# Patient Record
Sex: FEMALE | Race: BLACK OR AFRICAN AMERICAN | NOT HISPANIC OR LATINO | Employment: FULL TIME | URBAN - METROPOLITAN AREA
[De-identification: names, ages, dates, MRNs, and addresses within clinical notes are randomized per-mention and may not be internally consistent; named-entity substitution may affect disease eponyms.]

---

## 2017-01-03 ENCOUNTER — TELEPHONE (OUTPATIENT)
Dept: GASTROENTEROLOGY | Facility: CLINIC | Age: 27
End: 2017-01-03

## 2017-01-03 DIAGNOSIS — B37.31 VAGINAL YEAST INFECTION: Primary | ICD-10-CM

## 2017-01-03 RX ORDER — FLUCONAZOLE 150 MG/1
150 TABLET ORAL DAILY
Qty: 1 TABLET | Refills: 0 | Status: SHIPPED | OUTPATIENT
Start: 2017-01-03 | End: 2017-01-04

## 2017-01-03 NOTE — TELEPHONE ENCOUNTER
----- Message from Lamine Burks MD sent at 12/31/2016  4:17 PM CST -----  Mary Jo Ulloa's stomach pathology is still positive for H. Pylori recommend your prescibe her H.Pylori treatment with Quadruple therapy if not done yet.  Please warn her about not getting pregnant on treatment and the the meds can interact with any oral birth control if a patient were to be on it.  Recommend she get testing in 12 weeks after treatment to check for eradication but this stool for H. Pylori antigen needs to be done as follows:    Stool for H. Pylori Antigen    Please tell patient to check for H. Pylori eradiation we would like to check stool sample in 12 weeks which tells us if H. Pylori has been successfully eradicated with the prior treatment medications.      Please order Stool for H. Pylori Antigen in 12 weeks.    Cherelle - please order Stool for H. Pylori Antigen in 12 weeks.    Stool for H. Pylori antigen test which tells us if H. Pylori has been successfully eradicated with the prior treatment medications, and please tell patient that Stool H. Pylori antigen needs to be done off the following medications for the following amount of time:    1. Off all Antibiotics for 4 (Four) weeks before stool collection.      2. Off all Proton Pump Inhibitors medications for 2 (Two) weeks before collecting stool for H. Pylori Antigen:  :  Nexium (esomeprazole)  Prilosec (omeprazole)   Protonix (pantoprazole)  Prevacid (lansoprazole)  Aciphex (rabeprazole)  Dexilant (dexlansoprazole)    Zegerid     3. Off all H2 blockers medications for 2 (Two) weeks before collecting stool for H. Pylori Antigen:    Zantac (ranitidine)  Tagamet (cimetadine)  Axid (nizatidine)   Pepcid (famotidine)    4. Off Pepto-Bismol for 4 (four) weeks prior to collecting stool for H. Pylori Antigen.    Cherelle - please mail this to patient so they can follow it for their stool for H. pylori antigen collection.            SPECIMEN  1) Stomach biopsy.  FINAL  PATHOLOGIC DIAGNOSIS  STOMACH (BIOPSY):  Moderate chronic antral and oxyntic gastritis with mild activity  Helicobacter organisms present  Diagnosed by: Argentina Jaramillo M.D.       Patient Result Comments   Entered by Lamine Burks MD at 12/12/2016  9:15 PM  Mary Jo Ulloa's stomach pathology is still positive for H. Pylori recommend your prescibe her H.Pylori treatment with Quadruple therapy if not done yet.  Please warn her about not getting pregnant on treatment and the the meds can interact with any oral birth control if a patient were to be on it.  Recommend she get testing in 12 weeks after treatment to check for eradication but this stool for H. Pylori antigen needs to be done as follows:     Stool for H. Pylori Antigen     Please tell patient to check for H. Pylori eradiation we would like to check stool sample in 12 weeks which tells us if H. Pylori has been successfully eradicated with the prior treatment medications.       Please order Stool for H. Pylori Antigen in 12 weeks.     Cherelle - please order Stool for H. Pylori Antigen in 12 weeks.     Stool for H. Pylori antigen test which tells us if H. Pylori has been successfully eradicated with the prior treatment medications, and please tell patient that Stool H. Pylori antigen needs to be done off the following medications for the following amount of time:     1. Off all Antibiotics for 4 (Four) weeks before stool collection.       2. Off all Proton Pump Inhibitors medications for 2 (Two) weeks before collecting stool for H. Pylori Antigen:   :   Nexium (esomeprazole)   Prilosec (omeprazole)   Protonix (pantoprazole)   Prevacid (lansoprazole)   Aciphex (rabeprazole)   Dexilant (dexlansoprazole)     Zegerid     3. Off all H2 blockers medications for 2 (Two) weeks before collecting stool for H. Pylori Antigen:     Zantac (ranitidine)   Tagamet (cimetadine)   Axid (nizatidine)   Pepcid (famotidine)     4. Off Pepto-Bismol for 4 (four) weeks prior to  collecting stool for H. Pylori Antigen.     Cherelle - please mail this to patient so they can follow it for their stool for H. pylori antigen collection.             SPECIMEN   1) Stomach biopsy.   FINAL PATHOLOGIC DIAGNOSIS   STOMACH (BIOPSY):   Moderate chronic antral and oxyntic gastritis with mild activity   Helicobacter organisms present   Diagnosed by: Argentina Jaramillo M.D.

## 2017-01-25 ENCOUNTER — TELEPHONE (OUTPATIENT)
Dept: ORTHOPEDICS | Facility: CLINIC | Age: 27
End: 2017-01-25

## 2017-01-25 DIAGNOSIS — M54.50 LUMBAR SPINE PAIN: Primary | ICD-10-CM

## 2017-02-01 ENCOUNTER — OFFICE VISIT (OUTPATIENT)
Dept: ORTHOPEDICS | Facility: CLINIC | Age: 27
End: 2017-02-01
Payer: COMMERCIAL

## 2017-02-01 ENCOUNTER — HOSPITAL ENCOUNTER (OUTPATIENT)
Dept: RADIOLOGY | Facility: HOSPITAL | Age: 27
Discharge: HOME | End: 2017-02-01
Attending: ORTHOPAEDIC SURGERY
Payer: COMMERCIAL

## 2017-02-01 VITALS
WEIGHT: 147 LBS | HEIGHT: 62 IN | HEART RATE: 103 BPM | DIASTOLIC BLOOD PRESSURE: 80 MMHG | BODY MASS INDEX: 27.05 KG/M2 | SYSTOLIC BLOOD PRESSURE: 124 MMHG

## 2017-02-01 DIAGNOSIS — M54.50 CHRONIC LOW BACK PAIN WITHOUT SCIATICA, UNSPECIFIED BACK PAIN LATERALITY: Primary | ICD-10-CM

## 2017-02-01 DIAGNOSIS — M54.50 LUMBAR SPINE PAIN: ICD-10-CM

## 2017-02-01 DIAGNOSIS — G89.29 CHRONIC LOW BACK PAIN WITHOUT SCIATICA, UNSPECIFIED BACK PAIN LATERALITY: Primary | ICD-10-CM

## 2017-02-01 PROCEDURE — 99999 PR PBB SHADOW E&M-EST. PATIENT-LVL III: CPT | Mod: PBBFAC,,, | Performed by: ORTHOPAEDIC SURGERY

## 2017-02-01 PROCEDURE — 72120 X-RAY BEND ONLY L-S SPINE: CPT | Mod: 26,,, | Performed by: RADIOLOGY

## 2017-02-01 PROCEDURE — 72120 X-RAY BEND ONLY L-S SPINE: CPT | Mod: TC

## 2017-02-01 PROCEDURE — 72100 X-RAY EXAM L-S SPINE 2/3 VWS: CPT | Mod: 26,,, | Performed by: RADIOLOGY

## 2017-02-01 PROCEDURE — 99204 OFFICE O/P NEW MOD 45 MIN: CPT | Mod: S$GLB,,, | Performed by: ORTHOPAEDIC SURGERY

## 2017-02-01 RX ORDER — CIPROFLOXACIN 500 MG/1
TABLET ORAL
Refills: 0 | COMMUNITY
Start: 2016-12-12 | End: 2017-03-01

## 2017-02-01 RX ORDER — DICLOFENAC SODIUM 10 MG/G
2 GEL TOPICAL 3 TIMES DAILY PRN
Qty: 1 TUBE | Refills: 6 | Status: SHIPPED | OUTPATIENT
Start: 2017-02-01 | End: 2017-11-22 | Stop reason: SDUPTHER

## 2017-02-01 NOTE — PROGRESS NOTES
DATE: 2/1/2017  PATIENT: Laila Collazo    Attending Physician: Quique Tanner M.D.    CHIEF COMPLAINT: back and neck pain    HISTORY:  Laila Collazo is a 26 y.o. 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 The pain has been present for >10 years without inciting event. The patient describes the pain as achy pain.  The pain is worse with lying flat and prolonged sitting and improved by walking. There is no associated numbness and tingling. There is no subjective weakness. Prior treatments have included nothing.  NSAIDs cause GI symptoms.    The Patient denies myelopathic symptoms such as handwriting changes or difficulty with buttons/coins/keys. Denies perineal paresthesias, bowel/bladder dysfunction.    PAST MEDICAL/SURGICAL HISTORY:  Past Medical History   Diagnosis Date    Helicobacter pylori ab+      Past Surgical History   Procedure Laterality Date    No past surgeries         Current Medications:   Current Outpatient Prescriptions:     pantoprazole (PROTONIX) 40 MG tablet, Take 1 tablet (40 mg total) by mouth once daily., Disp: 30 tablet, Rfl: 3    ciprofloxacin HCl (CIPRO) 500 MG tablet, TK 1 T PO BID, Disp: , Rfl: 0    diclofenac sodium (VOLTAREN) 1 % Gel, Apply 2 g topically 3 (three) times daily as needed. To back, Disp: 1 Tube, Rfl: 6    ondansetron (ZOFRAN) 4 MG tablet, Take 1 tablet (4 mg total) by mouth every 6 (six) hours as needed for Nausea., Disp: 30 tablet, Rfl: 0    Social History:   Social History     Social History    Marital status: Single     Spouse name: N/A    Number of children: N/A    Years of education: N/A     Occupational History    Not on file.     Social History Main Topics    Smoking status: Never Smoker    Smokeless tobacco: Never Used    Alcohol use No    Drug use: No    Sexual activity: Not on file     Other Topics Concern    Not on file     Social History Narrative       REVIEW  "OF SYSTEMS:  Constitution: Negative. Negative for chills, fever and night sweats.   Cardiovascular: Negative for chest pain and syncope.   Respiratory: Negative for cough and shortness of breath.   Gastrointestinal: See HPI. Negative for nausea/vomiting. Negative for abdominal pain.  Genitourinary: See HPI. Negative for discoloration or dysuria.  Skin: Negative for dry skin, itching and rash.   Hematologic/Lymphatic: neg for bleeding/clotting disorders.   Musculoskeletal: Negative for falls and muscle weakness.   Neurological: See HPI. neg history of seizures. neg history of cranial surgery or shunts.  Endocrine: Negative for polydipsia, polyphagia and polyuria.   Allergic/Immunologic: Negative for hives and persistent infections.    PHYSICAL EXAMINATION:    Visit Vitals    /80 (BP Location: Right arm, Patient Position: Sitting, BP Method: Automatic)    Pulse 103    Ht 5' 2" (1.575 m)    Wt 66.7 kg (147 lb)    LMP 01/21/2017 (Exact Date)    BMI 26.89 kg/m2       General: The patient is a very pleasant 26 y.o. female in no apparent distress, the patient is orientatied to person, place and time.   Psych: Normal mood and affect  HEENT: Vision grossly intact, hearing intact to the spoken word.  Lungs: Respirations unlabored.  Gait: Normal station and gait, no difficulty with toe or heel walk.   Skin: Dorsal lumbar skin negative for rashes, lesions, hairy patches and surgical scars.  Range of motion: Lumbar range of motion is acceptable. There is no lumbar tenderness to palpation.  Spinal Balance: Global saggital and coronal spinal balance acceptable, no significant for scoliosis and kyphosis.  Musculoskeletal: No pain with the range of motion of the bilateral hips. No trochanteric tenderness to palpation.  Vascular: Bilateral lower extremities warm and well perfused, Dorsalis pedis pulses 2+ bilaterally.  Neurological: Normal strength and tone in all major motor groups in the bilateral lower extremities. " Normal sensation to light touch in the L2-S1 dermatomes bilaterally.  Deep tendon reflexes symmetric 3+ brisk in the bilateral lower extremities.  Negative Babinski bilaterally.    IMAGING:   Today I personally reviewed AP, Lat and Flex/Ex upright L-spine films that demonstrate normal lumbar spine films.  Prior scoliosis films show no abnormalities.       ASSESSMENT/PLAN:    Laila RAY was seen today for back pain.    Diagnoses and all orders for this visit:    Chronic low back pain without sciatica, unspecified back pain laterality  -     Ambulatory Referral to Physical/Occupational Therapy    Other orders  -     diclofenac sodium (VOLTAREN) 1 % Gel; Apply 2 g topically 3 (three) times daily as needed. To back        If symptoms persist, MRI lumbar spine

## 2017-03-01 ENCOUNTER — OFFICE VISIT (OUTPATIENT)
Dept: GASTROENTEROLOGY | Facility: CLINIC | Age: 27
End: 2017-03-01
Payer: COMMERCIAL

## 2017-03-01 ENCOUNTER — OFFICE VISIT (OUTPATIENT)
Dept: INTERNAL MEDICINE | Facility: CLINIC | Age: 27
End: 2017-03-01
Payer: COMMERCIAL

## 2017-03-01 VITALS
HEIGHT: 62 IN | HEART RATE: 91 BPM | DIASTOLIC BLOOD PRESSURE: 60 MMHG | WEIGHT: 144.19 LBS | SYSTOLIC BLOOD PRESSURE: 110 MMHG | TEMPERATURE: 98 F | OXYGEN SATURATION: 99 % | BODY MASS INDEX: 26.53 KG/M2

## 2017-03-01 VITALS
DIASTOLIC BLOOD PRESSURE: 75 MMHG | HEIGHT: 62 IN | BODY MASS INDEX: 27.23 KG/M2 | HEART RATE: 86 BPM | SYSTOLIC BLOOD PRESSURE: 115 MMHG | WEIGHT: 148 LBS

## 2017-03-01 DIAGNOSIS — Z00.00 ANNUAL PHYSICAL EXAM: Primary | ICD-10-CM

## 2017-03-01 DIAGNOSIS — R30.0 DYSURIA: ICD-10-CM

## 2017-03-01 DIAGNOSIS — Z11.3 SCREEN FOR STD (SEXUALLY TRANSMITTED DISEASE): ICD-10-CM

## 2017-03-01 DIAGNOSIS — K21.9 GASTROESOPHAGEAL REFLUX DISEASE, ESOPHAGITIS PRESENCE NOT SPECIFIED: ICD-10-CM

## 2017-03-01 DIAGNOSIS — K29.60 EROSIVE GASTRITIS: ICD-10-CM

## 2017-03-01 DIAGNOSIS — A04.8 H. PYLORI INFECTION: Primary | ICD-10-CM

## 2017-03-01 DIAGNOSIS — R76.8 HELICOBACTER PYLORI AB+: ICD-10-CM

## 2017-03-01 LAB
BACTERIA #/AREA URNS AUTO: ABNORMAL /HPF
BILIRUB UR QL STRIP: NEGATIVE
CLARITY UR REFRACT.AUTO: ABNORMAL
COLOR UR AUTO: YELLOW
GLUCOSE UR QL STRIP: NEGATIVE
HGB UR QL STRIP: NEGATIVE
KETONES UR QL STRIP: NEGATIVE
LEUKOCYTE ESTERASE UR QL STRIP: NEGATIVE
MICROSCOPIC COMMENT: ABNORMAL
NITRITE UR QL STRIP: NEGATIVE
PH UR STRIP: 8 [PH] (ref 5–8)
PROT UR QL STRIP: NEGATIVE
SP GR UR STRIP: 1.02 (ref 1–1.03)
SQUAMOUS #/AREA URNS AUTO: 4 /HPF
URN SPEC COLLECT METH UR: ABNORMAL
UROBILINOGEN UR STRIP-ACNC: NEGATIVE EU/DL
WBC #/AREA URNS AUTO: 1 /HPF (ref 0–5)

## 2017-03-01 PROCEDURE — 1160F RVW MEDS BY RX/DR IN RCRD: CPT | Mod: S$GLB,,, | Performed by: PHYSICIAN ASSISTANT

## 2017-03-01 PROCEDURE — 99213 OFFICE O/P EST LOW 20 MIN: CPT | Mod: S$GLB,,, | Performed by: PHYSICIAN ASSISTANT

## 2017-03-01 PROCEDURE — 99999 PR PBB SHADOW E&M-EST. PATIENT-LVL III: CPT | Mod: PBBFAC,,, | Performed by: INTERNAL MEDICINE

## 2017-03-01 PROCEDURE — 99999 PR PBB SHADOW E&M-EST. PATIENT-LVL III: CPT | Mod: PBBFAC,,, | Performed by: PHYSICIAN ASSISTANT

## 2017-03-01 PROCEDURE — 99395 PREV VISIT EST AGE 18-39: CPT | Mod: S$GLB,,, | Performed by: INTERNAL MEDICINE

## 2017-03-01 PROCEDURE — 87591 N.GONORRHOEAE DNA AMP PROB: CPT

## 2017-03-01 PROCEDURE — 81001 URINALYSIS AUTO W/SCOPE: CPT

## 2017-03-01 RX ORDER — DOXYCYCLINE 100 MG/1
100 CAPSULE ORAL 2 TIMES DAILY
Qty: 28 CAPSULE | Refills: 0 | Status: SHIPPED | OUTPATIENT
Start: 2017-03-01 | End: 2017-12-14

## 2017-03-01 NOTE — PROGRESS NOTES
[facility] Consultation Note    Reason for Consult:  The primary encounter diagnosis was H. pylori infection. A diagnosis of Erosive gastritis was also pertinent to this visit.    PCP:   Melany Laughlin MD:  No referring provider defined for this encounter.    HPI:  This is a 26 y.o. female her to follow up on her abdominal pain. Here with her mother.  Her 12/2016 EGD revealed erosive gastritis, + H. Pylori   Since the EGD she has stopped taking ibuprofen 800 daily, and takes protonix 40mg daily.  Burning in stomach is 50% better  Food trigger - greasy foods  The tetracycline was too expensive, so she has not completed the quad therapy    Reports straining with BM, associated rectal irritation. Saw blood in stool, saw one of her doctors who diagnosed her with hemorrhoids. Drinks plenty of water. Diet low in fiber.   Denies lactose intolerance.  She has a hx of H. Pylori infection. H. Pylori IgG positive 4/2016. She was initially treated with amoxicillin and biaxin but this made her nauseous and she could not complete the regimen. Repeat stool Ag positive on 8/2016. She does not think she completed the re-treatment regimen. Richard FHx GI cancers. Denies lactose intolerance.     Had taken ibuprofen for 7 yrs for menstrual cramping and headaches frequently. Then swithced to aleve.     ROS:  Constitutional: No fevers, chills, No weight loss  ENT: No allergies  CV: No chest pain  Pulm: No cough, No shortness of breath  Ophtho: No vision changes  GI: see HPI  Derm: No rash  Heme: No lymphadenopathy, No bruising  MSK: No arthritis  : No dysuria, No hematuria  Endo: No hot or cold intolerance  Neuro: No syncope, No seizure  Psych: No anxiety, No depression    Medical History:  has a past medical history of Helicobacter pylori ab+.    Surgical History:  has a past surgical history that includes No past surgeries.    Family History: family history includes No Known Problems in her brother,  "father, mother, and sister. There is no history of Colon cancer, Crohn's disease, Esophageal cancer, Inflammatory bowel disease, Irritable bowel syndrome, Rectal cancer, Stomach cancer, or Ulcerative colitis..     Social History:  reports that she has never smoked. She has never used smokeless tobacco. She reports that she does not drink alcohol or use illicit drugs.    Review of patient's allergies indicates:  No Known Allergies    Current Outpatient Prescriptions on File Prior to Visit   Medication Sig Dispense Refill    pantoprazole (PROTONIX) 40 MG tablet Take 1 tablet (40 mg total) by mouth once daily. 30 tablet 3    diclofenac sodium (VOLTAREN) 1 % Gel Apply 2 g topically 3 (three) times daily as needed. To back 1 Tube 6    [DISCONTINUED] ciprofloxacin HCl (CIPRO) 500 MG tablet TK 1 T PO BID  0    [DISCONTINUED] ondansetron (ZOFRAN) 4 MG tablet Take 1 tablet (4 mg total) by mouth every 6 (six) hours as needed for Nausea. 30 tablet 0     No current facility-administered medications on file prior to visit.          Objective Findings:    Vital Signs:  /75  Pulse 86  Ht 5' 2" (1.575 m)  Wt 67.1 kg (148 lb)  LMP 01/21/2017 (Exact Date)  BMI 27.07 kg/m2  Body mass index is 27.07 kg/(m^2).    Physical Exam:  General Appearance: Well appearing in no acute distress  Head:   Normocephalic, without obvious abnormality  Eyes:    No scleral icterus  ENT: Neck supple, Lips, mucosa, and tongue normal  Lungs: CTA bilaterally in anterior and posterior fields, no wheezes, no crackles.  Heart:  Regular rate and rhythm, S1, S2 normal, no murmurs heard  Abdomen: Soft, mild LUQ, epigastric, RUQ tenderness,no guarding or rebound tenderness and  non distended with positive bowel sounds in all four quadrants.   Extremities: 2+ pulses, no edema  Skin: No rash  Neurologic: AAO x 3      Labs:  Lab Results   Component Value Date    WBC 5.06 04/04/2016    HGB 14.7 04/04/2016    HCT 44.6 04/04/2016     04/04/2016    " CHOL 165 05/02/2016    TRIG 51 05/02/2016    HDL 41 05/02/2016    ALT 8 (L) 04/01/2016    AST 17 04/01/2016     04/01/2016    K 3.7 04/01/2016     04/01/2016    CREATININE 0.8 04/01/2016    BUN 7 04/01/2016    CO2 23 04/01/2016    TSH 2.199 05/02/2016    HGBA1C 5.2 05/02/2016       Imaging:    Endoscopy:    none  Assessment:  1. H. pylori infection    2. Erosive gastritis      h. Pylori, she has not been able to complete treatment. Burning in stomach is 50% better taking PPI    Recommendations:  1.Quadruple therapy for H. Pylori with doxycycline.    2. Start a probiotic    3. Limit greasy goods and NSAIDs    4. High fiber diet to improve straining with BMs    Consider abdominal ultrasound if still symptomatic at next visit      No Follow-up on file.      Order summary:  Orders Placed This Encounter    doxycycline (VIBRAMYCIN) 100 MG Cap         Thank you so much for allowing me to participate in the care of Laila Pedraza PA-C

## 2017-03-01 NOTE — PROGRESS NOTES
"Subjective:       Patient ID: Laila Collazo is a 26 y.o. female.    Chief Complaint: annual    HPI   H pylori positive 8/2016. Went to see gastro but was not able to get medicine due to cost. Will be seeing ANGELICA Alvarado today. Acid reflux is much better. Has been taking the protonix qam. Changing her diet. No nausea/vomting. No diarrhea/constipation.   EGD 12/5/16 - nonbleeding erosions at the gastric antrum and prepyloric region of the stomach.     Chronic LBP/neck pain. Daily lower back pain. No radiation. Seen in back and spine - Dr. Tanner 2/1/17. Will be doing PT next week. Previously on ibuprofen but not on it anymore 2/2 GERD.    Dysuria - followed w/ urologist. W/ high tone pelvic floor dysfunction. No fevers/chills. R flank pain but pain when twisting.     Review of Systems   Constitutional: Negative for chills and fever.   HENT: Negative for congestion, postnasal drip, rhinorrhea and sore throat.    Eyes: Negative for visual disturbance.   Respiratory: Negative for cough, shortness of breath and wheezing.    Cardiovascular: Negative for chest pain, palpitations and leg swelling.   Gastrointestinal: Positive for blood in stool (small streaks on the stool. one episode. not recurred.). Negative for abdominal pain, constipation, diarrhea, nausea and vomiting.   Endocrine: Negative for cold intolerance and heat intolerance.   Genitourinary: Negative for dysuria and hematuria.   Musculoskeletal: Positive for back pain. Negative for arthralgias and myalgias.   Skin: Negative for rash.   Neurological: Negative for dizziness, weakness and numbness.       Reviewed HM. Tdap 2009. Pt will go to OB/GYN - Dr. Dueñas at [facility] for Gardasil vaccine.     Objective:      Physical Exam    /60  Pulse 91  Temp 98.3 °F (36.8 °C)  Ht 5' 2" (1.575 m)  Wt 65.4 kg (144 lb 2.9 oz)  LMP 01/21/2017 (Exact Date)  SpO2 99%  BMI 26.37 kg/m2        Assessment/Plan     Laila RAY was seen today for follow-up.    Diagnoses " and all orders for this visit:    Annual physical exam  -     CBC auto differential; Future  -     Hemoglobin A1c; Future  -     Comprehensive metabolic panel; Future  -     Lipid panel; Future  -     TSH; Future    Gastroesophageal reflux disease, esophagitis presence not specified - cont protonix daily.     Helicobacter pylori ab+ - f/u w/ GI. Discussed that if it happens again where the meds are too expensive, then she should let us know right away.     Screen for STD (sexually transmitted disease)  -     C. trachomatis/N. gonorrhoeae by AMP DNA Urine  -     RPR; Future  -     HIV-1 and HIV-2 antibodies; Future  -     Herpes simplex type 1&2 IgG,Herpes titer; Future  -     Hepatitis panel, acute; Future    Dysuria  -     Urinalysis  -     Urinalysis Microscopic    Return in about 1 year (around 3/1/2018).      Melany Malhotra MD  Department of Internal Medicine - [facility]  8:13 AM

## 2017-03-01 NOTE — PATIENT INSTRUCTIONS
Flagyl (as directed), doxycycline (as directed), bismuth subsalicylate (two chewable tablets four times a day x 2 weeks), protonix     Start taking a probiotic daily    Increase fiber intake    [email or web address]  Dropping Acid The Reflux Diet Cookbook and Cure -  Dylan Rader M.D.    GERD  Worst Foods for Acid Reflux  Chocolate (milk chocolate worse than dark chocolate)  Soda (all carbonated beverages)  Alcohol (beer, liquor, wine)  Fried foods  Gill, sausage, ribs  Cream sauce  Fatty meats (beef)  Butter, margarine, lard, shortening  Coffee, tea  Mint   High fat nuts  Hot sauces and pepper  Citrus fruit/juices      Acidic foods (pH - 1 is MORE acidic, 5 is LESS acidic)     Do not eat or drink these (lower numbers are worse)    Induction diet - For 2 weeks eat nothing below pH 5     Lemon juice 2.3  Grape cranberry juice 2.5  Stomach Acid 2.5  Gelatin Dessert 2.6  Lemon/lime 2.9/2.7  Vinegar 2.9  Gatorade 3.0  Fruits - plums, apricots, strawberries, cherries 3.0  Vitamin C (ascorbic acid) 3.0  Iced tea, Snapple 3.1  Mustard 3.2  Soft drinks 3.3  Nectarines 3.3  Pomegranate 3.3  Applesauce 3.4  Grapefruit 3.4  Kiwi 3.4  Barbecue sauce 3.4  Caesar dressing 3.5  [place] dressing 3.6  Strawberries 3.5  Pineapple juice 3.5  Beer 3.5  Wine 3.5  Grape 3.6  Apples 3.6  Pineapple 3.7  Pickle 3.7  Blackberries, blueberries 3.7  Sukhi 3.7  Orange 3.8  Cherries 3.9  Red Bull 3.9  Tomatoes 4.2  Coffee 5.1      These are Safe foods:  Agave  Aloe Vera  Apple (only red)  Bagels  Banana (worsens reflux in 1%)  Beans - black, red, lima, lentils  Bread - whole grain, rye  Caramel  Celery  Chamomile tea  Chicken - skinless, never fried  Chicken stock or bouillon  Coffee - one cup/day with milk  Fennel  Fish  Elvie  Green vegetables (no green peppers)  Herbs  Honey  Melon  Milk - skin, soy, or Lactaid skim milk  Mushrooms  Oatmeal  Olive oil  Parsley  Pasta  Pears  Popcorn  Potatoes  Red bell  peppers  Rice  Soups  Tofu  Turkey Breast  Turnip  Vegetables - no onion, tomatoes, peppers  Vinaigrette  Water - non carbonated  Whole grain breads, crackers, breakfast cereals      Best Foods for Acid Reflux  Whole grain breads  Oatmeal  Aloe Vera  Salad (no tomatoes, onions, cheese, or high fat dressing)  Banana  Melon  Fennel  Chicken and turkey (skinless, never fried)  Fish/seafood (never fried)  Celery  Parsley  Couscous and Rice    Maybe bad foods (Everyone is unique)  Tomatoes  Garlic  Onion  Nuts (macadamia nuts)  Apples (especially green)  Cucumber  Green peppers  Spicy food  Some herbal teas    GERD tips  Change what you eat:  Eat smaller meals  Eat slowly and chew thoroughly until food is almost liquid  Cut down on junk carbohydrates such as sugar and white flour  Use herbs in your cooking  Eat more raw foods (more than 10 ingredients is not a raw food)  Avoid trans fats and partially hydrogenated oils  Eat more fish and switch to grass fed beef  Switch your cooking oil to macadamia nut or olive oil  Watch extremes of salt intake (too high or too low is bad)    If just cutting out acidic foods is not enough, change how you eat:  Large breakfast, medium lunch, light dinner  Dont mix fruit juices, sweet fruits, and refined starches with meats and heavy food  Dont wash your food down with a lot of liquid    List A Proteins - meat, poultry, cheese, eggs, fish, beans, yogurt    List B Neutral - vegetables, salads, seeds, nuts, herbs, cream, butter, olive oil    List C Starches - biscuit, breads, cake, crackers, oats, pasta, potatoes, rice, sugar/honey, sweets    A + B = ok  B + C = ok  Never mix list A and C!!    Change these habits:  Stop smoking  Eat dinner earlier (3-4 hours before lying down to sleep)  Elevate the head of your bed 6 inches (blocks under the head of the bed are better than pillows)  Exercise (but wait 2 hours after eating)  Drink more water (between meals)    Take these  supplements:  Multi vitamin  Probiotic  Fish oil    Most common food allergens: milk, eggs, peanuts, tree nuts, fish, shellfish, wheat, and soy    All natural immediate relief:  Chew 2-3 soft probiotic capsules - Dr. Mann's Probiotics 12 Plus  Chew chewable DGL licorice tablet  Chew papaya tablet with high protein meal - [employer] [facility]  Drink 2 ounces of aloe vera juice  Swedish bitters  Prelief- reduce the acid in food to keep it form burning sensitive tissue  Iberogast  [place]  Drink Chamomile Tea  Teaspoon of baking soda in water  Spoonful of vinegar in water      All natural ulcer healers:  Zinc carnosine - 75.5 mg with food twice a day x 8 weeks   Adrien by Ritesh - $8 for 60 pills  DGL (deglycyrrhizinated licorice) - 2 tablets before meals. Heals stomach lining   Natural Factors brand, Enzymatic therapy brand.  Aloe Vera juice  - 2 to 8 ounces a day   [place] or [place]      HIGH FIBER KEY POINTS:  1. Goal of 20-25 grams of fiber each day for women, 30-35 grams each day for men. Slowly build up to this goal as you may experience gas and bloating at first.  2. Take a fiber supplement to help reach this goal: Metamucil, Citrucel, Fibercon, Konsyl, or Psyllium  3. For Constipation: Finely ground psyllium husk (with or without flavoring or                                              Additives)   - To start: 1 teaspoon once a day in the morning with 8 oz of liquid    followed by an additional 8 ounce glass of water   - After a week: add a second teaspoon in the middle of the day   - After two weeks: add a third teaspoon at bedtime   - Follow each dose with another glass of water. Can add a splash of juice   or lemonade for taste.  3. Drink 6-8 glasses of water a day.  4. Try to do plant fiber (fruits and vegetables) over processed fibers (cereals and breads)     HIGH FIBER DIET  Fiber is present in all fruits, vegetables, cereals and grains. Fiber passes through the body undigested. A high  fiber diet helps food move through the intestinal tract. The added bulk is helpful in preventing constipation. In persons with diverticulosis it serves to clean out the pouches along the colon wall while preventing new ones from forming. A high fiber diet may reduce the risk of colon cancer, decreases blood cholesterol and prevents high blood sugar in diabetics.    The foods listed below are high in fiber and should be included in your diet. If you are not used to high fiber foods, start with 1 or 2 foods from this list. Every 3-4 days add a new one to your diet until you are eating 4 high fiber foods per day. This should give you 20-35 Gm of fiber/day. It is also important to drink a lot of water when you are on this diet (6-8 glasses a day). Water causes the fiber to swell and increases the benefit.  Add Fiber to Your Diet   Adding fiber to your diet can help relieve constipation by making stools softer and easier to pass. To increase your fiber intake, your doctor may recommend a bulking agent, such as psyllium. This is a high-fiber supplement available at most grocery and drugstores. Eating more fiber-rich foods will also help. There are two types of fiber:   Insoluble fiber is the main ingredient in bulking agents. Its also found in foods such as wheat bran, whole-grain breads, fresh fruits, and vegetables.   Soluble fiber is found in foods such as oat bran. Although soluble fiber is good for you, it may not ease constipation as much as foods high in insoluble fiber.    FOODS HIGH IN DIETARY FIBER:  BREADS: Made with 100% whole wheat flour; José, wheat or rye crackers; tortillas, bran muffins. Whole grains, such as wheat bran, corn bran, and brown rice.  CEREALS: Whole grain cereal with bran (Chex, Raisin Bran, Corn Bran), oatmeal, rolled oats, granola, wheat flakes, brown rice  NUTS: Any nuts  FRUITS: All fresh fruits along with edible skins, (bananas, citrus fruit, mangoes, pears, prunes, raisins, apples,  pineapple, apricot, melon, jams and marmalades), fruit juices (especially prune juice)  VEGETABLES: All types, preferably raw or lightly cooked: especially, celery, eggplant, potatoes,spinach, broccoli, brussel sprouts, winter squash, carrots, cauliflower, soybeans, lentils, fresh and dried beans of all kinds  OTHER: Popcorn, any spices.   Nuts and legumes, especially peanuts, lentils, and kidney beans.  Easy Ways to Add Fiber   The tips below offer some simple ways to add more high-fiber foods to your meals.   Start your day with a high-fiber breakfast. Eat a wheat bran cereal along with a sliced banana. Or, try peanut butter on whole-wheat toast.   Eat carrot sticks for snacks. Theyre easy to prepare, taste great, and are low in calories.   Use whole-grain breads instead of white bread for sandwiches.   Eat fruits for treats. Try an apple and some raisins instead of a candy bar.  Vegetables  Artichoke, cooked 10.3  Asparagus - 2.8  Beans - 2  Broccoli, boiled 1 cup - 5.1  [place] sprouts, cooked 1 cup - 4.1  Carrots - boiled 2.5, raw 4  Celery - 1  Corn - 4  Corn on the Cob - 5.9  Lettuce - 1  Peas (canned) - 4  Peas (dried) - 7.9  Green peas (cooked) - 8.8  Potato, with skin, baked - 3.0  Spinach - 4  Sweet potato - 3.4  Turnip greens, boiled 1 cup - 5.0  Sweet corn, cooked  1 cup  4.0  Tomato paste -1/4 cup -2.7  Yam - 2.7  Legumes, Nuts, and Seeds  Split peas, cooked  1 cup  16.3  Lentils, cooked 1 cup 15.6  Black beans, cooked 1 cup 15.0  Black eyed peas (1/2 cup) 4.2  Chick peas (1/2 cup) 5  Lima beans, cooked 1 cup  13.2  Baked beans, vegetarian, canned, cooked 1 cup 10.4  Sunflower seed kernels 1/4 cup 3.9  Almonds 1 ounce (23 nuts)  3.5  Pistachio nuts 1 ounce (49 nuts) 2.9  Pecans 1 ounce (19 halves) 2.7  Beans (lima,kidney,baked) - 10 (1/2 cup)  Refried beans -12 (1cup)  Lentils - 8  Soybeans (1/2 cup) 5  Fruit  Raspberries  1 cup  8.0  Pear, with skin - 5.5  Apple, with skin 4.4 (Juice = 0)  Banana -  3.1  Orange - 3.1  Strawberries (halves) 1 cup - 3.0  Figs, dried 2 medium - 1.6  Raisins 1 ounce (60 raisins) -1.0  Grapefruit - 1.4  Peach - 2  Kiwi - 5  Sukhi - 3.7  Pineapple - 2  Cereal, Grains, and Pasta  Spaghetti, whole-wheat, cooked 1 cup 6.3  Barley, pearled, cooked 1 cup 6.0  Bran flakes 3/4 cup 5.3  Oat bran muffin 1 medium 5.2  Oatmeal, instant, cooked 1 cup 4.0  Popcorn, air-popped 3 cups 3.5  Brown rice, cooked  1 cup  3.5  Bread, rye 1 slice 1.9, pumpernickel - 2.1  Bagel - 1.6  Bread, whole-wheat or multigrain  1 slice 1.9  Fiber One - 14  100% Bran - 13  Raisin Bran - 3.5  All Bran Extra Fiber - 13

## 2017-03-01 NOTE — MR AVS SNAPSHOT
[facility]enter[facility]  [address]  [place] [address]  Phone: [phone]  Fax: [phone]                  Laila Collazo   3/1/2017 9:00 AM   Office Visit    Description:  Female : 1990   Provider:  Mary Jo Pedraza PA-C   Department:  [facility]                To Do List           Future Appointments        Provider Department Dept Phone    3/8/2017 5:00 PM Sowmya Freire, PT [facility] [phone]    2017 9:00 AM Mary Jo Pedraza PA-C [facility] [phone]      Goals (5 Years of Data)     None       These Medications        Disp Refills Start End    doxycycline (VIBRAMYCIN) 100 MG Cap 28 capsule 0 3/1/2017     Take 1 capsule (100 mg total) by mouth 2 (two) times daily. - Oral    Pharmacy: [facility] [address] [place] [address] AT [facility] Ph #: [phone]         [facility]     [facility] -  Assistance  Registered nurses in the [facility] provide clinical advisement, health education, appointment booking, and other advisory services.  Call for this free service at [phone].             Medications           Message regarding Medications     Verify the changes and/or additions to your medication regime listed below are the same as discussed with your clinician today.  If any of these changes or additions are incorrect, please notify your healthcare provider.        START taking these NEW medications        Refills    doxycycline (VIBRAMYCIN) 100 MG Cap 0    Sig: Take 1 capsule (100 mg total) by mouth 2 (two) times daily.    Class: Normal    Route: Oral           Verify that the below list of medications is an accurate representation of the medications you are currently taking.  If none reported, the list may be blank. If incorrect, please contact your healthcare provider. Carry this list with you in  case of emergency.           Current Medications     pantoprazole (PROTONIX) 40 MG tablet Take 1 tablet (40 mg total) by mouth once daily.    diclofenac sodium (VOLTAREN) 1 % Gel Apply 2 g topically 3 (three) times daily as needed. To back    doxycycline (VIBRAMYCIN) 100 MG Cap Take 1 capsule (100 mg total) by mouth 2 (two) times daily.           Clinical Reference Information           Your Vitals Were     BP                   115/75           Blood Pressure          Most Recent Value    BP  115/75      Allergies as of 3/1/2017     No Known Allergies      Immunizations Administered on Date of Encounter - 3/1/2017     None      Instructions    Flagyl (as directed), doxycycline (as directed), bismuth subsalicylate (two chewable tablets four times a day x 2 weeks), protonix     Start taking a probiotic daily    Increase fiber intake    [email or web address].com/  Dropping Acid The Reflux Diet Cookbook and Cure -  Dylan Rader M.D.    GERD  Worst Foods for Acid Reflux  Chocolate (milk chocolate worse than dark chocolate)  Soda (all carbonated beverages)  Alcohol (beer, liquor, wine)  Fried foods  Gill, sausage, ribs  Cream sauce  Fatty meats (beef)  Butter, margarine, lard, shortening  Coffee, tea  Mint   High fat nuts  Hot sauces and pepper  Citrus fruit/juices      Acidic foods (pH - 1 is MORE acidic, 5 is LESS acidic)     Do not eat or drink these (lower numbers are worse)    Induction diet - For 2 weeks eat nothing below pH 5     Lemon juice 2.3  Grape cranberry juice 2.5  Stomach Acid 2.5  Gelatin Dessert 2.6  Lemon/lime 2.9/2.7  Vinegar 2.9  Gatorade 3.0  Fruits - plums, apricots, strawberries, cherries 3.0  Vitamin C (ascorbic acid) 3.0  Iced tea, Snapple 3.1  Mustard 3.2  Soft drinks 3.3  Nectarines 3.3  Pomegranate 3.3  Applesauce 3.4  Grapefruit 3.4  Kiwi 3.4  Barbecue sauce 3.4  Caesar dressing 3.5  [place] dressing 3.6  Strawberries 3.5  Pineapple juice 3.5  Beer 3.5  Wine 3.5  Grape  3.6  Apples 3.6  Pineapple 3.7  Pickle 3.7  Blackberries, blueberries 3.7  Sukhi 3.7  Orange 3.8  Cherries 3.9  Red Bull 3.9  Tomatoes 4.2  Coffee 5.1      These are Safe foods:  Agave  Aloe Vera  Apple (only red)  Bagels  Banana (worsens reflux in 1%)  Beans - black, red, lima, lentils  Bread - whole grain, rye  Caramel  Celery  Chamomile tea  Chicken - skinless, never fried  Chicken stock or bouillon  Coffee - one cup/day with milk  Fennel  Fish  Elvie  Green vegetables (no green peppers)  Herbs  Honey  Melon  Milk - skin, soy, or Lactaid skim milk  Mushrooms  Oatmeal  Olive oil  Parsley  Pasta  Pears  Popcorn  Potatoes  Red bell peppers  Rice  Soups  Tofu  Turkey Breast  Turnip  Vegetables - no onion, tomatoes, peppers  Vinaigrette  Water - non carbonated  Whole grain breads, crackers, breakfast cereals      Best Foods for Acid Reflux  Whole grain breads  Oatmeal  Aloe Vera  Salad (no tomatoes, onions, cheese, or high fat dressing)  Banana  Melon  Fennel  Chicken and turkey (skinless, never fried)  Fish/seafood (never fried)  Celery  Parsley  Couscous and Rice    Maybe bad foods (Everyone is unique)  Tomatoes  Garlic  Onion  Nuts (macadamia nuts)  Apples (especially green)  Cucumber  Green peppers  Spicy food  Some herbal teas    GERD tips  Change what you eat:  Eat smaller meals  Eat slowly and chew thoroughly until food is almost liquid  Cut down on junk carbohydrates such as sugar and white flour  Use herbs in your cooking  Eat more raw foods (more than 10 ingredients is not a raw food)  Avoid trans fats and partially hydrogenated oils  Eat more fish and switch to grass fed beef  Switch your cooking oil to macadamia nut or olive oil  Watch extremes of salt intake (too high or too low is bad)    If just cutting out acidic foods is not enough, change how you eat:  Large breakfast, medium lunch, light dinner  Dont mix fruit juices, sweet fruits, and refined starches with meats and heavy food  Dont wash your  food down with a lot of liquid    List A Proteins - meat, poultry, cheese, eggs, fish, beans, yogurt    List B Neutral - vegetables, salads, seeds, nuts, herbs, cream, butter, olive oil    List C Starches - biscuit, breads, cake, crackers, oats, pasta, potatoes, rice, sugar/honey, sweets    A + B = ok  B + C = ok  Never mix list A and C!!    Change these habits:  Stop smoking  Eat dinner earlier (3-4 hours before lying down to sleep)  Elevate the head of your bed 6 inches (blocks under the head of the bed are better than pillows)  Exercise (but wait 2 hours after eating)  Drink more water (between meals)    Take these supplements:  Multi vitamin  Probiotic  Fish oil    Most common food allergens: milk, eggs, peanuts, tree nuts, fish, shellfish, wheat, and soy    All natural immediate relief:  Chew 2-3 soft probiotic capsules - Dr. Mann's Probiotics 12 Plus  Chew chewable DGL licorice tablet  Chew papaya tablet with high protein meal - [employer]  Drink 2 ounces of aloe vera juice  Swedish bitters  Prelief- reduce the acid in food to keep it form burning sensitive tissue  Iberogast  [place]  Drink Chamomile Tea  Teaspoon of baking soda in water  Spoonful of vinegar in water      All natural ulcer healers:  Zinc carnosine - 75.5 mg with food twice a day x 8 weeks   Adrien Awad - $8 for 60 pills  DGL (deglycyrrhizinated licorice) - 2 tablets before meals. Heals stomach lining   Natural Factors brand, Enzymatic therapy brand.  Aloe Vera juice  - 2 to 8 ounces a day   [place] or [place]      HIGH FIBER KEY POINTS:  1. Goal of 20-25 grams of fiber each day for women, 30-35 grams each day for men. Slowly build up to this goal as you may experience gas and bloating at first.  2. Take a fiber supplement to help reach this goal: Metamucil, Citrucel, Fibercon, Konsyl, or Psyllium  3. For Constipation: Finely ground psyllium husk (with or without flavoring or                                               Additives)   - To start: 1 teaspoon once a day in the morning with 8 oz of liquid    followed by an additional 8 ounce glass of water   - After a week: add a second teaspoon in the middle of the day   - After two weeks: add a third teaspoon at bedtime   - Follow each dose with another glass of water. Can add a splash of juice   or lemonade for taste.  3. Drink 6-8 glasses of water a day.  4. Try to do plant fiber (fruits and vegetables) over processed fibers (cereals and breads)     HIGH FIBER DIET  Fiber is present in all fruits, vegetables, cereals and grains. Fiber passes through the body undigested. A high fiber diet helps food move through the intestinal tract. The added bulk is helpful in preventing constipation. In persons with diverticulosis it serves to clean out the pouches along the colon wall while preventing new ones from forming. A high fiber diet may reduce the risk of colon cancer, decreases blood cholesterol and prevents high blood sugar in diabetics.    The foods listed below are high in fiber and should be included in your diet. If you are not used to high fiber foods, start with 1 or 2 foods from this list. Every 3-4 days add a new one to your diet until you are eating 4 high fiber foods per day. This should give you 20-35 Gm of fiber/day. It is also important to drink a lot of water when you are on this diet (6-8 glasses a day). Water causes the fiber to swell and increases the benefit.  Add Fiber to Your Diet   Adding fiber to your diet can help relieve constipation by making stools softer and easier to pass. To increase your fiber intake, your doctor may recommend a bulking agent, such as psyllium. This is a high-fiber supplement available at most grocery and drugstores. Eating more fiber-rich foods will also help. There are two types of fiber:   Insoluble fiber is the main ingredient in bulking agents. Its also found in foods such as wheat bran, whole-grain breads, fresh fruits, and  vegetables.   Soluble fiber is found in foods such as oat bran. Although soluble fiber is good for you, it may not ease constipation as much as foods high in insoluble fiber.    FOODS HIGH IN DIETARY FIBER:  BREADS: Made with 100% whole wheat flour; José, wheat or rye crackers; tortillas, bran muffins. Whole grains, such as wheat bran, corn bran, and brown rice.  CEREALS: Whole grain cereal with bran (Chex, Raisin Bran, Corn Bran), oatmeal, rolled oats, granola, wheat flakes, brown rice  NUTS: Any nuts  FRUITS: All fresh fruits along with edible skins, (bananas, citrus fruit, mangoes, pears, prunes, raisins, apples, pineapple, apricot, melon, jams and marmalades), fruit juices (especially prune juice)  VEGETABLES: All types, preferably raw or lightly cooked: especially, celery, eggplant, potatoes,spinach, broccoli, brussel sprouts, winter squash, carrots, cauliflower, soybeans, lentils, fresh and dried beans of all kinds  OTHER: Popcorn, any spices.   Nuts and legumes, especially peanuts, lentils, and kidney beans.  Easy Ways to Add Fiber   The tips below offer some simple ways to add more high-fiber foods to your meals.   Start your day with a high-fiber breakfast. Eat a wheat bran cereal along with a sliced banana. Or, try peanut butter on whole-wheat toast.   Eat carrot sticks for snacks. Theyre easy to prepare, taste great, and are low in calories.   Use whole-grain breads instead of white bread for sandwiches.   Eat fruits for treats. Try an apple and some raisins instead of a candy bar.  Vegetables  Artichoke, cooked 10.3  Asparagus - 2.8  Beans - 2  Broccoli, boiled 1 cup - 5.1  [place]nor sprouts, cooked 1 cup - 4.1  Carrots - boiled 2.5, raw 4  Celery - 1  Corn - 4  Corn on the Cob - 5.9  Lettuce - 1  Peas (canned) - 4  Peas (dried) - 7.9  Green peas (cooked) - 8.8  Potato, with skin, baked - 3.0  Spinach - 4  Sweet potato - 3.4  Turnip greens, boiled 1 cup - 5.0  Sweet corn, cooked  1 cup  4.0  Tomato  paste -1/4 cup -2.7  Yam - 2.7  Legumes, Nuts, and Seeds  Split peas, cooked  1 cup  16.3  Lentils, cooked 1 cup 15.6  Black beans, cooked 1 cup 15.0  Black eyed peas (1/2 cup) 4.2  Chick peas (1/2 cup) 5  Lima beans, cooked 1 cup  13.2  Baked beans, vegetarian, canned, cooked 1 cup 10.4  Sunflower seed kernels 1/4 cup 3.9  Almonds 1 ounce (23 nuts)  3.5  Pistachio nuts 1 ounce (49 nuts) 2.9  Pecans 1 ounce (19 halves) 2.7  Beans (lima,kidney,baked) - 10 (1/2 cup)  Refried beans -12 (1cup)  Lentils - 8  Soybeans (1/2 cup) 5  Fruit  Raspberries  1 cup  8.0  Pear, with skin - 5.5  Apple, with skin 4.4 (Juice = 0)  Banana - 3.1  Orange - 3.1  Strawberries (halves) 1 cup - 3.0  Figs, dried 2 medium - 1.6  Raisins 1 ounce (60 raisins) -1.0  Grapefruit - 1.4  Peach - 2  Kiwi - 5  [place] - 3.7  Pineapple - 2  Cereal, Grains, and Pasta  Spaghetti, whole-wheat, cooked 1 cup 6.3  Barley, pearled, cooked 1 cup 6.0  Bran flakes 3/4 cup 5.3  Oat bran muffin 1 medium 5.2  Oatmeal, instant, cooked 1 cup 4.0  Popcorn, air-popped 3 cups 3.5  Brown rice, cooked  1 cup  3.5  Bread, rye 1 slice 1.9, pumpernickel - 2.1  Bagel - 1.6  Bread, whole-wheat or multigrain  1 slice 1.9  Fiber One - 14  100% Bran - 13  Raisin Bran - 3.5  All Bran Extra Fiber - 13                                                             Language Assistance Services     ATTENTION: Language assistance services are available, free of charge. Please call [phone].      ATENCIÓN: Si yrn mckeon, tiene a linn disposición servicios gratuitos de asistencia lingüística. Leandrodarline al [phone].     KEN Ý: N?u b?n nói Ti?ng Vi?t, có các d?ch v? h? tr? ngôn ng? mi?n phí dành cho b?n. G?i s? [phone].         [facility] complies with applicable Federal civil rights laws and does not discriminate on the basis of race, color, national origin, age, disability, or sex.

## 2017-03-01 NOTE — MR AVS SNAPSHOT
[facility]  [address]  [facility] [address]  Phone: [phone]  Fax: [phone]                  Laila Collazo   3/1/2017 7:40 AM   Office Visit    Description:  Female : 1990   Provider:  Melany Malhotra MD   Department:  [facility]           Reason for Visit     Follow-up           Diagnoses this Visit        Comments    Annual physical exam    -  Primary     Gastroesophageal reflux disease, esophagitis presence not specified         Helicobacter pylori ab+         Screen for STD (sexually transmitted disease)         Dysuria                To Do List           Future Appointments        Provider Department Dept Phone    3/1/2017 9:00 AM BIN [facility] [phone]    3/8/2017 5:00 PM Sowmya Freire, PT [facility] [phone]      Goals (5 Years of Data)     None      Follow-Up and Disposition     Return in about 1 year (around 3/1/2018).    Follow-up and Disposition History      [facility]     [facility] -  Assistance  Registered nurses in the [facility] provide clinical advisement, health education, appointment booking, and other advisory services.  Call for this free service at [phone].             Medications           Message regarding Medications     Verify the changes and/or additions to your medication regime listed below are the same as discussed with your clinician today.  If any of these changes or additions are incorrect, please notify your healthcare provider.        STOP taking these medications     ciprofloxacin HCl (CIPRO) 500 MG tablet TK 1 T PO BID    ondansetron (ZOFRAN) 4 MG tablet Take 1 tablet (4 mg total) by mouth every 6 (six) hours as needed for Nausea.           Verify that the below list of medications is an accurate representation of the medications you are currently taking.  If none reported, the list may be  blank. If incorrect, please contact your healthcare provider. Carry this list with you in case of emergency.           Current Medications     diclofenac sodium (VOLTAREN) 1 % Gel Apply 2 g topically 3 (three) times daily as needed. To back    pantoprazole (PROTONIX) 40 MG tablet Take 1 tablet (40 mg total) by mouth once daily.           Clinical Reference Information           Your Vitals Were     BP                   110/60           Blood Pressure          Most Recent Value    BP  110/60      Allergies as of 3/1/2017     No Known Allergies      Immunizations Administered on Date of Encounter - 3/1/2017     None      Orders Placed During Today's Visit      Normal Orders This Visit    C. trachomatis/N. gonorrhoeae by AMP DNA Urine     Urinalysis Microscopic     Urinalysis     Future Labs/Procedures Expected by Expires    CBC auto differential  3/1/2017 4/30/2018    Comprehensive metabolic panel  3/1/2017 4/30/2018    Hemoglobin A1c  3/1/2017 4/30/2018    Hepatitis panel, acute  3/1/2017 3/1/2018    Herpes simplex type 1&2 IgG,Herpes titer  3/1/2017 3/1/2018    HIV-1 and HIV-2 antibodies  3/1/2017 3/1/2018    Lipid panel  3/1/2017 4/30/2018    RPR  3/1/2017 3/1/2018    TSH  3/1/2017 4/30/2018      Language Assistance Services     ATTENTION: Language assistance services are available, free of charge. Please call [phone].      ATENCIÓN: Si habla español, tiene a linn disposición servicios gratuitos de asistencia lingüística. Llame al [phone].     CHÚ Ý: N?u b?n nói Ti?ng Vi?t, có các d?ch v? h? tr? ngôn ng? mi?n phí dành cho b?n. G?i s? [phone].         [facility] complies with applicable Federal civil rights laws and does not discriminate on the basis of race, color, national origin, age, disability, or sex.

## 2017-03-02 ENCOUNTER — PATIENT MESSAGE (OUTPATIENT)
Dept: INTERNAL MEDICINE | Facility: CLINIC | Age: 27
End: 2017-03-02

## 2017-03-02 LAB
C TRACH DNA SPEC QL NAA+PROBE: NEGATIVE
N GONORRHOEA DNA SPEC QL NAA+PROBE: NEGATIVE

## 2017-03-08 ENCOUNTER — CLINICAL SUPPORT (OUTPATIENT)
Dept: REHABILITATION | Facility: HOSPITAL | Age: 27
End: 2017-03-08
Attending: ORTHOPAEDIC SURGERY
Payer: COMMERCIAL

## 2017-03-08 DIAGNOSIS — M54.50 CHRONIC MIDLINE LOW BACK PAIN WITHOUT SCIATICA: ICD-10-CM

## 2017-03-08 DIAGNOSIS — G89.29 CHRONIC MIDLINE LOW BACK PAIN WITHOUT SCIATICA: ICD-10-CM

## 2017-03-08 PROCEDURE — 97161 PT EVAL LOW COMPLEX 20 MIN: CPT | Mod: PO

## 2017-03-08 PROCEDURE — 97110 THERAPEUTIC EXERCISES: CPT | Mod: PO

## 2017-03-08 NOTE — PLAN OF CARE
"OUTPATIENT PHYSICAL THERAPY  PHYSICAL THERAPY EVALUATION    Name: Laila Collazo  Clinic Number: [id number]    Evaluation Date: 03/08/2017  Visit #: 1 / 40  Authorization period Expiration: 2/1/2018  Plan of Care Expiration: 5/8/2017  Precautions: standard    Diagnosis:   Encounter Diagnosis   Name Primary?    Chronic midline low back pain without sciatica      Physician: Quique Tanner MD  Treatment Orders: PT Eval and Treat  Past Medical History:   Diagnosis Date    Helicobacter pylori ab+      Current Outpatient Prescriptions   Medication Sig    diclofenac sodium (VOLTAREN) 1 % Gel Apply 2 g topically 3 (three) times daily as needed. To back    doxycycline (VIBRAMYCIN) 100 MG Cap Take 1 capsule (100 mg total) by mouth 2 (two) times daily.    pantoprazole (PROTONIX) 40 MG tablet Take 1 tablet (40 mg total) by mouth once daily.     No current facility-administered medications for this visit.      Review of patient's allergies indicates:  No Known Allergies    History   Prior Therapy: pelvic floor in 2016  Previous functional status: "deals with the pain and it doesn't stop me from doing anything"  Current functional status: no exercise, pain with work tasks  Work: works at [employer] as     Subjective   History of Present Illness: "has always had pain." Had scoliosis in childhood but was minor. Also c/o R thoracic and lower cervical pain that is more recent.  Imaging, lumbar x-ray: unremarkable  Pain: current 3/10, worst 7/10, best 3/10, Aching, constant  Radicular symptoms: denies  Aggravating factors: sleeping on a pillow, sitting, prolonged standing  Easing factors: nothing  Pts goals: The patients goal is to return to PLOF without pain or risk of re-injury.    Objective   Mental status: alert, oriented x3  Posture/ Alignment: Poor, Protracted Scapula, weight shift to L with posterior pelvic tilt and flattened lordosis in sitting, stands with anterior pelvic tilt and increased lumbar " lordosis    FUNCTION:   - Trunk Flexion: dysfunctional nonpainful   - Trunk Ext: functional painful   - Trunk Rot R: functional painful   - Trunk Rot L: functional painful   - SLS R: functional nonpainful   - SLS L: functional nonpainful   - Sit <--> Stand: uses UE to assist  - Bed Mobility: Independent, provokes some SX    Strength:   - Transverse Abdominis: impaired Pelvic tilt and compensates with breathing and superficial abdominals  - Diaphragm: deep inhalation with significant chest expansion and minimal diaphragm or core contraction; improves partially with cueing    Special Tests:  - Markell test R positive, L positive  - Prone Knee Bend R negative, L negative  - SLR: R 63 degrees, L 66 degrees, bilateral negative    Palpation:  SIJ side stepping: to R side R SIJ is mobile. To L side, no palpable SIJ mobility  Significant TTP thoracolumbar paraspinals (most severe mid to upper thoracic R>L, then lumbar, and least TTP lower thoracic)  Abnormal multifidi contraction with prone hip extension R and L    Joint Play:  Unable to assess due to tenderness to palpation    Pt/family was provided educational information, including: role of PT, goals for PT, scheduling - pt verbalized understanding. Discussed insurance plan with pt.     TREATMENT   Time In: 4:58 PM  Time Out: 5:55    Discussed Plan of Care with patient: Yes    Laila RAY received 15 minutes of therapeutic exercise including:   Pelvic tilt 10 x 5 sec, max manual cues  Diaphragmatic breathing x 1 min  LTR 10 x 1-2 sec  SKTC nerve glide x 10 each LE  Child's pose R, L, straight x 30 sec each  Kneeling hip flexor stretch x 30 sec each side  (NEXT VISIT: quadruped hip ext with glute squeeze)      Laila RAY received 5 minutes of manual therapy including:  STM thoracolumbar paraspinals, light      Written Home Exercises Provided: yes  Exercises were reviewed and Laila RAY was able to demonstrate them prior to the end of the session. Pt received a written copy of  exercises to perform at home. Laila RAY demonstrated good  understanding of the education provided.     Assessment   Laila RAY is a 26 y.o. female referred to outpatient physical therapy with a medical diagnosis of back pain due to adolescent scoliosis, impaired posture, weak core, and tight ham strings. Demonstrates impairments including: limitations as described in the problem list. Pt prognosis is Good. Positive prognostic factors include current level of function. Negative prognostic factors include chronic pain, high sensitivity to palpation. Pt will benefit from skilled outpatient physical therapy to address the above stated deficits, provide pt/family education, and to maximize pt's level of independence.     Medical necessity is demonstrated by the following IMPAIRMENTS/PROBLEMS:  weakness, impaired endurance, pain, abnormal tone, decreased ROM, impaired muscle length and joint hypomobility, impaired core motor control  Co-morbidities and personal factors: chronic pain, ulcers, and sits for entire work day.  Activity Limitations: trunk flexion, trunk extension, trunk rotation.  Participation restrictions: sleeping, sitting at work, driving, standing.   Clinical presentation: stable and uncomplicated  Complexity: low    Pt's spiritual, cultural and educational needs considered and pt agreeable to plan of care and goals as stated below:     Anticipated Barriers for physical therapy: chronic pain    Short Term GOALS:  In 4 weeks, pt. will:  - sit with appropriate posture with min cueing and no increase in pain for 5 min  - lie supine without pillow or leg support without pain increase for 5 min  - improve core motor control to complete sub-max pelvic tilt in hook lying > 10 sec with min cueing and less pain  - improve work chair set-up with lumbar roll to improve sitting tolerance and increase work tolerance    Long Term GOALS:  In 8 weeks, pt. will:  - be independent with HEP and SX management   - lie supine with  small pillow without pain increase in order to fall asleep  - improve core motor control to complete sub-max pelvic tilt with dynamic tasks > 10 sec with no cueing or pain  - improve work chair and car ergonimics with lumbar roll to normalize sitting tolerance without pain    Plan   Outpatient physical therapy 1- 2 times weekly to include: pt ed, HEP, therapeutic exercises, therapeutic activities, neuromuscular re-education/ balance exercises, manual therapy, and modalities prn. Cont PT for 2 months. Pt may be seen by PTA as part of the rehabilitation team.     I certify the need for these services furnished under this plan of treatment and while under my care.    Sowmya Freire, PT

## 2017-03-08 NOTE — PROGRESS NOTES
"OUTPATIENT PHYSICAL THERAPY  PHYSICAL THERAPY EVALUATION    Name: Laila Collazo  Clinic Number: [id number]    Evaluation Date: 03/08/2017  Visit #: 1 / 40  Authorization period Expiration: 2/1/2018  Plan of Care Expiration: 5/8/2017  Precautions: standard    Diagnosis:   Encounter Diagnosis   Name Primary?    Chronic midline low back pain without sciatica      Physician: Quique Tanner MD  Treatment Orders: PT Eval and Treat  Past Medical History:   Diagnosis Date    Helicobacter pylori ab+      Current Outpatient Prescriptions   Medication Sig    diclofenac sodium (VOLTAREN) 1 % Gel Apply 2 g topically 3 (three) times daily as needed. To back    doxycycline (VIBRAMYCIN) 100 MG Cap Take 1 capsule (100 mg total) by mouth 2 (two) times daily.    pantoprazole (PROTONIX) 40 MG tablet Take 1 tablet (40 mg total) by mouth once daily.     No current facility-administered medications for this visit.      Review of patient's allergies indicates:  No Known Allergies    History   Prior Therapy: pelvic floor in 2016  Previous functional status: "deals with the pain and it doesn't stop me from doing anything"  Current functional status: no exercise, pain with work tasks  Work: works at [employer] as     Subjective   History of Present Illness: "has always had pain." Had scoliosis in childhood but was minor. Also c/o R thoracic and lower cervical pain that is more recent.  Imaging, lumbar x-ray: unremarkable  Pain: current 3/10, worst 7/10, best 3/10, Aching, constant  Radicular symptoms: denies  Aggravating factors: sleeping on a pillow, sitting, prolonged standing  Easing factors: nothing  Pts goals: The patients goal is to return to PLOF without pain or risk of re-injury.    Objective   Mental status: alert, oriented x3  Posture/ Alignment: Poor, Protracted Scapula, weight shift to L with posterior pelvic tilt and flattened lordosis in sitting, stands with anterior pelvic tilt and increased lumbar " lordosis    FUNCTION:   - Trunk Flexion: dysfunctional nonpainful   - Trunk Ext: functional painful   - Trunk Rot R: functional painful   - Trunk Rot L: functional painful   - SLS R: functional nonpainful   - SLS L: functional nonpainful   - Sit <--> Stand: uses UE to assist  - Bed Mobility: Independent, provokes some SX    Strength:   - Transverse Abdominis: impaired Pelvic tilt and compensates with breathing and superficial abdominals  - Diaphragm: deep inhalation with significant chest expansion and minimal diaphragm or core contraction; improves partially with cueing    Special Tests:  - Markell test R positive, L positive  - Prone Knee Bend R negative, L negative  - SLR: R 63 degrees, L 66 degrees, bilateral negative    Palpation:  SIJ side stepping: to R side R SIJ is mobile. To L side, no palpable SIJ mobility  Significant TTP thoracolumbar paraspinals (most severe mid to upper thoracic R>L, then lumbar, and least TTP lower thoracic)  Abnormal multifidi contraction with prone hip extension R and L    Joint Play:  Unable to assess due to tenderness to palpation    Pt/family was provided educational information, including: role of PT, goals for PT, scheduling - pt verbalized understanding. Discussed insurance plan with pt.     TREATMENT   Time In: 4:58 PM  Time Out: 5:55    Discussed Plan of Care with patient: Yes    Laila RAY received 15 minutes of therapeutic exercise including:   Pelvic tilt 10 x 5 sec, max manual cues  Diaphragmatic breathing x 1 min  LTR 10 x 1-2 sec  SKTC nerve glide x 10 each LE  Child's pose R, L, straight x 30 sec each  Kneeling hip flexor stretch x 30 sec each side  (NEXT VISIT: quadruped hip ext with glute squeeze)      Laila RAY received 5 minutes of manual therapy including:  STM thoracolumbar paraspinals, light      Written Home Exercises Provided: yes  Exercises were reviewed and Laila RAY was able to demonstrate them prior to the end of the session. Pt received a written copy of  exercises to perform at home. Laila RAY demonstrated good  understanding of the education provided.     Assessment   Laila RAY is a 26 y.o. female referred to outpatient physical therapy with a medical diagnosis of back pain due to adolescent scoliosis, impaired posture, weak core, and tight ham strings. Demonstrates impairments including: limitations as described in the problem list. Pt prognosis is Good. Positive prognostic factors include current level of function. Negative prognostic factors include chronic pain, high sensitivity to palpation. Pt will benefit from skilled outpatient physical therapy to address the above stated deficits, provide pt/family education, and to maximize pt's level of independence.     Medical necessity is demonstrated by the following IMPAIRMENTS/PROBLEMS:  weakness, impaired endurance, pain, abnormal tone, decreased ROM, impaired muscle length and joint hypomobility, impaired core motor control  Co-morbidities and personal factors: chronic pain, ulcers, and sits for entire work day.  Activity Limitations: trunk flexion, trunk extension, trunk rotation.  Participation restrictions: sleeping, sitting at work, driving, standing.   Clinical presentation: stable and uncomplicated  Complexity: low    Pt's spiritual, cultural and educational needs considered and pt agreeable to plan of care and goals as stated below:     Anticipated Barriers for physical therapy: chronic pain    Short Term GOALS:  In 4 weeks, pt. will:  - sit with appropriate posture with min cueing and no increase in pain for 5 min  - lie supine without pillow or leg support without pain increase for 5 min  - improve core motor control to complete sub-max pelvic tilt in hook lying > 10 sec with min cueing and less pain  - improve work chair set-up with lumbar roll to improve sitting tolerance and increase work tolerance    Long Term GOALS:  In 8 weeks, pt. will:  - be independent with HEP and SX management   - lie supine with  small pillow without pain increase in order to fall asleep  - improve core motor control to complete sub-max pelvic tilt with dynamic tasks > 10 sec with no cueing or pain  - improve work chair and car ergonimics with lumbar roll to normalize sitting tolerance without pain    Plan   Outpatient physical therapy 1- 2 times weekly to include: pt ed, HEP, therapeutic exercises, therapeutic activities, neuromuscular re-education/ balance exercises, manual therapy, and modalities prn. Cont PT for 2 months. Pt may be seen by PTA as part of the rehabilitation team.     I certify the need for these services furnished under this plan of treatment and while under my care.    Sowmya Freire, PT

## 2017-03-13 ENCOUNTER — CLINICAL SUPPORT (OUTPATIENT)
Dept: REHABILITATION | Facility: HOSPITAL | Age: 27
End: 2017-03-13
Attending: ORTHOPAEDIC SURGERY
Payer: COMMERCIAL

## 2017-03-13 DIAGNOSIS — G89.29 CHRONIC MIDLINE LOW BACK PAIN WITHOUT SCIATICA: ICD-10-CM

## 2017-03-13 DIAGNOSIS — M54.50 CHRONIC MIDLINE LOW BACK PAIN WITHOUT SCIATICA: ICD-10-CM

## 2017-03-13 PROCEDURE — 97140 MANUAL THERAPY 1/> REGIONS: CPT | Mod: PO

## 2017-03-13 PROCEDURE — 97110 THERAPEUTIC EXERCISES: CPT | Mod: PO

## 2017-03-13 NOTE — PROGRESS NOTES
Physical Therapy Daily Note   Name: Laila RAY Collazo  Clinic Number: [id number]    Evaluation Date: 03/08/2017  Visit #: 2 / 40  Authorization period Expiration: 2/1/2018  Plan of Care Expiration: 5/8/2017  Precautions: standard      Diagnosis:   Encounter Diagnosis   Name Primary?    Chronic midline low back pain without sciatica      Physician: Quique Tanner MD  Treatment Orders: PT Eval and Treat    Subjective   Pt reports: moved furniture in room and had significant pain in middle of night when got up too go to bathroom    Pain Scale:  5/10      Objective   Time In: 5:00 PM  Time Out:: 5:45 PM  Total Treatment Time 1:1: 30 min    Discussed Plan of Care with patient: Yes     Laila RAY received 20 minutes of therapeutic exercise including:   Pelvic tilt 10 x 5 sec, max manual cues  Diaphragmatic breathing x 1 min  MICHELLE x 2 min  LTR 10 x 1-2 sec  SKTC nerve glide x 10 each LE  Child's pose R, L, straight x 30 sec each  Kneeling hip flexor stretch x 30 sec each side  (NEXT VISIT: quadruped hip ext with glute squeeze)      Laila RAY received 5 minutes of therapeutic activity including:  Instructed in Supine-->side lying-->sit  Instructed in shin height to waist lift 10#, max cues to prevent trunk flexion or rotation      Laila RAY received 10 minutes of manual therapy including:  STM and TPR thoracolumbar paraspinals, medium      Laila RAY received 10 minutes of Russian e-stim 2-channel continuous to thoracic paraspinals at 12 mA.      Written Home Exercises Provided: yes  Exercises were reviewed and Laila RAY was able to demonstrate them prior to the end of the session. Pt received a written copy of exercises to perform at home. Laila RAY demonstrated good  understanding of the education provided.       Assessment   Pt had increased pain today due to repetitive incorrect lifting yesterday followed by inactivity while sleeping then incorrect bed transfer with loaded trunk  flexion, causing muscle spasm. Had increased tightness/tenderness to palpation of thoracic paraspinals that improved with manual therapy and modalities today. Tolerated treatment well with SX improvement by end of visit. Able to tolerate previous therex but therex was not progressed due to recent exacerbation.  Laila RAY is progressing well towards her goals. Will benefit from con't skilled care.    Anticipated barriers to physical therapy: none  Pt's spiritual, cultural and educational needs considered and pt agreeable to plan of care and goals.    Plan   Continue with established Plan of Care towards PT goals.      Sowmya Freire, PT

## 2017-03-15 ENCOUNTER — CLINICAL SUPPORT (OUTPATIENT)
Dept: REHABILITATION | Facility: HOSPITAL | Age: 27
End: 2017-03-15
Attending: ORTHOPAEDIC SURGERY
Payer: COMMERCIAL

## 2017-03-15 DIAGNOSIS — G89.29 CHRONIC MIDLINE LOW BACK PAIN WITHOUT SCIATICA: ICD-10-CM

## 2017-03-15 DIAGNOSIS — M54.50 CHRONIC MIDLINE LOW BACK PAIN WITHOUT SCIATICA: ICD-10-CM

## 2017-03-15 PROCEDURE — 97112 NEUROMUSCULAR REEDUCATION: CPT | Mod: PO

## 2017-03-15 PROCEDURE — 97140 MANUAL THERAPY 1/> REGIONS: CPT | Mod: PO

## 2017-03-15 NOTE — PROGRESS NOTES
Physical Therapy Daily Note   Name: Laila RAY Collazo  Clinic Number: [id number]    Evaluation Date: 03/08/2017  Visit #: 3 / 40  Authorization period Expiration: 2/1/2018  Plan of Care Expiration: 5/8/2017  Precautions: standard      Diagnosis:   Encounter Diagnosis   Name Primary?    Chronic midline low back pain without sciatica      Physician: Quique Tanner MD  Treatment Orders: PT Eval and Treat    Subjective   Pt reports: moved furniture in room and had significant pain in middle of night when got up too go to bathroom    Pain Scale:  5/10      Objective   Time In: 5:00 PM  Time Out:: 5:55 PM  Total Treatment Time 1:1: 30 min    Discussed Plan of Care with patient: Yes    Laila RAY received 25 minutes of therapeutic exercise/NMR including:   Pelvic tilt 10 x 5 sec, max manual cues  Diaphragmatic breathing x 1 min  MICHELLE x 2 min  LTR 10 x 1-2 sec  SKTC nerve glide x 10 each LE  Child's pose R, L, straight x 30 sec each  Kneeling hip flexor stretch x 30 sec each side  quadruped hip ext with glute squeeze x 5 each LE      Laila RAY received 5 minutes of therapeutic activity including:  Instructed in Supine-->side lying-->sit  Instructed in shin height to waist lift 10#, max cues to prevent trunk flexion or rotation      Laila RAY received 10 minutes of manual therapy including:  STM and TPR thoracolumbar paraspinals, medium      Laila RAY received 10 minutes of Russian e-stim 2-channel continuous to thoracic paraspinals at 12 mA.     Written Home Exercises Provided: yes  Exercises were reviewed and Laila RAY was able to demonstrate them prior to the end of the session. Pt received a written copy of exercises to perform at home. Laila RAY demonstrated good  understanding of the education provided.       Assessment   Pt tolerated PT well today without pain limiting her mobility or activity. Cont tenderness and hypertrophy of R thoracolumbar paraspinals but improved since last  visit and post-manual therapy/e-stim. Anticipate further improvement as pt modifies behavior with lifting and bed transfers to minimize repeated and loaded trunk flexion.  Laila RAY is progressing well towards her goals. Will benefit from con't skilled care.    Anticipated barriers to physical therapy: none  Pt's spiritual, cultural and educational needs considered and pt agreeable to plan of care and goals.    Plan   Continue with established Plan of Care towards PT goals.      Sowmya Freire, PT

## 2017-04-03 ENCOUNTER — CLINICAL SUPPORT (OUTPATIENT)
Dept: REHABILITATION | Facility: HOSPITAL | Age: 27
End: 2017-04-03
Attending: ORTHOPAEDIC SURGERY
Payer: COMMERCIAL

## 2017-04-03 DIAGNOSIS — M54.50 CHRONIC MIDLINE LOW BACK PAIN WITHOUT SCIATICA: ICD-10-CM

## 2017-04-03 DIAGNOSIS — G89.29 CHRONIC MIDLINE LOW BACK PAIN WITHOUT SCIATICA: ICD-10-CM

## 2017-04-03 PROCEDURE — 97112 NEUROMUSCULAR REEDUCATION: CPT | Mod: PO

## 2017-04-03 PROCEDURE — 97140 MANUAL THERAPY 1/> REGIONS: CPT | Mod: PO

## 2017-04-03 NOTE — PROGRESS NOTES
Physical Therapy Daily Note   Name: Laila RAY Collazo  Clinic Number: [id number]    Evaluation Date: 03/08/2017  Visit #: 4 / 40  Authorization period Expiration: 2/1/2018  Plan of Care Expiration: 5/8/2017  Precautions: standard      Diagnosis:   Encounter Diagnosis   Name Primary?    Chronic midline low back pain without sciatica      Physician: Quique Tanner MD  Treatment Orders: PT Eval and Treat    Subjective   Pt reports: Pt. reports 100% compliance with HEP most days since last visit. Localizes pain to midline low lumbar, with sitting, work. Been more aware of lifting and performing correctly.    Pain Scale:  0/10      Objective   Time In: 5:00 PM  Time Out:: 5:45 PM  Total Treatment Time 1:1: 30 min    Discussed Plan of Care with patient: Yes    Laila RAY received 30 minutes of therapeutic exercise/NMR including:   Pelvic tilt 10 x 5 sec, max manual cues  Diaphragmatic breathing x 1 min  MICHELLE x 2 min  LTR 10 x 1-2 sec  SKTC nerve glide x 10 each LE  Child's pose R, L, straight x 30 sec each  Kneeling hip flexor stretch x 30 sec each side  quadruped hip ext with glute squeeze 2 x 5 each LE  Pelvic tilt against wall with BUE flexion x 5, 3# stick  Wall angels x 5  Squat with BUE shoulder ext x 10, yellow T-band  (NEXT VISIT: jose)      Laila RAY received 15 minutes of manual therapy including:  STM and TPR thoracolumbar paraspinals, medium  Central PA glides lower thoracic and lumbar, Grade 3    Laila RAY received 10 minutes of Russian e-stim 2-channel continuous to thoracic paraspinals at 12 mA.     Written Home Exercises Provided: yes  Exercises were reviewed and Laila RAY was able to demonstrate them prior to the end of the session. Pt received a written copy of exercises to perform at home. Laila RAY demonstrated good  understanding of the education provided.       Assessment   Pt tolerated PT well with fatigue but no pain provocation. Pt has impaired core motor  control and endurance, resulting in hyperlordosis and pain.  Laila RAY is progressing well towards her goals. Will benefit from con't skilled care.    Anticipated barriers to physical therapy: none  Pt's spiritual, cultural and educational needs considered and pt agreeable to plan of care and goals.    Plan   Continue with established Plan of Care towards PT goals. Progress report next week.      Sowmya Freire, PT

## 2017-04-05 ENCOUNTER — CLINICAL SUPPORT (OUTPATIENT)
Dept: REHABILITATION | Facility: HOSPITAL | Age: 27
End: 2017-04-05
Attending: ORTHOPAEDIC SURGERY
Payer: COMMERCIAL

## 2017-04-05 DIAGNOSIS — M54.50 CHRONIC MIDLINE LOW BACK PAIN WITHOUT SCIATICA: ICD-10-CM

## 2017-04-05 DIAGNOSIS — G89.29 CHRONIC MIDLINE LOW BACK PAIN WITHOUT SCIATICA: ICD-10-CM

## 2017-04-05 PROCEDURE — 97140 MANUAL THERAPY 1/> REGIONS: CPT | Mod: PO

## 2017-04-05 PROCEDURE — 97112 NEUROMUSCULAR REEDUCATION: CPT | Mod: PO

## 2017-04-05 NOTE — PROGRESS NOTES
Physical Therapy Daily Note   Name: Laila RAY Collazo  Clinic Number: [id number]    Evaluation Date: 03/08/2017  Visit #: 5 / 40  Authorization period Expiration: 2/1/2018  Plan of Care Expiration: 5/8/2017  Precautions: standard      Diagnosis:   Encounter Diagnosis   Name Primary?    Chronic midline low back pain without sciatica      Physician: Quique Tanner MD  Treatment Orders: PT Eval and Treat    Subjective   Pt reports: Pt. reports 100% compliance with HEP since last visit. Had more thoracic pain yesterday that was still a little stiff this morning and has improved throughout the day.    Pain Scale:  0/10      Objective   Time In: 4:55 PM  Time Out:: 5:45 PM  Total Treatment Time 1:1: 30 min    Discussed Plan of Care with patient: Yes    Laila RAY received 35 minutes of therapeutic exercise/NMR including:   Pelvic tilt 10 x 5 sec, mod manual cues  Diaphragmatic breathing x 1 min  MICHELLE x 2 min  LTR 15 x 1-2 sec  SKTC nerve glide x 10 each LE  Child's pose R, L, straight x 30 sec each  Kneeling hip flexor stretch x 30 sec each side  quadruped hip ext with glute squeeze 2 x 5 each LE  Pelvic tilt against wall with BUE flexion x 10, 3# stick  Wall angels x 5  Squat with BUE shoulder ext x 10, yellow T-band  Plank on forearms/toes: anterior 2 x 15 sec, side x 10 sec each side      Laila RAY received 15 minutes of manual therapy including:  STM and TPR thoracolumbar paraspinals, medium  Central PA glides mid-thoracic and lumbar, Grade 3-4 (one cavitation)    Laila RAY received 10 minutes of Russian e-stim 2-channel continuous to thoracic paraspinals at 12 mA.     Written Home Exercises Provided: yes  Exercises were reviewed and Laila RAY was able to demonstrate them prior to the end of the session. Pt received a written copy of exercises to perform at home. Laila RAY demonstrated good  understanding of the education provided.       Assessment   Pt tolerated PT well with  improved exercise tolerance and body awareness although cont to require cues to perform some therex. Pt had immediate cavitation with mid-thoracic PA glides Grade 3, which may have caused some of the pain yesterday, and resulted in increased mobility and decreased pain after cavitation. Ongoing soft tissue tightness but with less pain during manual therapy.  Laila RAY is progressing well towards her goals. Will benefit from con't skilled care.    Anticipated barriers to physical therapy: none  Pt's spiritual, cultural and educational needs considered and pt agreeable to plan of care and goals.    Plan   Continue with established Plan of Care towards PT goals. Progress report next week.      Sowmya Freire, PT

## 2017-04-10 ENCOUNTER — CLINICAL SUPPORT (OUTPATIENT)
Dept: REHABILITATION | Facility: HOSPITAL | Age: 27
End: 2017-04-10
Attending: ORTHOPAEDIC SURGERY
Payer: COMMERCIAL

## 2017-04-10 DIAGNOSIS — G89.29 CHRONIC MIDLINE LOW BACK PAIN WITHOUT SCIATICA: ICD-10-CM

## 2017-04-10 DIAGNOSIS — M54.50 CHRONIC MIDLINE LOW BACK PAIN WITHOUT SCIATICA: ICD-10-CM

## 2017-04-10 PROCEDURE — 97112 NEUROMUSCULAR REEDUCATION: CPT | Mod: PO

## 2017-04-10 PROCEDURE — 97140 MANUAL THERAPY 1/> REGIONS: CPT | Mod: PO

## 2017-04-10 NOTE — PROGRESS NOTES
Physical Therapy Daily Note   Name: Laila RAY Collazo  Clinic Number: [id number]    Evaluation Date: 03/08/2017  Visit #: 6 / 40  Authorization period Expiration: 2/1/2018  Plan of Care Expiration: 5/8/2017  Precautions: standard      Diagnosis:   Encounter Diagnosis   Name Primary?    Chronic midline low back pain without sciatica      Physician: Quique Tanner MD  Treatment Orders: PT Eval and Treat    Subjective   Pt reports: Slept on air mattress all weekend and having more back pain since.    Pain Scale:  4/10      Objective   Time In: 5:05 PM  Time Out:: 5:55 PM  Total Treatment Time 1:1: 25 min    Discussed Plan of Care with patient: Yes    Laila RAY received 35 minutes of therapeutic exercise/NMR including:   Pelvic tilt 10 x 5 sec, mod manual cues  Diaphragmatic breathing x 1 min  MICHELLE x 2 min  LTR 15 x 1-2 sec  SKTC nerve glide x 10 each LE  Child's pose R, L, straight x 30 sec each  quadruped hip ext with glute squeeze 2 x 5 each LE  Kneeling hip flexor stretch x 30 sec each side  Pelvic tilt against wall with BUE flexion x 10, 3# stick  Wall angels x 5  Squat with BUE shoulder ext x 10, yellow T-band  Plank on forearms/toes: anterior 2 x 15 sec, side x 10 sec each side      Laila RAY received 15 minutes of manual therapy including:  STM and TPR thoracolumbar paraspinals, medium  Central PA glides mid-thoracic and lumbar, Grade 3-4 (one cavitation)      Laila RAY received 10 minutes of Russian e-stim 2-channel continuous to thoracic paraspinals at 12 mA.     Written Home Exercises Provided: yes  Exercises were reviewed and Laila RAY was able to demonstrate them prior to the end of the session. Pt received a written copy of exercises to perform at home. Laila RAY demonstrated good  understanding of the education provided.       Assessment   Pt tolerated PT well with improved exercise tolerance and body awareness although cont to require cues to perform some therex. Pt  had one cavitation with PA glides but no significant hypomobility at other levels. Soft tissue was less tight although cont to be tender to palpation. Improved post-treatment.  Laila RAY is progressing well towards her goals. Will benefit from con't skilled care.    Anticipated barriers to physical therapy: none  Pt's spiritual, cultural and educational needs considered and pt agreeable to plan of care and goals.    Plan   Continue with established Plan of Care towards PT goals. Progress report next visit.      Sowmya Freire, PT

## 2017-04-12 ENCOUNTER — CLINICAL SUPPORT (OUTPATIENT)
Dept: REHABILITATION | Facility: HOSPITAL | Age: 27
End: 2017-04-12
Attending: ORTHOPAEDIC SURGERY
Payer: COMMERCIAL

## 2017-04-12 DIAGNOSIS — M54.50 CHRONIC MIDLINE LOW BACK PAIN WITHOUT SCIATICA: ICD-10-CM

## 2017-04-12 DIAGNOSIS — G89.29 CHRONIC MIDLINE LOW BACK PAIN WITHOUT SCIATICA: ICD-10-CM

## 2017-04-12 PROCEDURE — 97140 MANUAL THERAPY 1/> REGIONS: CPT | Mod: PO

## 2017-04-12 PROCEDURE — 97110 THERAPEUTIC EXERCISES: CPT | Mod: PO

## 2017-04-12 PROCEDURE — 97112 NEUROMUSCULAR REEDUCATION: CPT | Mod: PO

## 2017-04-12 NOTE — PROGRESS NOTES
"OUTPATIENT PHYSICAL THERAPY  PHYSICAL THERAPY RE-EVALUATION    Name: Laila Collazo  Clinic Number: [id number]    Evaluation Date: 03/08/2017  Visit #: 7 / 40  Authorization period Expiration: 2/1/2018  Plan of Care Expiration: 5/8/2017  Precautions: standard    Diagnosis:   Encounter Diagnosis   Name Primary?    Chronic midline low back pain without sciatica      Physician: Quique Tanner MD  Treatment Orders: PT Eval and Treat    Subjective   History of Present Illness: "has always had pain." Had scoliosis in childhood but was minor. Also c/o R thoracic and lower cervical pain that is more recent. Since SOC, has improved with peak pain level and sitting tolerance although cont to have impaired sitting tolerance and constant pain  Imaging, lumbar x-ray: unremarkable  Pain: current 1/10, worst 4/10, best 1/10, Aching, constant  Radicular symptoms: denies  Aggravating factors: sleeping on a pillow, sitting, prolonged standing  Easing factors: nothing  Pts goals: The patients goal is to return to PLOF without pain or risk of re-injury.    Objective   Mental status: alert, oriented x3  Posture/ Alignment: Fair, Protracted Scapula, weight shift to L with posterior pelvic tilt and flattened lordosis in sitting, stands with anterior pelvic tilt and increased lumbar lordosis    FUNCTION:   - Trunk Flexion: dysfunctional nonpainful   - Trunk Ext: functional painful   - Trunk Rot R: functional nonpainful   - Trunk Rot L: functional nonpainful   - SLS R: functional nonpainful   - SLS L: functional nonpainful   - Sit <--> Stand: no pain, no UE to assist  - Bed Mobility: Independent, no SX    Strength:   - Transverse Abdominis: improved static control. impaired Pelvic tilt with dynamic US tasks  - Diaphragm: deep inhalation with initial diaphragmatic contraction then moderate chest expansion; improves with cueing    Special Tests:  - Markell test R positive, L positive  - Prone Knee Bend R negative, L negative  - SLR: R 75 " degrees, L 77 degrees, bilateral negative    Palpation:  SIJ side stepping: to R side R SIJ is mobile. To L side, no palpable SIJ mobility  Moderate TTP thoracolumbar paraspinals (most severe mid to upper thoracic R>L and lumbar)    Joint Play:  Hypomobile (2/6) with PA glides thoracic and lumbar levels with one cavitation with mobs    Pt/family was provided educational information, including: role of PT, goals for PT, scheduling - pt verbalized understanding. Discussed insurance plan with pt.     TREATMENT   Time In: 4:58 PM  Time Out: 6:00 PM  Total Treatment Time 1:1: 35 min    Discussed Plan of Care with patient: Yes    Laila RAY received 35 minutes of therapeutic exercise/NMR including:   Pelvic tilt 10 x 5 sec, mod manual cues  Diaphragmatic breathing x 1 min  MICHELLE x 2 min  LTR 15 x 1-2 sec  SKTC nerve glide x 10 each LE  Child's pose R, L, straight x 30 sec each  quadruped hip ext with glute squeeze 2 x 5 each LE  Kneeling hip flexor stretch x 30 sec each side  Pelvic tilt against wall with BUE flexion x 10, 3# stick  Wall angels x 5  Squat with BUE shoulder ext x 10, yellow T-band  Plank on forearms/toes: anterior 2 x 15 sec, side x 10 sec each side      Laila RAY received 15 minutes of manual therapy including:  STM and TPR thoracolumbar paraspinals, medium  Central PA glides mid-thoracic and lumbar, Grade 3-4 (one cavitation)      DID NOT PERFORM: Laila RAY received 10 minutes of Russian e-stim 2-channel continuous to thoracic paraspinals at 12 mA.     Written Home Exercises Provided: yes  Exercises were reviewed and Laila RAY was able to demonstrate them prior to the end of the session. Pt received a written copy of exercises to perform at home. Laila RAY demonstrated good  understanding of the education provided.     Assessment   Laila RAY is a 26 y.o. female referred to outpatient physical therapy with a medical diagnosis of back pain due to adolescent scoliosis, impaired posture, weak core, and tight ham strings.  Demonstrates impairments including: limitations as described in the problem list. Since SOC, has decreased peak pain levels, increased function, and improved postural awareness. Laila RAY has met some STG and is progressing well toward LTG. Pt prognosis is Good. Positive prognostic factors include current level of function. Negative prognostic factors include chronic pain, high sensitivity to palpation. Pt will benefit from skilled outpatient physical therapy to address the above stated deficits, provide pt/family education, and to maximize pt's level of independence.     Medical necessity is demonstrated by the following IMPAIRMENTS/PROBLEMS:  weakness, impaired endurance, pain, abnormal tone, decreased ROM, impaired muscle length and joint hypomobility, impaired core motor control    Pt's spiritual, cultural and educational needs considered and pt agreeable to plan of care and goals as stated below:     Anticipated Barriers for physical therapy: chronic pain    Short Term GOALS:  In 4 weeks, pt. will:  - sit with appropriate posture with min cueing and no increase in pain for 5 min - MET 4/12/17  - lie supine without pillow or leg support without pain increase for 5 min - MET 4/12/17  - improve core motor control to complete sub-max pelvic tilt in hook lying > 10 sec with min cueing and less pain - MET 4/12/17  - improve work chair set-up with lumbar roll to improve sitting tolerance and increase work tolerance - NOT MET    Long Term GOALS:  In 8 weeks, pt. will:  - be independent with HEP and SX management   - lie supine with small pillow without pain increase in order to fall asleep  - improve core motor control to complete sub-max pelvic tilt with dynamic tasks > 10 sec with no cueing or pain  - improve work chair and car ergonimics with lumbar roll to normalize sitting tolerance without pain    Plan   Outpatient physical therapy 1- 2 times weekly to include: pt ed, HEP, therapeutic exercises, therapeutic  activities, neuromuscular re-education/ balance exercises, manual therapy, and modalities prn. Cont PT for 1 more month. Pt may be seen by PTA as part of the rehabilitation team.     I certify the need for these services furnished under this plan of treatment and while under my care.    Sowmya Freire, PT

## 2017-04-17 ENCOUNTER — TELEPHONE (OUTPATIENT)
Dept: REHABILITATION | Facility: HOSPITAL | Age: 27
End: 2017-04-17

## 2017-04-17 ENCOUNTER — HOSPITAL ENCOUNTER (EMERGENCY)
Facility: HOSPITAL | Age: 27
Discharge: HOME | End: 2017-04-17
Attending: FAMILY MEDICINE
Payer: COMMERCIAL

## 2017-04-17 VITALS
HEIGHT: 62 IN | RESPIRATION RATE: 18 BRPM | WEIGHT: 148 LBS | TEMPERATURE: 98 F | HEART RATE: 92 BPM | SYSTOLIC BLOOD PRESSURE: 106 MMHG | BODY MASS INDEX: 27.23 KG/M2 | OXYGEN SATURATION: 99 % | DIASTOLIC BLOOD PRESSURE: 69 MMHG

## 2017-04-17 DIAGNOSIS — M25.521 RIGHT ELBOW PAIN: ICD-10-CM

## 2017-04-17 DIAGNOSIS — R07.81 RIB PAIN ON RIGHT SIDE: ICD-10-CM
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
## 2017-04-17 DIAGNOSIS — S20.211A RIB CONTUSION, RIGHT, INITIAL ENCOUNTER: Primary | ICD-10-CM
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
## 2017-04-17 LAB
B-HCG UR QL: NEGATIVE
CTP QC/QA: YES

## 2017-04-17 PROCEDURE — 25000003 PHARM REV CODE 250

## 2017-04-17 PROCEDURE — 96372 THER/PROPH/DIAG INJ SC/IM: CPT

## 2017-04-17 PROCEDURE — 99284 EMERGENCY DEPT VISIT MOD MDM: CPT | Mod: 25

## 2017-04-17 PROCEDURE — 81025 URINE PREGNANCY TEST: CPT

## 2017-04-17 PROCEDURE — 63600175 PHARM REV CODE 636 W HCPCS

## 2017-04-17 PROCEDURE — 99284 EMERGENCY DEPT VISIT MOD MDM: CPT | Mod: ,,,

## 2017-04-17 RX ORDER — ORPHENADRINE CITRATE 30 MG/ML
60 INJECTION INTRAMUSCULAR; INTRAVENOUS
Status: COMPLETED | OUTPATIENT
Start: 2017-04-17 | End: 2017-04-17

## 2017-04-17 RX ORDER — KETOROLAC TROMETHAMINE 10 MG/1
10 TABLET, FILM COATED ORAL
Status: COMPLETED | OUTPATIENT
Start: 2017-04-17 | End: 2017-04-17

## 2017-04-17 RX ADMIN — KETOROLAC TROMETHAMINE 10 MG: 10 TABLET, FILM COATED ORAL at 09:04

## 2017-04-17 RX ADMIN — ORPHENADRINE CITRATE 60 MG: 30 INJECTION INTRAMUSCULAR; INTRAVENOUS at 09:04

## 2017-04-17 NOTE — ED TRIAGE NOTES
Pt reports falling off a hover board on Saturday. Pt reports pain to right side of her back and right elbow. Pt denies numbness/tingling/head trauma/LOC.

## 2017-04-17 NOTE — DISCHARGE INSTRUCTIONS
Chest Contusion    A contusion is a bruise to the skin, muscle, or ribs. It may cause pain, tenderness, and swelling. It may turn the skin purple until it heals. Contusions take a few days to a few weeks to heal.  Home care  Follow these guidelines when caring for yourself at home:  · Rest. Dont do any heavy lifting or strenuous activity. Dont do any activity that causes pain.  · Put an ice pack on the injured area. Do this for 20 minutes every 1 to 2 hours the first day. You can make an ice pack by wrapping a plastic bag of ice cubes in a thin towel. Continue to use the ice pack 3 to 4 times a day for the next 2 days. Then use the ice pack as needed to ease pain and swelling.  · After 1 to 2 days you may put a warm compress on the area. Do this for 10 minutes several times a day. A warm compress is a clean cloth thats damp with warm water.  · Hold a pillow to the affected area when you cough. This will help ease pain.  · You may use acetaminophen or ibuprofen to control pain, unless another pain medicine was prescribed. If you have chronic liver or kidney disease, talk with your health care provider before using these medicines. Also talk with your provider if youve had a stomach ulcer or GI bleeding.  Follow-up care  Follow up with your health care provider during the next week, or as advised.  When to seek medical advice  Call your health care provider right away if any of these occur:  · Shortness of breath, difficulty breathing, or breathing fast  · Chest pain gets worse when you breathe  · Severe pain that comes on suddenly or lasts more than an hour  · Dizziness, weakness, or fainting  · New abdominal pain or abdominal pain that gets worse  ·  Fever of 101ºF (38.3ºC) or higher, or as directed by your health care provider  Date Last Reviewed: 2/15/2015  © 2170-5396 [employer]. [address] [place] [address], [place] [address]. All rights reserved. This information is not intended as a substitute  for professional medical care. Always follow your healthcare professional's instructions.

## 2017-04-17 NOTE — ED NOTES
Patient identifiers verified and correct for Laila Collazo.   LOC: The patient is awake, alert and aware of environment with an appropriate affect, the patient is oriented x 3 and speaking appropriately.   APPEARANCE: Patient appears comfortable and in no acute distress, patient is clean and well groomed.  SKIN: The skin is warm and dry, color consistent with ethnicity, patient has normal skin turgor and moist mucus membranes, skin intact, no breakdown or bruising noted.   MUSCULOSKELETAL: Patient moving all extremities spontaneously, no swelling noted. Pt reports pain to right side of the back and right elbow.  RESPIRATORY: Airway is open and patent, respirations are spontaneous, patient has a normal effort and rate, no accessory muscle use noted, pt placed on continuous pulse ox with O2 sats noted at 97% on room air.  CARDIAC: Pt placed on cardiac monitor. Patient has a normal rate and regular rhythm, no edema noted, capillary refill < 3 seconds.   GASTRO: Soft and non tender to palpation, no distention noted, normoactive bowel sounds present in all four quadrants. Pt states bowel movements have been regular.  : Pt denies any pain or frequency with urination.  NEURO: Pt opens eyes spontaneously, behavior appropriate to situation, follows commands, facial expression symmetrical, bilateral hand grasp equal and even, purposeful motor response noted, normal sensation in all extremities when touched with a finger.

## 2017-04-17 NOTE — TELEPHONE ENCOUNTER
Pt called because she had fall off hover board this weekend, went to ED and diagnosed with bruised ribs and advised to rest. Pt unsure whether to come to PT today. Rescheduled to 4/20/2017 and advised pt to cont HEP as tolerated but overall rest the next 2 days then call if not ready to resume PT 4/20/2017.    Sowmya Freire, PT

## 2017-04-17 NOTE — ED AVS SNAPSHOT
[facility]  [address]  [facility] [address]               Laila Collazo   2017  8:51 AM   ED    Description:  Female : 1990   [facility]  [facility]           Your Care was Coordinated By:     Provider Role From To    Otto So MD Attending Provider 17 --    Sushma Simmons PA-C Physician Assistant 17 --      Reason for Visit     Fall           Diagnoses this Visit        Comments    Rib contusion, right, initial encounter    -  Primary     Right elbow pain         Rib pain on right side           ED Disposition     ED Disposition Condition Comment    Discharge             To Do List           Follow-up Information     Schedule an appointment as soon as possible for a visit with Melany Malhotra MD.    Specialty:  Internal Medicine    Contact information:    [address]  [facility] [address]  [phone]        [facility]     [facility] -  Assistance  Unless otherwise directed by your provider, please contact [facility], our nurse care line that is available for  assistance.     Registered nurses in the [facility] provide: appointment scheduling, clinical advisement, health education, and other advisory services.  Call: [phone] (toll free)               Medications           Message regarding Medications     Verify the changes and/or additions to your medication regime listed below are the same as discussed with your clinician today.  If any of these changes or additions are incorrect, please notify your healthcare provider.        These medications were administered today        Dose Freq    orphenadrine injection 60 mg 60 mg ED 1 Time    Sig: Inject 2 mLs (60 mg total) into the muscle ED 1 Time.    Class: Normal    Route: Intramuscular    Cosign for Ordering: Accepted by Otto So MD on 2017 11:15 AM    ketorolac tablet 10 mg 10  "mg ED 1 Time    Sig: Take 1 tablet (10 mg total) by mouth ED 1 Time.    Class: Normal    Route: Oral    Cosign for Ordering: Accepted by Otto So MD on 4/17/2017 11:15 AM           Verify that the below list of medications is an accurate representation of the medications you are currently taking.  If none reported, the list may be blank. If incorrect, please contact your healthcare provider. Carry this list with you in case of emergency.           Current Medications     pantoprazole (PROTONIX) 40 MG tablet Take 1 tablet (40 mg total) by mouth once daily.    diclofenac sodium (VOLTAREN) 1 % Gel Apply 2 g topically 3 (three) times daily as needed. To back    doxycycline (VIBRAMYCIN) 100 MG Cap Take 1 capsule (100 mg total) by mouth 2 (two) times daily.           Clinical Reference Information           Your Vitals Were     BP Pulse Temp Resp Height Weight    106/69 92 98.2 °F (36.8 °C) (Oral) 18 5' 2" (1.575 m) 67.1 kg (148 lb)    Last Period SpO2 BMI          04/17/2017 (Approximate) 99% 27.07 kg/m2        Allergies as of 4/17/2017     No Known Allergies      Immunizations Administered on Date of Encounter - 4/17/2017     None      ED Micro, Lab, POCT     Start Ordered       Status Ordering Provider    04/17/17 0921 04/17/17 0920  POCT urine pregnancy  Once      Final result       ED Imaging Orders     Start Ordered       Status Ordering Provider    04/17/17 0923 04/17/17 0922  X-Ray Ribs 3 Views Bilateral  1 time imaging      Final result     04/17/17 0922 04/17/17 0922  X-Ray Elbow Complete Right  1 time imaging      Final result         Discharge Instructions         Chest Contusion    A contusion is a bruise to the skin, muscle, or ribs. It may cause pain, tenderness, and swelling. It may turn the skin purple until it heals. Contusions take a few days to a few weeks to heal.  Home care  Follow these guidelines when caring for yourself at home:  · Rest. Dont do any heavy lifting or strenuous activity. " Dont do any activity that causes pain.  · Put an ice pack on the injured area. Do this for 20 minutes every 1 to 2 hours the first day. You can make an ice pack by wrapping a plastic bag of ice cubes in a thin towel. Continue to use the ice pack 3 to 4 times a day for the next 2 days. Then use the ice pack as needed to ease pain and swelling.  · After 1 to 2 days you may put a warm compress on the area. Do this for 10 minutes several times a day. A warm compress is a clean cloth thats damp with warm water.  · Hold a pillow to the affected area when you cough. This will help ease pain.  · You may use acetaminophen or ibuprofen to control pain, unless another pain medicine was prescribed. If you have chronic liver or kidney disease, talk with your health care provider before using these medicines. Also talk with your provider if youve had a stomach ulcer or GI bleeding.  Follow-up care  Follow up with your health care provider during the next week, or as advised.  When to seek medical advice  Call your health care provider right away if any of these occur:  · Shortness of breath, difficulty breathing, or breathing fast  · Chest pain gets worse when you breathe  · Severe pain that comes on suddenly or lasts more than an hour  · Dizziness, weakness, or fainting  · New abdominal pain or abdominal pain that gets worse  ·  Fever of 101ºF (38.3ºC) or higher, or as directed by your health care provider  Date Last Reviewed: 2/15/2015  © 1469-8895 [employer]. [address] [place] [address], [place] [address]. All rights reserved. This information is not intended as a substitute for professional medical care. Always follow your healthcare professional's instructions.          Discharge References/Attachments     CONTUSION, ELBOW (ENGLISH)      Your Scheduled Appointments     Apr 17, 2017  5:00 PM CDT   Established Physical Therapy with Sowmya Freire, PT   [facility]     [address]  [place] [address]   [phone]            Apr 24, 2017  5:00 PM CDT   Established Physical Therapy with Sowmya Freire, PT   [facility] ([facility])    [address] [facility]  [place] [address]   [phone]            May 01, 2017  5:00 PM CDT   Established Physical Therapy with Sowmya Freire, PT   [facility] ([facility])    [address] [facility]  [place] [address]   [phone]            May 08, 2017  5:00 PM CDT   Established Physical Therapy with Sowmya Freire, PT   [facility] ([facility])    [address] [facility]  [place] [address]   [phone]            Jun 02, 2017  9:00 AM CDT   GASTROENTEROLOGY ESTABLISHED PATIENT with [facility] ([facility] )    [address]  [facility] [address]   [phone]               [facility] complies with applicable Federal civil rights laws and does not discriminate on the basis of race, color, national origin, age, disability, or sex.        Language Assistance Services     ATTENTION: Language assistance services are available, free of charge. Please call [phone].      ATENCIÓN: Si habla español, tiene a linn disposición servicios gratuitos de asistencia lingüística. Llame al [phone].     CHÚ Ý: N?u b?n nói Ti?ng Vi?t, có các d?ch v? h? tr? ngôn ng? mi?n phí dành cho b?n. G?i s? [phone].

## 2017-05-01 ENCOUNTER — CLINICAL SUPPORT (OUTPATIENT)
Dept: REHABILITATION | Facility: HOSPITAL | Age: 27
End: 2017-05-01
Attending: ORTHOPAEDIC SURGERY
Payer: COMMERCIAL

## 2017-05-01 DIAGNOSIS — G89.29 CHRONIC MIDLINE LOW BACK PAIN WITHOUT SCIATICA: ICD-10-CM

## 2017-05-01 DIAGNOSIS — M54.50 CHRONIC MIDLINE LOW BACK PAIN WITHOUT SCIATICA: ICD-10-CM

## 2017-05-01 PROCEDURE — 97014 ELECTRIC STIMULATION THERAPY: CPT | Mod: PO

## 2017-05-01 PROCEDURE — 97112 NEUROMUSCULAR REEDUCATION: CPT | Mod: PO

## 2017-05-01 PROCEDURE — 97140 MANUAL THERAPY 1/> REGIONS: CPT | Mod: PO

## 2017-05-01 PROCEDURE — 97110 THERAPEUTIC EXERCISES: CPT | Mod: PO

## 2017-05-01 NOTE — PROGRESS NOTES
"OUTPATIENT PHYSICAL THERAPY  PHYSICAL THERAPY RE-EVALUATION    Name: Laila Collazo  Clinic Number: [id number]    Evaluation Date: 03/08/2017  Visit #: 8 / 40  Authorization period Expiration: 2/1/2018  Plan of Care Expiration: 5/8/2017  Precautions: standard    Diagnosis:   Encounter Diagnosis   Name Primary?    Chronic midline low back pain without sciatica      Physician: Quique Tanner MD  Treatment Orders: PT Eval and Treat    Subjective   History of Present Illness: "has always had pain." Had scoliosis in childhood but was minor. Also c/o R thoracic and lower cervical pain that is more recent. Was riding hoverboard 2 weeks ago and fell, resulting in bruised ribs and elbow. Rested since then without coming to PT and gradually improving with pain.  Imaging, lumbar x-ray: unremarkable  Pain: current 6/10, worst 6/10, best 6/10, Aching, constant  Radicular symptoms: denies since SOC and since recent fall  Aggravating factors: sleeping on a pillow, sitting, prolonged standing  Easing factors: nothing  Pts goals: The patients goal is to return to PLOF without pain or risk of re-injury.    Objective   Mental status: alert, oriented x3  Posture/ Alignment: Fair, Protracted Scapula, weight shift to L with posterior pelvic tilt and flattened lordosis in sitting, stands with anterior pelvic tilt and increased lumbar lordosis    FUNCTION:   - Trunk Flexion: dysfunctional nonpainful   - Trunk Ext: functional painful (with return)  - Trunk Rot R: functional nonpainful   - Trunk Rot L: functional nonpainful   - SLS R: functional nonpainful   - SLS L: functional nonpainful   - Sit <--> Stand: no pain, no UE to assist  - Bed Mobility: Independent, no SX    ROM:  Repeated ROM ROM (% of normal) Pain? Radiation? Comment   Flex Stand: 75% Decrease, better none    Ext Stand: 100% Increase, no worse none Decreased pain after repeated flexion   Flex Supine: 100% Decrease, better     Ext Prone: 100% Decrease, better   "   *pain    Strength:   - Transverse Abdominis: improved static control. impaired Pelvic tilt with dynamic US tasks  - Diaphragm: deep inhalation with initial diaphragmatic contraction then moderate chest expansion; improves with cueing    Special Tests:  - Markell test R positive, L positive  - Prone Knee Bend R negative, L negative  - SLR: R 75 degrees, L 77 degrees, bilateral negative    Palpation:  SIJ side stepping: to R side R SIJ is mobile. To L side, no palpable SIJ mobility  Moderate TTP thoracolumbar paraspinals (most severe mid to upper thoracic R>L and lumbar)    Joint Play:  Hypomobile (2/6) with PA glides thoracic and lumbar levels with one cavitation with mobs    Pt/family was provided educational information, including: role of PT, goals for PT, scheduling - pt verbalized understanding. Discussed insurance plan with pt.     TREATMENT   Time In: 5:05 PM  Time Out: 5:50 PM  Total Treatment Time 1:1: 45 min    Discussed Plan of Care with patient: Yes    Laila RAY received 25 minutes of therapeutic exercise/NMR including:   Pelvic tilt 10 x 5 sec, mod manual cues  Diaphragmatic breathing x 1 min  PPU 2 x 10  LTR 15 x 1-2 sec  SKTC nerve glide x 10 each LE  Child's pose R, L, straight x 30 sec each  Kneeling hip flexor stretch x 30 sec each side  Pelvic tilt against wall with BUE flexion x 10, 3# stick    DID NOT PERFORM: quadruped hip ext with glute squeeze 2 x 5 each LE  Wall angels x 5  Squat with BUE shoulder ext x 10, yellow T-band  Plank on forearms/toes: anterior 2 x 15 sec, side x 10 sec each side      Laila RAY received 10 minutes of manual therapy including:  STM and TPR thoracolumbar paraspinals, medium  Central PA glides mid-thoracic and lumbar, Grade       Liala RAY received 10 minutes of IFC e-stim to thoracolumbar paraspinals at 13 mA.     Written Home Exercises Provided: yes  Exercises were reviewed and Laila RAY was able to demonstrate them prior to the end of the session. Pt received a written  copy of exercises to perform at home. Laila RAY demonstrated good  understanding of the education provided.     Assessment   Laila RAY is a 26 y.o. female referred to outpatient physical therapy with a medical diagnosis of back pain due to adolescent scoliosis, impaired posture, weak core, and tight ham strings. Demonstrates impairments including: limitations as described in the problem list. Since SOC, has decreased peak pain levels, increased function, and improved postural awareness. Pt had a fall since last visit, resulting in some increased muscle tightness and joint hypomobility, but no significant trauma to her back. Laila RAY has met some STG and is progressing well toward LTG. Pt prognosis is Good. Positive prognostic factors include current level of function. Negative prognostic factors include chronic pain, high sensitivity to palpation. Pt will benefit from skilled outpatient physical therapy to address the above stated deficits, provide pt/family education, and to maximize pt's level of independence.     Medical necessity is demonstrated by the following IMPAIRMENTS/PROBLEMS:  weakness, impaired endurance, pain, abnormal tone, decreased ROM, impaired muscle length and joint hypomobility, impaired core motor control    Pt's spiritual, cultural and educational needs considered and pt agreeable to plan of care and goals as stated below:     Anticipated Barriers for physical therapy: chronic pain    Short Term GOALS:  In 4 weeks, pt. will:  - sit with appropriate posture with min cueing and no increase in pain for 5 min - MET 4/12/17  - lie supine without pillow or leg support without pain increase for 5 min - MET 4/12/17  - improve core motor control to complete sub-max pelvic tilt in hook lying > 10 sec with min cueing and less pain - MET 4/12/17  - improve work chair set-up with lumbar roll to improve sitting tolerance and increase work tolerance - NOT MET    Long Term GOALS:  In 8 weeks, pt. will:  - be  independent with HEP and SX management   - lie supine with small pillow without pain increase in order to fall asleep  - improve core motor control to complete sub-max pelvic tilt with dynamic tasks > 10 sec with no cueing or pain  - improve work chair and car ergonimics with lumbar roll to normalize sitting tolerance without pain    Plan   Outpatient physical therapy 1- 2 times weekly to include: pt ed, HEP, therapeutic exercises, therapeutic activities, neuromuscular re-education/ balance exercises, manual therapy, and modalities prn. Cont PT for 1 more month. Pt may be seen by PTA as part of the rehabilitation team.     I certify the need for these services furnished under this plan of treatment and while under my care.    Sowmya Freire, PT

## 2017-05-03 ENCOUNTER — CLINICAL SUPPORT (OUTPATIENT)
Dept: REHABILITATION | Facility: HOSPITAL | Age: 27
End: 2017-05-03
Attending: ORTHOPAEDIC SURGERY
Payer: COMMERCIAL

## 2017-05-03 DIAGNOSIS — M54.50 CHRONIC MIDLINE LOW BACK PAIN WITHOUT SCIATICA: ICD-10-CM

## 2017-05-03 DIAGNOSIS — G89.29 CHRONIC MIDLINE LOW BACK PAIN WITHOUT SCIATICA: ICD-10-CM

## 2017-05-03 PROCEDURE — 97112 NEUROMUSCULAR REEDUCATION: CPT | Mod: PO

## 2017-05-03 PROCEDURE — 97140 MANUAL THERAPY 1/> REGIONS: CPT | Mod: PO

## 2017-05-03 NOTE — PROGRESS NOTES
Physical Therapy Daily Note   Name: Laila RAY Collazo  Clinic Number: [id number]    Evaluation Date: 03/08/2017  Visit #: 9 / 40  Authorization period Expiration: 2/1/2018  Plan of Care Expiration: 5/8/2017  Precautions: standard    Diagnosis:   Encounter Diagnosis   Name Primary?    Chronic midline low back pain without sciatica      Physician: Quique Tanner MD  Treatment Orders: PT Eval and Treat    Subjective   Pt reports: improving back and rib pain    Pain Scale:  4/10      Objective   Time In: 5:00 PM  Time Out:: 5:55 PM  Total Treatment Time 1:1: 30 min    Discussed Plan of Care with patient: Yes    Laila RAY received 35 minutes of therapeutic exercise/NMR including:   Pelvic tilt 10 x 5 sec, mod manual cues  Diaphragmatic breathing x 1 min  PPU 2 x 10  LTR 15 x 1-2 sec  SKTC nerve glide x 10 each LE  quadruped hip ext with glute squeeze 2 x 5 each LE  Child's pose R, L, straight x 30 sec each  Kneeling hip flexor stretch x 30 sec each side  Pelvic tilt against wall with BUE flexion x 10, 3# stick   Wall angels x 5    DID NOT PERFORM: Squat with BUE shoulder ext x 10, yellow T-band  Plank on forearms/toes: anterior 2 x 15 sec, side x 10 sec each side      Laila RAY received 10 minutes of manual therapy including:  STM and TPR thoracolumbar paraspinals, medium  Central PA glides mid-thoracic and lumbar, Grade 3        Liala RAY received 10 minutes of IFC e-stim to thoracolumbar paraspinals at 13 mA.     Written Home Exercises Provided: yes  Exercises were reviewed and Laila RAY was able to demonstrate them prior to the end of the session. Pt received a written copy of exercises to perform at home. Laila RAY demonstrated good  understanding of the education provided.       Assessment   Pt tolerated PT well with improved exercise tolerance and body awareness although cont to require cues to perform some therex. Soft tissue was less tight although cont to be tender to  palpation. Improved post-treatment.  Laila RAY is progressing well towards her goals. Will benefit from con't skilled care.    Anticipated barriers to physical therapy: none  Pt's spiritual, cultural and educational needs considered and pt agreeable to plan of care and goals.    Plan   Continue with established Plan of Care towards PT goals.      Sowmya Freire, PT

## 2017-05-08 ENCOUNTER — CLINICAL SUPPORT (OUTPATIENT)
Dept: REHABILITATION | Facility: HOSPITAL | Age: 27
End: 2017-05-08
Attending: ORTHOPAEDIC SURGERY
Payer: COMMERCIAL

## 2017-05-08 DIAGNOSIS — G89.29 CHRONIC MIDLINE LOW BACK PAIN WITHOUT SCIATICA: ICD-10-CM

## 2017-05-08 DIAGNOSIS — M54.50 CHRONIC MIDLINE LOW BACK PAIN WITHOUT SCIATICA: ICD-10-CM

## 2017-05-08 PROCEDURE — 97112 NEUROMUSCULAR REEDUCATION: CPT | Mod: PO

## 2017-05-08 PROCEDURE — 97140 MANUAL THERAPY 1/> REGIONS: CPT | Mod: PO

## 2017-05-08 PROCEDURE — 97110 THERAPEUTIC EXERCISES: CPT | Mod: PO

## 2017-05-08 NOTE — PROGRESS NOTES
Physical Therapy Daily Note   Name: Laila RAY Collazo  Clinic Number: [id number]    Evaluation Date: 03/08/2017  Visit #: 10 / 40  Authorization period Expiration: 2/1/2018  UPDATED Plan of Care Expiration: 6/8/2017  Precautions: standard    Diagnosis:   Encounter Diagnosis   Name Primary?    Chronic midline low back pain without sciatica      Physician: Quique Tanner MD  Treatment Orders: PT Eval and Treat    Subjective   Pt reports: improving back and rib pain    Pain Scale:  4/10      Objective   Time In: 5:00 PM  Time Out:: 6:00 PM  Total Treatment Time 1:1: 35 min    Discussed Plan of Care with patient: Yes    Laila RAY received 35 minutes of therapeutic exercise/NMR including:   Pelvic tilt 10 x 5 sec, mod manual cues  Diaphragmatic breathing x 1 min  PPU 2 x 10  LTR 15 x 1-2 sec  SKTC nerve glide x 10 each LE  quadruped hip ext with glute squeeze 2 x 5 each LE  Child's pose R, L, straight x 30 sec each  Kneeling hip flexor stretch x 30 sec each side  Pelvic tilt against wall with BUE flexion x 10, 3# stick   Wall angels x 5  Squat with BUE shoulder ext x 10, yellow T-band  Plank on forearms/toes: anterior 2 x 15 sec, side x 10 sec each side  (NEXT VISIT: bridge, Pallof, BKWD monster walk, plank progressions)      Laila RAY received 15 minutes of manual therapy including:  STM and TPR thoracolumbar paraspinals, medium  Central PA glides mid-thoracic and lumbar, Grade 3      Laila RAY received 10 minutes of IFC e-stim to thoracolumbar paraspinals at 13 mA.     Written Home Exercises Provided: yes  Exercises were reviewed and Laila RAY was able to demonstrate them prior to the end of the session. Pt received a written copy of exercises to perform at home. Laila RAY demonstrated good  understanding of the education provided.       Assessment   Pt tolerated PT well with improved exercise tolerance and body awareness. Decreased tissue tension throughout thoracolumbar  paraspinals and improved joint play with mild cavitations at mid-thoracic.  Laila RAY is progressing well towards her goals. Will benefit from con't skilled care.    Anticipated barriers to physical therapy: none  Pt's spiritual, cultural and educational needs considered and pt agreeable to plan of care and goals.    Plan   Continue with established Plan of Care towards PT goals for 1 month.      Sowmya Freire, PT

## 2017-05-08 NOTE — PLAN OF CARE
Physical Therapy Daily Note   Name: Laila RAY Collazo  Clinic Number: [id number]    Evaluation Date: 03/08/2017  Visit #: 10 / 40  Authorization period Expiration: 2/1/2018  UPDATED Plan of Care Expiration: 6/8/2017  Precautions: standard    Diagnosis:   Encounter Diagnosis   Name Primary?    Chronic midline low back pain without sciatica      Physician: Quiqeu Tanner MD  Treatment Orders: PT Eval and Treat    Subjective   Pt reports: improving back and rib pain    Pain Scale:  4/10      Objective   Time In: 5:00 PM  Time Out:: 6:00 PM  Total Treatment Time 1:1: 35 min    Discussed Plan of Care with patient: Yes    Laila RAY received 35 minutes of therapeutic exercise/NMR including:   Pelvic tilt 10 x 5 sec, mod manual cues  Diaphragmatic breathing x 1 min  PPU 2 x 10  LTR 15 x 1-2 sec  SKTC nerve glide x 10 each LE  quadruped hip ext with glute squeeze 2 x 5 each LE  Child's pose R, L, straight x 30 sec each  Kneeling hip flexor stretch x 30 sec each side  Pelvic tilt against wall with BUE flexion x 10, 3# stick   Wall angels x 5  Squat with BUE shoulder ext x 10, yellow T-band  Plank on forearms/toes: anterior 2 x 15 sec, side x 10 sec each side  (NEXT VISIT: cristi, Pallof, BKWD monster walk)      Laila RAY received 15 minutes of manual therapy including:  STM and TPR thoracolumbar paraspinals, medium  Central PA glides mid-thoracic and lumbar, Grade 3        Laila RAY received 10 minutes of IFC e-stim to thoracolumbar paraspinals at 13 mA.     Written Home Exercises Provided: yes  Exercises were reviewed and Laila RAY was able to demonstrate them prior to the end of the session. Pt received a written copy of exercises to perform at home. Laila RAY demonstrated good  understanding of the education provided.       Assessment   Pt tolerated PT well with improved exercise tolerance and body awareness. Decreased tissue tension throughout thoracolumbar paraspinals and improved  joint play with mild cavitations at mid-thoracic.  Laila RAY is progressing well towards her goals. Will benefit from con't skilled care.    Anticipated barriers to physical therapy: none  Pt's spiritual, cultural and educational needs considered and pt agreeable to plan of care and goals.    Short Term GOALS:  In 4 weeks, pt. will:  - sit with appropriate posture with min cueing and no increase in pain for 5 min - MET 4/12/17  - lie supine without pillow or leg support without pain increase for 5 min - MET 4/12/17  - improve core motor control to complete sub-max pelvic tilt in hook lying > 10 sec with min cueing and less pain - MET 4/12/17  - improve work chair set-up with lumbar roll to improve sitting tolerance and increase work tolerance - NOT MET    Long Term GOALS:  In 8 weeks, pt. will:  - be independent with HEP and SX management   - lie supine with small pillow without pain increase in order to fall asleep  - improve core motor control to complete sub-max pelvic tilt with dynamic tasks > 10 sec with no cueing or pain  - improve work chair and car ergonimics with lumbar roll to normalize sitting tolerance without pain    Plan   Continue with established Plan of Care towards PT goals for 1 month.      Sowmya Freire, PT

## 2017-05-15 ENCOUNTER — TELEPHONE (OUTPATIENT)
Dept: REHABILITATION | Facility: HOSPITAL | Age: 27
End: 2017-05-15

## 2017-05-15 NOTE — TELEPHONE ENCOUNTER
Called pt RE: no show appt and left message to confirm next appt. Requested a call back to confirm next appt.    Sowmya Freire, PT

## 2017-06-29 ENCOUNTER — DOCUMENTATION ONLY (OUTPATIENT)
Dept: REHABILITATION | Facility: HOSPITAL | Age: 27
End: 2017-06-29

## 2017-06-29 PROBLEM — M54.50 CHRONIC MIDLINE LOW BACK PAIN WITHOUT SCIATICA: Status: RESOLVED | Noted: 2017-03-08 | Resolved: 2017-06-29

## 2017-06-29 PROBLEM — G89.29 CHRONIC MIDLINE LOW BACK PAIN WITHOUT SCIATICA: Status: RESOLVED | Noted: 2017-03-08 | Resolved: 2017-06-29

## 2017-06-29 NOTE — PROGRESS NOTES
Patient was seen for 10 outpatient PT visits from 3/8/2017 to 5/8/2017. Pt missed/no showed 4 scheduled sessions. Treatment included: evaluation, HEP, pt education, manual therapy, ther ex, and e-stim.   Pt. did not return for follow up sessions and did not reschedule follow up visits. PT unable to fully assess goal achievement, and patient is discharged from outpatient PT Services.    Short Term GOALS:  In 4 weeks, pt. will:  - sit with appropriate posture with min cueing and no increase in pain for 5 min - MET 4/12/17  - lie supine without pillow or leg support without pain increase for 5 min - MET 4/12/17  - improve core motor control to complete sub-max pelvic tilt in hook lying > 10 sec with min cueing and less pain - MET 4/12/17  - improve work chair set-up with lumbar roll to improve sitting tolerance and increase work tolerance - NOT MET     Long Term GOALS:  In 8 weeks, pt. will:  - be independent with HEP and SX management - MET 5/1/17  - lie supine with small pillow without pain increase in order to fall asleep - MET 5/1/17  - improve core motor control to complete sub-max pelvic tilt with dynamic tasks > 10 sec with no cueing or pain - NOT MET  - improve work chair and car ergonimics with lumbar roll to normalize sitting tolerance without pain - NOT MET

## 2017-10-22 ENCOUNTER — HOSPITAL ENCOUNTER (EMERGENCY)
Facility: HOSPITAL | Age: 27
Discharge: HOME | End: 2017-10-22
Attending: EMERGENCY MEDICINE
Payer: COMMERCIAL

## 2017-10-22 VITALS
HEIGHT: 62 IN | WEIGHT: 143 LBS | SYSTOLIC BLOOD PRESSURE: 110 MMHG | HEART RATE: 86 BPM | TEMPERATURE: 99 F | OXYGEN SATURATION: 100 % | RESPIRATION RATE: 16 BRPM | DIASTOLIC BLOOD PRESSURE: 71 MMHG | BODY MASS INDEX: 26.31 KG/M2

## 2017-10-22 DIAGNOSIS — T63.481A ALLERGIC REACTION TO INSECT STING, ACCIDENTAL OR UNINTENTIONAL, INITIAL ENCOUNTER: ICD-10-CM

## 2017-10-22 DIAGNOSIS — T63.481A INSECT STINGS, ACCIDENTAL OR UNINTENTIONAL, INITIAL ENCOUNTER: Primary | ICD-10-CM

## 2017-10-22 LAB
B-HCG UR QL: NEGATIVE
CTP QC/QA: YES

## 2017-10-22 PROCEDURE — 99283 EMERGENCY DEPT VISIT LOW MDM: CPT | Mod: 25

## 2017-10-22 PROCEDURE — 63600175 PHARM REV CODE 636 W HCPCS: Performed by: PHYSICIAN ASSISTANT

## 2017-10-22 PROCEDURE — 99284 EMERGENCY DEPT VISIT MOD MDM: CPT | Mod: ,,, | Performed by: PHYSICIAN ASSISTANT

## 2017-10-22 PROCEDURE — 81025 URINE PREGNANCY TEST: CPT | Performed by: FAMILY MEDICINE

## 2017-10-22 PROCEDURE — 25000003 PHARM REV CODE 250: Performed by: PHYSICIAN ASSISTANT

## 2017-10-22 RX ORDER — CETIRIZINE HYDROCHLORIDE 5 MG/1
10 TABLET ORAL
Status: COMPLETED | OUTPATIENT
Start: 2017-10-22 | End: 2017-10-22

## 2017-10-22 RX ORDER — PREDNISONE 20 MG/1
60 TABLET ORAL
Status: COMPLETED | OUTPATIENT
Start: 2017-10-22 | End: 2017-10-22

## 2017-10-22 RX ORDER — CEPHALEXIN 500 MG/1
500 CAPSULE ORAL 4 TIMES DAILY
Qty: 28 CAPSULE | Refills: 0 | Status: SHIPPED | OUTPATIENT
Start: 2017-10-22 | End: 2017-10-29

## 2017-10-22 RX ADMIN — PREDNISONE 60 MG: 20 TABLET ORAL at 06:10

## 2017-10-22 RX ADMIN — CETIRIZINE HYDROCHLORIDE 10 MG: 5 TABLET, FILM COATED ORAL at 06:10

## 2017-10-22 NOTE — DISCHARGE INSTRUCTIONS
Take Benadryl as directed for allergic reaction.    Apply ice to the area.    If you develop a fever, worsening redness, or if the area becomes hard to touch or has drainage you may start taking the prescribed antibiotics.

## 2017-10-22 NOTE — ED TRIAGE NOTES
Insect bite x2 to left medial thigh since last pm.  There is no area of enduration or white cap to the areas.  Patient states that these areas started out looking like pimples, she massed them now the areas are surrounded with erythema and she states that they are burning.    GENERAL: The patient is well-developed and well-nourished in no apparent distress. Alert and oriented x4.                                                HEENT: Head is normocephalic and atraumatic. Extraocular muscles are intact. Pupils are equal, round, and reactive to light and accommodation. Nares appeared normal. Mouth is well hydrated and without lesions. Mucous membranes are moist. Posterior pharynx clear of any exudate or lesions.    NECK: Supple. No carotid bruits. No lymphadenopathy or thyromegaly.    LUNGS: Clear to auscultation.    HEART: Regular rate and rhythm without murmur.     ABDOMEN: Soft, nontender, and nondistended. Positive bowel sounds. No hepatosplenomegaly was noted.     NEUROLOGIC: Cranial nerves II through XII are grossly intact.     PSYCHIATRIC: Flat affect, but denies suicidal or homicidal ideations.    SKIN: No ulceration or induration present.

## 2017-10-23 NOTE — ED PROVIDER NOTES
"Encounter Date: 10/22/2017       History     Chief Complaint   Patient presents with    Abscess     Pt states "I think a spider bit me on my left upper thigh"     This is a 26 year old female with no known significant PMH who presents to the ED with a chief complaint of insect sting. Patient states that she was outdoors last night when she was bitten or stung by an insect. She developed erythema, warmth, and discomfort surrounding the area. She describes a burning sensation. States there were two pustules that drained a small amount of clear to bloody fluid. She denies fever, chills, chest pain, SOB, nausea/vomiting, abdominal pain, decreased ROM, weakness, or numbness.          Review of patient's allergies indicates:   Allergen Reactions    Codeine Rash     Past Medical History:   Diagnosis Date    Helicobacter pylori ab+      Past Surgical History:   Procedure Laterality Date    NO PAST SURGERIES       Family History   Problem Relation Age of Onset    Diabetes Mother     No Known Problems Father     No Known Problems Sister     No Known Problems Brother     Colon cancer Neg Hx     Crohn's disease Neg Hx     Esophageal cancer Neg Hx     Inflammatory bowel disease Neg Hx     Irritable bowel syndrome Neg Hx     Rectal cancer Neg Hx     Stomach cancer Neg Hx     Ulcerative colitis Neg Hx      Social History   Substance Use Topics    Smoking status: Never Smoker    Smokeless tobacco: Never Used    Alcohol use No     Review of Systems   Constitutional: Negative for chills and fever.   HENT: Negative for sore throat.    Respiratory: Negative for shortness of breath.    Cardiovascular: Negative for chest pain.   Gastrointestinal: Negative for nausea and vomiting.   Genitourinary: Negative for dysuria.   Musculoskeletal: Negative for back pain.   Skin: Positive for color change and rash.   Neurological: Negative for weakness.   Hematological: Does not bruise/bleed easily.       Physical Exam     Initial " Vitals [10/22/17 1726]   BP Pulse Resp Temp SpO2   110/71 86 16 99 °F (37.2 °C) 100 %      MAP       84         Physical Exam    Constitutional: She appears well-developed and well-nourished. No distress.   HENT:   Head: Atraumatic.   Eyes: Conjunctivae and EOM are normal. Pupils are equal, round, and reactive to light.   Neck: Normal range of motion. Neck supple.   Cardiovascular: Normal rate, regular rhythm and normal heart sounds.   Pulmonary/Chest: Breath sounds normal. No respiratory distress. She has no wheezes. She has no rhonchi. She has no rales.   Abdominal: Soft. Bowel sounds are normal. There is no tenderness. There is no rebound and no guarding.   Neurological: She is alert and oriented to person, place, and time.   Skin: Skin is warm and dry. Rash noted. There is erythema.              ED Course   Procedures  Labs Reviewed   POCT URINE PREGNANCY                   APC / Resident Notes:   26 year old female presents with insect bite.  On exam she is afebrile and nontoxic. There is a raised erythematous lesion of the left upper thigh, as noted. This is consistent with localized allergic reaction to insect sting. I considered but do not suspect cellulitis or abscess. Oral antihistamines and prednisone given in the ED. Advised pt to continue taking Benadryl as directed for allergic reaction. Prescription of Keflex written and discussed holding antibiotics unless signs of infection develop. Patient verbalized understanding and agrees with the course of treatment. She is stable for discharge. I discussed the care of this patient with my supervising MD.            Attending Attestation:     Physician Attestation Statement for NP/PA:   I discussed this assessment and plan of this patient with the NP/PA, but I did not personally examine the patient. The face to face encounter was performed by the NP/PA.    Other NP/PA Attestation Additions:      Medical Decision Making: Insect bite                 ED Course       Clinical Impression:   The primary encounter diagnosis was Insect stings, accidental or unintentional, initial encounter. A diagnosis of Allergic reaction to insect sting, accidental or unintentional, initial encounter was also pertinent to this visit.    Disposition:   Disposition: Discharged  Condition: Stable                        Angella Greenfield PA-C  10/23/17 0004       Dinorah Mills MD  10/25/17 0133

## 2017-11-22 ENCOUNTER — HOSPITAL ENCOUNTER (OUTPATIENT)
Dept: RADIOLOGY | Facility: HOSPITAL | Age: 27
Discharge: HOME | End: 2017-11-22
Attending: NURSE PRACTITIONER
Payer: COMMERCIAL

## 2017-11-22 ENCOUNTER — OFFICE VISIT (OUTPATIENT)
Dept: ORTHOPEDICS | Facility: CLINIC | Age: 27
End: 2017-11-22
Payer: COMMERCIAL

## 2017-11-22 VITALS — WEIGHT: 143.06 LBS | BODY MASS INDEX: 26.33 KG/M2 | HEIGHT: 62 IN

## 2017-11-22 DIAGNOSIS — M25.561 PAIN IN BOTH KNEES, UNSPECIFIED CHRONICITY: ICD-10-CM

## 2017-11-22 DIAGNOSIS — M25.562 PAIN IN BOTH KNEES, UNSPECIFIED CHRONICITY: ICD-10-CM

## 2017-11-22 DIAGNOSIS — M25.562 PAIN IN BOTH KNEES, UNSPECIFIED CHRONICITY: Primary | ICD-10-CM

## 2017-11-22 DIAGNOSIS — M25.561 PAIN IN BOTH KNEES, UNSPECIFIED CHRONICITY: Primary | ICD-10-CM

## 2017-11-22 DIAGNOSIS — M22.2X1 PATELLOFEMORAL PAIN SYNDROME OF BOTH KNEES: ICD-10-CM

## 2017-11-22 DIAGNOSIS — M22.2X2 PATELLOFEMORAL PAIN SYNDROME OF BOTH KNEES: ICD-10-CM

## 2017-11-22 PROCEDURE — 99213 OFFICE O/P EST LOW 20 MIN: CPT | Mod: S$GLB,,, | Performed by: NURSE PRACTITIONER

## 2017-11-22 PROCEDURE — 73562 X-RAY EXAM OF KNEE 3: CPT | Mod: TC,50

## 2017-11-22 PROCEDURE — 73562 X-RAY EXAM OF KNEE 3: CPT | Mod: 26,RT,, | Performed by: RADIOLOGY

## 2017-11-22 PROCEDURE — 73562 X-RAY EXAM OF KNEE 3: CPT | Mod: 26,LT,, | Performed by: RADIOLOGY

## 2017-11-22 PROCEDURE — 99999 PR PBB SHADOW E&M-EST. PATIENT-LVL IV: CPT | Mod: PBBFAC,,, | Performed by: NURSE PRACTITIONER

## 2017-11-22 RX ORDER — DICLOFENAC SODIUM 10 MG/G
4 GEL TOPICAL 4 TIMES DAILY PRN
Qty: 3 TUBE | Refills: 2 | Status: SHIPPED | OUTPATIENT
Start: 2017-11-22 | End: 2018-05-28 | Stop reason: RX

## 2017-11-22 NOTE — PROGRESS NOTES
SUBJECTIVE:     Chief Complaint & History of Present Illness:  Laila Collazo is a 26 y.o. year old female here with a history of constant bilateral knee pain which started many years ago.  There is not a history of trauma.  The pain is located in the anterior aspect of the knee, she reports deep aching inside the joint.  The pain is described as achy.  There is not radiation.  There is not catching or locking.  The feeling is constant so there are not aggravating factors.  Associated symptoms include popping sensation.  There is not numbness or tingling of the lower extremity.   Previous treatments include nothing which have provided poor relief.  There is not a history of previous injury or surgery to the knee. Denies instability. The patient does not use an assistive device.    Review of patient's allergies indicates:   Allergen Reactions    Codeine Rash         Current Outpatient Prescriptions   Medication Sig Dispense Refill    diclofenac sodium (VOLTAREN) 1 % Gel Apply 4 g topically 4 (four) times daily as needed. 3 Tube 2    doxycycline (VIBRAMYCIN) 100 MG Cap Take 1 capsule (100 mg total) by mouth 2 (two) times daily. 28 capsule 0    pantoprazole (PROTONIX) 40 MG tablet Take 1 tablet (40 mg total) by mouth once daily. 30 tablet 3     No current facility-administered medications for this visit.        Past Medical History:   Diagnosis Date    Helicobacter pylori ab+        Past Surgical History:   Procedure Laterality Date    NO PAST SURGERIES         Review of Systems:  Review of Systems   Constitution: Negative for chills, fever, weakness and malaise/fatigue.   Eyes: Negative for visual disturbance.   Cardiovascular: Negative for chest pain.   Hematologic/Lymphatic: Negative for bleeding problem.   Skin: Negative for color change, flushing and poor wound healing.   Musculoskeletal: Positive for joint pain. Negative for back pain, falls, joint swelling, muscle cramps, muscle weakness, myalgias and  "stiffness.   Neurological: Negative for dizziness, light-headedness, loss of balance, numbness and paresthesias.   Psychiatric/Behavioral: Negative for altered mental status.         OBJECTIVE:     PHYSICAL EXAM:  Height: 5' 2" (157.5 cm) Weight: 64.9 kg (143 lb 1.3 oz)  General    Nursing note reviewed.  Constitutional: She is oriented to person, place, and time. She appears well-developed and well-nourished. No distress.   HENT:   Head: Atraumatic.   Nose: Nose normal.   Eyes: Conjunctivae and EOM are normal. Right eye exhibits no discharge. Left eye exhibits no discharge.   Pulmonary/Chest: Effort normal. No respiratory distress.   Neurological: She is alert and oriented to person, place, and time.   Psychiatric: She has a normal mood and affect. Her behavior is normal. Judgment and thought content normal.           Right Knee Exam     Inspection   Erythema: absent  Swelling: absent  Effusion: effusion  Deformity: deformity  Bruising: absent    Tenderness   The patient is experiencing no tenderness.         Crepitus   Right knee crepitus location: None.    Range of Motion   Extension: normal   Flexion: normal     Other   Meniscal Cyst: absent  Popliteal (Baker's) Cyst: absent  Sensation: normal    Comments:  Pt reports infrapatellar pain with ata exam    Left Knee Exam     Inspection   Erythema: absent  Swelling: absent  Effusion: absent  Deformity: deformity  Bruising: absent    Tenderness   The patient is experiencing no tenderness.         Crepitus   Left knee crepitus location: None.    Range of Motion   Extension: normal   Flexion: normal     Other   Meniscal Cyst: absent  Popliteal (Baker's) Cyst: absent  Sensation: normal    Comments:  Pt reports infrapatellar pain with ata exam    Vascular Exam     Right Pulses    Posterior Tibial:      2+        Left Pulses    Posterior Tibial:      2+        Edema  Right Lower Leg: absent  Left Lower Leg: absent    There were no vitals filed for this " visit.    RADIOGRAPHS:  Xray obtained of the bilateral knees showing no significant DJD. No fracture or dislocation.     ASSESSMENT/PLAN:     Plan:   We discussed with the patient at length all the different treatment options available for patellofemoral pain syndrome of the knee including anti-inflammatories, acetaminophen, rest, ice, knee strengthening exercise, occasional cortisone injections for temporary relief, and finally surgical intervention. I discussed with the risks vs benefits of treatment options as well as explained to the patient knee anatomy and physiology.  She has a h/o of gastritis and Hpylori, no ulcers- GI advised against oral NSAIDs unless necessary due to risk  Recommend PT and Voltaren gel PRN  All questions answered    RTC PRN      Instructed pt to call office if they have any future questions/concerns or to schedule apt. Patient will return to see me if symptoms worsen or fail to improve.

## 2017-12-14 ENCOUNTER — OFFICE VISIT (OUTPATIENT)
Dept: INTERNAL MEDICINE | Facility: CLINIC | Age: 27
End: 2017-12-14
Payer: COMMERCIAL

## 2017-12-14 ENCOUNTER — HOSPITAL ENCOUNTER (OUTPATIENT)
Dept: RADIOLOGY | Facility: HOSPITAL | Age: 27
Discharge: HOME | End: 2017-12-14
Attending: INTERNAL MEDICINE
Payer: COMMERCIAL

## 2017-12-14 VITALS
DIASTOLIC BLOOD PRESSURE: 78 MMHG | HEIGHT: 62 IN | HEART RATE: 71 BPM | WEIGHT: 150.88 LBS | OXYGEN SATURATION: 98 % | SYSTOLIC BLOOD PRESSURE: 101 MMHG | BODY MASS INDEX: 27.77 KG/M2

## 2017-12-14 DIAGNOSIS — M54.9 MID-BACK PAIN, ACUTE: Primary | ICD-10-CM

## 2017-12-14 DIAGNOSIS — R10.9 BILATERAL FLANK PAIN: ICD-10-CM

## 2017-12-14 DIAGNOSIS — M54.9 MID-BACK PAIN, ACUTE: ICD-10-CM

## 2017-12-14 LAB
AMORPH CRY UR QL COMP ASSIST: NORMAL
BILIRUB UR QL STRIP: NEGATIVE
CLARITY UR REFRACT.AUTO: ABNORMAL
COLOR UR AUTO: YELLOW
GLUCOSE UR QL STRIP: NEGATIVE
HGB UR QL STRIP: NEGATIVE
KETONES UR QL STRIP: NEGATIVE
LEUKOCYTE ESTERASE UR QL STRIP: NEGATIVE
MICROSCOPIC COMMENT: NORMAL
NITRITE UR QL STRIP: NEGATIVE
PH UR STRIP: 8 [PH] (ref 5–8)
PROT UR QL STRIP: NEGATIVE
SP GR UR STRIP: 1.02 (ref 1–1.03)
SQUAMOUS #/AREA URNS AUTO: 12 /HPF
URN SPEC COLLECT METH UR: ABNORMAL
UROBILINOGEN UR STRIP-ACNC: NEGATIVE EU/DL

## 2017-12-14 PROCEDURE — 81001 URINALYSIS AUTO W/SCOPE: CPT

## 2017-12-14 PROCEDURE — 72080 X-RAY EXAM THORACOLMB 2/> VW: CPT | Mod: TC

## 2017-12-14 PROCEDURE — 99214 OFFICE O/P EST MOD 30 MIN: CPT | Mod: S$GLB,,, | Performed by: INTERNAL MEDICINE

## 2017-12-14 PROCEDURE — 76770 US EXAM ABDO BACK WALL COMP: CPT | Mod: 26,,, | Performed by: RADIOLOGY

## 2017-12-14 PROCEDURE — 76770 US EXAM ABDO BACK WALL COMP: CPT | Mod: TC

## 2017-12-14 PROCEDURE — 72080 X-RAY EXAM THORACOLMB 2/> VW: CPT | Mod: 26,,, | Performed by: RADIOLOGY

## 2017-12-14 PROCEDURE — 99999 PR PBB SHADOW E&M-EST. PATIENT-LVL IV: CPT | Mod: PBBFAC,,, | Performed by: INTERNAL MEDICINE

## 2017-12-14 RX ORDER — TIZANIDINE 2 MG/1
4 TABLET ORAL EVERY 8 HOURS PRN
Qty: 30 TABLET | Refills: 0 | Status: SHIPPED | OUTPATIENT
Start: 2017-12-14 | End: 2017-12-24

## 2017-12-14 RX ORDER — TIZANIDINE 2 MG/1
TABLET ORAL
Qty: 540 TABLET | Refills: 0 | OUTPATIENT
Start: 2017-12-14

## 2017-12-14 RX ORDER — TRAMADOL HYDROCHLORIDE 50 MG/1
50 TABLET ORAL EVERY 6 HOURS PRN
Qty: 24 TABLET | Refills: 0 | Status: SHIPPED | OUTPATIENT
Start: 2017-12-14 | End: 2017-12-21

## 2017-12-14 NOTE — LETTER
December 14, 2017      [facility]  [address]  [facility] [address]  Phone: [phone]  Fax: [phone]       Patient: Laila Collazo   YOB: 1990  Date of Visit: 12/14/2017    To Whom It May Concern:    Yamilet Collazo  was at [facility] on 12/14/2017. She may return to work/school on 12/15/17 with no restrictions. If you have any questions or concerns, or if I can be of further assistance, please do not hesitate to contact me.    Sincerely,          Melany Malhotra MD

## 2017-12-14 NOTE — PROGRESS NOTES
"Subjective:       Patient ID: Laila Collazo is a 26 y.o. female.    Chief Complaint: Back Pain (1 week 1/2 no injury that she recalls)    HPI   1.5 weeks of B flank pain that goes up and down her back. Reports that when the pain goes to the middle of the back, it causes chest tightness. Pain is 7 of 10. Pain is constantly present. Feels like pins and needles and sometimes burning. Nothing makes it better or worse. No weakness/numbness/tingling in the legs. Reports when she was walking into clinic, felt L groin pain for an instant but no pain now.   Not worse w/ urination. Chronically w/ burning w/ urination - improved w/ PT. This is more present after the cycle. Pain didn't stop her from working. Can't find comfortable position when she's sleeping.   No injury/falls.   No nausea/vomiting/diarrhea/constipation/abdominal pain.   No cough/fevers/chills. Pain worse when taking a deep breath.    Does have chronic back pain but this does not feel like the chronic pain.   S/p PT.     Reports can't really take anything (h/o GERD/epigastric pain so can't take NSAIDs).     Review of Systems      Objective:      Physical Exam    /78   Pulse 71   Ht 5' 2" (1.575 m)   Wt 68.4 kg (150 lb 14.4 oz)   SpO2 98%   BMI 27.60 kg/m²     Gen - A+OX4, NAD  HEENT - PERRL, OP clear. MMM.  Neck - n o LAD  CV - RRR  Chest - CTAB, no wheezing/rhonchi  ABd - S/NT/ND/+BS  Ext -2 + B DP and radial pulses. No LE edema.   MSK - B CVA/post lower rib tenderness to palpation. No spinal or paraspinal tenderness to palpation. Pain in B CVA area on lateral rotation and flexion. FROM of thoracolumbar spine though. Neg straight leg raise test. Normal gait. 5/5 BLE strength.  Skin - no rash.     Assessment/Plan     Laila RAY was seen today for back pain.    Diagnoses and all orders for this visit:    Acute mid back pain -   -     Urinalysis  -     Urinalysis Microscopic  -     Urine culture  -     Basic metabolic panel; Future  -     CBC auto " differential; Future  -     X-Ray Thoracolumbar Spine AP Lateral; Future  -     tiZANidine (ZANAFLEX) 2 MG tablet; Take 2 tablets (4 mg total) by mouth every 8 (eight) hours as needed.  -     traMADol (ULTRAM) 50 mg tablet; Take 1 tablet (50 mg total) by mouth every 6 (six) hours as needed for Pain.    Bilateral flank pain  -     Urinalysis  -     Urinalysis Microscopic  -     Urine culture  -     X-Ray Thoracolumbar Spine AP Lateral; Future  -     tiZANidine (ZANAFLEX) 2 MG tablet; Take 2 tablets (4 mg total) by mouth every 8 (eight) hours as needed.  -     traMADol (ULTRAM) 50 mg tablet; Take 1 tablet (50 mg total) by mouth every 6 (six) hours as needed for Pain.  -     US Retroperitoneal Complete (Kidney and; Future    F/u on 12/27/17. If symptoms worsen, go to ED.    Melany Malhotra MD  Department of Internal Medicine - [facility]  9:14 AM

## 2017-12-18 ENCOUNTER — TELEPHONE (OUTPATIENT)
Dept: INTERNAL MEDICINE | Facility: CLINIC | Age: 27
End: 2017-12-18

## 2017-12-18 NOTE — TELEPHONE ENCOUNTER
Results of US, XR, labs discussed w/ pt. Reports pain still present but better. Has f/u next week.

## 2018-01-10 ENCOUNTER — TELEPHONE (OUTPATIENT)
Dept: INTERNAL MEDICINE | Facility: CLINIC | Age: 28
End: 2018-01-10

## 2018-01-10 NOTE — TELEPHONE ENCOUNTER
----- Message from Carlos Enrique Bello sent at 1/10/2018  9:17 AM CST -----  Contact: PINK DOT  PINK DOT     flu-like symptoms are fever and throat sore . Please advise , Thanks        [facility] [phone]

## 2018-01-11 ENCOUNTER — OFFICE VISIT (OUTPATIENT)
Dept: INTERNAL MEDICINE | Facility: CLINIC | Age: 28
End: 2018-01-11

## 2018-01-11 VITALS
DIASTOLIC BLOOD PRESSURE: 84 MMHG | HEIGHT: 62 IN | SYSTOLIC BLOOD PRESSURE: 106 MMHG | BODY MASS INDEX: 27.6 KG/M2 | WEIGHT: 150 LBS | HEART RATE: 96 BPM | TEMPERATURE: 99 F | RESPIRATION RATE: 16 BRPM

## 2018-01-11 DIAGNOSIS — J02.9 PHARYNGITIS, UNSPECIFIED ETIOLOGY: Primary | ICD-10-CM

## 2018-01-11 LAB
DEPRECATED S PYO AG THROAT QL EIA: NEGATIVE
FLUAV AG SPEC QL IA: NEGATIVE
FLUBV AG SPEC QL IA: NEGATIVE
SPECIMEN SOURCE: NORMAL

## 2018-01-11 PROCEDURE — 87880 STREP A ASSAY W/OPTIC: CPT

## 2018-01-11 PROCEDURE — 87400 INFLUENZA A/B EACH AG IA: CPT

## 2018-01-11 PROCEDURE — 99213 OFFICE O/P EST LOW 20 MIN: CPT | Mod: S$PBB,,, | Performed by: INTERNAL MEDICINE

## 2018-01-11 PROCEDURE — 87081 CULTURE SCREEN ONLY: CPT

## 2018-01-11 PROCEDURE — 99999 PR PBB SHADOW E&M-EST. PATIENT-LVL IV: CPT | Mod: PBBFAC,,, | Performed by: INTERNAL MEDICINE

## 2018-01-11 RX ORDER — HYDROCODONE BITARTRATE AND HOMATROPINE METHYLBROMIDE ORAL SOLUTION 5; 1.5 MG/5ML; MG/5ML
5 LIQUID ORAL NIGHTLY PRN
Qty: 120 ML | Refills: 0 | Status: SHIPPED | OUTPATIENT
Start: 2018-01-11 | End: 2018-04-19

## 2018-01-11 RX ORDER — METHYLPREDNISOLONE 4 MG/1
TABLET ORAL
Qty: 1 PACKAGE | Refills: 0 | Status: SHIPPED | OUTPATIENT
Start: 2018-01-11 | End: 2018-02-01

## 2018-01-11 NOTE — LETTER
January 11, 2018      [facility]  [address]  [facility] [address]  Phone: [phone]  Fax: [phone]       Patient: Laila Collazo   YOB: 1990  Date of Visit: 01/11/2018    To Whom It May Concern:    Yamilet Collazo  was at [facility] on 01/11/2018. She may return to work/school on 1/12/18 with no restrictions. If you have any questions or concerns, or if I can be of further assistance, please do not hesitate to contact me.    Sincerely,        Melany Malhotra MD

## 2018-01-11 NOTE — PATIENT INSTRUCTIONS
Warm, salt water gargles 4x/day.   Vicks chloraseptic spray over the counter as needed. Tylenol as needed for fevers/headaches. Mucinex over the counter to thin out secretions.

## 2018-01-11 NOTE — PROGRESS NOTES
"Subjective:       Patient ID: Laila Collazo is a 27 y.o. female.    Chief Complaint: Fever and Sore Throat    HPI urgent  Started w/ sore throat 2 days ago. Also had fevers at home. No chills. No body aches. Light headache. Cough. Started being productive today. Rhinorrhea when she first started. Took Dayquil and that's controlled. No ear pain. Pain in the neck but no swollen glands. Able to move the neck w/o issues. No SOB/wheezing. Took Robitussin yesterday, which helped w/ the cough.     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     Review of Systems  as above in HPI.     Objective:      Physical Exam    /84   Pulse 96   Temp 98.5 °F (36.9 °C) (Oral)   Resp 16   Ht 5' 2" (1.575 m)   Wt 68 kg (150 lb)   LMP 12/26/2017   BMI 27.44 kg/m²     Gen - A+Ox4, NAD  HEENT - PERRL, OP with mild erythema but no exudates. MMM. TM normal. No sinus tenderness to palpation.  Neck - No cervical LAD.  CV - RRR  Chest -  CTAB. No wheezing. Normal work of breathing.   Abd - S/NT/ND/+BS  Ext - 2+ BDP and radial pulses. No LE edema.    Assessment/Plan     Laila RAY was seen today for fever and sore throat.    Diagnoses and all orders for this visit:    Pharyngitis, unspecified etiology  Warm, salt water gargles 4x/day.   Vicks chloraseptic spray over the counter as needed. Tylenol as needed for fevers/headaches. Mucinex over the counter to thin out secretions.  -     methylPREDNISolone (MEDROL DOSEPACK) 4 mg tablet; use as directed  -     Influenza antigen Nasal Swab  -     THROAT SCREEN, RAPID; Future  -     hydrocodone-homatropine 5-1.5 mg/5 ml (HYCODAN) 5-1.5 mg/5 mL Syrp; Take 5 mLs by mouth nightly as needed.      Follow-up if symptoms worsen or fail to improve.      Melany Malhotra MD  Department of Internal Medicine - [facility]  9:33 AM  "

## 2018-01-13 LAB — BACTERIA THROAT CULT: NORMAL

## 2018-04-19 ENCOUNTER — OFFICE VISIT (OUTPATIENT)
Dept: UROLOGY | Facility: CLINIC | Age: 28
End: 2018-04-19
Payer: COMMERCIAL

## 2018-04-19 VITALS
WEIGHT: 153.44 LBS | SYSTOLIC BLOOD PRESSURE: 123 MMHG | BODY MASS INDEX: 28.24 KG/M2 | DIASTOLIC BLOOD PRESSURE: 78 MMHG | HEIGHT: 62 IN | HEART RATE: 99 BPM

## 2018-04-19 DIAGNOSIS — R10.2 PELVIC PAIN: Primary | ICD-10-CM

## 2018-04-19 PROCEDURE — 87086 URINE CULTURE/COLONY COUNT: CPT

## 2018-04-19 PROCEDURE — 99213 OFFICE O/P EST LOW 20 MIN: CPT | Mod: S$GLB,,, | Performed by: UROLOGY

## 2018-04-19 PROCEDURE — 99999 PR PBB SHADOW E&M-EST. PATIENT-LVL III: CPT | Mod: PBBFAC,,, | Performed by: UROLOGY

## 2018-04-19 RX ORDER — LEVONORGESTREL AND ETHINYL ESTRADIOL 0.15-0.03
KIT ORAL
Refills: 6 | COMMUNITY
Start: 2018-04-11 | End: 2018-09-28

## 2018-04-19 NOTE — PROGRESS NOTES
Subjective:       Patient ID: Laila Collazo is a 27 y.o. female.    Chief Complaint: Advice Only (c/o burning with urination, [place] hx of high tone pelvic dysfunction.)    HPI patient is here for pelvic pain.  She has a history of pelvic floor disease which responded to physical therapy.  She has seen lynda Laureano before.  She's here complaining of dysuria.  She was told by her gynecologist that she had endometriosis in that this could be causing her problems.  She got a second opinion and was told that this was not related.  She's been having dysuria since 2016    Past Medical History:   Diagnosis Date    Helicobacter pylori ab+        Past Surgical History:   Procedure Laterality Date    NO PAST SURGERIES         Family History   Problem Relation Age of Onset    Diabetes Mother     No Known Problems Father     No Known Problems Sister     No Known Problems Brother     Colon cancer Neg Hx     Crohn's disease Neg Hx     Esophageal cancer Neg Hx     Inflammatory bowel disease Neg Hx     Irritable bowel syndrome Neg Hx     Rectal cancer Neg Hx     Stomach cancer Neg Hx     Ulcerative colitis Neg Hx        Social History     Social History    Marital status: Single     Spouse name: N/A    Number of children: N/A    Years of education: N/A     Occupational History    Not on file.     Social History Main Topics    Smoking status: Never Smoker    Smokeless tobacco: Never Used    Alcohol use No    Drug use: No    Sexual activity: Yes     Partners: Male     Birth control/ protection: None     Other Topics Concern    Not on file     Social History Narrative    No narrative on file       Allergies:  Codeine    Medications:    Current Outpatient Prescriptions:     diclofenac sodium (VOLTAREN) 1 % Gel, Apply 4 g topically 4 (four) times daily as needed., Disp: 3 Tube, Rfl: 2    SELINA 0.15-0.03 mg per tablet, TK 1 T PO QD, Disp: , Rfl: 6    Review of Systems    Constitutional: Negative.    HENT: Negative.    Eyes: Negative.    Respiratory: Negative.    Endocrine: Negative.    Genitourinary: Positive for dysuria.   Musculoskeletal: Negative.    Allergic/Immunologic: Negative.    Neurological: Negative.    Hematological: Negative.    Psychiatric/Behavioral: Negative.        Objective:      Physical Exam   Constitutional: She appears well-developed.   HENT:   Head: Normocephalic.   Cardiovascular: Normal rate.    Pulmonary/Chest: Effort normal.   Abdominal: Soft.   Musculoskeletal: Normal range of motion.   Neurological: She is alert.   Skin: Skin is warm.         Assessment:       1. Pelvic pain        Plan:       Laila RAY was seen today for advice only.    Diagnoses and all orders for this visit:    Pelvic pain        will get a urine culture and have patient follow-up with , me.  I recommend that she use Azo-Standard since she's not using anything right now

## 2018-04-20 LAB — BACTERIA UR CULT: NO GROWTH

## 2018-04-25 ENCOUNTER — TELEPHONE (OUTPATIENT)
Dept: INTERNAL MEDICINE | Facility: CLINIC | Age: 28
End: 2018-04-25

## 2018-04-25 ENCOUNTER — PATIENT MESSAGE (OUTPATIENT)
Dept: INTERNAL MEDICINE | Facility: CLINIC | Age: 28
End: 2018-04-25

## 2018-04-25 NOTE — TELEPHONE ENCOUNTER
Can come in on 5/23/18 at 1pm for annual. Please see if she can make it and please schedule. Once that's done, please send back to me and I can order labs prior.

## 2018-04-25 NOTE — TELEPHONE ENCOUNTER
----- Message from Rossy Moreno sent at 4/25/2018 10:03 AM CDT -----  Contact: [employer] request  Message     Appointment Request From: Laila Collazo    With Provider: Melany Malhotra MD [-Primary Care Physician-]    Would Accept With:Only the person I've selected    Preferred Date Range: Any date 4/25/2018 or later    Preferred Times: Any    Reason for visit: Request an Appt    Comments:  Hi I would like to schedule an appointment with Dr. Melany Malhotra to get my yearly check up and blood work done. Its not allowing me to schedule an appointment with her. I keep getting an error message.

## 2018-04-25 NOTE — TELEPHONE ENCOUNTER
Pt wants blood work done and to schedule an appt with you but there are no orders in for blood work if needed.

## 2018-05-02 ENCOUNTER — OFFICE VISIT (OUTPATIENT)
Dept: INTERNAL MEDICINE | Facility: CLINIC | Age: 28
End: 2018-05-02
Payer: COMMERCIAL

## 2018-05-02 ENCOUNTER — LAB VISIT (OUTPATIENT)
Dept: LAB | Facility: HOSPITAL | Age: 28
End: 2018-05-02
Payer: COMMERCIAL

## 2018-05-02 VITALS
OXYGEN SATURATION: 99 % | HEART RATE: 106 BPM | DIASTOLIC BLOOD PRESSURE: 70 MMHG | SYSTOLIC BLOOD PRESSURE: 110 MMHG | WEIGHT: 153 LBS | BODY MASS INDEX: 28.16 KG/M2 | HEIGHT: 62 IN

## 2018-05-02 DIAGNOSIS — Z00.00 ANNUAL PHYSICAL EXAM: Primary | ICD-10-CM

## 2018-05-02 DIAGNOSIS — Z00.00 ANNUAL PHYSICAL EXAM: ICD-10-CM

## 2018-05-02 LAB
ALBUMIN SERPL BCP-MCNC: 3.7 G/DL
ALP SERPL-CCNC: 44 U/L
ALT SERPL W/O P-5'-P-CCNC: 11 U/L
ANION GAP SERPL CALC-SCNC: 9 MMOL/L
AST SERPL-CCNC: 15 U/L
BASOPHILS # BLD AUTO: 0.01 K/UL
BASOPHILS NFR BLD: 0.1 %
BILIRUB SERPL-MCNC: 0.4 MG/DL
BUN SERPL-MCNC: 9 MG/DL
CALCIUM SERPL-MCNC: 9 MG/DL
CHLORIDE SERPL-SCNC: 109 MMOL/L
CHOLEST SERPL-MCNC: 182 MG/DL
CHOLEST/HDLC SERPL: 4.6 {RATIO}
CO2 SERPL-SCNC: 23 MMOL/L
CREAT SERPL-MCNC: 0.7 MG/DL
DIFFERENTIAL METHOD: NORMAL
EOSINOPHIL # BLD AUTO: 0 K/UL
EOSINOPHIL NFR BLD: 0.4 %
ERYTHROCYTE [DISTWIDTH] IN BLOOD BY AUTOMATED COUNT: 13.9 %
EST. GFR  (AFRICAN AMERICAN): >60 ML/MIN/1.73 M^2
EST. GFR  (NON AFRICAN AMERICAN): >60 ML/MIN/1.73 M^2
GLUCOSE SERPL-MCNC: 94 MG/DL
HCT VFR BLD AUTO: 43.2 %
HDLC SERPL-MCNC: 40 MG/DL
HDLC SERPL: 22 %
HGB BLD-MCNC: 14.1 G/DL
LDLC SERPL CALC-MCNC: 132.8 MG/DL
LYMPHOCYTES # BLD AUTO: 1.7 K/UL
LYMPHOCYTES NFR BLD: 22.2 %
MCH RBC QN AUTO: 29.5 PG
MCHC RBC AUTO-ENTMCNC: 32.6 G/DL
MCV RBC AUTO: 90 FL
MONOCYTES # BLD AUTO: 0.4 K/UL
MONOCYTES NFR BLD: 5.3 %
NEUTROPHILS # BLD AUTO: 5.5 K/UL
NEUTROPHILS NFR BLD: 71.9 %
NONHDLC SERPL-MCNC: 142 MG/DL
PLATELET # BLD AUTO: 309 K/UL
PMV BLD AUTO: 10.6 FL
POTASSIUM SERPL-SCNC: 4.5 MMOL/L
PROT SERPL-MCNC: 7.1 G/DL
RBC # BLD AUTO: 4.78 M/UL
SODIUM SERPL-SCNC: 141 MMOL/L
TRIGL SERPL-MCNC: 46 MG/DL
WBC # BLD AUTO: 7.69 K/UL

## 2018-05-02 PROCEDURE — 36415 COLL VENOUS BLD VENIPUNCTURE: CPT

## 2018-05-02 PROCEDURE — 1158F ADVNC CARE PLAN TLK DOCD: CPT | Mod: S$GLB,,, | Performed by: NURSE PRACTITIONER

## 2018-05-02 PROCEDURE — 99395 PREV VISIT EST AGE 18-39: CPT | Mod: S$GLB,,, | Performed by: NURSE PRACTITIONER

## 2018-05-02 PROCEDURE — 80053 COMPREHEN METABOLIC PANEL: CPT

## 2018-05-02 PROCEDURE — 86703 HIV-1/HIV-2 1 RESULT ANTBDY: CPT

## 2018-05-02 PROCEDURE — 85025 COMPLETE CBC W/AUTO DIFF WBC: CPT

## 2018-05-02 PROCEDURE — 80061 LIPID PANEL: CPT

## 2018-05-02 PROCEDURE — 99999 PR PBB SHADOW E&M-EST. PATIENT-LVL III: CPT | Mod: PBBFAC,,, | Performed by: NURSE PRACTITIONER

## 2018-05-02 NOTE — PROGRESS NOTES
"Subjective:       Patient ID: Laila Collazo is a 27 y.o. female.    Chief Complaint: Annual Exam    Pt here for annual exam, no complaints.  Has endometriosis, treated by GYN at .  Started [place] this past March.  Engaged.  Works at [employer]  Last eye exam January 2018  Last dental cleaning: gets frequently at work  Safe at home  Wears seatbelts        Past Medical History:   Diagnosis Date    Endometriosis     treated by GYN at     Helicobacter pylori ab+      Past Surgical History:   Procedure Laterality Date    NO PAST SURGERIES       Social History     Social History Narrative    No narrative on file     Family History   Problem Relation Age of Onset    Diabetes Mother     No Known Problems Father     No Known Problems Sister     No Known Problems Brother     Colon cancer Neg Hx     Crohn's disease Neg Hx     Esophageal cancer Neg Hx     Inflammatory bowel disease Neg Hx     Irritable bowel syndrome Neg Hx     Rectal cancer Neg Hx     Stomach cancer Neg Hx     Ulcerative colitis Neg Hx      Vitals:    05/02/18 0750   BP: 110/70   Pulse: 106   SpO2: 99%   Weight: 69.4 kg (153 lb)   Height: 5' 2" (1.575 m)   PainSc: 0-No pain     Outpatient Encounter Prescriptions as of 5/2/2018   Medication Sig Dispense Refill    SELINA 0.15-0.03 mg per tablet TK 1 T PO QD  6    diclofenac sodium (VOLTAREN) 1 % Gel Apply 4 g topically 4 (four) times daily as needed. 3 Tube 2     No facility-administered encounter medications on file as of 5/2/2018.      Wt Readings from Last 3 Encounters:   05/02/18 69.4 kg (153 lb)   04/19/18 69.4 kg (153 lb)   04/19/18 69.6 kg (153 lb 7 oz)     Last 3 sets of Vitals    Vitals - 1 value per visit 4/19/2018 4/19/2018 5/2/2018   SYSTOLIC 123 123 110   DIASTOLIC 78 78 70   PULSE 99 99 106   TEMPERATURE - - -   RESPIRATIONS - - -   SPO2 - - 99   Weight (lb) 153.44 153 153   Weight (kg) 69.6 69.4 69.4   HEIGHT 5' 2" 5' 2" 5' 2"   BODY MASS INDEX 28.06 27.98 27.98 "   VISIT REPORT - - -   Pain Score  0 - 0     No results found for: CBC  Review of Systems   Constitutional: Negative for activity change, appetite change, diaphoresis, fatigue, fever and unexpected weight change.   HENT: Negative for congestion.    Eyes: Negative for photophobia and visual disturbance.   Respiratory: Negative for cough and shortness of breath.    Cardiovascular: Negative for chest pain and palpitations.   Gastrointestinal: Negative for abdominal pain, diarrhea, nausea and vomiting.   Genitourinary: Negative for difficulty urinating.   Skin: Negative for rash.   Neurological: Negative for dizziness, facial asymmetry and headaches.   Hematological: Negative for adenopathy.   Psychiatric/Behavioral: Negative for sleep disturbance.       Objective:      Physical Exam   Constitutional: She is oriented to person, place, and time. She appears well-developed and well-nourished. No distress.   HENT:   Head: Normocephalic and atraumatic.   Right Ear: External ear normal.   Left Ear: External ear normal.   Nose: Nose normal.   Mouth/Throat: Oropharynx is clear and moist. No oropharyngeal exudate.   Eyes: Conjunctivae and EOM are normal. Pupils are equal, round, and reactive to light. Right eye exhibits no discharge. No scleral icterus.   Neck: Normal range of motion. Neck supple. No JVD present. No thyromegaly present.   Cardiovascular: Normal rate, regular rhythm, normal heart sounds and intact distal pulses.    No murmur heard.  Pulmonary/Chest: Effort normal and breath sounds normal. No respiratory distress. She has no wheezes.   Abdominal: Soft. Bowel sounds are normal. She exhibits no distension and no mass. There is no tenderness.   Musculoskeletal: Normal range of motion. She exhibits no edema or tenderness.   Lymphadenopathy:     She has no cervical adenopathy.   Neurological: She is alert and oriented to person, place, and time. She has normal reflexes. No cranial nerve deficit. She exhibits normal  muscle tone.   Skin: Skin is warm and dry. Capillary refill takes less than 2 seconds. No rash noted. She is not diaphoretic. No erythema.   Psychiatric: She has a normal mood and affect. Her behavior is normal. Judgment and thought content normal.   Nursing note and vitals reviewed.      Assessment:       1. Annual physical exam        Plan:       Patient Counseling:  --Nutrition: Stressed importance of moderation in sodium/caffeine intake, saturated fat and cholesterol, caloric balance, sufficient intake of fresh fruits, vegetables, fiber, calcium, iron, and 1 mg of folate supplement per day (for females capable of pregnancy).  --Exercise: Stressed the importance of regular exercise.   --Substance Abuse: Discussed cessation/primary prevention of tobacco, alcohol, or other drug use; driving or other dangerous activities under the influence; availability of treatment for abuse.   --Sexuality: Discussed sexually transmitted diseases, partner selection, use of condoms, avoidance of unintended pregnancy and contraceptive alternatives.   --Injury prevention: Discussed safety belts, safety helmets, smoke detector.  --Dental health: Discussed importance of regular tooth brushing, flossing, and dental visits.  --Immunizations reviewed.  Laila RAY was seen today for annual exam.    Diagnoses and all orders for this visit:    Annual physical exam  Comments:  Advanced directive packet given to pt  Labs ordered this am  Pt declined HPV.     Orders:  -     CBC auto differential; Future  -     Comprehensive metabolic panel; Future  -     Lipid panel; Future  -     C. trachomatis/N. gonorrhoeae by AMP DNA Urine; Future  -     HIV-1 and HIV-2 antibodies; Future      Patient Instructions       Prevention Guidelines, Women Ages 18 to 39  Screening tests and vaccines are an important part of managing your health. Health counseling is essential, too. Below are guidelines for these, for women ages 18 to 39. Talk with your healthcare  provider to make sure youre up-to-date on what you need.  Screening Who needs it How often   Alcohol misuse All women in this age group At routine exams   Blood pressure All women in this age group Every 2 years if your blood pressure is less than 120/80 mm Hg; yearly if your systolic blood pressure is 120 to 139 mm Hg, or your diastolic blood pressure reading is 80 to 89 mm Hg   Breast cancer All women in this age group should talk with their healthcare providers about the need for clinical breast exams (CBE)1 Clinical breast exam every 3 years1   Cervical cancer Women ages 21 and older Women between ages 21 and 29 should have a Pap test every 3 years; women between ages 30 and 65 are advised to have a Pap test plus an HPV test every 5 years   Chlamydia Sexually active women ages 24 and younger, and women at increased risk for infection Every 3 years if you're at risk or have symptoms   Depression All women in this age group At routine exams   Diabetes mellitus, type 2 Adults with no symptoms who are overweight or obese and have 1 or more other risk factors for diabetes At least every 3 years   Gonorrhea Sexually active women at increased risk for infection At routine exams   Hepatitis C Anyone at increased risk At routine exams   HIV All women At routine exams   Obesity All women in this age group At routine exams   Syphilis Women at increased risk for infection - talk with your healthcare provider At routine exams   Tuberculosis Women at increased risk for infection - talk with your healthcare provider Ask your healthcare provider   Vision All women in this age group At least 1 complete exam in your 20s, and 2 in your 30s   Vaccine2 Who needs it How often   Chickenpox (varicella) All women in this age group who have no record of this infection or vaccine 2 doses; the second dose should be given 4 to 8 weeks after the first dose   Hepatitis A Women at increased risk for infection - talk with your healthcare  provider 2 doses given at least 6 months apart   Hepatitis B Women at increased risk for infection - talk with your healthcare provider 3 doses over 6 months; second dose should be given 1 month after the first dose; the third dose should be given at least 2 months after the second dose and at least 4 months after the first dose   Haemophilus influenzae Type B (HIB) Women at increased risk for infection - talk with your healthcare provider 1 to 3 doses   Human papillomavirus (HPV) All women in this age group up to age 26 3 doses; the second dose should be given 1 to 2 months after the first dose and the third dose given 6 months after the first dose   Influenza (flu) All women in this age group Once a year   Measles, mumps, rubella (MMR) All women in this age group who have no record of these infections or vaccines 1 or 2 doses   Meningococcal Women at increased risk for infection - talk with your healthcare provider 1 or more doses   Pneumococcal conjugate vaccine (PCV13) and pneumococcal polysaccharide vaccine (PPSV23) Women at increased risk for infection - talk with your healthcare provider PCV13: 1 dose ages 19 to 65 (protects against 13 types of pneumococcal bacteria)  PPSV23: 1 to 2 doses through age 64, or 1 dose at 65 or older (protects against 23 types of pneumococcal bacteria)      Tetanus/diphtheria/pertussis (Td/Tdap) booster All women in this age group Td every 10 years, or a one-time dose of Tdap instead of a Td booster after age 18, then Td every 10 years   Counseling Who needs it How often   BRCA gene mutation testing for breast and ovarian cancer susceptibility Women with increased risk for having gene mutation When your risk is known   Breast cancer and chemoprevention Women at high risk for breast cancer When your risk is known   Diet and exercise Women who are overweight or obese When diagnosed, and then at routine exams   Domestic violence Women at the age in which they are able to have  children At routine exams   Sexually transmitted infection prevention Women who are sexually active At routine exams   Skin cancer Prevention of skin cancer in fair-skinned adults through age 24 At routine exams   Use of tobacco and the health effects it can cause All women in this age group Every visit   1According to the ACS, women ages 20 to 39 years should have a clinical breast exam (CBE) as part of their routine health exam every 3 years. Breast self-exams are an option for women starting in their 20s. But the  USPSTF does not recommend CBE.  2Those who are 18 years old and not up-to-date on their childhood vaccines should get all appropriate catch-up vaccines recommended by the CDC.  Date Last Reviewed: 8/27/2015  © 3341-9298 [employer]. [address] [place] [address], [place] [address]. All rights reserved. This information is not intended as a substitute for professional medical care. Always follow your healthcare professional's instructions.

## 2018-05-02 NOTE — PATIENT INSTRUCTIONS
Prevention Guidelines, Women Ages 18 to 39  Screening tests and vaccines are an important part of managing your health. Health counseling is essential, too. Below are guidelines for these, for women ages 18 to 39. Talk with your healthcare provider to make sure youre up-to-date on what you need.  Screening Who needs it How often   Alcohol misuse All women in this age group At routine exams   Blood pressure All women in this age group Every 2 years if your blood pressure is less than 120/80 mm Hg; yearly if your systolic blood pressure is 120 to 139 mm Hg, or your diastolic blood pressure reading is 80 to 89 mm Hg   Breast cancer All women in this age group should talk with their healthcare providers about the need for clinical breast exams (CBE)1 Clinical breast exam every 3 years1   Cervical cancer Women ages 21 and older Women between ages 21 and 29 should have a Pap test every 3 years; women between ages 30 and 65 are advised to have a Pap test plus an HPV test every 5 years   Chlamydia Sexually active women ages 24 and younger, and women at increased risk for infection Every 3 years if you're at risk or have symptoms   Depression All women in this age group At routine exams   Diabetes mellitus, type 2 Adults with no symptoms who are overweight or obese and have 1 or more other risk factors for diabetes At least every 3 years   Gonorrhea Sexually active women at increased risk for infection At routine exams   Hepatitis C Anyone at increased risk At routine exams   HIV All women At routine exams   Obesity All women in this age group At routine exams   Syphilis Women at increased risk for infection - talk with your healthcare provider At routine exams   Tuberculosis Women at increased risk for infection - talk with your healthcare provider Ask your healthcare provider   Vision All women in this age group At least 1 complete exam in your 20s, and 2 in your 30s   Vaccine2 Who needs it How often   Chickenpox  (varicella) All women in this age group who have no record of this infection or vaccine 2 doses; the second dose should be given 4 to 8 weeks after the first dose   Hepatitis A Women at increased risk for infection - talk with your healthcare provider 2 doses given at least 6 months apart   Hepatitis B Women at increased risk for infection - talk with your healthcare provider 3 doses over 6 months; second dose should be given 1 month after the first dose; the third dose should be given at least 2 months after the second dose and at least 4 months after the first dose   Haemophilus influenzae Type B (HIB) Women at increased risk for infection - talk with your healthcare provider 1 to 3 doses   Human papillomavirus (HPV) All women in this age group up to age 26 3 doses; the second dose should be given 1 to 2 months after the first dose and the third dose given 6 months after the first dose   Influenza (flu) All women in this age group Once a year   Measles, mumps, rubella (MMR) All women in this age group who have no record of these infections or vaccines 1 or 2 doses   Meningococcal Women at increased risk for infection - talk with your healthcare provider 1 or more doses   Pneumococcal conjugate vaccine (PCV13) and pneumococcal polysaccharide vaccine (PPSV23) Women at increased risk for infection - talk with your healthcare provider PCV13: 1 dose ages 19 to 65 (protects against 13 types of pneumococcal bacteria)  PPSV23: 1 to 2 doses through age 64, or 1 dose at 65 or older (protects against 23 types of pneumococcal bacteria)      Tetanus/diphtheria/pertussis (Td/Tdap) booster All women in this age group Td every 10 years, or a one-time dose of Tdap instead of a Td booster after age 18, then Td every 10 years   Counseling Who needs it How often   BRCA gene mutation testing for breast and ovarian cancer susceptibility Women with increased risk for having gene mutation When your risk is known   Breast cancer and  chemoprevention Women at high risk for breast cancer When your risk is known   Diet and exercise Women who are overweight or obese When diagnosed, and then at routine exams   Domestic violence Women at the age in which they are able to have children At routine exams   Sexually transmitted infection prevention Women who are sexually active At routine exams   Skin cancer Prevention of skin cancer in fair-skinned adults through age 24 At routine exams   Use of tobacco and the health effects it can cause All women in this age group Every visit   1According to the ACS, women ages 20 to 39 years should have a clinical breast exam (CBE) as part of their routine health exam every 3 years. Breast self-exams are an option for women starting in their 20s. But the  USPSTF does not recommend CBE.  2Those who are 18 years old and not up-to-date on their childhood vaccines should get all appropriate catch-up vaccines recommended by the CDC.  Date Last Reviewed: 8/27/2015  © 8780-0118 [employer]. [address] [place] [address], [place] [address]. All rights reserved. This information is not intended as a substitute for professional medical care. Always follow your healthcare professional's instructions.

## 2018-05-03 ENCOUNTER — TELEPHONE (OUTPATIENT)
Dept: ORTHOPEDICS | Facility: CLINIC | Age: 28
End: 2018-05-03

## 2018-05-03 LAB — HIV 1+2 AB+HIV1 P24 AG SERPL QL IA: NEGATIVE

## 2018-05-04 NOTE — TELEPHONE ENCOUNTER
Ortho Telephone Triage Message [phone]  Spoke with pt and advised that Sports Medicine will be notified regarding pt's lidia knee complaints to schedule Ortho appt. Advised that clinic visit will be necessary before an MRI may be ordered. Pt states understanding and will await call.

## 2018-05-04 NOTE — TELEPHONE ENCOUNTER
Ortho Telephone Triage Follow Up Call [phone]  Ortho appt scheduled with CHANDAN Garzon III, PA-[facility]/Sports Medicine on 5/28/18, per pt preference, at 10:00 am with arrival at 9:30am for c/o lidia knee pain. Pt states understanding. Active in [facility] and appt slip mailed.

## 2018-05-04 NOTE — TELEPHONE ENCOUNTER
----- Message from Nora Hinds sent at 5/4/2018  8:47 AM CDT -----  Contact: Self / [phone]  Patient Returning Call    Who Called:YVES HUA   Who Left Message for Patient:Nevaeh Owens LPN   Communication Preference Call Back # [phone]

## 2018-05-14 ENCOUNTER — OFFICE VISIT (OUTPATIENT)
Dept: UROLOGY | Facility: CLINIC | Age: 28
End: 2018-05-14

## 2018-05-14 VITALS
DIASTOLIC BLOOD PRESSURE: 85 MMHG | HEIGHT: 62 IN | HEART RATE: 116 BPM | BODY MASS INDEX: 27.99 KG/M2 | WEIGHT: 152.13 LBS | SYSTOLIC BLOOD PRESSURE: 128 MMHG

## 2018-05-14 DIAGNOSIS — B37.31 YEAST VAGINITIS: ICD-10-CM

## 2018-05-14 DIAGNOSIS — N30.90 BLADDER INFECTION: Primary | ICD-10-CM

## 2018-05-14 DIAGNOSIS — M62.89 HIGH-TONE PELVIC FLOOR DYSFUNCTION: ICD-10-CM

## 2018-05-14 PROCEDURE — 99213 OFFICE O/P EST LOW 20 MIN: CPT | Mod: S$PBB,,, | Performed by: UROLOGY

## 2018-05-14 PROCEDURE — 87186 SC STD MICRODIL/AGAR DIL: CPT

## 2018-05-14 PROCEDURE — 87086 URINE CULTURE/COLONY COUNT: CPT

## 2018-05-14 PROCEDURE — 87088 URINE BACTERIA CULTURE: CPT

## 2018-05-14 PROCEDURE — 87077 CULTURE AEROBIC IDENTIFY: CPT

## 2018-05-14 PROCEDURE — 99999 PR PBB SHADOW E&M-EST. PATIENT-LVL IV: CPT | Mod: PBBFAC,,, | Performed by: UROLOGY

## 2018-05-14 PROCEDURE — 81002 URINALYSIS NONAUTO W/O SCOPE: CPT | Mod: PBBFAC | Performed by: UROLOGY

## 2018-05-14 RX ORDER — SULFAMETHOXAZOLE AND TRIMETHOPRIM 800; 160 MG/1; MG/1
1 TABLET ORAL 2 TIMES DAILY
Qty: 14 TABLET | Refills: 0 | Status: SHIPPED | OUTPATIENT
Start: 2018-05-14 | End: 2018-05-17 | Stop reason: SINTOL

## 2018-05-14 RX ORDER — FLUCONAZOLE 150 MG/1
150 TABLET ORAL ONCE
Qty: 1 TABLET | Refills: 1 | Status: SHIPPED | OUTPATIENT
Start: 2018-05-14 | End: 2018-05-14

## 2018-05-14 NOTE — PROGRESS NOTES
CHIEF COMPLAINT:    Mrs. Collazo is a 27 y.o. female presenting for an urgent appointment for possible UTI    PRESENTING ILLNESS:    Laila Collazo is a 27 y.o. female who has a history of high tone pelvic floor dysfunction whom I last saw in January 2016.  She states that she did well after the PT (did two sessions and did the home exercise program.)  She states in the interim she was diagnosed with endometriosis.  Her mother encouraged her to get a second opinion from another OBGYN, when she asked him if there was a possibility that the endometriosis could cause the symptoms, one told her yes, the second said no.  She started having dysuria over 1 week ago, and it has progressed to the point that she is not sure if it is a UTI or if it is lower back pain.  She denies any fevers, or chills.      REVIEW OF SYSTEMS:    Review of Systems   Constitutional: Negative.    HENT: Negative.    Eyes: Negative.    Respiratory: Negative.    Cardiovascular: Negative.    Gastrointestinal: Positive for abdominal pain.   Genitourinary: Positive for dysuria.   Musculoskeletal: Positive for back pain.   Skin: Negative.    Neurological: Negative.    Endo/Heme/Allergies: Negative.    Psychiatric/Behavioral: Negative.      PATIENT HISTORY:    Past Medical History:   Diagnosis Date    Endometriosis     treated by GYN at     Helicobacter pylori ab+        Past Surgical History:   Procedure Laterality Date    NO PAST SURGERIES         Family History   Problem Relation Age of Onset    Diabetes Mother      Social History    Marital status: Single     Occupational History     [employer]      works at [employer]     Social History Main Topics    Smoking status: Never Smoker    Smokeless tobacco: Never Used    Alcohol use No    Drug use: No    Sexual activity: Yes     Partners: Male     Birth control/ protection: None       Allergies:  Codeine    Medications:  Outpatient Encounter Prescriptions as of 5/14/2018    Medication Sig Dispense Refill    SELINA 0.15-0.03 mg per tablet TK 1 T PO QD  6    diclofenac sodium (VOLTAREN) 1 % Gel Apply 4 g topically 4 (four) times daily as needed. 3 Tube 2    fluconazole (DIFLUCAN) 150 MG Tab Take 1 tablet (150 mg total) by mouth once. 1 tablet 1    sulfamethoxazole-trimethoprim 800-160mg (BACTRIM DS) 800-160 mg Tab Take 1 tablet by mouth 2 (two) times daily. 14 tablet 0     No facility-administered encounter medications on file as of 5/14/2018.          PHYSICAL EXAMINATION:    The patient generally appears in good health, is appropriately interactive, and is in no apparent distress.    Skin: No lesions.    Mental: Cooperative with normal affect.    Neuro: Grossly intact.    HEENT: Normal. No evidence of lymphadenopathy.    Chest:  normal inspiratory effort.    Abdomen:  Soft, non-tender. No masses or organomegaly. Bladder is not palpable. No evidence of flank discomfort. No evidence of inguinal hernia.    Extremities: No clubbing, cyanosis, or edema    Normal external female genitalia  No erythema medial to Alejandra's line  She has difficulty relaxing to have the exam.   Urethral meatus is normal  Urethra and bladder are nontender to bimanual exam  Well supported anteriorly and posteriorly   Uterus and cervix are normal  No adnexal masses    LABS:    Lab Results   Component Value Date    BUN 9 05/02/2018    CREATININE 0.7 05/02/2018     UA 1.015, pH 6, ++ leuk, nitrite positive, 30 prot, 4 urobilinogen, 250 blood, otherwise, negative    IMPRESSION:    Encounter Diagnoses   Name Primary?    Bladder infection Yes    Yeast vaginitis     High-tone pelvic floor dysfunction        PLAN:    1.  Bactrim DS sent  2.  Pt requested diflucan, was done  3.  She felt like she benefited from PT for high tone pelvic floor dysfunction and she would like to be referred again.  Order done  4.  Discussed that the endometriosis can be associated with pelvic floor dysfunction (anything that initially  causes pain can recruit the muscles and feed back on the bladder, thus causing the dysuria. )

## 2018-05-15 ENCOUNTER — PATIENT MESSAGE (OUTPATIENT)
Dept: UROLOGY | Facility: CLINIC | Age: 28
End: 2018-05-15

## 2018-05-16 ENCOUNTER — PATIENT MESSAGE (OUTPATIENT)
Dept: UROLOGY | Facility: CLINIC | Age: 28
End: 2018-05-16

## 2018-05-16 LAB — BACTERIA UR CULT: NORMAL

## 2018-05-16 NOTE — TELEPHONE ENCOUNTER
Notified Dr. Martin of pt's complaints and reaction to Bactrim DS, which was prescribed empirically. Pt was advised to discontinue the antibiotic until final results of culture were in. Pt advised to use AZO standard for symptoms of discomfort. Pt expressed understanding.

## 2018-05-16 NOTE — TELEPHONE ENCOUNTER
----- Message from Zahida Christopher sent at 5/16/2018  9:27 AM CDT -----  Contact: Self- [phone]  Veronica- pt called to speak with staff- states she placed on antibiotics Monday and began vomiting last night- not sure if it's from the medication- pt would like to discuss because she's going out of town tomorrow- please contact pt at [phone]

## 2018-05-17 ENCOUNTER — PATIENT MESSAGE (OUTPATIENT)
Dept: UROLOGY | Facility: CLINIC | Age: 28
End: 2018-05-17

## 2018-05-17 ENCOUNTER — TELEPHONE (OUTPATIENT)
Dept: UROLOGY | Facility: CLINIC | Age: 28
End: 2018-05-17

## 2018-05-17 DIAGNOSIS — N30.90 BLADDER INFECTION: Primary | ICD-10-CM

## 2018-05-17 RX ORDER — DOXYCYCLINE 100 MG/1
100 CAPSULE ORAL 2 TIMES DAILY
Qty: 14 CAPSULE | Refills: 0 | Status: SHIPPED | OUTPATIENT
Start: 2018-05-17 | End: 2018-05-24

## 2018-05-24 ENCOUNTER — TELEPHONE (OUTPATIENT)
Dept: SPINE | Facility: CLINIC | Age: 28
End: 2018-05-24

## 2018-05-24 DIAGNOSIS — M54.50 LUMBAR SPINE PAIN: Primary | ICD-10-CM

## 2018-05-28 ENCOUNTER — HOSPITAL ENCOUNTER (OUTPATIENT)
Dept: RADIOLOGY | Facility: HOSPITAL | Age: 28
Discharge: HOME | End: 2018-05-28
Attending: ORTHOPAEDIC SURGERY
Payer: COMMERCIAL

## 2018-05-28 ENCOUNTER — OFFICE VISIT (OUTPATIENT)
Dept: SPORTS MEDICINE | Facility: CLINIC | Age: 28
End: 2018-05-28
Payer: COMMERCIAL

## 2018-05-28 ENCOUNTER — HOSPITAL ENCOUNTER (OUTPATIENT)
Dept: RADIOLOGY | Facility: HOSPITAL | Age: 28
Discharge: HOME | End: 2018-05-28
Attending: PHYSICIAN ASSISTANT
Payer: COMMERCIAL

## 2018-05-28 VITALS
HEIGHT: 62 IN | WEIGHT: 152 LBS | SYSTOLIC BLOOD PRESSURE: 116 MMHG | HEART RATE: 100 BPM | DIASTOLIC BLOOD PRESSURE: 74 MMHG | BODY MASS INDEX: 27.97 KG/M2

## 2018-05-28 DIAGNOSIS — M76.52 PATELLAR TENDINITIS OF BOTH KNEES: ICD-10-CM

## 2018-05-28 DIAGNOSIS — M22.2X2 PATELLOFEMORAL PAIN SYNDROME OF BOTH KNEES: ICD-10-CM

## 2018-05-28 DIAGNOSIS — M54.50 LUMBAR SPINE PAIN: ICD-10-CM

## 2018-05-28 DIAGNOSIS — M25.562 CHRONIC PAIN OF BOTH KNEES: Primary | ICD-10-CM

## 2018-05-28 DIAGNOSIS — M25.561 CHRONIC PAIN OF BOTH KNEES: Primary | ICD-10-CM

## 2018-05-28 DIAGNOSIS — M22.8X2 PATELLAR MALTRACKING, LEFT: ICD-10-CM

## 2018-05-28 DIAGNOSIS — M25.562 PAIN IN BOTH KNEES, UNSPECIFIED CHRONICITY: ICD-10-CM

## 2018-05-28 DIAGNOSIS — M76.51 PATELLAR TENDINITIS OF BOTH KNEES: ICD-10-CM

## 2018-05-28 DIAGNOSIS — M22.2X1 PATELLOFEMORAL PAIN SYNDROME OF BOTH KNEES: ICD-10-CM

## 2018-05-28 DIAGNOSIS — G89.29 CHRONIC PAIN OF BOTH KNEES: Primary | ICD-10-CM

## 2018-05-28 DIAGNOSIS — M25.561 PAIN IN BOTH KNEES, UNSPECIFIED CHRONICITY: ICD-10-CM

## 2018-05-28 DIAGNOSIS — M22.8X1 PATELLAR MALTRACKING, RIGHT: ICD-10-CM

## 2018-05-28 PROCEDURE — 99999 PR PBB SHADOW E&M-EST. PATIENT-LVL III: CPT | Mod: PBBFAC,,, | Performed by: PHYSICIAN ASSISTANT

## 2018-05-28 PROCEDURE — 73564 X-RAY EXAM KNEE 4 OR MORE: CPT | Mod: 26,50,, | Performed by: RADIOLOGY

## 2018-05-28 PROCEDURE — 99203 OFFICE O/P NEW LOW 30 MIN: CPT | Mod: S$PBB,,, | Performed by: PHYSICIAN ASSISTANT

## 2018-05-28 PROCEDURE — 73564 X-RAY EXAM KNEE 4 OR MORE: CPT | Mod: TC,50,FY,PO

## 2018-05-28 PROCEDURE — 72080 X-RAY EXAM THORACOLMB 2/> VW: CPT | Mod: 26,,, | Performed by: RADIOLOGY

## 2018-05-28 PROCEDURE — 72080 X-RAY EXAM THORACOLMB 2/> VW: CPT | Mod: TC

## 2018-05-28 RX ORDER — DICLOFENAC SODIUM 75 MG/1
75 TABLET, DELAYED RELEASE ORAL EVERY 12 HOURS PRN
Qty: 30 TABLET | Refills: 0 | Status: SHIPPED | OUTPATIENT
Start: 2018-05-28 | End: 2018-08-13

## 2018-05-28 NOTE — PROGRESS NOTES
CC: bilateral knee pain    27 y.o. Female [facility] [employer] , who reports anterior and reta-patella knee pain refractory to conservative mgmt. She reports knee pain occurring for 1 year. She describes the pain as constant burning, throbbing, and aching. Her pain is worse in the left knee > right knee.    Pain is worse with activity but she also has pain when sitting for long periods. She reports not being able to run anymore due to her pain.     She reports that the pain is worse with steps.  It also bothers her at night.    Is affecting ADLs.     No mechanical symptoms, no instability    Review of Systems   Constitution: Negative. Negative for chills, fever and night sweats.   HENT: Negative for congestion and headaches.    Eyes: Negative for blurred vision, left vision loss and right vision loss.   Cardiovascular: Negative for chest pain and syncope.   Respiratory: Negative for cough and shortness of breath.    Endocrine: Negative for polydipsia, polyphagia and polyuria.   Hematologic/Lymphatic: Negative for bleeding problem. Does not bruise/bleed easily.   Skin: Negative for dry skin, itching and rash.   Musculoskeletal: Negative for falls and muscle weakness.   Gastrointestinal: Negative for abdominal pain and bowel incontinence.   Genitourinary: Negative for bladder incontinence and nocturia.   Neurological: Negative for disturbances in coordination, loss of balance and seizures.   Psychiatric/Behavioral: Negative for depression. The patient does not have insomnia.    Allergic/Immunologic: Negative for hives and persistent infections.   All other systems negative      PAST MEDICAL HISTORY:   Past Medical History:   Diagnosis Date    Endometriosis     treated by GYN at     Helicobacter pylori ab+      PAST SURGICAL HISTORY:   Past Surgical History:   Procedure Laterality Date    NO PAST SURGERIES       FAMILY HISTORY:   Family History   Problem Relation Age of Onset    Diabetes  "Mother     No Known Problems Father     No Known Problems Sister     No Known Problems Brother     Colon cancer Neg Hx     Crohn's disease Neg Hx     Esophageal cancer Neg Hx     Inflammatory bowel disease Neg Hx     Irritable bowel syndrome Neg Hx     Rectal cancer Neg Hx     Stomach cancer Neg Hx     Ulcerative colitis Neg Hx      SOCIAL HISTORY:   Social History     Social History    Marital status: Single     Spouse name: N/A    Number of children: N/A    Years of education: N/A     Occupational History     [employer]      works at [employer]     Social History Main Topics    Smoking status: Never Smoker    Smokeless tobacco: Never Used    Alcohol use No    Drug use: No    Sexual activity: Yes     Partners: Male     Birth control/ protection: None     Other Topics Concern    Not on file     Social History Narrative    No narrative on file       MEDICATIONS:   Current Outpatient Prescriptions:     SELINA 0.15-0.03 mg per tablet, TK 1 T PO QD, Disp: , Rfl: 6    diclofenac (VOLTAREN) 75 MG EC tablet, Take 1 tablet (75 mg total) by mouth every 12 (twelve) hours as needed (knee pain)., Disp: 30 tablet, Rfl: 0  ALLERGIES:   Review of patient's allergies indicates:   Allergen Reactions    Bactrim ds [sulfamethoxazole-trimethoprim] Nausea And Vomiting    Codeine Rash       VITAL SIGNS: /74   Pulse 100   Ht 5' 2" (1.575 m)   Wt 68.9 kg (152 lb)   BMI 27.80 kg/m²      PHYSICAL EXAMINATION    General:  The patient is alert and oriented x 3.  Mood is pleasant.  Observation of ears, eyes and nose reveal no gross abnormalities.  HEENT: NCAT, sclera nonicteric  Lungs: Respirations are equal and unlabored.    bilateral  KNEE EXAMINATION     OBSERVATION / INSPECTION   Gait:   Nonantalgic   Alignment:  Neutral   Scars:   None   Muscle atrophy: Mild  Effusion:  None   Warmth:  None   Discoloration:   none     TENDERNESS / CREPITUS (T / C):          T / C      T / " C   Patella   - / + with valgus force   Lateral joint line   - / -      Peripatellar medial  -  Medial joint line    - / -   Peripatellar lateral + bilateral Medial plica   - / -   Patellar tendon + bilateral  Popliteal fossa  - / -   Quad tendon   -   Gastrocnemius   -   Prepatellar Bursa - / -   Quadricep   -   Tibial tubercle  -  Thigh/hamstring  -   Pes anserine/HS -  Fibula    -   ITB   - / -  Tibia     -   Tib/fib joint  - / -  LCL    -     MFC   - / -   MCL: Proximal  -    LFC   - / -    Distal   -          ROM: (* = pain)  PASSIVE   ACTIVE    Left :   5 / 0 / 145   5 / 0 / 145     Right :    5 / 0 / 145   5 / 0 / 145    Patellofemoral examination:  See above noted areas of tenderness.   Patella position    Subluxation / dislocation: Centered           Sup. / Inf;   Normal   Crepitus (PF):    Absent   Patellar Mobility:       Medial-lateral:   Normal    Superior-inferior:  Normal    Inferior tilt   Normal    Patellar tendon:  Normal   Lateral tilt:    Normal   J-sign:     None   Patellofemoral grind:   +       MENISCAL SIGNS:     Pain on terminal extension:  -  Pain on terminal flexion:  +  Bernies maneuver:  -  Squat     + anterior pain bilaterally.     LIGAMENT EXAMINATION:  ACL / Lachman:  normal (-1 to 2mm)    PCL-Post.  drawer: normal 0 to 2mm  MCL- Valgus:  normal 0 to 2mm  LCL- Varus:  normal 0 to 2mm  Pivot shift: normal (Equal)   Dial Test: difference c/w other side   At 30° flexion: normal (< 5°)    At 90° flexion: normal (< 5°)   Reverse Pivot Shift:   normal (Equal)     STRENGTH: (* = with pain) PAINFUL SIDE   Quadricep   4+/5   Hamstrin/5    EXTREMITY NEURO-VASCULAR EXAMINATION:   Sensation:  Grossly intact to light touch all dermatomal regions.   Motor Function:  Fully intact motor function at hip, knee, foot and ankle    DTRs;  quadriceps and  achilles 2+.  No clonus and downgoing Babinski.    Vascular status:  DP and PT pulses 2+, brisk capillary refill, symmetric.     Other  Findings:  + step down bilat  + bridge test     Xrays:  Xrays of the bilateral knees with flexion were ordered and reviewed by me today. No fracture, subluxation, or other significant bony or joint abnormality is identified. Bony alignment is normal. Joints and soft tissues are unremarkable. Mild lateral tilt of the bilateral patellas with decrease joint space under lateral facet with flexion.       ASSESSMENT:    1. Bilateral Knee pain, chronic  2. Bilateral patella tendinitis  3. Bilateral patella maltracking  4. Bilateral patellofemoral pain syndrome  Bilateral hip abd/core weakness    PLAN:    Discussed diagnosis and Surgical and non-surgical treatment options with patient extensively.     1. PT for bilateral hip/core stretching and strengthening x8-12 weeks with HEP  2. Ice compresses prn pain  3. Diclofenac BID daily  4. No running at this time. Only closed chained exercises per pain  5. Will try Carlos Eduardo pull knee sleeve/brace for left knee first and if good results then will get for right knee.  6. RTC in 8 weeks for recheck or sooner if pain worsens. If no pain improvement, then will get MRI.      All questions were answered, pt will contact us for questions or concerns in the interim.

## 2018-05-30 ENCOUNTER — OFFICE VISIT (OUTPATIENT)
Dept: ORTHOPEDICS | Facility: CLINIC | Age: 28
End: 2018-05-30

## 2018-05-30 VITALS — HEIGHT: 62 IN | WEIGHT: 153.75 LBS | BODY MASS INDEX: 28.29 KG/M2

## 2018-05-30 DIAGNOSIS — M54.16 LUMBAR RADICULOPATHY: ICD-10-CM

## 2018-05-30 PROCEDURE — 99213 OFFICE O/P EST LOW 20 MIN: CPT | Mod: S$PBB,,, | Performed by: ORTHOPAEDIC SURGERY

## 2018-05-30 PROCEDURE — 99213 OFFICE O/P EST LOW 20 MIN: CPT | Mod: PBBFAC | Performed by: ORTHOPAEDIC SURGERY

## 2018-05-30 PROCEDURE — 99999 PR PBB SHADOW E&M-EST. PATIENT-LVL III: CPT | Mod: PBBFAC,,, | Performed by: ORTHOPAEDIC SURGERY

## 2018-06-01 NOTE — PROGRESS NOTES
The patient returns for follow up.    She is a 27F with a history of mid and low back pain as well as intermittent BLE paresthesias.    At our last visit I sent her to physical therapy which has helped her mid-back pain.    She continues to report LBP and BLE paresthesias, these have not improved over many years.    She is also seeing sports for her L knee.    Today we discussed options, I have recommended a lumbar MRI given that she has ongoing symptoms of LBP and BLE radiculopathy despite conservative management.    RTC for results.    I spent 15 minutes with the patient of which greater than 1/2 the time was devoted to counciling the patient regarding treatment options.

## 2018-06-19 ENCOUNTER — CLINICAL SUPPORT (OUTPATIENT)
Dept: REHABILITATION | Facility: HOSPITAL | Age: 28
End: 2018-06-19
Payer: COMMERCIAL

## 2018-06-19 DIAGNOSIS — M25.561 CHRONIC PAIN OF BOTH KNEES: ICD-10-CM

## 2018-06-19 DIAGNOSIS — M25.562 CHRONIC PAIN OF BOTH KNEES: ICD-10-CM

## 2018-06-19 DIAGNOSIS — G89.29 CHRONIC PAIN OF BOTH KNEES: ICD-10-CM

## 2018-06-19 PROCEDURE — 97110 THERAPEUTIC EXERCISES: CPT | Mod: PO

## 2018-06-19 PROCEDURE — 97162 PT EVAL MOD COMPLEX 30 MIN: CPT | Mod: PO

## 2018-06-19 NOTE — PROGRESS NOTES
PHYSICAL THERAPY INITIAL EVALUATION     Date: 06/19/2018  Name: Laila Collazo  Clinic Number: [id number]    Visit #: 1   Start Time:  400  Stop Time:  500  Time in treatment: 60 minutes    Orders:    Referral Entered By     Routine 05/28/2018 05/28/2019 Wallace Villagomez III, PA-C   Visits Requested Visits Authorized Visits Completed Visits Scheduled   1 1 1 1   Pt with several year history of progressive bilateral knee pain  Diagnosis   M25.561,M25.562,G89.29 (ICD-10-CM) - Chronic pain of both knees   M22.2X1,M22.2X2 (ICD-10-CM) - Patellofemoral pain syndrome of both knees   M22.8X2 (ICD-10-CM) - Patellar maltracking, left   M22.8X1 (ICD-10-CM) - Patellar maltracking, right   M76.51,M76.52 (ICD-10-CM) - Patellar tendinitis of both knees         History       Treatment Diagnosis:   1. Chronic pain of both knees         Past Medical History  Pt with several year history of progressive bilateral knee pain, bilateral  Lateral tilt of Patellae.   She cannot relate any activity or injury which started pain  She also relates that she has had 'shin splints' for as long as she can recall.     Past Medical History:   Diagnosis Date    Endometriosis     treated by GYN at     Helicobacter pylori ab+        Allergies  Review of patient's allergies indicates:   Allergen Reactions    Bactrim ds [sulfamethoxazole-trimethoprim] Nausea And Vomiting    Codeine Rash       Current Medication list:   Current Outpatient Prescriptions on File Prior to Visit   Medication Sig Dispense Refill    diclofenac (VOLTAREN) 75 MG EC tablet Take 1 tablet (75 mg total) by mouth every 12 (twelve) hours as needed (knee pain). 30 tablet 0    SELINA 0.15-0.03 mg per tablet TK 1 T PO QD  6     No current facility-administered medications on file prior to visit.        Precautions: Standard, Fall      Prior Therapy: no    Diagnostic Tests: xrays 2018  There is a bone island in the left medial femoral condyle, not clinically significant.    Joint  spaces and cortical margins are preserved.  There is slight lateral tilt of each patella on Merchant view.  I detect no fracture, dislocation, radiopaque retained foreign body, lytic or blastic lesion, erosion or chondrocalcinosis.  Living situation:  engaged  Stairs in home/work?  yes  Are stairs bothersome?:  yes  Work status:    Sports/Recreational Activities: no  Assistive devices/equipment no    Cultural, Spiritual, Developmental and Educational concerns: none  Abuse/Neglect, Nutritional concerns: none     Patient Therapy Goals:  Decrease pain    Subjective   Chief complaint: bilateral generalized knee pain that is worse at night when she sleeps.    What decreases symptoms:   Nothing she can identify  Popping/clicking: yes  Lower extremity gives way: yes  Locking episodes: yes  Are symptoms changing since onset:  Getting worse  Pain level with 0 being the lowest and 10 being at onset of treatment:  5  Pain level with 0 being the lowest and 10 being at conclusion of treatment:   5  Pain level at best: 2  Pain level at worst: 8  Has there been a change in functional status since onset of symptoms:   no  Current functional status: I  Exercise routine prior to onset :  None  Other than HEP for her back  Pts goals:decrease pain,   Objective   27 y.o. Black or  female arrives to clinic in [place]    GAIT: no deviation    POSTURAL examination in standing: bilateral lateral tilt to patellae,  Very mild genu valgus on right  JOINT ROM  AROM hips      WFL   AROM ankles  WFL  AROM knees   WFL and equal bilateral.  Audible and palpable pop to the knees with flex to ext  Joint Mobility: WNL    AROM lumbo/sacral region  WFL  PSIS  level   Leg length discrepancy : no    LE MMT                Grossly 5/5 Right                 Grossly 5/5 Left  With following exceptions:                                             RIGHT                                  LEFT                        Hip  Abductors        5-/5                                     5-/5               Hip  Adductors       5-/5                                     5-/5                Hip extensors         4+/5                                     4+/5               Quadriceps            4+/5                                      4+/5               Hamstrings            5-/5                                      5-/5            Gross Core strength:  Fair to good  Pt can maintain posterior pelvic tilt for 30 seconds    FLEXIBILITY:  Right:  Hamstrings extend to 5 degrees in supine with hip flexed to 90 degrees  Left:  Hamstrings extend  To 5 degrees in supine with hip flexed to 90 degrees  Gastroc/soleus: neutral     PALPATION: Tender to palpation at medial aspect of the patellae  Crepitus: yes  Sensation: has intermittent numbness in BLE but normal at time of testing               Muscle  Tone:  Normal  Atrophy noted: no    EDEMA:   Left: absent  Right: absent    TREATMENT: Evaluation completed  THEREX:  Pt received 15 minutes of individualized exercises with one-to one instruction  to develop a  Home Exercise program to develop strength, increase flexibility, improve balance, and increase endurance of the LEs.    1 x 10 reps quad sets  1 x 10 reps SLR                1 x 10 reps VMO SLR   1 x 10 reps prone hip extension SLR  1 x 10 reps hip adduction in supine with knee flexed with ball resistance   1 x 10 bridges    Ice to knee for 10 minutes following completion of the session      Education   Patient was provided with a written copy of the above home exercises indicated in bold to perform as tolerated within limits of pain.  Exercises were reviewed and patient was able to demonstrate them prior to the end of the session.  Pt was instructed in ways to improve safety of home environment to prevent falls  Pt instructed in proper use of ice or heat to limb.    All of the patients questions were answered  Goals of therapy, the roles of PT and PTA, and the  need for compliance of appointments, attendance policy and HEP was discussed with patient .  Patient expressed understanding and agreement with above education.      No barriers to learning or social/cultural issues were identified that could hinder therapy.    Assessment   Pt with bilateral knee pain due to abnormal tracking of patellae  felt  No change in symptoms post therapy and suffered no adverse effects with treatment.   .Laila has had progressive pain in the knees which is currently limiting her activity and she is concerned that she is so young to have this pain.  She has had therapy before for her back and continues to be compliant with the HEP so she should do well with HEP for knees.  She does have mild weakness of the LE hip and knee mm.  She has tried braces provided by [facility] but has had difficulty with obtaining a proper fit.  [facility] will bring her braces in next session to be assessed.       Patient can benefit from outpatient physical therapy and a home program  Prognosis to achieve below goals is good provided pt is compliant with home program and PT appointments..    Pt's spiritual, cultural and educational needs considered and pt agreeable to plan of care and goals as stated below:    Medical necessity is demonstrated by the following impairments/problem list:   Fall Risk  Difficulty and painful daily activities  Requires skilled supervision to complete and progress HEP     Decreased community ambulation    Impaired muscle strength    Pain    Anticipated barriers to physical therapy: none    Goals   8weeks. Pt agrees with goals set.  Independent with HEP.  Report decrease in pain in the bilateral  knee regions  to less than 3 during ADLs and upon arrival to clinic  Report decrease in pain in the bilateral  knee regions  to less than 3 during the night   Increased MMT in 75% of weak mm that are below 5/5 grade  by 1/3 grade to enable participation in improved ADLS and leisure activity  Pt to report less  popping of knees   Goals beyond 8 weeks will be set at that time based on reassessment    Re-assessment due on or before:  7/18/18    PTA may be involved in patient care as part of the Rehab team.      Plan   Pt will be seen 1-2 times per week for 8 weeks.   Recommended Treatment Plan will include:  Manual soft tissue and/or joint mobilization  Therapeutic Exercise  Neuromuscular re-education          Individualized Home Exercise Program  Modalities: heat/ice, estim, US, dry needling  as needed         Pt education    PTA may be involved in patient's care as part of the Rehab Team.        History  Co-morbidities and personal factors that may impact the plan of care Examination  Body Structures and Functions, activity limitations and participation restrictions that may impact the plan of care Clinical Presentation   Co-morbidities:   Lumbar pain, anatomic body structure    Personal Factors:   no deficits Body Regions:   lower extremities    Body Systems:    strength  gait    Participation Restrictions:   no     Activity limitations:   Learning and applying knowledge  no deficits  General Tasks and Commands  no deficits  Communication  communicating with/receiving spoken language  no deficits  Mobility  walking  Self care  no deficits  Domestic Life  shopping  Interactions/Relationships  no deficits  Life Areas  employment  Community and Social Life  recreation and leisure         evolving clinical presentation with changing clinical characteristics                  moderate   moderate moderate Decision Making/ Complexity Score:  moderate        Coco Macias, PT, MHA, WCC  06/19/2018

## 2018-06-27 ENCOUNTER — HOSPITAL ENCOUNTER (OUTPATIENT)
Dept: RADIOLOGY | Facility: HOSPITAL | Age: 28
Discharge: HOME | End: 2018-06-27
Attending: ORTHOPAEDIC SURGERY
Payer: COMMERCIAL

## 2018-06-27 DIAGNOSIS — M54.16 LUMBAR RADICULOPATHY: ICD-10-CM

## 2018-06-28 ENCOUNTER — CLINICAL SUPPORT (OUTPATIENT)
Dept: REHABILITATION | Facility: HOSPITAL | Age: 28
End: 2018-06-28
Payer: COMMERCIAL

## 2018-06-28 DIAGNOSIS — M25.562 CHRONIC PAIN OF BOTH KNEES: ICD-10-CM

## 2018-06-28 DIAGNOSIS — M25.561 CHRONIC PAIN OF BOTH KNEES: ICD-10-CM

## 2018-06-28 DIAGNOSIS — G89.29 CHRONIC PAIN OF BOTH KNEES: ICD-10-CM

## 2018-06-28 PROCEDURE — 97110 THERAPEUTIC EXERCISES: CPT | Mod: PO

## 2018-06-28 NOTE — PROGRESS NOTES
PHYSICAL THERAPY     Date: 06/28/2018  Name: Laila Collazo  Clinic Number: [id number]    Visit #: 1   Start Time:  421  Late arrival  Stop Time:  507  Time in treatment:45 minutes    Orders:    Referral Entered By     Routine 05/28/2018 05/28/2019 Wallace Villagomez III, PA-C   Visits Requested Visits Authorized Visits Completed Visits Scheduled   1 1 1 1   Pt with several year history of progressive bilateral knee pain  Diagnosis   M25.561,M25.562,G89.29 (ICD-10-CM) - Chronic pain of both knees   M22.2X1,M22.2X2 (ICD-10-CM) - Patellofemoral pain syndrome of both knees   M22.8X2 (ICD-10-CM) - Patellar maltracking, left   M22.8X1 (ICD-10-CM) - Patellar maltracking, right   M76.51,M76.52 (ICD-10-CM) - Patellar tendinitis of both knees         History       Treatment Diagnosis:   No diagnosis found.    Past Medical History  Pt with several year history of progressive bilateral knee pain, bilateral  Lateral tilt of Patellae.   She cannot relate any activity or injury which started pain  She also relates that she has had 'shin splints' for as long as she can recall.     Precautions: Standard, Fall    Diagnostic Tests: xrays 2018  There is a bone island in the left medial femoral condyle, not clinically significant.  Joint spaces and cortical margins are preserved.  There is slight lateral tilt of each patella on Merchant view.  I detect no fracture, dislocation, radiopaque retained foreign body, lytic or blastic lesion, erosion or chondrocalcinosis.    Patient Therapy Goals:  Decrease pain    Subjective   Chief complaint: bilateral generalized knee pain that is worse at night when she sleeps.    Pain level with 0 being the lowest and 10 being at onset of treatment:  3  Pain level with 0 being the lowest and 10 being at conclusion of treatment:  0  She also complans of bilateral 'shin splints' and numbness in the anterior tib regions.      Objective   27 y.o. Black or  female arrives to clinic in [place]  GAIT:  no deviation    POSTURAL examination in standing: bilateral lateral tilt to patellae,  Very mild genu valgus on right    FLEXIBILITY:  Right:  Hamstrings extend to 5 degrees in supine with hip flexed to 90 degrees  Left:  Hamstrings extend  To 5 degrees in supine with hip flexed to 90 degrees  Gastroc/soleus: neutral     PALPATION: Tender to palpation at medial aspect of the patellae  Crepitus: yes      TREATMENT:   THEREX:  Pt [id number] minutes of individualized exercises with one-to one instruction  to develop a  Home Exercise program to develop strength, increase flexibility, improve balance, and increase endurance of the LEs.    1 x 10 reps quad sets  2 x 10 reps SLR   2#             2 x 10 reps VMO SLR 2#  2x 10 reps prone hip extension SLR 2#  2 x 10 sidelying hip adduction SLR 2#  2 x 10 reps hip abduction in sidelying  Unilateral clams BTB  2 x 10 bridges  2 x 10 leg press 100# with yellow adductor ball  1 x 10 rotary hip adduction with 37.5#  Hamstring stretch in standing 3 x 20sec  Gastroc stretch 3 x 20 sec  Ice to knee for 10 minutes following completion of the session  deferred      Education   Patient was provided with a written copy of the above home exercises indicated in bold to perform as tolerated within limits of pain.  Exercises were reviewed and patient was able to demonstrate them prior to the end of the session.  All of the patients questions were answered  No barriers to learning or social/cultural issues were identified that could hinder therapy.    Assessment   Pt with bilateral knee pain due to abnormal tracking of patellae  felt  No pain in knees following session.   She reports consistency with HEP     She was able to progress with all exercises today     Medical necessity is demonstrated by the following impairments/problem list:   Fall Risk  Difficulty and painful daily activities  Requires skilled supervision to complete and progress HEP     Decreased community ambulation     Impaired muscle strength    Pain    Goals   8weeks. Pt agrees with goals set.  Independent with HEP.  Report decrease in pain in the bilateral  knee regions  to less than 3 during ADLs and upon arrival to clinic  Report decrease in pain in the bilateral  knee regions  to less than 3 during the night   Increased MMT in 75% of weak mm that are below 5/5 grade  by 1/3 grade to enable participation in improved ADLS and leisure activity  Pt to report less popping of knees   Goals beyond 8 weeks will be set at that time based on reassessment    Re-assessment due on or before:  7/18/18    PTA may be involved in patient care as part of the Rehab team.      Plan   Pt will be seen 1-2 times per week for 8 weeks.   Recommended Treatment Plan will include:  Manual soft tissue and/or joint mobilization  Therapeutic Exercise  Neuromuscular re-education          Individualized Home Exercise Program  Modalities: heat/ice, estim, US, dry needling  as needed         Pt education    PTA may be involved in patient's care as part of the Rehab Team.      Coco Macias, PT, MHA, WCC  06/28/2018

## 2018-07-02 ENCOUNTER — HOSPITAL ENCOUNTER (OUTPATIENT)
Dept: RADIOLOGY | Facility: HOSPITAL | Age: 28
Discharge: HOME | End: 2018-07-02
Attending: ORTHOPAEDIC SURGERY
Payer: COMMERCIAL

## 2018-07-02 PROCEDURE — 72148 MRI LUMBAR SPINE W/O DYE: CPT | Mod: 26,,, | Performed by: RADIOLOGY

## 2018-07-02 PROCEDURE — 72148 MRI LUMBAR SPINE W/O DYE: CPT | Mod: TC

## 2018-07-06 ENCOUNTER — OFFICE VISIT (OUTPATIENT)
Dept: ORTHOPEDICS | Facility: CLINIC | Age: 28
End: 2018-07-06
Payer: COMMERCIAL

## 2018-07-06 VITALS
DIASTOLIC BLOOD PRESSURE: 77 MMHG | SYSTOLIC BLOOD PRESSURE: 114 MMHG | HEART RATE: 98 BPM | BODY MASS INDEX: 28.2 KG/M2 | HEIGHT: 62 IN | WEIGHT: 153.25 LBS

## 2018-07-06 DIAGNOSIS — M54.50 CHRONIC MIDLINE LOW BACK PAIN WITHOUT SCIATICA: Primary | ICD-10-CM

## 2018-07-06 DIAGNOSIS — G89.29 CHRONIC MIDLINE LOW BACK PAIN WITHOUT SCIATICA: Primary | ICD-10-CM

## 2018-07-06 PROCEDURE — 99999 PR PBB SHADOW E&M-EST. PATIENT-LVL III: CPT | Mod: PBBFAC,,, | Performed by: ORTHOPAEDIC SURGERY

## 2018-07-06 PROCEDURE — 99212 OFFICE O/P EST SF 10 MIN: CPT | Mod: S$GLB,,, | Performed by: ORTHOPAEDIC SURGERY

## 2018-07-09 NOTE — PROGRESS NOTES
The patient returns for follow up.    She has a history of low back and BLE paresthesias.    Today we reviewed her recent MRI that demonstrates no large disc herniations.    She is taking voltaren.    Today we discussed options, I have recommended adding PT for her low back to her current regimen.    I spent 10 minutes with the patient of which greater than 1/2 the time was devoted to counciling the patient regarding treatment options.

## 2018-07-12 NOTE — ED PROVIDER NOTES
"Encounter Date: 4/17/2017    SCRIBE #1 NOTE: I, Dorothy Henderson, am scribing for, and in the presence of, Sushma Simmons PA-C.       History     Chief Complaint   Patient presents with    Fall     fell off hover board, pain to R elbow and back     Review of patient's allergies indicates:  No Known Allergies  HPI Comments: Time seen by provider: 9:11 AM    This is a 26 y.o. female who presents with complaint of progressively worsening right lateral rib pain, lumbar back pain, and right elbow pain status post fall off a Hoverboard 2 days ago. She fell backwards off the Hoverboard and describes hearing a "crack" in her back when she fell. She has not taking anything for the pain. Her last Tetanus vaccine was in 2009. She has history of sciatica and is followed by Physical Therapy. She denies head trauma, LOC, headache, changes in vision, chest pain, SOB, fever, chills, dysuria, constipation, burning lower extremity pain.    The history is provided by the patient.     Past Medical History:   Diagnosis Date    Helicobacter pylori ab+      Past Surgical History:   Procedure Laterality Date    NO PAST SURGERIES       Family History   Problem Relation Age of Onset    No Known Problems Mother     No Known Problems Father     No Known Problems Sister     No Known Problems Brother     Colon cancer Neg Hx     Crohn's disease Neg Hx     Esophageal cancer Neg Hx     Inflammatory bowel disease Neg Hx     Irritable bowel syndrome Neg Hx     Rectal cancer Neg Hx     Stomach cancer Neg Hx     Ulcerative colitis Neg Hx      Social History   Substance Use Topics    Smoking status: Never Smoker    Smokeless tobacco: Never Used    Alcohol use No     Review of Systems   Constitutional: Negative for chills and fever.   Eyes: Negative for visual disturbance.   Respiratory: Negative for shortness of breath.    Cardiovascular: Negative for chest pain.   Gastrointestinal: Negative for constipation.   Genitourinary: " Negative for dysuria.   Musculoskeletal: Positive for arthralgias, back pain and myalgias.        (+) right elbow pain  (+) right lateral rib pain   Neurological: Negative for syncope and headaches.       Physical Exam   Initial Vitals   BP Pulse Resp Temp SpO2   04/17/17 0831 04/17/17 0831 04/17/17 0831 04/17/17 0831 04/17/17 0831   106/69 92 18 98.2 °F (36.8 °C) 99 %     Physical Exam    Vitals reviewed.  Constitutional: She appears well-developed and well-nourished. She is not diaphoretic. No distress.   HENT:   Head: Normocephalic and atraumatic.   Nose: Nose normal.   Mouth/Throat: Oropharynx is clear and moist. No oropharyngeal exudate.   Eyes: Conjunctivae and EOM are normal. Pupils are equal, round, and reactive to light.   Neck: Normal range of motion. Neck supple.   Cardiovascular: Normal rate, regular rhythm, normal heart sounds and intact distal pulses.   No murmur heard.  Pulmonary/Chest: Breath sounds normal. No respiratory distress. She has no wheezes. She has no rhonchi. She has no rales. She exhibits no tenderness.   Abdominal: Soft. Bowel sounds are normal. She exhibits no distension. There is no tenderness. There is no rebound.   Musculoskeletal: Normal range of motion. She exhibits tenderness. She exhibits no edema.   Neurological: She is alert. She has normal strength and normal reflexes. No cranial nerve deficit.   Skin: Skin is warm and dry. Abrasion noted.   Abrasion noted to right elbow. Patient up to date on tetanus   Psychiatric: She has a normal mood and affect.         ED Course   Procedures  Labs Reviewed   POCT URINE PREGNANCY        Imaging Results         X-Ray Ribs 3 Views Bilateral (Final result) Result time:  04/17/17 11:11:31    Final result by Vishal Phan MD (04/17/17 11:11:31)    Impression:     As above.      Electronically signed by: Vishal Phan MD  Date:     04/17/17  Time:    11:11     Narrative:    6 views of the ribs were obtained.    No evidence of recent or healing rib  "fracture.  No lytic destructive process or other significant osseous abnormality.  No pneumothorax.            X-Ray Elbow Complete Right (Final result) Result time:  04/17/17 11:07:22    Final result by Vishal Phan MD (04/17/17 11:07:22)    Impression:     As above.      Electronically signed by: Vishal Phan MD  Date:     04/17/17  Time:    11:07     Narrative:    3 views of the right elbow were obtained, with AP, lateral, and oblique projections submitted.    Clinical information of pain.  No trauma specified.    Visualized osseous structures appear unremarkable, with no evidence of recent or healing fracture, lytic destructive process, osteochondral defect, or other significant abnormality noted.  No demonstrable joint effusion.                 Medical Decision Making:   History:   Old Medical Records: I decided to obtain old medical records.  Clinical Tests:   Lab Tests: Ordered and Reviewed  Radiological Study: Ordered and Reviewed       APC / Resident Notes:   This is a 26 y.o. female who presents with complaint of progressively worsening right lateral rib pain, lumbar back pain, and right elbow pain status post fall off a Hoverboard 2 days ago. Cardiac exam reveals regular rate and rhythm.lungs are clear bilateral auscultation.  Abdomen is soft, nontender, nondistended with normal bowel sounds ×4.  Tenderness to right lateral chest wall.distal pulses intact.  Sensation intact.  No midline tenderness.  Minimal tenderness to right elbow.  No bruising or abrasions seen.  Vitals are stable.  Patient is in no acute distress.    Patient given toradol 10mg and norflex 60mg.     Labs and imaging reviewed.   UPT negative.     Xray ribs, "No evidence of recent or healing rib fracture.  No lytic destructive process or other significant osseous abnormality.  No pneumothorax."    Elbow xray shows, "Visualized osseous structures appear unremarkable, with no evidence of recent or healing fracture, lytic destructive process, " "osteochondral defect, or other significant abnormality noted.  No demonstrable joint effusion."        DDX includes but is not limited to rib contusion, right elbow contusion, rib fracture, elbow fracture, costochondritis.     Discharged to home in stable condition, return to ED warnings given, follow up and patient care instructions given. Patient advised to take tylenol and ibuprofen for pain.    I have discussed and reviewed with my supervising physician.        Scribe Attestation:   Scribe #1: I performed the above scribed service and the documentation accurately describes the services I performed. I attest to the accuracy of the note.    Attending Attestation:           Physician Attestation for Scribe:  Physician Attestation Statement for Scribe #1: I, Sushma Simmons PA-C, reviewed documentation, as scribed by Dorothy Henderson in my presence, and it is both accurate and complete.                 ED Course     Clinical Impression:   The primary encounter diagnosis was Rib contusion, right, initial encounter. Diagnoses of Right elbow pain and Rib pain on right side were also pertinent to this visit.    Disposition:   Disposition: Discharged  Condition: Stable       Sushma Simmons PA-C  04/17/17 1802    " No

## 2018-08-13 ENCOUNTER — OFFICE VISIT (OUTPATIENT)
Dept: INTERNAL MEDICINE | Facility: CLINIC | Age: 28
End: 2018-08-13
Payer: COMMERCIAL

## 2018-08-13 VITALS
BODY MASS INDEX: 27.95 KG/M2 | HEART RATE: 99 BPM | WEIGHT: 151.88 LBS | HEIGHT: 62 IN | OXYGEN SATURATION: 99 % | SYSTOLIC BLOOD PRESSURE: 110 MMHG | TEMPERATURE: 99 F | DIASTOLIC BLOOD PRESSURE: 80 MMHG

## 2018-08-13 DIAGNOSIS — K27.9 H PYLORI ULCER: Primary | ICD-10-CM

## 2018-08-13 DIAGNOSIS — B96.81 H PYLORI ULCER: Primary | ICD-10-CM

## 2018-08-13 DIAGNOSIS — J02.9 SORE THROAT: ICD-10-CM

## 2018-08-13 DIAGNOSIS — R10.30 LOWER ABDOMINAL PAIN: ICD-10-CM

## 2018-08-13 DIAGNOSIS — Z00.00 HEALTHCARE MAINTENANCE: ICD-10-CM

## 2018-08-13 PROCEDURE — 99214 OFFICE O/P EST MOD 30 MIN: CPT | Mod: S$GLB,,, | Performed by: STUDENT IN AN ORGANIZED HEALTH CARE EDUCATION/TRAINING PROGRAM

## 2018-08-13 PROCEDURE — 99999 PR PBB SHADOW E&M-EST. PATIENT-LVL III: CPT | Mod: PBBFAC,,, | Performed by: STUDENT IN AN ORGANIZED HEALTH CARE EDUCATION/TRAINING PROGRAM

## 2018-08-13 RX ORDER — PANTOPRAZOLE SODIUM 20 MG/1
20 TABLET, DELAYED RELEASE ORAL
Qty: 28 TABLET | Refills: 0 | Status: SHIPPED | OUTPATIENT
Start: 2018-08-13 | End: 2019-01-03

## 2018-08-13 RX ORDER — TETRACYCLINE HYDROCHLORIDE 500 MG/1
500 CAPSULE ORAL EVERY 6 HOURS
Qty: 56 CAPSULE | Refills: 0 | Status: SHIPPED | OUTPATIENT
Start: 2018-08-13 | End: 2018-08-27

## 2018-08-13 RX ORDER — METRONIDAZOLE 250 MG/1
500 TABLET ORAL EVERY 6 HOURS
Qty: 112 TABLET | Refills: 0 | Status: SHIPPED | OUTPATIENT
Start: 2018-08-13 | End: 2018-08-27

## 2018-08-13 NOTE — PROGRESS NOTES
[facility]                                                             CLINIC NOTE    Patient Name: Laila Collazo  YOB: 1990    PRESENTING HISTORY     Chief Complaint   Patient presents with    Headache     all syptoms started on yesterday     Sore Throat    Fever    Cough            History of Present Illness:  Ms. Laila Collazo is a 27 y.o. female w/ significant PMHx of H pylori Ab+ and endometriosis.     She presents for evaluation of sore throat, headache and subjective fevers since last night. Pt's sore throat is characterized with burning pain localized to the back of her oropharynx. Pt had one episode of cough last night in which she expectorated a dry mucus plug about 1/2 a centimeter in length. She was concerned that it was tinged with blood, but on further review of the photo she took, it was just dark. She has tried dayquil for these symptoms without any benefit.     She complains of heart burn and burning pain that is associated with greasy foods. She received an EGD and tested positive for H pylori Ab two years ago. At that time she was prescribed triple therapy. Pt states she did not finish the entirety of the therapy because she felt better after initiation of the medicine. This is also associated with intermittent nausea and vomiting.     She also complains of mild (2/10) lower abdomen pain for the past week. She has been constipated for the past week and found that the pain was somewhat relieved by bowel movement that she experienced today.         Review of Systems   Constitutional: Positive for fever. Negative for chills, malaise/fatigue and weight loss.   HENT: Positive for ear pain (Right) and sore throat. Negative for congestion and ear discharge.    Eyes: Negative for blurred vision and redness.   Respiratory: Positive for cough. Negative for shortness of breath and wheezing.    Cardiovascular: Negative for chest  pain, palpitations and leg swelling.   Gastrointestinal: Positive for abdominal pain (Lower abdomen), nausea and vomiting. Negative for constipation and diarrhea.   Genitourinary: Negative for dysuria and frequency.   Musculoskeletal: Positive for back pain and myalgias. Negative for joint pain.   Skin: Negative for itching and rash.   Neurological: Positive for headaches. Negative for dizziness, seizures and loss of consciousness.   Endo/Heme/Allergies: Negative for environmental allergies. Does not bruise/bleed easily.   Psychiatric/Behavioral: Negative for depression. The patient is not nervous/anxious and does not have insomnia.          PAST HISTORY:     Past Medical History:   Diagnosis Date    Endometriosis     treated by GYN at     Helicobacter pylori ab+        Past Surgical History:   Procedure Laterality Date    NO PAST SURGERIES         Family History   Problem Relation Age of Onset    Diabetes Mother     No Known Problems Father     No Known Problems Sister     No Known Problems Brother     Colon cancer Neg Hx     Crohn's disease Neg Hx     Esophageal cancer Neg Hx     Inflammatory bowel disease Neg Hx     Irritable bowel syndrome Neg Hx     Rectal cancer Neg Hx     Stomach cancer Neg Hx     Ulcerative colitis Neg Hx        Social History     Socioeconomic History    Marital status: Single     Spouse name: None    Number of children: None    Years of education: None    Highest education level: None   Social Needs    Financial resource strain: None    Food insecurity - worry: None    Food insecurity - inability: None    Transportation needs - medical: None    Transportation needs - non-medical: None   Occupational History     Employer: [employer]     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   Tobacco Use    Smoking status: Never Smoker    Smokeless tobacco: Never Used   Substance and Sexual Activity    Alcohol use: No    Drug use: No    Sexual activity: Yes      "Partners: Male     Birth control/protection: None   Other Topics Concern    None   Social History Narrative    None       MEDICATIONS & ALLERGIES:               Medication List with Changes/Refills   Current Medications    DICLOFENAC (VOLTAREN) 75 MG EC TABLET    Take 1 tablet (75 mg total) by mouth every 12 (twelve) hours as needed (knee pain).    SELINA 0.15-0.03 MG PER TABLET    TK 1 T PO QD       Review of patient's allergies indicates:   Allergen Reactions    Bactrim ds [sulfamethoxazole-trimethoprim] Nausea And Vomiting    Codeine Rash       OBJECTIVE:     Vital Signs:  Vitals:    08/13/18 0829   BP: 110/80   Pulse: 99   Temp: 98.6 °F (37 °C)   SpO2: 99%   Weight: 68.9 kg (151 lb 14.4 oz)   Height: 5' 2" (1.575 m)     Wt Readings from Last 5 Encounters:   08/13/18 68.9 kg (151 lb 14.4 oz)   07/06/18 69.5 kg (153 lb 3.5 oz)   05/30/18 69.7 kg (153 lb 12.3 oz)   05/28/18 68.9 kg (152 lb)   05/14/18 69 kg (152 lb 1.9 oz)       No results found for this or any previous visit (from the past 24 hour(s)).      Physical Exam      ASSESSMENT & PLAN:     Laila RAY was seen today for headache, sore throat, fever and cough.    Diagnoses and all orders for this visit:    H pylori ulcer  Given failure to complete previous treatment and persistence of symptoms, will try to treat her H pylori once more. D/t increased macrolide resistance in [place], will initiate quadruple therapy as follows:     Plan  -     bismuth subsalicylate 262 mg Tab; Take 1 tablet by mouth once daily. for 14 days  -     metroNIDAZOLE (FLAGYL) 250 MG tablet; Take 2 tablets (500 mg total) by mouth every 6 (six) hours. for 14 days  -     tetracycline (ACHROMYCIN,SUMYCIN) 500 MG capsule; Take 1 capsule (500 mg total) by mouth every 6 (six) hours. for 14 days  -     pantoprazole (PROTONIX) 20 MG tablet; Take 1 tablet (20 mg total) by mouth 2 (two) times daily before meals. for 14 days    Sore throat  Pt had a score of +1 on Centor criteria, " corresponding with 5-10% chance of having strep pharyngitis. Recommended against testing for Strep as positive result would likely be false. Also do not believe this is retropharyngeal abscess or sinus infection. Recommended supportive therapy with the following.  -     phenol (CHLORASEPTIC) 0.5 % SprA; 2 sprays by Mucous Membrane route 3 (three) times daily as needed.  - Salt gargle two times daily as needed.    Lower abdominal pain  Given concordance with constipation, believe that her current pain is likely related slow motility. Recommended bowel regimen   - PRN miralax      Healthcare maintenance  Recommended pt receive influenza vaccine in three months when she returns for follow up of her H pylori symptoms.         RTC in 3 months. Pt was instructed to contact the clinic if symptoms worsened or if new symptoms arise.    I have discussed the plan with Dr Pacheco.     Behram Khan, MD  Internal Medicine PGY-2  #[phone]         Patient's history and physical discussed, please refer to resident physician's note for specific details.  I agree with resident's assessment and plan.    DARNELL Pacheco MD  08/13/2018  11:39 AM

## 2018-08-13 NOTE — LETTER
August 13, 2018      [facility]  [address]  [facility] [address]  Phone: [phone]  Fax: [phone]       Patient: Laila Collazo   YOB: 1990  Date of Visit: 08/13/2018    To Whom It May Concern:    Yamilet Collazo  was at [facility] on 08/13/2018. She may return to work/school on 8/14 with no restrictions. She had missed work on 8/13 for a visit with me. If you have any questions or concerns, or if I can be of further assistance, please do not hesitate to contact me.    Sincerely,    Behram Khan, MD

## 2018-08-16 ENCOUNTER — DOCUMENTATION ONLY (OUTPATIENT)
Dept: REHABILITATION | Facility: HOSPITAL | Age: 28
End: 2018-08-16

## 2018-08-16 NOTE — PROGRESS NOTES
PHYSICAL THERAPY  Discharge  Date of Discharge 8/16/2018    Name: Laila M [facility] #: [id number]    Pt was seen in Physical Therapy for initial evaluation on:6/19/18  Pt's last date of treatment in Physical Therapy was:6/28/18  Number of treatment sessions completed: 2  Reason for discharge:    self discharge / did not return for follow up appointments  Status at Discharge:  Goals not met     Discharge update in functional G code not available due to unplanned discharge.

## 2018-08-29 ENCOUNTER — OFFICE VISIT (OUTPATIENT)
Dept: INTERNAL MEDICINE | Facility: CLINIC | Age: 28
End: 2018-08-29
Payer: COMMERCIAL

## 2018-08-29 VITALS
TEMPERATURE: 99 F | SYSTOLIC BLOOD PRESSURE: 118 MMHG | DIASTOLIC BLOOD PRESSURE: 80 MMHG | HEIGHT: 62 IN | OXYGEN SATURATION: 99 % | BODY MASS INDEX: 28.2 KG/M2 | HEART RATE: 97 BPM | WEIGHT: 153.25 LBS

## 2018-08-29 DIAGNOSIS — N30.01 ACUTE CYSTITIS WITH HEMATURIA: Primary | ICD-10-CM

## 2018-08-29 DIAGNOSIS — Z29.9 PROPHYLACTIC MEASURE: ICD-10-CM

## 2018-08-29 LAB
BILIRUB SERPL-MCNC: ABNORMAL MG/DL
BLOOD URINE, POC: ABNORMAL
COLOR, POC UA: YELLOW
GLUCOSE UR QL STRIP: NORMAL
KETONES UR QL STRIP: ABNORMAL
LEUKOCYTE ESTERASE URINE, POC: ABNORMAL
NITRITE, POC UA: ABNORMAL
PH, POC UA: 7
PROTEIN, POC: 30
SPECIFIC GRAVITY, POC UA: 1
UROBILINOGEN, POC UA: NORMAL

## 2018-08-29 PROCEDURE — 99213 OFFICE O/P EST LOW 20 MIN: CPT | Mod: 25,S$GLB,, | Performed by: NURSE PRACTITIONER

## 2018-08-29 PROCEDURE — 99999 PR PBB SHADOW E&M-EST. PATIENT-LVL IV: CPT | Mod: PBBFAC,,, | Performed by: NURSE PRACTITIONER

## 2018-08-29 PROCEDURE — 81002 URINALYSIS NONAUTO W/O SCOPE: CPT | Mod: S$GLB,,, | Performed by: NURSE PRACTITIONER

## 2018-08-29 PROCEDURE — 87086 URINE CULTURE/COLONY COUNT: CPT

## 2018-08-29 RX ORDER — FLUCONAZOLE 150 MG/1
150 TABLET ORAL DAILY
Qty: 1 TABLET | Refills: 0 | Status: SHIPPED | OUTPATIENT
Start: 2018-08-29 | End: 2018-08-30

## 2018-08-29 RX ORDER — PREDNISOLONE ACETATE 10 MG/ML
SUSPENSION/ DROPS OPHTHALMIC
Refills: 0 | COMMUNITY
Start: 2018-06-21 | End: 2018-09-28

## 2018-08-29 RX ORDER — NITROFURANTOIN 25; 75 MG/1; MG/1
100 CAPSULE ORAL
COMMUNITY
End: 2018-08-29

## 2018-08-29 RX ORDER — CIPROFLOXACIN 500 MG/1
500 TABLET ORAL EVERY 12 HOURS
Qty: 14 TABLET | Refills: 0 | Status: SHIPPED | OUTPATIENT
Start: 2018-08-29 | End: 2018-09-28

## 2018-08-29 NOTE — PROGRESS NOTES
Subjective:       Patient ID: Laila Collazo is a 27 y.o. female.    Chief Complaint: Urinary Tract Infection    Ms Collazo went to urgent care yesterday for dysuria and was started on Macrobid. She has taken 2 doses. Over the course of today, she has developed right flank pain and she noticed blood in her urine, which was not there previously. She still has dysuria as well.       Urinary Tract Infection    This is a new problem. The current episode started in the past 7 days. The problem occurs every urination. The problem has been gradually worsening. The quality of the pain is described as aching and burning. The patient is experiencing no pain. She is sexually active. There is no history of pyelonephritis. Associated symptoms include flank pain, frequency, hematuria and urgency. Pertinent negatives include no behavior changes, chills, discharge, hesitancy, nausea, possible pregnancy, sweats, vomiting, weight loss, bubble bath use, constipation, rash or withholding. She has tried antibiotics for the symptoms. The treatment provided no relief.     Review of Systems   Constitutional: Negative for chills and weight loss.   HENT: Negative for facial swelling.    Eyes: Negative for visual disturbance.   Respiratory: Negative for shortness of breath.    Cardiovascular: Negative for chest pain.   Gastrointestinal: Negative for constipation, nausea and vomiting.   Genitourinary: Positive for flank pain, frequency, hematuria and urgency. Negative for hesitancy.   Musculoskeletal: Positive for back pain.   Skin: Negative for rash.   Neurological: Negative for headaches.   Psychiatric/Behavioral: Negative for confusion.       Objective:      Physical Exam   Constitutional: She is oriented to person, place, and time. She appears well-developed and well-nourished. No distress.   Eyes: No scleral icterus.   Neck: Normal range of motion.   Cardiovascular: Normal rate, regular rhythm and normal heart sounds.   Pulmonary/Chest:  Effort normal and breath sounds normal. No stridor. No respiratory distress. She has no wheezes. She has no rales.   Abdominal: There is tenderness in the suprapubic area. There is CVA tenderness.   Neurological: She is alert and oriented to person, place, and time.   Skin: Skin is warm and dry. She is not diaphoretic.   Psychiatric: She has a normal mood and affect. Her behavior is normal.   Nursing note and vitals reviewed.      Assessment:       1. Acute cystitis with hematuria    2. Prophylactic measure        Plan:   1. Acute cystitis with hematuria  - POCT urine dipstick without microscope  - POCT urine pregnancy  - ciprofloxacin HCl (CIPRO) 500 MG tablet; Take 1 tablet (500 mg total) by mouth every 12 (twelve) hours.  Dispense: 14 tablet; Refill: 0    2. Prophylactic measure  - fluconazole (DIFLUCAN) 150 MG Tab; Take 1 tablet (150 mg total) by mouth once daily. for 1 day  Dispense: 1 tablet; Refill: 0      Pt has been given instructions populated from [employer] database and has verbalized understanding of the after visit summary and information contained wherein.    Follow up with a primary care provider. May go to ER for acute shortness of breath, lightheadedness, fever, or any other emergent complaints or changes in condition.

## 2018-08-30 ENCOUNTER — PATIENT MESSAGE (OUTPATIENT)
Dept: INTERNAL MEDICINE | Facility: CLINIC | Age: 28
End: 2018-08-30

## 2018-08-30 LAB — BACTERIA UR CULT: NORMAL

## 2018-09-28 ENCOUNTER — OFFICE VISIT (OUTPATIENT)
Dept: INTERNAL MEDICINE | Facility: CLINIC | Age: 28
End: 2018-09-28
Payer: COMMERCIAL

## 2018-09-28 VITALS
RESPIRATION RATE: 12 BRPM | BODY MASS INDEX: 27.87 KG/M2 | DIASTOLIC BLOOD PRESSURE: 72 MMHG | HEART RATE: 86 BPM | TEMPERATURE: 99 F | WEIGHT: 151.44 LBS | SYSTOLIC BLOOD PRESSURE: 122 MMHG | HEIGHT: 62 IN

## 2018-09-28 DIAGNOSIS — K21.9 GASTROESOPHAGEAL REFLUX DISEASE, ESOPHAGITIS PRESENCE NOT SPECIFIED: ICD-10-CM

## 2018-09-28 DIAGNOSIS — R10.9 ABDOMINAL PAIN, UNSPECIFIED ABDOMINAL LOCATION: Primary | ICD-10-CM

## 2018-09-28 LAB
B-HCG UR QL: NEGATIVE
CTP QC/QA: YES

## 2018-09-28 PROCEDURE — 81025 URINE PREGNANCY TEST: CPT | Mod: S$GLB,,, | Performed by: INTERNAL MEDICINE

## 2018-09-28 PROCEDURE — 99999 PR PBB SHADOW E&M-EST. PATIENT-LVL III: CPT | Mod: PBBFAC,,, | Performed by: INTERNAL MEDICINE

## 2018-09-28 PROCEDURE — 99214 OFFICE O/P EST MOD 30 MIN: CPT | Mod: S$GLB,,, | Performed by: INTERNAL MEDICINE

## 2018-09-28 NOTE — PROGRESS NOTES
Subjective:       Patient ID: Laila Collazo is a 27 y.o. female.    Chief Complaint: Abdominal Pain    HPI     27-year-old female here for evaluation of abdominal pain.  She has had pain all week.  She feels like she has had spasms in her stomach.  She has nausea and constipation/abdominal pain.  One part of her abdomen does not hurt more than another.  She gets sharp pains in the left side of her abdomen.  She has had abnormal stools this week.  She reports no sick contacts.      She has h pylori.  She did not finish the first course of antibiotics for this.  She was restarted on this treatment.    Review of Systems    Objective:      Physical Exam   Constitutional: She is oriented to person, place, and time. She appears well-developed and well-nourished.   HENT:   Head: Normocephalic and atraumatic.   Mouth/Throat: No oropharyngeal exudate.   Eyes: EOM are normal. Pupils are equal, round, and reactive to light. Right eye exhibits no discharge. Left eye exhibits no discharge. No scleral icterus.   Neck: Normal range of motion. Neck supple. No tracheal deviation present. No thyromegaly present.   Cardiovascular: Normal rate, regular rhythm and normal heart sounds. Exam reveals no gallop and no friction rub.   No murmur heard.  Pulmonary/Chest: Effort normal and breath sounds normal. No respiratory distress. She has no wheezes. She has no rales. She exhibits no tenderness.   Abdominal: Soft. Bowel sounds are normal. She exhibits no distension and no mass. There is generalized tenderness and tenderness in the left upper quadrant and left lower quadrant. There is no rebound and no guarding.   Musculoskeletal: Normal range of motion. She exhibits no edema or tenderness.   Neurological: She is alert and oriented to person, place, and time.   Skin: Skin is warm and dry. No rash noted. No erythema. No pallor.   Psychiatric: She has a normal mood and affect. Her behavior is normal.   Vitals reviewed.      Assessment:        1. Abdominal pain, unspecified abdominal location    2. Gastroesophageal reflux disease, esophagitis presence not specified        Plan:       1.  Check urine pregnancy test, because patient missed 2 days of birth control last week.  Think this is viral gastroenteritis.  Likely to resolve on its own.  2.  Patient has been treated twice in completely for H pylori.  Stop Protonix 20 mg daily for now.  May use Zantac and Tums over-the-counter as needed.  Stool for H pylori ordered.

## 2018-10-29 ENCOUNTER — TELEPHONE (OUTPATIENT)
Dept: ENDOSCOPY | Facility: HOSPITAL | Age: 28
End: 2018-10-29

## 2018-10-29 ENCOUNTER — LAB VISIT (OUTPATIENT)
Dept: LAB | Facility: HOSPITAL | Age: 28
End: 2018-10-29
Payer: COMMERCIAL

## 2018-10-29 ENCOUNTER — OFFICE VISIT (OUTPATIENT)
Dept: GASTROENTEROLOGY | Facility: CLINIC | Age: 28
End: 2018-10-29
Payer: COMMERCIAL

## 2018-10-29 VITALS
WEIGHT: 149.06 LBS | SYSTOLIC BLOOD PRESSURE: 108 MMHG | HEIGHT: 62 IN | DIASTOLIC BLOOD PRESSURE: 75 MMHG | BODY MASS INDEX: 27.43 KG/M2 | HEART RATE: 90 BPM

## 2018-10-29 DIAGNOSIS — R10.12 LUQ ABDOMINAL PAIN: ICD-10-CM

## 2018-10-29 DIAGNOSIS — K59.04 CHRONIC IDIOPATHIC CONSTIPATION: ICD-10-CM

## 2018-10-29 DIAGNOSIS — R10.12 LUQ ABDOMINAL PAIN: Primary | ICD-10-CM

## 2018-10-29 DIAGNOSIS — K21.9 GASTROESOPHAGEAL REFLUX DISEASE WITHOUT ESOPHAGITIS: ICD-10-CM

## 2018-10-29 LAB
ALBUMIN SERPL BCP-MCNC: 3.5 G/DL
ALP SERPL-CCNC: 48 U/L
ALT SERPL W/O P-5'-P-CCNC: 13 U/L
ANION GAP SERPL CALC-SCNC: 4 MMOL/L
AST SERPL-CCNC: 17 U/L
BASOPHILS # BLD AUTO: 0.05 K/UL
BASOPHILS NFR BLD: 0.7 %
BILIRUB SERPL-MCNC: 0.4 MG/DL
BUN SERPL-MCNC: 9 MG/DL
CALCIUM SERPL-MCNC: 9 MG/DL
CHLORIDE SERPL-SCNC: 109 MMOL/L
CO2 SERPL-SCNC: 25 MMOL/L
CREAT SERPL-MCNC: 0.8 MG/DL
DIFFERENTIAL METHOD: NORMAL
EOSINOPHIL # BLD AUTO: 0.1 K/UL
EOSINOPHIL NFR BLD: 0.8 %
ERYTHROCYTE [DISTWIDTH] IN BLOOD BY AUTOMATED COUNT: 13.2 %
EST. GFR  (AFRICAN AMERICAN): >60 ML/MIN/1.73 M^2
EST. GFR  (NON AFRICAN AMERICAN): >60 ML/MIN/1.73 M^2
GLUCOSE SERPL-MCNC: 83 MG/DL
HCT VFR BLD AUTO: 44 %
HGB BLD-MCNC: 14.1 G/DL
IMM GRANULOCYTES # BLD AUTO: 0.02 K/UL
IMM GRANULOCYTES NFR BLD AUTO: 0.3 %
LIPASE SERPL-CCNC: 6 U/L
LYMPHOCYTES # BLD AUTO: 2.1 K/UL
LYMPHOCYTES NFR BLD: 28.7 %
MCH RBC QN AUTO: 29.4 PG
MCHC RBC AUTO-ENTMCNC: 32 G/DL
MCV RBC AUTO: 92 FL
MONOCYTES # BLD AUTO: 0.4 K/UL
MONOCYTES NFR BLD: 5.7 %
NEUTROPHILS # BLD AUTO: 4.6 K/UL
NEUTROPHILS NFR BLD: 63.8 %
NRBC BLD-RTO: 0 /100 WBC
PLATELET # BLD AUTO: 281 K/UL
PMV BLD AUTO: 10.8 FL
POTASSIUM SERPL-SCNC: 4.4 MMOL/L
PROT SERPL-MCNC: 7 G/DL
RBC # BLD AUTO: 4.79 M/UL
SODIUM SERPL-SCNC: 138 MMOL/L
WBC # BLD AUTO: 7.18 K/UL

## 2018-10-29 PROCEDURE — 80053 COMPREHEN METABOLIC PANEL: CPT

## 2018-10-29 PROCEDURE — 85025 COMPLETE CBC W/AUTO DIFF WBC: CPT

## 2018-10-29 PROCEDURE — 83690 ASSAY OF LIPASE: CPT

## 2018-10-29 PROCEDURE — 99214 OFFICE O/P EST MOD 30 MIN: CPT | Mod: S$GLB,,, | Performed by: PHYSICIAN ASSISTANT

## 2018-10-29 PROCEDURE — 36415 COLL VENOUS BLD VENIPUNCTURE: CPT

## 2018-10-29 PROCEDURE — 99999 PR PBB SHADOW E&M-EST. PATIENT-LVL III: CPT | Mod: PBBFAC,,, | Performed by: PHYSICIAN ASSISTANT

## 2018-10-29 RX ORDER — SUCRALFATE 1 G/1
1 TABLET ORAL 2 TIMES DAILY
Qty: 60 TABLET | Refills: 1 | Status: SHIPPED | OUTPATIENT
Start: 2018-10-29 | End: 2019-02-07

## 2018-10-29 NOTE — PROGRESS NOTES
[facility] Consultation Note    Reason for Consult:  The primary encounter diagnosis was LUQ abdominal pain. Diagnoses of Chronic idiopathic constipation and Gastroesophageal reflux disease without esophagitis were also pertinent to this visit.    PCP:   Melany Malhotra       Referring MD:  Melany Malhotra Md  [address] [place] [address]    HPI:  This is a 27 y.o. female here with complaints of abdominal pain.  Last seen in office for epigastric pain.   She has a Hx of H. Pylori gastritis and NSAIDs use  Eradication of H. Pylori was not confirmed.    Location- LUQ  Onset- 1 month  Palliative/Worse- worse with sleeping on her stomach  Quality- spasm  Radiation-no  Severity-typically mild, increases to moderate  Timing- constant, increases    Also having constipation, went 2-3 without a BM last week    Having GERD, some breakthrough taking protonix 20mg twice daily  The protonix does not help her abdominal pain  Still taking aleve prn for mentrual cramping, but taking less frequently since starting BC pills. has endometriosis    ROS:  Constitutional: No fevers, chills, No weight loss  ENT: No allergies  CV: No chest pain  Pulm: No cough, No shortness of breath  Ophtho: No vision changes  GI: see HPI  Derm: No rash  Heme: No lymphadenopathy, No bruising  MSK: No arthritis  : No dysuria, No hematuria  Endo: No hot or cold intolerance  Neuro: No syncope, No seizure  Psych: No anxiety, No depression    Medical History:  has a past medical history of Endometriosis and Helicobacter pylori ab+.    Surgical History:  has a past surgical history that includes No past surgeries; Upper gastrointestinal endoscopy; and ESOPHAGOGASTRODUODENOSCOPY (EGD) (N/A, 12/5/2016).    Family History: family history includes Diabetes in her mother; No Known Problems in her brother, father, and sister..     Social History:  reports that  has never smoked. she has never used smokeless tobacco. She reports that she does  "not drink alcohol or use drugs.    Review of patient's allergies indicates:  No Known Allergies    Current Outpatient Medications on File Prior to Visit   Medication Sig Dispense Refill    OGESTREL, 28, 0.5-50 mg-mcg per tablet TK 1 T PO QD ON A CONTINUOUS  BASIS WITH NO PLACEBO TABS  9    pantoprazole (PROTONIX) 20 MG tablet Take 1 tablet (20 mg total) by mouth 2 (two) times daily before meals. for 14 days 28 tablet 0     No current facility-administered medications on file prior to visit.          Objective Findings:    Vital Signs:  /75   Pulse 90   Ht 5' 2" (1.575 m)   Wt 67.6 kg (149 lb 0.5 oz)   BMI 27.26 kg/m²   Body mass index is 27.26 kg/m².    Physical Exam:  General Appearance: Well appearing in no acute distress  Head:   Normocephalic, without obvious abnormality  Eyes:    No scleral icterus  ENT: Neck supple, Lips, mucosa, and tongue normal  Lungs: CTA bilaterally in anterior and posterior fields, no wheezes, no crackles.  Heart:  Regular rate and rhythm, S1, S2 normal, no murmurs heard  Abdomen: Soft,moderate diffuse abdominal tenderness, mild guarding, no rebound tenderness and  non distended with positive bowel sounds in all four quadrants.   Extremities: 2+ pulses, no edema  Skin: No rash  Neurologic: AAO x 3      Labs:  Lab Results   Component Value Date    WBC 7.18 10/29/2018    HGB 14.1 10/29/2018    HCT 44.0 10/29/2018     10/29/2018    CHOL 182 05/02/2018    TRIG 46 05/02/2018    HDL 40 05/02/2018    ALT 13 10/29/2018    AST 17 10/29/2018     10/29/2018    K 4.4 10/29/2018     10/29/2018    CREATININE 0.8 10/29/2018    BUN 9 10/29/2018    CO2 25 10/29/2018    TSH 0.892 03/01/2017    HGBA1C 5.2 03/01/2017       Imaging:    Endoscopy:     12/2016 EGD revealed erosive gastritis, + H. Pylori   Assessment:  1. LUQ abdominal pain    2. Chronic idiopathic constipation    3. Gastroesophageal reflux disease without esophagitis      28yo F presents with LUQ pain x 1 month. " She has a Hx of H. Pylori and was treated, but eradication was never confirmed. She also has a Hx of frequent Nsaids use in the past, so a stomach ulcer is possible.     She is also having some constipation which may be contributing to her abdominal discomfort    Recommendations:  1. Schedule EGD to rule out gastric ulcers at pt request    2. Start carafate 1g BID    3. Stop protonix for 2 week, then turn in HP stool Ag    4. Labs to rule out elevated WBC, anemia, elevated lfts, and pancreatitis    Consider CT if EGD is unrevealing    Follow-up in about 6 weeks (around 12/10/2018).      Order summary:  Orders Placed This Encounter    CBC auto differential    Comprehensive metabolic panel    Lipase    H. pylori antigen, stool    sucralfate (CARAFATE) 1 gram tablet    Case request GI: EGD (ESOPHAGOGASTRODUODENOSCOPY)         Thank you so much for allowing me to participate in the care of Laila Pedraza PA-C

## 2018-10-29 NOTE — PATIENT INSTRUCTIONS
Restart the probiotic    Take miralax nightly for 1 week. Mix in 10 oz of water and drink nightly around 6pm, as needed to help your bowels move    Drinking 64oz of water daily.      1. Off all Antibiotics for 4 (Four) weeks before stool collection.       2. Off all Proton Pump Inhibitors medications for 2 (Two) weeks before collecting stool for H. Pylori Antigen:     Nexium (esomeprazole)   Prilosec (omeprazole)   Protonix (pantoprazole)   Prevacid (lansoprazole)   Aciphex (rabeprazole)   Dexilant (dexlansoprazole)     Zegerid     3. Off all H2 blockers medications for 2 (Two) weeks before collecting stool for H. Pylori Antigen:     Zantac (ranitidine)   Tagamet (cimetadine)   Axid (nizatidine)   Pepcid (famotidine)     4. Off Pepto-Bismol for 4 (four) weeks prior to collecting stool for H. Pylori Antigen.       HIGH FIBER KEY POINTS:  1. Goal of 20-25 grams of fiber each day for women, 30-35 grams each day for men. Slowly build up to this goal as you may experience gas and bloating at first.  2. Take a fiber supplement to help reach this goal: Metamucil, Citrucel, Fibercon, Konsyl, or Psyllium  3. For Constipation: Finely ground psyllium husk (with or without flavoring or                                              Additives)   - To start: 1 teaspoon once a day in the morning with 8 oz of liquid    followed by an additional 8 ounce glass of water   - After a week: add a second teaspoon in the middle of the day   - After two weeks: add a third teaspoon at bedtime   - Follow each dose with another glass of water. Can add a splash of juice   or lemonade for taste.  3. Drink 6-8 glasses of water a day.  4. Try to do plant fiber (fruits and vegetables) over processed fibers (cereals and breads)     HIGH FIBER DIET  Fiber is present in all fruits, vegetables, cereals and grains. Fiber passes through the body undigested. A high fiber diet helps food move through the intestinal tract. The added bulk is helpful in  preventing constipation. In persons with diverticulosis it serves to clean out the pouches along the colon wall while preventing new ones from forming. A high fiber diet may reduce the risk of colon cancer, decreases blood cholesterol and prevents high blood sugar in diabetics.    The foods listed below are high in fiber and should be included in your diet. If you are not used to high fiber foods, start with 1 or 2 foods from this list. Every 3-4 days add a new one to your diet until you are eating 4 high fiber foods per day. This should give you 20-35 Gm of fiber/day. It is also important to drink a lot of water when you are on this diet (6-8 glasses a day). Water causes the fiber to swell and increases the benefit.  Add Fiber to Your Diet   Adding fiber to your diet can help relieve constipation by making stools softer and easier to pass. To increase your fiber intake, your doctor may recommend a bulking agent, such as psyllium. This is a high-fiber supplement available at most grocery and drugstores. Eating more fiber-rich foods will also help. There are two types of fiber:   Insoluble fiber is the main ingredient in bulking agents. Its also found in foods such as wheat bran, whole-grain breads, fresh fruits, and vegetables.   Soluble fiber is found in foods such as oat bran. Although soluble fiber is good for you, it may not ease constipation as much as foods high in insoluble fiber.    FOODS HIGH IN DIETARY FIBER:  BREADS: Made with 100% whole wheat flour; José, wheat or rye crackers; tortillas, bran muffins. Whole grains, such as wheat bran, corn bran, and brown rice.  CEREALS: Whole grain cereal with bran (Chex, Raisin Bran, Corn Bran), oatmeal, rolled oats, granola, wheat flakes, brown rice  NUTS: Any nuts  FRUITS: All fresh fruits along with edible skins, (bananas, citrus fruit, mangoes, pears, prunes, raisins, apples, pineapple, apricot, melon, jams and marmalades), fruit juices (especially prune  juice)  VEGETABLES: All types, preferably raw or lightly cooked: especially, celery, eggplant, potatoes,spinach, broccoli, brussel sprouts, winter squash, carrots, cauliflower, soybeans, lentils, fresh and dried beans of all kinds  OTHER: Popcorn, any spices.   Nuts and legumes, especially peanuts, lentils, and kidney beans.  Easy Ways to Add Fiber   The tips below offer some simple ways to add more high-fiber foods to your meals.   Start your day with a high-fiber breakfast. Eat a wheat bran cereal along with a sliced banana. Or, try peanut butter on whole-wheat toast.   Eat carrot sticks for snacks. Theyre easy to prepare, taste great, and are low in calories.   Use whole-grain breads instead of white bread for sandwiches.   Eat fruits for treats. Try an apple and some raisins instead of a candy bar.  Vegetables  Artichoke, cooked 10.3  Asparagus - 2.8  Beans - 2  Broccoli, boiled 1 cup - 5.1  Stow sprouts, cooked 1 cup - 4.1  Carrots - boiled 2.5, raw 4  Celery - 1  Corn - 4  Corn on the Cob - 5.9  Lettuce - 1  Peas (canned) - 4  Peas (dried) - 7.9  Green peas (cooked) - 8.8  Potato, with skin, baked - 3.0  Spinach - 4  Sweet potato - 3.4  Turnip greens, boiled 1 cup - 5.0  Sweet corn, cooked  1 cup  4.0  Tomato paste -1/4 cup -2.7  Yam - 2.7  Legumes, Nuts, and Seeds  Split peas, cooked  1 cup  16.3  Lentils, cooked 1 cup 15.6  Black beans, cooked 1 cup 15.0  Black eyed peas (1/2 cup) 4.2  Chick peas (1/2 cup) 5  Lima beans, cooked 1 cup  13.2  Baked beans, vegetarian, canned, cooked 1 cup 10.4  Sunflower seed kernels 1/4 cup 3.9  Almonds 1 ounce (23 nuts)  3.5  Pistachio nuts 1 ounce (49 nuts) 2.9  Pecans 1 ounce (19 halves) 2.7  Beans (lima,kidney,baked) - 10 (1/2 cup)  Refried beans -12 (1cup)  Lentils - 8  Soybeans (1/2 cup) 5  Fruit  Raspberries  1 cup  8.0  Pear, with skin - 5.5  Apple, with skin 4.4 (Juice = 0)  Banana - 3.1  Orange - 3.1  Strawberries (halves) 1 cup - 3.0  Figs, dried 2 medium -  1.6  Raisins 1 ounce (60 raisins) -1.0  Grapefruit - 1.4  Peach - 2  Kiwi - 5  [place] - 3.7  Pineapple - 2  Cereal, Grains, and Pasta  Spaghetti, whole-wheat, cooked 1 cup 6.3  Barley, pearled, cooked 1 cup 6.0  Bran flakes 3/4 cup 5.3  Oat bran muffin 1 medium 5.2  Oatmeal, instant, cooked 1 cup 4.0  Popcorn, air-popped 3 cups 3.5  Brown rice, cooked  1 cup  3.5  Bread, rye 1 slice 1.9, pumpernickel - 2.1  Bagel - 1.6  Bread, whole-wheat or multigrain  1 slice 1.9  Fiber One - 14  100% Bran - 13  Raisin Bran - 3.5  All Bran Extra Fiber - 13    Http://www.refluxcookbook.com/  Dropping Acid The Reflux Diet Cookbook and Cure -  Dylan Rader M.D.    GERD  Worst Foods for Acid Reflux  Chocolate (milk chocolate worse than dark chocolate)  Soda (all carbonated beverages)  Alcohol (beer, liquor, wine)  Fried foods  Gill, sausage, ribs  Cream sauce  Fatty meats (beef)  Butter, margarine, lard, shortening  Coffee, tea  Mint   High fat nuts  Hot sauces and pepper  Citrus fruit/juices      Acidic foods (pH - 1 is MORE acidic, 5 is LESS acidic)     Do not eat or drink these (lower numbers are worse)    Induction diet - For 2 weeks eat nothing below pH 5     Lemon juice 2.3  Grape cranberry juice 2.5  Stomach Acid 2.5  Gelatin Dessert 2.6  Lemon/lime 2.9/2.7  Vinegar 2.9  Gatorade 3.0  Fruits - plums, apricots, strawberries, cherries 3.0  Vitamin C (ascorbic acid) 3.0  Iced tea, Snapple 3.1  Mustard 3.2  Soft drinks 3.3  Nectarines 3.3  Pomegranate 3.3  Applesauce 3.4  Grapefruit 3.4  Kiwi 3.4  Barbecue sauce 3.4  Caesar dressing 3.5  [place] dressing 3.6  Strawberries 3.5  Pineapple juice 3.5  Beer 3.5  Wine 3.5  Grape 3.6  Apples 3.6  Pineapple 3.7  Pickle 3.7  Blackberries, blueberries 3.7  [place] 3.7  Orange 3.8  Cherries 3.9  Red Bull 3.9  Tomatoes 4.2  Coffee 5.1      These are Safe foods:  Agave  Aloe Vera  Apple (only red)  Bagels  Banana (worsens reflux in 1%)  Beans - black, red, lima, lentils  Bread - whole  grain, rye  Caramel  Celery  Chamomile tea  Chicken - skinless, never fried  Chicken stock or bouillon  Coffee - one cup/day with milk  Fennel  Fish  Elvie  Green vegetables (no green peppers)  Herbs  Honey  Melon  Milk - skin, soy, or Lactaid skim milk  Mushrooms  Oatmeal  Olive oil  Parsley  Pasta  Pears  Popcorn  Potatoes  Red bell peppers  Rice  Soups  Tofu  Turkey Breast  Turnip  Vegetables - no onion, tomatoes, peppers  Vinaigrette  Water - non carbonated  Whole grain breads, crackers, breakfast cereals      Best Foods for Acid Reflux  Whole grain breads  Oatmeal  Aloe Vera  Salad (no tomatoes, onions, cheese, or high fat dressing)  Banana  Melon  Fennel  Chicken and turkey (skinless, never fried)  Fish/seafood (never fried)  Celery  Parsley  Couscous and Rice    Maybe bad foods (Everyone is unique)  Tomatoes  Garlic  Onion  Nuts (macadamia nuts)  Apples (especially green)  Cucumber  Green peppers  Spicy food  Some herbal teas    GERD tips  Change what you eat:  Eat smaller meals  Eat slowly and chew thoroughly until food is almost liquid  Cut down on junk carbohydrates such as sugar and white flour  Use herbs in your cooking  Eat more raw foods (more than 10 ingredients is not a raw food)  Avoid trans fats and partially hydrogenated oils  Eat more fish and switch to grass fed beef  Switch your cooking oil to macadamia nut or olive oil  Watch extremes of salt intake (too high or too low is bad)    If just cutting out acidic foods is not enough, change how you eat:  Large breakfast, medium lunch, light dinner  Dont mix fruit juices, sweet fruits, and refined starches with meats and heavy food  Dont wash your food down with a lot of liquid    List A Proteins - meat, poultry, cheese, eggs, fish, beans, yogurt    List B Neutral - vegetables, salads, seeds, nuts, herbs, cream, butter, olive oil    List C Starches - biscuit, breads, cake, crackers, oats, pasta, potatoes, rice, sugar/honey, sweets    A + B = ok  B  + C = ok  Never mix list A and C!!    Change these habits:  Stop smoking  Eat dinner earlier (3-4 hours before lying down to sleep)  Elevate the head of your bed 6 inches (blocks under the head of the bed are better than pillows)  Exercise (but wait 2 hours after eating)  Drink more water (between meals)    Take these supplements:  Multi vitamin  Probiotic  Fish oil    Most common food allergens: milk, eggs, peanuts, tree nuts, fish, shellfish, wheat, and soy    All natural immediate relief:  Chew 2-3 soft probiotic capsules - Dr. Mann's Probiotics 12 Plus  Chew chewable DGL licorice tablet  Chew papaya tablet with high protein meal - [employer]  Drink 2 ounces of aloe vera juice  Swedish bitters  Prelief- reduce the acid in food to keep it form burning sensitive tissue  Iberogast  [place]  Drink Chamomile Tea  Teaspoon of baking soda in water  Spoonful of vinegar in water      All natural ulcer healers:  Zinc carnosine - 75.5 mg with food twice a day x 8 weeks   Adrien Awad - $8 for 60 pills  DGL (deglycyrrhizinated licorice) - 2 tablets before meals. Heals stomach lining   Natural Factors brand, Enzymatic therapy brand.  Aloe Vera juice  - 2 to 8 ounces a day   [place] or [place]

## 2018-10-29 NOTE — LETTER
October 29, 2018      Melany Malhotra MD  [address]  [place] [address]           [facility]  [address]  [place] [address]  Phone: [phone]  Fax: [phone]          Patient: Laila Collazo   MR Number: [id number]   YOB: 1990   Date of Visit: 10/29/2018       Dear Dr. Melany Malhotra:    Thank you for referring Laila Collazo to me for evaluation. Attached you will find relevant portions of my assessment and plan of care.    If you have questions, please do not hesitate to call me. I look forward to following Laila Collazo along with you.    Sincerely,    Mary Jo Pedraza PA-C    Enclosure  CC:  No Recipients    If you would like to receive this communication electronically, please contact [email or web address] or [phone] to request more information on [employer] access.    For providers and/or their staff who would like to refer a patient to [facility], please contact us through our one-stop-shop provider referral line, [facility] , at [phone].    If you feel you have received this communication in error or would no longer like to receive these types of communications, please e-mail [email or web address]

## 2018-10-29 NOTE — H&P (VIEW-ONLY)
[facility] Consultation Note    Reason for Consult:  The primary encounter diagnosis was LUQ abdominal pain. Diagnoses of Chronic idiopathic constipation and Gastroesophageal reflux disease without esophagitis were also pertinent to this visit.    PCP:   Melany Malhotra       Referring MD:  Melany Malhotra Md  [address] [place] [address]    HPI:  This is a 27 y.o. female here with complaints of abdominal pain.  Last seen in office for epigastric pain.   She has a Hx of H. Pylori gastritis and NSAIDs use  Eradication of H. Pylori was not confirmed.    Location- LUQ  Onset- 1 month  Palliative/Worse- worse with sleeping on her stomach  Quality- spasm  Radiation-no  Severity-typically mild, increases to moderate  Timing- constant, increases    Also having constipation, went 2-3 without a BM last week    Having GERD, some breakthrough taking protonix 20mg twice daily  The protonix does not help her abdominal pain  Still taking aleve prn for mentrual cramping, but taking less frequently since starting BC pills. has endometriosis    ROS:  Constitutional: No fevers, chills, No weight loss  ENT: No allergies  CV: No chest pain  Pulm: No cough, No shortness of breath  Ophtho: No vision changes  GI: see HPI  Derm: No rash  Heme: No lymphadenopathy, No bruising  MSK: No arthritis  : No dysuria, No hematuria  Endo: No hot or cold intolerance  Neuro: No syncope, No seizure  Psych: No anxiety, No depression    Medical History:  has a past medical history of Endometriosis and Helicobacter pylori ab+.    Surgical History:  has a past surgical history that includes No past surgeries; Upper gastrointestinal endoscopy; and ESOPHAGOGASTRODUODENOSCOPY (EGD) (N/A, 12/5/2016).    Family History: family history includes Diabetes in her mother; No Known Problems in her brother, father, and sister..     Social History:  reports that  has never smoked. she has never used smokeless tobacco. She reports that she does  "not drink alcohol or use drugs.    Review of patient's allergies indicates:  No Known Allergies    Current Outpatient Medications on File Prior to Visit   Medication Sig Dispense Refill    OGESTREL, 28, 0.5-50 mg-mcg per tablet TK 1 T PO QD ON A CONTINUOUS  BASIS WITH NO PLACEBO TABS  9    pantoprazole (PROTONIX) 20 MG tablet Take 1 tablet (20 mg total) by mouth 2 (two) times daily before meals. for 14 days 28 tablet 0     No current facility-administered medications on file prior to visit.          Objective Findings:    Vital Signs:  /75   Pulse 90   Ht 5' 2" (1.575 m)   Wt 67.6 kg (149 lb 0.5 oz)   BMI 27.26 kg/m²   Body mass index is 27.26 kg/m².    Physical Exam:  General Appearance: Well appearing in no acute distress  Head:   Normocephalic, without obvious abnormality  Eyes:    No scleral icterus  ENT: Neck supple, Lips, mucosa, and tongue normal  Lungs: CTA bilaterally in anterior and posterior fields, no wheezes, no crackles.  Heart:  Regular rate and rhythm, S1, S2 normal, no murmurs heard  Abdomen: Soft,moderate diffuse abdominal tenderness, mild guarding, no rebound tenderness and  non distended with positive bowel sounds in all four quadrants.   Extremities: 2+ pulses, no edema  Skin: No rash  Neurologic: AAO x 3      Labs:  Lab Results   Component Value Date    WBC 7.18 10/29/2018    HGB 14.1 10/29/2018    HCT 44.0 10/29/2018     10/29/2018    CHOL 182 05/02/2018    TRIG 46 05/02/2018    HDL 40 05/02/2018    ALT 13 10/29/2018    AST 17 10/29/2018     10/29/2018    K 4.4 10/29/2018     10/29/2018    CREATININE 0.8 10/29/2018    BUN 9 10/29/2018    CO2 25 10/29/2018    TSH 0.892 03/01/2017    HGBA1C 5.2 03/01/2017       Imaging:    Endoscopy:     12/2016 EGD revealed erosive gastritis, + H. Pylori   Assessment:  1. LUQ abdominal pain    2. Chronic idiopathic constipation    3. Gastroesophageal reflux disease without esophagitis      26yo F presents with LUQ pain x 1 month. " She has a Hx of H. Pylori and was treated, but eradication was never confirmed. She also has a Hx of frequent Nsaids use in the past, so a stomach ulcer is possible.     She is also having some constipation which may be contributing to her abdominal discomfort    Recommendations:  1. Schedule EGD to rule out gastric ulcers at pt request    2. Start carafate 1g BID    3. Stop protonix for 2 week, then turn in HP stool Ag    4. Labs to rule out elevated WBC, anemia, elevated lfts, and pancreatitis    Consider CT if EGD is unrevealing    Follow-up in about 6 weeks (around 12/10/2018).      Order summary:  Orders Placed This Encounter    CBC auto differential    Comprehensive metabolic panel    Lipase    H. pylori antigen, stool    sucralfate (CARAFATE) 1 gram tablet    Case request GI: EGD (ESOPHAGOGASTRODUODENOSCOPY)         Thank you so much for allowing me to participate in the care of Laila Pedraza PA-C

## 2018-10-30 ENCOUNTER — HOSPITAL ENCOUNTER (OUTPATIENT)
Facility: HOSPITAL | Age: 28
Discharge: HOME | End: 2018-10-30
Attending: INTERNAL MEDICINE | Admitting: INTERNAL MEDICINE
Payer: COMMERCIAL

## 2018-10-30 ENCOUNTER — ANESTHESIA (OUTPATIENT)
Dept: ENDOSCOPY | Facility: HOSPITAL | Age: 28
End: 2018-10-30
Payer: COMMERCIAL

## 2018-10-30 ENCOUNTER — ANESTHESIA EVENT (OUTPATIENT)
Dept: ENDOSCOPY | Facility: HOSPITAL | Age: 28
End: 2018-10-30
Payer: COMMERCIAL

## 2018-10-30 VITALS
HEIGHT: 62 IN | BODY MASS INDEX: 27.05 KG/M2 | RESPIRATION RATE: 17 BRPM | SYSTOLIC BLOOD PRESSURE: 110 MMHG | TEMPERATURE: 98 F | WEIGHT: 147 LBS | HEART RATE: 80 BPM | OXYGEN SATURATION: 100 % | DIASTOLIC BLOOD PRESSURE: 69 MMHG

## 2018-10-30 DIAGNOSIS — R10.12 LUQ ABDOMINAL PAIN: Primary | ICD-10-CM

## 2018-10-30 LAB
B-HCG UR QL: NEGATIVE
CTP QC/QA: YES

## 2018-10-30 PROCEDURE — 37000009 HC ANESTHESIA EA ADD 15 MINS: Performed by: INTERNAL MEDICINE

## 2018-10-30 PROCEDURE — 88305 TISSUE EXAM BY PATHOLOGIST: CPT | Mod: 26,,, | Performed by: PATHOLOGY

## 2018-10-30 PROCEDURE — 88342 IMHCHEM/IMCYTCHM 1ST ANTB: CPT | Mod: 26,,, | Performed by: PATHOLOGY

## 2018-10-30 PROCEDURE — 88305 TISSUE EXAM BY PATHOLOGIST: CPT | Performed by: PATHOLOGY

## 2018-10-30 PROCEDURE — 25000003 PHARM REV CODE 250: Performed by: NURSE ANESTHETIST, CERTIFIED REGISTERED

## 2018-10-30 PROCEDURE — 43239 EGD BIOPSY SINGLE/MULTIPLE: CPT | Performed by: INTERNAL MEDICINE

## 2018-10-30 PROCEDURE — 25000003 PHARM REV CODE 250: Performed by: INTERNAL MEDICINE

## 2018-10-30 PROCEDURE — D9220A PRA ANESTHESIA: Mod: ,,, | Performed by: ANESTHESIOLOGY

## 2018-10-30 PROCEDURE — 63600175 PHARM REV CODE 636 W HCPCS: Performed by: NURSE ANESTHETIST, CERTIFIED REGISTERED

## 2018-10-30 PROCEDURE — 27201012 HC FORCEPS, HOT/COLD, DISP: Performed by: INTERNAL MEDICINE

## 2018-10-30 PROCEDURE — 81025 URINE PREGNANCY TEST: CPT | Performed by: INTERNAL MEDICINE

## 2018-10-30 PROCEDURE — 37000008 HC ANESTHESIA 1ST 15 MINUTES: Performed by: INTERNAL MEDICINE

## 2018-10-30 PROCEDURE — 43239 EGD BIOPSY SINGLE/MULTIPLE: CPT | Mod: ,,, | Performed by: INTERNAL MEDICINE

## 2018-10-30 PROCEDURE — 88342 IMHCHEM/IMCYTCHM 1ST ANTB: CPT | Performed by: PATHOLOGY

## 2018-10-30 RX ORDER — PROPOFOL 10 MG/ML
VIAL (ML) INTRAVENOUS
Status: DISCONTINUED | OUTPATIENT
Start: 2018-10-30 | End: 2018-10-30

## 2018-10-30 RX ORDER — GLYCOPYRROLATE 0.2 MG/ML
INJECTION INTRAMUSCULAR; INTRAVENOUS
Status: DISCONTINUED | OUTPATIENT
Start: 2018-10-30 | End: 2018-10-30

## 2018-10-30 RX ORDER — MEPERIDINE HYDROCHLORIDE 50 MG/ML
12.5 INJECTION INTRAMUSCULAR; INTRAVENOUS; SUBCUTANEOUS ONCE AS NEEDED
Status: DISCONTINUED | OUTPATIENT
Start: 2018-10-30 | End: 2018-10-30 | Stop reason: HOSPADM

## 2018-10-30 RX ORDER — LORAZEPAM 2 MG/ML
0.25 INJECTION INTRAMUSCULAR ONCE AS NEEDED
Status: DISCONTINUED | OUTPATIENT
Start: 2018-10-30 | End: 2018-10-30 | Stop reason: HOSPADM

## 2018-10-30 RX ORDER — SODIUM CHLORIDE 0.9 % (FLUSH) 0.9 %
3 SYRINGE (ML) INJECTION
Status: DISCONTINUED | OUTPATIENT
Start: 2018-10-30 | End: 2018-10-30 | Stop reason: HOSPADM

## 2018-10-30 RX ORDER — SODIUM CHLORIDE 9 MG/ML
INJECTION, SOLUTION INTRAVENOUS CONTINUOUS PRN
Status: DISCONTINUED | OUTPATIENT
Start: 2018-10-30 | End: 2018-10-30

## 2018-10-30 RX ORDER — SODIUM CHLORIDE 9 MG/ML
INJECTION, SOLUTION INTRAVENOUS CONTINUOUS
Status: DISCONTINUED | OUTPATIENT
Start: 2018-10-30 | End: 2018-10-30 | Stop reason: HOSPADM

## 2018-10-30 RX ORDER — HYDROMORPHONE HYDROCHLORIDE 1 MG/ML
0.2 INJECTION, SOLUTION INTRAMUSCULAR; INTRAVENOUS; SUBCUTANEOUS EVERY 5 MIN PRN
Status: DISCONTINUED | OUTPATIENT
Start: 2018-10-30 | End: 2018-10-30 | Stop reason: HOSPADM

## 2018-10-30 RX ORDER — LIDOCAINE HCL/PF 100 MG/5ML
SYRINGE (ML) INTRAVENOUS
Status: DISCONTINUED | OUTPATIENT
Start: 2018-10-30 | End: 2018-10-30

## 2018-10-30 RX ADMIN — PROPOFOL 20 MG: 10 INJECTION, EMULSION INTRAVENOUS at 03:10

## 2018-10-30 RX ADMIN — SODIUM CHLORIDE: 0.9 INJECTION, SOLUTION INTRAVENOUS at 02:10

## 2018-10-30 RX ADMIN — PROPOFOL 80 MG: 10 INJECTION, EMULSION INTRAVENOUS at 03:10

## 2018-10-30 RX ADMIN — SODIUM CHLORIDE: 9 INJECTION, SOLUTION INTRAVENOUS at 03:10

## 2018-10-30 RX ADMIN — PROPOFOL 30 MG: 10 INJECTION, EMULSION INTRAVENOUS at 03:10

## 2018-10-30 RX ADMIN — GLYCOPYRROLATE 0.2 MG: 0.2 INJECTION, SOLUTION INTRAMUSCULAR; INTRAVENOUS at 03:10

## 2018-10-30 RX ADMIN — PROPOFOL 40 MG: 10 INJECTION, EMULSION INTRAVENOUS at 03:10

## 2018-10-30 RX ADMIN — LIDOCAINE HYDROCHLORIDE 100 MG: 20 INJECTION, SOLUTION INTRAVENOUS at 03:10

## 2018-10-30 NOTE — INTERVAL H&P NOTE
Pre-Procedure H and P Addendum    Patient seen and examined.  History and exam unchanged from prior history and physical.      Procedure: EGD  Indication: LUQ abdominal pain; history of H pylori  ASA Class: per anesthesiology  Airway: normal  Neck Mobility: full range of motion  Mallampatti score: per anesthesia  History of anesthesia problems: no  Family history of anesthesia problems: no  Anesthesia Plan: MAC    Anesthesia/Surgery risks, benefits and alternative options discussed and understood by patient/family.          Active Hospital Problems    Diagnosis  POA    LUQ abdominal pain [R10.12]  Yes      Resolved Hospital Problems   No resolved problems to display.

## 2018-10-30 NOTE — DISCHARGE INSTRUCTIONS
Upper GI Endoscopy     During endoscopy, a long, flexible tube is used to view the inside of your upper GI tract.      Upper GI endoscopy allows your healthcare provider to look directly into the beginning of your gastrointestinal (GI) tract. The esophagus, stomach, and duodenum (the first part of the small intestine) make up the upper GI tract.   Before the exam  Follow these and any other instructions you are given before your endoscopy. If you dont follow the healthcare providers instructions carefully, the test may need to be canceled or done over:  · Don't eat or drink anything after midnight the night before your exam. If your exam is in the afternoon, drink only clear liquids in the morning. Don't eat or drink anything for 8 hours before the exam. In some cases, you may be able to take medicines with sips of water until 2 hours before the procedure. Speak with your healthcare provider about this.   · Bring your X-rays and any other test results you have.  · Because you will be sedated, arrange for an adult to drive you home after the exam.  · Tell your healthcare provider before the exam if you are taking any medicines or have any medical problems.  The procedure  Here is what to expect:  · You will lie on the endoscopy table. Usually patients lie on the left side.  · You will be monitored and given oxygen.  · Your throat may be numbed with a spray or gargle. You are given medicine through an intravenous (IV) line that will help you relax and remain comfortable. You may be awake or asleep during the procedure.  · The healthcare provider will put the endoscope in your mouth and down your esophagus. It is thinner than most pieces of food that you swallow. It will not affect your breathing. The medicine helps keep you from gagging.  · Air is put into your GI tract to expand it. It can make you burp.  · During the procedure, the healthcare provider can take biopsies (tissue samples), remove abnormalities,  such as polyps, or treat abnormalities through a variety of devices placed through the endoscope. You will not feel this.   · The endoscope carries images of your upper GI tract to a video screen. If you are awake, you may be able to look at the images.  · After the procedure is done, you will rest for a time. An adult must drive you home.  When to call your healthcare provider  Contact your healthcare provider if you have:  · Black or tarry stools, or blood in your stool  · Fever  · Pain in your belly that does not go away  · Nausea and vomiting, or vomiting blood   Date Last Reviewed: 7/1/2016  © 3539-5855 [employer]. [address] [place] [address], [place] [address]. All rights reserved. This information is not intended as a substitute for professional medical care. Always follow your healthcare professional's instructions.        charge Instructions: After Your Surgery  Youve just had surgery. During surgery, you were given medicine called anesthesia to keep you relaxed and free of pain. After surgery, you may have some pain or nausea. This is common. Here are some tips for feeling better and getting well after surgery.     Stay on schedule with your medicine.   Going home  Your healthcare provider will show you how to take care of yourself when you go home. He or she will also answer your questions. Have an adult family member or friend drive you home. For the first 24 hours after your surgery:  · Do not drive or use heavy equipment.  · Do not make important decisions or sign legal papers.  · Do not drink alcohol.  · Have someone stay with you, if needed. He or she can watch for problems and help keep you safe.  Be sure to go to all follow-up visits with your healthcare provider. And rest after your surgery for as long as your healthcare provider tells you to.  Coping with pain  If you have pain after surgery, pain medicine will help you feel better. Take it as told, before pain becomes severe. Also,  ask your healthcare provider or pharmacist about other ways to control pain. This might be with heat, ice, or relaxation. And follow any other instructions your surgeon or nurse gives you.  Tips for taking pain medicine  To get the best relief possible, remember these points:  · Pain medicines can upset your stomach. Taking them with a little food may help.  · Most pain relievers taken by mouth need at least 20 to 30 minutes to start to work.  · Taking medicine on a schedule can help you remember to take it. Try to time your medicine so that you can take it before starting an activity. This might be before you get dressed, go for a walk, or sit down for dinner.  · Constipation is a common side effect of pain medicines. Call your healthcare provider before taking any medicines such as laxatives or stool softeners to help ease constipation. Also ask if you should skip any foods. Drinking lots of fluids and eating foods such as fruits and vegetables that are high in fiber can also help. Remember, do not take laxatives unless your surgeon has prescribed them.  · Drinking alcohol and taking pain medicine can cause dizziness and slow your breathing. It can even be deadly. Do not drink alcohol while taking pain medicine.  · Pain medicine can make you react more slowly to things. Do not drive or run machinery while taking pain medicine.  Your healthcare provider may tell you to take acetaminophen to help ease your pain. Ask him or her how much you are supposed to take each day. Acetaminophen or other pain relievers may interact with your prescription medicines or other over-the-counter (OTC) medicines. Some prescription medicines have acetaminophen and other ingredients. Using both prescription and OTC acetaminophen for pain can cause you to overdose. Read the labels on your OTC medicines with care. This will help you to clearly know the list of ingredients, how much to take, and any warnings. It may also help you not take  too much acetaminophen. If you have questions or do not understand the information, ask your pharmacist or healthcare provider to explain it to you before you take the OTC medicine.  Managing nausea  Some people have an upset stomach after surgery. This is often because of anesthesia, pain, or pain medicine, or the stress of surgery. These tips will help you handle nausea and eat healthy foods as you get better. If you were on a special food plan before surgery, ask your healthcare provider if you should follow it while you get better. These tips may help:  · Do not push yourself to eat. Your body will tell you when to eat and how much.  · Start off with clear liquids and soup. They are easier to digest.  · Next try semi-solid foods, such as mashed potatoes, applesauce, and gelatin, as you feel ready.  · Slowly move to solid foods. Dont eat fatty, rich, or spicy foods at first.  · Do not force yourself to have 3 large meals a day. Instead eat smaller amounts more often.  · Take pain medicines with a small amount of solid food, such as crackers or toast, to avoid nausea.     Call your surgeon if  · You still have pain an hour after taking medicine. The medicine may not be strong enough.  · You feel too sleepy, dizzy, or groggy. The medicine may be too strong.  · You have side effects like nausea, vomiting, or skin changes, such as rash, itching, or hives.       If you have obstructive sleep apnea  You were given anesthesia medicine during surgery to keep you comfortable and free of pain. After surgery, you may have more apnea spells because of this medicine and other medicines you were given. The spells may last longer than usual.   At home:  · Keep using the continuous positive airway pressure (CPAP) device when you sleep. Unless your healthcare provider tells you not to, use it when you sleep, day or night. CPAP is a common device used to treat obstructive sleep apnea.  · Talk with your provider before taking any  pain medicine, muscle relaxants, or sedatives. Your provider will tell you about the possible dangers of taking these medicines.  Date Last Reviewed: 12/1/2016  © 0538-8653 [employer]. [address] [place] [address], [place] [address]. All rights reserved. This information is not intended as a substitute for professional medical care. Always follow your healthcare professional's instructions.

## 2018-10-30 NOTE — [employer] PATIENT INSTRUCTIONS
Discharge Summary/Instructions after an Endoscopic Procedure  Patient Name: Laila Collazo  Patient MRN: [id number]  Patient YOB: 1990 Tuesday, October 30, 2018  Greg Leach MD  RESTRICTIONS:  During your procedure today, you received medications for sedation.  These   medications may affect your judgment, balance and coordination.  Therefore,   for 24 hours, you have the following restrictions:   - DO NOT drive a car, operate machinery, make legal/financial decisions,   sign important papers or drink alcohol.    ACTIVITY:  Today: no heavy lifting, straining or running due to procedural   sedation/anesthesia.  The following day: return to full activity including work.  DIET:  Eat and drink normally unless instructed otherwise.     TREATMENT FOR COMMON SIDE EFFECTS:  - Mild abdominal pain, nausea, belching, bloating or excessive gas:  rest,   eat lightly and use a heating pad.  - Sore Throat: treat with throat lozenges and/or gargle with warm salt   water.  - Because air was used during the procedure, expelling large amounts of air   from your rectum or belching is normal.  - If a bowel prep was taken, you may not have a bowel movement for 1-3 days.    This is normal.  SYMPTOMS TO WATCH FOR AND REPORT TO YOUR PHYSICIAN:  1. Abdominal pain or bloating, other than gas cramps.  2. Chest pain.  3. Back pain.  4. Signs of infection such as: chills or fever occurring within 24 hours   after the procedure.  5. Rectal bleeding, which would show as bright red, maroon, or black stools.   (A tablespoon of blood from the rectum is not serious, especially if   hemorrhoids are present.)  6. Vomiting.  7. Weakness or dizziness.  GO DIRECTLY TO THE NEAREST EMERGENCY ROOM IF YOU HAVE ANY OF THE FOLLOWING:      Difficulty breathing              Chills and/or fever over 101 F   Persistent vomiting and/or vomiting blood   Severe abdominal pain   Severe chest pain   Black, tarry stools   Bleeding- more than one  tablespoon   Any other symptom or condition that you feel may need urgent attention  Your doctor recommends these additional instructions:  If any biopsies were taken, your doctors clinic will contact you in 1 to 2   weeks with any results.  - Discharge patient to home.   - Patient has a contact number available for emergencies.  The signs and   symptoms of potential delayed complications were discussed with the   patient.  Return to normal activities tomorrow.  Written discharge   instructions were provided to the patient.   - Resume previous diet.   - Continue present medications.   - Await pathology results.   - Telephone GI clinic for pathology results in 1 week.  For questions, problems or results please call your physician - Greg Leach MD at Work:  [phone].  [facility], EMERGENCY ROOM PHONE NUMBER: [phone]  IF A COMPLICATION OR EMERGENCY SITUATION ARISES AND YOU ARE UNABLE TO REACH   YOUR PHYSICIAN - GO DIRECTLY TO THE EMERGENCY ROOM.  Greg Leach MD  10/30/2018 4:08:50 PM  This report has been verified and signed electronically.  [employer]

## 2018-10-30 NOTE — ANESTHESIA PREPROCEDURE EVALUATION
10/30/2018  Laila Collazo is a 27 y.o., female.    Anesthesia Evaluation    I have reviewed the Patient Summary Reports.    I have reviewed the Nursing Notes.      Review of Systems  Anesthesia Hx:  No problems with previous Anesthesia    Hematology/Oncology:  Hematology Normal   Oncology Normal     EENT/Dental:EENT/Dental Normal   Cardiovascular:  Cardiovascular Normal     Pulmonary:  Pulmonary Normal    Renal/:  Renal/ Normal     Hepatic/GI:   GERD Helicobacter pylori ab+   Musculoskeletal:  Musculoskeletal Normal    Neurological:  Neurology Normal    Endocrine:  Endocrine Normal    Dermatological:  Skin Normal    Psych:  Psychiatric Normal           Physical Exam  General:  Well nourished    Airway/Jaw/Neck:  Airway Findings: Mouth Opening: Normal Tongue: Normal  General Airway Assessment: Adult  Mallampati: I  TM Distance: Normal, at least 6 cm        Eyes/Ears/Nose:  EYES/EARS/NOSE FINDINGS: Normal   Dental:  Dental Findings: In tact   Chest/Lungs:  Chest/Lungs Clear    Heart/Vascular:  Heart Findings: Normal Heart murmur: negative Vascular Findings: Normal    Abdomen:  Abdomen Findings: Normal    Musculoskeletal:  Musculoskeletal Findings: Normal   Skin:  Skin Findings: Normal    Mental Status:  Mental Status Findings: Normal        Anesthesia Plan  Type of Anesthesia, risks & benefits discussed:  Anesthesia Type:  general  Patient's Preference:   Intra-op Monitoring Plan:   Intra-op Monitoring Plan Comments:   Post Op Pain Control Plan:   Post Op Pain Control Plan Comments:   Induction:   IV  Beta Blocker:  Patient is not currently on a Beta-Blocker (No further documentation required).       Informed Consent: Patient understands risks and agrees with Anesthesia plan.  Questions answered. Anesthesia consent signed with patient.  ASA Score: 2     Day of Surgery Review of History & Physical:    H&P  update referred to the surgeon.         Ready For Surgery From Anesthesia Perspective.

## 2018-10-30 NOTE — ANESTHESIA POSTPROCEDURE EVALUATION
"Anesthesia Post Evaluation    Patient: Laila Collazo    Procedure(s) Performed: Procedure(s) (LRB):  EGD (ESOPHAGOGASTRODUODENOSCOPY) (N/A)    Final Anesthesia Type: general  Patient location during evaluation: PACU  Patient participation: Yes- Able to Participate  Level of consciousness: awake and alert  Post-procedure vital signs: reviewed and stable  Pain management: adequate  Airway patency: patent  PONV status at discharge: No PONV  Anesthetic complications: no      Cardiovascular status: blood pressure returned to baseline  Respiratory status: spontaneous ventilation and room air  Hydration status: euvolemic  Follow-up not needed.        Visit Vitals  /73   Pulse 84   Temp 36.7 °C (98.1 °F) (Temporal)   Resp 16   Ht 5' 2" (1.575 m)   Wt 66.7 kg (147 lb)   LMP 10/28/2018   SpO2 100%   Breastfeeding? No   BMI 26.89 kg/m²       Pain/Stefan Score: Pain Assessment Performed: Yes (10/30/2018  4:30 PM)  Presence of Pain: denies (10/30/2018  4:30 PM)  Stefan Score: 10 (10/30/2018  4:30 PM)        "

## 2018-10-30 NOTE — TRANSFER OF CARE
"Anesthesia Transfer of Care Note    Patient: Laila Collazo    Procedure(s) Performed: Procedure(s) (LRB):  EGD (ESOPHAGOGASTRODUODENOSCOPY) (N/A)    Patient location: [facility]    Anesthesia Type: general    Transport from OR: Transported from OR on 2-3 L/min O2 by NC with adequate spontaneous ventilation    Post pain: adequate analgesia    Post assessment: no apparent anesthetic complications and tolerated procedure well    Post vital signs: stable    Level of consciousness: responds to stimulation and sedated    Nausea/Vomiting: no nausea/vomiting    Complications: none    Transfer of care protocol was followed      Last vitals:   Visit Vitals  /61   Pulse 78   Resp 18   Ht 5' 2" (1.575 m)   Wt 66.7 kg (147 lb)   LMP 10/28/2018   SpO2 100%   Breastfeeding? No   BMI 26.89 kg/m²     "

## 2018-10-31 ENCOUNTER — PATIENT MESSAGE (OUTPATIENT)
Dept: GASTROENTEROLOGY | Facility: CLINIC | Age: 28
End: 2018-10-31

## 2018-10-31 DIAGNOSIS — R10.12 LUQ PAIN: Primary | ICD-10-CM

## 2018-11-01 ENCOUNTER — PATIENT MESSAGE (OUTPATIENT)
Dept: GASTROENTEROLOGY | Facility: CLINIC | Age: 28
End: 2018-11-01

## 2018-11-05 ENCOUNTER — TELEPHONE (OUTPATIENT)
Dept: GASTROENTEROLOGY | Facility: CLINIC | Age: 28
End: 2018-11-05

## 2018-11-05 ENCOUNTER — HOSPITAL ENCOUNTER (OUTPATIENT)
Dept: RADIOLOGY | Facility: HOSPITAL | Age: 28
Discharge: HOME | End: 2018-11-05
Attending: PHYSICIAN ASSISTANT
Payer: COMMERCIAL

## 2018-11-05 DIAGNOSIS — R10.12 LUQ PAIN: ICD-10-CM

## 2018-11-05 PROCEDURE — 74177 CT ABD & PELVIS W/CONTRAST: CPT | Mod: TC

## 2018-11-05 PROCEDURE — 74177 CT ABD & PELVIS W/CONTRAST: CPT | Mod: 26,,, | Performed by: RADIOLOGY

## 2018-11-05 PROCEDURE — 25500020 PHARM REV CODE 255: Performed by: PHYSICIAN ASSISTANT

## 2018-11-05 RX ADMIN — IOHEXOL 75 ML: 350 INJECTION, SOLUTION INTRAVENOUS at 02:11

## 2018-11-05 NOTE — TELEPHONE ENCOUNTER
Spoke with patient.  Aware I put in a call to the authorization department and have not heard back.  Asked if she would like to reschedule.  She stated she spoke with someone from [facility] and was told to come in for the CT even though it was not authorized.

## 2018-11-05 NOTE — TELEPHONE ENCOUNTER
----- Message from Melany Blanco sent at 11/5/2018 12:13 PM CST -----  Contact: self - [phone]  [facility] has ct scheduled for today and it has not been authorized yet - please call patient at  [phone]

## 2018-11-06 ENCOUNTER — PATIENT MESSAGE (OUTPATIENT)
Dept: GASTROENTEROLOGY | Facility: CLINIC | Age: 28
End: 2018-11-06

## 2018-11-07 ENCOUNTER — PATIENT MESSAGE (OUTPATIENT)
Dept: GASTROENTEROLOGY | Facility: CLINIC | Age: 28
End: 2018-11-07

## 2018-11-08 ENCOUNTER — PATIENT MESSAGE (OUTPATIENT)
Dept: GASTROENTEROLOGY | Facility: CLINIC | Age: 28
End: 2018-11-08

## 2018-11-08 RX ORDER — OMEPRAZOLE 40 MG/1
40 CAPSULE, DELAYED RELEASE ORAL
Qty: 28 CAPSULE | Refills: 0 | Status: SHIPPED | OUTPATIENT
Start: 2018-11-08 | End: 2019-01-03

## 2018-11-08 RX ORDER — AMOXICILLIN 500 MG/1
1000 TABLET, FILM COATED ORAL EVERY 12 HOURS
Qty: 56 TABLET | Refills: 0 | Status: SHIPPED | OUTPATIENT
Start: 2018-11-08 | End: 2018-11-22

## 2018-11-08 RX ORDER — LEVOFLOXACIN 250 MG/1
250 TABLET ORAL DAILY
Qty: 14 TABLET | Refills: 0 | Status: SHIPPED | OUTPATIENT
Start: 2018-11-08 | End: 2018-11-22

## 2018-11-16 ENCOUNTER — PATIENT MESSAGE (OUTPATIENT)
Dept: GASTROENTEROLOGY | Facility: CLINIC | Age: 28
End: 2018-11-16

## 2018-11-19 ENCOUNTER — TELEPHONE (OUTPATIENT)
Dept: ENDOSCOPY | Facility: HOSPITAL | Age: 28
End: 2018-11-19

## 2018-11-19 NOTE — TELEPHONE ENCOUNTER
----- Message from Zahida Christopher sent at 11/19/2018  9:11 AM CST -----  Contact: Self- [phone]  [facility]- pt called to speak with staff- states while taking the antibiotics she's been having pain in her knees- levoFLOXacin (LEVAQUIN) 250 MG tablet- would like to discuss- please contact pt at [phone]

## 2018-11-19 NOTE — TELEPHONE ENCOUNTER
Spoke with pt.She admits to knee pain since she started the levaquin. Looked up ADRs of levaquin. Muscle pain is <1%. I advised continuing the levaquin since she only has 4 days left. I also advised seeing her sport medicine provider for  evaluation of her knee pain.

## 2018-11-20 ENCOUNTER — OFFICE VISIT (OUTPATIENT)
Dept: SPORTS MEDICINE | Facility: CLINIC | Age: 28
End: 2018-11-20
Payer: COMMERCIAL

## 2018-11-20 VITALS
HEART RATE: 89 BPM | BODY MASS INDEX: 27.05 KG/M2 | HEIGHT: 62 IN | WEIGHT: 147 LBS | SYSTOLIC BLOOD PRESSURE: 111 MMHG | DIASTOLIC BLOOD PRESSURE: 77 MMHG

## 2018-11-20 DIAGNOSIS — M22.8X2 PATELLAR MALTRACKING, LEFT: ICD-10-CM

## 2018-11-20 DIAGNOSIS — M25.561 CHRONIC PAIN OF BOTH KNEES: ICD-10-CM

## 2018-11-20 DIAGNOSIS — G89.29 CHRONIC PAIN OF LEFT KNEE: Primary | ICD-10-CM

## 2018-11-20 DIAGNOSIS — M76.51 PATELLAR TENDINITIS OF BOTH KNEES: ICD-10-CM

## 2018-11-20 DIAGNOSIS — M25.562 CHRONIC PAIN OF LEFT KNEE: Primary | ICD-10-CM

## 2018-11-20 DIAGNOSIS — M25.562 CHRONIC PAIN OF BOTH KNEES: ICD-10-CM

## 2018-11-20 DIAGNOSIS — M22.8X1 PATELLAR MALTRACKING, RIGHT: ICD-10-CM

## 2018-11-20 DIAGNOSIS — M76.52 PATELLAR TENDINITIS OF BOTH KNEES: ICD-10-CM

## 2018-11-20 DIAGNOSIS — G89.29 CHRONIC PAIN OF BOTH KNEES: ICD-10-CM

## 2018-11-20 PROCEDURE — 99214 OFFICE O/P EST MOD 30 MIN: CPT | Mod: S$GLB,,, | Performed by: PHYSICIAN ASSISTANT

## 2018-11-20 PROCEDURE — 99999 PR PBB SHADOW E&M-EST. PATIENT-LVL III: CPT | Mod: PBBFAC,,, | Performed by: PHYSICIAN ASSISTANT

## 2018-11-26 NOTE — PROGRESS NOTES
CC: bilateral knee pain    27 y.o. Female [employer] , who reports anterior and reta-patella knee pain refractory to conservative mgmt. She reports knee pain occurring for 1.5 years. She describes the pain as constant burning, throbbing, and aching. Her pain is worse in the left knee > right knee. I last saw her 5.5 months ago for her knee pain. She did PT over a 2 month period with a home exercise program for 3-4 months. She had improvement of her right knee but her left knee pain has continued. Pain in her left knee has continued and is not improving. Hurts her a lot when walking.     Right knee is tolerable.    Pain is worse with activity but she also has pain when sitting for long periods. She reports not being able to run anymore due to her pain.     She reports that the pain is worse with steps.  It also bothers her at night.    Is affecting ADLs.     No mechanical symptoms, no instability    Review of Systems   Constitution: Negative. Negative for chills, fever and night sweats.   HENT: Negative for congestion and headaches.    Eyes: Negative for blurred vision, left vision loss and right vision loss.   Cardiovascular: Negative for chest pain and syncope.   Respiratory: Negative for cough and shortness of breath.    Endocrine: Negative for polydipsia, polyphagia and polyuria.   Hematologic/Lymphatic: Negative for bleeding problem. Does not bruise/bleed easily.   Skin: Negative for dry skin, itching and rash.   Musculoskeletal: Negative for falls and muscle weakness.   Gastrointestinal: Negative for abdominal pain and bowel incontinence.   Genitourinary: Negative for bladder incontinence and nocturia.   Neurological: Negative for disturbances in coordination, loss of balance and seizures.   Psychiatric/Behavioral: Negative for depression. The patient does not have insomnia.    Allergic/Immunologic: Negative for hives and persistent infections.   All other systems  negative      PAST MEDICAL HISTORY:   Past Medical History:   Diagnosis Date    Endometriosis     treated by GYN at     Helicobacter pylori ab+      PAST SURGICAL HISTORY:   Past Surgical History:   Procedure Laterality Date    EGD (ESOPHAGOGASTRODUODENOSCOPY) N/A 10/30/2018    Performed by Greg Leach MD at [facility] (2ND FLR)    ESOPHAGOGASTRODUODENOSCOPY N/A 10/30/2018    Procedure: EGD (ESOPHAGOGASTRODUODENOSCOPY);  Surgeon: Greg Leach MD;  Location: [facility] (2ND FLR);  Service: Endoscopy;  Laterality: N/A;  ok to schedule per Ikmmy    ESOPHAGOGASTRODUODENOSCOPY (EGD) N/A 12/5/2016    Performed by Lamine Burks MD at [facility] (4TH FLR)    NO PAST SURGERIES      UPPER GASTROINTESTINAL ENDOSCOPY       FAMILY HISTORY:   Family History   Problem Relation Age of Onset    Diabetes Mother     No Known Problems Father     No Known Problems Sister     No Known Problems Brother     Colon cancer Neg Hx     Crohn's disease Neg Hx     Esophageal cancer Neg Hx     Inflammatory bowel disease Neg Hx     Irritable bowel syndrome Neg Hx     Rectal cancer Neg Hx     Stomach cancer Neg Hx     Ulcerative colitis Neg Hx      SOCIAL HISTORY:   Social History     Socioeconomic History    Marital status: Single     Spouse name: Not on file    Number of children: Not on file    Years of education: Not on file    Highest education level: Not on file   Social Needs    Financial resource strain: Not on file    Food insecurity - worry: Not on file    Food insecurity - inability: Not on file    Transportation needs - medical: Not on file    Transportation needs - non-medical: Not on file   Occupational History     Employer: [employer]     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 for 14 days, Disp: 28 capsule, Rfl: 0    pantoprazole (PROTONIX) 20 MG tablet, Take 1 tablet (20 mg total) by mouth 2 (two) times daily before meals. for 14 days, Disp: 28 tablet, Rfl: 0    sucralfate (CARAFATE) 1 gram tablet, Take 1 tablet (1 g total) by mouth 2 (two) times daily. Separate from other medicines by 1 hr, Disp: 60 tablet, Rfl: 1  ALLERGIES:   Review of patient's allergies indicates:   Allergen Reactions    Bactrim ds [sulfamethoxazole-trimethoprim] Nausea And Vomiting    Codeine Rash       VITAL SIGNS: /77   Pulse 89   Ht 5' 2" (1.575 m)   Wt 66.7 kg (147 lb)   LMP 10/28/2018   BMI 26.89 kg/m²      PHYSICAL EXAMINATION    General:  The patient is alert and oriented x 3.  Mood is pleasant.  Observation of ears, eyes and nose reveal no gross abnormalities.  HEENT: NCAT, sclera nonicteric  Lungs: Respirations are equal and unlabored.    bilateral  KNEE EXAMINATION     OBSERVATION / INSPECTION   Gait:   Nonantalgic   Alignment:  Neutral   Scars:   None   Muscle atrophy: Mild  Effusion:  None   Warmth:  None   Discoloration:   none     TENDERNESS / CREPITUS (T / C):          T / C      T / C   Patella   - / + with valgus force bilateral  Lateral joint line   - / -      Peripatellar medial  -  Medial joint line    - / -   Peripatellar lateral + Left        Medial plica   - / -   Patellar tendon + Left  Popliteal fossa  - / -   Quad tendon   -   Gastrocnemius   -   Prepatellar Bursa - / -   Quadricep   -   Tibial tubercle  -  Thigh/hamstring  -   Pes anserine/HS -  Fibula    -   ITB   - / -  Tibia "     -   Tib/fib joint  - / -  LCL    -     MFC   - / -   MCL: Proximal  -    LFC   - / -    Distal   -          ROM: (* = pain)  PASSIVE   ACTIVE    Left :   5 / 0 / 145   5 / 0 / 145     Right :    5 / 0 / 145   5 / 0 / 145    Patellofemoral examination:  See above noted areas of tenderness.   Patella position    Subluxation / dislocation: Centered           Sup. / Inf;   Normal   Crepitus (PF):    Absent   Patellar Mobility:       Medial-lateral:   Normal    Superior-inferior:  Normal    Inferior tilt   Normal    Patellar tendon:  Normal   Lateral tilt:    Normal   J-sign:     None   Patellofemoral grind:   +       MENISCAL SIGNS:     Pain on terminal extension:  -  Pain on terminal flexion:  + left  Bernies maneuver:  -  Squat     + anterior pain left only     LIGAMENT EXAMINATION:  ACL / Lachman:  normal (-1 to 2mm)    PCL-Post.  drawer: normal 0 to 2mm  MCL- Valgus:  normal 0 to 2mm  LCL- Varus:  normal 0 to 2mm  Pivot shift: normal (Equal)   Dial Test: difference c/w other side   At 30° flexion: normal (< 5°)    At 90° flexion: normal (< 5°)   Reverse Pivot Shift:   normal (Equal)     STRENGTH: (* = with pain) PAINFUL SIDE   Quadricep   5/5   Hamstrin/5    EXTREMITY NEURO-VASCULAR EXAMINATION:   Sensation:  Grossly intact to light touch all dermatomal regions.   Motor Function:  Fully intact motor function at hip, knee, foot and ankle    DTRs;  quadriceps and  achilles 2+.  No clonus and downgoing Babinski.    Vascular status:  DP and PT pulses 2+, brisk capillary refill, symmetric.     Other Findings:  + step down bilat  + bridge test    Pes planus noted bilaterally. Worse on left. Medial foot inversion noted on step down test on left.      Xrays:  Xrays of the bilateral knees with flexion were ordered and reviewed by me today. No fracture, subluxation, or other significant bony or joint abnormality is identified. Bony alignment is normal. Joints and soft tissues are unremarkable. Mild lateral  tilt of the bilateral patellas with decrease joint space under lateral facet with flexion.       ASSESSMENT:    1. Bilateral Knee pain, chronic, left ; improved on right  2. Bilateral patella tendinitis  3. Bilateral patella maltracking  4. Bilateral patellofemoral pain syndrome  Bilateral hip abd/core weakness    PLAN:    Discussed diagnosis and Surgical and non-surgical treatment options with patient extensively.     1. Hold off on PT for now.  2. Ice compresses prn pain  3. NSAIDs orn  4. No running at this time. Only closed chained exercises per pain  5. Will try J sleeve knee brace for left knee first and if good results then will get for right knee. She did not tolerate cas pull brace and was fitted with J sleeve that she likes better.   [id number] - Jr Kwan, performed a custom orthotic / brace adjustment, fitting and training with the patient. The patient demonstrated understanding and proper care. This was performed for 15 minutes.  6. Recommended her getting either medium arch shoe lift insert from [employer] or going to therapeutic shoes to be fitted for pes planus inserts.   7. No improvement of left knee. Will get MRI to assess for internal derangement.   8. Will call her with results.      All questions were answered, pt will contact us for questions or concerns in the interim.

## 2018-12-10 ENCOUNTER — OFFICE VISIT (OUTPATIENT)
Dept: GASTROENTEROLOGY | Facility: CLINIC | Age: 28
End: 2018-12-10
Payer: COMMERCIAL

## 2018-12-10 VITALS
WEIGHT: 149 LBS | HEIGHT: 62 IN | BODY MASS INDEX: 27.42 KG/M2 | HEART RATE: 98 BPM | DIASTOLIC BLOOD PRESSURE: 72 MMHG | SYSTOLIC BLOOD PRESSURE: 103 MMHG

## 2018-12-10 DIAGNOSIS — R10.30 LOWER ABDOMINAL PAIN: ICD-10-CM

## 2018-12-10 DIAGNOSIS — R10.33 PERIUMBILICAL PAIN: ICD-10-CM

## 2018-12-10 DIAGNOSIS — A04.8 H. PYLORI INFECTION: Primary | ICD-10-CM

## 2018-12-10 DIAGNOSIS — R10.13 EPIGASTRIC PAIN: ICD-10-CM

## 2018-12-10 PROCEDURE — 99214 OFFICE O/P EST MOD 30 MIN: CPT | Mod: S$GLB,,, | Performed by: PHYSICIAN ASSISTANT

## 2018-12-10 PROCEDURE — 99999 PR PBB SHADOW E&M-EST. PATIENT-LVL V: CPT | Mod: PBBFAC,,, | Performed by: PHYSICIAN ASSISTANT

## 2018-12-10 NOTE — PROGRESS NOTES
[facility] Consultation Note    Reason for Consult:  The primary encounter diagnosis was H. pylori infection. Diagnoses of Epigastric pain, Periumbilical pain, and Lower abdominal pain were also pertinent to this visit.    PCP:   Melany Laughlin MD:  No referring provider defined for this encounter.    HPI:  This is a 27 y.o. female here to follow up on her abdominal pain.  10/30/18 EGD -gastritis, HP positive  11/5/18 CT scan was unrevealing  I prescribed amoxicillin and levaquin    Also treated in 12/2016 for H. Pylori with tetrocycline, Flagyl, pepto and PPI  Eradication was not confirmed  4/2016 treated for HP with amoxicillin and biaxin but this made her nauseous and she could not complete the regimen    Interval Hx  She admits to completing antibiotics  Not feeling much better  Having periumbilical and lower abdominal discomfort  She did not start the carafate as I advised  No longer having constipation  Vaginal US - normal  Does have endometriosis    10/2018 visit notes  Location- LUQ  Onset- 1 month  Palliative/Worse- worse with sleeping on her stomach  Quality- spasm  Radiation-no  Severity-typically mild, increases to moderate  Timing- constant, increases    ROS:  Constitutional: No fevers, chills, No weight loss  ENT: No allergies  CV: No chest pain  Pulm: No cough, No shortness of breath  Ophtho: No vision changes  GI: see HPI  Derm: No rash  Heme: No lymphadenopathy, No bruising  MSK: No arthritis  : No dysuria, No hematuria  Endo: No hot or cold intolerance  Neuro: No syncope, No seizure  Psych: No anxiety, No depression    Medical History:  has a past medical history of Endometriosis and Helicobacter pylori ab+.    Surgical History:  has a past surgical history that includes No past surgeries; Upper gastrointestinal endoscopy; EGD (ESOPHAGOGASTRODUODENOSCOPY) (N/A, 10/30/2018); and ESOPHAGOGASTRODUODENOSCOPY (EGD) (N/A, 12/5/2016).    Family History: family history  "includes Diabetes in her mother; No Known Problems in her brother, father, and sister..     Social History:  reports that  has never smoked. she has never used smokeless tobacco. She reports that she does not drink alcohol or use drugs.    Review of patient's allergies indicates:  No Known Allergies    Current Outpatient Medications on File Prior to Visit   Medication Sig Dispense Refill    OGESTREL, 28, 0.5-50 mg-mcg per tablet TK 1 T PO QD ON A CONTINUOUS  BASIS WITH NO PLACEBO TABS  9    omeprazole (PRILOSEC) 40 MG capsule Take 1 capsule (40 mg total) by mouth 2 (two) times daily before meals. for 14 days 28 capsule 0    pantoprazole (PROTONIX) 20 MG tablet Take 1 tablet (20 mg total) by mouth 2 (two) times daily before meals. for 14 days 28 tablet 0    sucralfate (CARAFATE) 1 gram tablet Take 1 tablet (1 g total) by mouth 2 (two) times daily. Separate from other medicines by 1 hr 60 tablet 1     No current facility-administered medications on file prior to visit.          Objective Findings:    Vital Signs:  /72   Pulse 98   Ht 5' 2" (1.575 m)   Wt 67.6 kg (149 lb)   BMI 27.25 kg/m²   Body mass index is 27.25 kg/m².    Physical Exam:  General Appearance: Well appearing in no acute distress  Head:   Normocephalic, without obvious abnormality  Eyes:    No scleral icterus  ENT: Neck supple, Lips, mucosa, and tongue normal  Lungs: CTA bilaterally in anterior and posterior fields, no wheezes, no crackles.  Heart:  Regular rate and rhythm, S1, S2 normal, no murmurs heard  Abdomen: Soft,moderate LUQ, periumbilical, and diffuse lower abdominal tenderness, mild guarding, no rebound tenderness and  non distended with positive bowel sounds in all four quadrants.   Extremities: 2+ pulses, no edema  Skin: No rash  Neurologic: AAO x 3      Labs:  Lab Results   Component Value Date    WBC 7.18 10/29/2018    HGB 14.1 10/29/2018    HCT 44.0 10/29/2018     10/29/2018    CHOL 182 05/02/2018    TRIG 46 " 05/02/2018    HDL 40 05/02/2018    ALT 13 10/29/2018    AST 17 10/29/2018     10/29/2018    K 4.4 10/29/2018     10/29/2018    CREATININE 0.8 10/29/2018    BUN 9 10/29/2018    CO2 25 10/29/2018    TSH 0.892 03/01/2017    HGBA1C 5.2 03/01/2017       Imaging:    Endoscopy:     12/2016 EGD revealed erosive gastritis, + H. Pylori   Assessment:  1. H. pylori infection    2. Epigastric pain    3. Periumbilical pain    4. Lower abdominal pain      26yo F presents with LUQ, periumbilical, and lower abdomin abdominal pain x 3 months. She has been treated for H. Pylori on 3 occasions. Need to confirm eradication of last treatment.     She does not feel any better after treatment. Having significant abdominal tenderness. CT was unrevealing. No anemia. Her abdominal pain does seen out of proportion to H. Pylori. Her endometriosis may be playing some role.     Recommendations:  1. Start carafate 1g BID    2. Schedule colonoscopy with visualization of the terminal ileum to rule out IBD and inflammation    3. ABD US to rule out gallstones    4. Continue probiotics    5. H. Pylori Stool Ag after Dec 20 to confirm eradication. She says she has the orders at home.    Consider UA  Follow-up in about 2 months (around 2/10/2019). with MD      Order summary:  Orders Placed This Encounter    US Abdomen Complete    Case request GI: COLONOSCOPY         Thank you so much for allowing me to participate in the care of Laila CORY Pedraza PA-C

## 2018-12-10 NOTE — PATIENT INSTRUCTIONS
Wait until Dec 20th to turn in your H. Pylori stool test    Start the carafate    Continue taking your probiotic

## 2018-12-11 ENCOUNTER — TELEPHONE (OUTPATIENT)
Dept: GASTROENTEROLOGY | Facility: CLINIC | Age: 28
End: 2018-12-11

## 2018-12-11 DIAGNOSIS — Z12.11 SPECIAL SCREENING FOR MALIGNANT NEOPLASMS, COLON: Primary | ICD-10-CM

## 2018-12-11 RX ORDER — SODIUM, POTASSIUM,MAG SULFATES 17.5-3.13G
1 SOLUTION, RECONSTITUTED, ORAL ORAL DAILY
Qty: 1 KIT | Refills: 0 | Status: SHIPPED | OUTPATIENT
Start: 2018-12-11 | End: 2018-12-13

## 2018-12-12 ENCOUNTER — ANESTHESIA EVENT (OUTPATIENT)
Dept: ENDOSCOPY | Facility: HOSPITAL | Age: 28
End: 2018-12-12
Payer: COMMERCIAL

## 2018-12-12 ENCOUNTER — PATIENT MESSAGE (OUTPATIENT)
Dept: GASTROENTEROLOGY | Facility: CLINIC | Age: 28
End: 2018-12-12

## 2018-12-12 ENCOUNTER — TELEPHONE (OUTPATIENT)
Dept: GASTROENTEROLOGY | Facility: CLINIC | Age: 28
End: 2018-12-12

## 2018-12-12 NOTE — TELEPHONE ENCOUNTER
Ma contact pt to schedule appt with Dr. Larry Pedraza     Pt didn't answer left detail message to return call

## 2018-12-13 ENCOUNTER — HOSPITAL ENCOUNTER (OUTPATIENT)
Facility: HOSPITAL | Age: 28
Discharge: HOME | End: 2018-12-13
Attending: INTERNAL MEDICINE | Admitting: INTERNAL MEDICINE
Payer: COMMERCIAL

## 2018-12-13 ENCOUNTER — ANESTHESIA (OUTPATIENT)
Dept: ENDOSCOPY | Facility: HOSPITAL | Age: 28
End: 2018-12-13
Payer: COMMERCIAL

## 2018-12-13 VITALS
HEART RATE: 93 BPM | DIASTOLIC BLOOD PRESSURE: 57 MMHG | RESPIRATION RATE: 20 BRPM | OXYGEN SATURATION: 96 % | HEIGHT: 62 IN | BODY MASS INDEX: 27.42 KG/M2 | TEMPERATURE: 98 F | WEIGHT: 149 LBS | SYSTOLIC BLOOD PRESSURE: 131 MMHG

## 2018-12-13 DIAGNOSIS — R10.12 LUQ ABDOMINAL PAIN: Primary | ICD-10-CM

## 2018-12-13 DIAGNOSIS — R10.30 LOWER ABDOMINAL PAIN: ICD-10-CM

## 2018-12-13 LAB
B-HCG UR QL: NEGATIVE
CTP QC/QA: YES

## 2018-12-13 PROCEDURE — 25000003 PHARM REV CODE 250: Performed by: INTERNAL MEDICINE

## 2018-12-13 PROCEDURE — 37000008 HC ANESTHESIA 1ST 15 MINUTES: Performed by: INTERNAL MEDICINE

## 2018-12-13 PROCEDURE — 63600175 PHARM REV CODE 636 W HCPCS: Performed by: NURSE ANESTHETIST, CERTIFIED REGISTERED

## 2018-12-13 PROCEDURE — 81025 URINE PREGNANCY TEST: CPT | Performed by: INTERNAL MEDICINE

## 2018-12-13 PROCEDURE — 37000009 HC ANESTHESIA EA ADD 15 MINS: Performed by: INTERNAL MEDICINE

## 2018-12-13 PROCEDURE — 45378 DIAGNOSTIC COLONOSCOPY: CPT | Performed by: INTERNAL MEDICINE

## 2018-12-13 PROCEDURE — E9220 PRA ENDO ANESTHESIA: HCPCS | Mod: ,,, | Performed by: NURSE ANESTHETIST, CERTIFIED REGISTERED

## 2018-12-13 PROCEDURE — 45378 DIAGNOSTIC COLONOSCOPY: CPT | Mod: ,,, | Performed by: INTERNAL MEDICINE

## 2018-12-13 RX ORDER — PROPOFOL 10 MG/ML
VIAL (ML) INTRAVENOUS
Status: DISCONTINUED | OUTPATIENT
Start: 2018-12-13 | End: 2018-12-13

## 2018-12-13 RX ORDER — SODIUM CHLORIDE 9 MG/ML
INJECTION, SOLUTION INTRAVENOUS CONTINUOUS
Status: DISCONTINUED | OUTPATIENT
Start: 2018-12-13 | End: 2018-12-13 | Stop reason: HOSPADM

## 2018-12-13 RX ORDER — LIDOCAINE HCL/PF 100 MG/5ML
SYRINGE (ML) INTRAVENOUS
Status: DISCONTINUED | OUTPATIENT
Start: 2018-12-13 | End: 2018-12-13

## 2018-12-13 RX ORDER — SODIUM CHLORIDE 0.9 % (FLUSH) 0.9 %
3 SYRINGE (ML) INJECTION
Status: DISCONTINUED | OUTPATIENT
Start: 2018-12-13 | End: 2018-12-13 | Stop reason: HOSPADM

## 2018-12-13 RX ORDER — PROPOFOL 10 MG/ML
VIAL (ML) INTRAVENOUS CONTINUOUS PRN
Status: DISCONTINUED | OUTPATIENT
Start: 2018-12-13 | End: 2018-12-13

## 2018-12-13 RX ADMIN — SODIUM CHLORIDE: 0.9 INJECTION, SOLUTION INTRAVENOUS at 09:12

## 2018-12-13 RX ADMIN — PROPOFOL 100 MG: 10 INJECTION, EMULSION INTRAVENOUS at 09:12

## 2018-12-13 RX ADMIN — LIDOCAINE HYDROCHLORIDE 50 MG: 20 INJECTION, SOLUTION INTRAVENOUS at 09:12

## 2018-12-13 RX ADMIN — PROPOFOL 200 MCG/KG/MIN: 10 INJECTION, EMULSION INTRAVENOUS at 09:12

## 2018-12-13 NOTE — [employer] PATIENT INSTRUCTIONS
Discharge Summary/Instructions after an Endoscopic Procedure  Patient Name: Laila Collazo  Patient MRN: [id number]  Patient YOB: 1990 Thursday, December 13, 2018  Micki Gross MD  RESTRICTIONS:  During your procedure today, you received medications for sedation.  These   medications may affect your judgment, balance and coordination.  Therefore,   for 24 hours, you have the following restrictions:   - DO NOT drive a car, operate machinery, make legal/financial decisions,   sign important papers or drink alcohol.    ACTIVITY:  Today: no heavy lifting, straining or running due to procedural   sedation/anesthesia.  The following day: return to full activity including work.  DIET:  Eat and drink normally unless instructed otherwise.     TREATMENT FOR COMMON SIDE EFFECTS:  - Mild abdominal pain, nausea, belching, bloating or excessive gas:  rest,   eat lightly and use a heating pad.  - Sore Throat: treat with throat lozenges and/or gargle with warm salt   water.  - Because air was used during the procedure, expelling large amounts of air   from your rectum or belching is normal.  - If a bowel prep was taken, you may not have a bowel movement for 1-3 days.    This is normal.  SYMPTOMS TO WATCH FOR AND REPORT TO YOUR PHYSICIAN:  1. Abdominal pain or bloating, other than gas cramps.  2. Chest pain.  3. Back pain.  4. Signs of infection such as: chills or fever occurring within 24 hours   after the procedure.  5. Rectal bleeding, which would show as bright red, maroon, or black stools.   (A tablespoon of blood from the rectum is not serious, especially if   hemorrhoids are present.)  6. Vomiting.  7. Weakness or dizziness.  GO DIRECTLY TO THE NEAREST EMERGENCY ROOM IF YOU HAVE ANY OF THE FOLLOWING:      Difficulty breathing              Chills and/or fever over 101 F   Persistent vomiting and/or vomiting blood   Severe abdominal pain   Severe chest pain   Black, tarry stools   Bleeding- more than one  tablespoon   Any other symptom or condition that you feel may need urgent attention  Your doctor recommends these additional instructions:  If any biopsies were taken, your doctors clinic will contact you in 1 to 2   weeks with any results.  - Discharge patient to home.   - Patient has a contact number available for emergencies.  The signs and   symptoms of potential delayed complications were discussed with the   patient.  Return to normal activities tomorrow.  Written discharge   instructions were provided to the patient.   - Resume previous diet.   - Continue present medications.   - Return to referring physician.   - Repeat colonoscopy at age 50 yo for screening purposes.  For questions, problems or results please call your physician - Micki Gross MD at Work:  [phone].  [facility], EMERGENCY ROOM PHONE NUMBER: [phone]  IF A COMPLICATION OR EMERGENCY SITUATION ARISES AND YOU ARE UNABLE TO REACH   YOUR PHYSICIAN - GO DIRECTLY TO THE EMERGENCY ROOM.  Micki Gross MD  12/13/2018 10:13:43 AM  This report has been verified and signed electronically.  [employer]

## 2018-12-13 NOTE — H&P
Short Stay Endoscopy History and Physical    PCP - Melany Malhotra MD  Referring Physician - Mary Jo Pedraza PA-C  [address] [place] [address]    Procedure - Colonoscopy  ASA - per anesthesia  Mallampati - per anesthesia  History of Anesthesia problems - no  Family history Anesthesia problems -  no   Plan of anesthesia - General    HPI  27 y.o. female  Reason for procedure:   Abdominal pain    ROS:  Constitutional: No fevers, chills, No weight loss  CV: No chest pain  Pulm: No cough, No shortness of breath  GI: see HPI    Medical History:  has a past medical history of Endometriosis and Helicobacter pylori ab+.    Surgical History:  has a past surgical history that includes No past surgeries; Upper gastrointestinal endoscopy; EGD (ESOPHAGOGASTRODUODENOSCOPY) (N/A, 10/30/2018); and ESOPHAGOGASTRODUODENOSCOPY (EGD) (N/A, 12/5/2016).    Family History: family history includes Diabetes in her mother; No Known Problems in her brother, father, and sister.. Otherwise no colon cancer, inflammatory bowel disease, or GI malignancies.    Social History:  reports that  has never smoked. she has never used smokeless tobacco. She reports that she does not drink alcohol or use drugs.    Review of patient's allergies indicates:   Allergen Reactions    Bactrim ds [sulfamethoxazole-trimethoprim] Nausea And Vomiting    Nsaids (non-steroidal anti-inflammatory drug)      D/t h/o PUD and H pylori    Codeine Rash       Medications:   Medications Prior to Admission   Medication Sig Dispense Refill Last Dose    OGESTREL, 28, 0.5-50 mg-mcg per tablet TK 1 T PO QD ON A CONTINUOUS  BASIS WITH NO PLACEBO TABS  9 12/12/2018 at Unknown time    sodium,potassium,mag sulfates (SUPREP BOWEL PREP KIT) 17.5-3.13-1.6 gram SolR Mix the contents and take as directed by clinic 1 kit 0 12/13/2018 at Unknown time    omeprazole (PRILOSEC) 40 MG capsule Take 1 capsule (40 mg total) by mouth 2 (two) times daily before meals. for 14 days 28  capsule 0     pantoprazole (PROTONIX) 20 MG tablet Take 1 tablet (20 mg total) by mouth 2 (two) times daily before meals. for 14 days 28 tablet 0 10/28/2018    sucralfate (CARAFATE) 1 gram tablet Take 1 tablet (1 g total) by mouth 2 (two) times daily. Separate from other medicines by 1 hr 60 tablet 1 More than a month at Unknown time       Physical Exam:    Vital Signs:   Vitals:    12/13/18 0943   BP: 120/77   Pulse: 104   Resp: 16   Temp: 98 °F (36.7 °C)       General Appearance: Well appearing in no acute distress  Abdomen: Soft, non tender, non distended with normal bowel sounds, no masses    Labs:  Lab Results   Component Value Date    WBC 7.18 10/29/2018    HGB 14.1 10/29/2018    HCT 44.0 10/29/2018     10/29/2018    CHOL 182 05/02/2018    TRIG 46 05/02/2018    HDL 40 05/02/2018    ALT 13 10/29/2018    AST 17 10/29/2018     10/29/2018    K 4.4 10/29/2018     10/29/2018    CREATININE 0.8 10/29/2018    BUN 9 10/29/2018    CO2 25 10/29/2018    TSH 0.892 03/01/2017    HGBA1C 5.2 03/01/2017       I have explained the risks and benefits of this endoscopic procedure to the patient including but not limited to bleeding, inflammation, infection, perforation, and death.      Micki Gross MD

## 2018-12-13 NOTE — TRANSFER OF CARE
"Anesthesia Transfer of Care Note    Patient: Laila Collazo    Procedure(s) Performed: Procedure(s) (LRB):  COLONOSCOPY (N/A)    Patient location: PACU    Anesthesia Type: general    Transport from OR: Transported from OR on 6-10 L/min O2 by face mask with adequate spontaneous ventilation    Post pain: adequate analgesia    Post assessment: no apparent anesthetic complications    Post vital signs: stable    Level of consciousness: sedated    Nausea/Vomiting: no nausea/vomiting    Complications: none    Transfer of care protocol was followed      Last vitals:   Visit Vitals  /77 (BP Location: Left arm, Patient Position: Sitting)   Pulse 104   Temp 36.7 °C (98 °F) (Temporal)   Resp 16   Ht 5' 2" (1.575 m)   Wt 67.6 kg (149 lb)   LMP 10/31/2018   SpO2 100%   Breastfeeding? No   BMI 27.25 kg/m²     "

## 2018-12-13 NOTE — DISCHARGE INSTRUCTIONS
Colonoscopy     A camera attached to a flexible tube with a viewing lens is used to take video pictures.     Colonoscopy is a test to view the inside of your lower digestive tract (colon and rectum). Sometimes it can show the last part of the small intestine (ileum). During the test, small pieces of tissue may be removed for testing. This is called a biopsy. Small growths, such as polyps, may also be removed.   Why is colonoscopy done?  The test is done to help look for colon cancer. And it can help find the source of abdominal pain, bleeding, and changes in bowel habits. It may be needed once a year, depending on factors such as your:  · Age  · Health history  · Family health history  · Symptoms  · Results from any prior colonoscopy  Risks and possible complications  These include:  · Bleeding               · A puncture or tear in the colon   · Risks of anesthesia  · A cancer lesion not being seen  Getting ready   To prepare for the test:  · Talk with your healthcare provider about the risks of the test (see below). Also ask your healthcare provider about alternatives to the test.  · Tell your healthcare provider about any medicines you take. Also tell him or her about any health conditions you may have.  · Make sure your rectum and colon are empty for the test. Follow the diet and bowel prep instructions exactly. If you dont, the test may need to be rescheduled.  · Plan for a friend or family member to drive you home after the test.     Colonoscopy provides an inside view of the entire colon.     You may discuss the results with your doctor right away or at a future visit.  During the test   The test is usually done in the hospital on an outpatient basis. This means you go home the same day. The procedure takes about 30 minutes. During that time:  · You are given relaxing (sedating) medicine through an IV line. You may be drowsy, or fully asleep.  · The healthcare provider will first give you a physical exam to  check for anal and rectal problems.  · Then the anus is lubricated and the scope inserted.  · If you are awake, you may have a feeling similar to needing to have a bowel movement. You may also feel pressure as air is pumped into the colon. Its OK to pass gas during the procedure.  · Biopsy, polyp removal, or other treatments may be done during the test.  After the test   You may have gas right after the test. It can help to try to pass it to help prevent later bloating. Your healthcare provider may discuss the results with you right away. Or you may need to schedule a follow-up visit to talk about the results. After the test, you can go back to your normal eating and other activities. You may be tired from the sedation and need to rest for a few hours.  Date Last Reviewed: 11/1/2016 © 2000-2017 [employer]. [address] [place] [address], [place] [address]. All rights reserved. This information is not intended as a substitute for professional medical care. Always follow your healthcare professional's instructions.

## 2018-12-13 NOTE — PLAN OF CARE
Pt verbalized understanding of plan of care.  Pt states ok to share information with her parents.

## 2018-12-13 NOTE — ANESTHESIA PREPROCEDURE EVALUATION
12/13/2018  Laila Collazo is a 27 y.o., female.    Anesthesia Evaluation    I have reviewed the Patient Summary Reports.     I have reviewed the Medications.     Review of Systems  Hematology/Oncology:  Hematology Normal   Oncology Normal     EENT/Dental:EENT/Dental Normal   Cardiovascular:  Cardiovascular Normal     Pulmonary:  Pulmonary Normal    Renal/:  Renal/ Normal     Hepatic/GI:   GERD (H. Pylori)    Musculoskeletal:  Musculoskeletal Normal    Neurological:  Neurology Normal    Endocrine:  Endocrine Normal    Dermatological:  Skin Normal        Physical Exam  General:  Well nourished    Airway/Jaw/Neck:  Airway Findings: Mouth Opening: Normal Mallampati: I        Eyes/Ears/Nose:  EYES/EARS/NOSE FINDINGS: Normal   Dental:  Dental Findings: In tact   Chest/Lungs:  Chest/Lungs Clear    Heart/Vascular:  Heart Findings: Normal Heart murmur: negative Vascular Findings: Normal    Abdomen:  Abdomen Findings: Normal    Musculoskeletal:  Musculoskeletal Findings: Normal   Skin:  Skin Findings: Normal    Mental Status:  Mental Status Findings: Normal        Anesthesia Plan  Type of Anesthesia, risks & benefits discussed:  Anesthesia Type:  general  Patient's Preference: general  Intra-op Monitoring Plan: standard ASA monitors  Intra-op Monitoring Plan Comments:   Post Op Pain Control Plan:   Post Op Pain Control Plan Comments:   Induction:   IV  Beta Blocker:  Patient is not currently on a Beta-Blocker (No further documentation required).       Informed Consent: Patient understands risks and agrees with Anesthesia plan.  Questions answered. Anesthesia consent signed with patient.  ASA Score: 1     Day of Surgery Review of History & Physical:  There are no significant changes.          Ready For Surgery From Anesthesia Perspective.

## 2018-12-13 NOTE — ANESTHESIA POSTPROCEDURE EVALUATION
"Anesthesia Post Evaluation    Patient: Laila Collazo    Procedure(s) Performed: Procedure(s) (LRB):  COLONOSCOPY (N/A)    Final Anesthesia Type: general  Patient location during evaluation: PACU  Patient participation: Yes- Able to Participate  Level of consciousness: awake and alert  Post-procedure vital signs: reviewed and stable  Pain management: adequate  Airway patency: patent  PONV status at discharge: No PONV  Anesthetic complications: no      Cardiovascular status: blood pressure returned to baseline  Respiratory status: unassisted, room air and spontaneous ventilation  Hydration status: euvolemic  Follow-up not needed.        Visit Vitals  BP (!) 131/57   Pulse 93   Temp 36.7 °C (98.1 °F)   Resp 20   Ht 5' 2" (1.575 m)   Wt 67.6 kg (149 lb)   LMP 10/31/2018   SpO2 96%   Breastfeeding? No   BMI 27.25 kg/m²       Pain/Stefan Score: Stefan Score: 10 (12/13/2018 10:45 AM)        "

## 2018-12-17 ENCOUNTER — OFFICE VISIT (OUTPATIENT)
Dept: INTERNAL MEDICINE | Facility: CLINIC | Age: 28
End: 2018-12-17
Payer: COMMERCIAL

## 2018-12-17 VITALS
HEART RATE: 110 BPM | WEIGHT: 149.69 LBS | TEMPERATURE: 99 F | SYSTOLIC BLOOD PRESSURE: 100 MMHG | BODY MASS INDEX: 27.55 KG/M2 | OXYGEN SATURATION: 98 % | DIASTOLIC BLOOD PRESSURE: 70 MMHG | HEIGHT: 62 IN

## 2018-12-17 DIAGNOSIS — R11.10 VOMITING, INTRACTABILITY OF VOMITING NOT SPECIFIED, PRESENCE OF NAUSEA NOT SPECIFIED, UNSPECIFIED VOMITING TYPE: ICD-10-CM

## 2018-12-17 DIAGNOSIS — J02.9 PHARYNGITIS, UNSPECIFIED ETIOLOGY: Primary | ICD-10-CM

## 2018-12-17 PROCEDURE — 87147 CULTURE TYPE IMMUNOLOGIC: CPT

## 2018-12-17 PROCEDURE — 99214 OFFICE O/P EST MOD 30 MIN: CPT | Mod: S$GLB,,, | Performed by: NURSE PRACTITIONER

## 2018-12-17 PROCEDURE — 99999 PR PBB SHADOW E&M-EST. PATIENT-LVL IV: CPT | Mod: PBBFAC,,, | Performed by: NURSE PRACTITIONER

## 2018-12-17 PROCEDURE — 87070 CULTURE OTHR SPECIMN AEROBIC: CPT

## 2018-12-17 RX ORDER — ONDANSETRON 4 MG/1
8 TABLET, ORALLY DISINTEGRATING ORAL EVERY 12 HOURS PRN
Qty: 15 TABLET | Refills: 0 | Status: SHIPPED | OUTPATIENT
Start: 2018-12-17 | End: 2019-01-03

## 2018-12-17 NOTE — PROGRESS NOTES
Subjective:       Patient ID: Laila Collazo is a 27 y.o. female.    Chief Complaint: Fever; Sore Throat; and Headache    Disclaimer: This note has been generated using voice-recognition software. There may be typographical errors that have been missed during proof-reading  Pt of Dr Malhotra here today complaining of sore throat started Friday and fever started Saturday.  T-max 100° 2 at home.  Patient states she vomited last night x2 this was not posttussive.  Patient denies any diarrhea or abdominal pain. Patient is tolerating p.o. fluids at home.  Patient states cough is nonproductive, started yesterday.  Patient has been taking DayQuil at home.      Fever    Associated symptoms include coughing, headaches, a sore throat and vomiting. Pertinent negatives include no abdominal pain, chest pain, congestion, diarrhea, nausea, rash or urinary pain.   Sore Throat    Associated symptoms include coughing, headaches and vomiting. Pertinent negatives include no abdominal pain, congestion, diarrhea, drooling, neck pain, shortness of breath or trouble swallowing.   Headache    Associated symptoms include coughing, a fever, a sore throat and vomiting. Pertinent negatives include no abdominal pain, nausea, neck pain, rhinorrhea or tinnitus.     Review of Systems   Constitutional: Positive for fever. Negative for chills, diaphoresis and fatigue.   HENT: Positive for sore throat. Negative for congestion, drooling, postnasal drip, rhinorrhea, tinnitus, trouble swallowing and voice change.    Respiratory: Positive for cough. Negative for chest tightness and shortness of breath.    Cardiovascular: Negative for chest pain and palpitations.   Gastrointestinal: Positive for vomiting. Negative for abdominal pain, constipation, diarrhea and nausea.   Genitourinary: Negative for difficulty urinating and dysuria.   Musculoskeletal: Negative for arthralgias, myalgias, neck pain and neck stiffness.   Skin: Negative for rash.   Neurological:  Positive for headaches.   Hematological: Negative for adenopathy. Does not bruise/bleed easily.   Psychiatric/Behavioral: Negative for sleep disturbance.         Past Medical History:   Diagnosis Date    Endometriosis     treated by GYN at     Helicobacter pylori ab+      Past Surgical History:   Procedure Laterality Date    COLONOSCOPY N/A 12/13/2018    Procedure: COLONOSCOPY;  Surgeon: Micki Gross MD;  Location: [facility] (4TH FLR);  Service: Endoscopy;  Laterality: N/A;  preferrably in Dec 2018, CRS ok if needed.    COLONOSCOPY N/A 12/13/2018    Performed by Micki Gross MD at [facility] (4TH FLR)    EGD (ESOPHAGOGASTRODUODENOSCOPY) N/A 10/30/2018    Performed by Greg Leach MD at [facility] (2ND FLR)    ESOPHAGOGASTRODUODENOSCOPY N/A 10/30/2018    Procedure: EGD (ESOPHAGOGASTRODUODENOSCOPY);  Surgeon: Greg Leach MD;  Location: [facility] (2ND FLR);  Service: Endoscopy;  Laterality: N/A;  ok to schedule per Kimmy    ESOPHAGOGASTRODUODENOSCOPY (EGD) N/A 12/5/2016    Performed by Lamine Burks MD at [facility] (4TH FLR)    NO PAST SURGERIES      UPPER GASTROINTESTINAL ENDOSCOPY       Social History     Social History Narrative    Not on file     Family History   Problem Relation Age of Onset    Diabetes Mother     No Known Problems Father     No Known Problems Sister     No Known Problems Brother     Colon cancer Neg Hx     Crohn's disease Neg Hx     Esophageal cancer Neg Hx     Inflammatory bowel disease Neg Hx     Irritable bowel syndrome Neg Hx     Rectal cancer Neg Hx     Stomach cancer Neg Hx     Ulcerative colitis Neg Hx      Outpatient Encounter Medications as of 12/17/2018   Medication Sig Dispense Refill    OGESTREL, 28, 0.5-50 mg-mcg per tablet TK 1 T PO QD ON A CONTINUOUS  BASIS WITH NO PLACEBO TABS  9    sucralfate (CARAFATE) 1 gram tablet Take 1 tablet (1 g total) by mouth 2 (two) times daily. Separate from other medicines by 1 hr 60 tablet 1     "omeprazole (PRILOSEC) 40 MG capsule Take 1 capsule (40 mg total) by mouth 2 (two) times daily before meals. for 14 days 28 capsule 0    ondansetron (ZOFRAN-ODT) 4 MG TbDL Take 2 tablets (8 mg total) by mouth every 12 (twelve) hours as needed (nausea/ vomiting). 15 tablet 0    pantoprazole (PROTONIX) 20 MG tablet Take 1 tablet (20 mg total) by mouth 2 (two) times daily before meals. for 14 days 28 tablet 0     No facility-administered encounter medications on file as of 12/17/2018.      Last 3 sets of Vitals  Vitals - 1 value per visit 12/10/2018 12/13/2018 12/17/2018   SYSTOLIC 103 131 100   DIASTOLIC 72 57 70   PULSE 98 93 110   TEMPERATURE - 98.1 98.5   RESPIRATIONS - 20 -   SPO2 - 96 98   Weight (lb) 149 149 149.69   Weight (kg) 67.586 67.586 67.9   HEIGHT 5' 2" 5' 2" 5' 2"   BODY MASS INDEX 27.25 27.25 27.38   VISIT REPORT - - -   Pain Score  0 - 6         Objective:      Physical Exam   Constitutional: She is oriented to person, place, and time. She appears well-developed and well-nourished. No distress.   HENT:   Head: Normocephalic and atraumatic.   Right Ear: External ear normal.   Left Ear: External ear normal.   Mouth/Throat: Uvula is midline and mucous membranes are normal. Posterior oropharyngeal erythema present. No oropharyngeal exudate, posterior oropharyngeal edema or tonsillar abscesses. No tonsillar exudate.   Eyes: Conjunctivae are normal. Pupils are equal, round, and reactive to light. Right eye exhibits no discharge. Left eye exhibits no discharge.   Neck: Normal range of motion. Neck supple.   Cardiovascular: Normal rate, regular rhythm, normal heart sounds and intact distal pulses.   Pulmonary/Chest: Effort normal and breath sounds normal. No stridor. No respiratory distress. She has no wheezes. She has no rales. She exhibits no tenderness.   Abdominal: Soft.   Lymphadenopathy:     She has cervical adenopathy.   Neurological: She is alert and oriented to person, place, and time.   Skin: Skin " is warm and dry. Capillary refill takes less than 2 seconds. No rash noted. She is not diaphoretic. No erythema. No pallor.   Psychiatric: She has a normal mood and affect. Her behavior is normal. Judgment and thought content normal.   Nursing note and vitals reviewed.          Lab Results   Component Value Date    WBC 7.18 10/29/2018    RBC 4.79 10/29/2018    HGB 14.1 10/29/2018    HCT 44.0 10/29/2018    MCV 92 10/29/2018    MCH 29.4 10/29/2018    MCHC 32.0 10/29/2018    RDW 13.2 10/29/2018     10/29/2018    MPV 10.8 10/29/2018    GRAN 4.6 10/29/2018    GRAN 63.8 10/29/2018    LYMPH 2.1 10/29/2018    LYMPH 28.7 10/29/2018    MONO 0.4 10/29/2018    MONO 5.7 10/29/2018    EOS 0.1 10/29/2018    BASO 0.05 10/29/2018    EOSINOPHIL 0.8 10/29/2018    BASOPHIL 0.7 10/29/2018     Lab Results   Component Value Date    WBC 7.18 10/29/2018    HGB 14.1 10/29/2018    HCT 44.0 10/29/2018     10/29/2018    CHOL 182 05/02/2018    TRIG 46 05/02/2018    HDL 40 05/02/2018    ALT 13 10/29/2018    AST 17 10/29/2018     10/29/2018    K 4.4 10/29/2018     10/29/2018    CREATININE 0.8 10/29/2018    BUN 9 10/29/2018    CO2 25 10/29/2018    TSH 0.892 03/01/2017    HGBA1C 5.2 03/01/2017       Assessment:       1. Pharyngitis, unspecified etiology    2. Vomiting, intractability of vomiting not specified, presence of nausea not specified, unspecified vomiting type        Plan:       Will contact pt via [facility] with her test results  Home care discussed  Pt advised to wear a mask at work  Pt did not ask for a work excuse      Laila RAY was seen today for fever, sore throat and headache.    Diagnoses and all orders for this visit:    Pharyngitis, unspecified etiology  -     Throat culture    Vomiting, intractability of vomiting not specified, presence of nausea not specified, unspecified vomiting type  -     ondansetron (ZOFRAN-ODT) 4 MG TbDL; Take 2 tablets (8 mg total) by mouth every 12 (twelve) hours as needed  (nausea/ vomiting).      Patient Instructions     Pharyngitis (Sore Throat), Report Pending    Pharyngitis (sore throat) is often due to a virus. It can also be caused by the streptococcus, or strep, bacterium, often called strep throat. Both viral and strep infections can cause throat pain that is worse when swallowing, aching all over with headache, and fever. Both types of infections are contagious. They may be spread by coughing, kissing, or touching others after touching your mouth or nose.  A test has been done to find out whether you (or your child, if your child is the patient) have strep throat. Call this facility or your healthcare provider if you were not given your test results. If the test is positive for strep infection, you will need to take antibiotic medicines. A prescription can be called into your pharmacy at that time. If the test is negative, you probably have a viral pharyngitis. This does not need to be treated with antibiotics. Until you receive the results of the strep test, you should stay home from work. If your child is being tested, he or she should stay home from school.  Home care  · Rest at home. Drink plenty of fluids so you won't get dehydrated.  · If the test is positive for strep, don't go to work or school for the first 2 days of taking the antibiotics. After this time, you will not be contagious. You can then return to work or school if you are feeling better.   · Take the antibiotic medicine for the full 10 days, even if you feel better. This is very important to make sure the infection is treated. It is also important to prevent drug-resistant germs from developing. If you were given an antibiotic shot, you won't need more antibiotics.  · For children: Use acetaminophen for fever, fussiness, or discomfort. In infants older than 6 months of age, you may use ibuprofen instead of acetaminophen. Talk with your child's healthcare provider before giving these medicines if your child  has chronic liver or kidney disease or ever had a stomach ulcer or GI bleeding. Never give aspirin to a child under 18 years of age who is ill with a fever. It may cause severe liver damage.  · For adults: Use acetaminophen or ibuprofen to control pain or fever, unless another medicine was prescribed for this. Talk with your healthcare provider before taking these medicines if you have chronic liver or kidney disease or ever had a stomach ulcer or GI bleeding.  · Use throat lozenges or numbing throat sprays to help reduce pain. Gargling with warm salt water will also help reduce throat pain. For this, dissolve 1/2 teaspoon of salt in 1 glass of warm water. To help soothe a sore throat, children can sip on juice or a popsicle. Children 5 years and older can also suck on a lollipop or hard candy.  · Don't eat salty or spicy foods. These can irritate the throat.  Follow-up care  Follow up with your healthcare provider or our staff if you don't get better over the next week.  When to seek medical advice  Call your healthcare provider right away if any of these occur:  · Fever as directed by your healthcare provider. For children, seek care if:  ¨ Your child is of any age and has repeated fevers above 104°F (40°C).  ¨ Your child is younger than 2 years of age and has a fever of 100.4°F (38°C) that continues for more than 1 day.  ¨ Your child is 2 years old or older and has a fever of 100.4°F (38°C) that continues for more than 3 days.  · New or worsening ear pain, sinus pain, or headache  · Painful lumps in the back of neck  · Stiff neck  · Lymph nodes are getting larger  · Inability to swallow liquids, excessive drooling, or inability to open mouth wide due to throat pain  · Signs of dehydration (very dark urine or no urine, sunken eyes, dizziness)  · Trouble breathing or noisy breathing  · Muffled voice  · New rash  · Child appears to be getting sicker  Date Last Reviewed: 4/13/2015  © 3506-9268 [employer] [address] [place] [address]. All rights reserved. This information is not intended as a substitute for professional medical care. Always follow your healthcare professional's instructions.

## 2018-12-17 NOTE — PATIENT INSTRUCTIONS
Pharyngitis (Sore Throat), Report Pending    Pharyngitis (sore throat) is often due to a virus. It can also be caused by the streptococcus, or strep, bacterium, often called strep throat. Both viral and strep infections can cause throat pain that is worse when swallowing, aching all over with headache, and fever. Both types of infections are contagious. They may be spread by coughing, kissing, or touching others after touching your mouth or nose.  A test has been done to find out whether you (or your child, if your child is the patient) have strep throat. Call this facility or your healthcare provider if you were not given your test results. If the test is positive for strep infection, you will need to take antibiotic medicines. A prescription can be called into your pharmacy at that time. If the test is negative, you probably have a viral pharyngitis. This does not need to be treated with antibiotics. Until you receive the results of the strep test, you should stay home from work. If your child is being tested, he or she should stay home from school.  Home care  · Rest at home. Drink plenty of fluids so you won't get dehydrated.  · If the test is positive for strep, don't go to work or school for the first 2 days of taking the antibiotics. After this time, you will not be contagious. You can then return to work or school if you are feeling better.   · Take the antibiotic medicine for the full 10 days, even if you feel better. This is very important to make sure the infection is treated. It is also important to prevent drug-resistant germs from developing. If you were given an antibiotic shot, you won't need more antibiotics.  · For children: Use acetaminophen for fever, fussiness, or discomfort. In infants older than 6 months of age, you may use ibuprofen instead of acetaminophen. Talk with your child's healthcare provider before giving these medicines if your child has chronic liver or kidney disease or ever had  a stomach ulcer or GI bleeding. Never give aspirin to a child under 18 years of age who is ill with a fever. It may cause severe liver damage.  · For adults: Use acetaminophen or ibuprofen to control pain or fever, unless another medicine was prescribed for this. Talk with your healthcare provider before taking these medicines if you have chronic liver or kidney disease or ever had a stomach ulcer or GI bleeding.  · Use throat lozenges or numbing throat sprays to help reduce pain. Gargling with warm salt water will also help reduce throat pain. For this, dissolve 1/2 teaspoon of salt in 1 glass of warm water. To help soothe a sore throat, children can sip on juice or a popsicle. Children 5 years and older can also suck on a lollipop or hard candy.  · Don't eat salty or spicy foods. These can irritate the throat.  Follow-up care  Follow up with your healthcare provider or our staff if you don't get better over the next week.  When to seek medical advice  Call your healthcare provider right away if any of these occur:  · Fever as directed by your healthcare provider. For children, seek care if:  ¨ Your child is of any age and has repeated fevers above 104°F (40°C).  ¨ Your child is younger than 2 years of age and has a fever of 100.4°F (38°C) that continues for more than 1 day.  ¨ Your child is 2 years old or older and has a fever of 100.4°F (38°C) that continues for more than 3 days.  · New or worsening ear pain, sinus pain, or headache  · Painful lumps in the back of neck  · Stiff neck  · Lymph nodes are getting larger  · Inability to swallow liquids, excessive drooling, or inability to open mouth wide due to throat pain  · Signs of dehydration (very dark urine or no urine, sunken eyes, dizziness)  · Trouble breathing or noisy breathing  · Muffled voice  · New rash  · Child appears to be getting sicker  Date Last Reviewed: 4/13/2015  © 6626-4697 [employer]. [address] [place] [address], [place] [address].  All rights reserved. This information is not intended as a substitute for professional medical care. Always follow your healthcare professional's instructions.

## 2018-12-18 ENCOUNTER — PATIENT MESSAGE (OUTPATIENT)
Dept: INTERNAL MEDICINE | Facility: CLINIC | Age: 28
End: 2018-12-18

## 2018-12-18 DIAGNOSIS — J02.0 STREP PHARYNGITIS: Primary | ICD-10-CM

## 2018-12-18 RX ORDER — AMOXICILLIN 875 MG/1
875 TABLET, FILM COATED ORAL EVERY 12 HOURS
Qty: 20 TABLET | Refills: 0 | Status: SHIPPED | OUTPATIENT
Start: 2018-12-18 | End: 2018-12-28

## 2018-12-19 LAB — BACTERIA THROAT CULT: NORMAL

## 2018-12-20 ENCOUNTER — PATIENT MESSAGE (OUTPATIENT)
Dept: INTERNAL MEDICINE | Facility: CLINIC | Age: 28
End: 2018-12-20

## 2018-12-20 ENCOUNTER — HOSPITAL ENCOUNTER (OUTPATIENT)
Dept: RADIOLOGY | Facility: HOSPITAL | Age: 28
Discharge: HOME | End: 2018-12-20
Attending: PHYSICIAN ASSISTANT
Payer: COMMERCIAL

## 2018-12-20 ENCOUNTER — TELEPHONE (OUTPATIENT)
Dept: ENDOSCOPY | Facility: HOSPITAL | Age: 28
End: 2018-12-20

## 2018-12-20 DIAGNOSIS — R10.13 EPIGASTRIC PAIN: ICD-10-CM

## 2018-12-20 PROCEDURE — 76700 US EXAM ABDOM COMPLETE: CPT | Mod: 26,,, | Performed by: RADIOLOGY

## 2018-12-20 PROCEDURE — 76700 US EXAM ABDOM COMPLETE: CPT | Mod: TC

## 2018-12-21 ENCOUNTER — PATIENT MESSAGE (OUTPATIENT)
Dept: GASTROENTEROLOGY | Facility: CLINIC | Age: 28
End: 2018-12-21

## 2019-01-03 ENCOUNTER — OFFICE VISIT (OUTPATIENT)
Dept: GASTROENTEROLOGY | Facility: CLINIC | Age: 29
End: 2019-01-03
Payer: COMMERCIAL

## 2019-01-03 VITALS
WEIGHT: 151 LBS | BODY MASS INDEX: 27.62 KG/M2 | DIASTOLIC BLOOD PRESSURE: 70 MMHG | HEART RATE: 92 BPM | SYSTOLIC BLOOD PRESSURE: 113 MMHG

## 2019-01-03 DIAGNOSIS — A04.8 H. PYLORI INFECTION: Primary | ICD-10-CM

## 2019-01-03 PROCEDURE — 99213 PR OFFICE/OUTPT VISIT, EST, LEVL III, 20-29 MIN: ICD-10-PCS | Mod: S$GLB,,, | Performed by: STUDENT IN AN ORGANIZED HEALTH CARE EDUCATION/TRAINING PROGRAM

## 2019-01-03 PROCEDURE — 99999 PR PBB SHADOW E&M-EST. PATIENT-LVL II: CPT | Mod: PBBFAC,,, | Performed by: STUDENT IN AN ORGANIZED HEALTH CARE EDUCATION/TRAINING PROGRAM

## 2019-01-03 PROCEDURE — 99213 OFFICE O/P EST LOW 20 MIN: CPT | Mod: S$GLB,,, | Performed by: STUDENT IN AN ORGANIZED HEALTH CARE EDUCATION/TRAINING PROGRAM

## 2019-01-03 PROCEDURE — 99999 PR PBB SHADOW E&M-EST. PATIENT-LVL II: ICD-10-PCS | Mod: PBBFAC,,, | Performed by: STUDENT IN AN ORGANIZED HEALTH CARE EDUCATION/TRAINING PROGRAM

## 2019-01-04 NOTE — PROGRESS NOTES
I was present withShawn Caldwell MD the fellow during the above evaluation, including history and exam.  I discussed the case with the fellow and agree with the findings and plan as documented in the fellow's note.

## 2019-01-04 NOTE — PROGRESS NOTES
[facility]    Reason for visit: The encounter diagnosis was H. pylori infection.  Referring Provider/PCP: Melany Malhotra MD    History of Present Illness:  Laila Collazo is a 28 y.o. female with a history of endometriosis on OCP who is presenting for follow-up evaluation of abdominal pain.    The patient followed with Mary Jo Pedraza for abdominal pain. Patient describes random episodes of abdominal pain that either seem to be generalized or left sides, sometimes radiating to the back, and her abdomen seems tender. It lasts for seconds. It can occur throughout the day, or not happen at all. Not related to food, or activity. Denies nausea, vomiting, or change in bowel habits. She used to have constipation, but this resolved after a trial of Miralax, and she is no longer taking laxatives. Endorses one episode of hematochezia small amount with BM yesterday. She denies bloating. Patient had an EGD in 12/2016 that was positive for H. Pylori, that she was treated for but eradication was not confirmed at that time. Another EGD was done 10/2018, positive for H.pylori. She completed a course of abx, but eradication has not been confirmed yet. She had a recent colonoscopy in 12/2018 that was negative as well including the terminal ileum. Evaluation with CT abdomen, U/S abdomen, and labs is unrevealing otherwise. She was also evaluated by gyn, did not think pain is related to endometriosis. Denies fever or chills. Denies weight loss or change in appetite  The patient has tried PPI in the past with no improvement of pain. She was prescribed carafate, but she has not started it.    Review of Systems:   Constitutional: no fever, chills or change in weight   Eyes: no visual changes   ENT: no sore throat or dysphagia  Respiratory: no cough or shortness of breath   Cardiovascular: no chest pain or palpitations   Gastrointestinal: as per HPI  Hematologic/Lymphatic: no easy bruising or lymphadenopathy    Musculoskeletal: no arthralgias or myalgias   Neurological: no change in mental status  Behavioral/Psych: no change in mood    Medical History:  Past Medical History:   Diagnosis Date    Endometriosis     treated by GYN at     Helicobacter pylori ab+        Past Surgical History:   Procedure Laterality Date    COLONOSCOPY N/A 12/13/2018    Performed by [facility] at [facility] (4TH FLR)    EGD (ESOPHAGOGASTRODUODENOSCOPY) N/A 10/30/2018    Performed by Greg Leach MD at [facility] ENDO (2ND FLR)    ESOPHAGOGASTRODUODENOSCOPY (EGD) N/A 12/5/2016    Performed by Lamine Burks MD at [facility] ENDO (4TH FLR)    NO PAST SURGERIES      UPPER GASTROINTESTINAL ENDOSCOPY         Family History   Problem Relation Age of Onset    Diabetes Mother     No Known Problems Father     No Known Problems Sister     No Known Problems Brother     Colon cancer Neg Hx     Crohn's disease Neg Hx     Esophageal cancer Neg Hx     Inflammatory bowel disease Neg Hx     Irritable bowel syndrome Neg Hx     Rectal cancer Neg Hx     Stomach cancer Neg Hx     Ulcerative colitis Neg Hx        Social History     Socioeconomic History    Marital status: Single     Spouse name: Not on file    Number of children: Not on file    Years of education: Not on file    Highest education level: Not on file   Social Needs    Financial resource strain: Not on file    Food insecurity - worry: Not on file    Food insecurity - inability: Not on file    Transportation needs - medical: Not on file    Transportation needs - non-medical: Not on file   Occupational History     Employer: [employer] [place]     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   Tobacco Use    Smoking status: Never Smoker    Smokeless tobacco: Never Used   Substance and Sexual Activity    Alcohol use: No    Drug use: No    Sexual activity: Yes     Partners: Male     Birth control/protection: None   Other Topics Concern    Not on file   Social History  Narrative    Not on file       Current Outpatient Medications on File Prior to Visit   Medication Sig Dispense Refill    OGESTREL, 28, 0.5-50 mg-mcg per tablet TK 1 T PO QD ON A CONTINUOUS  BASIS WITH NO PLACEBO TABS  9    sucralfate (CARAFATE) 1 gram tablet Take 1 tablet (1 g total) by mouth 2 (two) times daily. Separate from other medicines by 1 hr 60 tablet 1     No current facility-administered medications on file prior to visit.        Review of patient's allergies indicates:   Allergen Reactions    Bactrim ds [sulfamethoxazole-trimethoprim] Nausea And Vomiting    Nsaids (non-steroidal anti-inflammatory drug)      D/t h/o PUD and H pylori    Codeine Rash       Physical Exam:  General: Alert and Oriented x3, no distress   Vitals:    01/03/19 1455   BP: 113/70   Pulse: 92     HEENT: Normocephalic, Atraumatic. No scleral icterus.  Lymph: No cervical lymphadenopathy  Resp: Good air entry bilaterally, no adventitious sounds.  Cardiac: S1 and S2 normal.  Abdomen: Normoactive bowel sounds. Non-distended. Normal tympany. Soft. Tender to palpation left abdomen, lower abdomen. No peritoneal signs.  Extremities: No peripheral edema. Normal bilateral pedal and radial pulses.  Neurologic: No gross neurological Deficits  Psych: Calm, cooperative. Normal mood and affect.    Laboratory:  Lab Results   Component Value Date     10/29/2018    K 4.4 10/29/2018     10/29/2018    CO2 25 10/29/2018    BUN 9 10/29/2018    CREATININE 0.8 10/29/2018    CALCIUM 9.0 10/29/2018    ANIONGAP 4 (L) 10/29/2018    ESTGFRAFRICA >60.0 10/29/2018    EGFRNONAA >60.0 10/29/2018       Lab Results   Component Value Date    ALT 13 10/29/2018    AST 17 10/29/2018    ALKPHOS 48 (L) 10/29/2018    BILITOT 0.4 10/29/2018       Lab Results   Component Value Date    WBC 7.18 10/29/2018    HGB 14.1 10/29/2018    HCT 44.0 10/29/2018    MCV 92 10/29/2018     10/29/2018       Microbiology:  No Pertinent Microbiology    Imaging:  as in  HPI    Assessment:  Laila Collazo is a 28 y.o. female who is presenting for follow-up evaluation of abdominal pain.    Uncertain etiology of pain, but description is not consistent with any specific GI etiology, including IBS. She will need confirmation of H.pylori eradication to rule this out as cause. If this is negative, it is unlikely that her pain is of GI etiology. We will follow up to assess pain post-eradication.      Plan:  1. H.pylori stool antigen for eradication confirmation when the patient has been off abx for 4 weeks  2. No further workup indicated at this time  3. Follow-up in about 3 months (around 4/3/2019).    Orders Placed This Encounter   Procedures    H. pylori antigen, stool

## 2019-01-09 ENCOUNTER — PATIENT MESSAGE (OUTPATIENT)
Dept: GASTROENTEROLOGY | Facility: CLINIC | Age: 29
End: 2019-01-09

## 2019-01-09 NOTE — TELEPHONE ENCOUNTER
Attempted to call pt regarding appt with CRS pt didn't answer left vm intrusting pt to call back.

## 2019-02-07 ENCOUNTER — OFFICE VISIT (OUTPATIENT)
Dept: INTERNAL MEDICINE | Facility: CLINIC | Age: 29
End: 2019-02-07
Payer: COMMERCIAL

## 2019-02-07 ENCOUNTER — TELEPHONE (OUTPATIENT)
Dept: UROLOGY | Facility: CLINIC | Age: 29
End: 2019-02-07

## 2019-02-07 ENCOUNTER — LAB VISIT (OUTPATIENT)
Dept: LAB | Facility: HOSPITAL | Age: 29
End: 2019-02-07
Attending: INTERNAL MEDICINE
Payer: COMMERCIAL

## 2019-02-07 VITALS
OXYGEN SATURATION: 96 % | HEIGHT: 62 IN | DIASTOLIC BLOOD PRESSURE: 66 MMHG | SYSTOLIC BLOOD PRESSURE: 114 MMHG | BODY MASS INDEX: 28.16 KG/M2 | HEART RATE: 99 BPM | TEMPERATURE: 99 F | WEIGHT: 153 LBS

## 2019-02-07 DIAGNOSIS — M79.604 BILATERAL LEG PAIN: ICD-10-CM

## 2019-02-07 DIAGNOSIS — Z00.00 ANNUAL PHYSICAL EXAM: ICD-10-CM

## 2019-02-07 DIAGNOSIS — N39.41 URGE INCONTINENCE: ICD-10-CM

## 2019-02-07 DIAGNOSIS — Z00.00 ANNUAL PHYSICAL EXAM: Primary | ICD-10-CM

## 2019-02-07 DIAGNOSIS — G89.29 FLANK PAIN, CHRONIC: ICD-10-CM

## 2019-02-07 DIAGNOSIS — R10.9 FLANK PAIN, CHRONIC: ICD-10-CM

## 2019-02-07 DIAGNOSIS — M79.605 BILATERAL LEG PAIN: ICD-10-CM

## 2019-02-07 LAB
BILIRUB SERPL-MCNC: NORMAL MG/DL
BLOOD URINE, POC: NORMAL
CK SERPL-CCNC: 98 U/L
COLOR, POC UA: YELLOW
CRP SERPL-MCNC: 0.9 MG/L
ERYTHROCYTE [SEDIMENTATION RATE] IN BLOOD BY WESTERGREN METHOD: 13 MM/HR
ESTIMATED AVG GLUCOSE: 97 MG/DL
GLUCOSE UR QL STRIP: NORMAL
HBA1C MFR BLD HPLC: 5 %
KETONES UR QL STRIP: NORMAL
LEUKOCYTE ESTERASE URINE, POC: NORMAL
NITRITE, POC UA: NORMAL
PH, POC UA: 7
PROTEIN, POC: NORMAL
SPECIFIC GRAVITY, POC UA: 1.01
TSH SERPL DL<=0.005 MIU/L-ACNC: 2.68 UIU/ML
UROBILINOGEN, POC UA: NORMAL

## 2019-02-07 PROCEDURE — 99214 OFFICE O/P EST MOD 30 MIN: CPT | Mod: 25,S$GLB,, | Performed by: INTERNAL MEDICINE

## 2019-02-07 PROCEDURE — 86703 HIV-1/HIV-2 1 RESULT ANTBDY: CPT

## 2019-02-07 PROCEDURE — 82550 ASSAY OF CK (CPK): CPT

## 2019-02-07 PROCEDURE — 99999 PR PBB SHADOW E&M-EST. PATIENT-LVL III: CPT | Mod: PBBFAC,,, | Performed by: INTERNAL MEDICINE

## 2019-02-07 PROCEDURE — 86140 C-REACTIVE PROTEIN: CPT

## 2019-02-07 PROCEDURE — 81001 URINALYSIS AUTO W/SCOPE: CPT | Mod: S$GLB,,, | Performed by: INTERNAL MEDICINE

## 2019-02-07 PROCEDURE — 81001 POCT URINALYSIS, DIPSTICK OR TABLET REAGENT, AUTOMATED, WITH MICROSCOP: ICD-10-PCS | Mod: S$GLB,,, | Performed by: INTERNAL MEDICINE

## 2019-02-07 PROCEDURE — 99214 PR OFFICE/OUTPT VISIT, EST, LEVL IV, 30-39 MIN: ICD-10-PCS | Mod: 25,S$GLB,, | Performed by: INTERNAL MEDICINE

## 2019-02-07 PROCEDURE — 85652 RBC SED RATE AUTOMATED: CPT

## 2019-02-07 PROCEDURE — 84443 ASSAY THYROID STIM HORMONE: CPT

## 2019-02-07 PROCEDURE — 83036 HEMOGLOBIN GLYCOSYLATED A1C: CPT

## 2019-02-07 PROCEDURE — 87491 CHLMYD TRACH DNA AMP PROBE: CPT

## 2019-02-07 PROCEDURE — 99999 PR PBB SHADOW E&M-EST. PATIENT-LVL III: ICD-10-PCS | Mod: PBBFAC,,, | Performed by: INTERNAL MEDICINE

## 2019-02-07 PROCEDURE — 86038 ANTINUCLEAR ANTIBODIES: CPT

## 2019-02-07 PROCEDURE — 36415 COLL VENOUS BLD VENIPUNCTURE: CPT

## 2019-02-07 RX ORDER — DICYCLOMINE HYDROCHLORIDE 10 MG/1
10 CAPSULE ORAL EVERY 8 HOURS PRN
Qty: 120 CAPSULE | Refills: 3 | Status: SHIPPED | OUTPATIENT
Start: 2019-02-07 | End: 2019-03-09

## 2019-02-07 NOTE — PROGRESS NOTES
INTERNAL MEDICINE CLINIC - SAME DAY APPOINTMENT  Progress Note    PRESENTING HISTORY     PCP: Melany Malhotra MD  Chief Complaint/Reason for Visit:     Chief Complaint   Patient presents with    Annual Exam     History of Present Illness & ROS : Ms. Laila Collazo is a 28 y.o. female.      She complains of bilateral flank pain.  She has urgency of urination. Sometimes there is leakage.    She gets PAP at [facility], last one about a year ago.  History of endometriosis per record.    No trauma history    Review of Systems   Constitutional: Negative for chills and fever.   HENT: Negative for ear discharge.    Respiratory: Negative for shortness of breath.    Cardiovascular: Negative for chest pain.   Gastrointestinal: Positive for abdominal pain (sides) and constipation (BMb).   Genitourinary: Positive for flank pain. Negative for urgency.   Musculoskeletal: Positive for back pain and joint pain (leg pain below knees).   Skin: Negative for rash.   Neurological: Negative for dizziness.   Psychiatric/Behavioral: Negative for depression. The patient is not nervous/anxious.      Colonoscopy 12/13/18: normal  EGD 10/30/18: Gastritis    PAST HISTORY:     Past Medical History:   Diagnosis Date    Endometriosis     treated by GYN at     Helicobacter pylori ab+        Past Surgical History:   Procedure Laterality Date    COLONOSCOPY N/A 12/13/2018    Performed by [facility] ENDO (4TH FLR)    EGD (ESOPHAGOGASTRODUODENOSCOPY) N/A 10/30/2018    [facility] (2[facility]    ESOPHAGOGASTRODUODENOSCOPY (EGD) N/A 12/5/2016    Performed by Lamine Burks MD at [facility] ENDO (4TH FLR)    NO PAST SURGERIES      UPPER GASTROINTESTINAL ENDOSCOPY         Family History   Problem Relation Age of Onset    Diabetes Mother     No Known Problems Father     No Known Problems Sister     No Known Problems Brother     Colon cancer Neg Hx     Crohn's disease Neg Hx     Esophageal cancer Neg Hx      Inflammatory bowel disease Neg Hx     Irritable bowel syndrome Neg Hx     Rectal cancer Neg Hx     Stomach cancer Neg Hx     Ulcerative colitis Neg Hx        Social History     Socioeconomic History    Marital status: Single     Spouse name: None    Number of children: None    Years of education: None    Highest education level: None   Social Needs    Financial resource strain: None    Food insecurity - worry: None    Food insecurity - inability: None    Transportation needs - medical: None    Transportation needs - non-medical: None   Occupational History     Employer: [employer] [place]     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   Tobacco Use    Smoking status: Never Smoker    Smokeless tobacco: Never Used   Substance and Sexual Activity    Alcohol use: No    Drug use: No    Sexual activity: Yes     Partners: Male     Birth control/protection: None   Other Topics Concern    None   Social History Narrative    None       MEDICATIONS & ALLERGIES:     Current Outpatient Medications on File Prior to Visit   Medication Sig Dispense Refill    KELNOR 1-50 1-50 mg-mcg per tablet TK 1 T PO QD ON A CONTINUOUS BASIS. SKIP PLACEBO WEEK  3    OGESTREL, 28, 0.5-50 mg-mcg per tablet TK 1 T PO QD ON A CONTINUOUS  BASIS WITH NO PLACEBO TABS  9    sucralfate (CARAFATE) 1 gram tablet Take 1 tablet (1 g total) by mouth 2 (two) times daily. Separate from other medicines by 1 hr 60 tablet 1     No current facility-administered medications on file prior to visit.         Review of patient's allergies indicates:   Allergen Reactions    Bactrim ds [sulfamethoxazole-trimethoprim] Nausea And Vomiting    Nsaids (non-steroidal anti-inflammatory drug)      D/t h/o PUD and H pylori    Codeine Rash       Medications Reconciliation:   I have reconciled the patient's home medications with the patient/family. I have updated all changes.  Refer to After-Visit Medication List.    OBJECTIVE:     Vital Signs:  Vitals:     02/07/19 1531   BP: 114/66   Pulse: 99   Temp: 98.6 °F (37 °C)     Wt Readings from Last 1 Encounters:   02/07/19 1531 69.4 kg (152 lb 16 oz)     Body mass index is 27.98 kg/m².     Physical Exam:  General: Well developed, well nourished. No distress.  HEENT: Head is normocephalic, atraumatic; ears are normal.    Eyes: Clear conjunctiva.  Neck: Supple, symmetrical neck; trachea midline.  Lungs: Clear to auscultation bilaterally and normal respiratory effort.  Cardiovascular: Heart with regular rate and rhythm.    Extremities: No LE edema. Legs slightly puffy but no pitting edema.  Abdomen: Abdomen is soft, non-tender non-distended with normal bowel sounds.  Skin: Skin color, texture, turgor normal. No rashes.  Musculoskeletal: Normal gait.   Lymph Nodes: No cervical adenopathy.  Neurologic: Normal strength and tone. No focal numbness or weakness.   Psychiatric: Normal affect. Alert.    Laboratory  Lab Results   Component Value Date    WBC 7.18 10/29/2018    HGB 14.1 10/29/2018    HCT 44.0 10/29/2018     10/29/2018    CHOL 182 05/02/2018    TRIG 46 05/02/2018    HDL 40 05/02/2018    ALT 13 10/29/2018    AST 17 10/29/2018     10/29/2018    K 4.4 10/29/2018     10/29/2018    CREATININE 0.8 10/29/2018    BUN 9 10/29/2018    CO2 25 10/29/2018    TSH 0.892 03/01/2017    HGBA1C 5.2 03/01/2017       UA : no WBC or RBC.    ASSESSMENT & PLAN:     Annual physical exam  - Normal exam.  -     HIV 1/2 Ag/Ab (4th Gen); Future; Expected date: 02/07/2019  -     C. trachomatis/N. gonorrhoeae by AMP DNA  -     Hemoglobin A1c; Future; Expected date: 02/07/2019  -     TSH; Future; Expected date: 02/07/2019  -     MIKE Screen w/Reflex; Future; Expected date: 02/07/2019  -     CK; Future; Expected date: 02/07/2019  -     Sedimentation rate; Future; Expected date: 02/07/2019  -     C-reactive protein; Future; Expected date: 02/07/2019    Urge incontinence  Bilateral leg pain  Flank pain, chronic  -     POCT urinalysis: no  infection.        She will see Urogyn 2/13.        Early fibromyalgia, IBS symptoms.        Screening tests for rheumatological diseases and thyroid disorder.        Trial of:    -     dicyclomine (BENTYL) 10 MG capsule; Take 1 capsule (10 mg total) by mouth every 8 (eight) hours as needed.  Dispense: 120 capsule; Refill: 3    Scheduled Follow-up :  Future Appointments   Date Time Provider Department Center   2/7/2019  4:00 PM LAB, SAME DAY [facility] LAB IM [facility]   2/13/2019  2:00 PM Louann Hook DO [facility] [place] UROGYN [facility]   2/22/2019  3:30 PM Wallace Villagomez III, PA-C [facility] SPORTS [place]   4/8/2019  2:00 PM Shawn Caldwell MD [facility] GASTRO [facility]       After Visit Medication List :     Medication List           Accurate as of 2/7/19  3:58 PM. If you have any questions, ask your nurse or doctor.               START taking these medications    dicyclomine 10 MG capsule  Commonly known as:  BENTYL  Take 1 capsule (10 mg total) by mouth every 8 (eight) hours as needed.  Started by:  Santos Sharif MD        CONTINUE taking these medications    KELNOR 1-50 1-50 mg-mcg per tablet  Generic drug:  ethynodiol-ethinyl estradiol        STOP taking these medications    OGESTREL (28) 0.5-50 mg-mcg per tablet  Generic drug:  norgestrel-ethinyl estradiol  Stopped by:  Santos Sharif MD     sucralfate 1 gram tablet  Commonly known as:  CARAFATE  Stopped by:  Santos Sharif MD           Where to Get Your Medications      These medications were sent to [id number] - [place] - [address] [facility]  [address] [address]    Phone:  [phone]   · dicyclomine 10 MG capsule         Signing Physician:  Santos Sharif MD

## 2019-02-07 NOTE — TELEPHONE ENCOUNTER
Spoke with pt and informed her that Dr. Rojas doesn't see pt with her condition. Pt states she is going to her PCP today and will speak with him on who she needs to see. Gave her number to Uro/gyn docs at [facility] or see Dr. Martin. Pt voiced understanding and says she will cancel appt with Dr. Rojas.      ----- Message from Tevin Rojas MD sent at 2/7/2019  7:05 AM CST -----  Please call this patient who is scheduled for 2pm for high pelvic tone and bladder dysfunction/pain.  This is definitely not something I treat. Thank you.   Tevin

## 2019-02-08 LAB
ANA SER QL IF: NORMAL
C TRACH DNA SPEC QL NAA+PROBE: NOT DETECTED
HIV 1+2 AB+HIV1 P24 AG SERPL QL IA: NEGATIVE
N GONORRHOEA DNA SPEC QL NAA+PROBE: NOT DETECTED

## 2019-02-13 ENCOUNTER — OFFICE VISIT (OUTPATIENT)
Dept: UROGYNECOLOGY | Facility: CLINIC | Age: 29
End: 2019-02-13
Payer: COMMERCIAL

## 2019-02-13 VITALS
WEIGHT: 157.88 LBS | SYSTOLIC BLOOD PRESSURE: 100 MMHG | HEIGHT: 62 IN | BODY MASS INDEX: 29.05 KG/M2 | DIASTOLIC BLOOD PRESSURE: 60 MMHG

## 2019-02-13 DIAGNOSIS — N39.41 URGENCY INCONTINENCE: Primary | ICD-10-CM

## 2019-02-13 DIAGNOSIS — R39.15 URINARY URGENCY: ICD-10-CM

## 2019-02-13 DIAGNOSIS — R10.2 PELVIC PAIN: ICD-10-CM

## 2019-02-13 DIAGNOSIS — K59.01 SLOW TRANSIT CONSTIPATION: ICD-10-CM

## 2019-02-13 DIAGNOSIS — M62.89 HIGH-TONE PELVIC FLOOR DYSFUNCTION: ICD-10-CM

## 2019-02-13 PROCEDURE — 99999 PR PBB SHADOW E&M-EST. PATIENT-LVL III: CPT | Mod: PBBFAC,,, | Performed by: OBSTETRICS & GYNECOLOGY

## 2019-02-13 PROCEDURE — 99205 OFFICE O/P NEW HI 60 MIN: CPT | Mod: S$GLB,,, | Performed by: OBSTETRICS & GYNECOLOGY

## 2019-02-13 PROCEDURE — 99999 PR PBB SHADOW E&M-EST. PATIENT-LVL III: ICD-10-PCS | Mod: PBBFAC,,, | Performed by: OBSTETRICS & GYNECOLOGY

## 2019-02-13 PROCEDURE — 99205 PR OFFICE/OUTPT VISIT, NEW, LEVL V, 60-74 MIN: ICD-10-PCS | Mod: S$GLB,,, | Performed by: OBSTETRICS & GYNECOLOGY

## 2019-02-13 RX ORDER — HYDROXYZINE HYDROCHLORIDE 10 MG/1
10 TABLET, FILM COATED ORAL 3 TIMES DAILY PRN
Qty: 30 TABLET | Refills: 1 | Status: SHIPPED | OUTPATIENT
Start: 2019-02-13 | End: 2019-07-02

## 2019-02-13 RX ORDER — TROSPIUM CHLORIDE 20 MG/1
20 TABLET, FILM COATED ORAL 2 TIMES DAILY
Qty: 60 TABLET | Refills: 11 | Status: SHIPPED | OUTPATIENT
Start: 2019-02-13 | End: 2019-07-02

## 2019-02-13 RX ORDER — PHENAZOPYRIDINE HYDROCHLORIDE 100 MG/1
100 TABLET, FILM COATED ORAL 3 TIMES DAILY PRN
Qty: 30 TABLET | Refills: 0 | Status: SHIPPED | OUTPATIENT
Start: 2019-02-13 | End: 2019-02-23

## 2019-02-13 NOTE — PROGRESS NOTES
Subjective:       Patient ID: Laila Collazo is a 28 y.o. female.    Chief Complaint:  Bladder Pain; Pelvic Pain; and Urinary Incontinence      History of Present Illness  HPI 28 Y O F P0  has a past medical history of Endometriosis and Helicobacter pylori ab+. referred by evaluation of pelvic pain.   She's Dr. Dueñas with the recommendation for continuous ocp for endo.   She has seen Dr. Martin with diagnosis of pelvic floor dysfunction she was treated by Kayleen benavides with improvement in her symptoms.   She's has intercourse without pain.   She has a history of persistent abdominal pain with + H. Pylori.  Seeing [facility]     Question 2/13/2019  2:02 PM CST - Filed by Patient on 2/13/2019   General Urogynecology: Are you experiencing the following?    Dysuria (painful urination) Yes, sometime improved since seeing Dr. Martin and starting PT    Nocturia:  waking up at night to empty your bladder  Yes   If you answered yes to the previous question, how many times does this happen per night? 1-2, bothersome ( new in past several months)    Enuresis (urine loss during sleep) No   Dribbling urine after you urinate Yes   Hematuria (urine appears red) No   Type of stream Strong   Urinary frequency: How often a day are you going to the bathroom per day?  Less than 10   Urinary Tract Infections: How many Urinary Tract Infections have you had in the past year? One (1) in the past year   If you have had a UTI in the past year, what treatments have you had so far?  Antibiotics   Urinary Incontinence (General): Are you experiencing the following?    Past consultation for incontinence: Have you ever seen someone for the evaluation of incontinence? Yes   If you answered yes to the previous question, please select all the therapies you have tried.  Pelvic floor physical therapy   Please note the effectiveness of the therapies. Somewhat effective   Need to wear protection to keep clothes dry  No   If  "you answered yes to the previous question, please mague the protection you use.  None   If you wear protection, how much wetness is typically on each pad? N/a- I do not wear protection   If you wear protection, how often do you have to change per day, if applicable?     Stress Symptoms: Are you experiencing the following?    Leakage of urine with cough, laugh and/or sneeze Yes   If you answered yes to the previous question, what is the frequency in days, weeks and/or months? Daily   Leakage of urine with sex No   Leakage of urine with bending/ lifting No   Leakage of urine with briskly walking or jogging No   If you lose urine for any other reason not previously mentioned, please note it below, if applicable.     Urge Symptoms: Are you experiencing the following?    Urgency ("got to go" feeling) Yes   Urge: How frequently do you feel an urge to urinate (feeling like you "gotta go" to the bathroom and can't wait) Daily   Do you experience a leakage of urine when you have a feeling of urgency?  Yes   Leakage of urine when unaware No   Past use of anticholinergics (medications used to treat overactive bladder) No   If you answered yes to the previous question, please mague the anticholinergics you have used:     Have you ever used Mirbetriq (aka Mirabegron)?  No   Prolapse Symptoms: Are you experiencing any of the following?     Falling out/ Bulging/ Heaviness in the vagina No   Vaginal/ Abdominal Pain/ Pressure Yes   Need to strain/ Push to void No   Need to wait on the toilet before you void No   Unusual position to urinate (using your hands to push back the vaginal bulge) No   Sensation of incomplete emptying No   Past use of pessary device No   If you answered yes to the previous question, please list the devices you have used below.     Bowel Symptoms: Are you experiencing any of the following?    Constipation Yes, miralax    Diarrhea  No   Hematochezia (bloody stool) No   Incomplete evacuation of stool No "   Involuntary loss of formed stool No   Fecal smearing/urgency No   Involuntary loss of gas No   Vaginal Symptoms: Are you experiencing any of the following?     Abnormal vaginal bleeding  No   Vaginal dryness No   Sexually active  Yes   Dyspareunia (painful intercourse) No   Estrogen use  No       GYN & OB History  Patient's last menstrual period was 2019.   Date of Last Pap: No result found    OB History    Para Term  AB Living   0 0 0 0 0 0   SAB TAB Ectopic Multiple Live Births   0 0 0 0 0             Review of Systems  Review of Systems   Constitutional: Negative for activity change, appetite change, chills, diaphoresis, fatigue, fever and unexpected weight change.   Respiratory: Negative for cough.    Gastrointestinal: Negative for abdominal distention, abdominal pain, anal bleeding, blood in stool, constipation, diarrhea, nausea, rectal pain and vomiting.   Genitourinary: Positive for menstrual problem, pelvic pain and urgency. Negative for decreased urine volume, difficulty urinating, dyspareunia, dysuria, enuresis, flank pain, frequency, genital sores, hematuria, vaginal bleeding, vaginal discharge and vaginal pain.   Musculoskeletal: Negative for back pain.   Skin: Negative for color change, pallor, rash and wound.   Allergic/Immunologic: Negative for environmental allergies, food allergies and immunocompromised state.   Hematological: Negative for adenopathy. Does not bruise/bleed easily.   Psychiatric/Behavioral: Negative for agitation, behavioral problems, confusion and sleep disturbance.           Objective:     Physical Exam   Constitutional: She is oriented to person, place, and time. She appears well-developed.   HENT:   Head: Normocephalic and atraumatic.   Eyes: Conjunctivae and EOM are normal.   Neck: Normal range of motion. Neck supple.   Cardiovascular: Normal rate, regular rhythm, S1 normal, S2 normal, normal heart sounds and intact distal pulses.   Pulmonary/Chest: Effort  normal and breath sounds normal. She exhibits no tenderness.   Abdominal: Soft. Bowel sounds are normal. She exhibits no distension and no mass. There is no hepatosplenomegaly, splenomegaly or hepatomegaly. There is no tenderness. There is no rigidity, no rebound, no guarding and no CVA tenderness. No hernia.   Genitourinary: Pelvic exam was performed with patient supine. Rectum normal, uterus normal, cervix normal, skenes normal and bartholins normal. Right labia normal and left labia normal. Urethra exhibits hypermobility. Urethra exhibits no urethral caruncle, no urethral diverticulum and no urethral mass. Right bartholin is not enlarged and not tender. Left bartholin is not enlarged and not tender. Rectal exam shows resting tone normal and active tone normal. Rectal exam shows no external hemorrhoid, no fissure, no tenderness, anal tone normal and no dovetailing. Guaiac negative stool. There is tenderness and lavator tenderness in the vagina. No foreign body, bleeding, unspecified prolapse of vaginal walls, atrophy, fistula or mesh exposure in the vagina. No vaginal discharge found. Right adnexum displays no mass and no tenderness. Left adnexum displays no mass and no tenderness. Cervix exhibits no motion tenderness and no discharge. Uterus is not tender and not experiencing uterine prolapse.   PVR: 30 ML  Empty cough stress test: Negative.  Kegel: 4/5    POP-Q  Aa: -3 Ba: -3 C: -7   GH: 3 PB: 2 TVL: 9   Ap: -2 Bp: -2 D: -8                     Musculoskeletal: Normal range of motion.   Lymphadenopathy:     She has no axillary adenopathy.        Right: No inguinal adenopathy present.        Left: No inguinal adenopathy present.   Neurological: She is alert and oriented to person, place, and time. She has normal strength and normal reflexes. Cranial nerves II through XII intact. No cranial nerve deficit.   Skin: Skin is warm, dry and intact.   Psychiatric: She has a normal mood and affect. Her speech is normal and  behavior is normal. Judgment normal. Cognition and memory are normal.             HCM  Pap's: abnormal, pap 2017, repeat normal, 2018 normal   Mammo: n/a  Colonoscopy 2018- normal   Dexa: n/a       Assessment:        1. Urgency incontinence    2. Pelvic pain    3. Slow transit constipation    4. High-tone pelvic floor dysfunction    5. Urinary urgency             Plan:      1. urinary urgency   --Start Prelief to help alkinize the urine:   --Prelief Tablets : Each tablet contains 345 mg calcium glycerophosphate (65 mg of elemental calcium). The tablets also contain 0.25% magnesium  stearate as a processing aid. Two tablets are equivalent to 690 mg calcium glycerophosphate (130mg of elemental calcium).   --Prelief Powder : Each ¼ teaspoon usage of powder is comparable to two tablets. The powder dissolves rapidly in food or non-alcoholic beverages.  Tablets are recommended for taking with alcoholic beverages   --Usage:     --Tablets: 2-3 tablets, 2 times a day.    --Powder: ¼ teaspoon of powder 2 times a day. More can be used when needed  --Start the IC diet:  https://www.ichelp.org/wp-content/uploads/2015/07/food-list.pdf   --start Hydroxyzine 10 mg for relief of bladder pain  --start Pyridium for urinary discomfort     2. Urinary incontinence, urge  --Bladder diary for 3-7 days, write the number of times you go to the rest room and associated symptoms of urgency or leakage.   --Empty bladder every 3 hours.  Empty well: wait a minute, lean forward on toilet.    --Avoid dietary irritants (see sheet).  Keep diary x 3-5 days to determine your irritants.  --KEGELS: do 10 in AM and 10 in PM, holding each x 10 seconds.  When you feel urge to go, STOP, KEGEL, and when urge has passed, then go to bathroom.  Consider PT in future.    --URGE:start  Tropsium twice a day.   SE profile reviewed.  Takes 2-4 weeks to see if will have effect.  For dry mouth: get sour, sugar free lozenge or gum.       3. Endometriosis:   --Recommend  evaluation by Dr. De La Vega     4 . High tone pelvic floor dysfunction- discussed role of PT, Medications ( anti-spasmotics and vaginal valium) for now wants to focus on urinary issues first.    5. Constipation:  Controlling constipation may help bladder urgency/leakage and fiber may better control cholesterol and blood glucose.  Start daily fiber.  Take 1 tsp of fiber powder (psyllium or other sugar-free powder).  Mix in 8 oz of water.  Take x 3-5 days.  Then, increase fiber by 1 tsp every 3-5 days until stool is easy to pass.  Stop and continue at that dose.   Do not exceed 6 tsps/day.  May also use over the counter stool softener 1-2 x/day. Continue Miralax     Approximately 55 min were spent in consult, 90 % in discussion.     Thank you for requesting consultation of your patient.  I look forward to participating in her care.    Louann Hook DO  Female Pelvic Medicine and Reconstructive Surgery  [facility] [place], [place]

## 2019-02-13 NOTE — PATIENT INSTRUCTIONS
1. urinary urgency   --Start Prelief to help alkinize the urine:   --Prelief Tablets : Each tablet contains 345 mg calcium glycerophosphate (65 mg of elemental calcium). The tablets also contain 0.25% magnesium  stearate as a processing aid. Two tablets are equivalent to 690 mg calcium glycerophosphate (130mg of elemental calcium).   --Prelief Powder : Each ¼ teaspoon usage of powder is comparable to two tablets. The powder dissolves rapidly in food or non-alcoholic beverages.  Tablets are recommended for taking with alcoholic beverages   --Usage:     --Tablets: 2-3 tablets, 2 times a day.    --Powder: ¼ teaspoon of powder 2 times a day. More can be used when needed  --Start the IC diet:  https://www.kissnofrogelp.org/wp-content/uploads/2015/07/food-list.pdf   --start Hydroxyzine 10 mg for relief of bladder pain  --start Pyridium for urinary discomfort     2. Urinary incontinence, urge  --Bladder diary for 3-7 days, write the number of times you go to the rest room and associated symptoms of urgency or leakage.   --Empty bladder every 3 hours.  Empty well: wait a minute, lean forward on toilet.    --Avoid dietary irritants (see sheet).  Keep diary x 3-5 days to determine your irritants.  --KEGELS: do 10 in AM and 10 in PM, holding each x 10 seconds.  When you feel urge to go, STOP, KEGEL, and when urge has passed, then go to bathroom.  Consider PT in future.    --URGE:start  Tropsium twice a day.   SE profile reviewed.  Takes 2-4 weeks to see if will have effect.  For dry mouth: get sour, sugar free lozenge or gum.       4. Endometriosis:   --Recommend evaluation by Dr. De La Vega     5 . High tone pelvic floor dysfunction    6. Constipation:  Controlling constipation may help bladder urgency/leakage and fiber may better control cholesterol and blood glucose.  Start daily fiber.  Take 1 tsp of fiber powder (psyllium or other sugar-free powder).  Mix in 8 oz of water.  Take x 3-5 days.  Then, increase fiber by 1 tsp every 3-5  days until stool is easy to pass.  Stop and continue at that dose.   Do not exceed 6 tsps/day.  May also use over the counter stool softener 1-2 x/day. Continue Miralax

## 2019-02-14 ENCOUNTER — PATIENT MESSAGE (OUTPATIENT)
Dept: OBSTETRICS AND GYNECOLOGY | Facility: CLINIC | Age: 29
End: 2019-02-14

## 2019-02-14 ENCOUNTER — OFFICE VISIT (OUTPATIENT)
Dept: OBSTETRICS AND GYNECOLOGY | Facility: CLINIC | Age: 29
End: 2019-02-14
Payer: COMMERCIAL

## 2019-02-14 VITALS
HEIGHT: 62 IN | SYSTOLIC BLOOD PRESSURE: 102 MMHG | BODY MASS INDEX: 28.11 KG/M2 | WEIGHT: 152.75 LBS | DIASTOLIC BLOOD PRESSURE: 80 MMHG

## 2019-02-14 DIAGNOSIS — N94.10 DYSPAREUNIA, FEMALE: ICD-10-CM

## 2019-02-14 PROCEDURE — 99204 OFFICE O/P NEW MOD 45 MIN: CPT | Mod: S$GLB,,, | Performed by: OBSTETRICS & GYNECOLOGY

## 2019-02-14 PROCEDURE — 99999 PR PBB SHADOW E&M-EST. PATIENT-LVL III: ICD-10-PCS | Mod: PBBFAC,,, | Performed by: OBSTETRICS & GYNECOLOGY

## 2019-02-14 PROCEDURE — 99999 PR PBB SHADOW E&M-EST. PATIENT-LVL III: CPT | Mod: PBBFAC,,, | Performed by: OBSTETRICS & GYNECOLOGY

## 2019-02-14 PROCEDURE — 99204 PR OFFICE/OUTPT VISIT, NEW, LEVL IV, 45-59 MIN: ICD-10-PCS | Mod: S$GLB,,, | Performed by: OBSTETRICS & GYNECOLOGY

## 2019-02-14 NOTE — LETTER
February 15, 2019      Louann Hook, DO  [address] [facility] [facility] [address]           [facility], [place] [id number] [facility] [address]  [address] [facility] [address]  Phone: [phone]          Patient: Laila Collazo   MR Number: [id number]   YOB: 1990   Date of Visit: 2/14/2019       Dear Dr. Louann Hook:    Thank you for referring Laila Collazo to me for evaluation. Attached you will find relevant portions of my assessment and plan of care.    If you have questions, please do not hesitate to call me. I look forward to following Laila Collazo along with you.    Sincerely,    James De La Vega MD    Enclosure  CC:  No Recipients    If you would like to receive this communication electronically, please contact [email or web address] or [phone] to request more information on [facility] access.    For providers and/or their staff who would like to refer a patient to [facility], please contact us through our one-stop-shop provider referral line, [facility], at [phone].    If you feel you have received this communication in error or would no longer like to receive these types of communications, please e-mail [email or web address]

## 2019-02-14 NOTE — PROGRESS NOTES
Subjective:       Patient ID: Laila Collazo is a 28 y.o. female.    Chief Complaint:  Endometriosis      History of Present Illness  HPI  Pt is 28 y.o. who was sent in consultation from Dr. Hook for treatment of endometriosis.    Pt always had bad cramps and was finally taken to OR for Dx LSC in 2017.  Told she had endo.  No lesions removed.  But was started on ocp's and had to increase to 50mcg pill due to breakthrough bleeding.    No improvement since 2017.  Primary sx are cramps and pain with intercourse.  Pain is worse with deep penetration.    Also has mild pain with ovulation.     No GI sx.  Has some bladder irritation that is being managed by Dr. Hook currently.    GYN & OB History  Patient's last menstrual period was 2019.   Date of Last Pap: No result found    OB History    Para Term  AB Living   0 0 0 0 0 0   SAB TAB Ectopic Multiple Live Births   0 0 0 0 0             Review of Systems  Review of Systems   Constitutional: Negative for activity change, appetite change and fatigue.   HENT: Negative.  Negative for tinnitus.    Eyes: Negative.    Respiratory: Negative for cough and shortness of breath.    Cardiovascular: Negative for chest pain and palpitations.   Gastrointestinal: Negative.  Negative for abdominal pain, blood in stool, constipation, diarrhea and nausea.   Endocrine: Negative.  Negative for hot flashes.   Genitourinary: Positive for dyspareunia. Negative for dysmenorrhea, dysuria, frequency, menstrual problem, pelvic pain, vaginal discharge, urinary incontinence and postcoital bleeding.   Musculoskeletal: Negative for back pain and joint swelling.   Neurological: Negative.  Negative for headaches.   Hematological: Negative.  Does not bruise/bleed easily.   Psychiatric/Behavioral: The patient is not nervous/anxious.    Breast: negative.  Negative for mass, nipple discharge and skin changes          Objective:    Physical Exam:   Constitutional: She is oriented  to person, place, and time. She appears well-developed and well-nourished. No distress.    HENT:   Head: Normocephalic and atraumatic.    Eyes: Conjunctivae and lids are normal. Pupils are equal, round, and reactive to light.    Neck: Normal range of motion and full passive range of motion without pain. Neck supple.    Cardiovascular: Normal rate and regular rhythm.  Exam reveals no edema.     Pulmonary/Chest: Effort normal and breath sounds normal. She has no wheezes.        Abdominal: Soft. Normal appearance and bowel sounds are normal. She exhibits no distension. There is no tenderness. There is no rebound and no guarding.     Genitourinary: Vagina normal and uterus normal. Pelvic exam was performed with patient supine. There is no tenderness or lesion on the right labia. There is no tenderness or lesion on the left labia. Uterus is not deviated, not fixed and not hosting fibroids. Cervix is normal. Right adnexum displays no mass and no tenderness. Left adnexum displays no mass and no tenderness. No rectocele, cystocele or unspecified prolapse of vaginal walls in the vagina. No vaginal discharge found. Labial bartholins normal.Cervix exhibits no motion tenderness and no discharge.   Genitourinary Comments: Fullness at top of bladder area.  Tenderness near right ischial spine with increased levator tone.           Musculoskeletal: Normal range of motion and moves all extremeties.       Neurological: She is alert and oriented to person, place, and time. She has normal strength.    Skin: Skin is warm and dry.    Psychiatric: She has a normal mood and affect. Her speech is normal and behavior is normal. Thought content normal. Her mood appears not anxious. She does not exhibit a depressed mood. She expresses no suicidal plans and no homicidal plans.          Assessment:        1. Endometriosis of pelvis    2. Dyspareunia, female                Plan:      Laila was seen today for endometriosis.    Diagnoses and all  orders for this visit:    Endometriosis of pelvis  -     Ambulatory consult to Physical Therapy  We had a long discussion about her endometriosis history and current issues with dyspareunia.  Will get old op notes to see how bad her endo was.  Since she had no improvement with ocp's, would hold for now.  Pt doesn't want to be on hormones if she can avoid them.  We did discuss use of Mirena IUD.  Pt is considering it.  And finally, we discussed new medication (orlissa).   Pt given information about it.      Dyspareunia, female  -     Ambulatory consult to Physical Therapy  Will greatly benefit from pelvic floor PT.  She has reproducible tenderness that would be served well.  F/u 3 months for pain re-assessment.

## 2019-07-01 PROBLEM — R10.12 LUQ ABDOMINAL PAIN: Status: RESOLVED | Noted: 2018-10-30 | Resolved: 2019-07-01

## 2019-07-01 PROBLEM — N30.90 BLADDER INFECTION: Status: RESOLVED | Noted: 2018-05-14 | Resolved: 2019-07-01

## 2019-07-01 PROBLEM — M25.561 BILATERAL KNEE PAIN: Status: RESOLVED | Noted: 2018-06-19 | Resolved: 2019-07-01

## 2019-07-01 PROBLEM — R10.30 LOWER ABDOMINAL PAIN: Status: RESOLVED | Noted: 2018-12-13 | Resolved: 2019-07-01

## 2019-07-01 PROBLEM — M25.562 BILATERAL KNEE PAIN: Status: RESOLVED | Noted: 2018-06-19 | Resolved: 2019-07-01

## 2019-07-01 NOTE — PROGRESS NOTES
"ANNUAL VISIT NOTE     PRESENTING HISTORY     Reason for Visit:  Annual visit.    No chief complaint on file.      History of Present Illness & ROS: Ms. Laila Collazo is a 28 y.o. female.  Here for Annual.   Reports today with no complaints or issues.     She has "gained some weight".   Currently in school at [employer] and enrolled in  Urban Planning Program.     Review of Systems:  Eyes: denies visual changes at this time denies floaters   ENT: no nasal congestion or sore throat  Respiratory: no cough or shorness of breath  Cardiovascular: no chest pain or palpitations  Gastrointestinal: no nausea or vomiting, no abdominal pain or change in bowel habits  Genitourinary: no hematuria or dysuria; denies frequency  Hematologic/Lymphatic: no easy bruising or lymphadenopathy  Musculoskeletal: no arthralgias or myalgias  Neurological: no seizures or tremors  Endocrine: no heat or cold intolerance    PAST HISTORY:     Past Medical History:   Diagnosis Date    Endometriosis     treated by GYN at     Helicobacter pylori ab+        Past Surgical History:   Procedure Laterality Date    COLONOSCOPY N/A 12/13/2018    Performed by Micki Gross MD at [facility] (4TH FLR)    EGD (ESOPHAGOGASTRODUODENOSCOPY) N/A 10/30/2018    Performed by Greg Leach MD at [facility] END[facility] (2ND FLR)    ESOPHAGOGASTRODUODENOSCOPY (EGD) N/A 12/5/2016    Performed by Lamine Burks MD at [facility] END[facility] (4TH FLR)    LAPAROSCOPY  2017    dx with endo.  no removal    NO PAST SURGERIES      UPPER GASTROINTESTINAL ENDOSCOPY         Family History   Problem Relation Age of Onset    Diabetes Mother     No Known Problems Father     No Known Problems Sister     No Known Problems Brother     Colon cancer Neg Hx     Crohn's disease Neg Hx     Esophageal cancer Neg Hx     Inflammatory bowel disease Neg Hx     Irritable bowel syndrome Neg Hx     Rectal cancer Neg Hx     Stomach cancer Neg Hx     Ulcerative colitis Neg Hx        Social History "     Socioeconomic History    Marital status: Single     Spouse name: Not on file    Number of children: Not on file    Years of education: Not on file    Highest education level: Not on file   Occupational History     Employer: [employer] [place]     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   Social Needs    Financial resource strain: Not on file    Food insecurity:     Worry: Not on file     Inability: Not on file    Transportation needs:     Medical: Not on file     Non-medical: Not on file   Tobacco Use    Smoking status: Never Smoker    Smokeless tobacco: Never Used   Substance and Sexual Activity    Alcohol use: No    Drug use: No    Sexual activity: Yes     Partners: Male     Birth control/protection: None   Lifestyle    Physical activity:     Days per week: Not on file     Minutes per session: Not on file    Stress: Not on file   Relationships    Social connections:     Talks on phone: Not on file     Gets together: Not on file     Attends Baptism service: Not on file     Active member of club or organization: Not on file     Attends meetings of clubs or organizations: Not on file     Relationship status: Not on file   Other Topics Concern    Not on file   Social History Narrative    Not on file       MEDICATIONS & ALLERGIES:     Current Outpatient Medications on File Prior to Visit   Medication Sig Dispense Refill    hydrOXYzine HCl (ATARAX) 10 MG Tab Take 1 tablet (10 mg total) by mouth 3 (three) times daily as needed. 30 tablet 1    trospium (SANCTURA) 20 mg Tab tablet Take 1 tablet (20 mg total) by mouth 2 (two) times daily. 60 tablet 11     No current facility-administered medications on file prior to visit.         Review of patient's allergies indicates:   Allergen Reactions    Bactrim ds [sulfamethoxazole-trimethoprim] Nausea And Vomiting    Nsaids (non-steroidal anti-inflammatory drug)      D/t h/o PUD and H pylori    Codeine Rash       Medications Reconciliation:   I have  "reconciled the patient's home medications and discharge medications with the patient/family. I have updated all changes.  Refer to After-Visit Medication List.    OBJECTIVE:     Vital Signs:  There were no vitals filed for this visit.  Wt Readings from Last 1 Encounters:   02/14/19 1407 69.3 kg (152 lb 12.5 oz)     There is no height or weight on file to calculate BMI.     5'2"  75.8 kg    108/60  72  18      Physical Exam:  General: Well developed, well nourished. No distress.  HEENT: Head is normocephalic, atraumatic; ears are normal.   Eyes: Clear conjunctiva.  Neck: Supple, symmetrical neck; trachea midline.  Lungs: Clear to auscultation bilaterally and normal respiratory effort.  Cardiovascular: Heart with regular rate and rhythm. No murmurs, gallops or rubs  Extremities: No LE edema. Pulses 2+ and symmetric.   Abdomen: Abdomen is soft, non-tender non-distended with normal bowel sounds.  Skin: Skin color, texture, turgor normal. No rashes.  Musculoskeletal: Normal gait.   Lymph Nodes: No cervical or supraclavicular adenopathy.  Neurologic: Normal strength and tone. No focal numbness or weakness.   Psychiatric: Not depressed.      Laboratory  Lab Results   Component Value Date    WBC 7.18 10/29/2018    HGB 14.1 10/29/2018    HCT 44.0 10/29/2018     10/29/2018    CHOL 182 05/02/2018    TRIG 46 05/02/2018    HDL 40 05/02/2018    ALT 13 10/29/2018    AST 17 10/29/2018     10/29/2018    K 4.4 10/29/2018     10/29/2018    CREATININE 0.8 10/29/2018    BUN 9 10/29/2018    CO2 25 10/29/2018    TSH 2.680 02/07/2019    HGBA1C 5.0 02/07/2019         ASSESSMENT & PLAN:     Preventative health care  -     Comprehensive metabolic panel; Future; Expected date: 07/02/2019  -     CBC auto differential; Future; Expected date: 07/02/2019  -     Lipid panel; Future; Expected date: 07/02/2019  -     Hemoglobin A1c; Future; Expected date: 07/02/2019  -     TSH; Future; Expected date: 07/02/2019  -     Vitamin D; " Future; Expected date: 07/02/2019        Immunizations:   TDaP: today           Medication List           Accurate as of 7/2/19  7:24 AM. If you have any questions, ask your nurse or doctor.               STOP taking these medications    hydrOXYzine HCl 10 MG Tab  Commonly known as:  ATARAX  Stopped by:  MORGAN Cage     trospium 20 mg Tab tablet  Commonly known as:  sanctura  Stopped by:  MORGAN Cage              Signing Physician:  MORGAN Cage

## 2019-07-02 ENCOUNTER — OFFICE VISIT (OUTPATIENT)
Dept: INTERNAL MEDICINE | Facility: CLINIC | Age: 29
End: 2019-07-02
Payer: COMMERCIAL

## 2019-07-02 ENCOUNTER — TELEPHONE (OUTPATIENT)
Dept: INTERNAL MEDICINE | Facility: CLINIC | Age: 29
End: 2019-07-02

## 2019-07-02 VITALS — HEIGHT: 62 IN | HEART RATE: 72 BPM | BODY MASS INDEX: 27.94 KG/M2

## 2019-07-02 DIAGNOSIS — E55.9 VITAMIN D DEFICIENCY: Primary | ICD-10-CM

## 2019-07-02 DIAGNOSIS — Z00.00 PREVENTATIVE HEALTH CARE: Primary | ICD-10-CM

## 2019-07-02 PROCEDURE — 99999 PR PBB SHADOW E&M-EST. PATIENT-LVL III: CPT | Mod: PBBFAC,,, | Performed by: NURSE PRACTITIONER

## 2019-07-02 PROCEDURE — 99395 PR PREVENTIVE VISIT,EST,18-39: ICD-10-PCS | Mod: S$GLB,,, | Performed by: NURSE PRACTITIONER

## 2019-07-02 PROCEDURE — 99395 PREV VISIT EST AGE 18-39: CPT | Mod: S$GLB,,, | Performed by: NURSE PRACTITIONER

## 2019-07-02 PROCEDURE — 99999 PR PBB SHADOW E&M-EST. PATIENT-LVL III: ICD-10-PCS | Mod: PBBFAC,,, | Performed by: NURSE PRACTITIONER

## 2019-07-02 RX ORDER — ERGOCALCIFEROL 1.25 MG/1
50000 CAPSULE ORAL
Qty: 12 CAPSULE | Refills: 0 | Status: SHIPPED | OUTPATIENT
Start: 2019-07-02 | End: 2020-03-13

## 2019-07-02 NOTE — TELEPHONE ENCOUNTER
Lab Results   Component Value Date    TSH 1.536 07/02/2019       Lab Results   Component Value Date    HGBA1C 5.0 07/02/2019         Lipid Panel   Lab Results   Component Value Date    CHOL 184 07/02/2019    CHOL 182 05/02/2018    CHOL 177 03/01/2017     Lab Results   Component Value Date    HDL 45 07/02/2019    HDL 40 05/02/2018    HDL 47 03/01/2017     Lab Results   Component Value Date    LDLCALC 127.8 07/02/2019    LDLCALC 132.8 05/02/2018    LDLCALC 119.0 03/01/2017     Lab Results   Component Value Date    TRIG 56 07/02/2019    TRIG 46 05/02/2018    TRIG 55 03/01/2017     Lab Results   Component Value Date    CHOLHDL 24.5 07/02/2019    CHOLHDL 22.0 05/02/2018    CHOLHDL 26.6 03/01/2017       CBC  Lab Results   Component Value Date    WBC 6.25 07/02/2019    HGB 14.0 07/02/2019    HCT 43.5 07/02/2019    MCV 91 07/02/2019     07/02/2019         CMP  Sodium   Date Value Ref Range Status   07/02/2019 139 136 - 145 mmol/L Final     Potassium   Date Value Ref Range Status   07/02/2019 3.9 3.5 - 5.1 mmol/L Final     Chloride   Date Value Ref Range Status   07/02/2019 106 95 - 110 mmol/L Final     CO2   Date Value Ref Range Status   07/02/2019 24 23 - 29 mmol/L Final     Glucose   Date Value Ref Range Status   07/02/2019 90 70 - 110 mg/dL Final     BUN, Bld   Date Value Ref Range Status   07/02/2019 9 6 - 20 mg/dL Final     Creatinine   Date Value Ref Range Status   07/02/2019 0.7 0.5 - 1.4 mg/dL Final     Calcium   Date Value Ref Range Status   07/02/2019 8.9 8.7 - 10.5 mg/dL Final     Total Protein   Date Value Ref Range Status   07/02/2019 6.8 6.0 - 8.4 g/dL Final     Albumin   Date Value Ref Range Status   07/02/2019 3.5 3.5 - 5.2 g/dL Final     Total Bilirubin   Date Value Ref Range Status   07/02/2019 0.3 0.1 - 1.0 mg/dL Final     Comment:     For infants and newborns, interpretation of results should be based  on gestational age, weight and in agreement with clinical  observations.  Premature Infant  recommended reference ranges:  Up to 24 hours.............<8.0 mg/dL  Up to 48 hours............<12.0 mg/dL  3-5 days..................<15.0 mg/dL  6-29 days.................<15.0 mg/dL       Alkaline Phosphatase   Date Value Ref Range Status   07/02/2019 62 55 - 135 U/L Final     AST   Date Value Ref Range Status   07/02/2019 18 10 - 40 U/L Final     ALT   Date Value Ref Range Status   07/02/2019 17 10 - 44 U/L Final     Anion Gap   Date Value Ref Range Status   07/02/2019 9 8 - 16 mmol/L Final     eGFR if    Date Value Ref Range Status   07/02/2019 >60 >60 mL/min/1.73 m^2 Final     eGFR if non    Date Value Ref Range Status   07/02/2019 >60 >60 mL/min/1.73 m^2 Final     Comment:     Calculation used to obtain the estimated glomerular filtration  rate (eGFR) is the CKD-EPI equation.          Vitamin D: pending    Released to her portal with messaging.     SDJ  .

## 2019-07-02 NOTE — TELEPHONE ENCOUNTER
"Noted US of ABD in 12/2018, with "tiny Gallstones" per the Radiologist.   LFTs on today's labs wnl.     Consider repeating the US; c/o "abd pain" to RUQ with consideration for referral to Gen Surg.     SDJ  "

## 2019-07-22 ENCOUNTER — OFFICE VISIT (OUTPATIENT)
Dept: INTERNAL MEDICINE | Facility: CLINIC | Age: 29
End: 2019-07-22
Attending: FAMILY MEDICINE
Payer: COMMERCIAL

## 2019-07-22 ENCOUNTER — CLINICAL SUPPORT (OUTPATIENT)
Dept: INTERNAL MEDICINE | Facility: CLINIC | Age: 29
End: 2019-07-22
Payer: COMMERCIAL

## 2019-07-22 VITALS
HEART RATE: 84 BPM | DIASTOLIC BLOOD PRESSURE: 62 MMHG | SYSTOLIC BLOOD PRESSURE: 110 MMHG | HEIGHT: 62 IN | BODY MASS INDEX: 28.16 KG/M2 | WEIGHT: 153 LBS

## 2019-07-22 DIAGNOSIS — T63.424A FIRE ANT BITE, UNDETERMINED INTENT, INITIAL ENCOUNTER: Primary | ICD-10-CM

## 2019-07-22 PROCEDURE — 99213 PR OFFICE/OUTPT VISIT, EST, LEVL III, 20-29 MIN: ICD-10-PCS | Mod: 25,S$GLB,, | Performed by: FAMILY MEDICINE

## 2019-07-22 PROCEDURE — 90715 TDAP VACCINE GREATER THAN OR EQUAL TO 7YO IM: ICD-10-PCS | Mod: S$GLB,,, | Performed by: FAMILY MEDICINE

## 2019-07-22 PROCEDURE — 99213 OFFICE O/P EST LOW 20 MIN: CPT | Mod: 25,S$GLB,, | Performed by: FAMILY MEDICINE

## 2019-07-22 PROCEDURE — 99999 PR PBB SHADOW E&M-EST. PATIENT-LVL I: ICD-10-PCS | Mod: PBBFAC,,,

## 2019-07-22 PROCEDURE — 99999 PR PBB SHADOW E&M-EST. PATIENT-LVL III: CPT | Mod: PBBFAC,,, | Performed by: FAMILY MEDICINE

## 2019-07-22 PROCEDURE — 99999 PR PBB SHADOW E&M-EST. PATIENT-LVL I: CPT | Mod: PBBFAC,,,

## 2019-07-22 PROCEDURE — 99999 PR PBB SHADOW E&M-EST. PATIENT-LVL III: ICD-10-PCS | Mod: PBBFAC,,, | Performed by: FAMILY MEDICINE

## 2019-07-22 PROCEDURE — 90715 TDAP VACCINE 7 YRS/> IM: CPT | Mod: S$GLB,,, | Performed by: FAMILY MEDICINE

## 2019-07-22 PROCEDURE — 90471 TDAP VACCINE GREATER THAN OR EQUAL TO 7YO IM: ICD-10-PCS | Mod: S$GLB,,, | Performed by: FAMILY MEDICINE

## 2019-07-22 PROCEDURE — 90471 IMMUNIZATION ADMIN: CPT | Mod: S$GLB,,, | Performed by: FAMILY MEDICINE

## 2019-07-22 RX ORDER — TRIAMCINOLONE ACETONIDE 1 MG/G
CREAM TOPICAL 2 TIMES DAILY
Qty: 80 G | Refills: 1 | Status: SHIPPED | OUTPATIENT
Start: 2019-07-22 | End: 2020-07-21

## 2019-07-22 NOTE — PROGRESS NOTES
Subjective:       Patient ID: Laila Collazo is a 28 y.o. female.    Chief Complaint: Insect Bite (swelling, itching, burning, stinging bite on foot.)    HPI  Review of Systems   Constitutional: Negative for chills, fatigue, fever and unexpected weight change.   HENT: Negative for congestion and trouble swallowing.    Eyes: Negative for redness and visual disturbance.   Respiratory: Negative for cough, chest tightness and shortness of breath.    Cardiovascular: Negative for chest pain, palpitations and leg swelling.   Gastrointestinal: Negative for abdominal pain and blood in stool.   Genitourinary: Negative for difficulty urinating, hematuria and menstrual problem.   Musculoskeletal: Negative for arthralgias, back pain, gait problem, joint swelling, myalgias and neck pain.   Skin: Positive for rash and wound. Negative for color change.   Neurological: Negative for tremors, speech difficulty, weakness, numbness and headaches.   Hematological: Negative for adenopathy. Does not bruise/bleed easily.   Psychiatric/Behavioral: Negative for behavioral problems, confusion and sleep disturbance. The patient is not nervous/anxious.        Objective:      Physical Exam   Constitutional: She is oriented to person, place, and time. She appears well-developed and well-nourished. No distress.   Neck: Neck supple.   Pulmonary/Chest: Effort normal.   Musculoskeletal: She exhibits no edema.        Right lower leg: She exhibits no edema.        Left lower leg: She exhibits no edema.   Neurological: She is alert and oriented to person, place, and time.   Skin: Skin is warm and dry. No rash noted.   Psychiatric: She has a normal mood and affect. Her behavior is normal. Judgment and thought content normal.   Nursing note and vitals reviewed.      Assessment:       1. Fire ant bite, undetermined intent, initial encounter        Plan:     Medication List with Changes/Refills   New Medications    TRIAMCINOLONE ACETONIDE 0.1% (KENALOG) 0.1 %  CREAM    Apply topically 2 (two) times daily.   Current Medications    ERGOCALCIFEROL (ERGOCALCIFEROL) 50,000 UNIT CAP    Take 1 capsule (50,000 Units total) by mouth every 7 days.     Laila was seen today for insect bite.    Diagnoses and all orders for this visit:    Fire ant bite, undetermined intent, initial encounter    Other orders  -     triamcinolone acetonide 0.1% (KENALOG) 0.1 % cream; Apply topically 2 (two) times daily.      See meds, orders, follow up, routing and instructions sections of encounter.  A 28-year-old new patient with an area to the left leg that she describes as   some sort of a bite.  She states Friday she felt something bite her, but did not   actually see what it was, next day with swelling to the anterior aspect of the   left ankle in the flexor area with a 2-3 mm pustule.  No fever, chills or   constitutional complaints.  The area is decreasing in size, but she states it is   .  She states the pustules popped spontaneously and she has no lost   range of motion in the ankle joint.  No fever, chills or constitutional   complaints.  She had a cell phone picture of the pustular area that appeared   similar to what would be expected from a red ant bite.    RECOMMENDATIONS:  Call p.r.n. for constitutional complaints, recurrent ankle   swelling, fever, loss, range of motion, etc.  Reassurance, topical   triamcinolone.      TOVA/TALI  dd: 07/22/2019 18:45:03 (CDT)  td: 07/23/2019 02:06:58 (CDT)  Doc ID   #[id number]  Job ID #[id number]    CC:

## 2019-08-21 ENCOUNTER — OFFICE VISIT (OUTPATIENT)
Dept: PODIATRY | Facility: CLINIC | Age: 29
End: 2019-08-21
Payer: COMMERCIAL

## 2019-08-21 VITALS
WEIGHT: 174.63 LBS | SYSTOLIC BLOOD PRESSURE: 119 MMHG | DIASTOLIC BLOOD PRESSURE: 76 MMHG | HEIGHT: 62 IN | BODY MASS INDEX: 32.14 KG/M2 | HEART RATE: 88 BPM

## 2019-08-21 DIAGNOSIS — M79.675 TOE PAIN, LEFT: ICD-10-CM

## 2019-08-21 DIAGNOSIS — L84 CALLUS: Primary | ICD-10-CM

## 2019-08-21 DIAGNOSIS — M20.5X2 ADDUCTOVARUS ROTATION OF TOE, ACQUIRED, LEFT: ICD-10-CM

## 2019-08-21 PROCEDURE — 99999 PR PBB SHADOW E&M-EST. PATIENT-LVL III: CPT | Mod: PBBFAC,,, | Performed by: PODIATRIST

## 2019-08-21 PROCEDURE — 99202 OFFICE O/P NEW SF 15 MIN: CPT | Mod: S$GLB,,, | Performed by: PODIATRIST

## 2019-08-21 PROCEDURE — 99999 PR PBB SHADOW E&M-EST. PATIENT-LVL III: ICD-10-PCS | Mod: PBBFAC,,, | Performed by: PODIATRIST

## 2019-08-21 PROCEDURE — 99202 PR OFFICE/OUTPT VISIT, NEW, LEVL II, 15-29 MIN: ICD-10-PCS | Mod: S$GLB,,, | Performed by: PODIATRIST

## 2019-08-21 NOTE — PROGRESS NOTES
Subjective:      Patient ID: Laila Collazo is a 28 y.o. female.    Chief Complaint: Foot Problem    Painful skin lesion betweet toes 4,5 left.  Gradual onset, worsening over past several weeks, aggravated by increased weight bearing, shoe gear, pressure.  No previous medical treatment.  OTC pain med not helping. Denies trauma, surgery.    Review of Systems   Constitution: Negative for chills, diaphoresis, fever, malaise/fatigue and night sweats.   Cardiovascular: Negative for claudication, cyanosis, leg swelling and syncope.   Skin: Positive for suspicious lesions. Negative for color change, dry skin, nail changes, rash and unusual hair distribution.   Musculoskeletal: Negative for falls, joint pain, joint swelling, muscle cramps, muscle weakness and stiffness.   Gastrointestinal: Negative for constipation, diarrhea, nausea and vomiting.   Neurological: Negative for brief paralysis, disturbances in coordination, focal weakness, numbness, paresthesias, sensory change and tremors.           Objective:      Physical Exam   Constitutional: She is oriented to person, place, and time. She appears well-developed and well-nourished. She is cooperative. No distress.   Cardiovascular:   Pulses:       Popliteal pulses are 2+ on the right side, and 2+ on the left side.        Dorsalis pedis pulses are 2+ on the right side, and 2+ on the left side.        Posterior tibial pulses are 2+ on the right side, and 2+ on the left side.   Capillary refill 3 seconds all toes/distal feet, all toes/both feet warm to touch.      Negative lymphadenopathy bilateral popliteal fossa and tarsal tunnel.      Negavie lower extremity edema bilateral.     Musculoskeletal:        Right ankle: She exhibits normal range of motion, no swelling, no ecchymosis, no deformity, no laceration and normal pulse. Achilles tendon normal. Achilles tendon exhibits no pain, no defect and normal Hart's test results.   Patient has hammertoes of digits  5 left with  adductovarus position                 partially reducible without symptom today.    Otherwise, Normal angle, base, station of gait. All ten toes without clubbing, cyanosis, or signs of ischemia.  No pain to palpation bilateral lower extremities.  Range of motion, stability, muscle strength, and muscle tone normal bilateral feet and legs.      Lymphadenopathy: No inguinal adenopathy noted on the right or left side.   Negative lymphadenopathy bilateral popliteal fossa and tarsal tunnel.    Negative lymphangitic streaking bilateral feet/ankles/legs.   Neurological: She is alert and oriented to person, place, and time. She has normal strength. She displays no atrophy and no tremor. No sensory deficit. She exhibits normal muscle tone. Gait normal.   Reflex Scores:       Patellar reflexes are 2+ on the right side and 2+ on the left side.       Achilles reflexes are 2+ on the right side and 2+ on the left side.  Negative tinel sign to percussion sural, superficial peroneal, deep peroneal, saphenous, and posterior tibial nerves right and left ankles and feet.     Skin: Skin is warm, dry and intact. Capillary refill takes 2 to 3 seconds. No abrasion, no bruising, no burn, no ecchymosis, no laceration, no lesion and no rash noted. She is not diaphoretic. No cyanosis or erythema. No pallor. Nails show no clubbing.     Focal hyperkeratotic lesion consisting entirely of hyperkeratotic tissue without open skin, drainage, pus, fluctuance, malodor, or signs of infection lateral 4th pipj left.    Otherwise, Skin is normal age and health appropriate color, turgor, texture, and temperature bilateral lower extremities without ulceration, hyperpigmentation, discoloration, masses nodules or cords palpated.  No ecchymosis, erythema, edema, or cardinal signs of infection bilateral lower extremities.     Psychiatric: She has a normal mood and affect.             Assessment:       Encounter Diagnoses   Name Primary?    Callus Yes    Toe  pain, left     Adductovarus rotation of toe, acquired, left          Plan:       Laila was seen today for foot problem.    Diagnoses and all orders for this visit:    Callus    Toe pain, left    Adductovarus rotation of toe, acquired, left      I counseled the patient on her conditions, their implications and medical management.        Discussed conservative treatment with shoes of adequate dimensions, material, and style to alleviate symptoms and delay or prevent surgical intervention.    Recommend toe spacers, sleeves, apertures.    Declines xrays and surgical discussion for now.          Follow up if symptoms worsen or fail to improve.

## 2019-10-02 ENCOUNTER — PATIENT MESSAGE (OUTPATIENT)
Dept: INTERNAL MEDICINE | Facility: CLINIC | Age: 29
End: 2019-10-02

## 2020-03-02 ENCOUNTER — OFFICE VISIT (OUTPATIENT)
Dept: INTERNAL MEDICINE | Facility: CLINIC | Age: 30
End: 2020-03-02
Payer: COMMERCIAL

## 2020-03-02 VITALS
WEIGHT: 168.19 LBS | HEIGHT: 62 IN | DIASTOLIC BLOOD PRESSURE: 72 MMHG | OXYGEN SATURATION: 98 % | BODY MASS INDEX: 30.95 KG/M2 | TEMPERATURE: 101 F | SYSTOLIC BLOOD PRESSURE: 110 MMHG | HEART RATE: 95 BPM

## 2020-03-02 DIAGNOSIS — R50.9 FEBRILE ILLNESS: ICD-10-CM

## 2020-03-02 DIAGNOSIS — J02.9 PHARYNGITIS, UNSPECIFIED ETIOLOGY: Primary | ICD-10-CM

## 2020-03-02 LAB
DEPRECATED S PYO AG THROAT QL EIA: NEGATIVE
INFLUENZA A, MOLECULAR: NEGATIVE
INFLUENZA B, MOLECULAR: POSITIVE
SPECIMEN SOURCE: ABNORMAL

## 2020-03-02 PROCEDURE — 99999 PR PBB SHADOW E&M-EST. PATIENT-LVL III: CPT | Mod: PBBFAC,,, | Performed by: INTERNAL MEDICINE

## 2020-03-02 PROCEDURE — 99999 PR PBB SHADOW E&M-EST. PATIENT-LVL III: ICD-10-PCS | Mod: PBBFAC,,, | Performed by: INTERNAL MEDICINE

## 2020-03-02 PROCEDURE — 87081 CULTURE SCREEN ONLY: CPT

## 2020-03-02 PROCEDURE — 87880 STREP A ASSAY W/OPTIC: CPT

## 2020-03-02 PROCEDURE — 99213 PR OFFICE/OUTPT VISIT, EST, LEVL III, 20-29 MIN: ICD-10-PCS | Mod: S$GLB,,, | Performed by: INTERNAL MEDICINE

## 2020-03-02 PROCEDURE — 87502 INFLUENZA DNA AMP PROBE: CPT

## 2020-03-02 PROCEDURE — 99213 OFFICE O/P EST LOW 20 MIN: CPT | Mod: S$GLB,,, | Performed by: INTERNAL MEDICINE

## 2020-03-02 RX ORDER — OSELTAMIVIR PHOSPHATE 75 MG/1
75 CAPSULE ORAL 2 TIMES DAILY
Qty: 10 CAPSULE | Refills: 0 | Status: SHIPPED | OUTPATIENT
Start: 2020-03-02 | End: 2020-03-07

## 2020-03-02 NOTE — PROGRESS NOTES
Subjective:       Patient ID: Laila Collazo is a 29 y.o. female.    Chief Complaint: Fever    HPI   Became ill Saturday with sore throat, which improved, then worsened this am.    Mild cough.  No runny nose.  Ears ache.  No fever, but chills this am.  .  Muscles ache.   She didn't get flu vax.    Review of Systems   Constitutional: Negative for fever and unexpected weight change.   HENT: Positive for ear pain and sore throat. Negative for congestion and postnasal drip.    Eyes: Negative for pain, discharge and visual disturbance.   Respiratory: Positive for cough. Negative for chest tightness, shortness of breath and wheezing.    Cardiovascular: Negative for chest pain and leg swelling.   Gastrointestinal: Negative for abdominal pain, constipation, diarrhea and nausea.   Genitourinary: Negative for difficulty urinating, dysuria and hematuria.   Skin: Negative for rash.   Neurological: Negative for headaches.   Psychiatric/Behavioral: Negative for dysphoric mood and sleep disturbance. The patient is not nervous/anxious.        Objective:      Physical Exam   Constitutional: She is oriented to person, place, and time. She appears well-developed and well-nourished. No distress.   HENT:   Head: Normocephalic and atraumatic.   Mouth/Throat: Oropharynx is clear and moist. No oropharyngeal exudate.   Tm's clear   Neck: Neck supple.   Cardiovascular: Normal rate and regular rhythm.   Pulmonary/Chest: Effort normal and breath sounds normal. No respiratory distress. She has no wheezes. She has no rales.   Lymphadenopathy:     She has no cervical adenopathy.   Neurological: She is alert and oriented to person, place, and time.   Psychiatric: She has a normal mood and affect. Her behavior is normal.       Assessment:       1. Pharyngitis, unspecified etiology    2. Febrile illness        Plan:       Laila was seen today for fever.    Diagnoses and all orders for this visit:    Pharyngitis, unspecified etiology  -     Throat  Screen, Rapid    Febrile illness  -     Influenza A & B by Molecular       work excuse 2 days    Ibuprofen/tylenol      Call if no better

## 2020-03-03 ENCOUNTER — TELEPHONE (OUTPATIENT)
Dept: INTERNAL MEDICINE | Facility: CLINIC | Age: 30
End: 2020-03-03

## 2020-03-03 NOTE — TELEPHONE ENCOUNTER
----- Message from Lynsey Tse MD sent at 3/2/2020 12:25 PM CST -----  Pls Call: Laila - you do have the flu.    Stay home until you are without fever for 24 hours.    Tamiflu sent to pharmacy.  Strep screen in pending  [place]  
Spoke with pt, she says her fever has gone down and started tamiflu yesterday   
Breath sounds clear and equal bilaterally.

## 2020-03-04 LAB — BACTERIA THROAT CULT: NORMAL

## 2020-03-05 ENCOUNTER — PATIENT MESSAGE (OUTPATIENT)
Dept: INTERNAL MEDICINE | Facility: CLINIC | Age: 30
End: 2020-03-05

## 2020-03-13 ENCOUNTER — NURSE TRIAGE (OUTPATIENT)
Dept: ADMINISTRATIVE | Facility: CLINIC | Age: 30
End: 2020-03-13

## 2020-03-13 ENCOUNTER — HOSPITAL ENCOUNTER (EMERGENCY)
Facility: HOSPITAL | Age: 30
Discharge: HOME | End: 2020-03-13
Attending: EMERGENCY MEDICINE
Payer: COMMERCIAL

## 2020-03-13 VITALS
HEART RATE: 100 BPM | TEMPERATURE: 99 F | BODY MASS INDEX: 31.83 KG/M2 | SYSTOLIC BLOOD PRESSURE: 115 MMHG | HEIGHT: 62 IN | DIASTOLIC BLOOD PRESSURE: 71 MMHG | OXYGEN SATURATION: 99 % | RESPIRATION RATE: 18 BRPM | WEIGHT: 173 LBS

## 2020-03-13 DIAGNOSIS — J10.1 INFLUENZA B: Primary | ICD-10-CM

## 2020-03-13 DIAGNOSIS — R07.9 CHEST PAIN: ICD-10-CM

## 2020-03-13 LAB
CTP QC/QA: YES
POC MOLECULAR INFLUENZA A AGN: NEGATIVE
POC MOLECULAR INFLUENZA B AGN: POSITIVE
TROPONIN I SERPL DL<=0.01 NG/ML-MCNC: <0.006 NG/ML (ref 0–0.03)

## 2020-03-13 PROCEDURE — 84484 ASSAY OF TROPONIN QUANT: CPT

## 2020-03-13 PROCEDURE — 87502 INFLUENZA DNA AMP PROBE: CPT

## 2020-03-13 PROCEDURE — 93010 ELECTROCARDIOGRAM REPORT: CPT | Mod: ,,, | Performed by: INTERNAL MEDICINE

## 2020-03-13 PROCEDURE — 99285 EMERGENCY DEPT VISIT HI MDM: CPT | Mod: ,,, | Performed by: PHYSICIAN ASSISTANT

## 2020-03-13 PROCEDURE — 25000003 PHARM REV CODE 250: Performed by: PHYSICIAN ASSISTANT

## 2020-03-13 PROCEDURE — 93010 EKG 12-LEAD: ICD-10-PCS | Mod: ,,, | Performed by: INTERNAL MEDICINE

## 2020-03-13 PROCEDURE — 99285 EMERGENCY DEPT VISIT HI MDM: CPT | Mod: 25

## 2020-03-13 PROCEDURE — 93005 ELECTROCARDIOGRAM TRACING: CPT

## 2020-03-13 PROCEDURE — 99285 PR EMERGENCY DEPT VISIT,LEVEL V: ICD-10-PCS | Mod: ,,, | Performed by: PHYSICIAN ASSISTANT

## 2020-03-13 RX ORDER — ACETAMINOPHEN 500 MG
1000 TABLET ORAL
Status: COMPLETED | OUTPATIENT
Start: 2020-03-13 | End: 2020-03-13

## 2020-03-13 RX ADMIN — ACETAMINOPHEN 1000 MG: 500 TABLET ORAL at 09:03

## 2020-03-13 NOTE — TELEPHONE ENCOUNTER
Reason for Disposition   Chest pain lasting longer than 5 minutes and ANY of the following:* Over 50 years old* Over 30 years old and at least one cardiac risk factor (i.e., high blood pressure, diabetes, high cholesterol, obesity, smoker or strong family history of heart disease)* Pain is crushing, pressure-like, or heavy * Took nitroglycerin and chest pain was not relieved* History of heart disease (i.e., angina, heart attack, bypass surgery, angioplasty, CHF)    Additional Information   Negative: Severe difficulty breathing (e.g., struggling for each breath, speaks in single words)   Negative: Passed out (i.e., fainted, collapsed and was not responding)    Protocols used: CHEST PAIN-A-[place]    Pt stated since this am she has been experiencing sob and chest tightness. No international traveling or exposure to anyone with Covid 19 that she is aware of. 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 Cough. Headache 3/10. Chest pain 5/10 constant heavy pressure. Care advice recommend pt call 911. Pt refused and stated she will go to an ER.

## 2020-03-14 NOTE — ED TRIAGE NOTES
Pt presents to ER w/ reports of + chest pains described as constant, non radiating, generalized, w/ new onset yesterday. Pt reports recent + flu at primary care. Denies fever or chills currently.

## 2020-03-14 NOTE — DISCHARGE INSTRUCTIONS
Drink plenty of fluids.  Return emergency department if you develop shortness of breath, worsening or different chest pain.    Our goal in the emergency department is to always give you outstanding care and exceptional service. You may receive a survey by mail or e-mail in the next week regarding your experience in our ED. We would greatly appreciate your completing and returning the survey. Your feedback provides us with a way to recognize our staff who give very good care and it helps us learn how to improve when your experience was below our aspiration of excellence.

## 2020-03-14 NOTE — ED NOTES
Two patient identifiers have been checked and are correct.      Appearance: Pt awake, alert & oriented to person, place & time. Pt in no acute distress at present time. Pt is clean and well groomed with clothes appropriately fastened.   Skin: Skin warm, dry & intact. Color consistent with ethnicity. Mucous membranes moist. No breakdown or brusing noted. + dry, non-productive cough reported, denies fever or chills.   Musculoskeletal: Patient moving all extremities well, no obvious swelling or deformities noted. + generalized body aches.   Respiratory: Respirations spontaneous, even, and non-labored. Visible chest rise noted. Airway is open and patent. No accessory muscle use noted.  Neurologic: Sensation is intact. Speech is clear and appropriate. Eyes open spontaneously, behavior appropriate to situation, follows commands, facial expression symmetrical, bilateral hand grasp equal and even, purposeful motor response noted.  Cardiac: All peripheral pulses present. No Bilateral lower extremity edema. Cap refill is <3 seconds.  Abdomen: Abdomen soft, non-tender to palpation.

## 2020-03-15 NOTE — ED PROVIDER NOTES
Encounter Date: 3/13/2020       History     Chief Complaint   Patient presents with    Chest Pain     started this morning, cough and chest pain constant, had flu last week dx at primary care , spoke with charge send to edou     Ms Collazo is a 29yoF who presents for cough and chest pain after flu diagnosis; pertinent PMHx GERD, h/o palpitations.  Patient tested positive for influenza B last week.  Reports improvement in myalgias and fatigue.  Onset of nonproductive cough with central substernal chest pain over the past 2 days.  Does not worsen with exertion.  Not associated with shortness of breath, dizziness, weakness or headache.  Does not improve with position change.  No recent leg swelling or long travel.  The patients available PMH, PSH, Social History, medications, allergies, and triage vital signs were reviewed just prior to their medical evaluation.  A ten point review of systems was completed and is negative except as documented above.  Patient denies any other acute medical complaint.    Please be advised this text was dictated with Orgdot software and may contain errors due to translation.           Review of patient's allergies indicates:   Allergen Reactions    Bactrim ds [sulfamethoxazole-trimethoprim] Nausea And Vomiting    Nsaids (non-steroidal anti-inflammatory drug)      D/t h/o PUD and H pylori    Codeine Rash     Past Medical History:   Diagnosis Date    Bilateral knee pain 6/19/2018    Endometriosis     treated by GYN at     Epigastric abdominal pain 12/5/2016    Gastroesophageal reflux disease 7/6/2016    Helicobacter pylori ab+     Helicobacter pylori ab+     High-tone pelvic floor dysfunction 5/2/2016    Palpitations 7/6/2016     Past Surgical History:   Procedure Laterality Date    COLONOSCOPY N/A 12/13/2018    Procedure: COLONOSCOPY;  Surgeon: Micki Gross MD;  Location: [address];  Service: Endoscopy;  Laterality: N/A;  preferrably in Dec 2018, CRS ok if  needed.    ESOPHAGOGASTRODUODENOSCOPY N/A 10/30/2018    Procedure: EGD (ESOPHAGOGASTRODUODENOSCOPY);  Surgeon: Greg Leach MD;  Location: [facility] ([address] [place] [address]);  Service: Endoscopy;  Laterality: N/A;  ok to schedule per Kimmy    LAPAROSCOPY  2017    dx with endo.  no removal    NO PAST SURGERIES      UPPER GASTROINTESTINAL ENDOSCOPY       Family History   Problem Relation Age of Onset    Diabetes Mother     No Known Problems Father     No Known Problems Sister     No Known Problems Brother     Hypertension Maternal Grandmother     Hypertension Maternal Grandfather     No Known Problems Paternal Grandmother     No Known Problems Paternal Grandfather     Colon cancer Neg Hx     Crohn's disease Neg Hx     Esophageal cancer Neg Hx     Inflammatory bowel disease Neg Hx     Irritable bowel syndrome Neg Hx     Rectal cancer Neg Hx     Stomach cancer Neg Hx     Ulcerative colitis Neg Hx      Social History     Tobacco Use    Smoking status: Never Smoker    Smokeless tobacco: Never Used   Substance Use Topics    Alcohol use: No    Drug use: No     Review of Systems   Constitutional: Chills: improving. Fever: improving.   HENT: Negative for congestion and trouble swallowing.    Eyes: Negative for visual disturbance.   Respiratory: Positive for cough. Negative for chest tightness and shortness of breath.    Cardiovascular: Positive for chest pain. Negative for palpitations.   Gastrointestinal: Negative for diarrhea, nausea and vomiting.   Genitourinary: Negative for dysuria.   Musculoskeletal: Negative for neck pain and neck stiffness. Myalgias: improving.   Skin: Negative for rash and wound.   Neurological: Negative for dizziness, weakness and headaches.   Psychiatric/Behavioral: Negative for confusion.       Physical Exam     Initial Vitals [03/13/20 1733]   BP Pulse Resp Temp SpO2   115/71 100 18 99.3 °F (37.4 °C) 99 %      MAP       --         Physical Exam    Constitutional: She appears  well-developed and well-nourished. She is not diaphoretic. No distress.   HENT:   Head: Normocephalic and atraumatic.   Right Ear: External ear normal.   Left Ear: External ear normal.   Mouth/Throat: Oropharynx is clear and moist. No oropharyngeal exudate.   Eyes: Conjunctivae and EOM are normal. Pupils are equal, round, and reactive to light. No scleral icterus.   Neck: Normal range of motion. Neck supple.   Cardiovascular: Normal rate, regular rhythm and intact distal pulses. Exam reveals no friction rub (no change with leaning forward).    Pulmonary/Chest: Breath sounds normal. No respiratory distress. She has no wheezes. She has no rhonchi. She has no rales. She exhibits no tenderness (nonproducible).   Abdominal: Bowel sounds are normal. There is no tenderness.   Musculoskeletal: Normal range of motion. She exhibits no edema.   Lymphadenopathy:     She has no cervical adenopathy.   Neurological: She is alert and oriented to person, place, and time. She has normal strength. No cranial nerve deficit.   Skin: Skin is warm and dry. Capillary refill takes less than 2 seconds. No rash noted. No erythema. No pallor.   Psychiatric: She has a normal mood and affect. Her behavior is normal. Judgment and thought content normal.         ED Course   Procedures  Labs Reviewed   POCT INFLUENZA A/B MOLECULAR - Abnormal; Notable for the following components:       Result Value    POC Molecular Influenza B Ag Positive (*)     All other components within normal limits   TROPONIN I        ECG Results          EKG 12-lead (Final result)  Result time 03/14/20 20:06:03    Final result by Interface, Lab In [facility] (03/14/20 20:06:03)                 Narrative:    Test Reason : R07.9,    Vent. Rate : 092 BPM     Atrial Rate : 092 BPM     P-R Int : 154 ms          QRS Dur : 082 ms      QT Int : 398 ms       P-R-T Axes : 072 064 058 degrees     QTc Int : 492 ms    Normal sinus rhythm  Nonspecific T wave abnormality  Prolonged  QT  Abnormal ECG  When compared with ECG of 27-JUN-2016 09:27,  Nonspecific T wave abnormality, worse in Lateral leads  QT has lengthened  Confirmed by DAGMAR CORNEJO MD ([id number]) on 3/14/2020 8:05:54 PM    Referred By: HENRIETTA   SELF           Confirmed By:DAGMAR CORNEJO MD                            Imaging Results          X-Ray Chest AP Portable (Final result)  Result time 03/13/20 21:33:51    Final result by Josh Camarena MD (03/13/20 21:33:51)                 Impression:      No acute findings in the chest.      Electronically signed by: Josh Camarena MD  Date:    03/13/2020  Time:    21:33             Narrative:    EXAMINATION:  XR CHEST AP PORTABLE    CLINICAL HISTORY:  Chest pain, unspecified    TECHNIQUE:  Single frontal view of the chest was performed.    COMPARISON:  April 17, 2017.    FINDINGS:  No consolidation, pleural effusion or pneumothorax.    Cardiomediastinal silhouette is unremarkable.                                 Medical Decision Making:   History:   Old Medical Records: I decided to obtain old medical records.  Old Records Summarized: records from clinic visits and records from previous admission(s).  Initial Assessment:   Patient recently diagnosed with flu B presents for nonproductive cough and mild, constant substernal chest pain since yesterday; exam unremarkable, VSS, afebrile  Differential Diagnosis:   DDx includes musculoskeletal pain, myocarditis, GERD. Physical exam and history taking lower clinical suspicion for pericarditis, pneumonia, aortic dissection, PE.  Independently Interpreted Test(s):   I have ordered and independently interpreted X-rays - see prior notes.  I have ordered and independently interpreted EKG Reading(s) - see prior notes  Clinical Tests:   Lab Tests: Ordered and Reviewed  Radiological Study: Ordered and Reviewed  Medical Tests: Ordered and Reviewed  ED Management:  Screening Troponin WNL.  EKG without acute ischemic changes, QTc <500, T-wave inversions in  V2, V3. CXR without acute findings.  Recommended follow-up with PCP next week in addition to conservative measures for likely musculoskeletal chest pain. Patient agreed to plan of care and voiced understanding.    Gwen العراقي PA-C  03/15/2020        Additional MDM:     Well's Criteria Score:  -Clinical symptoms of DVT (leg swelling, pain with palpation) = 0.0  -Other diagnosis less likely than pulmonary embolism =            0.0  -Heart Rate >100 =   0.0  -Immobilization (= or > than 3 days) or surgery in the previous 4 weeks = 0.0  -Previous DVT/PE = 0.0  -Hemoptysis =          0.0  -Malignancy =           0.0  Well's Probability Score =    0                                    Clinical Impression:       ICD-10-CM ICD-9-CM   1. Influenza B J10.1 487.1   2. Chest pain R07.9 786.50         Disposition:   Disposition: Discharged  Condition: Stable     ED Disposition Condition    Discharge Stable        ED Prescriptions     None        Follow-up Information     Follow up With Specialties Details Why Contact Info    [facility] Emergency Medicine Go to  Return to the ER immediately, If symptoms worsen or new symptoms occur [address] [place] [address] [id number] [phone]                                     Gwen العراقي PA-C  03/15/20 0949

## 2020-07-02 ENCOUNTER — TELEPHONE (OUTPATIENT)
Dept: INTERNAL MEDICINE | Facility: CLINIC | Age: 30
End: 2020-07-02

## 2020-07-02 NOTE — TELEPHONE ENCOUNTER
Called pt and let her know a letter was generated pt asked it be emailed.  emailed to [email or web address]

## 2020-07-02 NOTE — TELEPHONE ENCOUNTER
Generated and printed letter. Please see if pt wants fax or to . If she needs something more formal or a Return to work note, she'll have to be seen in a virtual visit.

## 2020-07-02 NOTE — LETTER
[facility]  [address]  [facility] [address]  Phone: [phone]  Fax: [phone]     2020    To Whom It May Concern:    Ms. Laila Collazo ( 1990) had contacted our office to inquire about return to work guidelines for COVID-19 patients. Please note I do not have any testing or results from patients. However here is the current CDC guidelines:    IF test results are POSITIVE exclude from work until:  o At least 3 days (72 hours) have passed since recovery defined as resolution of  fever without the use of fever-reducing medications AND improvement in  respiratory symptoms (e.g., cough, shortness of breath); AND  o At least 10 days have passed since symptoms first appeared.    If you have any questions, please contact me at [phone].     Sincerely,     Melany Malhotra MD  Department of Internal Medicine [facility]

## 2020-07-21 ENCOUNTER — OFFICE VISIT (OUTPATIENT)
Dept: INTERNAL MEDICINE | Facility: CLINIC | Age: 30
End: 2020-07-21
Payer: COMMERCIAL

## 2020-07-21 VITALS
WEIGHT: 169.31 LBS | DIASTOLIC BLOOD PRESSURE: 80 MMHG | HEART RATE: 97 BPM | SYSTOLIC BLOOD PRESSURE: 126 MMHG | HEIGHT: 62 IN | OXYGEN SATURATION: 100 % | BODY MASS INDEX: 31.16 KG/M2

## 2020-07-21 DIAGNOSIS — E55.9 VITAMIN D INSUFFICIENCY: ICD-10-CM

## 2020-07-21 DIAGNOSIS — Z00.00 ANNUAL PHYSICAL EXAM: Primary | ICD-10-CM

## 2020-07-21 DIAGNOSIS — R68.89 HEAT INTOLERANCE: ICD-10-CM

## 2020-07-21 PROBLEM — N80.9 ENDOMETRIOSIS DETERMINED BY LAPAROSCOPY: Status: ACTIVE | Noted: 2018-11-12

## 2020-07-21 PROCEDURE — 99999 PR PBB SHADOW E&M-EST. PATIENT-LVL III: ICD-10-PCS | Mod: PBBFAC,,, | Performed by: FAMILY MEDICINE

## 2020-07-21 PROCEDURE — 99395 PREV VISIT EST AGE 18-39: CPT | Mod: S$GLB,,, | Performed by: FAMILY MEDICINE

## 2020-07-21 PROCEDURE — 99999 PR PBB SHADOW E&M-EST. PATIENT-LVL III: CPT | Mod: PBBFAC,,, | Performed by: FAMILY MEDICINE

## 2020-07-21 PROCEDURE — 99395 PR PREVENTIVE VISIT,EST,18-39: ICD-10-PCS | Mod: S$GLB,,, | Performed by: FAMILY MEDICINE

## 2020-07-21 PROCEDURE — 99214 OFFICE O/P EST MOD 30 MIN: CPT | Mod: 25,S$GLB,, | Performed by: FAMILY MEDICINE

## 2020-07-21 PROCEDURE — 99214 PR OFFICE/OUTPT VISIT, EST, LEVL IV, 30-39 MIN: ICD-10-PCS | Mod: 25,S$GLB,, | Performed by: FAMILY MEDICINE

## 2020-07-21 RX ORDER — PROGESTERONE 200 MG/1
CAPSULE ORAL
COMMUNITY
Start: 2020-07-09 | End: 2020-09-23

## 2020-07-21 NOTE — PROGRESS NOTES
"Subjective:       Patient ID: Laila Collazo is a 29 y.o. female.    Chief Complaint: Annual Exam    HPI    29yoF for annual exam. PCP Dr Malhotra.    Pt c/o feeling of warmth throughout her body x months. Subjectively feels warm intermittently and then fiance will also tell patient she feels very hot. Will check her temperature at home sometimes and temp is wnl and is today as well. Denies cp, palps, d/c, dysuria, other acute complaints. Does follow with infertility doctor and had hormones checked ~12/2019 outside of system and was told that "thyroid was abnormal" but other labs/hormones normal.     Review of Systems   Constitutional: Negative for appetite change, fatigue and fever.   HENT: Negative for congestion.    Respiratory: Negative for cough and shortness of breath.    Cardiovascular: Negative for chest pain and palpitations.   Gastrointestinal: Negative for abdominal pain, constipation, diarrhea, nausea and vomiting.   Endocrine: Positive for heat intolerance.   Genitourinary: Negative for dysuria.   Musculoskeletal: Negative for back pain.   Skin: Negative for rash.   Neurological: Negative for weakness, numbness and headaches.         Past Medical History:   Diagnosis Date    Bilateral knee pain 6/19/2018    Endometriosis     treated by GYN at     Epigastric abdominal pain 12/5/2016    Gastroesophageal reflux disease 7/6/2016    Helicobacter pylori ab+     Helicobacter pylori ab+     High-tone pelvic floor dysfunction 5/2/2016    Palpitations 7/6/2016        Prior to Admission medications    Medication Sig Start Date End Date Taking? Authorizing Provider   triamcinolone acetonide 0.1% (KENALOG) 0.1 % cream Apply topically 2 (two) times daily. 7/22/19  Yes Greg Hernández MD        Past medical history, surgical history, and family medical history reviewed and updated as appropriate.    Medications and allergies reviewed.     Objective:          Vitals:    07/21/20 1631   BP: 126/80   BP Location: " "Right arm   Patient Position: Sitting   BP Method: Medium (Manual)   Pulse: 97   SpO2: 100%   Weight: 76.8 kg (169 lb 5 oz)   Height: 5' 2" (1.575 m)     Body mass index is 30.97 kg/m².  Physical Exam  Vitals signs and nursing note reviewed.   Constitutional:       General: She is not in acute distress.     Appearance: Normal appearance.   Eyes:      Extraocular Movements: Extraocular movements intact.   Neck:      Musculoskeletal: Normal range of motion.      Thyroid: Thyroid tenderness present. No thyroid mass or thyromegaly.   Cardiovascular:      Rate and Rhythm: Normal rate and regular rhythm.      Pulses: Normal pulses.      Heart sounds: Normal heart sounds. No murmur.   Pulmonary:      Effort: Pulmonary effort is normal. No respiratory distress.      Breath sounds: Normal breath sounds. No wheezing.   Abdominal:      General: Bowel sounds are normal. There is no distension.      Palpations: Abdomen is soft.      Tenderness: There is no abdominal tenderness.   Neurological:      Mental Status: She is alert and oriented to person, place, and time.   Psychiatric:         Mood and Affect: Mood normal.         Behavior: Behavior normal.         Lab Results   Component Value Date    WBC 6.25 07/02/2019    HGB 14.0 07/02/2019    HCT 43.5 07/02/2019     07/02/2019    CHOL 184 07/02/2019    TRIG 56 07/02/2019    HDL 45 07/02/2019    ALT 17 07/02/2019    AST 18 07/02/2019     07/02/2019    K 3.9 07/02/2019     07/02/2019    CREATININE 0.7 07/02/2019    BUN 9 07/02/2019    CO2 24 07/02/2019    TSH 1.536 07/02/2019    HGBA1C 5.0 07/02/2019       Assessment:       1. Annual physical exam    2. Vitamin D insufficiency    3. Heat intolerance        Plan:   Laila was seen today for annual exam.    Diagnoses and all orders for this visit:    Labs and f/u results     Prob: tsh levels today, consider us in future given tenderness in area, no nodules appreciated or enlargement. F/u prn.    Annual physical " exam  -     TSH; Future  -     T4; Future  -     Comprehensive metabolic panel; Future  -     CBC auto differential; Future  -     Lipid Panel; Future  -     Hemoglobin A1C; Future  -     Vitamin D; Future    Vitamin D insufficiency  -     Vitamin D; Future    Heat intolerance  -     TSH; Future  -     T4; Future    Consider thyroid us in future.    Health maintenance reviewed with patient.     No follow-ups on file.    Greg Diego MD  Family Medicine  [facility]  7/21/2020

## 2020-07-22 ENCOUNTER — PATIENT MESSAGE (OUTPATIENT)
Dept: INTERNAL MEDICINE | Facility: CLINIC | Age: 30
End: 2020-07-22

## 2020-07-22 ENCOUNTER — LAB VISIT (OUTPATIENT)
Dept: LAB | Facility: HOSPITAL | Age: 30
End: 2020-07-22
Attending: FAMILY MEDICINE
Payer: COMMERCIAL

## 2020-07-22 DIAGNOSIS — R68.89 HEAT INTOLERANCE: ICD-10-CM

## 2020-07-22 DIAGNOSIS — E55.9 VITAMIN D INSUFFICIENCY: Primary | ICD-10-CM

## 2020-07-22 DIAGNOSIS — E55.9 VITAMIN D INSUFFICIENCY: ICD-10-CM

## 2020-07-22 DIAGNOSIS — Z00.00 ANNUAL PHYSICAL EXAM: ICD-10-CM

## 2020-07-22 LAB
25(OH)D3+25(OH)D2 SERPL-MCNC: 17 NG/ML (ref 30–96)
ALBUMIN SERPL BCP-MCNC: 3.5 G/DL (ref 3.5–5.2)
ALP SERPL-CCNC: 58 U/L (ref 55–135)
ALT SERPL W/O P-5'-P-CCNC: 10 U/L (ref 10–44)
ANION GAP SERPL CALC-SCNC: 5 MMOL/L (ref 8–16)
AST SERPL-CCNC: 15 U/L (ref 10–40)
BASOPHILS # BLD AUTO: 0.04 K/UL (ref 0–0.2)
BASOPHILS NFR BLD: 0.5 % (ref 0–1.9)
BILIRUB SERPL-MCNC: 0.4 MG/DL (ref 0.1–1)
BUN SERPL-MCNC: 8 MG/DL (ref 6–20)
CALCIUM SERPL-MCNC: 8.8 MG/DL (ref 8.7–10.5)
CHLORIDE SERPL-SCNC: 108 MMOL/L (ref 95–110)
CHOLEST SERPL-MCNC: 192 MG/DL (ref 120–199)
CHOLEST/HDLC SERPL: 4.9 {RATIO} (ref 2–5)
CO2 SERPL-SCNC: 26 MMOL/L (ref 23–29)
CREAT SERPL-MCNC: 0.7 MG/DL (ref 0.5–1.4)
DIFFERENTIAL METHOD: ABNORMAL
EOSINOPHIL # BLD AUTO: 0.1 K/UL (ref 0–0.5)
EOSINOPHIL NFR BLD: 1.1 % (ref 0–8)
ERYTHROCYTE [DISTWIDTH] IN BLOOD BY AUTOMATED COUNT: 13.8 % (ref 11.5–14.5)
EST. GFR  (AFRICAN AMERICAN): >60 ML/MIN/1.73 M^2
EST. GFR  (NON AFRICAN AMERICAN): >60 ML/MIN/1.73 M^2
ESTIMATED AVG GLUCOSE: 100 MG/DL (ref 68–131)
GLUCOSE SERPL-MCNC: 87 MG/DL (ref 70–110)
HBA1C MFR BLD HPLC: 5.1 % (ref 4–5.6)
HCT VFR BLD AUTO: 43.2 % (ref 37–48.5)
HDLC SERPL-MCNC: 39 MG/DL (ref 40–75)
HDLC SERPL: 20.3 % (ref 20–50)
HGB BLD-MCNC: 13.5 G/DL (ref 12–16)
IMM GRANULOCYTES # BLD AUTO: 0.01 K/UL (ref 0–0.04)
IMM GRANULOCYTES NFR BLD AUTO: 0.1 % (ref 0–0.5)
LDLC SERPL CALC-MCNC: 143.4 MG/DL (ref 63–159)
LYMPHOCYTES # BLD AUTO: 2.2 K/UL (ref 1–4.8)
LYMPHOCYTES NFR BLD: 30.5 % (ref 18–48)
MCH RBC QN AUTO: 29.1 PG (ref 27–31)
MCHC RBC AUTO-ENTMCNC: 31.3 G/DL (ref 32–36)
MCV RBC AUTO: 93 FL (ref 82–98)
MONOCYTES # BLD AUTO: 0.4 K/UL (ref 0.3–1)
MONOCYTES NFR BLD: 6 % (ref 4–15)
NEUTROPHILS # BLD AUTO: 4.5 K/UL (ref 1.8–7.7)
NEUTROPHILS NFR BLD: 61.8 % (ref 38–73)
NONHDLC SERPL-MCNC: 153 MG/DL
NRBC BLD-RTO: 0 /100 WBC
PLATELET # BLD AUTO: 282 K/UL (ref 150–350)
PMV BLD AUTO: 11.2 FL (ref 9.2–12.9)
POTASSIUM SERPL-SCNC: 3.8 MMOL/L (ref 3.5–5.1)
PROT SERPL-MCNC: 6.8 G/DL (ref 6–8.4)
RBC # BLD AUTO: 4.64 M/UL (ref 4–5.4)
SODIUM SERPL-SCNC: 139 MMOL/L (ref 136–145)
T4 SERPL-MCNC: 8.2 UG/DL (ref 4.5–11.5)
TRIGL SERPL-MCNC: 48 MG/DL (ref 30–150)
TSH SERPL DL<=0.005 MIU/L-ACNC: 1.79 UIU/ML (ref 0.4–4)
WBC # BLD AUTO: 7.29 K/UL (ref 3.9–12.7)

## 2020-07-22 PROCEDURE — 84443 ASSAY THYROID STIM HORMONE: CPT

## 2020-07-22 PROCEDURE — 80053 COMPREHEN METABOLIC PANEL: CPT

## 2020-07-22 PROCEDURE — 36415 COLL VENOUS BLD VENIPUNCTURE: CPT

## 2020-07-22 PROCEDURE — 83036 HEMOGLOBIN GLYCOSYLATED A1C: CPT

## 2020-07-22 PROCEDURE — 84436 ASSAY OF TOTAL THYROXINE: CPT

## 2020-07-22 PROCEDURE — 85025 COMPLETE CBC W/AUTO DIFF WBC: CPT

## 2020-07-22 PROCEDURE — 82306 VITAMIN D 25 HYDROXY: CPT

## 2020-07-22 PROCEDURE — 80061 LIPID PANEL: CPT

## 2020-07-22 RX ORDER — ERGOCALCIFEROL 1.25 MG/1
50000 CAPSULE ORAL WEEKLY
Qty: 8 CAPSULE | Refills: 0 | Status: SHIPPED | OUTPATIENT
Start: 2020-07-22 | End: 2020-08-21

## 2020-08-18 ENCOUNTER — HOSPITAL ENCOUNTER (EMERGENCY)
Facility: HOSPITAL | Age: 30
Discharge: HOME | End: 2020-08-18
Attending: EMERGENCY MEDICINE
Payer: COMMERCIAL

## 2020-08-18 ENCOUNTER — OFFICE VISIT (OUTPATIENT)
Dept: NEUROLOGY | Facility: CLINIC | Age: 30
End: 2020-08-18
Payer: COMMERCIAL

## 2020-08-18 VITALS
TEMPERATURE: 99 F | HEART RATE: 90 BPM | HEIGHT: 62 IN | DIASTOLIC BLOOD PRESSURE: 69 MMHG | BODY MASS INDEX: 31.1 KG/M2 | WEIGHT: 169 LBS | OXYGEN SATURATION: 100 % | RESPIRATION RATE: 16 BRPM | SYSTOLIC BLOOD PRESSURE: 152 MMHG

## 2020-08-18 VITALS
HEART RATE: 100 BPM | DIASTOLIC BLOOD PRESSURE: 83 MMHG | BODY MASS INDEX: 30.77 KG/M2 | WEIGHT: 168.19 LBS | SYSTOLIC BLOOD PRESSURE: 139 MMHG

## 2020-08-18 DIAGNOSIS — G93.2 IIH (IDIOPATHIC INTRACRANIAL HYPERTENSION): ICD-10-CM

## 2020-08-18 DIAGNOSIS — R51.9 NONINTRACTABLE HEADACHE, UNSPECIFIED CHRONICITY PATTERN, UNSPECIFIED HEADACHE TYPE: ICD-10-CM

## 2020-08-18 DIAGNOSIS — R51.9 NONINTRACTABLE HEADACHE, UNSPECIFIED CHRONICITY PATTERN, UNSPECIFIED HEADACHE TYPE: Primary | ICD-10-CM

## 2020-08-18 DIAGNOSIS — E55.9 VITAMIN D INSUFFICIENCY: ICD-10-CM

## 2020-08-18 DIAGNOSIS — R20.2 FACIAL PARESTHESIA: Primary | ICD-10-CM

## 2020-08-18 DIAGNOSIS — B96.81 HELICOBACTER PYLORI GASTRITIS: ICD-10-CM

## 2020-08-18 DIAGNOSIS — K29.70 HELICOBACTER PYLORI GASTRITIS: ICD-10-CM

## 2020-08-18 LAB
ANION GAP SERPL CALC-SCNC: 9 MMOL/L (ref 8–16)
B-HCG UR QL: NEGATIVE
BASOPHILS # BLD AUTO: 0.04 K/UL (ref 0–0.2)
BASOPHILS NFR BLD: 0.5 % (ref 0–1.9)
BUN SERPL-MCNC: 7 MG/DL (ref 6–20)
CALCIUM SERPL-MCNC: 8.9 MG/DL (ref 8.7–10.5)
CHLORIDE SERPL-SCNC: 107 MMOL/L (ref 95–110)
CO2 SERPL-SCNC: 22 MMOL/L (ref 23–29)
CREAT SERPL-MCNC: 0.7 MG/DL (ref 0.5–1.4)
CTP QC/QA: YES
DIFFERENTIAL METHOD: ABNORMAL
EOSINOPHIL # BLD AUTO: 0.1 K/UL (ref 0–0.5)
EOSINOPHIL NFR BLD: 0.8 % (ref 0–8)
ERYTHROCYTE [DISTWIDTH] IN BLOOD BY AUTOMATED COUNT: 13.3 % (ref 11.5–14.5)
EST. GFR  (AFRICAN AMERICAN): >60 ML/MIN/1.73 M^2
EST. GFR  (NON AFRICAN AMERICAN): >60 ML/MIN/1.73 M^2
GLUCOSE SERPL-MCNC: 82 MG/DL (ref 70–110)
HCT VFR BLD AUTO: 43.9 % (ref 37–48.5)
HGB BLD-MCNC: 14 G/DL (ref 12–16)
IMM GRANULOCYTES # BLD AUTO: 0.02 K/UL (ref 0–0.04)
IMM GRANULOCYTES NFR BLD AUTO: 0.3 % (ref 0–0.5)
LYMPHOCYTES # BLD AUTO: 2.7 K/UL (ref 1–4.8)
LYMPHOCYTES NFR BLD: 36.1 % (ref 18–48)
MCH RBC QN AUTO: 29.4 PG (ref 27–31)
MCHC RBC AUTO-ENTMCNC: 31.9 G/DL (ref 32–36)
MCV RBC AUTO: 92 FL (ref 82–98)
MONOCYTES # BLD AUTO: 0.5 K/UL (ref 0.3–1)
MONOCYTES NFR BLD: 6.6 % (ref 4–15)
NEUTROPHILS # BLD AUTO: 4.2 K/UL (ref 1.8–7.7)
NEUTROPHILS NFR BLD: 55.7 % (ref 38–73)
NRBC BLD-RTO: 0 /100 WBC
PLATELET # BLD AUTO: 299 K/UL (ref 150–350)
PMV BLD AUTO: 10.8 FL (ref 9.2–12.9)
POTASSIUM SERPL-SCNC: 3.9 MMOL/L (ref 3.5–5.1)
RBC # BLD AUTO: 4.76 M/UL (ref 4–5.4)
SODIUM SERPL-SCNC: 138 MMOL/L (ref 136–145)
WBC # BLD AUTO: 7.59 K/UL (ref 3.9–12.7)

## 2020-08-18 PROCEDURE — 99284 PR EMERGENCY DEPT VISIT,LEVEL IV: ICD-10-PCS | Mod: ,,, | Performed by: PHYSICIAN ASSISTANT

## 2020-08-18 PROCEDURE — 99284 EMERGENCY DEPT VISIT MOD MDM: CPT | Mod: ,,, | Performed by: PHYSICIAN ASSISTANT

## 2020-08-18 PROCEDURE — 85025 COMPLETE CBC W/AUTO DIFF WBC: CPT

## 2020-08-18 PROCEDURE — 99999 PR PBB SHADOW E&M-EST. PATIENT-LVL III: CPT | Mod: PBBFAC,,, | Performed by: PHYSICIAN ASSISTANT

## 2020-08-18 PROCEDURE — 99204 PR OFFICE/OUTPT VISIT, NEW, LEVL IV, 45-59 MIN: ICD-10-PCS | Mod: S$GLB,,, | Performed by: PHYSICIAN ASSISTANT

## 2020-08-18 PROCEDURE — 80048 BASIC METABOLIC PNL TOTAL CA: CPT

## 2020-08-18 PROCEDURE — 99284 EMERGENCY DEPT VISIT MOD MDM: CPT | Mod: 25

## 2020-08-18 PROCEDURE — 99204 OFFICE O/P NEW MOD 45 MIN: CPT | Mod: S$GLB,,, | Performed by: PHYSICIAN ASSISTANT

## 2020-08-18 PROCEDURE — 99999 PR PBB SHADOW E&M-EST. PATIENT-LVL III: ICD-10-PCS | Mod: PBBFAC,,, | Performed by: PHYSICIAN ASSISTANT

## 2020-08-18 PROCEDURE — 81025 URINE PREGNANCY TEST: CPT | Performed by: PHYSICIAN ASSISTANT

## 2020-08-18 RX ORDER — DICLOFENAC SODIUM 75 MG/1
75 TABLET, DELAYED RELEASE ORAL 2 TIMES DAILY PRN
Qty: 20 TABLET | Refills: 3 | Status: SHIPPED | OUTPATIENT
Start: 2020-08-18 | End: 2021-07-08

## 2020-08-18 RX ORDER — DOXYCYCLINE HYCLATE 100 MG/1
100 TABLET, DELAYED RELEASE ORAL 2 TIMES DAILY
COMMUNITY
End: 2020-09-17

## 2020-08-18 RX ORDER — ACETAZOLAMIDE 125 MG/1
TABLET ORAL
Qty: 108 TABLET | Refills: 0 | Status: SHIPPED | OUTPATIENT
Start: 2020-08-18 | End: 2020-09-23

## 2020-08-18 NOTE — PROGRESS NOTES
New Patient     SUBJECTIVE:  Patient ID: Laila Collazo   MRN: [id number]  Referred By: Aaareferral Self  Chief Complaint: Headache      History of Present Illness:   29 y.o. female with a PMHx of endometriosis, low vit d, gastritis h pylori, who presents to clinic with  (fausto) for evaluation of headaches.   PMHx negative for TBI, Meningitis, Aneurysms, Kidney Stones, asthma, GI bleed, osteoporosis, CAD/MI, CVA/TIA, DM, cancer  Family Hx negative for Migraines.     Patient reports a new onset of Ha's 2 months ago that she describes as a sharp pain along her unilateral temple (can be right or left). It was occurring 5/30 days until approximately last week, she began to experience a daily pressure like pain along her bilateral retro-orbital, temporalis, and frontalis regions as well. This pain lasts anywhere from 1-24 hours. Both pains can range 1-4/10 and are generally mild. The pain worsens upon laying down or placing pressure on the head and is alleviated by sitting up. Only changes noted at this time was a pregnancy and subsequent miscarraige on July 23rd, weight changes (10 lb in approximately 2 months), and abx use (doxycycline). She had a recent eye exam approximately 1 month ago with no abnormalities noted.     Today, she began to experience an associated left facial numbness along her left eyelid and lip that lasted a few hours. She was seen in the ED and had mild left sided weakness and decreased sensation on exam. MRI brain was completed which revealed a partially empty sella. During examination hours later at my visit, she had some very subtle left sided weakness and no sensation changes. Pt states the left facial numbness had improved greatly as well.     She does recall some infrequent Ha's in high school that were mild and cuased by hunger or being overheated. Does not recall any associated symptoms or other characteristics of those Ha's.     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    Current Medications:    Current Outpatient Medications:     acetaZOLAMIDE (DIAMOX) 125 MG Tab, Take 1 tablet (125 mg total) by mouth once daily for 2 days, THEN 1 tablet (125 mg total) 2 (two) times daily for 2 days, THEN 1 tablet (125 mg total) 3 (three) times daily for 2 days, THEN 1 tablet (125 mg total) 4 (four) times daily for 24 days., Disp: 108 tablet, Rfl: 0    diclofenac (VOLTAREN) 75 MG EC tablet, Take 1 tablet (75 mg total) by mouth 2 (two) times daily as needed., Disp: 20 tablet, Rfl: 3    doxycycline (DORYX) 100 MG EC tablet, Take 100 mg by mouth 2 (two) times daily., Disp: , Rfl:     ergocalciferol (ERGOCALCIFEROL) 50,000 unit Cap, Take 1 capsule (50,000 Units total) by mouth once a week., Disp: 8 capsule, Rfl: 0    progesterone (PROMETRIUM) 200 MG capsule, I 1 C INTO THE VAGINA BID, Disp: , Rfl:     Review of Systems - as per HPI, otherwise a balanced 10 systems review is negative.    OBJECTIVE:  Vitals:  /83   Pulse 100   Wt 76.3 kg (168 lb 3.4 oz)   BMI 30.77 kg/m²     Physical Exam   Constitutional: she appears well-developed and well-nourished. she is well groomed. NAD  HENT:    Head: Normocephalic and atraumatic, Frontalis was NTTP, temporalis was NTTP   Eyes: Conjunctivae and EOM are normal. Pupils are equal, round, and reactive to light   Neck: Neck supple. Occiput and trapezius NTTP, traps tight   Musculoskeletal: Normal range of motion. No joint stiffness. No vertebral point tenderness.  Skin: Skin is warm and dry.  Psychiatric: Normal mood and affect.     Neuro exam:    Mental status:  The patient is alert and oriented to person, place and time.  Language is intact and fluent  Remote and recent memory are intact  Normal attention and concentration  Mood is stable    Cranial Nerves:  Fundoscopic examination  does not reveal any occult papilledema.    Pupils are equal and reactive to light.    Extraocular movements are intact and without nystagmus.    Visual fields are full to confrontation testing.   Facial movement is symmetric.  Facial sensation is intact.    Hearing is intact to finger rub   FROM of neck in all (6) directions without pain  Shoulder shrug symmetrical.    Coordination:     Finger to nose - normal and symmetric bilaterally   Heel to shin test - normal and symmetric bilaterally     Motor:  Normal muscle bulk and symmetry. No fasciculations were noted.   Tremor not apparent   Pronator drift not apparent.    strength was strong and symmetric, very subtle weakness noted on left hand  Finger extension strength was strong and symmetric  RUE:appropriate against gravity and medium force as tested 5/5  LUE: appropriate against gravity and medium force as tested 4/5  RLE:appropriate against gravity and medium force as tested 5/5              LLE: appropriate against gravity and medium force as tested 5/5    Reflexes:  Right Brachioradialis 2+  Left Brachioradialis 2+  Right Biceps 2+  Left Biceps 2+  Right Patellar2+  Left Patellar 2+  Right Achilles 2+  Left Achilles 2+                          Nielson was negative    Sensory:  RUE  intact light touch  LUE intact light touch  RLE intact light touch  LLE intact light touch    Gait:   Romberg - negative  Normal gait  Tandem, Heel, and Toe Walk - able to perform without difficulty    Review of Data:   Notes from ED reviewed   Labs:  Admission on 08/18/2020, Discharged on 08/18/2020   Component Date Value Ref Range Status    POC Preg Test, Ur 08/18/2020 Negative  Negative Final     Acceptable 08/18/2020 Yes   Final    WBC 08/18/2020 7.59  3.90 - 12.70 K/uL Final    RBC 08/18/2020 4.76  4.00 - 5.40 M/uL Final    Hemoglobin 08/18/2020 14.0  12.0 - 16.0 g/dL Final    Hematocrit 08/18/2020 43.9  37.0 - 48.5 % Final    Mean Corpuscular Volume  08/18/2020 92  82 - 98 fL Final    Mean Corpuscular Hemoglobin 08/18/2020 29.4  27.0 - 31.0 pg Final    Mean Corpuscular Hemoglobin Conc 08/18/2020 31.9* 32.0 - 36.0 g/dL Final    RDW 08/18/2020 13.3  11.5 - 14.5 % Final    Platelets 08/18/2020 299  150 - 350 K/uL Final    MPV 08/18/2020 10.8  9.2 - 12.9 fL Final    Immature Granulocytes 08/18/2020 0.3  0.0 - 0.5 % Final    Gran # (ANC) 08/18/2020 4.2  1.8 - 7.7 K/uL Final    Immature Grans (Abs) 08/18/2020 0.02  0.00 - 0.04 K/uL Final    Lymph # 08/18/2020 2.7  1.0 - 4.8 K/uL Final    Mono # 08/18/2020 0.5  0.3 - 1.0 K/uL Final    Eos # 08/18/2020 0.1  0.0 - 0.5 K/uL Final    Baso # 08/18/2020 0.04  0.00 - 0.20 K/uL Final    nRBC 08/18/2020 0  0 /100 WBC Final    Gran% 08/18/2020 55.7  38.0 - 73.0 % Final    Lymph% 08/18/2020 36.1  18.0 - 48.0 % Final    Mono% 08/18/2020 6.6  4.0 - 15.0 % Final    Eosinophil% 08/18/2020 0.8  0.0 - 8.0 % Final    Basophil% 08/18/2020 0.5  0.0 - 1.9 % Final    Differential Method 08/18/2020 Automated   Final    Sodium 08/18/2020 138  136 - 145 mmol/L Final    Potassium 08/18/2020 3.9  3.5 - 5.1 mmol/L Final    Chloride 08/18/2020 107  95 - 110 mmol/L Final    CO2 08/18/2020 22* 23 - 29 mmol/L Final    Glucose 08/18/2020 82  70 - 110 mg/dL Final    BUN, Bld 08/18/2020 7  6 - 20 mg/dL Final    Creatinine 08/18/2020 0.7  0.5 - 1.4 mg/dL Final    Calcium 08/18/2020 8.9  8.7 - 10.5 mg/dL Final    Anion Gap 08/18/2020 9  8 - 16 mmol/L Final    eGFR if African American 08/18/2020 >60.0  >60 mL/min/1.73 m^2 Final    eGFR if non African American 08/18/2020 >60.0  >60 mL/min/1.73 m^2 Final   Lab Visit on 07/22/2020   Component Date Value Ref Range Status    TSH 07/22/2020 1.792  0.400 - 4.000 uIU/mL Final    T4, Total 07/22/2020 8.2  4.5 - 11.5 ug/dL Final    Sodium 07/22/2020 139  136 - 145 mmol/L Final    Potassium 07/22/2020 3.8  3.5 - 5.1 mmol/L Final    Chloride 07/22/2020 108  95 - 110 mmol/L Final     CO2 07/22/2020 26  23 - 29 mmol/L Final    Glucose 07/22/2020 87  70 - 110 mg/dL Final    BUN, Bld 07/22/2020 8  6 - 20 mg/dL Final    Creatinine 07/22/2020 0.7  0.5 - 1.4 mg/dL Final    Calcium 07/22/2020 8.8  8.7 - 10.5 mg/dL Final    Total Protein 07/22/2020 6.8  6.0 - 8.4 g/dL Final    Albumin 07/22/2020 3.5  3.5 - 5.2 g/dL Final    Total Bilirubin 07/22/2020 0.4  0.1 - 1.0 mg/dL Final    Alkaline Phosphatase 07/22/2020 58  55 - 135 U/L Final    AST 07/22/2020 15  10 - 40 U/L Final    ALT 07/22/2020 10  10 - 44 U/L Final    Anion Gap 07/22/2020 5* 8 - 16 mmol/L Final    eGFR if African American 07/22/2020 >60.0  >60 mL/min/1.73 m^2 Final    eGFR if non African American 07/22/2020 >60.0  >60 mL/min/1.73 m^2 Final    WBC 07/22/2020 7.29  3.90 - 12.70 K/uL Final    RBC 07/22/2020 4.64  4.00 - 5.40 M/uL Final    Hemoglobin 07/22/2020 13.5  12.0 - 16.0 g/dL Final    Hematocrit 07/22/2020 43.2  37.0 - 48.5 % Final    Mean Corpuscular Volume 07/22/2020 93  82 - 98 fL Final    Mean Corpuscular Hemoglobin 07/22/2020 29.1  27.0 - 31.0 pg Final    Mean Corpuscular Hemoglobin Conc 07/22/2020 31.3* 32.0 - 36.0 g/dL Final    RDW 07/22/2020 13.8  11.5 - 14.5 % Final    Platelets 07/22/2020 282  150 - 350 K/uL Final    MPV 07/22/2020 11.2  9.2 - 12.9 fL Final    Immature Granulocytes 07/22/2020 0.1  0.0 - 0.5 % Final    Gran # (ANC) 07/22/2020 4.5  1.8 - 7.7 K/uL Final    Immature Grans (Abs) 07/22/2020 0.01  0.00 - 0.04 K/uL Final    Lymph # 07/22/2020 2.2  1.0 - 4.8 K/uL Final    Mono # 07/22/2020 0.4  0.3 - 1.0 K/uL Final    Eos # 07/22/2020 0.1  0.0 - 0.5 K/uL Final    Baso # 07/22/2020 0.04  0.00 - 0.20 K/uL Final    nRBC 07/22/2020 0  0 /100 WBC Final    Gran% 07/22/2020 61.8  38.0 - 73.0 % Final    Lymph% 07/22/2020 30.5  18.0 - 48.0 % Final    Mono% 07/22/2020 6.0  4.0 - 15.0 % Final    Eosinophil% 07/22/2020 1.1  0.0 - 8.0 % Final    Basophil% 07/22/2020 0.5  0.0 - 1.9 % Final     Differential Method 07/22/2020 Automated   Final    Cholesterol 07/22/2020 192  120 - 199 mg/dL Final    Triglycerides 07/22/2020 48  30 - 150 mg/dL Final    HDL 07/22/2020 39* 40 - 75 mg/dL Final    LDL Cholesterol 07/22/2020 143.4  63.0 - 159.0 mg/dL Final    Hdl/Cholesterol Ratio 07/22/2020 20.3  20.0 - 50.0 % Final    Total Cholesterol/HDL Ratio 07/22/2020 4.9  2.0 - 5.0 Final    Non-HDL Cholesterol 07/22/2020 153  mg/dL Final    Hemoglobin A1C 07/22/2020 5.1  4.0 - 5.6 % Final    Estimated Avg Glucose 07/22/2020 100  68 - 131 mg/dL Final    Vit D, 25-Hydroxy 07/22/2020 17* 30 - 96 ng/mL Final     Imaging:  Results for orders placed or performed during the hospital encounter of 08/18/20   MRI Brain Without Contrast    Narrative    EXAMINATION:  MRI BRAIN WITHOUT CONTRAST    CLINICAL HISTORY:  Neuro deficit, acute, persistent or progressing;    TECHNIQUE:  Multiplanar multisequence MR imaging of the brain was performed without intravenous contrast.    COMPARISON:  No priors    FINDINGS:  Intracranial Compartment:    Ventricles are normal in size for age without evidence of hydrocephalus.    The brain parenchyma appears within normal limits.  No mass or mass effect.  No recent or remote hemorrhage or major vascular distribution infarct.    No extra-axial blood or fluid collections.    Partially empty sella configuration.    Normal vascular flow voids are preserved.    Skull/Extracranial Contents (limited evaluation):    Bone marrow signal intensity is normal.      Impression    The brain appears within normal limits, specifically without evidence of recent infarct or hemorrhage.    Partially empty sella configuration.    Further evaluation as warranted clinically.      Electronically signed by: Enoch Vasquez MD  Date:    08/18/2020  Time:    11:17     Note: I have independently reviewed any/all imaging/labs/tests and agree with the report (s) as documented.  Any discrepancies will be as noted/demarcated  by free text.  BIN MONTEJO 8/18/2020    ASSESSMENT:  1. Nonintractable headache, unspecified chronicity pattern, unspecified headache type    2. IIH (idiopathic intracranial hypertension)    3. Helicobacter pylori gastritis    4. Vitamin D insufficiency          PLAN:  - Discussed symptoms appear to be consistent with IIH, discussed treatment options and patient agreed with the following plan:  - consulted w/ Dr. Mar  - obtain MRV to r/o other causes of IIH  - short course of diamox 125 titrating up to QID (consider zonisamide in future)  - abortive - diclofenac (discussed at length cautions 2/2 previous hx of gastritis)  - vit d def - continue vit d supplements, mgmt per pcp  - currently putting family planning on hold while working on her health, will inform me when she decides to begin family planning again so we can make necessary adjustments  - risks, benefits, and potential side effects of diamox, diclofenac discussed   - alternative treatment options offered   - importance of healthy diet, regular exercise and sleep hygiene in the treatment of headaches    - Start tracking headaches via [facility] [employer] on phone   - RTC in 1 mo     Orders Placed This Encounter    MRV Brain Without Contrast    acetaZOLAMIDE (DIAMOX) 125 MG Tab    diclofenac (VOLTAREN) 75 MG EC tablet       I have discussed realistic goals of care with patient at length as well as medication options, and need for lifestyle adjustment. I have explained that treatment will take time. We have agreed that the goal will be to reduce frequency/intensity/quantity of HA, not to be completely HA free. I have explained my non narcotic policy regarding headache treatment.    Patient agreeable to work on lifestyle adjustments.    Questions and concerns were sought and answered to the patient's stated verbal satisfaction.  The patient verbalizes understanding and agreement with the above stated treatment plan.     CC: MD Kiya Avila,  BIN  [facility]  [phone]    Dr. Mar was available and consulted with during today's encounter.

## 2020-08-18 NOTE — ED PROVIDER NOTES
Encounter Date: 2020       History     Chief Complaint   Patient presents with    Facial Pain     sharp temp pain on L side on and off 2 months, has neurology apt at 3pm, came in today pain on L side of face feel numb, no droop, numbness to L corner of mouth, and L eye     29-year-old female presents to the with a chief complaint of head pressure and facial numbness.  Patient reports frontal head pressure that began over the weekend.  She reports associated pressure behind left eye.  She reports blurry vision when she applies pressure to her head or lies down.  She does not have a history of headache or migraines.  Patient noticed numbness to her left eyelid and her left upper lip today which prompted her ED visit.  Patient also notes a sharp pain in her left temple that has been intermittent for the past 2 months.  She reports that the pain is quick and immediately resolves. She feels slightly woozy when the episodes occur. Of note, pt had a spontaneous  a few weeks ago. She denies weakness, extremity numbness, difficulty walking or swallowing.  She took Tylenol for her headache which did not provide any relief.  She denies any history of headaches or migraines.     The history is provided by the patient. No  was used.     Review of patient's allergies indicates:   Allergen Reactions    Bactrim ds [sulfamethoxazole-trimethoprim] Nausea And Vomiting    Nsaids (non-steroidal anti-inflammatory drug)      D/t h/o PUD and H pylori    Codeine Rash     Past Medical History:   Diagnosis Date    Bilateral knee pain 2018    Endometriosis     treated by GYN at     Epigastric abdominal pain 2016    Gastroesophageal reflux disease 2016    Helicobacter pylori ab+     Helicobacter pylori ab+     High-tone pelvic floor dysfunction 2016    Palpitations 2016     Past Surgical History:   Procedure Laterality Date    COLONOSCOPY N/A 2018    Procedure:  COLONOSCOPY;  Surgeon: Micki Gross MD;  Location: [facility] ENDO (4TH FLR);  Service: Endoscopy;  Laterality: N/A;  preferrably in Dec 2018, CRS ok if needed.    ESOPHAGOGASTRODUODENOSCOPY N/A 10/30/2018    Procedure: EGD (ESOPHAGOGASTRODUODENOSCOPY);  Surgeon: Greg Leach MD;  Location: [facility] ENDO (2ND FLR);  Service: Endoscopy;  Laterality: N/A;  ok to schedule per Kimmy    LAPAROSCOPY  2017    dx with endo.  no removal    NO PAST SURGERIES      UPPER GASTROINTESTINAL ENDOSCOPY       Family History   Problem Relation Age of Onset    Diabetes Mother     No Known Problems Father     No Known Problems Sister     No Known Problems Brother     Hypertension Maternal Grandmother     Hypertension Maternal Grandfather     No Known Problems Paternal Grandmother     No Known Problems Paternal Grandfather     Colon cancer Neg Hx     Crohn's disease Neg Hx     Esophageal cancer Neg Hx     Inflammatory bowel disease Neg Hx     Irritable bowel syndrome Neg Hx     Rectal cancer Neg Hx     Stomach cancer Neg Hx     Ulcerative colitis Neg Hx      Social History     Tobacco Use    Smoking status: Never Smoker    Smokeless tobacco: Never Used   Substance Use Topics    Alcohol use: No    Drug use: No     Review of Systems   Constitutional: Negative for fever.   HENT: Negative for sore throat.    Respiratory: Negative for shortness of breath.    Cardiovascular: Negative for chest pain.   Gastrointestinal: Negative for nausea and vomiting.   Genitourinary: Negative for dysuria.   Musculoskeletal: Negative for back pain.   Skin: Negative for rash.   Neurological: Positive for numbness and headaches. Negative for weakness.   Hematological: Does not bruise/bleed easily.       Physical Exam     Initial Vitals [08/18/20 1008]   BP Pulse Resp Temp SpO2   (!) 145/66 90 18 98.6 °F (37 °C) 100 %      MAP       --         Physical Exam    Nursing note and vitals reviewed.  Constitutional: She appears well-developed  and well-nourished. She is not diaphoretic.  Non-toxic appearance. She does not appear ill. No distress.   HENT:   Head: Normocephalic and atraumatic.   Neck: Neck supple.   Cardiovascular: Normal rate and regular rhythm. Exam reveals no gallop and no friction rub.    No murmur heard.  Pulmonary/Chest: Effort normal and breath sounds normal. No accessory muscle usage. No tachypnea. No respiratory distress. She has no decreased breath sounds. She has no wheezes. She has no rhonchi. She has no rales.   Abdominal: She exhibits no distension.   Neurological: She is alert.   4/5 strength most notable with L dorsiflexion and hip flexion.     Speech is clear.  No facial droop or asymmetry.  Patient reports decreased sensation to the left eyelid and slightly in the temporal area as well as to the left side of the upper lip.  Normal sensation to the forehead, cheek and chin bilaterally.    Skin: No rash noted.   Psychiatric: She has a normal mood and affect. Her behavior is normal.         ED Course   Procedures  Labs Reviewed   CBC W/ AUTO DIFFERENTIAL - Abnormal; Notable for the following components:       Result Value    Mean Corpuscular Hemoglobin Conc 31.9 (*)     All other components within normal limits   BASIC METABOLIC PANEL - Abnormal; Notable for the following components:    CO2 22 (*)     All other components within normal limits   POCT URINE PREGNANCY          Imaging Results          MRI Brain Without Contrast (Final result)  Result time 08/18/20 11:17:20    Final result by Enoch Vasquez MD (08/18/20 11:17:20)                 Impression:      The brain appears within normal limits, specifically without evidence of recent infarct or hemorrhage.    Partially empty sella configuration.    Further evaluation as warranted clinically.      Electronically signed by: Enoch Vasquez MD  Date:    08/18/2020  Time:    11:17             Narrative:    EXAMINATION:  MRI BRAIN WITHOUT CONTRAST    CLINICAL HISTORY:  Neuro  deficit, acute, persistent or progressing;    TECHNIQUE:  Multiplanar multisequence MR imaging of the brain was performed without intravenous contrast.    COMPARISON:  No priors    FINDINGS:  Intracranial Compartment:    Ventricles are normal in size for age without evidence of hydrocephalus.    The brain parenchyma appears within normal limits.  No mass or mass effect.  No recent or remote hemorrhage or major vascular distribution infarct.    No extra-axial blood or fluid collections.    Partially empty sella configuration.    Normal vascular flow voids are preserved.    Skull/Extracranial Contents (limited evaluation):    Bone marrow signal intensity is normal.                                 Medical Decision Making:   History:   Old Medical Records: I decided to obtain old medical records.  Differential Diagnosis:   My differential diagnosis includes but is not limited to:  Neuromuscular disorder, demyelinating disorder, intracranial mass, anxiety, stroke   Clinical Tests:   Lab Tests: Ordered  Radiological Study: Ordered       APC / Resident Notes:   General Neurology 29-year-old female presents to the ED with headache and new left-sided facial numbness.  Vitals within normal limits.  Patient is afebrile, nontoxic appearing and in no acute distress.  Neuro exam as above.    Given abnormal neuro findings on my exam, MRI of the brain was obtained.  Imaging revealed no acute process.  Discussed this patient with Neurology.  They reviewed the patient's imaging.  Unfortunately, they would not be able to evaluate the patient in the ED for another several hours.  We agreed that patient would be stable for discharge to attend her neurology appointment today. Pt agreed to this plan.  She will be discharged in stable condition.  ED return precautions given.    I have reviewed the patient's records and discussed this case with my supervising physician. Please be advised that this text was dictated with [employer] software and  may contain dictation errors.                Attending Attestation:     Physician Attestation Statement for NP/PA:   I have conducted a face to face encounter with this patient in addition to the NP/PA, due to NP/PA Request    Other NP/PA Attestation Additions:    History of Present Illness: 28 yo female presenting with chronic headache, new numbness to face.    Physical Exam: Equal and full strength in all extremities on my exam, maybe 4+/5 LUE.  No papilledema noted.    Medical Decision Making: MRI and lab results reviewed.  PA discussed patient with neuro, as patient had an appointment already scheduled for today.    Patient amenable to discharge to go to neuro appt, understands she may be referred back to ER after neuro eval.   She was given strict return precautions.                                Clinical Impression:       ICD-10-CM ICD-9-CM   1. Facial paresthesia  R20.9 782.0   2. Nonintractable headache, unspecified chronicity pattern, unspecified headache type  R51 784.0         Disposition:   Disposition: Discharged  Condition: Stable     ED Disposition Condition    Discharge Stable        ED Prescriptions     None        Follow-up Information    None                                    Shelby Santos PA-C  08/18/20 1901       Shelby Santos PA-C  08/18/20 1902       Robert Rodriguez MD  08/18/20 1943

## 2020-08-18 NOTE — DISCHARGE INSTRUCTIONS
Go to your neurology appointment today.     Return to the ER for any new or significantly worsening symptoms such as spreading numbness, weakness in your face, arms or legs, severe headache, fever greater than 100.4 or any other worrisome symptoms.

## 2020-08-18 NOTE — ED TRIAGE NOTES
Laila Collazo, a 29 y.o. female presents to the ED via personal transportation with CC right side facial pain, numbness to left side of face, x5 days. Also reports slight weakness in left leg, states did not realized until PA was performing eval. Denies dizziness. Reports blurred vision lying down. Denies CP nausea SOB fever or cough.    Patient identifiers verified verbally with patient and correct for Laila Collazo.      SKIN: Skin is warm dry and intact, and color is consistent with ethnicity. Capillary refill <3 seconds. No breakdown or brusing visible. Mucus membranes moist, acyanotic.  RESPIRATORY: Airway is open and patent. Respirations-spontaneous, unlabored, regular rate, equal bilaterally on inspiration and expiration. No accessory muscle use noted. Lungs clear to auscultation in all fields bilaterally anterior and posterior.   CARDIAC: Patient has regular heart rate.  No peripheral edema noted, and patient has no c/o chest pain. Peripheral pulses present equal and strong throughout.  ABDOMEN: Soft and non-tender to palpation with no distention noted. Normoactive bowel sounds x4 quadrants. Pt has no complaints of abnormal bowel movements, denies blood in stool.   NEUROLOGIC: Pt c/o right side headache, rates pain 4/10. Eyes open spontaneously and facial expression symmetrical. Pt behavior appropriate to situation, and pt follows commands. Pt reports sensation present in all extremities when touched with a finger, denies any numbness or tingling. PERRLA  MUSCULOSKELETAL: Spontaneous movement noted to all extremities. Hand  equal and leg strength strong +5 bilaterally.   : No complaints of frequency, burning, urgency or blood in the urine. No complaints of incontinence.

## 2020-08-19 ENCOUNTER — TELEPHONE (OUTPATIENT)
Dept: NEUROLOGY | Facility: CLINIC | Age: 30
End: 2020-08-19

## 2020-08-19 NOTE — TELEPHONE ENCOUNTER
----- Message from Kristan Cerna sent at 8/19/2020 10:35 AM CDT -----  Regarding: MRV  Contact: Pt. [phone]  The patient would like to speak to someone regarding scheduling her MRV. Please contact the patient to discuss further.

## 2020-08-26 ENCOUNTER — HOSPITAL ENCOUNTER (OUTPATIENT)
Dept: RADIOLOGY | Facility: HOSPITAL | Age: 30
Discharge: HOME | End: 2020-08-26
Attending: PHYSICIAN ASSISTANT
Payer: COMMERCIAL

## 2020-08-26 DIAGNOSIS — R51.9 NONINTRACTABLE HEADACHE, UNSPECIFIED CHRONICITY PATTERN, UNSPECIFIED HEADACHE TYPE: ICD-10-CM

## 2020-08-26 PROCEDURE — 70544 MRV BRAIN WITHOUT CONTRAST: ICD-10-PCS | Mod: 26,,, | Performed by: RADIOLOGY

## 2020-08-26 PROCEDURE — 70544 MR ANGIOGRAPHY HEAD W/O DYE: CPT | Mod: TC

## 2020-08-26 PROCEDURE — 70544 MR ANGIOGRAPHY HEAD W/O DYE: CPT | Mod: 26,,, | Performed by: RADIOLOGY

## 2020-09-08 ENCOUNTER — PATIENT MESSAGE (OUTPATIENT)
Dept: INTERNAL MEDICINE | Facility: CLINIC | Age: 30
End: 2020-09-08

## 2020-09-08 DIAGNOSIS — E55.9 VITAMIN D DEFICIENCY: Primary | ICD-10-CM

## 2020-09-09 ENCOUNTER — PATIENT MESSAGE (OUTPATIENT)
Dept: NEUROLOGY | Facility: CLINIC | Age: 30
End: 2020-09-09

## 2020-09-09 ENCOUNTER — LAB VISIT (OUTPATIENT)
Dept: LAB | Facility: HOSPITAL | Age: 30
End: 2020-09-09
Attending: INTERNAL MEDICINE
Payer: COMMERCIAL

## 2020-09-09 DIAGNOSIS — E55.9 VITAMIN D DEFICIENCY: ICD-10-CM

## 2020-09-09 LAB — 25(OH)D3+25(OH)D2 SERPL-MCNC: 19 NG/ML (ref 30–96)

## 2020-09-09 PROCEDURE — 36415 COLL VENOUS BLD VENIPUNCTURE: CPT

## 2020-09-09 PROCEDURE — 82306 VITAMIN D 25 HYDROXY: CPT

## 2020-09-16 ENCOUNTER — PATIENT MESSAGE (OUTPATIENT)
Dept: INTERNAL MEDICINE | Facility: CLINIC | Age: 30
End: 2020-09-16

## 2020-09-16 NOTE — PROGRESS NOTES
"INTERNAL MEDICINE CLINIC - SAME DAY APPOINTMENT  Progress Note    PRESENTING HISTORY     PCP: Melany Malhotra MD  Chief Complaint/Reason for Visit:   No chief complaint on file.      History of Present Illness & ROS : Ms. Laila Collazo is a 29 y.o. female.    Here for Same Day appt.  Very sweet young lady.   Reports that "started to feel some soreness to left side of neck, hurts with moving, and thought lymph node swelling".   Denies any sick contacts. Denies "cold" symptoms, including cough, ear pain. Sore throat, congestion or headaches.   Has not tried taking anything for this new problem.     Denies recent travel.         Review of Systems:  Eyes: denies visual changes at this time denies floaters   ENT: no nasal congestion or sore throat  Respiratory: no cough or shorness of breath  Cardiovascular: no chest pain or palpitations  Gastrointestinal: no nausea or vomiting, no abdominal pain or change in bowel habits  Genitourinary: no hematuria or dysuria; denies frequency  Hematologic/Lymphatic: no easy bruising or lymphadenopathy  Musculoskeletal: no arthralgias or myalgias  Neurological: no seizures or tremors  Endocrine: no heat or cold intolerance      PAST HISTORY:     Past Medical History:   Diagnosis Date    Bilateral knee pain 6/19/2018    Endometriosis     treated by GYN at     Epigastric abdominal pain 12/5/2016    Gastroesophageal reflux disease 7/6/2016    Helicobacter pylori ab+     Helicobacter pylori ab+     High-tone pelvic floor dysfunction 5/2/2016    Palpitations 7/6/2016       Past Surgical History:   Procedure Laterality Date    COLONOSCOPY N/A 12/13/2018    Procedure: COLONOSCOPY;  Surgeon: Micki Gross MD;  Location: [facility] (4TH FLR);  Service: Endoscopy;  Laterality: N/A;  preferrably in Dec 2018, CRS ok if needed.    ESOPHAGOGASTRODUODENOSCOPY N/A 10/30/2018    Procedure: EGD (ESOPHAGOGASTRODUODENOSCOPY);  Surgeon: Greg Leach MD;  Location: [facility] (2ND FLR);  Service: " Endoscopy;  Laterality: N/A;  ok to schedule per Kimmy    LAPAROSCOPY  2017    dx with endo.  no removal    NO PAST SURGERIES      UPPER GASTROINTESTINAL ENDOSCOPY         Family History   Problem Relation Age of Onset    Diabetes Mother     No Known Problems Father     No Known Problems Sister     No Known Problems Brother     Hypertension Maternal Grandmother     Hypertension Maternal Grandfather     No Known Problems Paternal Grandmother     No Known Problems Paternal Grandfather     Colon cancer Neg Hx     Crohn's disease Neg Hx     Esophageal cancer Neg Hx     Inflammatory bowel disease Neg Hx     Irritable bowel syndrome Neg Hx     Rectal cancer Neg Hx     Stomach cancer Neg Hx     Ulcerative colitis Neg Hx        Social History     Socioeconomic History    Marital status: Single     Spouse name: Not on file    Number of children: Not on file    Years of education: Not on file    Highest education level: Not on file   Occupational History     Employer: [employer]     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   Social Needs    Financial resource strain: Not on file    Food insecurity     Worry: Not on file     Inability: Not on file    Transportation needs     Medical: Not on file     Non-medical: Not on file   Tobacco Use    Smoking status: Never Smoker    Smokeless tobacco: Never Used   Substance and Sexual Activity    Alcohol use: No    Drug use: No    Sexual activity: Yes     Partners: Male     Birth control/protection: None   Lifestyle    Physical activity     Days per week: Not on file     Minutes per session: Not on file    Stress: Not on file   Relationships    Social connections     Talks on phone: Not on file     Gets together: Not on file     Attends Jew service: Not on file     Active member of club or organization: Not on file     Attends meetings of clubs or organizations: Not on file     Relationship status: Not on file   Other Topics Concern     Not on file   Social History Narrative    Not on file       MEDICATIONS & ALLERGIES:     Current Outpatient Medications on File Prior to Visit   Medication Sig Dispense Refill    acetaZOLAMIDE (DIAMOX) 125 MG Tab Take 1 tablet (125 mg total) by mouth once daily for 2 days, THEN 1 tablet (125 mg total) 2 (two) times daily for 2 days, THEN 1 tablet (125 mg total) 3 (three) times daily for 2 days, THEN 1 tablet (125 mg total) 4 (four) times daily for 24 days. 108 tablet 0    diclofenac (VOLTAREN) 75 MG EC tablet Take 1 tablet (75 mg total) by mouth 2 (two) times daily as needed. 20 tablet 3    doxycycline (DORYX) 100 MG EC tablet Take 100 mg by mouth 2 (two) times daily.      progesterone (PROMETRIUM) 200 MG capsule I 1 C INTO THE VAGINA BID       No current facility-administered medications on file prior to visit.         Review of patient's allergies indicates:   Allergen Reactions    Bactrim ds [sulfamethoxazole-trimethoprim] Nausea And Vomiting    Nsaids (non-steroidal anti-inflammatory drug)      D/t h/o PUD and H pylori    Codeine Rash       Medications Reconciliation:   I have reconciled the patient's home medications with the patient/family. I have updated all changes.  Refer to After-Visit Medication List.    OBJECTIVE:     Vital Signs:  There were no vitals filed for this visit.  Wt Readings from Last 1 Encounters:   08/18/20 1409 76.3 kg (168 lb 3.4 oz)     There is no height or weight on file to calculate BMI.     Wt Readings from Last 3 Encounters:   09/17/20 74.1 kg (163 lb 5.8 oz)   08/18/20 76.3 kg (168 lb 3.4 oz)   08/18/20 76.7 kg (169 lb)     Temp Readings from Last 3 Encounters:   08/18/20 98.6 °F (37 °C) (Oral)   03/13/20 99.3 °F (37.4 °C) (Oral)   03/02/20 (!) 100.7 °F (38.2 °C)     BP Readings from Last 3 Encounters:   09/17/20 124/86   08/18/20 139/83   08/18/20 (!) 152/69     Pulse Readings from Last 3 Encounters:   09/17/20 103   08/18/20 100   08/18/20 90         Physical  Exam:  General: Well developed, well nourished. No distress.  HEENT: Head is normocephalic, atraumatic; ears are normal.   Eyes: Clear conjunctiva.  Neck: Supple, symmetrical neck; trachea midline.  + muscle hypertrophy to left lateral neck wall; no appreciable LAD on exam  Lungs: Clear to auscultation bilaterally and normal respiratory effort.  Cardiovascular: Heart with regular rate and rhythm. No murmurs, gallops or rubs  Extremities: No LE edema. Pulses 2+ and symmetric.   Abdomen: Abdomen is soft, non-tender non-distended with normal bowel sounds.  Skin: Skin color, texture, turgor normal. No rashes.  Musculoskeletal: Normal gait.   + induced expression of discomfort upon right lateral rotation of neck and with flexion from chin to chest maneuver   Lymph Nodes: No cervical or supraclavicular adenopathy.  Neurologic: Normal strength and tone. No focal numbness or weakness.   Psychiatric: Not depressed.      Laboratory  Lab Results   Component Value Date    WBC 7.59 08/18/2020    HGB 14.0 08/18/2020    HCT 43.9 08/18/2020     08/18/2020    CHOL 192 07/22/2020    TRIG 48 07/22/2020    HDL 39 (L) 07/22/2020    ALT 10 07/22/2020    AST 15 07/22/2020     08/18/2020    K 3.9 08/18/2020     08/18/2020    CREATININE 0.7 08/18/2020    BUN 7 08/18/2020    CO2 22 (L) 08/18/2020    TSH 1.792 07/22/2020    HGBA1C 5.1 07/22/2020         ASSESSMENT & PLAN:     Here for Same Day appt.     *Based on symptomatology and clinical exam findings, nothing depicts viral or infectious today.     Muscle hypertrophy to Left Lateral Neck-wall:     Other orders  -     tiZANidine (ZANAFLEX) 4 MG tablet; Take 1 tablet (4 mg total) by mouth every 6 (six) hours as needed.  Dispense: 30 tablet; Refill: 0  ` advised to application of Warm compresses 2-3 times daily, x 20 min intervals.      *Advised that if not improving over the next 5 days, or something should change in symptoms, to notify either this provider or primary  promptly.     *May need to have Viral panel or abx therapy, +/- need to have imaging to further delineate the findings.     Scheduled for an Annual with her Primary in 14 days.     Future Appointments   Date Time Provider Department Center   9/22/2020  8:00 AM Kiya Estrada PA-C [facility] NEURO [facility]   9/30/2020  7:00 AM Melany Malhotra MD [facility] IM [facility]   10/14/2020  3:45 PM Quique Tanner MD [facility] SPINE [facility]        Medication List          Accurate as of September 17, 2020  8:42 AM. If you have any questions, ask your nurse or doctor.            START taking these medications    tiZANidine 4 MG tablet  Commonly known as: ZANAFLEX  Take 1 tablet (4 mg total) by mouth every 6 (six) hours as needed.  Started by: MORGAN Cage        CONTINUE taking these medications    acetaZOLAMIDE 125 MG Tab  Commonly known as: DIAMOX  Take 1 tablet (125 mg total) by mouth once daily for 2 days, THEN 1 tablet (125 mg total) 2 (two) times daily for 2 days, THEN 1 tablet (125 mg total) 3 (three) times daily for 2 days, THEN 1 tablet (125 mg total) 4 (four) times daily for 24 days.  Start taking on: August 18, 2020     diclofenac 75 MG EC tablet  Commonly known as: VOLTAREN  Take 1 tablet (75 mg total) by mouth 2 (two) times daily as needed.     progesterone 200 MG capsule  Commonly known as: PROMETRIUM        STOP taking these medications    doxycycline 100 MG EC tablet  Commonly known as: DORYX  Stopped by: MORGAN Cage           Where to Get Your Medications      These medications were sent to [id number] - [place], [place] - [address]  [address] [facility] [address]    Phone: [phone]   · tiZANidine 4 MG tablet         Signing Physician:  MORGAN Cage

## 2020-09-17 ENCOUNTER — OFFICE VISIT (OUTPATIENT)
Dept: INTERNAL MEDICINE | Facility: CLINIC | Age: 30
End: 2020-09-17
Payer: COMMERCIAL

## 2020-09-17 VITALS
WEIGHT: 163.38 LBS | SYSTOLIC BLOOD PRESSURE: 124 MMHG | BODY MASS INDEX: 30.07 KG/M2 | HEIGHT: 62 IN | OXYGEN SATURATION: 98 % | DIASTOLIC BLOOD PRESSURE: 86 MMHG | HEART RATE: 103 BPM

## 2020-09-17 DIAGNOSIS — M62.89 MUSCLE HYPERTROPHY: Primary | ICD-10-CM

## 2020-09-17 PROCEDURE — 99214 OFFICE O/P EST MOD 30 MIN: CPT | Mod: S$GLB,,, | Performed by: NURSE PRACTITIONER

## 2020-09-17 PROCEDURE — 99214 PR OFFICE/OUTPT VISIT, EST, LEVL IV, 30-39 MIN: ICD-10-PCS | Mod: S$GLB,,, | Performed by: NURSE PRACTITIONER

## 2020-09-17 PROCEDURE — 99999 PR PBB SHADOW E&M-EST. PATIENT-LVL III: ICD-10-PCS | Mod: PBBFAC,,, | Performed by: NURSE PRACTITIONER

## 2020-09-17 PROCEDURE — 99999 PR PBB SHADOW E&M-EST. PATIENT-LVL III: CPT | Mod: PBBFAC,,, | Performed by: NURSE PRACTITIONER

## 2020-09-17 RX ORDER — TIZANIDINE 4 MG/1
4 TABLET ORAL EVERY 6 HOURS PRN
Qty: 30 TABLET | Refills: 0 | Status: SHIPPED | OUTPATIENT
Start: 2020-09-17 | End: 2020-09-27

## 2020-09-21 ENCOUNTER — PATIENT OUTREACH (OUTPATIENT)
Dept: ADMINISTRATIVE | Facility: OTHER | Age: 30
End: 2020-09-21

## 2020-09-22 ENCOUNTER — HOSPITAL ENCOUNTER (OUTPATIENT)
Dept: RADIOLOGY | Facility: HOSPITAL | Age: 30
Discharge: HOME | End: 2020-09-22
Attending: NURSE PRACTITIONER
Payer: COMMERCIAL

## 2020-09-22 ENCOUNTER — PATIENT MESSAGE (OUTPATIENT)
Dept: INTERNAL MEDICINE | Facility: CLINIC | Age: 30
End: 2020-09-22

## 2020-09-22 ENCOUNTER — TELEPHONE (OUTPATIENT)
Dept: INTERNAL MEDICINE | Facility: CLINIC | Age: 30
End: 2020-09-22

## 2020-09-22 ENCOUNTER — PATIENT MESSAGE (OUTPATIENT)
Dept: NEUROLOGY | Facility: CLINIC | Age: 30
End: 2020-09-22

## 2020-09-22 DIAGNOSIS — M54.2 NECK PAIN: ICD-10-CM

## 2020-09-22 DIAGNOSIS — M54.2 NECK PAIN: Primary | ICD-10-CM

## 2020-09-22 PROCEDURE — 72050 X-RAY EXAM NECK SPINE 4/5VWS: CPT | Mod: 26,,, | Performed by: RADIOLOGY

## 2020-09-22 PROCEDURE — 72050 XR CERVICAL SPINE COMPLETE 5 VIEW: ICD-10-PCS | Mod: 26,,, | Performed by: RADIOLOGY

## 2020-09-22 PROCEDURE — 72050 X-RAY EXAM NECK SPINE 4/5VWS: CPT | Mod: TC

## 2020-09-23 ENCOUNTER — TELEPHONE (OUTPATIENT)
Dept: INTERNAL MEDICINE | Facility: CLINIC | Age: 30
End: 2020-09-23

## 2020-09-23 ENCOUNTER — OFFICE VISIT (OUTPATIENT)
Dept: NEUROLOGY | Facility: CLINIC | Age: 30
End: 2020-09-23
Payer: COMMERCIAL

## 2020-09-23 VITALS
DIASTOLIC BLOOD PRESSURE: 80 MMHG | HEIGHT: 62 IN | BODY MASS INDEX: 30.44 KG/M2 | WEIGHT: 165.44 LBS | HEART RATE: 108 BPM | SYSTOLIC BLOOD PRESSURE: 117 MMHG

## 2020-09-23 DIAGNOSIS — G93.2 IIH (IDIOPATHIC INTRACRANIAL HYPERTENSION): Primary | ICD-10-CM

## 2020-09-23 DIAGNOSIS — R22.1 NECK SWELLING: Primary | ICD-10-CM

## 2020-09-23 PROCEDURE — 99999 PR PBB SHADOW E&M-EST. PATIENT-LVL III: CPT | Mod: PBBFAC,,, | Performed by: PHYSICIAN ASSISTANT

## 2020-09-23 PROCEDURE — 99214 PR OFFICE/OUTPT VISIT, EST, LEVL IV, 30-39 MIN: ICD-10-PCS | Mod: S$GLB,,, | Performed by: PHYSICIAN ASSISTANT

## 2020-09-23 PROCEDURE — 99214 OFFICE O/P EST MOD 30 MIN: CPT | Mod: S$GLB,,, | Performed by: PHYSICIAN ASSISTANT

## 2020-09-23 PROCEDURE — 99999 PR PBB SHADOW E&M-EST. PATIENT-LVL III: ICD-10-PCS | Mod: PBBFAC,,, | Performed by: PHYSICIAN ASSISTANT

## 2020-09-23 RX ORDER — ACETAZOLAMIDE 250 MG/1
250 TABLET ORAL 3 TIMES DAILY
Qty: 90 TABLET | Refills: 3 | Status: SHIPPED | OUTPATIENT
Start: 2020-09-23 | End: 2021-07-08

## 2020-09-23 NOTE — PROGRESS NOTES
Established Patient   SUBJECTIVE:  Patient ID: Laila Collazo   Chief Complaint: Headache    History of Present Illness:  Laila Collazo is a 29 y.o. female with a PMHx of endometriosis, low vit d, gastritis h pylori,  who presents to clinic with  (fausto) for follow-up of headaches.       09/24/2020 - Interval History:  Last visit for pt's IIH, we began a short course of diamox 125mg titrating up to QID, diclofenac, and obtained an MRV which did not reveal any dural venous thrombosis, however did reveal evidence consistent with IIH (possible tapering of transverse sigmoid sinus).   She reports initial improvement in her Ha's with this regimen. They were mild, lasting an hour, occurring twice a week or less. She reports she hit her head 1 week ago when she bent down to put clothes in the dryer, no LOC. She reports that since this time, her HA's have worsened. The HA's are now daily but intermittent, they last 30 min - 1 hour, and can range 2-4/10.   Diclofenac - hasn't taken it.   Neck and ear pain - pcp has completed cervical Xray which was wnl, will be obtaining US. Patient has started muscle relaxants which helps somewhat.   Plan: increase diamox to 250mg TID, trial diclofenac, f/u 6 wks    Recommendations made at last Office Visit on 8/18/20:  - Discussed symptoms appear to be consistent with IIH, discussed treatment options and patient agreed with the following plan:  - consulted w/ Dr. Mar  - obtain MRV to r/o other causes of IIH  - short course of diamox 125 titrating up to QID (consider zonisamide in future)  - abortive - diclofenac (discussed at length cautions 2/2 previous hx of gastritis)  - vit d def - continue vit d supplements, mgmt per pcp  - currently putting family planning on hold while working on her health, will inform me when she decides to begin family planning again so we can make necessary adjustments  - risks, benefits, and potential side effects of diamox, diclofenac discussed   -  "alternative treatment options offered   - importance of healthy diet, regular exercise and sleep hygiene in the treatment of headaches    - Start tracking headaches via [employer] on phone   - RTC in 1 mo     Treatments Tried:  Diamox  diclofenac  tylenol    Current Medications:    Current Outpatient Medications:     diclofenac (VOLTAREN) 75 MG EC tablet, Take 1 tablet (75 mg total) by mouth 2 (two) times daily as needed., Disp: 20 tablet, Rfl: 3    tiZANidine (ZANAFLEX) 4 MG tablet, Take 1 tablet (4 mg total) by mouth every 6 (six) hours as needed., Disp: 30 tablet, Rfl: 0    acetaZOLAMIDE (DIAMOX) 250 MG tablet, Take 1 tablet (250 mg total) by mouth 3 (three) times daily., Disp: 90 tablet, Rfl: 3    Review of Systems - as per HPI, otherwise a balanced 10 systems review is negative.    OBJECTIVE:  Vitals:  /80   Pulse 108   Ht 5' 2" (1.575 m)   Wt 75 kg (165 lb 7.3 oz)   BMI 30.26 kg/m²      Physical Exam:  Constitutional: she appears well-developed and well-nourished. she is well groomed. NAD   HENT:    Head: Normocephalic and atraumatic  Eyes: Conjunctivae and EOM are normal  Musculoskeletal: Normal range of motion. No joint stiffness.   Skin: Skin is warm and dry.  Psychiatric: Mood and affect are normal    Neuro: Patient is alert and oriented to person, place, and time. Language is intact and fluent. Speech is clear and fluent. Recent and remote memory are intact.  Normal attention and concentration.  Facial movement is symmetric. Moves all 4 extremities against gravity. Gait and station normal.  Cranial Nerves II through XII without focal deficit.     Review of Data:   Notes from neuro reviewed   Labs:  Lab Visit on 09/09/2020   Component Date Value Ref Range Status    Vit D, 25-Hydroxy 09/09/2020 19* 30 - 96 ng/mL Final   Admission on 08/18/2020, Discharged on 08/18/2020   Component Date Value Ref Range Status    POC Preg Test, Ur 08/18/2020 Negative  Negative Final     " Acceptable 08/18/2020 Yes   Final    WBC 08/18/2020 7.59  3.90 - 12.70 K/uL Final    RBC 08/18/2020 4.76  4.00 - 5.40 M/uL Final    Hemoglobin 08/18/2020 14.0  12.0 - 16.0 g/dL Final    Hematocrit 08/18/2020 43.9  37.0 - 48.5 % Final    Mean Corpuscular Volume 08/18/2020 92  82 - 98 fL Final    Mean Corpuscular Hemoglobin 08/18/2020 29.4  27.0 - 31.0 pg Final    Mean Corpuscular Hemoglobin Conc 08/18/2020 31.9* 32.0 - 36.0 g/dL Final    RDW 08/18/2020 13.3  11.5 - 14.5 % Final    Platelets 08/18/2020 299  150 - 350 K/uL Final    MPV 08/18/2020 10.8  9.2 - 12.9 fL Final    Immature Granulocytes 08/18/2020 0.3  0.0 - 0.5 % Final    Gran # (ANC) 08/18/2020 4.2  1.8 - 7.7 K/uL Final    Immature Grans (Abs) 08/18/2020 0.02  0.00 - 0.04 K/uL Final    Lymph # 08/18/2020 2.7  1.0 - 4.8 K/uL Final    Mono # 08/18/2020 0.5  0.3 - 1.0 K/uL Final    Eos # 08/18/2020 0.1  0.0 - 0.5 K/uL Final    Baso # 08/18/2020 0.04  0.00 - 0.20 K/uL Final    nRBC 08/18/2020 0  0 /100 WBC Final    Gran% 08/18/2020 55.7  38.0 - 73.0 % Final    Lymph% 08/18/2020 36.1  18.0 - 48.0 % Final    Mono% 08/18/2020 6.6  4.0 - 15.0 % Final    Eosinophil% 08/18/2020 0.8  0.0 - 8.0 % Final    Basophil% 08/18/2020 0.5  0.0 - 1.9 % Final    Differential Method 08/18/2020 Automated   Final    Sodium 08/18/2020 138  136 - 145 mmol/L Final    Potassium 08/18/2020 3.9  3.5 - 5.1 mmol/L Final    Chloride 08/18/2020 107  95 - 110 mmol/L Final    CO2 08/18/2020 22* 23 - 29 mmol/L Final    Glucose 08/18/2020 82  70 - 110 mg/dL Final    BUN, Bld 08/18/2020 7  6 - 20 mg/dL Final    Creatinine 08/18/2020 0.7  0.5 - 1.4 mg/dL Final    Calcium 08/18/2020 8.9  8.7 - 10.5 mg/dL Final    Anion Gap 08/18/2020 9  8 - 16 mmol/L Final    eGFR if African American 08/18/2020 >60.0  >60 mL/min/1.73 m^2 Final    eGFR if non African American 08/18/2020 >60.0  >60 mL/min/1.73 m^2 Final   Lab Visit on 07/22/2020   Component Date Value Ref Range  Status    TSH 07/22/2020 1.792  0.400 - 4.000 uIU/mL Final    T4, Total 07/22/2020 8.2  4.5 - 11.5 ug/dL Final    Sodium 07/22/2020 139  136 - 145 mmol/L Final    Potassium 07/22/2020 3.8  3.5 - 5.1 mmol/L Final    Chloride 07/22/2020 108  95 - 110 mmol/L Final    CO2 07/22/2020 26  23 - 29 mmol/L Final    Glucose 07/22/2020 87  70 - 110 mg/dL Final    BUN, Bld 07/22/2020 8  6 - 20 mg/dL Final    Creatinine 07/22/2020 0.7  0.5 - 1.4 mg/dL Final    Calcium 07/22/2020 8.8  8.7 - 10.5 mg/dL Final    Total Protein 07/22/2020 6.8  6.0 - 8.4 g/dL Final    Albumin 07/22/2020 3.5  3.5 - 5.2 g/dL Final    Total Bilirubin 07/22/2020 0.4  0.1 - 1.0 mg/dL Final    Alkaline Phosphatase 07/22/2020 58  55 - 135 U/L Final    AST 07/22/2020 15  10 - 40 U/L Final    ALT 07/22/2020 10  10 - 44 U/L Final    Anion Gap 07/22/2020 5* 8 - 16 mmol/L Final    eGFR if African American 07/22/2020 >60.0  >60 mL/min/1.73 m^2 Final    eGFR if non African American 07/22/2020 >60.0  >60 mL/min/1.73 m^2 Final    WBC 07/22/2020 7.29  3.90 - 12.70 K/uL Final    RBC 07/22/2020 4.64  4.00 - 5.40 M/uL Final    Hemoglobin 07/22/2020 13.5  12.0 - 16.0 g/dL Final    Hematocrit 07/22/2020 43.2  37.0 - 48.5 % Final    Mean Corpuscular Volume 07/22/2020 93  82 - 98 fL Final    Mean Corpuscular Hemoglobin 07/22/2020 29.1  27.0 - 31.0 pg Final    Mean Corpuscular Hemoglobin Conc 07/22/2020 31.3* 32.0 - 36.0 g/dL Final    RDW 07/22/2020 13.8  11.5 - 14.5 % Final    Platelets 07/22/2020 282  150 - 350 K/uL Final    MPV 07/22/2020 11.2  9.2 - 12.9 fL Final    Immature Granulocytes 07/22/2020 0.1  0.0 - 0.5 % Final    Gran # (ANC) 07/22/2020 4.5  1.8 - 7.7 K/uL Final    Immature Grans (Abs) 07/22/2020 0.01  0.00 - 0.04 K/uL Final    Lymph # 07/22/2020 2.2  1.0 - 4.8 K/uL Final    Mono # 07/22/2020 0.4  0.3 - 1.0 K/uL Final    Eos # 07/22/2020 0.1  0.0 - 0.5 K/uL Final    Baso # 07/22/2020 0.04  0.00 - 0.20 K/uL Final    nRBC  07/22/2020 0  0 /100 WBC Final    Gran% 07/22/2020 61.8  38.0 - 73.0 % Final    Lymph% 07/22/2020 30.5  18.0 - 48.0 % Final    Mono% 07/22/2020 6.0  4.0 - 15.0 % Final    Eosinophil% 07/22/2020 1.1  0.0 - 8.0 % Final    Basophil% 07/22/2020 0.5  0.0 - 1.9 % Final    Differential Method 07/22/2020 Automated   Final    Cholesterol 07/22/2020 192  120 - 199 mg/dL Final    Triglycerides 07/22/2020 48  30 - 150 mg/dL Final    HDL 07/22/2020 39* 40 - 75 mg/dL Final    LDL Cholesterol 07/22/2020 143.4  63.0 - 159.0 mg/dL Final    Hdl/Cholesterol Ratio 07/22/2020 20.3  20.0 - 50.0 % Final    Total Cholesterol/HDL Ratio 07/22/2020 4.9  2.0 - 5.0 Final    Non-HDL Cholesterol 07/22/2020 153  mg/dL Final    Hemoglobin A1C 07/22/2020 5.1  4.0 - 5.6 % Final    Estimated Avg Glucose 07/22/2020 100  68 - 131 mg/dL Final    Vit D, 25-Hydroxy 07/22/2020 17* 30 - 96 ng/mL Final     Imaging:  Results for orders placed or performed during the hospital encounter of 08/26/20   MRV Brain Without Contrast    Narrative    EXAMINATION:  MRV BRAIN WITHOUT CONTRAST    CLINICAL HISTORY:  r/o dural venous sinus thrombosis;  Headache    TECHNIQUE:  Non-contrast MR venogram of the intracranial vasculature with MIP reconstructions    COMPARISON:  MRI brain 08/18/2020.    FINDINGS:  Hypoplastic left transverse and sigmoid sinuses, developmental variant.  Apparent tapering at the transverse-sigmoid sinus junction bilaterally.  On the right this is thought at least partially artifactual in nature extending to the edge of the area of acquisition.    Otherwise normal flow related signal within the dural venous sinuses and deep cerebral veins.  No evidence of dural venous sinus thrombosis.      Impression    No evidence of dural venous sinus thrombosis.    Apparent tapering at the transverse-sigmoid sinus junction bilaterally.  While possibly artifactual nature, the configuration does at least raise the possibility of idiopathic  intracranial hypertension (pseudotumor cerebri).  Clinical correlation advised.    Electronically signed by resident: Donna Echeverria  Date:    08/26/2020  Time:    09:53    Electronically signed by: Enoch Vasquez MD  Date:    08/26/2020  Time:    10:48   Results for orders placed or performed during the hospital encounter of 08/18/20   MRI Brain Without Contrast    Narrative    EXAMINATION:  MRI BRAIN WITHOUT CONTRAST    CLINICAL HISTORY:  Neuro deficit, acute, persistent or progressing;    TECHNIQUE:  Multiplanar multisequence MR imaging of the brain was performed without intravenous contrast.    COMPARISON:  No priors    FINDINGS:  Intracranial Compartment:    Ventricles are normal in size for age without evidence of hydrocephalus.    The brain parenchyma appears within normal limits.  No mass or mass effect.  No recent or remote hemorrhage or major vascular distribution infarct.    No extra-axial blood or fluid collections.    Partially empty sella configuration.    Normal vascular flow voids are preserved.    Skull/Extracranial Contents (limited evaluation):    Bone marrow signal intensity is normal.      Impression    The brain appears within normal limits, specifically without evidence of recent infarct or hemorrhage.    Partially empty sella configuration.    Further evaluation as warranted clinically.      Electronically signed by: Enoch Vasquez MD  Date:    08/18/2020  Time:    11:17     Note: I have independently reviewed any/all imaging/labs/tests and agree with the report (s) as documented.  Any discrepancies will be as noted/demarcated by free text.  BIN MONTEJO 9/24/2020    ASSESSMENT:  1. IIH (idiopathic intracranial hypertension)        PLAN:  - Discussed symptoms appear to be consistent with IIH, discussed treatment options and patient agreed with the following plan:  - consulted w/ Dr. Mar  - obtain MRV to r/o other causes of IIH  - increase diamox to 250mg TID, (consider addition of zonisamide in  future)  - abortive - diclofenac (discussed at length cautions 2/2 previous hx of gastritis)  - vit d def - continue vit d supplements, mgmt per pcp  - currently putting family planning on hold while working on her health, will inform me when she decides to begin family planning again so we can make necessary adjustments  - Continue tracking headaches   - Discussed goals of therapy are to decrease the frequency, intensity, and duration of headaches  - RTC in 6 wks     Orders Placed This Encounter    acetaZOLAMIDE (DIAMOX) 250 MG tablet       Discussed potential for teratogenicity with treatment, patient understands if her family planning status should change she will contact office immediately and we will safely adjust medications as needed.     Questions and concerns were sought and answered to the patient's stated verbal satisfaction.  The patient verbalizes understanding and agreement with the above stated treatment plan.     CC: MD Kiya Avila PA-C  [facility]   [phone]    Dr. Mar was available during today's encounter.

## 2020-09-23 NOTE — TELEPHONE ENCOUNTER
C Spine xrays are negative.     Will send for soft tissue US to explore possible underlying path that may be attributing.   She will follow up with her PCP on 9/30/2020.       ALEXIA

## 2020-09-24 ENCOUNTER — PATIENT MESSAGE (OUTPATIENT)
Dept: NEUROLOGY | Facility: CLINIC | Age: 30
End: 2020-09-24

## 2020-09-24 DIAGNOSIS — G93.2 IIH (IDIOPATHIC INTRACRANIAL HYPERTENSION): Primary | ICD-10-CM

## 2020-09-25 ENCOUNTER — PATIENT MESSAGE (OUTPATIENT)
Dept: NEUROLOGY | Facility: CLINIC | Age: 30
End: 2020-09-25

## 2020-09-28 ENCOUNTER — HOSPITAL ENCOUNTER (OUTPATIENT)
Dept: RADIOLOGY | Facility: HOSPITAL | Age: 30
Discharge: HOME | End: 2020-09-28
Attending: NURSE PRACTITIONER
Payer: COMMERCIAL

## 2020-09-28 DIAGNOSIS — R22.1 NECK SWELLING: ICD-10-CM

## 2020-09-28 PROCEDURE — 76536 US EXAM OF HEAD AND NECK: CPT | Mod: TC

## 2020-09-28 PROCEDURE — 76536 US EXAM OF HEAD AND NECK: CPT | Mod: 26,,, | Performed by: RADIOLOGY

## 2020-09-28 PROCEDURE — 76536 US SOFT TISSUE HEAD NECK THYROID: ICD-10-PCS | Mod: 26,,, | Performed by: RADIOLOGY

## 2020-09-29 ENCOUNTER — TELEPHONE (OUTPATIENT)
Dept: INTERNAL MEDICINE | Facility: CLINIC | Age: 30
End: 2020-09-29

## 2020-09-29 ENCOUNTER — PATIENT MESSAGE (OUTPATIENT)
Dept: NEUROLOGY | Facility: CLINIC | Age: 30
End: 2020-09-29

## 2020-09-29 NOTE — TELEPHONE ENCOUNTER
Called and spoke with patient regarding referral. Requested referral to be faxed to patient at [phone]. Patient stated she will confirm receipt

## 2020-09-29 NOTE — TELEPHONE ENCOUNTER
US of Soft Tissue with findings of two sub-centimeter right thyroid lobe hypoechoic nodules, not meeting (ACR) criteria of FNA.     Lab Results   Component Value Date    TSH 1.792 07/22/2020     Scheduled to follow up with her Primary on 9/30/2020.       *Contact patient to discuss the findings. No answer. VM left with request to return call.   ALEXIA

## 2020-09-29 NOTE — TELEPHONE ENCOUNTER
----- Message from Liberty Maher MA sent at 9/29/2020 11:11 AM CDT -----  Contact: Self     ----- Message -----  From: Amanda Holt  Sent: 9/29/2020  10:37 AM CDT  To: Marium Snider Staff    Patient is returning a phone call.  Who left a message for the patient: MORGAN Sebastian  Does patient know what this is regarding:    Comments:

## 2020-09-30 ENCOUNTER — OFFICE VISIT (OUTPATIENT)
Dept: INTERNAL MEDICINE | Facility: CLINIC | Age: 30
End: 2020-09-30
Payer: COMMERCIAL

## 2020-09-30 ENCOUNTER — HOSPITAL ENCOUNTER (OUTPATIENT)
Dept: RADIOLOGY | Facility: HOSPITAL | Age: 30
Discharge: HOME | End: 2020-09-30
Attending: INTERNAL MEDICINE
Payer: COMMERCIAL

## 2020-09-30 VITALS
DIASTOLIC BLOOD PRESSURE: 82 MMHG | OXYGEN SATURATION: 99 % | WEIGHT: 165.38 LBS | HEIGHT: 62 IN | SYSTOLIC BLOOD PRESSURE: 116 MMHG | HEART RATE: 101 BPM | BODY MASS INDEX: 30.44 KG/M2

## 2020-09-30 DIAGNOSIS — E55.9 VITAMIN D DEFICIENCY: ICD-10-CM

## 2020-09-30 DIAGNOSIS — G89.29 CHRONIC NECK PAIN: Primary | ICD-10-CM

## 2020-09-30 DIAGNOSIS — M54.2 CHRONIC NECK PAIN: Primary | ICD-10-CM

## 2020-09-30 DIAGNOSIS — R76.8 HELICOBACTER PYLORI ANTIBODY POSITIVE: ICD-10-CM

## 2020-09-30 DIAGNOSIS — G93.2 IIH (IDIOPATHIC INTRACRANIAL HYPERTENSION): ICD-10-CM

## 2020-09-30 DIAGNOSIS — M54.2 CHRONIC NECK PAIN: ICD-10-CM

## 2020-09-30 DIAGNOSIS — R10.9 LEFT FLANK PAIN: ICD-10-CM

## 2020-09-30 DIAGNOSIS — J02.9 SORE THROAT: ICD-10-CM

## 2020-09-30 DIAGNOSIS — G89.29 CHRONIC NECK PAIN: ICD-10-CM

## 2020-09-30 PROCEDURE — 99214 PR OFFICE/OUTPT VISIT, EST, LEVL IV, 30-39 MIN: ICD-10-PCS | Mod: S$GLB,,, | Performed by: INTERNAL MEDICINE

## 2020-09-30 PROCEDURE — 99999 PR PBB SHADOW E&M-EST. PATIENT-LVL IV: CPT | Mod: PBBFAC,,, | Performed by: INTERNAL MEDICINE

## 2020-09-30 PROCEDURE — 70491 CT SOFT TISSUE NECK W/DYE: CPT | Mod: 26,,, | Performed by: RADIOLOGY

## 2020-09-30 PROCEDURE — 70491 CT SOFT TISSUE NECK W/DYE: CPT | Mod: TC

## 2020-09-30 PROCEDURE — 70491 CT SOFT TISSUE NECK WITH CONTRAST: ICD-10-PCS | Mod: 26,,, | Performed by: RADIOLOGY

## 2020-09-30 PROCEDURE — 25500020 PHARM REV CODE 255: Performed by: INTERNAL MEDICINE

## 2020-09-30 PROCEDURE — 99214 OFFICE O/P EST MOD 30 MIN: CPT | Mod: S$GLB,,, | Performed by: INTERNAL MEDICINE

## 2020-09-30 PROCEDURE — 99999 PR PBB SHADOW E&M-EST. PATIENT-LVL IV: ICD-10-PCS | Mod: PBBFAC,,, | Performed by: INTERNAL MEDICINE

## 2020-09-30 RX ORDER — ERGOCALCIFEROL 1.25 MG/1
50000 CAPSULE ORAL
Qty: 12 CAPSULE | Refills: 3 | Status: SHIPPED | OUTPATIENT
Start: 2020-09-30 | End: 2021-01-28

## 2020-09-30 RX ORDER — FLUTICASONE PROPIONATE 50 MCG
1 SPRAY, SUSPENSION (ML) NASAL DAILY
Qty: 16 G | Refills: 1 | Status: SHIPPED | OUTPATIENT
Start: 2020-09-30 | End: 2021-07-08

## 2020-09-30 RX ADMIN — IOHEXOL 75 ML: 350 INJECTION, SOLUTION INTRAVENOUS at 04:09

## 2020-09-30 NOTE — PROGRESS NOTES
INTERNAL MEDICINE VISIT NOTE      CHIEF COMPLAINT     Follow up    HPI     Laila Collazo is a 29 y.o. AA female who presents for follow up.  Last seen pt 2018.  Had a miscarriage at 7 weeks 7/23/20. Recently  in July.    Currently working at [employer].    Recently saw Agatha Cauesy 9/17/20 for L neck pain x 3 weeks.  Woke up w/ pain w/ L lateral neck suddenly. Pain can be constant for up to a day but then not always there. Pain feels like an ache and can range from 1-3 of 10. Radiates to the L ear and sometimes to the R side of the neck. Movement does make the neck hurt. No limited ROM. Sometimes does have pain in the L shoulder (at one point not sure if related to the neck pain). Sometimes does have tingling in fingers that she associates w/ diamox). No hand weakness.   Using cold compresses and also tizanidine. Slight relief w/ cold compresses. Muscle relaxer didn't help but did make her sleepy.   Overall neck pain has not gotten any better or worse.   No fevers/chills.   Throat pain started less than 2 weeks ago. No pain or trouble in swallowing. No swollen glands she's noted. No congestion/sinus pain. No postnasal drip, rhinorrhea, cough.   C spine XR 9/22/20 - no acute process.   Thyroid US 9/28/20 - tow subcentimeter R thyroid nodules - does not meet criteria for f/u or FNA biopsy. Normal cervical LN.     MRI brain - partial empty sella.   MRV brain 8/26/20 - no dural venous sinus thrombosis.   Currently being treated for IIH w/ neurology. On diamox and recently titrated up to 250mg TID 9/24/20.    Needs repeat H pylori.    Vit D def - weekly D but finished a while ago.     C/o intermittent L flank pain x several months. Does not go anywhere else. Tenderness even when pressing on the area. No urinary frequency or pain when urinating. No constipation/diarrhea.     Past Medical History:  Past Medical History:   Diagnosis Date    Bilateral knee pain 6/19/2018    Endometriosis     treated  by GYN at     Epigastric abdominal pain 12/5/2016    Gastroesophageal reflux disease 7/6/2016    Helicobacter pylori ab+     Helicobacter pylori ab+     High-tone pelvic floor dysfunction 5/2/2016    IIH (idiopathic intracranial hypertension)     Palpitations 7/6/2016       Past Surgical History:  Past Surgical History:   Procedure Laterality Date    COLONOSCOPY N/A 12/13/2018    Procedure: COLONOSCOPY;  Surgeon: Micki Gross MD;  Location: [facility] ENDO (4TH FLR);  Service: Endoscopy;  Laterality: N/A;  preferrably in Dec 2018, CRS ok if needed.    ESOPHAGOGASTRODUODENOSCOPY N/A 10/30/2018    Procedure: EGD (ESOPHAGOGASTRODUODENOSCOPY);  Surgeon: Greg Leach MD;  Location: [facility] ENDO (2ND FLR);  Service: Endoscopy;  Laterality: N/A;  ok to schedule per Kimmy    LAPAROSCOPY  2017    dx with endo.  no removal    NO PAST SURGERIES      UPPER GASTROINTESTINAL ENDOSCOPY         Allergies:  Review of patient's allergies indicates:   Allergen Reactions    Bactrim ds [sulfamethoxazole-trimethoprim] Nausea And Vomiting    Nsaids (non-steroidal anti-inflammatory drug)      D/t h/o PUD and H pylori    Codeine Rash       Home Medications:  Prior to Admission medications    Medication Sig Start Date End Date Taking? Authorizing Provider   acetaZOLAMIDE (DIAMOX) 250 MG tablet Take 1 tablet (250 mg total) by mouth 3 (three) times daily. 9/23/20   Kiya Estrada PA-C   diclofenac (VOLTAREN) 75 MG EC tablet Take 1 tablet (75 mg total) by mouth 2 (two) times daily as needed. 8/18/20   Kiya Estrada PA-C       Family History:  Family History   Problem Relation Age of Onset    Diabetes Mother     No Known Problems Father     No Known Problems Sister     No Known Problems Brother     Hypertension Maternal Grandmother     Hypertension Maternal Grandfather     No Known Problems Paternal Grandmother     No Known Problems Paternal Grandfather     Colon cancer Neg Hx     Crohn's disease Neg Hx      "Esophageal cancer Neg Hx     Inflammatory bowel disease Neg Hx     Irritable bowel syndrome Neg Hx     Rectal cancer Neg Hx     Stomach cancer Neg Hx     Ulcerative colitis Neg Hx        Social History:  Social History     Tobacco Use    Smoking status: Never Smoker    Smokeless tobacco: Never Used   Substance Use Topics    Alcohol use: No     Frequency: Never    Drug use: No       Review of Systems:  Review of Systems   Constitutional: Negative for activity change and unexpected weight change.   HENT: Negative for hearing loss, rhinorrhea and trouble swallowing.    Eyes: Negative for discharge and visual disturbance.   Respiratory: Negative for chest tightness and wheezing.    Cardiovascular: Negative for chest pain and palpitations.   Gastrointestinal: Negative for blood in stool, constipation, diarrhea and vomiting.   Endocrine: Negative for polydipsia and polyuria.   Genitourinary: Negative for difficulty urinating, dysuria, hematuria and menstrual problem.   Musculoskeletal: Positive for neck pain. Negative for arthralgias and joint swelling.   Neurological: Positive for weakness and headaches.   Psychiatric/Behavioral: Negative for confusion and dysphoric mood.       Health Maintainence:   HM reviewed.     PHYSICAL EXAM     /82 (BP Location: Left arm, Patient Position: Sitting, BP Method: Medium (Manual))   Pulse 101   Ht 5' 2" (1.575 m)   Wt 75 kg (165 lb 5.5 oz)   LMP 09/22/2020   SpO2 99%   BMI 30.24 kg/m²     GEN - A+OX4, NAD   HEENT - PERRL, EOMI, OP mildly erythematous. No exudates. MMM. TM normal but maybe some dullness.   Neck - No thyromegaly or cervical LAD. No thyroid masses felt. L mid ant cervical/SCM fullness (no masses but asymmetry compared to the R side and point tenderness to palpation).   CV - RRR, no m/r   Chest - CTAB, no wheezing or rhonchi  Abd - S/NT/ND/+BS.   Ext - 2+BDP and radial pulses. No LE edema.   Neuro - PERRL, EOMI, no nystagmus, eyebrow raise, facial " sensation, hearing, m of mastication, smile, palatal raise, shoulder shrug, tongue protrusion symmetric and intact. 5/5 BUE and BLE strength. Sensation to light touch intact throughout. 2+ DTRs. Normal gait.   MSK - No spinal tenderness to palpation. Normal gait. Normal flexion and extension of the C spine. Some pulling sensation on lateral flexion and ext of neck but normal ROM. No CVA tenderness.  Skin - No rash.    LABS     Previous labs reviewed.    ASSESSMENT/PLAN     Laila Collazo is a 29 y.o. female with  Laila was seen today for annual exam.    Diagnoses and all orders for this visit:    Chronic neck pain  -     CT Soft Tissue Neck With Contrast; Future; Expected date: 09/30/2020    Vitamin D deficiency  -     ergocalciferol (ERGOCALCIFEROL) 50,000 unit Cap; Take 1 capsule (50,000 Units total) by mouth every 7 days.    IIH (idiopathic intracranial hypertension) - cont diamox.     Helicobacter pylori antibody positive - previously w/ h/o H pylori s/p treatment. Does not have test to prove eradication.   -     H. pylori antigen, stool; Future; Expected date: 09/30/2020    Left flank pain  -     Urinalysis; Future  -     Urinalysis Microscopic; Future    Sore throat - possibly some postnasal drip changes. Trial of flonase. If persistent consider ENT. CT soft tissue of the neck as above.   -     fluticasone propionate (FLONASE) 50 mcg/actuation nasal spray; 1 spray (50 mcg total) by Each Nostril route once daily.      RTC in 3 months, sooner if needed and depending on labs.    Melany Malhotra MD  Department of Internal Medicine - [facility]  6:57 AM

## 2020-10-01 ENCOUNTER — TELEPHONE (OUTPATIENT)
Dept: INTERNAL MEDICINE | Facility: CLINIC | Age: 30
End: 2020-10-01

## 2020-10-01 ENCOUNTER — PATIENT MESSAGE (OUTPATIENT)
Dept: INTERNAL MEDICINE | Facility: CLINIC | Age: 30
End: 2020-10-01

## 2020-10-01 DIAGNOSIS — M54.2 NECK PAIN: Primary | ICD-10-CM

## 2020-10-01 NOTE — TELEPHONE ENCOUNTER
Please call and notify pt:    Her CT of the neck is actually normal. Given her continued pain on the side of the neck, I'm sending her to ENT to be further evaluated

## 2020-10-02 ENCOUNTER — PATIENT MESSAGE (OUTPATIENT)
Dept: INTERNAL MEDICINE | Facility: CLINIC | Age: 30
End: 2020-10-02

## 2020-10-02 ENCOUNTER — OFFICE VISIT (OUTPATIENT)
Dept: OTOLARYNGOLOGY | Facility: CLINIC | Age: 30
End: 2020-10-02
Payer: COMMERCIAL

## 2020-10-02 VITALS
WEIGHT: 163 LBS | DIASTOLIC BLOOD PRESSURE: 77 MMHG | BODY MASS INDEX: 30 KG/M2 | HEIGHT: 62 IN | HEART RATE: 105 BPM | SYSTOLIC BLOOD PRESSURE: 133 MMHG

## 2020-10-02 DIAGNOSIS — M54.2 NECK PAIN: ICD-10-CM

## 2020-10-02 DIAGNOSIS — J02.9 SORE THROAT: Primary | ICD-10-CM

## 2020-10-02 PROCEDURE — 87102 FUNGUS ISOLATION CULTURE: CPT

## 2020-10-02 PROCEDURE — 99999 PR PBB SHADOW E&M-EST. PATIENT-LVL IV: ICD-10-PCS | Mod: PBBFAC,,, | Performed by: OTOLARYNGOLOGY

## 2020-10-02 PROCEDURE — 87076 CULTURE ANAEROBE IDENT EACH: CPT

## 2020-10-02 PROCEDURE — 99999 PR PBB SHADOW E&M-EST. PATIENT-LVL IV: CPT | Mod: PBBFAC,,, | Performed by: OTOLARYNGOLOGY

## 2020-10-02 PROCEDURE — 87070 CULTURE OTHR SPECIMN AEROBIC: CPT

## 2020-10-02 PROCEDURE — 87075 CULTR BACTERIA EXCEPT BLOOD: CPT

## 2020-10-02 PROCEDURE — 99204 PR OFFICE/OUTPT VISIT, NEW, LEVL IV, 45-59 MIN: ICD-10-PCS | Mod: S$GLB,,, | Performed by: OTOLARYNGOLOGY

## 2020-10-02 PROCEDURE — 99204 OFFICE O/P NEW MOD 45 MIN: CPT | Mod: S$GLB,,, | Performed by: OTOLARYNGOLOGY

## 2020-10-02 NOTE — LETTER
October 2, 2020      Melany Malhotra MD  [address]  [facility] [address]           [id number]  [address]  [facility] [address]  Phone: [phone]  Fax: [phone]          Patient: Laila Collazo   MR Number: [id number]   YOB: 1990   Date of Visit: 10/2/2020       Dear Dr. Melany Malhotra:    Thank you for referring Laila Collazo to me for evaluation. Attached you will find relevant portions of my assessment and plan of care.    If you have questions, please do not hesitate to call me. I look forward to following Laila Collazo along with you.    Sincerely,    César Olivas MD    Enclosure  CC:  No Recipients    If you would like to receive this communication electronically, please contact [email or web address] or [phone] to request more information on [employer] access.    For providers and/or their staff who would like to refer a patient to [facility], please contact us through our one-stop-shop provider referral line, [facility], at [phone].    If you feel you have received this communication in error or would no longer like to receive these types of communications, please e-mail [email or web address]

## 2020-10-02 NOTE — PROGRESS NOTES
Subjective:     Chief Complaint:   Chief Complaint   Patient presents with    Neck Swelling    Sore Throat    Other     ear ache      Laila Collazo is a 29 y.o. female who was referred to me by Dr. Melany Malhotra in consultation for neck pain and sore throat    Patient reports symptoms of left-sided neck pain that started 2-3 weeks ago. Reports waking up with pain. Rates 2-3 out 10. Reports movement makes the pain worse. Pain is intermittent. Cold compress improves symptoms some. Muscle relaxants improved symptoms but made her too drowsy.  She reports pain in the left neck slowly improved but the right side started to hurt.  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 Denies pain when eating. Denies reflux, reports history of H pylori but no further symptoms. No worsened pain the am. She reports noticing a mild sore throat a week ago. She reports bilateral ear pain associated. She states she has had step throat in the past that seemed similar. She denies fever, chills, neck swelling, or sick contacts. Denies cough, nasal congestion, rhinorrhea, facial pain or pressure.    There is not a prior history of sinonasal surgery.  She is not an active smoker.    Past Medical History  She has a past medical history of Bilateral knee pain, Endometriosis, Epigastric abdominal pain, Gastroesophageal reflux disease, Helicobacter pylori ab+, Helicobacter pylori ab+, High-tone pelvic floor dysfunction, IIH (idiopathic intracranial hypertension), and Palpitations.    Past Surgical History  She has a past surgical history that includes No past surgeries; Upper gastrointestinal endoscopy; Esophagogastroduodenoscopy (N/A, 10/30/2018); Colonoscopy (N/A, 12/13/2018); and Laparoscopy (2017).    Family History  Her family history includes Diabetes in her mother; Hypertension in her brother, maternal grandfather, and maternal grandmother; No Known Problems in her father, paternal grandfather, paternal grandmother, and  sister.    Social History  She reports that she has never smoked. She has never used smokeless tobacco. She reports that she does not drink alcohol or use drugs.    Allergies  She is allergic to bactrim ds [sulfamethoxazole-trimethoprim]; nsaids (non-steroidal anti-inflammatory drug); and codeine.    Medications  She has a current medication list which includes the following prescription(s): acetazolamide, diclofenac, ergocalciferol, and fluticasone propionate.    Review of Systems     Constitutional: Negative for appetite change, chills, fatigue, fever and unexpected weight loss.      HENT: Positive for ear pain and sore throat.  Negative for ear discharge, ear infection, facial swelling, hearing loss, mouth sores, nosebleeds, postnasal drip, ringing in the ears, runny nose, sinus infection, sinus pressure, stuffy nose, tonsil infection, dental problems, trouble swallowing and voice change.      Eyes:  Negative for change in eyesight, eye drainage, eye itching and photophobia.     Respiratory:  Negative for cough, shortness of breath, sleep apnea, snoring and wheezing.      Cardiovascular:  Negative for chest pain, foot swelling, irregular heartbeat and swollen veins.     Gastrointestinal:  Negative for abdominal pain, acid reflux, constipation, diarrhea, heartburn and vomiting.     Genitourinary: Negative for difficulty urinating, sexual problems and frequent urination.     Musc: Negative for aching joints, aching muscles, back pain and neck pain.     Skin: Negative for rash.     Allergy: Negative for food allergies and seasonal allergies.     Endocrine: Negative for cold intolerance and heat intolerance.      Neurological: Negative for dizziness, headaches, light-headedness, seizures and tremors.      Hematologic: Negative for bruises/bleeds easily and swollen glands.      Psychiatric: Negative for decreased concentration, depression, nervous/anxious and sleep disturbance.               Objective:     /77  "(BP Location: Left arm, Patient Position: Sitting)   Pulse 105   Ht 5' 2" (1.575 m)   Wt 73.9 kg (163 lb)   LMP 09/22/2020   BMI 29.81 kg/m²      Constitutional:   Vital signs are normal. She appears well-developed and well-nourished. Normal speech.      Head:  Normocephalic and atraumatic. Salivary glands normal.  Facial strength is normal.      Ears:  Right ear hearing normal to normal and whispered voice; external ear normal without scars, lesions, or masses; ear canal, tympanic membrane, and middle ear normal. and left ear hearing normal to normal and whispered voice; external ear normal without scars, lesions, or masses; ear canal, tympanic membrane, and middle ear normal..   Right Ear: No drainage, swelling or tenderness. No middle ear effusion. No decreased hearing is noted.   Left Ear: No drainage, swelling or tenderness.  No middle ear effusion. No decreased hearing is noted.     Nose:  No mucosal edema, rhinorrhea, nose lacerations, septal deviation or nasal septal hematoma. Turbinates normal, no turbinate masses and no turbinate hypertrophy.  Right sinus exhibits no maxillary sinus tenderness and no frontal sinus tenderness. Left sinus exhibits no maxillary sinus tenderness and no frontal sinus tenderness.     Mouth/Throat  Oropharynx not clear and moist, abnormal uvula midline, lips, teeth, and gums normal and oropharynx normal. She does not have dentures. Normal dentition. No uvula swelling. Posterior oropharyngeal erythema present. No oropharyngeal exudate or posterior oropharyngeal edema.     Neck:  Neck normal without thyromegaly masses, asymmetry, normal tracheal structure, crepitus, and tenderness, thyroid normal, trachea normal, phonation normal, full range of motion with neck supple and no adenopathy. No edema, no erythema, normal range of motion, no stridor, no crepitus and no neck rigidity present. No thyroid mass and no thyromegaly present.     She has no cervical adenopathy. "     Pulmonary/Chest:   Effort normal and effort and breath sounds normal. No stridor. No apnea, no tachypnea and no bradypnea. No respiratory distress.     Psychiatric:   She has a normal mood and affect. Her speech is normal and behavior is normal.     Neurological:   She has neurological normal, alert and oriented. No cranial nerve deficit or sensory deficit.       Procedure    None      Data Reviewed    CT neck 9/30/20  Impression:     Unremarkable CT evaluation of the neck.      Assessment:     1. Sore throat    2. Neck pain          Plan:     I had a long discussion with the patient regarding her condition and the further workup and management options.  Her neck pain is likely related to SCM strain from work. Discussed alternating warm and cold compress and daily ROM exercises and massage. No evidence of oropharyngeal exudate but oral swab performed per patient request. She has had strep throat in the past that had an atypical presentation. Discussed salt water gargling and halls soothing drops. She has to avoid nsaids do to her h pylori history. Will contact patient with culture results.     Follow up in about 3 weeks (around 10/23/2020), or if symptoms worsen or fail to improve.

## 2020-10-03 ENCOUNTER — TELEPHONE (OUTPATIENT)
Dept: INTERNAL MEDICINE | Facility: CLINIC | Age: 30
End: 2020-10-03

## 2020-10-03 NOTE — TELEPHONE ENCOUNTER
----- Message from Nori Hinds sent at 10/2/2020  4:26 PM CDT -----  Contact: self [phone]  Patient is returning a phone call.  Who left a message for the patient: jorge gonzales  Does patient know what this is regarding:  returning call  Comments:

## 2020-10-05 ENCOUNTER — PATIENT MESSAGE (OUTPATIENT)
Dept: OTOLARYNGOLOGY | Facility: CLINIC | Age: 30
End: 2020-10-05

## 2020-10-05 LAB — BACTERIA SPEC AEROBE CULT: NORMAL

## 2020-10-07 ENCOUNTER — PATIENT MESSAGE (OUTPATIENT)
Dept: NEUROLOGY | Facility: CLINIC | Age: 30
End: 2020-10-07

## 2020-10-07 ENCOUNTER — PATIENT MESSAGE (OUTPATIENT)
Dept: OTOLARYNGOLOGY | Facility: CLINIC | Age: 30
End: 2020-10-07

## 2020-10-07 LAB — BACTERIA SPEC ANAEROBE CULT: ABNORMAL

## 2020-10-14 ENCOUNTER — OFFICE VISIT (OUTPATIENT)
Dept: ORTHOPEDICS | Facility: CLINIC | Age: 30
End: 2020-10-14
Payer: COMMERCIAL

## 2020-10-14 ENCOUNTER — HOSPITAL ENCOUNTER (OUTPATIENT)
Dept: RADIOLOGY | Facility: HOSPITAL | Age: 30
Discharge: HOME | End: 2020-10-14
Attending: ORTHOPAEDIC SURGERY
Payer: COMMERCIAL

## 2020-10-14 VITALS — WEIGHT: 167.44 LBS | BODY MASS INDEX: 30.63 KG/M2

## 2020-10-14 DIAGNOSIS — M54.9 BACK PAIN, UNSPECIFIED BACK LOCATION, UNSPECIFIED BACK PAIN LATERALITY, UNSPECIFIED CHRONICITY: Primary | ICD-10-CM

## 2020-10-14 DIAGNOSIS — M51.34 DDD (DEGENERATIVE DISC DISEASE), THORACIC: ICD-10-CM

## 2020-10-14 DIAGNOSIS — M50.30 DDD (DEGENERATIVE DISC DISEASE), CERVICAL: ICD-10-CM

## 2020-10-14 DIAGNOSIS — M51.36 DDD (DEGENERATIVE DISC DISEASE), LUMBAR: ICD-10-CM

## 2020-10-14 PROCEDURE — 72050 XR CERVICAL SPINE AP LAT WITH FLEX EXTEN: ICD-10-PCS | Mod: 26,,, | Performed by: RADIOLOGY

## 2020-10-14 PROCEDURE — 72050 X-RAY EXAM NECK SPINE 4/5VWS: CPT | Mod: TC

## 2020-10-14 PROCEDURE — 99999 PR PBB SHADOW E&M-EST. PATIENT-LVL III: CPT | Mod: PBBFAC,,, | Performed by: ORTHOPAEDIC SURGERY

## 2020-10-14 PROCEDURE — 72120 X-RAY BEND ONLY L-S SPINE: CPT | Mod: 26,,, | Performed by: RADIOLOGY

## 2020-10-14 PROCEDURE — 72070 X-RAY EXAM THORAC SPINE 2VWS: CPT | Mod: TC

## 2020-10-14 PROCEDURE — 72120 XR LUMBAR SPINE AP AND LAT WITH FLEX/EXT: ICD-10-PCS | Mod: 26,,, | Performed by: RADIOLOGY

## 2020-10-14 PROCEDURE — 99999 PR PBB SHADOW E&M-EST. PATIENT-LVL III: ICD-10-PCS | Mod: PBBFAC,,, | Performed by: ORTHOPAEDIC SURGERY

## 2020-10-14 PROCEDURE — 72070 X-RAY EXAM THORAC SPINE 2VWS: CPT | Mod: 26,,, | Performed by: RADIOLOGY

## 2020-10-14 PROCEDURE — 99213 PR OFFICE/OUTPT VISIT, EST, LEVL III, 20-29 MIN: ICD-10-PCS | Mod: S$GLB,,, | Performed by: ORTHOPAEDIC SURGERY

## 2020-10-14 PROCEDURE — 72120 X-RAY BEND ONLY L-S SPINE: CPT | Mod: TC

## 2020-10-14 PROCEDURE — 72100 X-RAY EXAM L-S SPINE 2/3 VWS: CPT | Mod: 26,,, | Performed by: RADIOLOGY

## 2020-10-14 PROCEDURE — 99213 OFFICE O/P EST LOW 20 MIN: CPT | Mod: S$GLB,,, | Performed by: ORTHOPAEDIC SURGERY

## 2020-10-14 PROCEDURE — 72070 XR THORACIC SPINE AP LATERAL: ICD-10-PCS | Mod: 26,,, | Performed by: RADIOLOGY

## 2020-10-14 PROCEDURE — 72100 XR LUMBAR SPINE AP AND LAT WITH FLEX/EXT: ICD-10-PCS | Mod: 26,,, | Performed by: RADIOLOGY

## 2020-10-14 PROCEDURE — 72050 X-RAY EXAM NECK SPINE 4/5VWS: CPT | Mod: 26,,, | Performed by: RADIOLOGY

## 2020-10-14 NOTE — PROGRESS NOTES
DATE: 10/14/2020  PATIENT: Laila Collazo    Attending Physician: Quique Tanner M.D.    CHIEF COMPLAINT: back/neck pain    HISTORY:  Laila Collazo is a 29 y.o. female here for initial evaluation of neck and left arm pain (Neck - 2, Arm - 2). The pain has been present for many months. The patient describes the pain as dull. The pain is worse with no particular activity and improved by nothing. There is associated numbness and tingling. There is subjective weakness which is her main complaint. Prior treatments have included PT, but no surgery or HEIDY.     The Patient denies myelopathic symptoms such as handwriting changes or difficulty with buttons/coins/keys. Denies perineal paresthesias, bowel/bladder dysfunction.    PAST MEDICAL/SURGICAL HISTORY:  Past Medical History:   Diagnosis Date    Bilateral knee pain 6/19/2018    Endometriosis     treated by GYN at     Epigastric abdominal pain 12/5/2016    Gastroesophageal reflux disease 7/6/2016    Helicobacter pylori ab+     Helicobacter pylori ab+     High-tone pelvic floor dysfunction 5/2/2016    IIH (idiopathic intracranial hypertension)     Palpitations 7/6/2016     Past Surgical History:   Procedure Laterality Date    COLONOSCOPY N/A 12/13/2018    Procedure: COLONOSCOPY;  Surgeon: Micki Gross MD;  Location: [facility] ENDO (4TH FLR);  Service: Endoscopy;  Laterality: N/A;  preferrably in Dec 2018, CRS ok if needed.    ESOPHAGOGASTRODUODENOSCOPY N/A 10/30/2018    Procedure: EGD (ESOPHAGOGASTRODUODENOSCOPY);  Surgeon: Greg Leach MD;  Location: [facility] ENDO (2ND FLR);  Service: Endoscopy;  Laterality: N/A;  ok to schedule per Kimmy    LAPAROSCOPY  2017    dx with endo.  no removal    NO PAST SURGERIES      UPPER GASTROINTESTINAL ENDOSCOPY         Current Medications:   Current Outpatient Medications:     acetaZOLAMIDE (DIAMOX) 250 MG tablet, Take 1 tablet (250 mg total) by mouth 3 (three) times daily., Disp: 90 tablet, Rfl: 3    diclofenac  (VOLTAREN) 75 MG EC tablet, Take 1 tablet (75 mg total) by mouth 2 (two) times daily as needed., Disp: 20 tablet, Rfl: 3    ergocalciferol (ERGOCALCIFEROL) 50,000 unit Cap, Take 1 capsule (50,000 Units total) by mouth every 7 days., Disp: 12 capsule, Rfl: 3    fluticasone propionate (FLONASE) 50 mcg/actuation nasal spray, 1 spray (50 mcg total) by Each Nostril route once daily., Disp: 16 g, Rfl: 1    Social History:   Social History     Socioeconomic History    Marital status: Single     Spouse name: Not on file    Number of children: Not on file    Years of education: Not on file    Highest education level: Not on file   Occupational History     Employer: [employer] [place]     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   Social Needs    Financial resource strain: Not hard at all    Food insecurity     Worry: Never true     Inability: Never true    Transportation needs     Medical: No     Non-medical: No   Tobacco Use    Smoking status: Never Smoker    Smokeless tobacco: Never Used   Substance and Sexual Activity    Alcohol use: No     Frequency: Never    Drug use: No    Sexual activity: Yes     Partners: Male     Birth control/protection: None   Lifestyle    Physical activity     Days per week: 0 days     Minutes per session: 0 min    Stress: Only a little   Relationships    Social connections     Talks on phone: More than three times a week     Gets together: Three times a week     Attends [employer]ism service: Not on file     Active member of club or organization: No     Attends meetings of clubs or organizations: Never     Relationship status:    Other Topics Concern    Not on file   Social History Narrative    Not on file       REVIEW OF SYSTEMS:  Constitution: Negative. Negative for chills, fever and night sweats.   Cardiovascular: Negative for chest pain and syncope.   Respiratory: Negative for cough and shortness of breath.   Gastrointestinal: See HPI. Negative for nausea/vomiting.  Negative for abdominal pain.  Genitourinary: See HPI. Negative for discoloration or dysuria.  Skin: Negative for dry skin, itching and rash.   Hematologic/Lymphatic: neg for bleeding/clotting disorders.   Musculoskeletal: Negative for falls and muscle weakness.   Neurological: See HPI. no history of seizures. no history of cranial surgery or shunts.  Endocrine: Negative for polydipsia, polyphagia and polyuria.   Allergic/Immunologic: Negative for hives and persistent infections.  Psychiatric/Behavioral: Negative for depression and insomnia.         EXAM:  Wt 76 kg (167 lb 7 oz)   LMP 09/22/2020   BMI 30.63 kg/m²     General: The patient is a 29 y.o. female in no apparent distress, the patient is orientatied to person, place and time.  Psych: Normal mood and affect  HEENT: Vision grossly intact, hearing intact to the spoken word.  Lungs: Respirations unlabored.  Gait: Normal station and gait, no difficulty with toe or heel walk.   Skin: Cervical skin negative for rashes, lesions, hairy patches and surgical scars.  Range of motion: Cervical range of motion is acceptable. There is no tenderness to palpation.  Spinal Balance: Global saggital and coronal spinal balance acceptable, no significant for scoliosis and kyphosis.  Musculoskeletal: No pain with the range of motion of the bilateral shoulders and elbows. Normal bulk and contour of the bilateral hands.  Vascular: Bilateral hands warm and well perfused, Radial pulses 2+ bilaterally.  Neurological: Normal strength and tone in all major motor groups in the bilateral upper and lower extremities. Normal sensation to light touch in the C5-T1 and L2-S1 dermatomes bilaterally.  Deep tendon reflexes symmetric in the bilateral upper and lower extremities.  Negative Inverted Radial Reflex and Nielson's bilaterally. Negative Babinski bilaterally.     IMAGING:   Today I personally reviewed AP, Lat and Flex/Ex  upright C-spine films that demonstrate no bony abnormalities      Body mass index is 30.63 kg/m².  Hemoglobin A1C   Date Value Ref Range Status   07/22/2020 5.1 4.0 - 5.6 % Final     Comment:     ADA Screening Guidelines:  5.7-6.4%  Consistent with prediabetes  >or=6.5%  Consistent with diabetes  High levels of fetal hemoglobin interfere with the HbA1C  assay. Heterozygous hemoglobin variants (HbS, HgC, etc)do  not significantly interfere with this assay.   However, presence of multiple variants may affect accuracy.     07/02/2019 5.0 4.0 - 5.6 % Final     Comment:     ADA Screening Guidelines:  5.7-6.4%  Consistent with prediabetes  >or=6.5%  Consistent with diabetes  High levels of fetal hemoglobin interfere with the HbA1C  assay. Heterozygous hemoglobin variants (HbS, HgC, etc)do  not significantly interfere with this assay.   However, presence of multiple variants may affect accuracy.     02/07/2019 5.0 4.0 - 5.6 % Final     Comment:     ADA Screening Guidelines:  5.7-6.4%  Consistent with prediabetes  >or=6.5%  Consistent with diabetes  High levels of fetal hemoglobin interfere with the HbA1C  assay. Heterozygous hemoglobin variants (HbS, HgC, etc)do  not significantly interfere with this assay.   However, presence of multiple variants may affect accuracy.         ASSESSMENT/PLAN:  Back pain, unspecified back location, unspecified back pain laterality, unspecified chronicity  -     Ambulatory referral/consult to Physical/Occupational Therapy; Future; Expected date: 10/21/2020      No follow-ups on file.    Laila Collazo is a 29 y.o. female with LUE weakness and numbness. She has been recently diagnosed with pseudotumor cerebri. Will order C-spine MRI and have patient follow up to review results

## 2020-10-20 ENCOUNTER — TELEPHONE (OUTPATIENT)
Dept: GASTROENTEROLOGY | Facility: CLINIC | Age: 30
End: 2020-10-20

## 2020-10-20 NOTE — TELEPHONE ENCOUNTER
----- Message from Jhoana Roper sent at 10/20/2020  9:06 AM CDT -----  Pt #[phone] or [facility]  Pt is calling to schedule an appt for abdominal pain

## 2020-10-21 ENCOUNTER — TELEPHONE (OUTPATIENT)
Dept: GASTROENTEROLOGY | Facility: CLINIC | Age: 30
End: 2020-10-21

## 2020-10-21 ENCOUNTER — PATIENT MESSAGE (OUTPATIENT)
Dept: ORTHOPEDICS | Facility: CLINIC | Age: 30
End: 2020-10-21

## 2020-10-21 ENCOUNTER — PATIENT MESSAGE (OUTPATIENT)
Dept: INTERNAL MEDICINE | Facility: CLINIC | Age: 30
End: 2020-10-21

## 2020-10-21 DIAGNOSIS — M54.12 CERVICAL RADICULOPATHY: Primary | ICD-10-CM

## 2020-10-21 DIAGNOSIS — E04.1 THYROID NODULE: Primary | ICD-10-CM

## 2020-10-21 NOTE — TELEPHONE ENCOUNTER
Spoke with patient.  Aware  does not have anything available until January.  Did schedule an appointment for her to see Dr.Matthew Leach on 12/4 for 11:30.   Confirmation mailed.   Monse

## 2020-10-21 NOTE — TELEPHONE ENCOUNTER
----- Message from Carla King sent at 10/21/2020  9:06 AM CDT -----  Laila Collazo calling regarding a miss call from Mrs. Shea about an appt.  [phone] or [facility].

## 2020-10-29 ENCOUNTER — HOSPITAL ENCOUNTER (OUTPATIENT)
Dept: RADIOLOGY | Facility: HOSPITAL | Age: 30
Discharge: HOME | End: 2020-10-29
Attending: PHYSICIAN ASSISTANT
Payer: COMMERCIAL

## 2020-10-29 DIAGNOSIS — M54.12 CERVICAL RADICULOPATHY: ICD-10-CM

## 2020-10-29 PROCEDURE — 72141 MRI CERVICAL SPINE WITHOUT CONTRAST: ICD-10-PCS | Mod: 26,,, | Performed by: RADIOLOGY

## 2020-10-29 PROCEDURE — 72141 MRI NECK SPINE W/O DYE: CPT | Mod: 26,,, | Performed by: RADIOLOGY

## 2020-10-29 PROCEDURE — 72141 MRI NECK SPINE W/O DYE: CPT | Mod: TC

## 2020-11-03 LAB — FUNGUS SPEC CULT: NORMAL

## 2020-11-04 ENCOUNTER — OFFICE VISIT (OUTPATIENT)
Dept: NEUROLOGY | Facility: CLINIC | Age: 30
End: 2020-11-04
Payer: COMMERCIAL

## 2020-11-04 ENCOUNTER — OFFICE VISIT (OUTPATIENT)
Dept: ENDOCRINOLOGY | Facility: CLINIC | Age: 30
End: 2020-11-04
Payer: COMMERCIAL

## 2020-11-04 ENCOUNTER — OFFICE VISIT (OUTPATIENT)
Dept: GASTROENTEROLOGY | Facility: CLINIC | Age: 30
End: 2020-11-04
Payer: COMMERCIAL

## 2020-11-04 ENCOUNTER — LAB VISIT (OUTPATIENT)
Dept: LAB | Facility: HOSPITAL | Age: 30
End: 2020-11-04
Payer: COMMERCIAL

## 2020-11-04 VITALS
WEIGHT: 166.44 LBS | OXYGEN SATURATION: 98 % | SYSTOLIC BLOOD PRESSURE: 108 MMHG | DIASTOLIC BLOOD PRESSURE: 72 MMHG | BODY MASS INDEX: 30.63 KG/M2 | RESPIRATION RATE: 16 BRPM | HEIGHT: 62 IN | HEART RATE: 95 BPM

## 2020-11-04 VITALS
HEIGHT: 62 IN | DIASTOLIC BLOOD PRESSURE: 83 MMHG | SYSTOLIC BLOOD PRESSURE: 133 MMHG | WEIGHT: 167 LBS | BODY MASS INDEX: 30.73 KG/M2 | HEART RATE: 104 BPM

## 2020-11-04 VITALS
HEART RATE: 104 BPM | DIASTOLIC BLOOD PRESSURE: 79 MMHG | SYSTOLIC BLOOD PRESSURE: 129 MMHG | HEIGHT: 64 IN | WEIGHT: 166.69 LBS | BODY MASS INDEX: 28.46 KG/M2

## 2020-11-04 DIAGNOSIS — R11.0 NAUSEA: ICD-10-CM

## 2020-11-04 DIAGNOSIS — R10.13 EPIGASTRIC ABDOMINAL PAIN: Primary | ICD-10-CM

## 2020-11-04 DIAGNOSIS — R10.33 PERIUMBILICAL ABDOMINAL PAIN: ICD-10-CM

## 2020-11-04 DIAGNOSIS — E04.1 THYROID NODULE: ICD-10-CM

## 2020-11-04 DIAGNOSIS — R10.13 EPIGASTRIC ABDOMINAL PAIN: ICD-10-CM

## 2020-11-04 DIAGNOSIS — F41.9 ANXIETY: ICD-10-CM

## 2020-11-04 DIAGNOSIS — G93.2 IIH (IDIOPATHIC INTRACRANIAL HYPERTENSION): Primary | ICD-10-CM

## 2020-11-04 DIAGNOSIS — E04.2 MULTIPLE THYROID NODULES: ICD-10-CM

## 2020-11-04 LAB
ALBUMIN SERPL BCP-MCNC: 3.7 G/DL (ref 3.5–5.2)
ALP SERPL-CCNC: 72 U/L (ref 55–135)
ALT SERPL W/O P-5'-P-CCNC: 12 U/L (ref 10–44)
ANION GAP SERPL CALC-SCNC: 10 MMOL/L (ref 8–16)
AST SERPL-CCNC: 15 U/L (ref 10–40)
BILIRUB SERPL-MCNC: 0.4 MG/DL (ref 0.1–1)
BUN SERPL-MCNC: 8 MG/DL (ref 6–20)
CALCIUM SERPL-MCNC: 9 MG/DL (ref 8.7–10.5)
CHLORIDE SERPL-SCNC: 107 MMOL/L (ref 95–110)
CO2 SERPL-SCNC: 22 MMOL/L (ref 23–29)
CREAT SERPL-MCNC: 0.8 MG/DL (ref 0.5–1.4)
EST. GFR  (AFRICAN AMERICAN): >60 ML/MIN/1.73 M^2
EST. GFR  (NON AFRICAN AMERICAN): >60 ML/MIN/1.73 M^2
GLUCOSE SERPL-MCNC: 124 MG/DL (ref 70–110)
LIPASE SERPL-CCNC: 10 U/L (ref 4–60)
POTASSIUM SERPL-SCNC: 3.7 MMOL/L (ref 3.5–5.1)
PROT SERPL-MCNC: 6.9 G/DL (ref 6–8.4)
SODIUM SERPL-SCNC: 139 MMOL/L (ref 136–145)

## 2020-11-04 PROCEDURE — 83690 ASSAY OF LIPASE: CPT

## 2020-11-04 PROCEDURE — 99214 PR OFFICE/OUTPT VISIT, EST, LEVL IV, 30-39 MIN: ICD-10-PCS | Mod: S$GLB,,, | Performed by: PHYSICIAN ASSISTANT

## 2020-11-04 PROCEDURE — 99203 PR OFFICE/OUTPT VISIT, NEW, LEVL III, 30-44 MIN: ICD-10-PCS | Mod: S$GLB,,, | Performed by: INTERNAL MEDICINE

## 2020-11-04 PROCEDURE — 99203 OFFICE O/P NEW LOW 30 MIN: CPT | Mod: S$GLB,,, | Performed by: INTERNAL MEDICINE

## 2020-11-04 PROCEDURE — 99214 OFFICE O/P EST MOD 30 MIN: CPT | Mod: S$GLB,,, | Performed by: PHYSICIAN ASSISTANT

## 2020-11-04 PROCEDURE — 99999 PR PBB SHADOW E&M-EST. PATIENT-LVL III: ICD-10-PCS | Mod: PBBFAC,,, | Performed by: NURSE PRACTITIONER

## 2020-11-04 PROCEDURE — 99999 PR PBB SHADOW E&M-EST. PATIENT-LVL III: CPT | Mod: PBBFAC,,, | Performed by: NURSE PRACTITIONER

## 2020-11-04 PROCEDURE — 99999 PR PBB SHADOW E&M-EST. PATIENT-LVL III: CPT | Mod: PBBFAC,,, | Performed by: PHYSICIAN ASSISTANT

## 2020-11-04 PROCEDURE — 99999 PR PBB SHADOW E&M-EST. PATIENT-LVL IV: CPT | Mod: PBBFAC,,, | Performed by: INTERNAL MEDICINE

## 2020-11-04 PROCEDURE — 99214 PR OFFICE/OUTPT VISIT, EST, LEVL IV, 30-39 MIN: ICD-10-PCS | Mod: S$GLB,,, | Performed by: NURSE PRACTITIONER

## 2020-11-04 PROCEDURE — 80053 COMPREHEN METABOLIC PANEL: CPT

## 2020-11-04 PROCEDURE — 99999 PR PBB SHADOW E&M-EST. PATIENT-LVL IV: ICD-10-PCS | Mod: PBBFAC,,, | Performed by: INTERNAL MEDICINE

## 2020-11-04 PROCEDURE — 36415 COLL VENOUS BLD VENIPUNCTURE: CPT

## 2020-11-04 PROCEDURE — 99999 PR PBB SHADOW E&M-EST. PATIENT-LVL III: ICD-10-PCS | Mod: PBBFAC,,, | Performed by: PHYSICIAN ASSISTANT

## 2020-11-04 PROCEDURE — 99214 OFFICE O/P EST MOD 30 MIN: CPT | Mod: S$GLB,,, | Performed by: NURSE PRACTITIONER

## 2020-11-04 RX ORDER — DICYCLOMINE HYDROCHLORIDE 10 MG/1
10 CAPSULE ORAL 3 TIMES DAILY
Qty: 90 CAPSULE | Refills: 3 | Status: SHIPPED | OUTPATIENT
Start: 2020-11-04 | End: 2021-07-08

## 2020-11-04 NOTE — PROGRESS NOTES
Subjective:      Patient ID: Laila Collazo is a 29 y.o. female.    Chief Complaint:  Thyroid Nodule      History of Present Illness     Ms. Collazo is a 29 year old woman with subcentimeter thyroid nodules on recent US. This was discovered while undergoing evaluation of swelling and pain over her neck. Pain was located over the lateral aspects of her neck.    Denies pre-existing thyroid disease, denies symptoms of hyper- or hypo- thyroidism. Denies anterior neck pressure, dysphagia, or voice changes. Denies exposure to radiation or treatment with radiation to the head or neck.     Denies personal history of breast or colon cancer.     Maternal aunt with thyroid disease, unknown.   Denies family history of thyroid cancer or thyroid disease.     Review of Systems   Constitutional: Negative for fever.   HENT: Negative for congestion.    Eyes: Negative for visual disturbance.   Respiratory: Negative for shortness of breath.    Cardiovascular: Negative for chest pain.   Gastrointestinal: Negative for abdominal pain.   Genitourinary: Negative for dysuria.   Musculoskeletal: Negative for arthralgias.   Skin: Negative for rash.   Neurological: Negative for weakness.   Hematological: Does not bruise/bleed easily.   Psychiatric/Behavioral: Negative for sleep disturbance.       Objective:   Physical Exam  Constitutional:       General: She is not in acute distress.     Appearance: She is well-developed.   Eyes:      General: No scleral icterus.     Conjunctiva/sclera: Conjunctivae normal.      Pupils: Pupils are equal, round, and reactive to light.      Comments: No proptosis.    Neck:      Musculoskeletal: Normal range of motion and neck supple.      Thyroid: No thyromegaly.      Trachea: No tracheal deviation.   Cardiovascular:      Rate and Rhythm: Normal rate.   Pulmonary:      Effort: Pulmonary effort is normal.      Breath sounds: Normal breath sounds.   Lymphadenopathy:      Cervical: No cervical adenopathy.   Skin:      "General: Skin is warm and dry.      Findings: No rash.   Neurological:      Mental Status: She is alert and oriented to person, place, and time.      Deep Tendon Reflexes: Reflexes are normal and symmetric.      Comments: No tremor.       Vitals:    11/04/20 0908   BP: 108/72   Pulse: 95   Resp: 16   SpO2: 98%   Weight: 75.5 kg (166 lb 7.2 oz)   Height: 5' 2" (1.575 m)       BP Readings from Last 3 Encounters:   11/04/20 108/72   11/04/20 133/83   10/02/20 133/77     Wt Readings from Last 1 Encounters:   11/04/20 0908 75.5 kg (166 lb 7.2 oz)         Body mass index is 30.44 kg/m².    Lab Review:   Lab Results   Component Value Date    HGBA1C 5.1 07/22/2020     Lab Results   Component Value Date    CHOL 192 07/22/2020    HDL 39 (L) 07/22/2020    LDLCALC 143.4 07/22/2020    TRIG 48 07/22/2020    CHOLHDL 20.3 07/22/2020     Lab Results   Component Value Date     08/18/2020    K 3.9 08/18/2020     08/18/2020    CO2 22 (L) 08/18/2020    GLU 82 08/18/2020    BUN 7 08/18/2020    CREATININE 0.7 08/18/2020    CALCIUM 8.9 08/18/2020    PROT 6.8 07/22/2020    ALBUMIN 3.5 07/22/2020    BILITOT 0.4 07/22/2020    ALKPHOS 58 07/22/2020    AST 15 07/22/2020    ALT 10 07/22/2020    ANIONGAP 9 08/18/2020    ESTGFRAFRICA >60.0 08/18/2020    EGFRNONAA >60.0 08/18/2020    TSH 1.792 07/22/2020     US 9/2020  FINDINGS:  The thyroid is normal in size.  Right lobe of the thyroid measures 4.3 x 1.5 x 1.7 cm.  Left lobe of the thyroid measures 4.8 x 1.2 x 1.6 cm.     Two subcentimeter solid hypoechoic nodules in the right thyroid lobe measuring 0.7 x 0.5 x 0.7 cm and 0.5 x 0.3 x 0.5 cm.  Both nodules do not meet ACR criteria for follow-up or FNA biopsy.     Cervical lymph nodes demonstrate normal morphology and size.     Assessment and Plan     Multiple thyroid nodules  Sub-centimeter nodules   No FNA for now discussed options and rationale   Plan to do US in one year to monitor for growth  If not changes consider 2 -3 years. "

## 2020-11-04 NOTE — LETTER
November 4, 2020      Melany Malhotra MD  [address]  [facility] [address]           [facility] [address]  [address]  [facility] [address]  Phone: [phone]  Fax: [phone]          Patient: Laila Collazo   MR Number: [id number]   YOB: 1990   Date of Visit: 11/4/2020       Dear Dr. Melany Malhotra:    Thank you for referring Laila Collazo to me for evaluation. Attached you will find relevant portions of my assessment and plan of care.    If you have questions, please do not hesitate to call me. I look forward to following Laila Collazo along with you.    Sincerely,    Domonique Saenz MD    Enclosure  CC:  No Recipients    If you would like to receive this communication electronically, please contact [email or web address] or [phone] to request more information on [facility] access.    For providers and/or their staff who would like to refer a patient to [facility], please contact us through our one-stop-shop provider referral line, [facility], at [phone].    If you feel you have received this communication in error or would no longer like to receive these types of communications, please e-mail [email or web address]

## 2020-11-04 NOTE — PROGRESS NOTES
Established Patient   SUBJECTIVE:  Patient ID: Laila Collazo   Chief Complaint: Headache    History of Present Illness:  Laila Collazo is a 29 y.o. female with a PMHx of endometriosis, low vit d, gastritis h pylori, who presents to clinic with mother for follow-up of headaches.     Recommendations made at last Office Visit on 9/23/2020:  - Discussed symptoms appear to be consistent with IIH, discussed treatment options and patient agreed with the following plan:  - consulted w/ Dr. Mar  - obtain MRV to r/o other causes of IIH  - increase diamox to 250mg TID, (consider addition of zonisamide in future)  - abortive - diclofenac (discussed at length cautions 2/2 previous hx of gastritis)  - vit d def - continue vit d supplements, mgmt per pcp  - currently putting family planning on hold while working on her health, will inform me when she decides to begin family planning again so we can make necessary adjustments  - Continue tracking headaches   - Discussed goals of therapy are to decrease the frequency, intensity, and duration of headaches  - RTC in 6 wks     11/04/2020 - Interval History:  Pt reports her Ha's have improved since last visit. She is a poor historian, will begin tracking. They are occurring 4/30, last 30 min - 12 hours, severity 2-7/10. Is taking diamox 250mg TID.   Diclofenac - helps resolve milder Ha's but not more severe Ha's.   States she saw optho who did not see papilledema.   During visit pt reports anxiety and nervousness related to her current HA's. We discussed this and recommended psych/therapy consult. Pt in agreement.   Plan: continue diamox 250mg TID, switch to sprix, f/u with psych/therapy for anxiety, f/u 3 mo    09/24/2020 - Interval History:  Last visit for pt's IIH, we began a short course of diamox 125mg titrating up to QID, diclofenac, and obtained an MRV which did not reveal any dural venous thrombosis, however did reveal evidence consistent with IIH (possible tapering of  transverse sigmoid sinus).   She reports initial improvement in her Ha's with this regimen. They were mild, lasting an hour, occurring twice a week or less. She reports she hit her head 1 week ago when she bent down to put clothes in the dryer, no LOC. She reports that since this time, her HA's have worsened. The HA's are now daily but intermittent, they last 30 min - 1 hour, and can range 2-4/10.   Diclofenac - hasn't taken it.   Neck and ear pain - pcp has completed cervical Xray which was wnl, will be obtaining US. Patient has started muscle relaxants which helps somewhat.   Plan: increase diamox to 250mg TID, trial diclofenac, f/u 6 wks     Recommendations made at last Office Visit on 8/18/20:  - Discussed symptoms appear to be consistent with IIH, discussed treatment options and patient agreed with the following plan:  - consulted w/ Dr. Mar  - obtain MRV to r/o other causes of IIH  - short course of diamox 125 titrating up to QID (consider zonisamide in future)  - abortive - diclofenac (discussed at length cautions 2/2 previous hx of gastritis)  - vit d def - continue vit d supplements, mgmt per pcp  - currently putting family planning on hold while working on her health, will inform me when she decides to begin family planning again so we can make necessary adjustments  - risks, benefits, and potential side effects of diamox, diclofenac discussed   - alternative treatment options offered   - importance of healthy diet, regular exercise and sleep hygiene in the treatment of headaches    - Start tracking headaches via [facility] on phone   - RTC in 1 mo      Treatments Tried:  Diamox  Diclofenac  sprix  tylenol    Current Medications:    Current Outpatient Medications:     acetaZOLAMIDE (DIAMOX) 250 MG tablet, Take 1 tablet (250 mg total) by mouth 3 (three) times daily., Disp: 90 tablet, Rfl: 3    diclofenac (VOLTAREN) 75 MG EC tablet, Take 1 tablet (75 mg total) by mouth 2 (two) times daily as  "needed., Disp: 20 tablet, Rfl: 3    ergocalciferol (ERGOCALCIFEROL) 50,000 unit Cap, Take 1 capsule (50,000 Units total) by mouth every 7 days., Disp: 12 capsule, Rfl: 3    fluticasone propionate (FLONASE) 50 mcg/actuation nasal spray, 1 spray (50 mcg total) by Each Nostril route once daily. (Patient not taking: Reported on 11/4/2020), Disp: 16 g, Rfl: 1    Review of Systems - as per HPI, otherwise a balanced 10 systems review is negative.    OBJECTIVE:  Vitals:  /83   Pulse 104   Ht 5' 2" (1.575 m)   Wt 75.8 kg (167 lb)   LMP 10/21/2020   BMI 30.54 kg/m²      Physical Exam:  Constitutional: she appears well-developed and well-nourished. she is well groomed. NAD   HENT:    Head: Normocephalic and atraumatic  Eyes: Conjunctivae and EOM are normal  Musculoskeletal: Normal range of motion. No joint stiffness.   Skin: Skin is warm and dry.  Psychiatric: Mood and affect are normal    Neuro: Patient is alert and oriented to person, place, and time. Language is intact and fluent. Speech is clear and fluent. Recent and remote memory are intact.  Normal attention and concentration.  Facial movement is symmetric. Moves all 4 extremities against gravity. Gait and station normal.  Cranial Nerves II through XII without focal deficit.     Review of Data:   Notes from neuro reviewed   Labs:  Office Visit on 10/02/2020   Component Date Value Ref Range Status    Aerobic Bacterial Culture 10/02/2020 Oropharyngeal eugenia, no predominant organism   Final    Anaerobic Culture 10/02/2020 *  Final                    Value:PREVOTELLA (B.) MELANINOGENICA  Moderate      Fungus (Mycology) Culture 10/02/2020 No fungus isolated after 4 weeks   Final   Lab Visit on 09/30/2020   Component Date Value Ref Range Status    Specimen UA 09/30/2020 Urine, Clean Catch   Final    Color, UA 09/30/2020 Yellow  Yellow, Straw, Maritza Final    Appearance, UA 09/30/2020 Hazy* Clear Final    pH, UA 09/30/2020 5.0  5.0 - 8.0 Final    Specific " Gravity, UA 09/30/2020 1.015  1.005 - 1.030 Final    Protein, UA 09/30/2020 Negative  Negative Final    Glucose, UA 09/30/2020 Negative  Negative Final    Ketones, UA 09/30/2020 Negative  Negative Final    Bilirubin (UA) 09/30/2020 Negative  Negative Final    Occult Blood UA 09/30/2020 Negative  Negative Final    Nitrite, UA 09/30/2020 Negative  Negative Final    Leukocytes, UA 09/30/2020 Negative  Negative Final    Microscopic Comment 09/30/2020 SEE COMMENT   Final   Lab Visit on 09/09/2020   Component Date Value Ref Range Status    Vit D, 25-Hydroxy 09/09/2020 19* 30 - 96 ng/mL Final   Admission on 08/18/2020, Discharged on 08/18/2020   Component Date Value Ref Range Status    POC Preg Test, Ur 08/18/2020 Negative  Negative Final     Acceptable 08/18/2020 Yes   Final    WBC 08/18/2020 7.59  3.90 - 12.70 K/uL Final    RBC 08/18/2020 4.76  4.00 - 5.40 M/uL Final    Hemoglobin 08/18/2020 14.0  12.0 - 16.0 g/dL Final    Hematocrit 08/18/2020 43.9  37.0 - 48.5 % Final    MCV 08/18/2020 92  82 - 98 fL Final    MCH 08/18/2020 29.4  27.0 - 31.0 pg Final    MCHC 08/18/2020 31.9* 32.0 - 36.0 g/dL Final    RDW 08/18/2020 13.3  11.5 - 14.5 % Final    Platelets 08/18/2020 299  150 - 350 K/uL Final    MPV 08/18/2020 10.8  9.2 - 12.9 fL Final    Immature Granulocytes 08/18/2020 0.3  0.0 - 0.5 % Final    Gran # (ANC) 08/18/2020 4.2  1.8 - 7.7 K/uL Final    Immature Grans (Abs) 08/18/2020 0.02  0.00 - 0.04 K/uL Final    Lymph # 08/18/2020 2.7  1.0 - 4.8 K/uL Final    Mono # 08/18/2020 0.5  0.3 - 1.0 K/uL Final    Eos # 08/18/2020 0.1  0.0 - 0.5 K/uL Final    Baso # 08/18/2020 0.04  0.00 - 0.20 K/uL Final    nRBC 08/18/2020 0  0 /100 WBC Final    Gran % 08/18/2020 55.7  38.0 - 73.0 % Final    Lymph % 08/18/2020 36.1  18.0 - 48.0 % Final    Mono % 08/18/2020 6.6  4.0 - 15.0 % Final    Eosinophil % 08/18/2020 0.8  0.0 - 8.0 % Final    Basophil % 08/18/2020 0.5  0.0 - 1.9 % Final     Differential Method 08/18/2020 Automated   Final    Sodium 08/18/2020 138  136 - 145 mmol/L Final    Potassium 08/18/2020 3.9  3.5 - 5.1 mmol/L Final    Chloride 08/18/2020 107  95 - 110 mmol/L Final    CO2 08/18/2020 22* 23 - 29 mmol/L Final    Glucose 08/18/2020 82  70 - 110 mg/dL Final    BUN 08/18/2020 7  6 - 20 mg/dL Final    Creatinine 08/18/2020 0.7  0.5 - 1.4 mg/dL Final    Calcium 08/18/2020 8.9  8.7 - 10.5 mg/dL Final    Anion Gap 08/18/2020 9  8 - 16 mmol/L Final    eGFR if African American 08/18/2020 >60.0  >60 mL/min/1.73 m^2 Final    eGFR if non African American 08/18/2020 >60.0  >60 mL/min/1.73 m^2 Final     Imaging:  Results for orders placed or performed during the hospital encounter of 08/26/20   MRV Brain Without Contrast    Narrative    EXAMINATION:  MRV BRAIN WITHOUT CONTRAST    CLINICAL HISTORY:  r/o dural venous sinus thrombosis;  Headache    TECHNIQUE:  Non-contrast MR venogram of the intracranial vasculature with MIP reconstructions    COMPARISON:  MRI brain 08/18/2020.    FINDINGS:  Hypoplastic left transverse and sigmoid sinuses, developmental variant.  Apparent tapering at the transverse-sigmoid sinus junction bilaterally.  On the right this is thought at least partially artifactual in nature extending to the edge of the area of acquisition.    Otherwise normal flow related signal within the dural venous sinuses and deep cerebral veins.  No evidence of dural venous sinus thrombosis.      Impression    No evidence of dural venous sinus thrombosis.    Apparent tapering at the transverse-sigmoid sinus junction bilaterally.  While possibly artifactual nature, the configuration does at least raise the possibility of idiopathic intracranial hypertension (pseudotumor cerebri).  Clinical correlation advised.    Electronically signed by resident: Donna Echeverria  Date:    08/26/2020  Time:    09:53    Electronically signed by: Enoch Vasquez MD  Date:    08/26/2020  Time:    10:48    Results for orders placed or performed during the hospital encounter of 08/18/20   MRI Brain Without Contrast    Narrative    EXAMINATION:  MRI BRAIN WITHOUT CONTRAST    CLINICAL HISTORY:  Neuro deficit, acute, persistent or progressing;    TECHNIQUE:  Multiplanar multisequence MR imaging of the brain was performed without intravenous contrast.    COMPARISON:  No priors    FINDINGS:  Intracranial Compartment:    Ventricles are normal in size for age without evidence of hydrocephalus.    The brain parenchyma appears within normal limits.  No mass or mass effect.  No recent or remote hemorrhage or major vascular distribution infarct.    No extra-axial blood or fluid collections.    Partially empty sella configuration.    Normal vascular flow voids are preserved.    Skull/Extracranial Contents (limited evaluation):    Bone marrow signal intensity is normal.      Impression    The brain appears within normal limits, specifically without evidence of recent infarct or hemorrhage.    Partially empty sella configuration.    Further evaluation as warranted clinically.      Electronically signed by: Enoch Vasquez MD  Date:    08/18/2020  Time:    11:17     Note: I have independently reviewed any/all imaging/labs/tests and agree with the report (s) as documented.  Any discrepancies will be as noted/demarcated by free text.  BIN MONTEJO 11/4/2020    ASSESSMENT:  1. IIH (idiopathic intracranial hypertension)    2. Anxiety        PLAN:  - Discussed symptoms appear to be consistent with IIH, discussed treatment options and patient agreed with the following plan:  - consulted w/ Dr. Mar  - obtain MRV to r/o other causes of IIH  - continue diamox to 250mg TID, (consider addition of zonisamide in future)  - abortive - switch to sprix   - anxiety - discussed referral to psych/therapy, pt in agreement, will reach out on her own  - vit d def - continue vit d supplements, mgmt per pcp  - currently putting family planning on hold while  working on her health, will inform me when she decides to begin family planning again so we can make necessary adjustments  - start tracking headaches   - Discussed goals of therapy are to decrease the frequency, intensity, and duration of headaches  - RTC in 3 months          Discussed potential for teratogenicity with treatment, patient understands if her family planning status should change she will contact office immediately and we will safely adjust medications as needed.     Questions and concerns were sought and answered to the patient's stated verbal satisfaction.  The patient verbalizes understanding and agreement with the above stated treatment plan.     CC: MD Kiya Avila PA-C  [facility]   [phone]    Dr. Mar was available during today's encounter.

## 2020-11-04 NOTE — ASSESSMENT & PLAN NOTE
Sub-centimeter nodules   No FNA for now discussed options and rationale   Plan to do US in one year to monitor for growth  If not changes consider 2 -3 years.

## 2020-11-04 NOTE — PROGRESS NOTES
[facility] Consultation Note    Reason for Consult:  The primary encounter diagnosis was Epigastric abdominal pain. Diagnoses of Periumbilical abdominal pain and Nausea were also pertinent to this visit.    PCP:   Melany Laughlin MD:  No referring provider defined for this encounter.    Initial History of Present Illness (HPI):  This is a 29 y.o. female here for evaluation of abdominal pain.  Patient reports she  began having pain around her belly button that started in May.  She felt the pain as sharp stab.  Pain stopped but then returned in July.  Pain is more frequent and happens pretty much every day.  It is not related to eating.  Though she does complain of epigastric pain at times.  Pain around her umbilicus is associated with nausea.  It does radiate to her right and left side at times.  She was seen for abdominal pain 2 years ago by one of the nurse practitioners in clinic.  CT at that time was normal.  However patient reporting that this pain is different from the pain she previously experienced.  Additionally, she was diagnosed with H. pylori in 2016.  No stool study was submitted for eradication after treatment.  She is aware that she needs to submit a stool sample, her primary care doctor did place this order.  She denies any reflux or dysphagia.  She denies any diarrhea.  She reports 1 episode of constipation a week and half ago.  Abdominal pain is not relieved or worsened with bowel movements.  She does take diclofenac daily due to headaches.  She denies hematochezia or melena. She does report an unintentional weight loss of over 10 lbs since December.     ROS:  Constitutional: No fevers, chills, No weight loss  ENT:  No sore throat, no PND  CV: No chest pain, no palpitation  Pulm: No cough, No shortness of breath, no wheezing  Ophtho: No vision changes  GI: see HPI  Derm: No rash, no itching  Heme: No easy bruising  MSK: + significant arthritis- back pain  : No  dysuria, No hematuria  Neuro: No syncope, No seizure  Psych: No uncontrolled anxiety, No uncontrolled depression    Medical History:  has a past medical history of Bilateral knee pain (6/19/2018), Endometriosis, Epigastric abdominal pain (12/5/2016), Gastroesophageal reflux disease (7/6/2016), Helicobacter pylori ab+, Helicobacter pylori ab+, High-tone pelvic floor dysfunction (5/2/2016), IIH (idiopathic intracranial hypertension), and Palpitations (7/6/2016).    Surgical History:  has a past surgical history that includes No past surgeries; Upper gastrointestinal endoscopy; Esophagogastroduodenoscopy (N/A, 10/30/2018); Colonoscopy (N/A, 12/13/2018); and Laparoscopy (2017).    Family History: family history includes Diabetes in her mother; Hypertension in her brother, maternal grandfather, and maternal grandmother; No Known Problems in her father, paternal grandfather, paternal grandmother, and sister..     Social History:  reports that she has never smoked. She has never used smokeless tobacco. She reports that she does not drink alcohol or use drugs.    Review of patient's allergies indicates:   Allergen Reactions    Bactrim ds [sulfamethoxazole-trimethoprim] Nausea And Vomiting    Nsaids (non-steroidal anti-inflammatory drug)      D/t h/o PUD and H pylori    Codeine Rash       Medication List with Changes/Refills   New Medications    DICYCLOMINE (BENTYL) 10 MG CAPSULE    Take 1 capsule (10 mg total) by mouth 3 (three) times daily.   Current Medications    ACETAZOLAMIDE (DIAMOX) 250 MG TABLET    Take 1 tablet (250 mg total) by mouth 3 (three) times daily.    DICLOFENAC (VOLTAREN) 75 MG EC TABLET    Take 1 tablet (75 mg total) by mouth 2 (two) times daily as needed.    ERGOCALCIFEROL (ERGOCALCIFEROL) 50,000 UNIT CAP    Take 1 capsule (50,000 Units total) by mouth every 7 days.    FLUTICASONE PROPIONATE (FLONASE) 50 MCG/ACTUATION NASAL SPRAY    1 spray (50 mcg total) by Each Nostril route once daily.  "        Objective Findings:    Vital Signs:  /79   Pulse 104   Ht 5' 4" (1.626 m)   Wt 75.6 kg (166 lb 10.7 oz)   LMP 10/21/2020   BMI 28.61 kg/m²   Body mass index is 28.61 kg/m².    Physical Exam:  General Appearance: Well appearing, NAD noted  Eyes:    No scleral icterus  ENT:  No lesions or masses   Lungs: BBS CTA ,  no wheezes  Heart:  HRRR, S1 & S2 normal, no murmurs heard  Abdomen:  Non distended, soft, no guarding, no rebound, +epigastic and periumbilical tenderness, no appreciated ascites  Musculoskeletal:  No major joint deformities  Skin: No petechiae or rash on exposed skin areas  Neurologic:  Alert and oriented x4  Psychiatric:  Normal speech mentation and affect    Labs reviewed:  Lab Results   Component Value Date    WBC 7.59 08/18/2020    HGB 14.0 08/18/2020    HCT 43.9 08/18/2020     08/18/2020    CHOL 192 07/22/2020    TRIG 48 07/22/2020    HDL 39 (L) 07/22/2020    ALT 10 07/22/2020    AST 15 07/22/2020     08/18/2020    K 3.9 08/18/2020     08/18/2020    CREATININE 0.7 08/18/2020    BUN 7 08/18/2020    CO2 22 (L) 08/18/2020    TSH 1.792 07/22/2020    HGBA1C 5.1 07/22/2020           Imaging reviewed:  US 12/10/18  Tiny floating gallstone measuring up to 1 mm, otherwise no significant abnormalities.  CT scan 10/31/18  No acute abdominopelvic abnormality to explain patient's symptoms  Endoscopy reviewed:    10/30/18 EGD  - Normal esophagus.                         - Esophagogastric landmarks identified.                         - Gastritis.                         - Normal antrum.                         - Normal examined duodenum.                         - Biopsies were taken with a cold forceps for                         histology in the gastric body and in the gastric                         antrum.   12/13/18 Colon  - The entire examined colon is normal.                         - The examined portion of the ileum was normal.                         - No specimens " collected.     Medical Decision Making:    Assessment:    Laila Collazo is a 29 y.o. female here for:  Abdominal pain.  She reports that this pain is different from the pain she previously experienced 2 years ago.  She is concerned.  I did explain to her that the CT scan would expose her to radiation however she would like to move forward with having the test done.  I will get an updated CMP as well as a lipase.  If no abnormalities are found on CT scan I have advised her to follow up with ome of our gastroenterology MD's for further workup.  She was agreeable to this plan.  I also explained the importance of checking stool sample for checking for her medication of H pylori.  She will submit stool sample as soon as possible.    1. Epigastric abdominal pain    2. Periumbilical abdominal pain    3. Nausea         Recommendations:  1. CT scan abd/pelvis  2. Labs  3. Bentyl for abd pain    Follow up pending results    Order summary:  Orders Placed This Encounter    CT Abdomen Pelvis With Contrast    Lipase    Comprehensive Metabolic Panel    dicyclomine (BENTYL) 10 MG capsule         Thank you for allowing me to participate in the care of Laila Collazo    Katlyn Fairbanks, MORGAN-C

## 2020-11-05 ENCOUNTER — PATIENT MESSAGE (OUTPATIENT)
Dept: INTERNAL MEDICINE | Facility: CLINIC | Age: 30
End: 2020-11-05

## 2020-11-05 ENCOUNTER — TELEPHONE (OUTPATIENT)
Dept: GASTROENTEROLOGY | Facility: CLINIC | Age: 30
End: 2020-11-05

## 2020-11-06 ENCOUNTER — PATIENT MESSAGE (OUTPATIENT)
Dept: INTERNAL MEDICINE | Facility: CLINIC | Age: 30
End: 2020-11-06

## 2020-11-09 ENCOUNTER — OFFICE VISIT (OUTPATIENT)
Dept: ORTHOPEDICS | Facility: CLINIC | Age: 30
End: 2020-11-09
Payer: COMMERCIAL

## 2020-11-09 DIAGNOSIS — M54.16 LUMBAR RADICULOPATHY: ICD-10-CM

## 2020-11-09 DIAGNOSIS — M54.12 CERVICAL RADICULOPATHY: Primary | ICD-10-CM

## 2020-11-09 PROCEDURE — 99999 PR PBB SHADOW E&M-EST. PATIENT-LVL II: ICD-10-PCS | Mod: PBBFAC,,, | Performed by: ORTHOPAEDIC SURGERY

## 2020-11-09 PROCEDURE — 99212 OFFICE O/P EST SF 10 MIN: CPT | Mod: S$GLB,,, | Performed by: ORTHOPAEDIC SURGERY

## 2020-11-09 PROCEDURE — 99999 PR PBB SHADOW E&M-EST. PATIENT-LVL II: CPT | Mod: PBBFAC,,, | Performed by: ORTHOPAEDIC SURGERY

## 2020-11-09 PROCEDURE — 99212 PR OFFICE/OUTPT VISIT, EST, LEVL II, 10-19 MIN: ICD-10-PCS | Mod: S$GLB,,, | Performed by: ORTHOPAEDIC SURGERY

## 2020-11-09 NOTE — PROGRESS NOTES
The patient returns for follow-up.  She is left upper and lower extremity intermittent paresthesias.  She has also been diagnosed with pseudotumor cerebral a period I recently ordered an MRI of her cervical spine that demonstrates no significant stenosis.    Today I discussed options with the patient.  I recommended that we obtain an EMG of the bilateral upper and lower extremities for further evaluation.  I do not think she will need surgical intervention in regard to her spine.  I recommended that she continue physical therapy as well.    I spent 10 minutes with the patient of which greater than 1/2 the time was devoted to counciling the patient regarding treatment options.

## 2020-11-10 ENCOUNTER — CLINICAL SUPPORT (OUTPATIENT)
Dept: REHABILITATION | Facility: HOSPITAL | Age: 30
End: 2020-11-10
Payer: COMMERCIAL

## 2020-11-10 DIAGNOSIS — M54.50 CHRONIC MIDLINE LOW BACK PAIN WITHOUT SCIATICA: ICD-10-CM

## 2020-11-10 DIAGNOSIS — G89.29 CHRONIC MIDLINE LOW BACK PAIN WITHOUT SCIATICA: ICD-10-CM

## 2020-11-10 DIAGNOSIS — M54.9 MID BACK PAIN: ICD-10-CM

## 2020-11-10 DIAGNOSIS — M54.9 BACK PAIN, UNSPECIFIED BACK LOCATION, UNSPECIFIED BACK PAIN LATERALITY, UNSPECIFIED CHRONICITY: ICD-10-CM

## 2020-11-10 DIAGNOSIS — R29.3 POSTURAL IMBALANCE: ICD-10-CM

## 2020-11-10 DIAGNOSIS — R29.3 POOR POSTURE: ICD-10-CM

## 2020-11-10 PROCEDURE — 97161 PT EVAL LOW COMPLEX 20 MIN: CPT | Mod: PO

## 2020-11-11 NOTE — PLAN OF CARE
[facility]  Physical Therapy Initial Evaluation    Name: Laila Collazo  Clinic Number: [id number]    Therapy Diagnosis:   Encounter Diagnoses   Name Primary?    Back pain, unspecified back location, unspecified back pain laterality, unspecified chronicity     Poor posture     Mid back pain     Chronic midline low back pain without sciatica     Postural imbalance      Physician: Quique Tanner MD    Physician Orders: PT Eval and Treat   Medical Diagnosis from Referral: Back pain, unspecified back location, unspecified back pain laterality, unspecified chronicity  Evaluation Date: 11/10/2020  Authorization Period Expiration: 2/10/2021  Plan of Care Expiration: 1/19/2021  Visit # / Visits authorized: 1/1    Time In: 515 PM  Time Out: 600 PM    Total Billable Time: 45 minutes    Precautions: idiopathic intracranial hypertension     Subjective   Date of onset: chronic  History of current condition - Laila reports: she has mid to low back pain. She as always had chronic mid-low back pain that is off and on. She went to go see Dr. Tanner for the back and she mentioned that she had weakness in the left arm so he suggested she could work on that for PT as well. She does endorse numbness and tingling into the left arm as well, but this is not constant. They are thinking that the arm pain could be caused by increased intercranial pressure. The doctor did not give her any precautions as far as exercising. Most of her current pain is the mid back right below the bra line. She does not endorse weakness into the legs. The medicine she is on for the intracranial pressure caused tingling into the fingers of the left hand so Dr. Tanner wants to get an EMG to make sure nothing else is causing this.     Medical History:   Past Medical History:   Diagnosis Date    Bilateral knee pain 6/19/2018    Endometriosis     treated by GYN at     Epigastric abdominal pain 12/5/2016    Gastroesophageal  reflux disease 7/6/2016    Helicobacter pylori ab+     Helicobacter pylori ab+     High-tone pelvic floor dysfunction 5/2/2016    IIH (idiopathic intracranial hypertension)     Palpitations 7/6/2016       Surgical History:   Laila Collazo  has a past surgical history that includes No past surgeries; Upper gastrointestinal endoscopy; Esophagogastroduodenoscopy (N/A, 10/30/2018); Colonoscopy (N/A, 12/13/2018); and Laparoscopy (2017).    Medications:   Laila has a current medication list which includes the following prescription(s): acetazolamide, diclofenac, dicyclomine, ergocalciferol, and fluticasone propionate.    Allergies:   Review of patient's allergies indicates:   Allergen Reactions    Bactrim ds [sulfamethoxazole-trimethoprim] Nausea And Vomiting    Nsaids (non-steroidal anti-inflammatory drug)      D/t h/o PUD and H pylori    Codeine Rash        Imaging,     MRI Cervical Spine: 10/2020  The craniocervical junction is unremarkable.  The cervical cord demonstrates normal size and signal.  No evidence of osteomyelitis, marrow replacement process, or acute fracture.  No paraspinal masses or inflammatory changes.     No focal disc abnormalities.  No spinal canal stenosis or significant neural foraminal narrowing.  The intervertebral disc spaces are well maintained.  The facet joints are unremarkable.  Impression:  No focal disc abnormality, spinal canal stenosis, or significant neural foraminal narrowing.       Xray Thoracic Spine 10/2020  Vertebral bodies are intact.  Disc spaces are maintained.  No collapse or bony abnormalities are noted.  No evidence of scoliosis.    Xray Cervical Spine 10/2020  Vertebral bodies are intact and disc spaces are maintained.  No instability in flexion extension.  No bony abnormalities are detected.    Xray Lumbar Spine 10/2020  Vertebral bodies are intact.  Disc spaces are maintained.  No instability in flexion and extension.  No collapse or destruction.        Prior  Therapy: yes for the back and knee  Social History: she lives with , no stairs   Occupation: she is a  at [employer], she is seated mostly   Prior Level of Function: chronic issues with the back, left arm weakness started around August when she was dx with increased intracranial pressure   Current Level of Function: She is not limited her daily or work life, but the pain is there sometimes constantly.     Pain:  Current 2/10, worst 3/10, best 0/10   Location: mid to lower back, midline  Description: Aching, sometimes can have a burning sensation  Aggravating Factors: unsupported sitting, prolonged sitting, prolonged standing  Easing Factors: nothing really noted    Pts goals: decrease pain, improve posture     Objective       Observation: enters clinic independently      Posture: FHP, rounded shoulders       Thoracic AROM: %AROM    Degrees Pain   Flexion WFL     below bra line   Extension WFL Bilateral sides of mid back   Right Rotation 75% Pain mid back   Left Rotation 75% Pain mid back   Right Side Bending 75% Pain mid back   Left Side Bending 75% Pain mid back        Cervical AROM: %AROM    Degrees Pain   Flexion 75% Mid back   Extension WFL No pain   Right Rotation WFL No pain   Left Rotation WFL No pain   Right Side Bending WFL No pain   Left Side Bending WFL No pain        Lumbar AROM: %AROM    Degrees Pain   Flexion 75% Mid back   Extension WFL Mid back   Right Rotation WFL No pain   Left Rotation WFL No pain   Right Side Bending WFL No pain   Left Side Bending WFL Mid back            Shoulder Range of Motion:  WNL          Upper Extremity Strength  (R) UE   (L) UE     Shoulder flexion: 5/5 Shoulder flexion: 4/5   Shoulder Abduction: 5/5 Shoulder abduction: 4+/5   Shoulder ER 5/5 Shoulder ER 4+/5   Shoulder IR 5/5 Shoulder IR 5/5   Elbow flexion: 5/5 Elbow flexion: 5/5   Middle trap  4-/5 Middle trap  4-/5   Lower Trap 4-/5 Lower trap  4-/5   Elbow extension: 5/5 Elbow extension:  5/5      Upper Limb Neurodynamic testing:  Median: +B  Ulnar: +B  Radial : +B          Palpation: TTP @ mid thoracic spine,  L4, TTP @ thoracolumbar paraspinals   TTP @R PSIS      Sensation: grossly intact to light touch BUE     Lower Extremity Strength: 5/5 bilaterally         TREATMENT     Home Exercises and Patient Education Provided    Education provided:   - PT role and POC    Assessment   Laila is a 29 y.o. female referred to outpatient Physical Therapy with a medical diagnosis of Back pain, unspecified back location, unspecified back pain laterality, unspecified chronicity.   Pt presents with signs and symptoms of mid-back, mid-thoracic pain. Pt presents with overall postural imbalance and poor postural awareness and control. She has pain with palpation at mid-thoracic spinous processes, around L4 and PSIS as well. Weakness noted in periscapular muscles. Neural tension testing is positive bilaterally today, with the left more affected than the right. Also, Laila does have a dx of idiopathic intracranial hypertension. Therefore, no cervical testing is performed today. Only seated cervical AROM is assessed. PT has messaged Dr. Tanner and Kiya Estrada PA-C to receive clearance prior to implementing exercises to ask about any precautions with exercising/physical therapy due to idiopathic intracranial hypertension diagnosis.       Pt prognosis is Good.   Pt will benefit from skilled outpatient Physical Therapy to address the deficits stated above and in the chart below, provide pt/family education, and to maximize pt's level of independence.     Plan of care discussed with patient: Yes  Pt's spiritual, cultural and educational needs considered and patient is agreeable to the plan of care and goals as stated below:     Anticipated Barriers for therapy: none anticipated     Medical Necessity is demonstrated by the following  History  Co-morbidities and personal factors that may impact the plan of care  Co-morbidities:     idiopathic intracranial hypertension diagnosis.         Personal Factors:   no deficits     low   Examination  Body Structures and Functions, activity limitations and participation restrictions that may impact the plan of care Body Regions:   back    Body Systems:    gross coordinated movement  motor control  motor learning    Participation Restrictions:   None     Activity limitations:   Learning and applying knowledge  no deficits    General Tasks and Commands  no deficits    Communication  no deficits    Mobility  sitting, prolonged sitting     Self care  no deficits    Domestic Life  no deficits    Interactions/Relationships  no deficits    Life Areas  no deficits    Community and Social Life  no deficits         low   Clinical Presentation stable and uncomplicated low   Decision Making/ Complexity Score: low     Goals:    Short Term Goals:  5 weeks  1. Pt to be independent in HEP to improve independence with symptoms.   2. Pt to require min VC from PT for proper scapular retraction in order to improve postural awareness.   3. Pt to improve periscapular muscles strength by 1/2 muscle grade to improve scapular stability.   4. Pt to report pain at worst to be </1/10 in order to improve upon symptom management and quality of life.        Long Term Goals:10 weeks   1. Pt to be independent in HEP progressions to improve independence with symptoms.   2. Pt to require no verbal or tactile from PT for proper scapular retraction in order to improve postural awareness.   3. Pt to improve periscapular muscles strength by 1 muscle grade to improve scapular stability.   4. Pt to report no pain to improve upon symptom management and quality of life.  5. Pt will demonstrate improved sitting posture to decrease pain experienced in neck and upper back.  6. Pt to show full, pain-free thoracic AROM to improve movement patterns.       Plan   Plan of care Certification: 11/10/2020 to 1/19/2021.    Outpatient  Physical Therapy 1 times weekly for 10 weeks to include the following interventions: Manual Therapy, Moist Heat/ Ice, Neuromuscular Re-ed, Patient Education, Therapeutic Activites and Therapeutic Exercise.     PT to receive clearance from Dr. Tanner and Kiya Estrada PA-C, prior to implementing exercises to make sure there are not precautions with exercising/physical therapy due to idiopathic intracranial hypertension diagnosis.     Natalee Sanches, PT

## 2020-11-14 ENCOUNTER — HOSPITAL ENCOUNTER (OUTPATIENT)
Dept: RADIOLOGY | Facility: HOSPITAL | Age: 30
Discharge: HOME | End: 2020-11-14
Attending: NURSE PRACTITIONER
Payer: COMMERCIAL

## 2020-11-14 DIAGNOSIS — R10.33 PERIUMBILICAL ABDOMINAL PAIN: ICD-10-CM

## 2020-11-14 PROCEDURE — 74177 CT ABD & PELVIS W/CONTRAST: CPT | Mod: 26,,, | Performed by: RADIOLOGY

## 2020-11-14 PROCEDURE — 25500020 PHARM REV CODE 255: Performed by: NURSE PRACTITIONER

## 2020-11-14 PROCEDURE — 74177 CT ABD & PELVIS W/CONTRAST: CPT | Mod: TC

## 2020-11-14 PROCEDURE — 74177 CT ABDOMEN PELVIS WITH CONTRAST: ICD-10-PCS | Mod: 26,,, | Performed by: RADIOLOGY

## 2020-11-14 RX ADMIN — IOHEXOL 15 ML: 350 INJECTION, SOLUTION INTRAVENOUS at 08:11

## 2020-11-14 RX ADMIN — IOHEXOL 75 ML: 350 INJECTION, SOLUTION INTRAVENOUS at 09:11

## 2020-11-16 ENCOUNTER — PATIENT MESSAGE (OUTPATIENT)
Dept: GASTROENTEROLOGY | Facility: CLINIC | Age: 30
End: 2020-11-16

## 2020-11-17 ENCOUNTER — PROCEDURE VISIT (OUTPATIENT)
Dept: NEUROLOGY | Facility: CLINIC | Age: 30
End: 2020-11-17
Payer: COMMERCIAL

## 2020-11-17 ENCOUNTER — PATIENT MESSAGE (OUTPATIENT)
Dept: GASTROENTEROLOGY | Facility: CLINIC | Age: 30
End: 2020-11-17

## 2020-11-17 VITALS — BODY MASS INDEX: 28.34 KG/M2 | WEIGHT: 166 LBS | HEIGHT: 64 IN

## 2020-11-17 DIAGNOSIS — M54.12 CERVICAL RADICULOPATHY: ICD-10-CM

## 2020-11-17 DIAGNOSIS — M54.16 LUMBAR RADICULOPATHY: ICD-10-CM

## 2020-11-17 PROCEDURE — 95885 PR MUSC TST DONE W/NERV TST LIM: ICD-10-PCS | Mod: S$GLB,,, | Performed by: NEUROLOGICAL SURGERY

## 2020-11-17 PROCEDURE — 95911 NRV CNDJ TEST 9-10 STUDIES: CPT | Mod: S$GLB,,, | Performed by: NEUROLOGICAL SURGERY

## 2020-11-17 PROCEDURE — 95885 MUSC TST DONE W/NERV TST LIM: CPT | Mod: S$GLB,,, | Performed by: NEUROLOGICAL SURGERY

## 2020-11-17 PROCEDURE — 95911 PR NERVE CONDUCTION STUDY; 9-10 STUDIES: ICD-10-PCS | Mod: S$GLB,,, | Performed by: NEUROLOGICAL SURGERY

## 2020-11-19 ENCOUNTER — DOCUMENTATION ONLY (OUTPATIENT)
Dept: REHABILITATION | Facility: HOSPITAL | Age: 30
End: 2020-11-19

## 2020-11-19 NOTE — PROGRESS NOTES
PT messages Quique Tanner MD and Kiya Estrada PA-C, regarding any precautions with PT as patient has dx of increased intracranial pressure    Per Kiya Estrada PA-C: No restrictions at this time as her increased pressure is well controlled. However, If she feels her HA's worsen or are triggered by/with any movements, please adjust accordingly.   Thanks!

## 2020-12-07 ENCOUNTER — OFFICE VISIT (OUTPATIENT)
Dept: ORTHOPEDICS | Facility: CLINIC | Age: 30
End: 2020-12-07
Payer: COMMERCIAL

## 2020-12-07 DIAGNOSIS — M54.9 DORSALGIA, UNSPECIFIED: ICD-10-CM

## 2020-12-07 DIAGNOSIS — M54.50 LOW BACK PAIN, UNSPECIFIED BACK PAIN LATERALITY, UNSPECIFIED CHRONICITY, UNSPECIFIED WHETHER SCIATICA PRESENT: Primary | ICD-10-CM

## 2020-12-07 PROCEDURE — 99212 OFFICE O/P EST SF 10 MIN: CPT | Mod: S$GLB,,, | Performed by: ORTHOPAEDIC SURGERY

## 2020-12-07 PROCEDURE — 99999 PR PBB SHADOW E&M-EST. PATIENT-LVL II: ICD-10-PCS | Mod: PBBFAC,,, | Performed by: ORTHOPAEDIC SURGERY

## 2020-12-07 PROCEDURE — 99212 PR OFFICE/OUTPT VISIT, EST, LEVL II, 10-19 MIN: ICD-10-PCS | Mod: S$GLB,,, | Performed by: ORTHOPAEDIC SURGERY

## 2020-12-07 PROCEDURE — 99999 PR PBB SHADOW E&M-EST. PATIENT-LVL II: CPT | Mod: PBBFAC,,, | Performed by: ORTHOPAEDIC SURGERY

## 2020-12-14 ENCOUNTER — HOSPITAL ENCOUNTER (OUTPATIENT)
Dept: RADIOLOGY | Facility: HOSPITAL | Age: 30
Discharge: HOME | End: 2020-12-14
Attending: ORTHOPAEDIC SURGERY
Payer: COMMERCIAL

## 2020-12-14 DIAGNOSIS — M54.9 DORSALGIA, UNSPECIFIED: ICD-10-CM

## 2020-12-14 PROCEDURE — 72148 MRI LUMBAR SPINE W/O DYE: CPT | Mod: 26,,, | Performed by: RADIOLOGY

## 2020-12-14 PROCEDURE — 72148 MRI LUMBAR SPINE WITHOUT CONTRAST: ICD-10-PCS | Mod: 26,,, | Performed by: RADIOLOGY

## 2020-12-14 PROCEDURE — 72148 MRI LUMBAR SPINE W/O DYE: CPT | Mod: TC

## 2020-12-16 NOTE — PROGRESS NOTES
The patient returns for follow-up.  She has left upper and lower extremity intermittent paresthesias.  She has also been diagnosed with pseudotumor cerebral a period I recently ordered an MRI of her cervical spine that demonstrates no significant stenosis.    I recently ordered an EMG to help evaluate for her radicular symptoms, this is also negative in the bilateral upper extremities.  She does note some transient improvement in her left upper extremity symptoms.  She also complains of mid and low back pain, this has developed relatively recently.    Today we discussed options, I do not see any surgical options for her in regard to her cervical spine.  In regard to her lumbar spine like to obtain a new MRI and go from there.    I spent 10 minutes with the patient of which greater than 1/2 the time was devoted to counciling the patient regarding treatment options.

## 2020-12-30 ENCOUNTER — PATIENT MESSAGE (OUTPATIENT)
Dept: GASTROENTEROLOGY | Facility: CLINIC | Age: 30
End: 2020-12-30

## 2020-12-31 ENCOUNTER — TELEPHONE (OUTPATIENT)
Dept: OTOLARYNGOLOGY | Facility: CLINIC | Age: 30
End: 2020-12-31

## 2021-01-04 ENCOUNTER — PATIENT MESSAGE (OUTPATIENT)
Dept: ORTHOPEDICS | Facility: CLINIC | Age: 31
End: 2021-01-04

## 2021-01-07 DIAGNOSIS — R00.2 PALPITATIONS: Primary | ICD-10-CM

## 2021-01-07 DIAGNOSIS — R07.9 CHEST PAIN: ICD-10-CM

## 2021-01-08 ENCOUNTER — OFFICE VISIT (OUTPATIENT)
Dept: CARDIOLOGY | Facility: CLINIC | Age: 31
End: 2021-01-08
Payer: COMMERCIAL

## 2021-01-08 ENCOUNTER — OFFICE VISIT (OUTPATIENT)
Dept: ORTHOPEDICS | Facility: CLINIC | Age: 31
End: 2021-01-08
Payer: COMMERCIAL

## 2021-01-08 ENCOUNTER — OFFICE VISIT (OUTPATIENT)
Dept: OTOLARYNGOLOGY | Facility: CLINIC | Age: 31
End: 2021-01-08
Payer: COMMERCIAL

## 2021-01-08 ENCOUNTER — HOSPITAL ENCOUNTER (OUTPATIENT)
Dept: CARDIOLOGY | Facility: CLINIC | Age: 31
Discharge: HOME | End: 2021-01-08
Payer: COMMERCIAL

## 2021-01-08 VITALS
BODY MASS INDEX: 31.29 KG/M2 | SYSTOLIC BLOOD PRESSURE: 108 MMHG | WEIGHT: 171.06 LBS | DIASTOLIC BLOOD PRESSURE: 70 MMHG | HEART RATE: 92 BPM

## 2021-01-08 VITALS
SYSTOLIC BLOOD PRESSURE: 108 MMHG | WEIGHT: 169.56 LBS | HEART RATE: 92 BPM | DIASTOLIC BLOOD PRESSURE: 70 MMHG | OXYGEN SATURATION: 99 % | HEIGHT: 62 IN | BODY MASS INDEX: 31.2 KG/M2

## 2021-01-08 DIAGNOSIS — R00.2 PALPITATIONS: ICD-10-CM

## 2021-01-08 DIAGNOSIS — R07.89 OTHER CHEST PAIN: ICD-10-CM

## 2021-01-08 DIAGNOSIS — H69.90 ETD (EUSTACHIAN TUBE DYSFUNCTION), UNSPECIFIED LATERALITY: ICD-10-CM

## 2021-01-08 DIAGNOSIS — R07.81 RIB PAIN ON LEFT SIDE: Primary | ICD-10-CM

## 2021-01-08 DIAGNOSIS — E78.5 DYSLIPIDEMIA: ICD-10-CM

## 2021-01-08 DIAGNOSIS — R07.9 CHEST PAIN: ICD-10-CM

## 2021-01-08 DIAGNOSIS — K21.9 GASTROESOPHAGEAL REFLUX DISEASE, UNSPECIFIED WHETHER ESOPHAGITIS PRESENT: Primary | ICD-10-CM

## 2021-01-08 PROCEDURE — 93010 ELECTROCARDIOGRAM REPORT: CPT | Mod: S$GLB,,, | Performed by: INTERNAL MEDICINE

## 2021-01-08 PROCEDURE — 93005 ELECTROCARDIOGRAM TRACING: CPT | Mod: S$GLB,,, | Performed by: INTERNAL MEDICINE

## 2021-01-08 PROCEDURE — 93005 EKG 12-LEAD: ICD-10-PCS | Mod: S$GLB,,, | Performed by: INTERNAL MEDICINE

## 2021-01-08 PROCEDURE — 99999 PR PBB SHADOW E&M-EST. PATIENT-LVL IV: CPT | Mod: PBBFAC,,, | Performed by: INTERNAL MEDICINE

## 2021-01-08 PROCEDURE — 93010 EKG 12-LEAD: ICD-10-PCS | Mod: S$GLB,,, | Performed by: INTERNAL MEDICINE

## 2021-01-08 PROCEDURE — 99213 OFFICE O/P EST LOW 20 MIN: CPT | Mod: S$GLB,,, | Performed by: OTOLARYNGOLOGY

## 2021-01-08 PROCEDURE — 99999 PR PBB SHADOW E&M-EST. PATIENT-LVL II: ICD-10-PCS | Mod: PBBFAC,,, | Performed by: ORTHOPAEDIC SURGERY

## 2021-01-08 PROCEDURE — 99213 PR OFFICE/OUTPT VISIT, EST, LEVL III, 20-29 MIN: ICD-10-PCS | Mod: S$GLB,,, | Performed by: ORTHOPAEDIC SURGERY

## 2021-01-08 PROCEDURE — 99203 PR OFFICE/OUTPT VISIT, NEW, LEVL III, 30-44 MIN: ICD-10-PCS | Mod: S$GLB,,, | Performed by: INTERNAL MEDICINE

## 2021-01-08 PROCEDURE — 99999 PR PBB SHADOW E&M-EST. PATIENT-LVL IV: ICD-10-PCS | Mod: PBBFAC,,, | Performed by: INTERNAL MEDICINE

## 2021-01-08 PROCEDURE — 99213 PR OFFICE/OUTPT VISIT, EST, LEVL III, 20-29 MIN: ICD-10-PCS | Mod: S$GLB,,, | Performed by: OTOLARYNGOLOGY

## 2021-01-08 PROCEDURE — 99203 OFFICE O/P NEW LOW 30 MIN: CPT | Mod: S$GLB,,, | Performed by: INTERNAL MEDICINE

## 2021-01-08 PROCEDURE — 99213 OFFICE O/P EST LOW 20 MIN: CPT | Mod: S$GLB,,, | Performed by: ORTHOPAEDIC SURGERY

## 2021-01-08 PROCEDURE — 99999 PR PBB SHADOW E&M-EST. PATIENT-LVL III: CPT | Mod: PBBFAC,,, | Performed by: OTOLARYNGOLOGY

## 2021-01-08 PROCEDURE — 99999 PR PBB SHADOW E&M-EST. PATIENT-LVL III: ICD-10-PCS | Mod: PBBFAC,,, | Performed by: OTOLARYNGOLOGY

## 2021-01-08 PROCEDURE — 99999 PR PBB SHADOW E&M-EST. PATIENT-LVL II: CPT | Mod: PBBFAC,,, | Performed by: ORTHOPAEDIC SURGERY

## 2021-01-13 ENCOUNTER — PATIENT MESSAGE (OUTPATIENT)
Dept: INTERNAL MEDICINE | Facility: CLINIC | Age: 31
End: 2021-01-13

## 2021-01-13 ENCOUNTER — PATIENT MESSAGE (OUTPATIENT)
Dept: GASTROENTEROLOGY | Facility: CLINIC | Age: 31
End: 2021-01-13

## 2021-01-13 ENCOUNTER — PATIENT MESSAGE (OUTPATIENT)
Dept: ORTHOPEDICS | Facility: CLINIC | Age: 31
End: 2021-01-13

## 2021-01-13 DIAGNOSIS — R10.13 EPIGASTRIC ABDOMINAL PAIN: ICD-10-CM

## 2021-01-13 DIAGNOSIS — E55.9 VITAMIN D DEFICIENCY: Primary | ICD-10-CM

## 2021-01-13 DIAGNOSIS — R11.0 NAUSEA: Primary | ICD-10-CM

## 2021-01-13 RX ORDER — DICLOFENAC SODIUM 10 MG/G
2 GEL TOPICAL 4 TIMES DAILY
Qty: 1 TUBE | Refills: 6 | Status: SHIPPED | OUTPATIENT
Start: 2021-01-13 | End: 2021-07-08

## 2021-01-14 RX ORDER — PANTOPRAZOLE SODIUM 40 MG/1
40 TABLET, DELAYED RELEASE ORAL DAILY
Qty: 30 TABLET | Refills: 1 | Status: SHIPPED | OUTPATIENT
Start: 2021-01-14 | End: 2021-07-08

## 2021-01-21 ENCOUNTER — TELEPHONE (OUTPATIENT)
Dept: INTERNAL MEDICINE | Facility: CLINIC | Age: 31
End: 2021-01-21

## 2021-01-21 ENCOUNTER — PATIENT MESSAGE (OUTPATIENT)
Dept: INTERNAL MEDICINE | Facility: CLINIC | Age: 31
End: 2021-01-21

## 2021-01-21 ENCOUNTER — HOSPITAL ENCOUNTER (OUTPATIENT)
Dept: RADIOLOGY | Facility: HOSPITAL | Age: 31
Discharge: HOME | End: 2021-01-21
Attending: INTERNAL MEDICINE
Payer: COMMERCIAL

## 2021-01-21 ENCOUNTER — OFFICE VISIT (OUTPATIENT)
Dept: INTERNAL MEDICINE | Facility: CLINIC | Age: 31
End: 2021-01-21
Payer: COMMERCIAL

## 2021-01-21 VITALS
BODY MASS INDEX: 30.95 KG/M2 | SYSTOLIC BLOOD PRESSURE: 108 MMHG | WEIGHT: 168.19 LBS | DIASTOLIC BLOOD PRESSURE: 70 MMHG | HEIGHT: 62 IN | HEART RATE: 99 BPM | OXYGEN SATURATION: 98 %

## 2021-01-21 DIAGNOSIS — R10.9 BILATERAL FLANK PAIN: ICD-10-CM

## 2021-01-21 DIAGNOSIS — R07.81 PLEURITIC CHEST PAIN: ICD-10-CM

## 2021-01-21 DIAGNOSIS — M54.9 MID BACK PAIN: ICD-10-CM

## 2021-01-21 DIAGNOSIS — M62.838 MUSCLE SPASM: Primary | ICD-10-CM

## 2021-01-21 DIAGNOSIS — Z01.84 ANTIBODY RESPONSE EXAM: ICD-10-CM

## 2021-01-21 DIAGNOSIS — R10.9 BILATERAL FLANK PAIN: Primary | ICD-10-CM

## 2021-01-21 PROCEDURE — 71046 XR CHEST PA AND LATERAL: ICD-10-PCS | Mod: 26,,, | Performed by: RADIOLOGY

## 2021-01-21 PROCEDURE — 99999 PR PBB SHADOW E&M-EST. PATIENT-LVL V: CPT | Mod: PBBFAC,,, | Performed by: INTERNAL MEDICINE

## 2021-01-21 PROCEDURE — 76770 US RETROPERITONEAL COMPLETE: ICD-10-PCS | Mod: 26,,, | Performed by: RADIOLOGY

## 2021-01-21 PROCEDURE — 99999 PR PBB SHADOW E&M-EST. PATIENT-LVL V: ICD-10-PCS | Mod: PBBFAC,,, | Performed by: INTERNAL MEDICINE

## 2021-01-21 PROCEDURE — 76770 US EXAM ABDO BACK WALL COMP: CPT | Mod: TC

## 2021-01-21 PROCEDURE — 76770 US EXAM ABDO BACK WALL COMP: CPT | Mod: 26,,, | Performed by: RADIOLOGY

## 2021-01-21 PROCEDURE — 99214 PR OFFICE/OUTPT VISIT, EST, LEVL IV, 30-39 MIN: ICD-10-PCS | Mod: S$GLB,,, | Performed by: INTERNAL MEDICINE

## 2021-01-21 PROCEDURE — 71046 X-RAY EXAM CHEST 2 VIEWS: CPT | Mod: 26,,, | Performed by: RADIOLOGY

## 2021-01-21 PROCEDURE — 99214 OFFICE O/P EST MOD 30 MIN: CPT | Mod: S$GLB,,, | Performed by: INTERNAL MEDICINE

## 2021-01-21 PROCEDURE — 71046 X-RAY EXAM CHEST 2 VIEWS: CPT | Mod: TC

## 2021-01-21 RX ORDER — CYCLOBENZAPRINE HCL 5 MG
5 TABLET ORAL 3 TIMES DAILY PRN
Qty: 30 TABLET | Refills: 3 | Status: SHIPPED | OUTPATIENT
Start: 2021-01-21 | End: 2021-01-31

## 2021-01-27 ENCOUNTER — PATIENT MESSAGE (OUTPATIENT)
Dept: OTOLARYNGOLOGY | Facility: CLINIC | Age: 31
End: 2021-01-27

## 2021-01-28 ENCOUNTER — TELEPHONE (OUTPATIENT)
Dept: OTOLARYNGOLOGY | Facility: CLINIC | Age: 31
End: 2021-01-28

## 2021-01-28 ENCOUNTER — PATIENT MESSAGE (OUTPATIENT)
Dept: NEUROLOGY | Facility: CLINIC | Age: 31
End: 2021-01-28

## 2021-01-28 ENCOUNTER — PATIENT MESSAGE (OUTPATIENT)
Dept: INTERNAL MEDICINE | Facility: CLINIC | Age: 31
End: 2021-01-28

## 2021-01-28 ENCOUNTER — OFFICE VISIT (OUTPATIENT)
Dept: OTOLARYNGOLOGY | Facility: CLINIC | Age: 31
End: 2021-01-28
Payer: COMMERCIAL

## 2021-01-28 DIAGNOSIS — J30.89 NON-SEASONAL ALLERGIC RHINITIS, UNSPECIFIED TRIGGER: ICD-10-CM

## 2021-01-28 DIAGNOSIS — H93.A9 PULSATILE TINNITUS: Primary | ICD-10-CM

## 2021-01-28 DIAGNOSIS — G93.2 BENIGN INTRACRANIAL HYPERTENSION: ICD-10-CM

## 2021-01-28 DIAGNOSIS — K21.9 GASTROESOPHAGEAL REFLUX DISEASE, UNSPECIFIED WHETHER ESOPHAGITIS PRESENT: Primary | ICD-10-CM

## 2021-01-28 DIAGNOSIS — H93.A9 PULSATILE TINNITUS: ICD-10-CM

## 2021-01-28 PROCEDURE — 99213 PR OFFICE/OUTPT VISIT, EST, LEVL III, 20-29 MIN: ICD-10-PCS | Mod: 95,,, | Performed by: OTOLARYNGOLOGY

## 2021-01-28 PROCEDURE — 99213 OFFICE O/P EST LOW 20 MIN: CPT | Mod: 95,,, | Performed by: OTOLARYNGOLOGY

## 2021-02-01 ENCOUNTER — OFFICE VISIT (OUTPATIENT)
Dept: NEUROLOGY | Facility: CLINIC | Age: 31
End: 2021-02-01
Payer: COMMERCIAL

## 2021-02-01 ENCOUNTER — PATIENT OUTREACH (OUTPATIENT)
Dept: ADMINISTRATIVE | Facility: OTHER | Age: 31
End: 2021-02-01

## 2021-02-01 VITALS
HEART RATE: 105 BPM | WEIGHT: 169.31 LBS | BODY MASS INDEX: 31.16 KG/M2 | HEIGHT: 62 IN | SYSTOLIC BLOOD PRESSURE: 126 MMHG | DIASTOLIC BLOOD PRESSURE: 69 MMHG

## 2021-02-01 DIAGNOSIS — G93.2 IIH (IDIOPATHIC INTRACRANIAL HYPERTENSION): ICD-10-CM

## 2021-02-01 PROCEDURE — 99999 PR PBB SHADOW E&M-EST. PATIENT-LVL III: ICD-10-PCS | Mod: PBBFAC,,, | Performed by: NEUROLOGICAL SURGERY

## 2021-02-01 PROCEDURE — 99214 OFFICE O/P EST MOD 30 MIN: CPT | Mod: S$GLB,,, | Performed by: NEUROLOGICAL SURGERY

## 2021-02-01 PROCEDURE — 99999 PR PBB SHADOW E&M-EST. PATIENT-LVL III: CPT | Mod: PBBFAC,,, | Performed by: NEUROLOGICAL SURGERY

## 2021-02-01 PROCEDURE — 99214 PR OFFICE/OUTPT VISIT, EST, LEVL IV, 30-39 MIN: ICD-10-PCS | Mod: S$GLB,,, | Performed by: NEUROLOGICAL SURGERY

## 2021-02-05 ENCOUNTER — OFFICE VISIT (OUTPATIENT)
Dept: OTOLARYNGOLOGY | Facility: CLINIC | Age: 31
End: 2021-02-05
Payer: COMMERCIAL

## 2021-02-05 DIAGNOSIS — M54.2 NECK PAIN: Primary | ICD-10-CM

## 2021-02-05 DIAGNOSIS — G93.2 BENIGN INTRACRANIAL HYPERTENSION: ICD-10-CM

## 2021-02-05 DIAGNOSIS — K21.9 GASTROESOPHAGEAL REFLUX DISEASE, UNSPECIFIED WHETHER ESOPHAGITIS PRESENT: ICD-10-CM

## 2021-02-05 DIAGNOSIS — J30.89 NON-SEASONAL ALLERGIC RHINITIS, UNSPECIFIED TRIGGER: ICD-10-CM

## 2021-02-05 PROCEDURE — 99213 OFFICE O/P EST LOW 20 MIN: CPT | Mod: 95,,, | Performed by: OTOLARYNGOLOGY

## 2021-02-05 PROCEDURE — 99213 PR OFFICE/OUTPT VISIT, EST, LEVL III, 20-29 MIN: ICD-10-PCS | Mod: 95,,, | Performed by: OTOLARYNGOLOGY

## 2021-02-08 ENCOUNTER — HOSPITAL ENCOUNTER (OUTPATIENT)
Dept: RADIOLOGY | Facility: HOSPITAL | Age: 31
Discharge: HOME | End: 2021-02-08
Attending: NEUROLOGICAL SURGERY
Payer: COMMERCIAL

## 2021-02-08 DIAGNOSIS — G93.2 IIH (IDIOPATHIC INTRACRANIAL HYPERTENSION): ICD-10-CM

## 2021-02-08 LAB
CLARITY CSF: CLEAR
COLOR CSF: ABNORMAL
EOSINOPHIL NFR CSF MANUAL: 2 %
GLUCOSE CSF-MCNC: 58 MG/DL (ref 40–70)
LYMPHOCYTES NFR CSF MANUAL: 68 % (ref 40–80)
MONOS+MACROS NFR CSF MANUAL: 6 % (ref 15–45)
NEUTROPHILS NFR CSF MANUAL: 24 % (ref 0–6)
PROT CSF-MCNC: 15 MG/DL (ref 15–40)
RBC # CSF: 711 /CU MM
SPECIMEN VOL CSF: 1 ML
WBC # CSF: 1 /CU MM (ref 0–5)

## 2021-02-08 PROCEDURE — 99000 SPECIMEN HANDLING OFFICE-LAB: CPT

## 2021-02-08 PROCEDURE — 25000003 PHARM REV CODE 250: Performed by: NEUROLOGICAL SURGERY

## 2021-02-08 PROCEDURE — 62328 FL LUMBAR PUNCTURE: ICD-10-PCS | Mod: ,,, | Performed by: RADIOLOGY

## 2021-02-08 PROCEDURE — 62328 DX LMBR SPI PNXR W/FLUOR/CT: CPT | Mod: TC

## 2021-02-08 PROCEDURE — 82945 GLUCOSE OTHER FLUID: CPT

## 2021-02-08 PROCEDURE — 62328 DX LMBR SPI PNXR W/FLUOR/CT: CPT | Mod: ,,, | Performed by: RADIOLOGY

## 2021-02-08 PROCEDURE — 89051 BODY FLUID CELL COUNT: CPT

## 2021-02-08 PROCEDURE — 84157 ASSAY OF PROTEIN OTHER: CPT

## 2021-02-08 RX ORDER — LIDOCAINE HYDROCHLORIDE 10 MG/ML
5 INJECTION INFILTRATION; PERINEURAL ONCE
Status: COMPLETED | OUTPATIENT
Start: 2021-02-08 | End: 2021-02-08

## 2021-02-08 RX ADMIN — LIDOCAINE HYDROCHLORIDE 5 ML: 10 INJECTION, SOLUTION INFILTRATION; PERINEURAL at 12:02

## 2021-02-10 ENCOUNTER — PATIENT MESSAGE (OUTPATIENT)
Dept: NEUROLOGY | Facility: CLINIC | Age: 31
End: 2021-02-10

## 2021-02-10 ENCOUNTER — PATIENT MESSAGE (OUTPATIENT)
Dept: INTERNAL MEDICINE | Facility: CLINIC | Age: 31
End: 2021-02-10

## 2021-02-11 ENCOUNTER — TELEPHONE (OUTPATIENT)
Dept: OTOLARYNGOLOGY | Facility: CLINIC | Age: 31
End: 2021-02-11

## 2021-02-18 ENCOUNTER — PATIENT MESSAGE (OUTPATIENT)
Dept: NEUROLOGY | Facility: CLINIC | Age: 31
End: 2021-02-18

## 2021-02-22 ENCOUNTER — PATIENT MESSAGE (OUTPATIENT)
Dept: OTOLARYNGOLOGY | Facility: CLINIC | Age: 31
End: 2021-02-22

## 2021-02-23 ENCOUNTER — PATIENT MESSAGE (OUTPATIENT)
Dept: INTERNAL MEDICINE | Facility: CLINIC | Age: 31
End: 2021-02-23

## 2021-02-23 ENCOUNTER — PATIENT MESSAGE (OUTPATIENT)
Dept: ORTHOPEDICS | Facility: CLINIC | Age: 31
End: 2021-02-23

## 2021-02-24 ENCOUNTER — PATIENT MESSAGE (OUTPATIENT)
Dept: INTERNAL MEDICINE | Facility: CLINIC | Age: 31
End: 2021-02-24

## 2021-02-24 DIAGNOSIS — E66.9 OBESITY (BMI 30-39.9): Primary | ICD-10-CM

## 2021-02-26 ENCOUNTER — PATIENT MESSAGE (OUTPATIENT)
Dept: ORTHOPEDICS | Facility: CLINIC | Age: 31
End: 2021-02-26

## 2021-02-26 DIAGNOSIS — M25.521 BILATERAL ELBOW JOINT PAIN: ICD-10-CM

## 2021-02-26 DIAGNOSIS — M79.641 BILATERAL HAND PAIN: Primary | ICD-10-CM

## 2021-02-26 DIAGNOSIS — M79.642 BILATERAL HAND PAIN: Primary | ICD-10-CM

## 2021-02-26 DIAGNOSIS — M25.522 BILATERAL ELBOW JOINT PAIN: ICD-10-CM

## 2021-03-01 ENCOUNTER — OFFICE VISIT (OUTPATIENT)
Dept: ORTHOPEDICS | Facility: CLINIC | Age: 31
End: 2021-03-01
Payer: COMMERCIAL

## 2021-03-01 ENCOUNTER — HOSPITAL ENCOUNTER (OUTPATIENT)
Dept: RADIOLOGY | Facility: OTHER | Age: 31
Discharge: HOME | End: 2021-03-01
Attending: ORTHOPAEDIC SURGERY
Payer: COMMERCIAL

## 2021-03-01 VITALS — BODY MASS INDEX: 31.1 KG/M2 | HEIGHT: 62 IN | WEIGHT: 169 LBS

## 2021-03-01 DIAGNOSIS — M79.641 BILATERAL HAND PAIN: ICD-10-CM

## 2021-03-01 DIAGNOSIS — M79.642 BILATERAL HAND PAIN: ICD-10-CM

## 2021-03-01 DIAGNOSIS — G56.00 CARPAL TUNNEL SYNDROME, UNSPECIFIED LATERALITY: Primary | ICD-10-CM

## 2021-03-01 PROCEDURE — 99999 PR PBB SHADOW E&M-EST. PATIENT-LVL II: CPT | Mod: PBBFAC,,, | Performed by: ORTHOPAEDIC SURGERY

## 2021-03-01 PROCEDURE — 73130 XR HAND COMPLETE 3 VIEWS BILATERAL: ICD-10-PCS | Mod: 26,50,, | Performed by: INTERNAL MEDICINE

## 2021-03-01 PROCEDURE — 99204 PR OFFICE/OUTPT VISIT, NEW, LEVL IV, 45-59 MIN: ICD-10-PCS | Mod: S$GLB,,, | Performed by: ORTHOPAEDIC SURGERY

## 2021-03-01 PROCEDURE — 99204 OFFICE O/P NEW MOD 45 MIN: CPT | Mod: S$GLB,,, | Performed by: ORTHOPAEDIC SURGERY

## 2021-03-01 PROCEDURE — 73130 X-RAY EXAM OF HAND: CPT | Mod: 26,50,, | Performed by: INTERNAL MEDICINE

## 2021-03-01 PROCEDURE — 73130 X-RAY EXAM OF HAND: CPT | Mod: TC,50,FY

## 2021-03-01 PROCEDURE — 99999 PR PBB SHADOW E&M-EST. PATIENT-LVL II: ICD-10-PCS | Mod: PBBFAC,,, | Performed by: ORTHOPAEDIC SURGERY

## 2021-03-01 RX ORDER — GABAPENTIN 100 MG/1
100 CAPSULE ORAL 3 TIMES DAILY
Qty: 90 CAPSULE | Refills: 3 | Status: SHIPPED | OUTPATIENT
Start: 2021-03-01 | End: 2021-07-08

## 2021-03-01 RX ORDER — GABAPENTIN 300 MG/1
300 CAPSULE ORAL 3 TIMES DAILY
Qty: 90 CAPSULE | Refills: 11 | Status: SHIPPED | OUTPATIENT
Start: 2021-03-01 | End: 2021-03-01

## 2021-03-03 ENCOUNTER — HOSPITAL ENCOUNTER (OUTPATIENT)
Dept: RADIOLOGY | Facility: HOSPITAL | Age: 31
Discharge: HOME | End: 2021-03-03
Attending: PHYSICIAN ASSISTANT
Payer: COMMERCIAL

## 2021-03-03 ENCOUNTER — OFFICE VISIT (OUTPATIENT)
Dept: SPORTS MEDICINE | Facility: CLINIC | Age: 31
End: 2021-03-03
Payer: COMMERCIAL

## 2021-03-03 VITALS — WEIGHT: 169 LBS | BODY MASS INDEX: 31.1 KG/M2 | HEIGHT: 62 IN

## 2021-03-03 DIAGNOSIS — G89.29 CHRONIC LEFT SHOULDER PAIN: ICD-10-CM

## 2021-03-03 DIAGNOSIS — M25.512 CHRONIC LEFT SHOULDER PAIN: ICD-10-CM

## 2021-03-03 DIAGNOSIS — G89.29 CHRONIC PAIN OF LEFT KNEE: Primary | ICD-10-CM

## 2021-03-03 DIAGNOSIS — M75.22 BICEPS TENDINITIS OF LEFT UPPER EXTREMITY: ICD-10-CM

## 2021-03-03 DIAGNOSIS — M25.512 LEFT SHOULDER PAIN, UNSPECIFIED CHRONICITY: ICD-10-CM

## 2021-03-03 DIAGNOSIS — M25.562 CHRONIC PAIN OF LEFT KNEE: Primary | ICD-10-CM

## 2021-03-03 PROCEDURE — 99214 OFFICE O/P EST MOD 30 MIN: CPT | Mod: S$GLB,,, | Performed by: PHYSICIAN ASSISTANT

## 2021-03-03 PROCEDURE — 73030 XR SHOULDER COMPLETE 2 OR MORE VIEWS LEFT: ICD-10-PCS | Mod: 26,LT,, | Performed by: RADIOLOGY

## 2021-03-03 PROCEDURE — 99214 PR OFFICE/OUTPT VISIT, EST, LEVL IV, 30-39 MIN: ICD-10-PCS | Mod: S$GLB,,, | Performed by: PHYSICIAN ASSISTANT

## 2021-03-03 PROCEDURE — 99999 PR PBB SHADOW E&M-EST. PATIENT-LVL IV: ICD-10-PCS | Mod: PBBFAC,,, | Performed by: PHYSICIAN ASSISTANT

## 2021-03-03 PROCEDURE — 73030 X-RAY EXAM OF SHOULDER: CPT | Mod: TC,LT

## 2021-03-03 PROCEDURE — 73030 X-RAY EXAM OF SHOULDER: CPT | Mod: 26,LT,, | Performed by: RADIOLOGY

## 2021-03-03 PROCEDURE — 99999 PR PBB SHADOW E&M-EST. PATIENT-LVL IV: CPT | Mod: PBBFAC,,, | Performed by: PHYSICIAN ASSISTANT

## 2021-03-10 ENCOUNTER — CLINICAL SUPPORT (OUTPATIENT)
Dept: REHABILITATION | Facility: HOSPITAL | Age: 31
End: 2021-03-10
Payer: COMMERCIAL

## 2021-03-10 DIAGNOSIS — M25.562 CHRONIC PAIN OF LEFT KNEE: ICD-10-CM

## 2021-03-10 DIAGNOSIS — G89.29 CHRONIC PAIN OF LEFT KNEE: ICD-10-CM

## 2021-03-10 DIAGNOSIS — M75.22 BICEPS TENDINITIS OF LEFT UPPER EXTREMITY: ICD-10-CM

## 2021-03-10 DIAGNOSIS — M79.602 ARM PAIN, LEFT: ICD-10-CM

## 2021-03-10 PROCEDURE — 97112 NEUROMUSCULAR REEDUCATION: CPT

## 2021-03-10 PROCEDURE — 97161 PT EVAL LOW COMPLEX 20 MIN: CPT

## 2021-03-10 PROCEDURE — 97110 THERAPEUTIC EXERCISES: CPT

## 2021-03-18 ENCOUNTER — HOSPITAL ENCOUNTER (OUTPATIENT)
Dept: RADIOLOGY | Facility: HOSPITAL | Age: 31
Discharge: HOME | End: 2021-03-18
Attending: PHYSICIAN ASSISTANT
Payer: COMMERCIAL

## 2021-03-18 DIAGNOSIS — M25.562 CHRONIC PAIN OF LEFT KNEE: ICD-10-CM

## 2021-03-18 DIAGNOSIS — G89.29 CHRONIC PAIN OF LEFT KNEE: ICD-10-CM

## 2021-03-18 PROCEDURE — 73721 MRI KNEE WITHOUT CONTRAST LEFT: ICD-10-PCS | Mod: 26,LT,, | Performed by: RADIOLOGY

## 2021-03-18 PROCEDURE — 73721 MRI JNT OF LWR EXTRE W/O DYE: CPT | Mod: 26,LT,, | Performed by: RADIOLOGY

## 2021-03-18 PROCEDURE — 73721 MRI JNT OF LWR EXTRE W/O DYE: CPT | Mod: TC,LT

## 2021-03-24 ENCOUNTER — TELEPHONE (OUTPATIENT)
Dept: SPORTS MEDICINE | Facility: CLINIC | Age: 31
End: 2021-03-24

## 2021-03-26 ENCOUNTER — OFFICE VISIT (OUTPATIENT)
Dept: SPORTS MEDICINE | Facility: CLINIC | Age: 31
End: 2021-03-26
Payer: COMMERCIAL

## 2021-03-26 ENCOUNTER — TELEPHONE (OUTPATIENT)
Dept: SPORTS MEDICINE | Facility: CLINIC | Age: 31
End: 2021-03-26

## 2021-03-26 DIAGNOSIS — M67.52 PLICA SYNDROME, LEFT KNEE: Primary | ICD-10-CM

## 2021-03-26 DIAGNOSIS — M25.562 CHRONIC PAIN OF LEFT KNEE: ICD-10-CM

## 2021-03-26 DIAGNOSIS — M22.42 CHONDROMALACIA OF LEFT PATELLA: ICD-10-CM

## 2021-03-26 DIAGNOSIS — G89.29 CHRONIC PAIN OF LEFT KNEE: ICD-10-CM

## 2021-03-26 DIAGNOSIS — M67.50 PLICA SYNDROME: Primary | ICD-10-CM

## 2021-03-26 DIAGNOSIS — Z01.818 PRE-OP TESTING: Primary | ICD-10-CM

## 2021-03-26 PROCEDURE — 99214 PR OFFICE/OUTPT VISIT, EST, LEVL IV, 30-39 MIN: ICD-10-PCS | Mod: 95,,, | Performed by: PHYSICIAN ASSISTANT

## 2021-03-26 PROCEDURE — 99214 OFFICE O/P EST MOD 30 MIN: CPT | Mod: 95,,, | Performed by: PHYSICIAN ASSISTANT

## 2021-04-01 ENCOUNTER — PATIENT MESSAGE (OUTPATIENT)
Dept: INTERNAL MEDICINE | Facility: CLINIC | Age: 31
End: 2021-04-01

## 2021-04-03 ENCOUNTER — LAB VISIT (OUTPATIENT)
Dept: INTERNAL MEDICINE | Facility: CLINIC | Age: 31
End: 2021-04-03
Payer: COMMERCIAL

## 2021-04-03 DIAGNOSIS — Z01.818 PRE-OP TESTING: ICD-10-CM

## 2021-04-03 PROCEDURE — U0005 INFEC AGEN DETEC AMPLI PROBE: HCPCS | Performed by: ORTHOPAEDIC SURGERY

## 2021-04-03 PROCEDURE — U0003 INFECTIOUS AGENT DETECTION BY NUCLEIC ACID (DNA OR RNA); SEVERE ACUTE RESPIRATORY SYNDROME CORONAVIRUS 2 (SARS-COV-2) (CORONAVIRUS DISEASE [COVID-19]), AMPLIFIED PROBE TECHNIQUE, MAKING USE OF HIGH THROUGHPUT TECHNOLOGIES AS DESCRIBED BY CMS-2020-01-R: HCPCS | Performed by: ORTHOPAEDIC SURGERY

## 2021-04-04 LAB — SARS-COV-2 RNA RESP QL NAA+PROBE: NOT DETECTED

## 2021-04-05 ENCOUNTER — ANESTHESIA EVENT (OUTPATIENT)
Dept: SURGERY | Facility: HOSPITAL | Age: 31
End: 2021-04-05
Payer: COMMERCIAL

## 2021-04-05 ENCOUNTER — OFFICE VISIT (OUTPATIENT)
Dept: SPORTS MEDICINE | Facility: CLINIC | Age: 31
End: 2021-04-05
Payer: COMMERCIAL

## 2021-04-05 ENCOUNTER — HOSPITAL ENCOUNTER (EMERGENCY)
Facility: HOSPITAL | Age: 31
Discharge: HOME | End: 2021-04-06
Attending: EMERGENCY MEDICINE
Payer: COMMERCIAL

## 2021-04-05 ENCOUNTER — TELEPHONE (OUTPATIENT)
Dept: SPORTS MEDICINE | Facility: CLINIC | Age: 31
End: 2021-04-05

## 2021-04-05 VITALS
TEMPERATURE: 99 F | HEART RATE: 92 BPM | WEIGHT: 172 LBS | OXYGEN SATURATION: 99 % | DIASTOLIC BLOOD PRESSURE: 70 MMHG | HEIGHT: 62 IN | RESPIRATION RATE: 18 BRPM | SYSTOLIC BLOOD PRESSURE: 127 MMHG | BODY MASS INDEX: 31.65 KG/M2

## 2021-04-05 VITALS
DIASTOLIC BLOOD PRESSURE: 78 MMHG | HEIGHT: 62 IN | BODY MASS INDEX: 31.65 KG/M2 | WEIGHT: 172 LBS | SYSTOLIC BLOOD PRESSURE: 110 MMHG | HEART RATE: 91 BPM

## 2021-04-05 DIAGNOSIS — M22.42 CHONDROMALACIA OF LEFT PATELLA: ICD-10-CM

## 2021-04-05 DIAGNOSIS — M67.50 PLICA SYNDROME: Primary | ICD-10-CM

## 2021-04-05 DIAGNOSIS — R07.9 CHEST PAIN, UNSPECIFIED TYPE: Primary | ICD-10-CM

## 2021-04-05 DIAGNOSIS — G89.18 POST-OPERATIVE PAIN: ICD-10-CM

## 2021-04-05 LAB
ALBUMIN SERPL BCP-MCNC: 3.4 G/DL (ref 3.5–5.2)
ALP SERPL-CCNC: 67 U/L (ref 55–135)
ALT SERPL W/O P-5'-P-CCNC: 16 U/L (ref 10–44)
ANION GAP SERPL CALC-SCNC: 11 MMOL/L (ref 8–16)
AST SERPL-CCNC: 24 U/L (ref 10–40)
B-HCG UR QL: NEGATIVE
BASOPHILS # BLD AUTO: 0.04 K/UL (ref 0–0.2)
BASOPHILS NFR BLD: 0.4 % (ref 0–1.9)
BILIRUB SERPL-MCNC: 0.2 MG/DL (ref 0.1–1)
BUN SERPL-MCNC: 12 MG/DL (ref 6–20)
CALCIUM SERPL-MCNC: 9 MG/DL (ref 8.7–10.5)
CHLORIDE SERPL-SCNC: 106 MMOL/L (ref 95–110)
CO2 SERPL-SCNC: 23 MMOL/L (ref 23–29)
CREAT SERPL-MCNC: 0.8 MG/DL (ref 0.5–1.4)
CTP QC/QA: YES
DIFFERENTIAL METHOD: NORMAL
EOSINOPHIL # BLD AUTO: 0.1 K/UL (ref 0–0.5)
EOSINOPHIL NFR BLD: 1.2 % (ref 0–8)
ERYTHROCYTE [DISTWIDTH] IN BLOOD BY AUTOMATED COUNT: 13.4 % (ref 11.5–14.5)
EST. GFR  (AFRICAN AMERICAN): >60 ML/MIN/1.73 M^2
EST. GFR  (NON AFRICAN AMERICAN): >60 ML/MIN/1.73 M^2
GLUCOSE SERPL-MCNC: 79 MG/DL (ref 70–110)
HCT VFR BLD AUTO: 41 % (ref 37–48.5)
HGB BLD-MCNC: 13.1 G/DL (ref 12–16)
IMM GRANULOCYTES # BLD AUTO: 0.02 K/UL (ref 0–0.04)
IMM GRANULOCYTES NFR BLD AUTO: 0.2 % (ref 0–0.5)
LYMPHOCYTES # BLD AUTO: 3.2 K/UL (ref 1–4.8)
LYMPHOCYTES NFR BLD: 30.1 % (ref 18–48)
MCH RBC QN AUTO: 29.4 PG (ref 27–31)
MCHC RBC AUTO-ENTMCNC: 32 G/DL (ref 32–36)
MCV RBC AUTO: 92 FL (ref 82–98)
MONOCYTES # BLD AUTO: 0.9 K/UL (ref 0.3–1)
MONOCYTES NFR BLD: 8.1 % (ref 4–15)
NEUTROPHILS # BLD AUTO: 6.3 K/UL (ref 1.8–7.7)
NEUTROPHILS NFR BLD: 60 % (ref 38–73)
NRBC BLD-RTO: 0 /100 WBC
PLATELET # BLD AUTO: 305 K/UL (ref 150–450)
PMV BLD AUTO: 10.5 FL (ref 9.2–12.9)
POTASSIUM SERPL-SCNC: 4 MMOL/L (ref 3.5–5.1)
PROT SERPL-MCNC: 7 G/DL (ref 6–8.4)
RBC # BLD AUTO: 4.46 M/UL (ref 4–5.4)
SODIUM SERPL-SCNC: 140 MMOL/L (ref 136–145)
TROPONIN I SERPL DL<=0.01 NG/ML-MCNC: <0.006 NG/ML (ref 0–0.03)
WBC # BLD AUTO: 10.49 K/UL (ref 3.9–12.7)

## 2021-04-05 PROCEDURE — 99999 PR PBB SHADOW E&M-EST. PATIENT-LVL IV: CPT | Mod: PBBFAC,,, | Performed by: PHYSICIAN ASSISTANT

## 2021-04-05 PROCEDURE — 93010 EKG 12-LEAD: ICD-10-PCS | Mod: ,,, | Performed by: INTERNAL MEDICINE

## 2021-04-05 PROCEDURE — 84484 ASSAY OF TROPONIN QUANT: CPT | Performed by: PHYSICIAN ASSISTANT

## 2021-04-05 PROCEDURE — 99285 EMERGENCY DEPT VISIT HI MDM: CPT | Mod: 25

## 2021-04-05 PROCEDURE — 99285 PR EMERGENCY DEPT VISIT,LEVEL V: ICD-10-PCS | Mod: ,,, | Performed by: EMERGENCY MEDICINE

## 2021-04-05 PROCEDURE — 81025 URINE PREGNANCY TEST: CPT | Performed by: PHYSICIAN ASSISTANT

## 2021-04-05 PROCEDURE — 93005 ELECTROCARDIOGRAM TRACING: CPT

## 2021-04-05 PROCEDURE — 99999 PR PBB SHADOW E&M-EST. PATIENT-LVL IV: ICD-10-PCS | Mod: PBBFAC,,, | Performed by: PHYSICIAN ASSISTANT

## 2021-04-05 PROCEDURE — 85025 COMPLETE CBC W/AUTO DIFF WBC: CPT | Performed by: PHYSICIAN ASSISTANT

## 2021-04-05 PROCEDURE — 93010 ELECTROCARDIOGRAM REPORT: CPT | Mod: ,,, | Performed by: INTERNAL MEDICINE

## 2021-04-05 PROCEDURE — 99499 UNLISTED E&M SERVICE: CPT | Mod: S$GLB,,, | Performed by: PHYSICIAN ASSISTANT

## 2021-04-05 PROCEDURE — 99285 EMERGENCY DEPT VISIT HI MDM: CPT | Mod: ,,, | Performed by: EMERGENCY MEDICINE

## 2021-04-05 PROCEDURE — 99499 NO LOS: ICD-10-PCS | Mod: S$GLB,,, | Performed by: PHYSICIAN ASSISTANT

## 2021-04-05 PROCEDURE — 80053 COMPREHEN METABOLIC PANEL: CPT | Performed by: PHYSICIAN ASSISTANT

## 2021-04-05 PROCEDURE — 25000003 PHARM REV CODE 250: Performed by: PHYSICIAN ASSISTANT

## 2021-04-05 RX ORDER — CEFAZOLIN SODIUM 2 G/50ML
2 SOLUTION INTRAVENOUS
Status: CANCELLED | OUTPATIENT
Start: 2021-04-05

## 2021-04-05 RX ORDER — SODIUM CHLORIDE 9 MG/ML
INJECTION, SOLUTION INTRAVENOUS CONTINUOUS
Status: CANCELLED | OUTPATIENT
Start: 2021-04-05

## 2021-04-05 RX ORDER — ASPIRIN 325 MG
325 TABLET ORAL
Status: COMPLETED | OUTPATIENT
Start: 2021-04-05 | End: 2021-04-05

## 2021-04-05 RX ORDER — TRAMADOL HYDROCHLORIDE 50 MG/1
50-100 TABLET ORAL EVERY 6 HOURS PRN
Qty: 16 TABLET | Refills: 0 | Status: SHIPPED | OUTPATIENT
Start: 2021-04-05 | End: 2021-07-08

## 2021-04-05 RX ORDER — HYDROCODONE BITARTRATE AND ACETAMINOPHEN 10; 325 MG/1; MG/1
TABLET ORAL
Qty: 15 TABLET | Refills: 0 | Status: SHIPPED | OUTPATIENT
Start: 2021-04-05 | End: 2021-07-08

## 2021-04-05 RX ORDER — ASPIRIN 81 MG/1
81 TABLET ORAL DAILY
Qty: 21 TABLET | Refills: 0 | COMMUNITY
Start: 2021-04-05 | End: 2021-07-08

## 2021-04-05 RX ORDER — PROMETHAZINE HYDROCHLORIDE 25 MG/1
25 TABLET ORAL EVERY 6 HOURS PRN
Qty: 8 TABLET | Refills: 0 | Status: SHIPPED | OUTPATIENT
Start: 2021-04-05 | End: 2021-07-08

## 2021-04-05 RX ADMIN — ASPIRIN 325 MG ORAL TABLET 325 MG: 325 PILL ORAL at 10:04

## 2021-04-06 ENCOUNTER — ANESTHESIA (OUTPATIENT)
Dept: SURGERY | Facility: HOSPITAL | Age: 31
End: 2021-04-06
Payer: COMMERCIAL

## 2021-04-06 ENCOUNTER — HOSPITAL ENCOUNTER (OUTPATIENT)
Facility: HOSPITAL | Age: 31
Discharge: HOME | End: 2021-04-06
Attending: ORTHOPAEDIC SURGERY | Admitting: ORTHOPAEDIC SURGERY
Payer: COMMERCIAL

## 2021-04-06 VITALS
RESPIRATION RATE: 20 BRPM | OXYGEN SATURATION: 98 % | DIASTOLIC BLOOD PRESSURE: 62 MMHG | HEIGHT: 62 IN | SYSTOLIC BLOOD PRESSURE: 103 MMHG | HEART RATE: 79 BPM | BODY MASS INDEX: 31.65 KG/M2 | TEMPERATURE: 98 F | WEIGHT: 172 LBS

## 2021-04-06 DIAGNOSIS — M22.42 CHONDROMALACIA OF LEFT PATELLA: ICD-10-CM

## 2021-04-06 DIAGNOSIS — M67.50 PLICA SYNDROME: ICD-10-CM

## 2021-04-06 PROCEDURE — 99900035 HC TECH TIME PER 15 MIN (STAT)

## 2021-04-06 PROCEDURE — 63600175 PHARM REV CODE 636 W HCPCS: Performed by: NURSE ANESTHETIST, CERTIFIED REGISTERED

## 2021-04-06 PROCEDURE — D9220A PRA ANESTHESIA: ICD-10-PCS | Mod: ,,, | Performed by: ANESTHESIOLOGY

## 2021-04-06 PROCEDURE — 36000711: Performed by: ORTHOPAEDIC SURGERY

## 2021-04-06 PROCEDURE — 25000003 PHARM REV CODE 250: Performed by: NURSE ANESTHETIST, CERTIFIED REGISTERED

## 2021-04-06 PROCEDURE — 29881 ARTHRS KNE SRG MNISECTMY M/L: CPT | Mod: AS,LT,, | Performed by: PHYSICIAN ASSISTANT

## 2021-04-06 PROCEDURE — 25000003 PHARM REV CODE 250: Performed by: ORTHOPAEDIC SURGERY

## 2021-04-06 PROCEDURE — 37000009 HC ANESTHESIA EA ADD 15 MINS: Performed by: ORTHOPAEDIC SURGERY

## 2021-04-06 PROCEDURE — 37000008 HC ANESTHESIA 1ST 15 MINUTES: Performed by: ORTHOPAEDIC SURGERY

## 2021-04-06 PROCEDURE — 29881 PR KNEE SCOPE SINGLE MENISECECTOMY: ICD-10-PCS | Mod: LT,,, | Performed by: ORTHOPAEDIC SURGERY

## 2021-04-06 PROCEDURE — A4216 STERILE WATER/SALINE, 10 ML: HCPCS | Performed by: NURSE ANESTHETIST, CERTIFIED REGISTERED

## 2021-04-06 PROCEDURE — 36000710: Performed by: ORTHOPAEDIC SURGERY

## 2021-04-06 PROCEDURE — 27200651 HC AIRWAY, LMA: Performed by: ANESTHESIOLOGY

## 2021-04-06 PROCEDURE — 71000033 HC RECOVERY, INTIAL HOUR: Performed by: ORTHOPAEDIC SURGERY

## 2021-04-06 PROCEDURE — 94761 N-INVAS EAR/PLS OXIMETRY MLT: CPT

## 2021-04-06 PROCEDURE — 29881 PR KNEE SCOPE SINGLE MENISECECTOMY: ICD-10-PCS | Mod: AS,LT,, | Performed by: PHYSICIAN ASSISTANT

## 2021-04-06 PROCEDURE — 63600175 PHARM REV CODE 636 W HCPCS: Performed by: ORTHOPAEDIC SURGERY

## 2021-04-06 PROCEDURE — 29881 ARTHRS KNE SRG MNISECTMY M/L: CPT | Mod: LT,,, | Performed by: ORTHOPAEDIC SURGERY

## 2021-04-06 PROCEDURE — 63600175 PHARM REV CODE 636 W HCPCS: Performed by: PHYSICIAN ASSISTANT

## 2021-04-06 PROCEDURE — D9220A PRA ANESTHESIA: Mod: ,,, | Performed by: ANESTHESIOLOGY

## 2021-04-06 PROCEDURE — 27201423 OPTIME MED/SURG SUP & DEVICES STERILE SUPPLY: Performed by: ORTHOPAEDIC SURGERY

## 2021-04-06 PROCEDURE — 71000015 HC POSTOP RECOV 1ST HR: Performed by: ORTHOPAEDIC SURGERY

## 2021-04-06 PROCEDURE — 25000003 PHARM REV CODE 250: Performed by: PHYSICIAN ASSISTANT

## 2021-04-06 RX ORDER — DEXMEDETOMIDINE HYDROCHLORIDE 100 UG/ML
INJECTION, SOLUTION INTRAVENOUS
Status: DISCONTINUED | OUTPATIENT
Start: 2021-04-06 | End: 2021-04-06

## 2021-04-06 RX ORDER — HYDROMORPHONE HYDROCHLORIDE 1 MG/ML
0.2 INJECTION, SOLUTION INTRAMUSCULAR; INTRAVENOUS; SUBCUTANEOUS EVERY 5 MIN PRN
Status: DISCONTINUED | OUTPATIENT
Start: 2021-04-06 | End: 2021-04-06 | Stop reason: HOSPADM

## 2021-04-06 RX ORDER — ROPIVACAINE HYDROCHLORIDE 5 MG/ML
INJECTION, SOLUTION EPIDURAL; INFILTRATION; PERINEURAL
Status: DISCONTINUED | OUTPATIENT
Start: 2021-04-06 | End: 2021-04-06 | Stop reason: HOSPADM

## 2021-04-06 RX ORDER — FENTANYL CITRATE 50 UG/ML
INJECTION, SOLUTION INTRAMUSCULAR; INTRAVENOUS
Status: DISCONTINUED | OUTPATIENT
Start: 2021-04-06 | End: 2021-04-06

## 2021-04-06 RX ORDER — OXYCODONE HYDROCHLORIDE 5 MG/1
10 TABLET ORAL EVERY 4 HOURS PRN
Status: DISCONTINUED | OUTPATIENT
Start: 2021-04-06 | End: 2021-04-06 | Stop reason: HOSPADM

## 2021-04-06 RX ORDER — CARBOXYMETHYLCELLULOSE SODIUM 10 MG/ML
GEL OPHTHALMIC
Status: DISCONTINUED | OUTPATIENT
Start: 2021-04-06 | End: 2021-04-06

## 2021-04-06 RX ORDER — ONDANSETRON 2 MG/ML
INJECTION INTRAMUSCULAR; INTRAVENOUS
Status: DISCONTINUED | OUTPATIENT
Start: 2021-04-06 | End: 2021-04-06

## 2021-04-06 RX ORDER — DEXAMETHASONE SODIUM PHOSPHATE 4 MG/ML
INJECTION, SOLUTION INTRA-ARTICULAR; INTRALESIONAL; INTRAMUSCULAR; INTRAVENOUS; SOFT TISSUE
Status: DISCONTINUED | OUTPATIENT
Start: 2021-04-06 | End: 2021-04-06

## 2021-04-06 RX ORDER — TRAMADOL HYDROCHLORIDE 50 MG/1
100 TABLET ORAL EVERY 6 HOURS PRN
Status: DISCONTINUED | OUTPATIENT
Start: 2021-04-06 | End: 2021-04-06 | Stop reason: HOSPADM

## 2021-04-06 RX ORDER — FAMOTIDINE 10 MG/ML
INJECTION INTRAVENOUS
Status: DISCONTINUED | OUTPATIENT
Start: 2021-04-06 | End: 2021-04-06

## 2021-04-06 RX ORDER — KETOROLAC TROMETHAMINE 30 MG/ML
INJECTION, SOLUTION INTRAMUSCULAR; INTRAVENOUS
Status: DISCONTINUED | OUTPATIENT
Start: 2021-04-06 | End: 2021-04-06 | Stop reason: HOSPADM

## 2021-04-06 RX ORDER — CEFAZOLIN SODIUM 1 G/3ML
2 INJECTION, POWDER, FOR SOLUTION INTRAMUSCULAR; INTRAVENOUS
Status: COMPLETED | OUTPATIENT
Start: 2021-04-06 | End: 2021-04-06

## 2021-04-06 RX ORDER — EPINEPHRINE CONVENIENCE KIT 1 MG/ML(1)
KIT INTRAMUSCULAR; SUBCUTANEOUS
Status: DISCONTINUED | OUTPATIENT
Start: 2021-04-06 | End: 2021-04-06 | Stop reason: HOSPADM

## 2021-04-06 RX ORDER — KETAMINE HCL IN 0.9 % NACL 50 MG/5 ML
SYRINGE (ML) INTRAVENOUS
Status: DISCONTINUED | OUTPATIENT
Start: 2021-04-06 | End: 2021-04-06

## 2021-04-06 RX ORDER — SODIUM CHLORIDE 9 MG/ML
INJECTION, SOLUTION INTRAVENOUS CONTINUOUS
Status: DISCONTINUED | OUTPATIENT
Start: 2021-04-06 | End: 2021-04-06 | Stop reason: HOSPADM

## 2021-04-06 RX ORDER — SODIUM CHLORIDE 0.9 % (FLUSH) 0.9 %
3 SYRINGE (ML) INJECTION
Status: DISCONTINUED | OUTPATIENT
Start: 2021-04-06 | End: 2021-04-06 | Stop reason: HOSPADM

## 2021-04-06 RX ORDER — KETAMINE HYDROCHLORIDE 100 MG/ML
INJECTION, SOLUTION INTRAMUSCULAR; INTRAVENOUS
Status: DISCONTINUED | OUTPATIENT
Start: 2021-04-06 | End: 2021-04-06 | Stop reason: HOSPADM

## 2021-04-06 RX ORDER — LIDOCAINE HYDROCHLORIDE 20 MG/ML
INJECTION INTRAVENOUS
Status: DISCONTINUED | OUTPATIENT
Start: 2021-04-06 | End: 2021-04-06

## 2021-04-06 RX ORDER — ONDANSETRON 2 MG/ML
4 INJECTION INTRAMUSCULAR; INTRAVENOUS EVERY 12 HOURS PRN
Status: DISCONTINUED | OUTPATIENT
Start: 2021-04-06 | End: 2021-04-06 | Stop reason: HOSPADM

## 2021-04-06 RX ORDER — PROPOFOL 10 MG/ML
VIAL (ML) INTRAVENOUS
Status: DISCONTINUED | OUTPATIENT
Start: 2021-04-06 | End: 2021-04-06

## 2021-04-06 RX ORDER — PROMETHAZINE HYDROCHLORIDE 25 MG/1
25 TABLET ORAL EVERY 6 HOURS PRN
Status: DISCONTINUED | OUTPATIENT
Start: 2021-04-06 | End: 2021-04-06 | Stop reason: HOSPADM

## 2021-04-06 RX ORDER — MIDAZOLAM HYDROCHLORIDE 1 MG/ML
INJECTION INTRAMUSCULAR; INTRAVENOUS
Status: DISCONTINUED | OUTPATIENT
Start: 2021-04-06 | End: 2021-04-06

## 2021-04-06 RX ORDER — MORPHINE SULFATE 2 MG/ML
2 INJECTION, SOLUTION INTRAMUSCULAR; INTRAVENOUS EVERY 10 MIN PRN
Status: DISCONTINUED | OUTPATIENT
Start: 2021-04-06 | End: 2021-04-06 | Stop reason: HOSPADM

## 2021-04-06 RX ADMIN — ONDANSETRON 4 MG: 2 INJECTION, SOLUTION INTRAMUSCULAR; INTRAVENOUS at 07:04

## 2021-04-06 RX ADMIN — Medication 20 MG: at 07:04

## 2021-04-06 RX ADMIN — FAMOTIDINE 20 MG: 10 INJECTION, SOLUTION INTRAVENOUS at 07:04

## 2021-04-06 RX ADMIN — FENTANYL CITRATE 25 MCG: 50 INJECTION, SOLUTION INTRAMUSCULAR; INTRAVENOUS at 07:04

## 2021-04-06 RX ADMIN — LIDOCAINE HYDROCHLORIDE 100 MG: 20 INJECTION, SOLUTION INTRAVENOUS at 07:04

## 2021-04-06 RX ADMIN — DEXMEDETOMIDINE HYDROCHLORIDE 8 MCG: 100 INJECTION, SOLUTION, CONCENTRATE INTRAVENOUS at 07:04

## 2021-04-06 RX ADMIN — PROPOFOL 200 MG: 10 INJECTION, EMULSION INTRAVENOUS at 07:04

## 2021-04-06 RX ADMIN — PROPOFOL 60 MG: 10 INJECTION, EMULSION INTRAVENOUS at 07:04

## 2021-04-06 RX ADMIN — CARBOXYMETHYLCELLULOSE SODIUM 2 DROP: 10 GEL OPHTHALMIC at 07:04

## 2021-04-06 RX ADMIN — DEXAMETHASONE SODIUM PHOSPHATE 8 MG: 4 INJECTION, SOLUTION INTRAMUSCULAR; INTRAVENOUS at 07:04

## 2021-04-06 RX ADMIN — SODIUM CHLORIDE 0.2 MCG/KG/HR: 9 INJECTION INTRAMUSCULAR; INTRAVENOUS; SUBCUTANEOUS at 07:04

## 2021-04-06 RX ADMIN — MIDAZOLAM HYDROCHLORIDE 2 MG: 1 INJECTION, SOLUTION INTRAMUSCULAR; INTRAVENOUS at 06:04

## 2021-04-06 RX ADMIN — FENTANYL CITRATE 50 MCG: 50 INJECTION, SOLUTION INTRAMUSCULAR; INTRAVENOUS at 07:04

## 2021-04-06 RX ADMIN — OXYCODONE 10 MG: 5 TABLET ORAL at 09:04

## 2021-04-06 RX ADMIN — SODIUM CHLORIDE: 0.9 INJECTION, SOLUTION INTRAVENOUS at 06:04

## 2021-04-06 RX ADMIN — CEFAZOLIN 2 G: 330 INJECTION, POWDER, FOR SOLUTION INTRAMUSCULAR; INTRAVENOUS at 07:04

## 2021-04-07 ENCOUNTER — PATIENT MESSAGE (OUTPATIENT)
Dept: SPORTS MEDICINE | Facility: CLINIC | Age: 31
End: 2021-04-07

## 2021-04-07 ENCOUNTER — CLINICAL SUPPORT (OUTPATIENT)
Dept: REHABILITATION | Facility: HOSPITAL | Age: 31
End: 2021-04-07
Payer: COMMERCIAL

## 2021-04-07 DIAGNOSIS — M67.50 PLICA SYNDROME: ICD-10-CM

## 2021-04-07 DIAGNOSIS — M25.662 DECREASED RANGE OF MOTION (ROM) OF LEFT KNEE: ICD-10-CM

## 2021-04-07 DIAGNOSIS — M25.562 ACUTE PAIN OF LEFT KNEE: ICD-10-CM

## 2021-04-07 DIAGNOSIS — M22.42 CHONDROMALACIA OF LEFT PATELLA: ICD-10-CM

## 2021-04-07 DIAGNOSIS — Z74.09 IMPAIRED FUNCTIONAL MOBILITY, BALANCE, GAIT, AND ENDURANCE: ICD-10-CM

## 2021-04-07 PROCEDURE — 97110 THERAPEUTIC EXERCISES: CPT

## 2021-04-07 PROCEDURE — 97161 PT EVAL LOW COMPLEX 20 MIN: CPT

## 2021-04-12 ENCOUNTER — CLINICAL SUPPORT (OUTPATIENT)
Dept: REHABILITATION | Facility: HOSPITAL | Age: 31
End: 2021-04-12
Payer: COMMERCIAL

## 2021-04-12 DIAGNOSIS — Z74.09 IMPAIRED FUNCTIONAL MOBILITY, BALANCE, GAIT, AND ENDURANCE: ICD-10-CM

## 2021-04-12 DIAGNOSIS — M25.562 ACUTE PAIN OF LEFT KNEE: ICD-10-CM

## 2021-04-12 DIAGNOSIS — M25.662 DECREASED RANGE OF MOTION (ROM) OF LEFT KNEE: ICD-10-CM

## 2021-04-12 PROCEDURE — 97116 GAIT TRAINING THERAPY: CPT

## 2021-04-12 PROCEDURE — 97110 THERAPEUTIC EXERCISES: CPT

## 2021-04-15 ENCOUNTER — CLINICAL SUPPORT (OUTPATIENT)
Dept: REHABILITATION | Facility: HOSPITAL | Age: 31
End: 2021-04-15
Payer: COMMERCIAL

## 2021-04-15 DIAGNOSIS — Z74.09 IMPAIRED FUNCTIONAL MOBILITY, BALANCE, GAIT, AND ENDURANCE: ICD-10-CM

## 2021-04-15 DIAGNOSIS — M25.562 ACUTE PAIN OF LEFT KNEE: ICD-10-CM

## 2021-04-15 DIAGNOSIS — M25.662 DECREASED RANGE OF MOTION (ROM) OF LEFT KNEE: ICD-10-CM

## 2021-04-15 PROCEDURE — 97110 THERAPEUTIC EXERCISES: CPT

## 2021-04-15 PROCEDURE — 97140 MANUAL THERAPY 1/> REGIONS: CPT

## 2021-04-21 ENCOUNTER — OFFICE VISIT (OUTPATIENT)
Dept: SPORTS MEDICINE | Facility: CLINIC | Age: 31
End: 2021-04-21
Payer: COMMERCIAL

## 2021-04-21 VITALS
DIASTOLIC BLOOD PRESSURE: 72 MMHG | HEIGHT: 62 IN | SYSTOLIC BLOOD PRESSURE: 114 MMHG | WEIGHT: 170.88 LBS | HEART RATE: 89 BPM | BODY MASS INDEX: 31.45 KG/M2

## 2021-04-21 DIAGNOSIS — Z98.890 S/P ARTHROSCOPY OF LEFT KNEE: Primary | ICD-10-CM

## 2021-04-21 PROCEDURE — 99024 PR POST-OP FOLLOW-UP VISIT: ICD-10-PCS | Mod: S$GLB,,, | Performed by: PHYSICIAN ASSISTANT

## 2021-04-21 PROCEDURE — 99999 PR PBB SHADOW E&M-EST. PATIENT-LVL III: ICD-10-PCS | Mod: PBBFAC,,, | Performed by: PHYSICIAN ASSISTANT

## 2021-04-21 PROCEDURE — 99999 PR PBB SHADOW E&M-EST. PATIENT-LVL III: CPT | Mod: PBBFAC,,, | Performed by: PHYSICIAN ASSISTANT

## 2021-04-21 PROCEDURE — 99024 POSTOP FOLLOW-UP VISIT: CPT | Mod: S$GLB,,, | Performed by: PHYSICIAN ASSISTANT

## 2021-04-26 ENCOUNTER — TELEPHONE (OUTPATIENT)
Dept: OBSTETRICS AND GYNECOLOGY | Facility: CLINIC | Age: 31
End: 2021-04-26

## 2021-04-27 ENCOUNTER — TELEPHONE (OUTPATIENT)
Dept: OBSTETRICS AND GYNECOLOGY | Facility: CLINIC | Age: 31
End: 2021-04-27

## 2021-04-28 ENCOUNTER — CLINICAL SUPPORT (OUTPATIENT)
Dept: REHABILITATION | Facility: HOSPITAL | Age: 31
End: 2021-04-28
Payer: COMMERCIAL

## 2021-04-28 ENCOUNTER — TELEPHONE (OUTPATIENT)
Dept: OBSTETRICS AND GYNECOLOGY | Facility: CLINIC | Age: 31
End: 2021-04-28

## 2021-04-28 DIAGNOSIS — M25.562 ACUTE PAIN OF LEFT KNEE: ICD-10-CM

## 2021-04-28 DIAGNOSIS — M25.662 DECREASED RANGE OF MOTION (ROM) OF LEFT KNEE: ICD-10-CM

## 2021-04-28 DIAGNOSIS — Z74.09 IMPAIRED FUNCTIONAL MOBILITY, BALANCE, GAIT, AND ENDURANCE: ICD-10-CM

## 2021-04-28 PROCEDURE — 97110 THERAPEUTIC EXERCISES: CPT

## 2021-04-30 ENCOUNTER — HOSPITAL ENCOUNTER (OUTPATIENT)
Dept: CARDIOLOGY | Facility: HOSPITAL | Age: 31
Discharge: HOME | End: 2021-04-30
Attending: INTERNAL MEDICINE
Payer: COMMERCIAL

## 2021-04-30 ENCOUNTER — OFFICE VISIT (OUTPATIENT)
Dept: NEUROLOGY | Facility: CLINIC | Age: 31
End: 2021-04-30
Payer: COMMERCIAL

## 2021-04-30 VITALS
WEIGHT: 171.94 LBS | DIASTOLIC BLOOD PRESSURE: 75 MMHG | HEART RATE: 91 BPM | HEIGHT: 62 IN | SYSTOLIC BLOOD PRESSURE: 118 MMHG | BODY MASS INDEX: 31.64 KG/M2

## 2021-04-30 VITALS — BODY MASS INDEX: 31.65 KG/M2 | WEIGHT: 172 LBS | HEIGHT: 62 IN

## 2021-04-30 DIAGNOSIS — H93.A9 PULSATILE TINNITUS: ICD-10-CM

## 2021-04-30 DIAGNOSIS — G93.2 IIH (IDIOPATHIC INTRACRANIAL HYPERTENSION): Primary | ICD-10-CM

## 2021-04-30 DIAGNOSIS — R07.89 OTHER CHEST PAIN: ICD-10-CM

## 2021-04-30 LAB
ASCENDING AORTA: 2.29 CM
BSA FOR ECHO PROCEDURE: 1.85 M2
CV ECHO LV RWT: 0.38 CM
CV STRESS BASE HR: 91 BPM
DIASTOLIC BLOOD PRESSURE: 59 MMHG
DOP CALC LVOT AREA: 2.8 CM2
DOP CALC LVOT DIAMETER: 1.88 CM
DOP CALC LVOT PEAK VEL: 1.16 M/S
DOP CALC LVOT STROKE VOLUME: 59.71 CM3
DOP CALCLVOT PEAK VEL VTI: 21.52 CM
E WAVE DECELERATION TIME: 161.97 MSEC
E/A RATIO: 1.91
E/E' RATIO: 6.52 M/S
ECHO LV POSTERIOR WALL: 0.77 CM (ref 0.6–1.1)
EJECTION FRACTION: 63 %
FRACTIONAL SHORTENING: 40 % (ref 28–44)
INTERVENTRICULAR SEPTUM: 0.88 CM (ref 0.6–1.1)
IVRT: 65.65 MSEC
LA MAJOR: 4.38 CM
LA MINOR: 4.59 CM
LA WIDTH: 2.9 CM
LEFT ATRIUM SIZE: 2.58 CM
LEFT ATRIUM VOLUME INDEX: 15.9 ML/M2
LEFT ATRIUM VOLUME: 28.51 CM3
LEFT INTERNAL DIMENSION IN SYSTOLE: 2.41 CM (ref 2.1–4)
LEFT VENTRICLE DIASTOLIC VOLUME INDEX: 39.69 ML/M2
LEFT VENTRICLE DIASTOLIC VOLUME: 71.05 ML
LEFT VENTRICLE MASS INDEX: 55 G/M2
LEFT VENTRICLE SYSTOLIC VOLUME INDEX: 11.4 ML/M2
LEFT VENTRICLE SYSTOLIC VOLUME: 20.34 ML
LEFT VENTRICULAR INTERNAL DIMENSION IN DIASTOLE: 4.03 CM (ref 3.5–6)
LEFT VENTRICULAR MASS: 98.61 G
LV LATERAL E/E' RATIO: 5.18 M/S
LV SEPTAL E/E' RATIO: 8.8 M/S
MV A" WAVE DURATION": 7.42 MSEC
MV PEAK A VEL: 0.46 M/S
MV PEAK E VEL: 0.88 M/S
MV STENOSIS PRESSURE HALF TIME: 46.97 MS
MV VALVE AREA P 1/2 METHOD: 4.68 CM2
OHS CV CPX 1 MINUTE RECOVERY HEART RATE: 118 BPM
OHS CV CPX 85 PERCENT MAX PREDICTED HEART RATE MALE: 153
OHS CV CPX ESTIMATED METS: 10
OHS CV CPX MAX PREDICTED HEART RATE: 180
OHS CV CPX PATIENT IS FEMALE: 1
OHS CV CPX PATIENT IS MALE: 0
OHS CV CPX PEAK DIASTOLIC BLOOD PRESSURE: 78 MMHG
OHS CV CPX PEAK HEAR RATE: 155 BPM
OHS CV CPX PEAK RATE PRESSURE PRODUCT: NORMAL
OHS CV CPX PEAK SYSTOLIC BLOOD PRESSURE: 162 MMHG
OHS CV CPX PERCENT MAX PREDICTED HEART RATE ACHIEVED: 86
OHS CV CPX RATE PRESSURE PRODUCT PRESENTING: 9737
PISA TR MAX VEL: 2.1 M/S
PULM VEIN S/D RATIO: 1.06
PV PEAK D VEL: 0.51 M/S
PV PEAK S VEL: 0.54 M/S
RA MAJOR: 3.85 CM
RA PRESSURE: 3 MMHG
RA WIDTH: 2.72 CM
RIGHT VENTRICULAR END-DIASTOLIC DIMENSION: 2.75 CM
RV TISSUE DOPPLER FREE WALL SYSTOLIC VELOCITY 1 (APICAL 4 CHAMBER VIEW): 11.84 CM/S
SINUS: 2.93 CM
STJ: 2.27 CM
STRESS ECHO POST EXERCISE DUR MIN: 6 MINUTES
STRESS ECHO POST EXERCISE DUR SEC: 15 SECONDS
SYSTOLIC BLOOD PRESSURE: 107 MMHG
TDI LATERAL: 0.17 M/S
TDI SEPTAL: 0.1 M/S
TDI: 0.14 M/S
TR MAX PG: 18 MMHG
TRICUSPID ANNULAR PLANE SYSTOLIC EXCURSION: 2.3 CM
TV REST PULMONARY ARTERY PRESSURE: 21 MMHG

## 2021-04-30 PROCEDURE — 93351 STRESS TTE COMPLETE: CPT | Mod: 26,,, | Performed by: INTERNAL MEDICINE

## 2021-04-30 PROCEDURE — 99999 PR PBB SHADOW E&M-EST. PATIENT-LVL IV: ICD-10-PCS | Mod: PBBFAC,,, | Performed by: NEUROLOGICAL SURGERY

## 2021-04-30 PROCEDURE — 93351 STRESS ECHO (CUPID ONLY): ICD-10-PCS | Mod: 26,,, | Performed by: INTERNAL MEDICINE

## 2021-04-30 PROCEDURE — 99999 PR PBB SHADOW E&M-EST. PATIENT-LVL IV: CPT | Mod: PBBFAC,,, | Performed by: NEUROLOGICAL SURGERY

## 2021-04-30 PROCEDURE — 99214 OFFICE O/P EST MOD 30 MIN: CPT | Mod: S$GLB,,, | Performed by: NEUROLOGICAL SURGERY

## 2021-04-30 PROCEDURE — 99214 PR OFFICE/OUTPT VISIT, EST, LEVL IV, 30-39 MIN: ICD-10-PCS | Mod: S$GLB,,, | Performed by: NEUROLOGICAL SURGERY

## 2021-04-30 PROCEDURE — 93351 STRESS TTE COMPLETE: CPT

## 2021-05-06 ENCOUNTER — CLINICAL SUPPORT (OUTPATIENT)
Dept: REHABILITATION | Facility: HOSPITAL | Age: 31
End: 2021-05-06
Payer: COMMERCIAL

## 2021-05-06 DIAGNOSIS — M25.662 DECREASED RANGE OF MOTION (ROM) OF LEFT KNEE: ICD-10-CM

## 2021-05-06 DIAGNOSIS — M25.562 ACUTE PAIN OF LEFT KNEE: ICD-10-CM

## 2021-05-06 DIAGNOSIS — Z74.09 IMPAIRED FUNCTIONAL MOBILITY, BALANCE, GAIT, AND ENDURANCE: ICD-10-CM

## 2021-05-06 PROCEDURE — 97110 THERAPEUTIC EXERCISES: CPT

## 2021-05-11 ENCOUNTER — TELEPHONE (OUTPATIENT)
Dept: SPORTS MEDICINE | Facility: CLINIC | Age: 31
End: 2021-05-11

## 2021-05-13 ENCOUNTER — TELEPHONE (OUTPATIENT)
Dept: REHABILITATION | Facility: HOSPITAL | Age: 31
End: 2021-05-13

## 2021-05-13 DIAGNOSIS — M25.562 ACUTE PAIN OF LEFT KNEE: Primary | ICD-10-CM

## 2021-05-13 DIAGNOSIS — Z74.09 IMPAIRED FUNCTIONAL MOBILITY, BALANCE, GAIT, AND ENDURANCE: ICD-10-CM

## 2021-05-13 DIAGNOSIS — M25.662 DECREASED RANGE OF MOTION (ROM) OF LEFT KNEE: ICD-10-CM

## 2021-05-14 ENCOUNTER — PATIENT MESSAGE (OUTPATIENT)
Dept: SPORTS MEDICINE | Facility: CLINIC | Age: 31
End: 2021-05-14

## 2021-05-14 ENCOUNTER — TELEPHONE (OUTPATIENT)
Dept: SPORTS MEDICINE | Facility: CLINIC | Age: 31
End: 2021-05-14

## 2021-05-18 ENCOUNTER — PATIENT MESSAGE (OUTPATIENT)
Dept: SPORTS MEDICINE | Facility: CLINIC | Age: 31
End: 2021-05-18

## 2021-05-19 ENCOUNTER — HOSPITAL ENCOUNTER (OUTPATIENT)
Dept: RADIOLOGY | Facility: HOSPITAL | Age: 31
Discharge: HOME | End: 2021-05-19
Attending: NEUROLOGICAL SURGERY
Payer: COMMERCIAL

## 2021-05-19 ENCOUNTER — OFFICE VISIT (OUTPATIENT)
Dept: SPORTS MEDICINE | Facility: CLINIC | Age: 31
End: 2021-05-19
Payer: COMMERCIAL

## 2021-05-19 VITALS
HEART RATE: 92 BPM | HEIGHT: 62 IN | WEIGHT: 172 LBS | SYSTOLIC BLOOD PRESSURE: 108 MMHG | DIASTOLIC BLOOD PRESSURE: 74 MMHG | BODY MASS INDEX: 31.65 KG/M2

## 2021-05-19 DIAGNOSIS — H93.A9 PULSATILE TINNITUS: ICD-10-CM

## 2021-05-19 DIAGNOSIS — G89.29 CHRONIC PAIN OF LEFT KNEE: Primary | ICD-10-CM

## 2021-05-19 DIAGNOSIS — G93.2 IIH (IDIOPATHIC INTRACRANIAL HYPERTENSION): ICD-10-CM

## 2021-05-19 DIAGNOSIS — M25.562 CHRONIC PAIN OF LEFT KNEE: Primary | ICD-10-CM

## 2021-05-19 PROCEDURE — 99024 POSTOP FOLLOW-UP VISIT: CPT | Mod: S$GLB,,, | Performed by: ORTHOPAEDIC SURGERY

## 2021-05-19 PROCEDURE — 99999 PR PBB SHADOW E&M-EST. PATIENT-LVL III: CPT | Mod: PBBFAC,,, | Performed by: ORTHOPAEDIC SURGERY

## 2021-05-19 PROCEDURE — 99999 PR PBB SHADOW E&M-EST. PATIENT-LVL III: ICD-10-PCS | Mod: PBBFAC,,, | Performed by: ORTHOPAEDIC SURGERY

## 2021-05-19 PROCEDURE — 70544 MR ANGIOGRAPHY HEAD W/O DYE: CPT | Mod: TC

## 2021-05-19 PROCEDURE — 70553 MRI BRAIN W WO CONTRAST: ICD-10-PCS | Mod: 26,,, | Performed by: RADIOLOGY

## 2021-05-19 PROCEDURE — 70544 MRA BRAIN WITHOUT CONTRAST: ICD-10-PCS | Mod: 26,,, | Performed by: RADIOLOGY

## 2021-05-19 PROCEDURE — 25500020 PHARM REV CODE 255: Performed by: NEUROLOGICAL SURGERY

## 2021-05-19 PROCEDURE — 99024 PR POST-OP FOLLOW-UP VISIT: ICD-10-PCS | Mod: S$GLB,,, | Performed by: ORTHOPAEDIC SURGERY

## 2021-05-19 PROCEDURE — 70544 MR ANGIOGRAPHY HEAD W/O DYE: CPT | Mod: 26,,, | Performed by: RADIOLOGY

## 2021-05-19 PROCEDURE — A9585 GADOBUTROL INJECTION: HCPCS | Performed by: NEUROLOGICAL SURGERY

## 2021-05-19 PROCEDURE — 70553 MRI BRAIN STEM W/O & W/DYE: CPT | Mod: TC

## 2021-05-19 PROCEDURE — 70553 MRI BRAIN STEM W/O & W/DYE: CPT | Mod: 26,,, | Performed by: RADIOLOGY

## 2021-05-19 RX ORDER — GADOBUTROL 604.72 MG/ML
9 INJECTION INTRAVENOUS
Status: COMPLETED | OUTPATIENT
Start: 2021-05-19 | End: 2021-05-19

## 2021-05-19 RX ADMIN — GADOBUTROL 9 ML: 604.72 INJECTION INTRAVENOUS at 07:05

## 2021-05-20 ENCOUNTER — CLINICAL SUPPORT (OUTPATIENT)
Dept: REHABILITATION | Facility: HOSPITAL | Age: 31
End: 2021-05-20
Payer: COMMERCIAL

## 2021-05-20 DIAGNOSIS — Z74.09 IMPAIRED FUNCTIONAL MOBILITY, BALANCE, GAIT, AND ENDURANCE: ICD-10-CM

## 2021-05-20 DIAGNOSIS — M25.662 DECREASED RANGE OF MOTION (ROM) OF LEFT KNEE: ICD-10-CM

## 2021-05-20 DIAGNOSIS — M25.562 ACUTE PAIN OF LEFT KNEE: ICD-10-CM

## 2021-05-20 PROCEDURE — 97110 THERAPEUTIC EXERCISES: CPT

## 2021-06-07 ENCOUNTER — CLINICAL SUPPORT (OUTPATIENT)
Dept: REHABILITATION | Facility: HOSPITAL | Age: 31
End: 2021-06-07
Payer: COMMERCIAL

## 2021-06-07 DIAGNOSIS — Z74.09 IMPAIRED FUNCTIONAL MOBILITY, BALANCE, GAIT, AND ENDURANCE: ICD-10-CM

## 2021-06-07 DIAGNOSIS — M25.662 DECREASED RANGE OF MOTION (ROM) OF LEFT KNEE: ICD-10-CM

## 2021-06-07 DIAGNOSIS — M25.562 ACUTE PAIN OF LEFT KNEE: ICD-10-CM

## 2021-06-07 PROCEDURE — 97140 MANUAL THERAPY 1/> REGIONS: CPT

## 2021-06-07 PROCEDURE — 97110 THERAPEUTIC EXERCISES: CPT

## 2021-06-14 ENCOUNTER — HOSPITAL ENCOUNTER (EMERGENCY)
Facility: HOSPITAL | Age: 31
Discharge: HOME | End: 2021-06-14
Attending: EMERGENCY MEDICINE
Payer: COMMERCIAL

## 2021-06-14 VITALS
OXYGEN SATURATION: 99 % | DIASTOLIC BLOOD PRESSURE: 71 MMHG | WEIGHT: 175 LBS | BODY MASS INDEX: 32.2 KG/M2 | SYSTOLIC BLOOD PRESSURE: 111 MMHG | RESPIRATION RATE: 18 BRPM | TEMPERATURE: 100 F | HEIGHT: 62 IN | HEART RATE: 109 BPM

## 2021-06-14 DIAGNOSIS — R19.7 DIARRHEA, UNSPECIFIED TYPE: ICD-10-CM

## 2021-06-14 DIAGNOSIS — R11.2 NON-INTRACTABLE VOMITING WITH NAUSEA, UNSPECIFIED VOMITING TYPE: Primary | ICD-10-CM

## 2021-06-14 LAB
ALBUMIN SERPL BCP-MCNC: 3.9 G/DL (ref 3.5–5.2)
ALP SERPL-CCNC: 67 U/L (ref 55–135)
ALT SERPL W/O P-5'-P-CCNC: 11 U/L (ref 10–44)
ANION GAP SERPL CALC-SCNC: 11 MMOL/L (ref 8–16)
AST SERPL-CCNC: 16 U/L (ref 10–40)
B-HCG UR QL: NEGATIVE
BACTERIA #/AREA URNS AUTO: ABNORMAL /HPF
BASOPHILS # BLD AUTO: 0.02 K/UL (ref 0–0.2)
BASOPHILS NFR BLD: 0.2 % (ref 0–1.9)
BILIRUB SERPL-MCNC: 0.6 MG/DL (ref 0.1–1)
BILIRUB UR QL STRIP: NEGATIVE
BUN SERPL-MCNC: 11 MG/DL (ref 6–20)
CALCIUM SERPL-MCNC: 9.3 MG/DL (ref 8.7–10.5)
CHLORIDE SERPL-SCNC: 106 MMOL/L (ref 95–110)
CLARITY UR REFRACT.AUTO: ABNORMAL
CO2 SERPL-SCNC: 21 MMOL/L (ref 23–29)
COLOR UR AUTO: YELLOW
CREAT SERPL-MCNC: 0.8 MG/DL (ref 0.5–1.4)
CTP QC/QA: YES
DIFFERENTIAL METHOD: ABNORMAL
EOSINOPHIL # BLD AUTO: 0 K/UL (ref 0–0.5)
EOSINOPHIL NFR BLD: 0.1 % (ref 0–8)
ERYTHROCYTE [DISTWIDTH] IN BLOOD BY AUTOMATED COUNT: 13.2 % (ref 11.5–14.5)
EST. GFR  (AFRICAN AMERICAN): >60 ML/MIN/1.73 M^2
EST. GFR  (NON AFRICAN AMERICAN): >60 ML/MIN/1.73 M^2
GLUCOSE SERPL-MCNC: 100 MG/DL (ref 70–110)
GLUCOSE UR QL STRIP: NEGATIVE
HCT VFR BLD AUTO: 44.2 % (ref 37–48.5)
HGB BLD-MCNC: 14.3 G/DL (ref 12–16)
HGB UR QL STRIP: ABNORMAL
IMM GRANULOCYTES # BLD AUTO: 0.01 K/UL (ref 0–0.04)
IMM GRANULOCYTES NFR BLD AUTO: 0.1 % (ref 0–0.5)
KETONES UR QL STRIP: ABNORMAL
LEUKOCYTE ESTERASE UR QL STRIP: NEGATIVE
LIPASE SERPL-CCNC: 7 U/L (ref 4–60)
LYMPHOCYTES # BLD AUTO: 0.6 K/UL (ref 1–4.8)
LYMPHOCYTES NFR BLD: 7.1 % (ref 18–48)
MCH RBC QN AUTO: 28.9 PG (ref 27–31)
MCHC RBC AUTO-ENTMCNC: 32.4 G/DL (ref 32–36)
MCV RBC AUTO: 90 FL (ref 82–98)
MICROSCOPIC COMMENT: ABNORMAL
MONOCYTES # BLD AUTO: 0.3 K/UL (ref 0.3–1)
MONOCYTES NFR BLD: 3.7 % (ref 4–15)
NEUTROPHILS # BLD AUTO: 7.9 K/UL (ref 1.8–7.7)
NEUTROPHILS NFR BLD: 88.8 % (ref 38–73)
NITRITE UR QL STRIP: NEGATIVE
NRBC BLD-RTO: 0 /100 WBC
PH UR STRIP: 5 [PH] (ref 5–8)
PLATELET # BLD AUTO: 277 K/UL (ref 150–450)
PMV BLD AUTO: 10.6 FL (ref 9.2–12.9)
POTASSIUM SERPL-SCNC: 3.7 MMOL/L (ref 3.5–5.1)
PROT SERPL-MCNC: 7.5 G/DL (ref 6–8.4)
PROT UR QL STRIP: NEGATIVE
RBC # BLD AUTO: 4.94 M/UL (ref 4–5.4)
RBC #/AREA URNS AUTO: 5 /HPF (ref 0–4)
SODIUM SERPL-SCNC: 138 MMOL/L (ref 136–145)
SP GR UR STRIP: 1.03 (ref 1–1.03)
SQUAMOUS #/AREA URNS AUTO: 7 /HPF
URN SPEC COLLECT METH UR: ABNORMAL
WBC # BLD AUTO: 8.86 K/UL (ref 3.9–12.7)
WBC #/AREA URNS AUTO: 1 /HPF (ref 0–5)

## 2021-06-14 PROCEDURE — 83690 ASSAY OF LIPASE: CPT | Performed by: PHYSICIAN ASSISTANT

## 2021-06-14 PROCEDURE — 96361 HYDRATE IV INFUSION ADD-ON: CPT

## 2021-06-14 PROCEDURE — 86703 HIV-1/HIV-2 1 RESULT ANTBDY: CPT | Performed by: EMERGENCY MEDICINE

## 2021-06-14 PROCEDURE — 85025 COMPLETE CBC W/AUTO DIFF WBC: CPT | Performed by: PHYSICIAN ASSISTANT

## 2021-06-14 PROCEDURE — 81001 URINALYSIS AUTO W/SCOPE: CPT | Performed by: PHYSICIAN ASSISTANT

## 2021-06-14 PROCEDURE — 25000003 PHARM REV CODE 250: Performed by: PHYSICIAN ASSISTANT

## 2021-06-14 PROCEDURE — 81025 URINE PREGNANCY TEST: CPT | Performed by: EMERGENCY MEDICINE

## 2021-06-14 PROCEDURE — 99284 EMERGENCY DEPT VISIT MOD MDM: CPT | Mod: 25

## 2021-06-14 PROCEDURE — 80053 COMPREHEN METABOLIC PANEL: CPT | Performed by: PHYSICIAN ASSISTANT

## 2021-06-14 PROCEDURE — 96374 THER/PROPH/DIAG INJ IV PUSH: CPT

## 2021-06-14 PROCEDURE — 99284 EMERGENCY DEPT VISIT MOD MDM: CPT | Mod: ,,, | Performed by: PHYSICIAN ASSISTANT

## 2021-06-14 PROCEDURE — 99284 PR EMERGENCY DEPT VISIT,LEVEL IV: ICD-10-PCS | Mod: ,,, | Performed by: PHYSICIAN ASSISTANT

## 2021-06-14 PROCEDURE — 86803 HEPATITIS C AB TEST: CPT | Performed by: EMERGENCY MEDICINE

## 2021-06-14 PROCEDURE — 63600175 PHARM REV CODE 636 W HCPCS: Performed by: PHYSICIAN ASSISTANT

## 2021-06-14 RX ORDER — ONDANSETRON 4 MG/1
4 TABLET, ORALLY DISINTEGRATING ORAL EVERY 8 HOURS PRN
Qty: 12 TABLET | Refills: 0 | Status: SHIPPED | OUTPATIENT
Start: 2021-06-14 | End: 2021-07-08

## 2021-06-14 RX ORDER — ONDANSETRON 4 MG/1
4 TABLET, ORALLY DISINTEGRATING ORAL EVERY 8 HOURS PRN
Qty: 12 TABLET | Refills: 0 | Status: SHIPPED | OUTPATIENT
Start: 2021-06-14 | End: 2021-06-14 | Stop reason: SDUPTHER

## 2021-06-14 RX ORDER — ONDANSETRON 2 MG/ML
4 INJECTION INTRAMUSCULAR; INTRAVENOUS
Status: COMPLETED | OUTPATIENT
Start: 2021-06-14 | End: 2021-06-14

## 2021-06-14 RX ADMIN — SODIUM CHLORIDE 1000 ML: 0.9 INJECTION, SOLUTION INTRAVENOUS at 10:06

## 2021-06-14 RX ADMIN — ONDANSETRON 4 MG: 2 INJECTION INTRAMUSCULAR; INTRAVENOUS at 10:06

## 2021-06-15 LAB
HCV AB SERPL QL IA: NEGATIVE
HIV 1+2 AB+HIV1 P24 AG SERPL QL IA: NEGATIVE

## 2021-06-17 ENCOUNTER — HOSPITAL ENCOUNTER (EMERGENCY)
Facility: HOSPITAL | Age: 31
Discharge: HOME | End: 2021-06-17
Attending: EMERGENCY MEDICINE
Payer: COMMERCIAL

## 2021-06-17 VITALS
RESPIRATION RATE: 16 BRPM | OXYGEN SATURATION: 97 % | TEMPERATURE: 98 F | HEART RATE: 72 BPM | WEIGHT: 175 LBS | HEIGHT: 62 IN | BODY MASS INDEX: 32.2 KG/M2 | DIASTOLIC BLOOD PRESSURE: 60 MMHG | SYSTOLIC BLOOD PRESSURE: 104 MMHG

## 2021-06-17 DIAGNOSIS — R07.9 CHEST PAIN: ICD-10-CM

## 2021-06-17 DIAGNOSIS — R07.89 CHEST WALL PAIN: Primary | ICD-10-CM

## 2021-06-17 LAB
ALBUMIN SERPL BCP-MCNC: 3.6 G/DL (ref 3.5–5.2)
ALP SERPL-CCNC: 61 U/L (ref 55–135)
ALT SERPL W/O P-5'-P-CCNC: 10 U/L (ref 10–44)
ANION GAP SERPL CALC-SCNC: 10 MMOL/L (ref 8–16)
AST SERPL-CCNC: 19 U/L (ref 10–40)
BASOPHILS # BLD AUTO: 0.03 K/UL (ref 0–0.2)
BASOPHILS NFR BLD: 0.5 % (ref 0–1.9)
BILIRUB SERPL-MCNC: 0.3 MG/DL (ref 0.1–1)
BUN SERPL-MCNC: 8 MG/DL (ref 6–20)
CALCIUM SERPL-MCNC: 9.5 MG/DL (ref 8.7–10.5)
CHLORIDE SERPL-SCNC: 108 MMOL/L (ref 95–110)
CO2 SERPL-SCNC: 24 MMOL/L (ref 23–29)
CREAT SERPL-MCNC: 0.7 MG/DL (ref 0.5–1.4)
DIFFERENTIAL METHOD: NORMAL
EOSINOPHIL # BLD AUTO: 0.2 K/UL (ref 0–0.5)
EOSINOPHIL NFR BLD: 3.2 % (ref 0–8)
ERYTHROCYTE [DISTWIDTH] IN BLOOD BY AUTOMATED COUNT: 13.1 % (ref 11.5–14.5)
EST. GFR  (AFRICAN AMERICAN): >60 ML/MIN/1.73 M^2
EST. GFR  (NON AFRICAN AMERICAN): >60 ML/MIN/1.73 M^2
GLUCOSE SERPL-MCNC: 85 MG/DL (ref 70–110)
HCT VFR BLD AUTO: 41.6 % (ref 37–48.5)
HGB BLD-MCNC: 13.4 G/DL (ref 12–16)
IMM GRANULOCYTES # BLD AUTO: 0.01 K/UL (ref 0–0.04)
IMM GRANULOCYTES NFR BLD AUTO: 0.2 % (ref 0–0.5)
LYMPHOCYTES # BLD AUTO: 2.1 K/UL (ref 1–4.8)
LYMPHOCYTES NFR BLD: 37.6 % (ref 18–48)
MCH RBC QN AUTO: 28.9 PG (ref 27–31)
MCHC RBC AUTO-ENTMCNC: 32.2 G/DL (ref 32–36)
MCV RBC AUTO: 90 FL (ref 82–98)
MONOCYTES # BLD AUTO: 0.4 K/UL (ref 0.3–1)
MONOCYTES NFR BLD: 7.6 % (ref 4–15)
NEUTROPHILS # BLD AUTO: 2.9 K/UL (ref 1.8–7.7)
NEUTROPHILS NFR BLD: 50.9 % (ref 38–73)
NRBC BLD-RTO: 0 /100 WBC
PLATELET # BLD AUTO: 281 K/UL (ref 150–450)
PLATELET BLD QL SMEAR: NORMAL
PMV BLD AUTO: 10.7 FL (ref 9.2–12.9)
POTASSIUM SERPL-SCNC: 4 MMOL/L (ref 3.5–5.1)
PROT SERPL-MCNC: 7.2 G/DL (ref 6–8.4)
RBC # BLD AUTO: 4.63 M/UL (ref 4–5.4)
SODIUM SERPL-SCNC: 142 MMOL/L (ref 136–145)
TROPONIN I SERPL DL<=0.01 NG/ML-MCNC: <0.006 NG/ML (ref 0–0.03)
WBC # BLD AUTO: 5.66 K/UL (ref 3.9–12.7)

## 2021-06-17 PROCEDURE — 94761 N-INVAS EAR/PLS OXIMETRY MLT: CPT

## 2021-06-17 PROCEDURE — 25000003 PHARM REV CODE 250: Performed by: EMERGENCY MEDICINE

## 2021-06-17 PROCEDURE — 93010 EKG 12-LEAD: ICD-10-PCS | Mod: ,,, | Performed by: INTERNAL MEDICINE

## 2021-06-17 PROCEDURE — 85025 COMPLETE CBC W/AUTO DIFF WBC: CPT | Performed by: EMERGENCY MEDICINE

## 2021-06-17 PROCEDURE — 80053 COMPREHEN METABOLIC PANEL: CPT | Performed by: EMERGENCY MEDICINE

## 2021-06-17 PROCEDURE — 93010 ELECTROCARDIOGRAM REPORT: CPT | Mod: ,,, | Performed by: INTERNAL MEDICINE

## 2021-06-17 PROCEDURE — 93005 ELECTROCARDIOGRAM TRACING: CPT

## 2021-06-17 PROCEDURE — 84484 ASSAY OF TROPONIN QUANT: CPT | Performed by: EMERGENCY MEDICINE

## 2021-06-17 PROCEDURE — 99285 EMERGENCY DEPT VISIT HI MDM: CPT | Mod: 25

## 2021-06-17 PROCEDURE — 99284 EMERGENCY DEPT VISIT MOD MDM: CPT | Mod: ,,, | Performed by: EMERGENCY MEDICINE

## 2021-06-17 PROCEDURE — 99284 PR EMERGENCY DEPT VISIT,LEVEL IV: ICD-10-PCS | Mod: ,,, | Performed by: EMERGENCY MEDICINE

## 2021-06-17 RX ORDER — BACLOFEN 10 MG/1
20 TABLET ORAL 3 TIMES DAILY
Status: DISCONTINUED | OUTPATIENT
Start: 2021-06-17 | End: 2021-06-17 | Stop reason: HOSPADM

## 2021-06-17 RX ORDER — NAPROXEN 500 MG/1
500 TABLET ORAL
Status: COMPLETED | OUTPATIENT
Start: 2021-06-17 | End: 2021-06-17

## 2021-06-17 RX ORDER — NAPROXEN 375 MG/1
375 TABLET ORAL 2 TIMES DAILY WITH MEALS
Qty: 6 TABLET | Refills: 0 | Status: SHIPPED | OUTPATIENT
Start: 2021-06-17 | End: 2021-06-20

## 2021-06-17 RX ADMIN — NAPROXEN 500 MG: 500 TABLET ORAL at 11:06

## 2021-06-23 ENCOUNTER — TELEPHONE (OUTPATIENT)
Dept: REHABILITATION | Facility: HOSPITAL | Age: 31
End: 2021-06-23

## 2021-06-23 DIAGNOSIS — Z74.09 IMPAIRED FUNCTIONAL MOBILITY, BALANCE, GAIT, AND ENDURANCE: ICD-10-CM

## 2021-06-23 DIAGNOSIS — M25.562 ACUTE PAIN OF LEFT KNEE: Primary | ICD-10-CM

## 2021-06-23 DIAGNOSIS — M25.662 DECREASED RANGE OF MOTION (ROM) OF LEFT KNEE: ICD-10-CM

## 2021-06-30 ENCOUNTER — CLINICAL SUPPORT (OUTPATIENT)
Dept: REHABILITATION | Facility: HOSPITAL | Age: 31
End: 2021-06-30
Payer: COMMERCIAL

## 2021-06-30 DIAGNOSIS — M25.662 DECREASED RANGE OF MOTION (ROM) OF LEFT KNEE: ICD-10-CM

## 2021-06-30 DIAGNOSIS — Z74.09 IMPAIRED FUNCTIONAL MOBILITY, BALANCE, GAIT, AND ENDURANCE: ICD-10-CM

## 2021-06-30 DIAGNOSIS — M25.562 ACUTE PAIN OF LEFT KNEE: ICD-10-CM

## 2021-06-30 PROCEDURE — 97110 THERAPEUTIC EXERCISES: CPT

## 2021-06-30 PROCEDURE — 97140 MANUAL THERAPY 1/> REGIONS: CPT

## 2021-07-08 ENCOUNTER — OFFICE VISIT (OUTPATIENT)
Dept: INTERNAL MEDICINE | Facility: CLINIC | Age: 31
End: 2021-07-08
Payer: COMMERCIAL

## 2021-07-08 VITALS
TEMPERATURE: 99 F | SYSTOLIC BLOOD PRESSURE: 108 MMHG | WEIGHT: 170.19 LBS | BODY MASS INDEX: 31.32 KG/M2 | DIASTOLIC BLOOD PRESSURE: 82 MMHG | HEART RATE: 82 BPM | HEIGHT: 62 IN

## 2021-07-08 DIAGNOSIS — E66.9 OBESITY (BMI 30-39.9): ICD-10-CM

## 2021-07-08 DIAGNOSIS — J02.9 SORE THROAT: Primary | ICD-10-CM

## 2021-07-08 DIAGNOSIS — R09.82 POSTNASAL DRIP: ICD-10-CM

## 2021-07-08 PROCEDURE — 99999 PR PBB SHADOW E&M-EST. PATIENT-LVL III: ICD-10-PCS | Mod: PBBFAC,,, | Performed by: NURSE PRACTITIONER

## 2021-07-08 PROCEDURE — 99214 PR OFFICE/OUTPT VISIT, EST, LEVL IV, 30-39 MIN: ICD-10-PCS | Mod: S$GLB,,, | Performed by: NURSE PRACTITIONER

## 2021-07-08 PROCEDURE — 99214 OFFICE O/P EST MOD 30 MIN: CPT | Mod: S$GLB,,, | Performed by: NURSE PRACTITIONER

## 2021-07-08 PROCEDURE — 99999 PR PBB SHADOW E&M-EST. PATIENT-LVL III: CPT | Mod: PBBFAC,,, | Performed by: NURSE PRACTITIONER

## 2021-07-08 RX ORDER — CLOMIPHENE CITRATE 50 MG/1
100 TABLET ORAL DAILY
COMMUNITY
Start: 2021-06-10 | End: 2021-07-08

## 2021-07-08 RX ORDER — LETROZOLE 2.5 MG/1
5 TABLET, FILM COATED ORAL NIGHTLY
COMMUNITY
Start: 2021-05-12 | End: 2021-07-08

## 2021-07-08 RX ORDER — LEVOCETIRIZINE DIHYDROCHLORIDE 5 MG/1
5 TABLET, FILM COATED ORAL NIGHTLY
Qty: 7 TABLET | Refills: 0 | Status: SHIPPED | OUTPATIENT
Start: 2021-07-08 | End: 2021-09-22 | Stop reason: CLARIF

## 2021-07-08 RX ORDER — FLUTICASONE PROPIONATE 50 MCG
1 SPRAY, SUSPENSION (ML) NASAL 2 TIMES DAILY
Qty: 18.2 ML | Refills: 0 | Status: SHIPPED | OUTPATIENT
Start: 2021-07-08 | End: 2021-09-22 | Stop reason: CLARIF

## 2021-07-08 RX ORDER — TAMOXIFEN CITRATE 20 MG/1
60 TABLET ORAL NIGHTLY
COMMUNITY
Start: 2021-07-06 | End: 2021-09-22 | Stop reason: CLARIF

## 2021-07-08 RX ORDER — MENOTROPINS 75 UNIT
2 KIT SUBCUTANEOUS DAILY
COMMUNITY
Start: 2021-04-19 | End: 2021-07-08

## 2021-07-08 RX ORDER — CETRORELIX ACETATE 0.25 MG
KIT SUBCUTANEOUS
COMMUNITY
Start: 2021-04-19 | End: 2021-07-08

## 2021-07-08 RX ORDER — FOLLITROPIN 300 [IU]/.5ML
INJECTION, SOLUTION SUBCUTANEOUS
COMMUNITY
Start: 2021-04-19 | End: 2021-07-08

## 2021-07-08 RX ORDER — GONADOTROPHIN, CHORIONIC 5000 UNIT
KIT INTRAMUSCULAR
COMMUNITY
Start: 2021-06-10 | End: 2021-07-08

## 2021-07-21 ENCOUNTER — TELEPHONE (OUTPATIENT)
Dept: REHABILITATION | Facility: HOSPITAL | Age: 31
End: 2021-07-21

## 2021-07-21 DIAGNOSIS — Z74.09 IMPAIRED FUNCTIONAL MOBILITY, BALANCE, GAIT, AND ENDURANCE: ICD-10-CM

## 2021-07-21 DIAGNOSIS — M25.562 ACUTE PAIN OF LEFT KNEE: Primary | ICD-10-CM

## 2021-07-21 DIAGNOSIS — M25.662 DECREASED RANGE OF MOTION (ROM) OF LEFT KNEE: ICD-10-CM

## 2021-07-27 ENCOUNTER — TELEPHONE (OUTPATIENT)
Dept: PULMONOLOGY | Facility: CLINIC | Age: 31
End: 2021-07-27

## 2021-07-27 DIAGNOSIS — R06.02 SOB (SHORTNESS OF BREATH): Primary | ICD-10-CM

## 2021-07-28 ENCOUNTER — LAB VISIT (OUTPATIENT)
Dept: SPORTS MEDICINE | Facility: CLINIC | Age: 31
End: 2021-07-28
Payer: COMMERCIAL

## 2021-07-28 ENCOUNTER — OFFICE VISIT (OUTPATIENT)
Dept: PULMONOLOGY | Facility: CLINIC | Age: 31
End: 2021-07-28
Payer: COMMERCIAL

## 2021-07-28 ENCOUNTER — HOSPITAL ENCOUNTER (OUTPATIENT)
Dept: CARDIOLOGY | Facility: HOSPITAL | Age: 31
Discharge: HOME | End: 2021-07-28
Attending: EMERGENCY MEDICINE
Payer: COMMERCIAL

## 2021-07-28 ENCOUNTER — HOSPITAL ENCOUNTER (OUTPATIENT)
Dept: PULMONOLOGY | Facility: CLINIC | Age: 31
Discharge: HOME | End: 2021-07-28
Payer: COMMERCIAL

## 2021-07-28 VITALS
WEIGHT: 173 LBS | OXYGEN SATURATION: 99 % | HEART RATE: 97 BPM | BODY MASS INDEX: 31.83 KG/M2 | HEIGHT: 62 IN | WEIGHT: 173 LBS | BODY MASS INDEX: 31.83 KG/M2 | DIASTOLIC BLOOD PRESSURE: 75 MMHG | SYSTOLIC BLOOD PRESSURE: 121 MMHG | HEIGHT: 62 IN

## 2021-07-28 VITALS
HEART RATE: 90 BPM | HEIGHT: 62 IN | DIASTOLIC BLOOD PRESSURE: 70 MMHG | WEIGHT: 173 LBS | BODY MASS INDEX: 31.83 KG/M2 | SYSTOLIC BLOOD PRESSURE: 110 MMHG

## 2021-07-28 DIAGNOSIS — R06.00 DYSPNEA, UNSPECIFIED TYPE: ICD-10-CM

## 2021-07-28 DIAGNOSIS — R06.02 SHORTNESS OF BREATH: ICD-10-CM

## 2021-07-28 DIAGNOSIS — R06.00 DYSPNEA, UNSPECIFIED TYPE: Primary | ICD-10-CM

## 2021-07-28 DIAGNOSIS — R06.02 SOB (SHORTNESS OF BREATH): ICD-10-CM

## 2021-07-28 LAB
AV INDEX (PROSTH): 0.85
AV MEAN GRADIENT: 4 MMHG
AV PEAK GRADIENT: 7 MMHG
AV VALVE AREA: 2.19 CM2
AV VELOCITY RATIO: 0.89
BSA FOR ECHO PROCEDURE: 1.85 M2
CV ECHO LV RWT: 0.31 CM
DOP CALC AO PEAK VEL: 1.28 M/S
DOP CALC AO VTI: 23.38 CM
DOP CALC LVOT AREA: 2.6 CM2
DOP CALC LVOT DIAMETER: 1.81 CM
DOP CALC LVOT PEAK VEL: 1.14 M/S
DOP CALC LVOT STROKE VOLUME: 51.2 CM3
DOP CALCLVOT PEAK VEL VTI: 19.91 CM
E WAVE DECELERATION TIME: 177.53 MSEC
E/A RATIO: 1.67
E/E' RATIO: 4.71 M/S
ECHO LV POSTERIOR WALL: 0.61 CM (ref 0.6–1.1)
EJECTION FRACTION: 60 %
FRACTIONAL SHORTENING: 31 % (ref 28–44)
INTERVENTRICULAR SEPTUM: 0.67 CM (ref 0.6–1.1)
IVRT: 53.28 MSEC
LA MAJOR: 4.92 CM
LA MINOR: 4.7 CM
LA WIDTH: 3.21 CM
LEFT ATRIUM SIZE: 3.13 CM
LEFT ATRIUM VOLUME INDEX MOD: 25 ML/M2
LEFT ATRIUM VOLUME INDEX: 22.8 ML/M2
LEFT ATRIUM VOLUME MOD: 45.02 CM3
LEFT ATRIUM VOLUME: 41.06 CM3
LEFT INTERNAL DIMENSION IN SYSTOLE: 2.69 CM (ref 2.1–4)
LEFT VENTRICLE DIASTOLIC VOLUME INDEX: 36.64 ML/M2
LEFT VENTRICLE DIASTOLIC VOLUME: 65.95 ML
LEFT VENTRICLE MASS INDEX: 37 G/M2
LEFT VENTRICLE SYSTOLIC VOLUME INDEX: 14.8 ML/M2
LEFT VENTRICLE SYSTOLIC VOLUME: 26.7 ML
LEFT VENTRICULAR INTERNAL DIMENSION IN DIASTOLE: 3.9 CM (ref 3.5–6)
LEFT VENTRICULAR MASS: 66.89 G
LV LATERAL E/E' RATIO: 4.21 M/S
LV SEPTAL E/E' RATIO: 5.33 M/S
MV A" WAVE DURATION": 4 MSEC
MV PEAK A VEL: 0.48 M/S
MV PEAK E VEL: 0.8 M/S
MV STENOSIS PRESSURE HALF TIME: 51.48 MS
MV VALVE AREA P 1/2 METHOD: 4.27 CM2
PULM VEIN S/D RATIO: 1.13
PV PEAK D VEL: 0.53 M/S
PV PEAK S VEL: 0.6 M/S
RA MAJOR: 4.35 CM
RA PRESSURE: 3 MMHG
RA WIDTH: 3.17 CM
RIGHT VENTRICULAR END-DIASTOLIC DIMENSION: 2.95 CM
RV TISSUE DOPPLER FREE WALL SYSTOLIC VELOCITY 1 (APICAL 4 CHAMBER VIEW): 11.46 CM/S
SINUS: 2.96 CM
STJ: 2.4 CM
TDI LATERAL: 0.19 M/S
TDI SEPTAL: 0.15 M/S
TDI: 0.17 M/S
TRICUSPID ANNULAR PLANE SYSTOLIC EXCURSION: 2.48 CM

## 2021-07-28 PROCEDURE — U0003 INFECTIOUS AGENT DETECTION BY NUCLEIC ACID (DNA OR RNA); SEVERE ACUTE RESPIRATORY SYNDROME CORONAVIRUS 2 (SARS-COV-2) (CORONAVIRUS DISEASE [COVID-19]), AMPLIFIED PROBE TECHNIQUE, MAKING USE OF HIGH THROUGHPUT TECHNOLOGIES AS DESCRIBED BY CMS-2020-01-R: HCPCS | Performed by: EMERGENCY MEDICINE

## 2021-07-28 PROCEDURE — 93306 TTE W/DOPPLER COMPLETE: CPT

## 2021-07-28 PROCEDURE — 94618 PULMONARY STRESS TESTING: CPT | Mod: S$GLB,,, | Performed by: INTERNAL MEDICINE

## 2021-07-28 PROCEDURE — 99203 OFFICE O/P NEW LOW 30 MIN: CPT | Mod: S$GLB,,, | Performed by: EMERGENCY MEDICINE

## 2021-07-28 PROCEDURE — 94618 PULMONARY STRESS TESTING: ICD-10-PCS | Mod: S$GLB,,, | Performed by: INTERNAL MEDICINE

## 2021-07-28 PROCEDURE — 99999 PR PBB SHADOW E&M-EST. PATIENT-LVL III: CPT | Mod: PBBFAC,,, | Performed by: EMERGENCY MEDICINE

## 2021-07-28 PROCEDURE — U0005 INFEC AGEN DETEC AMPLI PROBE: HCPCS | Performed by: EMERGENCY MEDICINE

## 2021-07-28 PROCEDURE — 99203 PR OFFICE/OUTPT VISIT, NEW, LEVL III, 30-44 MIN: ICD-10-PCS | Mod: S$GLB,,, | Performed by: EMERGENCY MEDICINE

## 2021-07-28 PROCEDURE — 93306 TTE W/DOPPLER COMPLETE: CPT | Mod: 26,,, | Performed by: INTERNAL MEDICINE

## 2021-07-28 PROCEDURE — 99999 PR PBB SHADOW E&M-EST. PATIENT-LVL III: ICD-10-PCS | Mod: PBBFAC,,, | Performed by: EMERGENCY MEDICINE

## 2021-07-28 PROCEDURE — 93306 ECHO (CUPID ONLY): ICD-10-PCS | Mod: 26,,, | Performed by: INTERNAL MEDICINE

## 2021-07-29 LAB
SARS-COV-2 RNA RESP QL NAA+PROBE: NOT DETECTED
SARS-COV-2- CYCLE NUMBER: -1

## 2021-07-30 ENCOUNTER — HOSPITAL ENCOUNTER (OUTPATIENT)
Dept: PULMONOLOGY | Facility: CLINIC | Age: 31
Discharge: HOME | End: 2021-07-30
Payer: COMMERCIAL

## 2021-07-30 DIAGNOSIS — R06.02 SOB (SHORTNESS OF BREATH): ICD-10-CM

## 2021-07-30 PROCEDURE — 94010 BREATHING CAPACITY TEST: ICD-10-PCS | Mod: S$GLB,,, | Performed by: INTERNAL MEDICINE

## 2021-07-30 PROCEDURE — 94727 GAS DIL/WSHOT DETER LNG VOL: CPT | Mod: S$GLB,,, | Performed by: INTERNAL MEDICINE

## 2021-07-30 PROCEDURE — 94010 BREATHING CAPACITY TEST: CPT | Mod: S$GLB,,, | Performed by: INTERNAL MEDICINE

## 2021-07-30 PROCEDURE — 94727 PR PULM FUNCTION TEST BY GAS: ICD-10-PCS | Mod: S$GLB,,, | Performed by: INTERNAL MEDICINE

## 2021-07-30 PROCEDURE — 94729 PR C02/MEMBANE DIFFUSE CAPACITY: ICD-10-PCS | Mod: S$GLB,,, | Performed by: INTERNAL MEDICINE

## 2021-07-30 PROCEDURE — 94729 DIFFUSING CAPACITY: CPT | Mod: S$GLB,,, | Performed by: INTERNAL MEDICINE

## 2021-08-11 ENCOUNTER — PATIENT MESSAGE (OUTPATIENT)
Dept: PULMONOLOGY | Facility: CLINIC | Age: 31
End: 2021-08-11

## 2021-08-18 ENCOUNTER — TELEPHONE (OUTPATIENT)
Dept: OTOLARYNGOLOGY | Facility: CLINIC | Age: 31
End: 2021-08-18

## 2021-08-18 ENCOUNTER — PATIENT MESSAGE (OUTPATIENT)
Dept: OTOLARYNGOLOGY | Facility: CLINIC | Age: 31
End: 2021-08-18

## 2021-09-02 ENCOUNTER — TELEPHONE (OUTPATIENT)
Dept: OTOLARYNGOLOGY | Facility: CLINIC | Age: 31
End: 2021-09-02

## 2021-09-08 ENCOUNTER — PATIENT MESSAGE (OUTPATIENT)
Dept: NEUROLOGY | Facility: CLINIC | Age: 31
End: 2021-09-08

## 2021-09-14 ENCOUNTER — OFFICE VISIT (OUTPATIENT)
Dept: NEUROLOGY | Facility: CLINIC | Age: 31
End: 2021-09-14
Payer: COMMERCIAL

## 2021-09-14 DIAGNOSIS — G93.2 IIH (IDIOPATHIC INTRACRANIAL HYPERTENSION): Primary | ICD-10-CM

## 2021-09-14 PROCEDURE — 99214 PR OFFICE/OUTPT VISIT, EST, LEVL IV, 30-39 MIN: ICD-10-PCS | Mod: 95,,, | Performed by: NEUROLOGICAL SURGERY

## 2021-09-14 PROCEDURE — 99214 OFFICE O/P EST MOD 30 MIN: CPT | Mod: 95,,, | Performed by: NEUROLOGICAL SURGERY

## 2021-09-17 ENCOUNTER — TELEPHONE (OUTPATIENT)
Dept: NEUROLOGY | Facility: CLINIC | Age: 31
End: 2021-09-17

## 2021-09-20 DIAGNOSIS — G93.2 IIH (IDIOPATHIC INTRACRANIAL HYPERTENSION): Primary | ICD-10-CM

## 2021-09-22 ENCOUNTER — HOSPITAL ENCOUNTER (OUTPATIENT)
Dept: PREADMISSION TESTING | Facility: HOSPITAL | Age: 31
Discharge: HOME | End: 2021-09-22
Attending: RADIOLOGY
Payer: COMMERCIAL

## 2021-09-22 VITALS
SYSTOLIC BLOOD PRESSURE: 109 MMHG | OXYGEN SATURATION: 99 % | WEIGHT: 172.81 LBS | DIASTOLIC BLOOD PRESSURE: 76 MMHG | RESPIRATION RATE: 16 BRPM | TEMPERATURE: 99 F | HEART RATE: 89 BPM | HEIGHT: 62 IN | BODY MASS INDEX: 31.8 KG/M2

## 2021-09-22 DIAGNOSIS — G93.2 IIH (IDIOPATHIC INTRACRANIAL HYPERTENSION): ICD-10-CM

## 2021-09-22 DIAGNOSIS — Z74.09 IMPAIRED FUNCTIONAL MOBILITY, BALANCE, GAIT, AND ENDURANCE: ICD-10-CM

## 2021-09-22 LAB
ANION GAP SERPL CALC-SCNC: 4 MMOL/L (ref 8–16)
BUN SERPL-MCNC: 8 MG/DL (ref 6–20)
CALCIUM SERPL-MCNC: 9.2 MG/DL (ref 8.7–10.5)
CHLORIDE SERPL-SCNC: 110 MMOL/L (ref 95–110)
CO2 SERPL-SCNC: 25 MMOL/L (ref 23–29)
CREAT SERPL-MCNC: 0.8 MG/DL (ref 0.5–1.4)
ERYTHROCYTE [DISTWIDTH] IN BLOOD BY AUTOMATED COUNT: 13.8 % (ref 11.5–14.5)
EST. GFR  (AFRICAN AMERICAN): >60 ML/MIN/1.73 M^2
EST. GFR  (NON AFRICAN AMERICAN): >60 ML/MIN/1.73 M^2
GLUCOSE SERPL-MCNC: 107 MG/DL (ref 70–110)
HCT VFR BLD AUTO: 40 % (ref 37–48.5)
HGB BLD-MCNC: 13.3 G/DL (ref 12–16)
INR PPP: 1 (ref 0.8–1.2)
MCH RBC QN AUTO: 29.7 PG (ref 27–31)
MCHC RBC AUTO-ENTMCNC: 33.3 G/DL (ref 32–36)
MCV RBC AUTO: 89 FL (ref 82–98)
PLATELET # BLD AUTO: 291 K/UL (ref 150–450)
PMV BLD AUTO: 10.6 FL (ref 9.2–12.9)
POTASSIUM SERPL-SCNC: 4.4 MMOL/L (ref 3.5–5.1)
PROTHROMBIN TIME: 10.6 SEC (ref 9–12.5)
RBC # BLD AUTO: 4.48 M/UL (ref 4–5.4)
SARS-COV-2 RDRP RESP QL NAA+PROBE: NEGATIVE
SODIUM SERPL-SCNC: 139 MMOL/L (ref 136–145)
WBC # BLD AUTO: 8.86 K/UL (ref 3.9–12.7)

## 2021-09-22 PROCEDURE — U0002 COVID-19 LAB TEST NON-CDC: HCPCS | Performed by: RADIOLOGY

## 2021-09-22 PROCEDURE — 85610 PROTHROMBIN TIME: CPT | Performed by: RADIOLOGY

## 2021-09-22 PROCEDURE — 85027 COMPLETE CBC AUTOMATED: CPT | Performed by: RADIOLOGY

## 2021-09-22 PROCEDURE — 80048 BASIC METABOLIC PNL TOTAL CA: CPT | Performed by: RADIOLOGY

## 2021-09-22 RX ORDER — FOLIC ACID 1 MG/1
1 TABLET ORAL DAILY
Status: ON HOLD | COMMUNITY
End: 2022-08-08 | Stop reason: HOSPADM

## 2021-09-22 RX ORDER — CHOLECALCIFEROL (VITAMIN D3) 25 MCG
1000 TABLET ORAL DAILY
Status: ON HOLD | COMMUNITY
End: 2022-11-05 | Stop reason: HOSPADM

## 2021-09-23 ENCOUNTER — PATIENT MESSAGE (OUTPATIENT)
Dept: ENDOCRINOLOGY | Facility: CLINIC | Age: 31
End: 2021-09-23

## 2021-09-24 ENCOUNTER — HOSPITAL ENCOUNTER (OUTPATIENT)
Dept: INTERVENTIONAL RADIOLOGY/VASCULAR | Facility: HOSPITAL | Age: 31
Discharge: HOME | End: 2021-09-24
Attending: NEUROLOGICAL SURGERY
Payer: COMMERCIAL

## 2021-09-24 VITALS
OXYGEN SATURATION: 99 % | SYSTOLIC BLOOD PRESSURE: 113 MMHG | DIASTOLIC BLOOD PRESSURE: 66 MMHG | HEART RATE: 81 BPM | RESPIRATION RATE: 18 BRPM | TEMPERATURE: 98 F

## 2021-09-24 DIAGNOSIS — Z74.09 IMPAIRED FUNCTIONAL MOBILITY, BALANCE, GAIT, AND ENDURANCE: ICD-10-CM

## 2021-09-24 DIAGNOSIS — G93.2 IIH (IDIOPATHIC INTRACRANIAL HYPERTENSION): Primary | ICD-10-CM

## 2021-09-24 PROCEDURE — 62328 IR LUMBAR PUNCTURE DIAGNOSTIC WITHOUT IMAGING: ICD-10-PCS | Mod: ,,, | Performed by: RADIOLOGY

## 2021-09-24 PROCEDURE — 62328 DX LMBR SPI PNXR W/FLUOR/CT: CPT | Performed by: RADIOLOGY

## 2021-09-24 RX ORDER — ACETAMINOPHEN 325 MG/1
650 TABLET ORAL EVERY 4 HOURS PRN
Status: DISCONTINUED | OUTPATIENT
Start: 2021-09-24 | End: 2021-09-25 | Stop reason: HOSPADM

## 2021-09-30 ENCOUNTER — PATIENT MESSAGE (OUTPATIENT)
Dept: NEUROLOGY | Facility: CLINIC | Age: 31
End: 2021-09-30

## 2021-10-04 ENCOUNTER — OFFICE VISIT (OUTPATIENT)
Dept: OTOLARYNGOLOGY | Facility: CLINIC | Age: 31
End: 2021-10-04
Payer: COMMERCIAL

## 2021-10-04 DIAGNOSIS — G93.2 IIH (IDIOPATHIC INTRACRANIAL HYPERTENSION): ICD-10-CM

## 2021-10-04 DIAGNOSIS — H61.21 IMPACTED CERUMEN OF RIGHT EAR: ICD-10-CM

## 2021-10-04 DIAGNOSIS — H93.A9 PULSATILE TINNITUS: Primary | ICD-10-CM

## 2021-10-04 PROCEDURE — 69210 EAR CERUMEN REMOVAL: ICD-10-PCS | Mod: S$GLB,,, | Performed by: NURSE PRACTITIONER

## 2021-10-04 PROCEDURE — 92551 PURE TONE HEARING TEST AIR: CPT | Mod: S$GLB,,, | Performed by: NURSE PRACTITIONER

## 2021-10-04 PROCEDURE — 99213 PR OFFICE/OUTPT VISIT, EST, LEVL III, 20-29 MIN: ICD-10-PCS | Mod: 25,S$GLB,, | Performed by: NURSE PRACTITIONER

## 2021-10-04 PROCEDURE — 99213 OFFICE O/P EST LOW 20 MIN: CPT | Mod: 25,S$GLB,, | Performed by: NURSE PRACTITIONER

## 2021-10-04 PROCEDURE — 99999 PR PBB SHADOW E&M-EST. PATIENT-LVL II: CPT | Mod: PBBFAC,,, | Performed by: NURSE PRACTITIONER

## 2021-10-04 PROCEDURE — 92551 PR PURE TONE HEARING TEST, AIR: ICD-10-PCS | Mod: S$GLB,,, | Performed by: NURSE PRACTITIONER

## 2021-10-04 PROCEDURE — 69210 REMOVE IMPACTED EAR WAX UNI: CPT | Mod: S$GLB,,, | Performed by: NURSE PRACTITIONER

## 2021-10-04 PROCEDURE — 99999 PR PBB SHADOW E&M-EST. PATIENT-LVL II: ICD-10-PCS | Mod: PBBFAC,,, | Performed by: NURSE PRACTITIONER

## 2021-10-05 DIAGNOSIS — G93.2 IIH (IDIOPATHIC INTRACRANIAL HYPERTENSION): Primary | ICD-10-CM

## 2021-10-05 RX ORDER — BUTALBITAL, ACETAMINOPHEN AND CAFFEINE 50; 325; 40 MG/1; MG/1; MG/1
1 TABLET ORAL EVERY 8 HOURS PRN
Qty: 30 TABLET | Refills: 2 | Status: SHIPPED | OUTPATIENT
Start: 2021-10-05 | End: 2021-11-04

## 2021-10-12 ENCOUNTER — PATIENT MESSAGE (OUTPATIENT)
Dept: INTERNAL MEDICINE | Facility: CLINIC | Age: 31
End: 2021-10-12

## 2021-10-12 DIAGNOSIS — N64.4 BREAST PAIN: Primary | ICD-10-CM

## 2021-10-13 ENCOUNTER — PATIENT MESSAGE (OUTPATIENT)
Dept: INTERNAL MEDICINE | Facility: CLINIC | Age: 31
End: 2021-10-13

## 2021-10-13 ENCOUNTER — PATIENT MESSAGE (OUTPATIENT)
Dept: INTERNAL MEDICINE | Facility: CLINIC | Age: 31
End: 2021-10-13
Payer: COMMERCIAL

## 2021-10-13 ENCOUNTER — OFFICE VISIT (OUTPATIENT)
Dept: SURGERY | Facility: CLINIC | Age: 31
End: 2021-10-13
Payer: COMMERCIAL

## 2021-10-13 VITALS
DIASTOLIC BLOOD PRESSURE: 63 MMHG | WEIGHT: 174 LBS | BODY MASS INDEX: 32.02 KG/M2 | SYSTOLIC BLOOD PRESSURE: 112 MMHG | HEART RATE: 91 BPM | HEIGHT: 62 IN

## 2021-10-13 DIAGNOSIS — Z12.39 ENCOUNTER FOR SCREENING BREAST EXAMINATION: ICD-10-CM

## 2021-10-13 DIAGNOSIS — N64.4 BREAST PAIN: Primary | ICD-10-CM

## 2021-10-13 PROCEDURE — 99204 PR OFFICE/OUTPT VISIT, NEW, LEVL IV, 45-59 MIN: ICD-10-PCS | Mod: S$GLB,,, | Performed by: NURSE PRACTITIONER

## 2021-10-13 PROCEDURE — 99999 PR PBB SHADOW E&M-EST. PATIENT-LVL III: CPT | Mod: PBBFAC,,, | Performed by: NURSE PRACTITIONER

## 2021-10-13 PROCEDURE — 99999 PR PBB SHADOW E&M-EST. PATIENT-LVL III: ICD-10-PCS | Mod: PBBFAC,,, | Performed by: NURSE PRACTITIONER

## 2021-10-13 PROCEDURE — 99204 OFFICE O/P NEW MOD 45 MIN: CPT | Mod: S$GLB,,, | Performed by: NURSE PRACTITIONER

## 2021-10-18 ENCOUNTER — PATIENT OUTREACH (OUTPATIENT)
Dept: ADMINISTRATIVE | Facility: OTHER | Age: 31
End: 2021-10-18

## 2021-10-18 ENCOUNTER — PATIENT MESSAGE (OUTPATIENT)
Dept: SURGERY | Facility: CLINIC | Age: 31
End: 2021-10-18
Payer: COMMERCIAL

## 2021-10-22 ENCOUNTER — OFFICE VISIT (OUTPATIENT)
Dept: SPORTS MEDICINE | Facility: CLINIC | Age: 31
End: 2021-10-22
Payer: COMMERCIAL

## 2021-10-22 VITALS
WEIGHT: 173 LBS | DIASTOLIC BLOOD PRESSURE: 70 MMHG | BODY MASS INDEX: 31.83 KG/M2 | TEMPERATURE: 98 F | HEIGHT: 62 IN | SYSTOLIC BLOOD PRESSURE: 103 MMHG | HEART RATE: 90 BPM

## 2021-10-22 DIAGNOSIS — M25.562 CHRONIC PAIN OF LEFT KNEE: Primary | ICD-10-CM

## 2021-10-22 DIAGNOSIS — M79.662 PAIN IN LEFT SHIN: ICD-10-CM

## 2021-10-22 DIAGNOSIS — G89.29 CHRONIC PAIN OF LEFT KNEE: Primary | ICD-10-CM

## 2021-10-22 DIAGNOSIS — Z98.890 S/P ARTHROSCOPY OF LEFT KNEE: ICD-10-CM

## 2021-10-22 DIAGNOSIS — M79.661 PAIN IN RIGHT SHIN: ICD-10-CM

## 2021-10-22 DIAGNOSIS — M79.604 PAIN IN BOTH LOWER EXTREMITIES: ICD-10-CM

## 2021-10-22 DIAGNOSIS — M79.605 PAIN IN BOTH LOWER EXTREMITIES: ICD-10-CM

## 2021-10-22 PROCEDURE — 99999 PR PBB SHADOW E&M-EST. PATIENT-LVL III: ICD-10-PCS | Mod: PBBFAC,,, | Performed by: PHYSICIAN ASSISTANT

## 2021-10-22 PROCEDURE — 99214 OFFICE O/P EST MOD 30 MIN: CPT | Mod: S$GLB,,, | Performed by: PHYSICIAN ASSISTANT

## 2021-10-22 PROCEDURE — 99214 PR OFFICE/OUTPT VISIT, EST, LEVL IV, 30-39 MIN: ICD-10-PCS | Mod: S$GLB,,, | Performed by: PHYSICIAN ASSISTANT

## 2021-10-22 PROCEDURE — 99999 PR PBB SHADOW E&M-EST. PATIENT-LVL III: CPT | Mod: PBBFAC,,, | Performed by: PHYSICIAN ASSISTANT

## 2021-10-25 ENCOUNTER — PATIENT MESSAGE (OUTPATIENT)
Dept: SPORTS MEDICINE | Facility: CLINIC | Age: 31
End: 2021-10-25
Payer: COMMERCIAL

## 2021-10-26 ENCOUNTER — HOSPITAL ENCOUNTER (OUTPATIENT)
Dept: RADIOLOGY | Facility: HOSPITAL | Age: 31
Discharge: HOME | End: 2021-10-26
Attending: PHYSICIAN ASSISTANT
Payer: COMMERCIAL

## 2021-10-26 DIAGNOSIS — M79.605 PAIN IN BOTH LOWER EXTREMITIES: ICD-10-CM

## 2021-10-26 DIAGNOSIS — M79.662 PAIN IN LEFT SHIN: ICD-10-CM

## 2021-10-26 DIAGNOSIS — M79.604 PAIN IN BOTH LOWER EXTREMITIES: ICD-10-CM

## 2021-10-26 DIAGNOSIS — M79.661 PAIN IN RIGHT SHIN: ICD-10-CM

## 2021-10-26 PROCEDURE — 73718 MRI LOWER EXTREMITY W/O DYE: CPT | Mod: TC,RT

## 2021-10-26 PROCEDURE — 73718 MRI TIBIA FIBULA WITHOUT CONTRAST RIGHT: ICD-10-PCS | Mod: 26,RT,, | Performed by: RADIOLOGY

## 2021-10-26 PROCEDURE — 73718 MRI LOWER EXTREMITY W/O DYE: CPT | Mod: 26,RT,, | Performed by: RADIOLOGY

## 2021-10-26 PROCEDURE — 73718 MRI LOWER EXTREMITY W/O DYE: CPT | Mod: 26,LT,, | Performed by: RADIOLOGY

## 2021-10-26 PROCEDURE — 73718 MRI LOWER EXTREMITY W/O DYE: CPT | Mod: TC,LT

## 2021-10-26 PROCEDURE — 73718 MRI TIBIA FIBULA WITHOUT CONTRAST LEFT: ICD-10-PCS | Mod: 26,LT,, | Performed by: RADIOLOGY

## 2021-10-29 ENCOUNTER — PATIENT MESSAGE (OUTPATIENT)
Dept: ENDOCRINOLOGY | Facility: CLINIC | Age: 31
End: 2021-10-29
Payer: COMMERCIAL

## 2021-10-29 ENCOUNTER — LAB VISIT (OUTPATIENT)
Dept: LAB | Facility: HOSPITAL | Age: 31
End: 2021-10-29
Payer: COMMERCIAL

## 2021-10-29 ENCOUNTER — OFFICE VISIT (OUTPATIENT)
Dept: INTERNAL MEDICINE | Facility: CLINIC | Age: 31
End: 2021-10-29
Payer: COMMERCIAL

## 2021-10-29 ENCOUNTER — TELEPHONE (OUTPATIENT)
Dept: SPORTS MEDICINE | Facility: CLINIC | Age: 31
End: 2021-10-29
Payer: COMMERCIAL

## 2021-10-29 VITALS
OXYGEN SATURATION: 99 % | SYSTOLIC BLOOD PRESSURE: 115 MMHG | WEIGHT: 175.06 LBS | BODY MASS INDEX: 32.22 KG/M2 | HEART RATE: 88 BPM | HEIGHT: 62 IN | DIASTOLIC BLOOD PRESSURE: 62 MMHG

## 2021-10-29 DIAGNOSIS — E55.9 VITAMIN D DEFICIENCY: ICD-10-CM

## 2021-10-29 DIAGNOSIS — M79.604 PAIN IN BOTH LOWER EXTREMITIES: Primary | ICD-10-CM

## 2021-10-29 DIAGNOSIS — M25.562 CHRONIC PAIN OF LEFT KNEE: ICD-10-CM

## 2021-10-29 DIAGNOSIS — R10.13 DYSPEPSIA: ICD-10-CM

## 2021-10-29 DIAGNOSIS — S86.891D RIGHT MEDIAL TIBIAL STRESS SYNDROME, SUBSEQUENT ENCOUNTER: ICD-10-CM

## 2021-10-29 DIAGNOSIS — Z00.00 PREVENTATIVE HEALTH CARE: ICD-10-CM

## 2021-10-29 DIAGNOSIS — G89.29 CHRONIC PAIN OF LEFT KNEE: ICD-10-CM

## 2021-10-29 DIAGNOSIS — M79.605 PAIN IN BOTH LOWER EXTREMITIES: Primary | ICD-10-CM

## 2021-10-29 DIAGNOSIS — G93.2 IIH (IDIOPATHIC INTRACRANIAL HYPERTENSION): ICD-10-CM

## 2021-10-29 DIAGNOSIS — S86.892D LEFT MEDIAL TIBIAL STRESS SYNDROME, SUBSEQUENT ENCOUNTER: ICD-10-CM

## 2021-10-29 DIAGNOSIS — Z00.00 PREVENTATIVE HEALTH CARE: Primary | ICD-10-CM

## 2021-10-29 PROBLEM — R07.89 OTHER CHEST PAIN: Status: RESOLVED | Noted: 2021-01-08 | Resolved: 2021-10-29

## 2021-10-29 LAB
25(OH)D3+25(OH)D2 SERPL-MCNC: 25 NG/ML (ref 30–96)
ALBUMIN SERPL BCP-MCNC: 3.5 G/DL (ref 3.5–5.2)
ALP SERPL-CCNC: 56 U/L (ref 55–135)
ALT SERPL W/O P-5'-P-CCNC: 14 U/L (ref 10–44)
ANION GAP SERPL CALC-SCNC: 9 MMOL/L (ref 8–16)
AST SERPL-CCNC: 17 U/L (ref 10–40)
BASOPHILS # BLD AUTO: 0.05 K/UL (ref 0–0.2)
BASOPHILS NFR BLD: 0.7 % (ref 0–1.9)
BILIRUB SERPL-MCNC: 0.3 MG/DL (ref 0.1–1)
BUN SERPL-MCNC: 10 MG/DL (ref 6–20)
CALCIUM SERPL-MCNC: 9.4 MG/DL (ref 8.7–10.5)
CHLORIDE SERPL-SCNC: 107 MMOL/L (ref 95–110)
CHOLEST SERPL-MCNC: 204 MG/DL (ref 120–199)
CHOLEST/HDLC SERPL: 5 {RATIO} (ref 2–5)
CO2 SERPL-SCNC: 25 MMOL/L (ref 23–29)
CREAT SERPL-MCNC: 0.7 MG/DL (ref 0.5–1.4)
DIFFERENTIAL METHOD: NORMAL
EOSINOPHIL # BLD AUTO: 0.1 K/UL (ref 0–0.5)
EOSINOPHIL NFR BLD: 1 % (ref 0–8)
ERYTHROCYTE [DISTWIDTH] IN BLOOD BY AUTOMATED COUNT: 13.4 % (ref 11.5–14.5)
EST. GFR  (AFRICAN AMERICAN): >60 ML/MIN/1.73 M^2
EST. GFR  (NON AFRICAN AMERICAN): >60 ML/MIN/1.73 M^2
ESTIMATED AVG GLUCOSE: 94 MG/DL (ref 68–131)
GLUCOSE SERPL-MCNC: 91 MG/DL (ref 70–110)
HBA1C MFR BLD: 4.9 % (ref 4–5.6)
HCT VFR BLD AUTO: 43.5 % (ref 37–48.5)
HDLC SERPL-MCNC: 41 MG/DL (ref 40–75)
HDLC SERPL: 20.1 % (ref 20–50)
HGB BLD-MCNC: 14 G/DL (ref 12–16)
IMM GRANULOCYTES # BLD AUTO: 0.02 K/UL (ref 0–0.04)
IMM GRANULOCYTES NFR BLD AUTO: 0.3 % (ref 0–0.5)
LDLC SERPL CALC-MCNC: 153.8 MG/DL (ref 63–159)
LYMPHOCYTES # BLD AUTO: 2.3 K/UL (ref 1–4.8)
LYMPHOCYTES NFR BLD: 31.5 % (ref 18–48)
MCH RBC QN AUTO: 29.7 PG (ref 27–31)
MCHC RBC AUTO-ENTMCNC: 32.2 G/DL (ref 32–36)
MCV RBC AUTO: 92 FL (ref 82–98)
MONOCYTES # BLD AUTO: 0.4 K/UL (ref 0.3–1)
MONOCYTES NFR BLD: 5.7 % (ref 4–15)
NEUTROPHILS # BLD AUTO: 4.4 K/UL (ref 1.8–7.7)
NEUTROPHILS NFR BLD: 60.8 % (ref 38–73)
NONHDLC SERPL-MCNC: 163 MG/DL
NRBC BLD-RTO: 0 /100 WBC
PLATELET # BLD AUTO: 390 K/UL (ref 150–450)
PMV BLD AUTO: 10.8 FL (ref 9.2–12.9)
POTASSIUM SERPL-SCNC: 4.4 MMOL/L (ref 3.5–5.1)
PROT SERPL-MCNC: 7.1 G/DL (ref 6–8.4)
RBC # BLD AUTO: 4.72 M/UL (ref 4–5.4)
SODIUM SERPL-SCNC: 141 MMOL/L (ref 136–145)
TRIGL SERPL-MCNC: 46 MG/DL (ref 30–150)
TSH SERPL DL<=0.005 MIU/L-ACNC: 1.55 UIU/ML (ref 0.4–4)
WBC # BLD AUTO: 7.2 K/UL (ref 3.9–12.7)

## 2021-10-29 PROCEDURE — 84443 ASSAY THYROID STIM HORMONE: CPT | Performed by: NURSE PRACTITIONER

## 2021-10-29 PROCEDURE — 80061 LIPID PANEL: CPT | Performed by: NURSE PRACTITIONER

## 2021-10-29 PROCEDURE — 99999 PR PBB SHADOW E&M-EST. PATIENT-LVL III: CPT | Mod: PBBFAC,,, | Performed by: NURSE PRACTITIONER

## 2021-10-29 PROCEDURE — 85025 COMPLETE CBC W/AUTO DIFF WBC: CPT | Performed by: NURSE PRACTITIONER

## 2021-10-29 PROCEDURE — 99999 PR PBB SHADOW E&M-EST. PATIENT-LVL III: ICD-10-PCS | Mod: PBBFAC,,, | Performed by: NURSE PRACTITIONER

## 2021-10-29 PROCEDURE — 83036 HEMOGLOBIN GLYCOSYLATED A1C: CPT | Performed by: NURSE PRACTITIONER

## 2021-10-29 PROCEDURE — 99395 PR PREVENTIVE VISIT,EST,18-39: ICD-10-PCS | Mod: S$GLB,,, | Performed by: NURSE PRACTITIONER

## 2021-10-29 PROCEDURE — 36415 COLL VENOUS BLD VENIPUNCTURE: CPT | Performed by: NURSE PRACTITIONER

## 2021-10-29 PROCEDURE — 80053 COMPREHEN METABOLIC PANEL: CPT | Performed by: NURSE PRACTITIONER

## 2021-10-29 PROCEDURE — 99395 PREV VISIT EST AGE 18-39: CPT | Mod: S$GLB,,, | Performed by: NURSE PRACTITIONER

## 2021-10-29 PROCEDURE — 82306 VITAMIN D 25 HYDROXY: CPT | Performed by: NURSE PRACTITIONER

## 2021-11-09 ENCOUNTER — PATIENT MESSAGE (OUTPATIENT)
Dept: INTERNAL MEDICINE | Facility: CLINIC | Age: 31
End: 2021-11-09
Payer: COMMERCIAL

## 2021-11-15 ENCOUNTER — TELEPHONE (OUTPATIENT)
Dept: OTOLARYNGOLOGY | Facility: CLINIC | Age: 31
End: 2021-11-15
Payer: COMMERCIAL

## 2021-12-27 ENCOUNTER — PATIENT OUTREACH (OUTPATIENT)
Dept: ADMINISTRATIVE | Facility: OTHER | Age: 31
End: 2021-12-27
Payer: COMMERCIAL

## 2021-12-31 ENCOUNTER — TELEPHONE (OUTPATIENT)
Dept: PHARMACY | Facility: CLINIC | Age: 31
End: 2021-12-31
Payer: COMMERCIAL

## 2021-12-31 ENCOUNTER — SPECIALTY PHARMACY (OUTPATIENT)
Dept: PHARMACY | Facility: CLINIC | Age: 31
End: 2021-12-31
Payer: COMMERCIAL

## 2022-01-03 ENCOUNTER — PATIENT MESSAGE (OUTPATIENT)
Dept: OBSTETRICS AND GYNECOLOGY | Facility: CLINIC | Age: 32
End: 2022-01-03
Payer: COMMERCIAL

## 2022-01-03 ENCOUNTER — TELEPHONE (OUTPATIENT)
Dept: OBSTETRICS AND GYNECOLOGY | Facility: CLINIC | Age: 32
End: 2022-01-03
Payer: COMMERCIAL

## 2022-01-03 NOTE — TELEPHONE ENCOUNTER
----- Message from Melinda Ramos sent at 1/3/2022  8:16 AM CST -----  Regarding: Patient call back  Who called: YVES HUA [[id number]]    What is the request in detail: Patient is requesting a call back. patient has been doing IVF while waiting for her new pt appt and is now pregnant. She states she will be 11 weeks on her 1/6 appt and would like to know if it can be converted to anew OB appt. She would like to further discuss.   Please advise.    Can the clinic reply by [facility]? No    Best call back number: [phone]    Additional Information: N/A

## 2022-01-06 ENCOUNTER — OFFICE VISIT (OUTPATIENT)
Dept: OBSTETRICS AND GYNECOLOGY | Facility: CLINIC | Age: 32
End: 2022-01-06
Payer: COMMERCIAL

## 2022-01-06 ENCOUNTER — LAB VISIT (OUTPATIENT)
Dept: LAB | Facility: OTHER | Age: 32
End: 2022-01-06
Attending: OBSTETRICS & GYNECOLOGY
Payer: COMMERCIAL

## 2022-01-06 VITALS
WEIGHT: 171.94 LBS | SYSTOLIC BLOOD PRESSURE: 120 MMHG | DIASTOLIC BLOOD PRESSURE: 78 MMHG | HEIGHT: 62 IN | BODY MASS INDEX: 31.64 KG/M2

## 2022-01-06 DIAGNOSIS — Z3A.09 9 WEEKS GESTATION OF PREGNANCY: Primary | ICD-10-CM

## 2022-01-06 DIAGNOSIS — E04.2 MULTIPLE THYROID NODULES: ICD-10-CM

## 2022-01-06 DIAGNOSIS — K21.9 GASTROESOPHAGEAL REFLUX DISEASE, UNSPECIFIED WHETHER ESOPHAGITIS PRESENT: ICD-10-CM

## 2022-01-06 DIAGNOSIS — O09.811 PREGNANCY RESULTING FROM IN VITRO FERTILIZATION IN FIRST TRIMESTER: ICD-10-CM

## 2022-01-06 DIAGNOSIS — G93.2 IIH (IDIOPATHIC INTRACRANIAL HYPERTENSION): ICD-10-CM

## 2022-01-06 DIAGNOSIS — N80.9 ENDOMETRIOSIS: ICD-10-CM

## 2022-01-06 DIAGNOSIS — Z3A.09 9 WEEKS GESTATION OF PREGNANCY: ICD-10-CM

## 2022-01-06 LAB
ABO + RH BLD: NORMAL
ANION GAP SERPL CALC-SCNC: 12 MMOL/L (ref 8–16)
BASOPHILS # BLD AUTO: 0.04 K/UL (ref 0–0.2)
BASOPHILS NFR BLD: 0.4 % (ref 0–1.9)
BLD GP AB SCN CELLS X3 SERPL QL: NORMAL
BUN SERPL-MCNC: 6 MG/DL (ref 6–20)
CALCIUM SERPL-MCNC: 9.3 MG/DL (ref 8.7–10.5)
CHLORIDE SERPL-SCNC: 105 MMOL/L (ref 95–110)
CO2 SERPL-SCNC: 23 MMOL/L (ref 23–29)
CREAT SERPL-MCNC: 0.6 MG/DL (ref 0.5–1.4)
DIFFERENTIAL METHOD: ABNORMAL
EOSINOPHIL # BLD AUTO: 0 K/UL (ref 0–0.5)
EOSINOPHIL NFR BLD: 0.4 % (ref 0–8)
ERYTHROCYTE [DISTWIDTH] IN BLOOD BY AUTOMATED COUNT: 13.4 % (ref 11.5–14.5)
EST. GFR  (AFRICAN AMERICAN): >60 ML/MIN/1.73 M^2
EST. GFR  (NON AFRICAN AMERICAN): >60 ML/MIN/1.73 M^2
GLUCOSE SERPL-MCNC: 87 MG/DL (ref 70–110)
HCT VFR BLD AUTO: 40.7 % (ref 37–48.5)
HGB BLD-MCNC: 13.4 G/DL (ref 12–16)
IMM GRANULOCYTES # BLD AUTO: 0.05 K/UL (ref 0–0.04)
IMM GRANULOCYTES NFR BLD AUTO: 0.4 % (ref 0–0.5)
LYMPHOCYTES # BLD AUTO: 2.3 K/UL (ref 1–4.8)
LYMPHOCYTES NFR BLD: 20.4 % (ref 18–48)
MCH RBC QN AUTO: 29.6 PG (ref 27–31)
MCHC RBC AUTO-ENTMCNC: 32.9 G/DL (ref 32–36)
MCV RBC AUTO: 90 FL (ref 82–98)
MONOCYTES # BLD AUTO: 0.7 K/UL (ref 0.3–1)
MONOCYTES NFR BLD: 6.5 % (ref 4–15)
NEUTROPHILS # BLD AUTO: 8.2 K/UL (ref 1.8–7.7)
NEUTROPHILS NFR BLD: 71.9 % (ref 38–73)
NRBC BLD-RTO: 0 /100 WBC
PLATELET # BLD AUTO: 275 K/UL (ref 150–450)
PMV BLD AUTO: 10.5 FL (ref 9.2–12.9)
POTASSIUM SERPL-SCNC: 3.7 MMOL/L (ref 3.5–5.1)
RBC # BLD AUTO: 4.53 M/UL (ref 4–5.4)
RH BLD: NORMAL
SODIUM SERPL-SCNC: 140 MMOL/L (ref 136–145)
WBC # BLD AUTO: 11.35 K/UL (ref 3.9–12.7)

## 2022-01-06 PROCEDURE — 87340 HEPATITIS B SURFACE AG IA: CPT | Performed by: OBSTETRICS & GYNECOLOGY

## 2022-01-06 PROCEDURE — 86592 SYPHILIS TEST NON-TREP QUAL: CPT | Performed by: OBSTETRICS & GYNECOLOGY

## 2022-01-06 PROCEDURE — 85025 COMPLETE CBC W/AUTO DIFF WBC: CPT | Performed by: OBSTETRICS & GYNECOLOGY

## 2022-01-06 PROCEDURE — 80048 BASIC METABOLIC PNL TOTAL CA: CPT | Performed by: OBSTETRICS & GYNECOLOGY

## 2022-01-06 PROCEDURE — 83020 HEMOGLOBIN ELECTROPHORESIS: CPT | Performed by: OBSTETRICS & GYNECOLOGY

## 2022-01-06 PROCEDURE — 87801 DETECT AGNT MULT DNA AMPLI: CPT | Performed by: OBSTETRICS & GYNECOLOGY

## 2022-01-06 PROCEDURE — 88175 CYTOPATH C/V AUTO FLUID REDO: CPT | Performed by: OBSTETRICS & GYNECOLOGY

## 2022-01-06 PROCEDURE — 87481 CANDIDA DNA AMP PROBE: CPT | Mod: 59 | Performed by: OBSTETRICS & GYNECOLOGY

## 2022-01-06 PROCEDURE — 86762 RUBELLA ANTIBODY: CPT | Performed by: OBSTETRICS & GYNECOLOGY

## 2022-01-06 PROCEDURE — 99204 PR OFFICE/OUTPT VISIT, NEW, LEVL IV, 45-59 MIN: ICD-10-PCS | Mod: S$GLB,,, | Performed by: OBSTETRICS & GYNECOLOGY

## 2022-01-06 PROCEDURE — 86850 RBC ANTIBODY SCREEN: CPT | Performed by: OBSTETRICS & GYNECOLOGY

## 2022-01-06 PROCEDURE — 87491 CHLMYD TRACH DNA AMP PROBE: CPT | Mod: 59 | Performed by: OBSTETRICS & GYNECOLOGY

## 2022-01-06 PROCEDURE — 36415 COLL VENOUS BLD VENIPUNCTURE: CPT | Performed by: OBSTETRICS & GYNECOLOGY

## 2022-01-06 PROCEDURE — 87591 N.GONORRHOEAE DNA AMP PROB: CPT | Mod: 59 | Performed by: OBSTETRICS & GYNECOLOGY

## 2022-01-06 PROCEDURE — 87624 HPV HI-RISK TYP POOLED RSLT: CPT | Performed by: OBSTETRICS & GYNECOLOGY

## 2022-01-06 PROCEDURE — 87389 HIV-1 AG W/HIV-1&-2 AB AG IA: CPT | Performed by: OBSTETRICS & GYNECOLOGY

## 2022-01-06 PROCEDURE — 86901 BLOOD TYPING SEROLOGIC RH(D): CPT | Performed by: OBSTETRICS & GYNECOLOGY

## 2022-01-06 PROCEDURE — 99204 OFFICE O/P NEW MOD 45 MIN: CPT | Mod: S$GLB,,, | Performed by: OBSTETRICS & GYNECOLOGY

## 2022-01-06 PROCEDURE — 99999 PR PBB SHADOW E&M-EST. PATIENT-LVL III: ICD-10-PCS | Mod: PBBFAC,,, | Performed by: OBSTETRICS & GYNECOLOGY

## 2022-01-06 PROCEDURE — 87086 URINE CULTURE/COLONY COUNT: CPT | Performed by: OBSTETRICS & GYNECOLOGY

## 2022-01-06 PROCEDURE — 99999 PR PBB SHADOW E&M-EST. PATIENT-LVL III: CPT | Mod: PBBFAC,,, | Performed by: OBSTETRICS & GYNECOLOGY

## 2022-01-06 NOTE — PROGRESS NOTES
HISTORY OF PRESENT ILLNESS:    Laila Collazo is a 31 y.o. female, , Patient's last menstrual period was 10/16/2021.,  presents for initial OB visit.   IVF pregnancy - transfer of care from Dr. Knapp [facility]   Hx of infertility since , had 3 IUIs before IVF transfer of one embryo in 2021 - Normal chromosomes - girl   Patient finished vaginal PG this week. One baby ASA daily   Hx of idiopathic intracranial hypertension - Sees Dr. Wilson - next appt next month     Past Medical History:   Diagnosis Date    Bilateral knee pain 2018    Endometriosis     treated by GYN at     Epigastric abdominal pain 2016    Gastroesophageal reflux disease 2016    Helicobacter pylori ab+     Helicobacter pylori ab+     High-tone pelvic floor dysfunction 2016    IIH (idiopathic intracranial hypertension)     Dr. Steve Feng [facility]    Palpitations 2016       Past Surgical History:   Procedure Laterality Date    ARTHROSCOPY OF KNEE Left 2021    Procedure: ARTHROSCOPY, KNEE;  Surgeon: Deidra Mejias MD;  Location: [facility] OR;  Service: Orthopedics;  Laterality: Left;    COLONOSCOPY N/A 2018    Procedure: COLONOSCOPY;  Surgeon: Micki Gross MD;  Location: [facility] (4TH FLR);  Service: Endoscopy;  Laterality: N/A;  preferrably in Dec 2018, CRS ok if needed.    ESOPHAGOGASTRODUODENOSCOPY N/A 10/30/2018    Procedure: EGD (ESOPHAGOGASTRODUODENOSCOPY);  Surgeon: Greg Leach MD;  Location: [facility] (2ND FLR);  Service: Endoscopy;  Laterality: N/A;  ok to schedule per Kimmy    KNEE ARTHROSCOPY W/ DEBRIDEMENT  2021    Procedure: ARTHROSCOPY, KNEE, WITH DEBRIDEMENT;  Surgeon: Deidra Mejias MD;  Location: [facility] OR;  Service: Orthopedics;;    KNEE ARTHROSCOPY W/ MENISCECTOMY Right 2021    Procedure: ARTHROSCOPY, KNEE, WITH MENISCECTOMY;  Surgeon: Deidra Mejias MD;  Location: [facility] OR;  Service: Orthopedics;  Laterality: Right;    KNEE ARTHROSCOPY W/ PLICA  EXCISION Left 4/6/2021    Procedure: EXCISION, PLICA, KNEE, ARTHROSCOPIC;  Surgeon: Deidra Mejias MD;  Location: [facility] OR;  Service: Orthopedics;  Laterality: Left;    LAPAROSCOPY  2017    dx with endo.  no removal    NO PAST SURGERIES      SYNOVECTOMY OF KNEE Left 4/6/2021    Procedure: SYNOVECTOMY, KNEE;  Surgeon: Deidra Mejias MD;  Location: [facility] OR;  Service: Orthopedics;  Laterality: Left;    UPPER GASTROINTESTINAL ENDOSCOPY         MEDICATIONS AND ALLERGIES:      Current Outpatient Medications:     folic acid (FOLVITE) 1 MG tablet, Take 1 mg by mouth once daily., Disp: , Rfl:     prenatal vit 10-iron-folic-dha (VITAFOL-OB+DHA) 65-1-250 mg Cmpk, Take 1 tablet by mouth., Disp: , Rfl:     progesterone micronized (ENDOMETRIN) 100 mg vaginal insert, Place 1 tablet (100 mg total) vaginally 2 (two) times a day., Disp: 76 tablet, Rfl: 0    vitamin D (VITAMIN D3) 1000 units Tab, Take 1,000 Units by mouth once daily., Disp: , Rfl:     Review of patient's allergies indicates:   Allergen Reactions    Doxycycline Other (See Comments)     Triggers headaches    Bactrim ds [sulfamethoxazole-trimethoprim] Nausea And Vomiting    Nsaids (non-steroidal anti-inflammatory drug) Other (See Comments)     D/t h/o PUD and H pylori    Codeine Rash       Family History   Problem Relation Age of Onset    Diabetes Mother     No Known Problems Father     No Known Problems Sister     Hypertension Brother     Hypertension Maternal Grandmother     Hypertension Maternal Grandfather     No Known Problems Paternal Grandmother     No Known Problems Paternal Grandfather     Colon cancer Neg Hx     Crohn's disease Neg Hx     Esophageal cancer Neg Hx     Inflammatory bowel disease Neg Hx     Irritable bowel syndrome Neg Hx     Rectal cancer Neg Hx     Stomach cancer Neg Hx     Ulcerative colitis Neg Hx        Social History     Socioeconomic History    Marital status: Single   Occupational History     Employer: [employer]     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  Infection History: Denies STDs. Denies PID.  Benign History: Denies uterine fibroids. Denies ovarian cysts. Denies endometriosis. Denies other conditions.  Cancer History: Denies cervical cancer. Denies uterine cancer or hyperplasia. Denies ovarian cancer. Denies vulvar cancer or pre-cancer. Denies vaginal cancer or pre-cancer. Denies breast cancer. Denies colon cancer.  Sexual Activity History: Reports currently being sexually active  Menstrual History: Monthly. Mod then light flow.   Dysmenorrhea History: Reports mild dysmenorrhea.       ROS:  GENERAL: No weight changes. No swelling. No fatigue. No fever.  CARDIOVASCULAR: No chest pain. No shortness of breath. No leg cramps.   NEUROLOGICAL: No headaches. No vision changes.  BREASTS: No pain. No lumps. No discharge.  ABDOMEN: No pain. No nausea. No vomiting. No diarrhea. No constipation.  REPRODUCTIVE: No abnormal bleeding.   VULVA: No pain. No lesions. No itching.  VAGINA: No relaxation. No itching. No odor. No discharge. No lesions.  URINARY: No incontinence. No nocturia. No frequency. No dysuria.    /78   Ht 5' 2" (1.575 m)   Wt 78 kg (171 lb 15.3 oz)   LMP 10/16/2021   BMI 31.45 kg/m²     PE:  APPEARANCE: Well nourished, well developed, in no acute distress.  AFFECT: WNL, alert and oriented x 3.  SKIN: No acne or hirsutism.  NECK: Neck symmetric, without masses or thyromegaly.  NODES: No inguinal, cervical, axillary or femoral lymph node enlargement.  CHEST: Good respiratory effort.   ABDOMEN: Soft. No tenderness or masses. No hepatosplenomegaly. No hernias.  BREASTS: Symmetrical, no skin changes, visible " lesions, palpable masses or nipple discharge bilaterally.  PELVIC: External female genitalia without lesions.  Female hair distribution. Adequate perineal body, Normal urethral meatus. Vagina moist and well rugated without lesions or discharge.  No significant cystocele or rectocele present. Cervix pink without lesions, discharge or tenderness. Uterus is 10 week size, regular, mobile and nontender. Adnexa without masses or tenderness.  EXTREMITIES: No edema    DIAGNOSIS:  1. 9 weeks gestation of pregnancy    2. Gastroesophageal reflux disease, unspecified whether esophagitis present    3. Endometriosis    4. IIH (idiopathic intracranial hypertension)    5. Multiple thyroid nodules    6. Pregnancy resulting from in vitro fertilization in first trimester        PLAN:    Orders Placed This Encounter    Urine culture    Urine culture    HPV High Risk Genotypes, PCR    C. trachomatis/N. gonorrhoeae by AMP DNA    Vaginosis Screen by DNA Probe    HIV 1/2 Ag/Ab (4th Gen)    RPR    Hepatitis B surface antigen    Rubella antibody, IgG    CBC auto differential    Basic metabolic panel    Hemoglobin Electrophoresis,Hgb A2 Shayne.    Ambulatory referral/consult to Perinatology (Maternal Fetal Medicine)    Type & Screen    Liquid-Based Pap Smear, Screening    US MFM Procedure ([facility])       COUNSELING:  [facility] DELIVERY PHONE NUMBER, [phone] (OPEN 24/7, LOCATED ON 6TH FLOOR OF HOSPITAL)  [address] PHONE NUMBER, [phone] (OPEN MON-FRI, 8a-5p)     1) Eat small frequent meals through the day versus three large meals  2) Try ginger ale or sprite to help settle the stomach   3) Eat crackers or dry toast before getting out of bed in the morning   4) Stay hydrated by drinking plenty of water-do not immediately eat or drink something after vomiting. Give your stomach a rest for 20-30 minutes. Slowly reintroduce ice chips, small sips water, crackers, etc.    5) Try OTC unisom and vitamin B6 together at night  before bed. This can help with the nausea in the morning and is safe to use during pregnancy.    If you are unable to keep anything down and constantly vomiting for more that 24 hours, let the office know so that dehydration can be avoided. We would recommend being seen in the emergency department if this is the case.        FOLLOW-UP with me in 4 weeks

## 2022-01-07 ENCOUNTER — PATIENT MESSAGE (OUTPATIENT)
Dept: NEUROLOGY | Facility: CLINIC | Age: 32
End: 2022-01-07
Payer: COMMERCIAL

## 2022-01-07 ENCOUNTER — PATIENT MESSAGE (OUTPATIENT)
Dept: ENDOCRINOLOGY | Facility: CLINIC | Age: 32
End: 2022-01-07
Payer: COMMERCIAL

## 2022-01-07 ENCOUNTER — PATIENT MESSAGE (OUTPATIENT)
Dept: SURGERY | Facility: CLINIC | Age: 32
End: 2022-01-07
Payer: COMMERCIAL

## 2022-01-07 ENCOUNTER — PATIENT MESSAGE (OUTPATIENT)
Dept: OBSTETRICS AND GYNECOLOGY | Facility: CLINIC | Age: 32
End: 2022-01-07
Payer: COMMERCIAL

## 2022-01-07 ENCOUNTER — HOSPITAL ENCOUNTER (OUTPATIENT)
Dept: RADIOLOGY | Facility: HOSPITAL | Age: 32
Discharge: HOME | End: 2022-01-07
Attending: INTERNAL MEDICINE
Payer: COMMERCIAL

## 2022-01-07 DIAGNOSIS — E04.2 MULTIPLE THYROID NODULES: ICD-10-CM

## 2022-01-07 LAB
BACTERIA UR CULT: NORMAL
HBV SURFACE AG SERPL QL IA: NEGATIVE
HIV 1+2 AB+HIV1 P24 AG SERPL QL IA: NEGATIVE
RPR SER QL: NORMAL
RUBV IGG SER-ACNC: 45.4 IU/ML
RUBV IGG SER-IMP: REACTIVE

## 2022-01-07 PROCEDURE — 76536 US EXAM OF HEAD AND NECK: CPT | Mod: 26,,, | Performed by: INTERNAL MEDICINE

## 2022-01-07 PROCEDURE — 76536 US EXAM OF HEAD AND NECK: CPT | Mod: TC

## 2022-01-07 PROCEDURE — 76536 US SOFT TISSUE HEAD NECK THYROID: ICD-10-PCS | Mod: 26,,, | Performed by: INTERNAL MEDICINE

## 2022-01-10 LAB
HGB A2 MFR BLD HPLC: 2.7 % (ref 2.2–3.2)
HGB FRACT BLD ELPH-IMP: NORMAL
HGB FRACT BLD ELPH-IMP: NORMAL

## 2022-01-12 LAB
C TRACH DNA SPEC QL NAA+PROBE: NOT DETECTED
FINAL PATHOLOGIC DIAGNOSIS: NORMAL
Lab: NORMAL
N GONORRHOEA DNA SPEC QL NAA+PROBE: NOT DETECTED

## 2022-01-13 LAB
BACTERIAL VAGINOSIS DNA: NEGATIVE
CANDIDA GLABRATA DNA: NEGATIVE
CANDIDA KRUSEI DNA: NEGATIVE
CANDIDA RRNA VAG QL PROBE: NEGATIVE
T VAGINALIS RRNA GENITAL QL PROBE: NEGATIVE

## 2022-01-16 ENCOUNTER — PATIENT MESSAGE (OUTPATIENT)
Dept: OBSTETRICS AND GYNECOLOGY | Facility: CLINIC | Age: 32
End: 2022-01-16
Payer: COMMERCIAL

## 2022-01-17 LAB
HPV HR 12 DNA SPEC QL NAA+PROBE: NEGATIVE
HPV16 AG SPEC QL: NEGATIVE
HPV18 DNA SPEC QL NAA+PROBE: NEGATIVE

## 2022-01-18 ENCOUNTER — TELEPHONE (OUTPATIENT)
Dept: SURGERY | Facility: CLINIC | Age: 32
End: 2022-01-18
Payer: COMMERCIAL

## 2022-01-18 NOTE — TELEPHONE ENCOUNTER
Talked with Ms. Collazo about imaging. Discussed there is no rush with imaging and that we can re-evaluate after. Per my last report, I palpated a cyst like mass with low clinical suspicion for disease. Patient will talk with her  and call me back with her decision.

## 2022-01-21 ENCOUNTER — PATIENT MESSAGE (OUTPATIENT)
Dept: MATERNAL FETAL MEDICINE | Facility: CLINIC | Age: 32
End: 2022-01-21
Payer: COMMERCIAL

## 2022-01-21 ENCOUNTER — ROUTINE PRENATAL (OUTPATIENT)
Dept: OBSTETRICS AND GYNECOLOGY | Facility: CLINIC | Age: 32
End: 2022-01-21
Payer: COMMERCIAL

## 2022-01-21 VITALS
SYSTOLIC BLOOD PRESSURE: 100 MMHG | DIASTOLIC BLOOD PRESSURE: 70 MMHG | WEIGHT: 173.19 LBS | BODY MASS INDEX: 31.67 KG/M2

## 2022-01-21 DIAGNOSIS — Z3A.11 11 WEEKS GESTATION OF PREGNANCY: ICD-10-CM

## 2022-01-21 DIAGNOSIS — R10.9 CRAMPING AFFECTING PREGNANCY, ANTEPARTUM: Primary | ICD-10-CM

## 2022-01-21 DIAGNOSIS — O26.899 CRAMPING AFFECTING PREGNANCY, ANTEPARTUM: Primary | ICD-10-CM

## 2022-01-21 PROCEDURE — 99999 PR PBB SHADOW E&M-EST. PATIENT-LVL III: CPT | Mod: PBBFAC,,,

## 2022-01-21 PROCEDURE — 0502F SUBSEQUENT PRENATAL CARE: CPT | Mod: S$GLB,,, | Performed by: OBSTETRICS & GYNECOLOGY

## 2022-01-21 PROCEDURE — 87186 SC STD MICRODIL/AGAR DIL: CPT | Mod: 59 | Performed by: REGISTERED NURSE

## 2022-01-21 PROCEDURE — 99999 PR PBB SHADOW E&M-EST. PATIENT-LVL III: ICD-10-PCS | Mod: PBBFAC,,,

## 2022-01-21 PROCEDURE — 0502F PR SUBSEQUENT PRENATAL CARE: ICD-10-PCS | Mod: S$GLB,,, | Performed by: OBSTETRICS & GYNECOLOGY

## 2022-01-21 PROCEDURE — 87086 URINE CULTURE/COLONY COUNT: CPT | Performed by: REGISTERED NURSE

## 2022-01-21 PROCEDURE — 87088 URINE BACTERIA CULTURE: CPT | Performed by: REGISTERED NURSE

## 2022-01-21 PROCEDURE — 87077 CULTURE AEROBIC IDENTIFY: CPT | Mod: 59 | Performed by: REGISTERED NURSE

## 2022-01-21 NOTE — PROGRESS NOTES
Here for OB appt at 11w5d with c/o cramping. She reports it began in the middle of the night last night and has persisted. She reports cramping occurred also last weekend, but lasted 1 hour. She denies vaginal bleeding. She has drank 2 bottles of water today. This pregnancy was IVF; [facility].  Discussed increasing water intake. FHTs with bedside US. Urine culture. Pain, bleeding, and ED precautions given.  F/U scheduled 2 weeks with Dr. Bunn and on Monday with DANISHA.

## 2022-01-22 PROBLEM — O09.819 PREGNANCY RESULTING FROM IN-VITRO FERTILIZATION: Status: ACTIVE | Noted: 2022-01-22

## 2022-01-22 NOTE — ASSESSMENT & PLAN NOTE
PSEUDOTUMOR CEREBRI / IDIOPATHIC INTRACRANIAL HYPERTENSION  Pseudotumor cerebri or Idiopathic intracranial hypertension is a disorder defined by clinical criteria that include symptoms and signs isolated to those produced by increased intracranial pressure (eg, headache, papilledema, vision loss), elevated intracranial pressure with normal cerebrospinal fluid composition, and no other cause of intracranial hypertension evident on neuroimaging or other evaluations. Obesity and pregnancy are 2 predisposing factors. Some patients develop Idiopathic intracranial hypertension only in pregnancy with recurrence in each pregnancy.     In her case she developed IIH in June 2020 when she was having chronic headaches. Last spinal tap was in August 2021 and she denies headaches since this time. Following closely with neurology.     If symptoms do develop during pregnancy, they tend to do so in the first trimester shortly postpartum, however they may occur at any time during pregnancy. Symptoms are usually headaches and visual disturbances caused by papilledema and sometimes diplopia resulting from abducens weakness. MRI demonstrates no space occupying lesion and lumbar puncture may reveal increased pressure with normal cerebrospinal fluid. Permanent vision loss is the major morbidity associated with IIH.  Usually this disease is self limiting and usually does not recur in the next pregnancy.      The treatment of IIH has two major goals: the alleviation of symptoms (usually headache) and the preservation of vision.    Acetazolamide (Diamox) is frequently used and is well tolerated in pregnancy. Carbonic anhydrase inhibitors are believed to reduce the rate of cerebrospinal fluid production and are the first line therapy. Lowered CSF pressure with acetazolamide has been documented in IIH patients who underwent continuous ICP recording. Case series also suggest that in patients who can tolerate it, acetazolamide is successful in  managing symptoms and stabilizing vision in 47 to 67 percent of patients. Although listed as a category C drug in pregnancy there is no well established teratogenicity or fetotoxic data to support this. The Collaborative  project monitored 1024 women who used diamox at some time during pregnancy and did not suggest a relationship with any category of birth defects in human pregnancy.  AAP classified acetazolamide among drugs that are usually compatible with breastfeeding. A nursing infant would be unlikely to experience adverse effects from this route of exposure.  Topiramate is an antiseizure drug that inhibits carbonic anhydrase activity. Its efficacy in the treatment of migraine headaches and its association with weight loss are features that make it an attractive potential therapeutic option in IIH.      In cases in which the above treatments prove ineffective, sequential lumbar puncture with CSF drainage is used to decrease intracranial pressure.  Optic nerve sheath fenestration and CSF shunting procedures are surgical interventions rarely required in pregnancy.   IIH in itself is not a contraindication to vaginal delivery.    Recommendations:   Close Neurology follow-up through the entire pregnancy, at least once a trimester, next follow up 22   Evaluation by a neuro-ophthalmologist. Will arrange visit.   Close monitoring of symptoms   Appropriate treatment of any acute episode with acetazolamide or a short course of steroids is acceptable in pregnancy   The mode and timing of delivery should be decided based on obstetrical indications   Regional anesthesia can be safely administered in women with IIH (Molly et al, 2011).   Anesthesia consultation may be considered if recurrent episodes in pregnancy.

## 2022-01-22 NOTE — ASSESSMENT & PLAN NOTE
In Vitro Fertilization  The use of IVF has been associated with an increase in preeclampsia, gestational hypertension, placental abruption, placenta previa, and risk of  delivery. Due to the potential increased risk of congenital malformations following IVF, a targeted anatomic ultrasound is recommended at 18-20 weeks. In addition, a fetal echocardiogram may be considered if the targeted images are incomplete given the 2-fold associated risk of congenital cardiac anomalies.  While the association between ART and these adverse outcomes raise some concern, it is important to note that the literature is contradictory on this subject and the chances of having a healthy child conceived with ART are overall extremely high.    Recommendations:   Targeted ultrasound for anatomical survey at 18-20 weeks.   Consider fetal echocardiogram at 22 weeks if cardiac views are suboptimal on detailed ultrasound.   Consider low dose aspirin 81 mg daily at 12-16 weeks for preeclampsia risk reduction   Close monitoring for preeclampsia.   Fetal growth ultrasound at 32-34 weeks   Start weekly antepartum testing at 32 weeks   Consider delivery between 39 0/7 - 39 6/7 weeks gestation

## 2022-01-24 ENCOUNTER — OFFICE VISIT (OUTPATIENT)
Dept: MATERNAL FETAL MEDICINE | Facility: CLINIC | Age: 32
End: 2022-01-24
Attending: OBSTETRICS & GYNECOLOGY
Payer: COMMERCIAL

## 2022-01-24 ENCOUNTER — PROCEDURE VISIT (OUTPATIENT)
Dept: MATERNAL FETAL MEDICINE | Facility: CLINIC | Age: 32
End: 2022-01-24
Payer: COMMERCIAL

## 2022-01-24 VITALS
DIASTOLIC BLOOD PRESSURE: 62 MMHG | WEIGHT: 173.06 LBS | HEIGHT: 62 IN | SYSTOLIC BLOOD PRESSURE: 104 MMHG | BODY MASS INDEX: 31.85 KG/M2

## 2022-01-24 DIAGNOSIS — O09.811 PREGNANCY RESULTING FROM IN VITRO FERTILIZATION IN FIRST TRIMESTER: ICD-10-CM

## 2022-01-24 DIAGNOSIS — N80.9 ENDOMETRIOSIS: ICD-10-CM

## 2022-01-24 DIAGNOSIS — K21.9 GASTROESOPHAGEAL REFLUX DISEASE, UNSPECIFIED WHETHER ESOPHAGITIS PRESENT: ICD-10-CM

## 2022-01-24 DIAGNOSIS — Z36.89 ENCOUNTER FOR FETAL ANATOMIC SURVEY: Primary | ICD-10-CM

## 2022-01-24 DIAGNOSIS — E04.2 MULTIPLE THYROID NODULES: ICD-10-CM

## 2022-01-24 DIAGNOSIS — Z3A.09 9 WEEKS GESTATION OF PREGNANCY: ICD-10-CM

## 2022-01-24 DIAGNOSIS — G93.2 IIH (IDIOPATHIC INTRACRANIAL HYPERTENSION): ICD-10-CM

## 2022-01-24 DIAGNOSIS — G93.2 IIH (IDIOPATHIC INTRACRANIAL HYPERTENSION): Primary | ICD-10-CM

## 2022-01-24 DIAGNOSIS — O09.819 PREGNANCY RESULTING FROM IN-VITRO FERTILIZATION: ICD-10-CM

## 2022-01-24 LAB
BACTERIA UR CULT: ABNORMAL
BACTERIA UR CULT: ABNORMAL

## 2022-01-24 PROCEDURE — 76801 OB US < 14 WKS SINGLE FETUS: CPT | Mod: S$GLB,,, | Performed by: OBSTETRICS & GYNECOLOGY

## 2022-01-24 PROCEDURE — 99999 PR PBB SHADOW E&M-EST. PATIENT-LVL III: ICD-10-PCS | Mod: PBBFAC,,, | Performed by: OBSTETRICS & GYNECOLOGY

## 2022-01-24 PROCEDURE — 99999 PR PBB SHADOW E&M-EST. PATIENT-LVL III: CPT | Mod: PBBFAC,,, | Performed by: OBSTETRICS & GYNECOLOGY

## 2022-01-24 PROCEDURE — 99205 PR OFFICE/OUTPT VISIT, NEW, LEVL V, 60-74 MIN: ICD-10-PCS | Mod: 25,S$GLB,, | Performed by: OBSTETRICS & GYNECOLOGY

## 2022-01-24 PROCEDURE — 99205 OFFICE O/P NEW HI 60 MIN: CPT | Mod: 25,S$GLB,, | Performed by: OBSTETRICS & GYNECOLOGY

## 2022-01-24 PROCEDURE — 76801 PR US, OB <14WKS, TRANSABD, SINGLE GESTATION: ICD-10-PCS | Mod: S$GLB,,, | Performed by: OBSTETRICS & GYNECOLOGY

## 2022-01-24 RX ORDER — ASPIRIN 81 MG/1
81 TABLET ORAL DAILY
Status: ON HOLD | COMMUNITY
End: 2022-08-08 | Stop reason: HOSPADM

## 2022-01-26 ENCOUNTER — PATIENT MESSAGE (OUTPATIENT)
Dept: OBSTETRICS AND GYNECOLOGY | Facility: CLINIC | Age: 32
End: 2022-01-26
Payer: COMMERCIAL

## 2022-01-26 DIAGNOSIS — O23.40 UTI IN PREGNANCY, UNSPECIFIED TRIMESTER: Primary | ICD-10-CM

## 2022-01-26 RX ORDER — NITROFURANTOIN 25; 75 MG/1; MG/1
100 CAPSULE ORAL 2 TIMES DAILY
Qty: 10 CAPSULE | Refills: 0 | Status: SHIPPED | OUTPATIENT
Start: 2022-01-26 | End: 2022-01-31

## 2022-02-01 ENCOUNTER — INITIAL PRENATAL (OUTPATIENT)
Dept: OBSTETRICS AND GYNECOLOGY | Facility: CLINIC | Age: 32
End: 2022-02-01
Payer: COMMERCIAL

## 2022-02-01 VITALS — SYSTOLIC BLOOD PRESSURE: 90 MMHG | DIASTOLIC BLOOD PRESSURE: 50 MMHG | WEIGHT: 169.75 LBS | BODY MASS INDEX: 31.05 KG/M2

## 2022-02-01 DIAGNOSIS — Z34.02 ENCOUNTER FOR SUPERVISION OF NORMAL FIRST PREGNANCY IN SECOND TRIMESTER: Primary | ICD-10-CM

## 2022-02-01 PROCEDURE — 0502F PR SUBSEQUENT PRENATAL CARE: ICD-10-PCS | Mod: S$GLB,,, | Performed by: OBSTETRICS & GYNECOLOGY

## 2022-02-01 PROCEDURE — 99999 PR PBB SHADOW E&M-EST. PATIENT-LVL II: ICD-10-PCS | Mod: PBBFAC,,, | Performed by: OBSTETRICS & GYNECOLOGY

## 2022-02-01 PROCEDURE — 0502F SUBSEQUENT PRENATAL CARE: CPT | Mod: S$GLB,,, | Performed by: OBSTETRICS & GYNECOLOGY

## 2022-02-01 PROCEDURE — 99999 PR PBB SHADOW E&M-EST. PATIENT-LVL II: CPT | Mod: PBBFAC,,, | Performed by: OBSTETRICS & GYNECOLOGY

## 2022-02-02 ENCOUNTER — PATIENT MESSAGE (OUTPATIENT)
Dept: ADMINISTRATIVE | Facility: OTHER | Age: 32
End: 2022-02-02
Payer: COMMERCIAL

## 2022-02-02 ENCOUNTER — OFFICE VISIT (OUTPATIENT)
Dept: NEUROLOGY | Facility: CLINIC | Age: 32
End: 2022-02-02
Payer: COMMERCIAL

## 2022-02-02 VITALS
HEART RATE: 102 BPM | BODY MASS INDEX: 31.52 KG/M2 | HEIGHT: 62 IN | WEIGHT: 171.31 LBS | SYSTOLIC BLOOD PRESSURE: 120 MMHG | DIASTOLIC BLOOD PRESSURE: 60 MMHG

## 2022-02-02 DIAGNOSIS — G93.2 IIH (IDIOPATHIC INTRACRANIAL HYPERTENSION): ICD-10-CM

## 2022-02-02 PROCEDURE — 99999 PR PBB SHADOW E&M-EST. PATIENT-LVL III: ICD-10-PCS | Mod: PBBFAC,,, | Performed by: NEUROLOGICAL SURGERY

## 2022-02-02 PROCEDURE — 99214 OFFICE O/P EST MOD 30 MIN: CPT | Mod: S$GLB,,, | Performed by: NEUROLOGICAL SURGERY

## 2022-02-02 PROCEDURE — 99214 PR OFFICE/OUTPT VISIT, EST, LEVL IV, 30-39 MIN: ICD-10-PCS | Mod: S$GLB,,, | Performed by: NEUROLOGICAL SURGERY

## 2022-02-02 PROCEDURE — 99999 PR PBB SHADOW E&M-EST. PATIENT-LVL III: CPT | Mod: PBBFAC,,, | Performed by: NEUROLOGICAL SURGERY

## 2022-02-03 NOTE — PROGRESS NOTES
Chief Complaint   Patient presents with    Neurologic Problem     [facility] Cammie Collazo is a 31 y.o. female with a history of multiple medical diagnoses as listed below that presents for evaluation of headaches at a suspected to be due to idiopathic intracranial hypertension.  She has been having abnormal sensations that prompted EMG/nerve conduction studies which were essentially unremarkable.  She has been having pulsatile tinnitus and has been headaches that have been intractable.  Workup thus far has included evaluation by Ophthalmology which has been unremarkable, MRV which showed changes which could be compatible with idiopathic intracranial hypertension, but has not had a lumbar puncture to this point.  She says that she White to discuss all options for treatment and management.  She has recently got  and wants to begin her family planning.  She says that she would like to have at least 3 children and would like to start this process in the very near future as long as there are no contraindications from a health standpoint.    Interval History  04/30/2021  She has been accompanied to this visit by her .  She continues to have headaches have been pulsatile in nature but also has been having episodes of a pulsatile ringing sensation has more prominent in the left ear specially if she is lying on that side.  Headache has been increasing in intensity with Valsalva and with position specially she bends at the waist.  She is considered all treatment options including diuretics and discussing is shunt placement will be helpful.  She is somewhat apprehensive about either because she and her  or family cleaning it would like to avoid any medication that could cause any complications to pregnancy or any difficulty with conception.  She is also somewhat concerned about having a potential surgery to have shunt placed.    09/14/2021  She is beginning to undergo evaluation for IVF.  As part of  her preprocedure treatment strategy she was giving antibiotics.  After having a dose of doxycycline she began to have rapid intensification of her headaches and significant positional sensitivity associated with these headaches.  She says that she was given antibiotic once in the past that also brought about her similar symptoms.  She feels that her 1st headaches were shortly after being given the medication in the past.    02/02/2022  She is currently at 13 WGA. After her most recent LP headaches have been present on occasion but have been stable. She has not had any vision changes that have not been corrected by her glasses. Headaches have been tolerable, but she has taken Tylenol for at least one headache.    PAST MEDICAL HISTORY:  Past Medical History:   Diagnosis Date    Bilateral knee pain 6/19/2018    Endometriosis     treated by GYN at     Epigastric abdominal pain 12/5/2016    Gastroesophageal reflux disease 7/6/2016    Helicobacter pylori ab+     Helicobacter pylori ab+     High-tone pelvic floor dysfunction 5/2/2016    IIH (idiopathic intracranial hypertension)     Dr. Rogers - [facility]    Palpitations 7/6/2016       PAST SURGICAL HISTORY:  Past Surgical History:   Procedure Laterality Date    ARTHROSCOPY OF KNEE Left 4/6/2021    Procedure: ARTHROSCOPY, KNEE;  Surgeon: Deidra Mejias MD;  Location: [facility];  Service: Orthopedics;  Laterality: Left;    COLONOSCOPY N/A 12/13/2018    Procedure: COLONOSCOPY;  Surgeon: Micki Gross MD;  Location: [facility] (4TH FLR);  Service: Endoscopy;  Laterality: N/A;  preferrably in Dec 2018, CRS ok if needed.    ESOPHAGOGASTRODUODENOSCOPY N/A 10/30/2018    Procedure: EGD (ESOPHAGOGASTRODUODENOSCOPY);  Surgeon: Greg Leach MD;  Location: [facility] (2ND FLR);  Service: Endoscopy;  Laterality: N/A;  ok to schedule per Kimmy    KNEE ARTHROSCOPY W/ DEBRIDEMENT  4/6/2021    Procedure: ARTHROSCOPY, KNEE, WITH DEBRIDEMENT;  Surgeon: Deidra Mejias MD;   Location: [facility];  Service: Orthopedics;;    KNEE ARTHROSCOPY W/ MENISCECTOMY Right 4/6/2021    Procedure: ARTHROSCOPY, KNEE, WITH MENISCECTOMY;  Surgeon: Deidra Mejias MD;  Location: [facility];  Service: Orthopedics;  Laterality: Right;    KNEE ARTHROSCOPY W/ PLICA EXCISION Left 4/6/2021    Procedure: EXCISION, PLICA, KNEE, ARTHROSCOPIC;  Surgeon: Deidra Mejias MD;  Location: [facility];  Service: Orthopedics;  Laterality: Left;    LAPAROSCOPY  2017    dx with endo.  no removal    NO PAST SURGERIES      SYNOVECTOMY OF KNEE Left 4/6/2021    Procedure: SYNOVECTOMY, KNEE;  Surgeon: Deidra Mejias MD;  Location: [facility];  Service: Orthopedics;  Laterality: Left;    UPPER GASTROINTESTINAL ENDOSCOPY         SOCIAL HISTORY:  Social History     Socioeconomic History    Marital status: Single   Occupational History     Employer: [employer]     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   Tobacco Use    Smoking status: Never Smoker    Smokeless tobacco: Never Used   Substance and Sexual Activity    Alcohol use: No    Drug use: No    Sexual activity: Yes     Partners: Male     Birth control/protection: None       FAMILY HISTORY:  Family History   Problem Relation Age of Onset    Diabetes Mother     No Known Problems Father     No Known Problems Sister     Hypertension Brother     Hypertension Maternal Grandmother     Hypertension Maternal Grandfather     No Known Problems Paternal Grandmother     No Known Problems Paternal Grandfather     Colon cancer Neg Hx     Crohn's disease Neg Hx     Esophageal cancer Neg Hx     Inflammatory bowel disease Neg Hx     Irritable bowel syndrome Neg Hx     Rectal cancer Neg Hx     Stomach cancer Neg Hx     Ulcerative colitis Neg Hx        ALLERGIES AND MEDICATIONS: updated and reviewed.  Review of patient's allergies indicates:   Allergen Reactions    Doxycycline Other (See Comments)     Triggers headaches    Bactrim ds [sulfamethoxazole-trimethoprim] Nausea And  Vomiting    Nsaids (non-steroidal anti-inflammatory drug) Other (See Comments)     D/t h/o PUD and H pylori    Codeine Rash     Current Outpatient Medications   Medication Sig Dispense Refill    aspirin (ECOTRIN) 81 MG EC tablet Take 81 mg by mouth once daily.      folic acid (FOLVITE) 1 MG tablet Take 1 mg by mouth once daily.      prenatal vit 10-iron-folic-dha (VITAFOL-OB+DHA) 65-1-250 mg Cmpk Take 1 tablet by mouth.      progesterone micronized (ENDOMETRIN) 100 mg vaginal insert Place 1 tablet (100 mg total) vaginally 2 (two) times a day. 76 tablet 0    vitamin D (VITAMIN D3) 1000 units Tab Take 1,000 Units by mouth once daily.       No current facility-administered medications for this visit.       Review of Systems   Constitutional: Negative for activity change, appetite change, fever and unexpected weight change.   HENT: Negative for trouble swallowing and voice change.    Eyes: Negative for photophobia and visual disturbance.   Respiratory: Negative for apnea and shortness of breath.    Cardiovascular: Negative for chest pain and leg swelling.   Gastrointestinal: Negative for constipation and nausea.   Genitourinary: Negative for difficulty urinating.   Musculoskeletal: Negative for back pain, gait problem and neck pain.   Skin: Negative for color change and pallor.   Neurological: Positive for headaches. Negative for dizziness, seizures, syncope, weakness and numbness.   Hematological: Negative for adenopathy.   Psychiatric/Behavioral: Negative for agitation, confusion and decreased concentration.       Neurologic Exam     Mental Status   Oriented to person, place, and time.   Registration: recalls 3 of 3 objects.   Attention: normal. Concentration: normal.   Speech: speech is normal   Level of consciousness: alert  Knowledge: good.     Cranial Nerves     CN II   Right visual field deficit: none  Left visual field deficit: none     CN III, IV, VI   Extraocular motions are normal.   Right pupil: Size:  "3 mm. Shape: regular.   Left pupil: Size: 3 mm. Shape: regular.   CN III: no CN III palsy  CN VI: no CN VI palsy  Nystagmus: none   Diplopia: none  Ophthalmoparesis: none  Upgaze: normal  Downgaze: normal  Conjugate gaze: present    CN VII   Facial expression full, symmetric.   Right facial weakness: none  Left facial weakness: none    CN VIII   CN VIII normal.     CN XI   CN XI normal.     CN XII   CN XII normal.   Tongue deviation: none    Motor Exam   Muscle bulk: normal  Overall muscle tone: normal  Right arm tone: normal  Left arm tone: normal  Right leg tone: normal  Left leg tone: normal    Gait, Coordination, and Reflexes     Gait  Gait: normal    Coordination   Finger to nose coordination: normal    Tremor   Resting tremor: absent      Physical Exam  Vitals reviewed.   Constitutional:       Appearance: She is well-developed.   HENT:      Head: Normocephalic and atraumatic.   Eyes:      Extraocular Movements: EOM normal.   Pulmonary:      Effort: Pulmonary effort is normal. No respiratory distress.   Musculoskeletal:         General: Normal range of motion.      Cervical back: Normal range of motion.   Neurological:      Mental Status: She is alert and oriented to person, place, and time.      Coordination: Finger-Nose-Finger Test normal.      Gait: Gait is intact.   Psychiatric:         Speech: Speech normal.         Behavior: Behavior normal.         Thought Content: Thought content normal.         Vitals:    02/02/22 1323   BP: 120/60   Pulse: 102   Weight: 77.7 kg (171 lb 4.8 oz)   Height: 5' 2" (1.575 m)       Assessment & Plan:    Problem List Items Addressed This Visit     IIH (idiopathic intracranial hypertension)    Overview     Pulsatile tinnitus with positional components definitely raises suspicion for idiopathic intracranial hypertension.  Patient's body habitus does not support this diagnosis.  She has not had any observable deficits or changes on the funduscopic examination documented by her " ophthalmologist.  Tetracyclines have been shown in several patients to induce or provoke idiopathic intracranial hypertension.  LP showed OP - 33 cm H20. Off Diamox and since her symptoms have been relatively mild would not start therapy at this time as long as fundoscopic examination does not show significant papilledema.              Follow-up: Follow up in about 3 months (around 5/2/2022).    This note was done with the assistance of voice recognition software. Some errors may be present after proofreading.

## 2022-02-04 ENCOUNTER — PATIENT MESSAGE (OUTPATIENT)
Dept: MATERNAL FETAL MEDICINE | Facility: CLINIC | Age: 32
End: 2022-02-04
Payer: COMMERCIAL

## 2022-02-07 NOTE — PROGRESS NOTES
Patient reports no abdominal pain & no vaginal bleeding. Labs reviewed in detail with patient and questions answered. Overall doing well.  Discussed future OB appointments, genetic testing, ultrasounds and prenatal classes.   Abdominal pain & vaginal bleeding precautions discussed in detail.   Time spent over 20 minutes   This includes face to face time and non-face to face time preparing to see the patient (eg, review of tests), obtaining and/or reviewing separately obtained history, documenting clinical information in the electronic or other health record, independently interpreting results and communicating results to the patient/family/caregiver, or care coordinator.

## 2022-02-22 ENCOUNTER — TELEPHONE (OUTPATIENT)
Dept: OBSTETRICS AND GYNECOLOGY | Facility: CLINIC | Age: 32
End: 2022-02-22
Payer: COMMERCIAL

## 2022-02-22 ENCOUNTER — CLINICAL SUPPORT (OUTPATIENT)
Dept: OPHTHALMOLOGY | Facility: CLINIC | Age: 32
End: 2022-02-22
Payer: COMMERCIAL

## 2022-02-22 ENCOUNTER — PATIENT MESSAGE (OUTPATIENT)
Dept: OBSTETRICS AND GYNECOLOGY | Facility: CLINIC | Age: 32
End: 2022-02-22
Payer: COMMERCIAL

## 2022-02-22 ENCOUNTER — HOSPITAL ENCOUNTER (EMERGENCY)
Facility: OTHER | Age: 32
Discharge: HOME | End: 2022-02-22
Attending: EMERGENCY MEDICINE
Payer: COMMERCIAL

## 2022-02-22 VITALS
WEIGHT: 164 LBS | TEMPERATURE: 98 F | OXYGEN SATURATION: 100 % | BODY MASS INDEX: 30.18 KG/M2 | HEART RATE: 87 BPM | RESPIRATION RATE: 18 BRPM | DIASTOLIC BLOOD PRESSURE: 70 MMHG | HEIGHT: 62 IN | SYSTOLIC BLOOD PRESSURE: 102 MMHG

## 2022-02-22 DIAGNOSIS — H53.15 VISUAL DISTORTIONS OF SHAPE AND SIZE: ICD-10-CM

## 2022-02-22 DIAGNOSIS — G93.2 IIH (IDIOPATHIC INTRACRANIAL HYPERTENSION): ICD-10-CM

## 2022-02-22 DIAGNOSIS — H47.10 PAPILLEDEMA: Primary | ICD-10-CM

## 2022-02-22 DIAGNOSIS — R10.2 VAGINAL PAIN: Primary | ICD-10-CM

## 2022-02-22 LAB
BACTERIA GENITAL QL WET PREP: ABNORMAL
BILIRUB UR QL STRIP: NEGATIVE
CLARITY UR: ABNORMAL
CLUE CELLS VAG QL WET PREP: ABNORMAL
COLOR UR: YELLOW
FILAMENT FUNGI VAG WET PREP-#/AREA: ABNORMAL
GLUCOSE UR QL STRIP: NEGATIVE
HGB UR QL STRIP: NEGATIVE
KETONES UR QL STRIP: ABNORMAL
LEUKOCYTE ESTERASE UR QL STRIP: NEGATIVE
NITRITE UR QL STRIP: NEGATIVE
PH UR STRIP: 6 [PH] (ref 5–8)
PROT UR QL STRIP: NEGATIVE
SP GR UR STRIP: <=1.005 (ref 1–1.03)
SPECIMEN SOURCE: ABNORMAL
T VAGINALIS GENITAL QL WET PREP: ABNORMAL
URN SPEC COLLECT METH UR: ABNORMAL
UROBILINOGEN UR STRIP-ACNC: NEGATIVE EU/DL
WBC #/AREA VAG WET PREP: ABNORMAL
YEAST GENITAL QL WET PREP: ABNORMAL

## 2022-02-22 PROCEDURE — 81003 URINALYSIS AUTO W/O SCOPE: CPT | Performed by: EMERGENCY MEDICINE

## 2022-02-22 PROCEDURE — 87529 HSV DNA AMP PROBE: CPT | Performed by: EMERGENCY MEDICINE

## 2022-02-22 PROCEDURE — 87210 SMEAR WET MOUNT SALINE/INK: CPT | Performed by: EMERGENCY MEDICINE

## 2022-02-22 PROCEDURE — 87491 CHLMYD TRACH DNA AMP PROBE: CPT | Performed by: EMERGENCY MEDICINE

## 2022-02-22 PROCEDURE — 99284 EMERGENCY DEPT VISIT MOD MDM: CPT

## 2022-02-22 PROCEDURE — 87591 N.GONORRHOEAE DNA AMP PROB: CPT | Performed by: EMERGENCY MEDICINE

## 2022-02-22 NOTE — PROGRESS NOTES
Visual field test done.  Patient stated no latex allergies used coverlet      -2.50+1.00x 161  -2.50+0.50x 27

## 2022-02-23 ENCOUNTER — PATIENT MESSAGE (OUTPATIENT)
Dept: OBSTETRICS AND GYNECOLOGY | Facility: CLINIC | Age: 32
End: 2022-02-23
Payer: COMMERCIAL

## 2022-02-23 NOTE — ED PROVIDER NOTES
xEncounter Date: 2022    SCRIBE #1 NOTE: I, Theodore Tinoco am scribing for, and in the presence of,  Abena Sky MD. I have scribed the following portions of the note - Other sections scribed: HPI, ROS, PE.       History     Chief Complaint   Patient presents with    Vaginal Pain     Pt, who is approximately 16 weeks pregnant, presents to the ER with complaints of vaginal pain since ; pain was initially intermittent but has been more constant over the past two days. Pt reports mild abdominal cramping; denies vaginal bleeding.     Time seen by provider: 6:21 PM    This is a 31 y.o. female, , who is 16 weeks pregnant and presents with complaint of worsening vaginal pain today. Patient states she intially began to experience intermittent, hour-long episodes of burning, non-radiating pressure two days ago.  She notes the pain to be more external rather than internal.  This became constant today. Her pains does not change with movement. She also notes that she began having pelvic cramping just PTA. Patient denies any abdominal pain. Her next Ob appointment is in eight days. PMHx of pelvic floor weakness with chronic, unchanged dysuria. She was also diagnosed with endometriosis at the same time, five years ago.    The history is provided by the patient.     Review of patient's allergies indicates:   Allergen Reactions    Doxycycline Other (See Comments)     Triggers headaches    Bactrim ds [sulfamethoxazole-trimethoprim] Nausea And Vomiting    Nsaids (non-steroidal anti-inflammatory drug) Other (See Comments)     D/t h/o PUD and H pylori    Codeine Rash     Past Medical History:   Diagnosis Date    Bilateral knee pain 2018    Endometriosis     treated by GYN at     Epigastric abdominal pain 2016    Gastroesophageal reflux disease 2016    Helicobacter pylori ab+     Helicobacter pylori ab+     High-tone pelvic floor dysfunction 2016    IIH (idiopathic intracranial  hypertension)     Dr. Rogers - [facility]    Palpitations 7/6/2016     Past Surgical History:   Procedure Laterality Date    ARTHROSCOPY OF KNEE Left 4/6/2021    Procedure: ARTHROSCOPY, KNEE;  Surgeon: Deidra Mejias MD;  Location: [facility] OR;  Service: Orthopedics;  Laterality: Left;    COLONOSCOPY N/A 12/13/2018    Procedure: COLONOSCOPY;  Surgeon: Micki Gross MD;  Location: [facility] ENDO (4TH FLR);  Service: Endoscopy;  Laterality: N/A;  preferrably in Dec 2018, CRS ok if needed.    ESOPHAGOGASTRODUODENOSCOPY N/A 10/30/2018    Procedure: EGD (ESOPHAGOGASTRODUODENOSCOPY);  Surgeon: Greg Leach MD;  Location: [facility] ENDO (2ND FLR);  Service: Endoscopy;  Laterality: N/A;  ok to schedule per Kimmy    KNEE ARTHROSCOPY W/ DEBRIDEMENT  4/6/2021    Procedure: ARTHROSCOPY, KNEE, WITH DEBRIDEMENT;  Surgeon: Deidra Mejias MD;  Location: [facility] OR;  Service: Orthopedics;;    KNEE ARTHROSCOPY W/ MENISCECTOMY Right 4/6/2021    Procedure: ARTHROSCOPY, KNEE, WITH MENISCECTOMY;  Surgeon: Deidra Mejias MD;  Location: [facility] OR;  Service: Orthopedics;  Laterality: Right;    KNEE ARTHROSCOPY W/ PLICA EXCISION Left 4/6/2021    Procedure: EXCISION, PLICA, KNEE, ARTHROSCOPIC;  Surgeon: Deidra Mejias MD;  Location: [facility] OR;  Service: Orthopedics;  Laterality: Left;    LAPAROSCOPY  2017    dx with endo.  no removal    NO PAST SURGERIES      SYNOVECTOMY OF KNEE Left 4/6/2021    Procedure: SYNOVECTOMY, KNEE;  Surgeon: Deidra Mejias MD;  Location: [facility];  Service: Orthopedics;  Laterality: Left;    UPPER GASTROINTESTINAL ENDOSCOPY       Family History   Problem Relation Age of Onset    Diabetes Mother     No Known Problems Father     No Known Problems Sister     Hypertension Brother     Hypertension Maternal Grandmother     Hypertension Maternal Grandfather     No Known Problems Paternal Grandmother     No Known Problems Paternal Grandfather     Colon cancer Neg Hx     Crohn's disease Neg Hx     Esophageal cancer Neg Hx      Inflammatory bowel disease Neg Hx     Irritable bowel syndrome Neg Hx     Rectal cancer Neg Hx     Stomach cancer Neg Hx     Ulcerative colitis Neg Hx      Social History     Tobacco Use    Smoking status: Never Smoker    Smokeless tobacco: Never Used   Substance Use Topics    Alcohol use: No    Drug use: No     Review of Systems   Constitutional: Negative for chills and fever.   Respiratory: Negative for chest tightness and shortness of breath.    Gastrointestinal: Negative for abdominal pain.   Genitourinary: Positive for dysuria (chronic, unchanged), pelvic pain and vaginal pain.   Neurological: Negative for dizziness and headaches.       Physical Exam     Initial Vitals [02/22/22 1727]   BP Pulse Resp Temp SpO2   126/72 101 16 98.3 °F (36.8 °C) 98 %      MAP       --         Physical Exam    Nursing note and vitals reviewed.  Constitutional: She appears well-developed and well-nourished. She is not diaphoretic. No distress.   HENT:   Head: Normocephalic and atraumatic.   Eyes: Conjunctivae and EOM are normal. No scleral icterus.   Neck: Neck supple. No JVD present.   Cardiovascular: Normal rate, regular rhythm and intact distal pulses.   Pulmonary/Chest: No accessory muscle usage. No tachypnea and no bradypnea. No respiratory distress. She has no wheezes. She has no rales.   Abdominal: Abdomen is soft. Bowel sounds are normal. She exhibits no distension. There is no abdominal tenderness. There is no rebound and no guarding.   Genitourinary:    Genitourinary Comments:  exam:  Vesicular lesion noted to the right labia.  No discharge or blood noted in the vaginal vault.  No CMT or adnexal tenderness.  Os closed.  Female chaperone at bedside.     Musculoskeletal:         General: No tenderness or edema. Normal range of motion.      Cervical back: Neck supple.     Neurological: She is alert and oriented to person, place, and time.   Skin: Skin is warm and dry.   Psychiatric: She has a normal mood and  affect. Her behavior is normal. Thought content normal.         ED Course   Procedures  Labs Reviewed   URINALYSIS - Abnormal; Notable for the following components:       Result Value    Appearance, UA Hazy (*)     Specific Gravity, UA <=1.005 (*)     Ketones, UA 1+ (*)     All other components within normal limits   VAGINAL SCREEN - Abnormal; Notable for the following components:    Clue Cells Rare (*)     WBC - Vaginal Screen Occasional (*)     Bacteria - Vaginal Screen Moderate (*)     All other components within normal limits    Narrative:     Release to patient->Immediate   C. TRACHOMATIS/N. GONORRHOEAE BY AMP DNA   HERPES SIMPLEX (HSV) BY RAPID PCR, NON-BLOOD          Imaging Results    None          Medications - No data to display  Medical Decision Making:   History:   Old Medical Records: I decided to obtain old medical records.  Old Records Summarized: other records and records from clinic visits.  Initial Assessment:   6:21PM:  Patient is a 31 year old female who presents to the emerged from a vaginal pain.  Patient appears well, nontoxic.  She is 17 weeks pregnant.  Will plan for fetal heart tones and UA.  She does appear to have a vesicular lesion to the right labia which is suspicious for HSV.  Patient does also have a history of pelvic floor dysfunction which may be contributing to her pain.  We will plan for pelvic swabs with GC, vaginal screen, will continue to follow and reassess.  Clinical Tests:   Lab Tests: Ordered and Reviewed    8:32 PM:  Patient doing well, remains stable.  Her vaginal screen is unremarkable and her UA is negative for any acute findings.  I did discuss with the patient that I did swab the vesicular lesion for HSV.  She does state that her  has HSV and that it is possible that she may have contracted it.  Will plan to defer treatment and await PCR confirmation.  I updated the patient regarding the results and I counseled the pt regarding supportive care measures.  She  does have an OB appointment already set up for next week.  I do not feel that further workup in the emergency department is indicated at this time.  I have discussed with the pt ED return warnings and need for close PCP f/u.  Pt agreeable to plan and all questions answered.  I feel that pt is stable for discharge and management as an outpatient and no further intervention is needed at this time.  Pt is comfortable returning to the ED if needed.  Will DC home in stable condition.              Scribe Attestation:   Scribe #1: I performed the above scribed service and the documentation accurately describes the services I performed. I attest to the accuracy of the note.               Physician Attestation for Scribe: I, Abena Sky, reviewed documentation as scribed in my presence, which is both accurate and complete.    Clinical Impression:   Final diagnoses:  [R10.2] Vaginal pain (Primary)          ED Disposition Condition    Discharge Stable        ED Prescriptions     None        Follow-up Information     Follow up With Specialties Details Why Contact Info    Meghan Bunn MD Obstetrics, Obstetrics and Gynecology   [address] [place] [id number]  [phone]             Abena Sky MD  02/22/22 0264

## 2022-02-23 NOTE — DISCHARGE INSTRUCTIONS
Please return to the ER if you have worsening symptoms, vaginal bleeding, chest pain, difficulty breathing, fevers, altered mental status, dizziness, weakness, or any other concerns.      Follow up with your OB physician.

## 2022-02-23 NOTE — ED NOTES
Patient presented with vaginal pain since Sunday but increased in pain last night.  Patient denies bleeding, sexual intercourse, n/v/d and fever and chills.  Patient states she is 16 weeks pregnant.  Instructed to come to ER per OBGYN.

## 2022-02-25 LAB
C TRACH DNA SPEC QL NAA+PROBE: NOT DETECTED
HSV1 DNA SPEC QL NAA+PROBE: NEGATIVE
HSV2 DNA SPEC QL NAA+PROBE: NEGATIVE
N GONORRHOEA DNA SPEC QL NAA+PROBE: NOT DETECTED
SPECIMEN SOURCE: NORMAL

## 2022-03-02 ENCOUNTER — ROUTINE PRENATAL (OUTPATIENT)
Dept: OBSTETRICS AND GYNECOLOGY | Facility: CLINIC | Age: 32
End: 2022-03-02
Payer: COMMERCIAL

## 2022-03-02 VITALS — SYSTOLIC BLOOD PRESSURE: 105 MMHG | WEIGHT: 162.94 LBS | DIASTOLIC BLOOD PRESSURE: 62 MMHG | BODY MASS INDEX: 29.8 KG/M2

## 2022-03-02 DIAGNOSIS — Z3A.17 17 WEEKS GESTATION OF PREGNANCY: Primary | ICD-10-CM

## 2022-03-02 DIAGNOSIS — O26.899 PELVIC PAIN IN PREGNANCY: ICD-10-CM

## 2022-03-02 DIAGNOSIS — R10.2 PELVIC PAIN IN PREGNANCY: ICD-10-CM

## 2022-03-02 PROCEDURE — 0502F SUBSEQUENT PRENATAL CARE: CPT | Mod: S$GLB,,, | Performed by: NURSE PRACTITIONER

## 2022-03-02 PROCEDURE — 87086 URINE CULTURE/COLONY COUNT: CPT | Performed by: NURSE PRACTITIONER

## 2022-03-02 PROCEDURE — 0502F PR SUBSEQUENT PRENATAL CARE: ICD-10-PCS | Mod: S$GLB,,, | Performed by: NURSE PRACTITIONER

## 2022-03-02 PROCEDURE — 99999 PR PBB SHADOW E&M-EST. PATIENT-LVL III: ICD-10-PCS | Mod: PBBFAC,,, | Performed by: NURSE PRACTITIONER

## 2022-03-02 PROCEDURE — 99999 PR PBB SHADOW E&M-EST. PATIENT-LVL III: CPT | Mod: PBBFAC,,, | Performed by: NURSE PRACTITIONER

## 2022-03-02 NOTE — PROGRESS NOTES
Here for routine OB appt at 17w3d, with c/o pelvic pain. Seen in the ER- had lesion on labia but HSV swab was negative. Denies personal history of outbreak, partner does have a history of. Lesion was never painful to her. Pelvic pain happens at night, mostly on her hip. Just ordered a body pillow. Personal history of pelvic floor dysfunction- discussed maternity belt, RLP and PT. Did IVF with . Taking a baby aspirin. Having a GIRL. Reports good FM.  Denies LOF, denies VB, denies contractions.  Reviewed warning signs of Labor and Preeclampsia.  Daily FM counts reinforced.  F/U scheduled 4 weeks with Dr. Bunn  Needs Rhogam at 28 weeks  Glucose at 24 weeks with HSV IgG labs  Pelvic floor PT ordered

## 2022-03-02 NOTE — PATIENT INSTRUCTIONS
LABOR AND DELIVERY PHONE NUMBER, [phone] (OPEN 24/7, LOCATED ON 6TH FLOOR OF HOSPITAL)  [address] PHONE NUMBER, [phone] (OPEN MON-FRI, 8a-5p)

## 2022-03-03 LAB — BACTERIA UR CULT: NO GROWTH

## 2022-03-11 ENCOUNTER — PATIENT MESSAGE (OUTPATIENT)
Dept: MATERNAL FETAL MEDICINE | Facility: CLINIC | Age: 32
End: 2022-03-11
Payer: COMMERCIAL

## 2022-03-14 ENCOUNTER — PATIENT MESSAGE (OUTPATIENT)
Dept: MATERNAL FETAL MEDICINE | Facility: CLINIC | Age: 32
End: 2022-03-14

## 2022-03-14 ENCOUNTER — OFFICE VISIT (OUTPATIENT)
Dept: MATERNAL FETAL MEDICINE | Facility: CLINIC | Age: 32
End: 2022-03-14
Payer: COMMERCIAL

## 2022-03-14 ENCOUNTER — PROCEDURE VISIT (OUTPATIENT)
Dept: MATERNAL FETAL MEDICINE | Facility: CLINIC | Age: 32
End: 2022-03-14
Payer: COMMERCIAL

## 2022-03-14 VITALS
WEIGHT: 175.5 LBS | DIASTOLIC BLOOD PRESSURE: 68 MMHG | HEIGHT: 62 IN | SYSTOLIC BLOOD PRESSURE: 119 MMHG | BODY MASS INDEX: 32.3 KG/M2

## 2022-03-14 DIAGNOSIS — O09.819 PREGNANCY RESULTING FROM IN-VITRO FERTILIZATION: ICD-10-CM

## 2022-03-14 DIAGNOSIS — G93.2 IIH (IDIOPATHIC INTRACRANIAL HYPERTENSION): ICD-10-CM

## 2022-03-14 DIAGNOSIS — Z36.89 ENCOUNTER FOR ULTRASOUND TO CHECK FETAL GROWTH: Primary | ICD-10-CM

## 2022-03-14 DIAGNOSIS — Z36.89 ENCOUNTER FOR FETAL ANATOMIC SURVEY: ICD-10-CM

## 2022-03-14 DIAGNOSIS — Z36.2 ENCOUNTER FOR FOLLOW-UP ULTRASOUND OF FETAL ANATOMY: ICD-10-CM

## 2022-03-14 PROCEDURE — 99999 PR PBB SHADOW E&M-EST. PATIENT-LVL III: ICD-10-PCS | Mod: PBBFAC,,, | Performed by: OBSTETRICS & GYNECOLOGY

## 2022-03-14 PROCEDURE — 99214 OFFICE O/P EST MOD 30 MIN: CPT | Mod: S$GLB,,, | Performed by: OBSTETRICS & GYNECOLOGY

## 2022-03-14 PROCEDURE — 99214 PR OFFICE/OUTPT VISIT, EST, LEVL IV, 30-39 MIN: ICD-10-PCS | Mod: S$GLB,,, | Performed by: OBSTETRICS & GYNECOLOGY

## 2022-03-14 PROCEDURE — 99999 PR PBB SHADOW E&M-EST. PATIENT-LVL III: CPT | Mod: PBBFAC,,, | Performed by: OBSTETRICS & GYNECOLOGY

## 2022-03-14 NOTE — ASSESSMENT & PLAN NOTE
In Vitro Fertilization  This pregnancy is a result of IVF. Patient and her  have been previously counseled however our recommendations are below.    Recommendations:   Consider fetal echocardiogram at 22 weeks if cardiac views are suboptimal on detailed ultrasound. Will return for completion of anatomy in 4 weeks and will reevaluate need for fetal echocardiogram.   Close monitoring for preeclampsia.   Fetal growth ultrasound at 32-34 weeks   Start weekly antepartum testing at 36 weeks   Consider delivery between 39 0/7 - 39 6/7 weeks gestation

## 2022-03-14 NOTE — PROGRESS NOTES
"Maternal Fetal Medicine Follow up    SUBJECTIVE:     Laila Collazo is a 31 y.o.  female with IUP at 19w1d who is seen for [facility] follow up for management of:      Problem   Pregnancy Resulting From in-Vitro Fertilization   Iih (Idiopathic Intracranial Hypertension)         OBH,PMH,PSH,SH,FH reviewed with no changes      Review of patient's allergies indicates:   Allergen Reactions    Doxycycline Other (See Comments)     Triggers headaches    Bactrim ds [sulfamethoxazole-trimethoprim] Nausea And Vomiting    Nsaids (non-steroidal anti-inflammatory drug) Other (See Comments)     D/t h/o PUD and H pylori    Codeine Rash       Medications:  Aspirin  Folate  PNV    Records reviewed    Care team members:  MD Oni - Primary OB       OBJECTIVE:     Blood Pressure: /68 (BP Location: Right arm, Patient Position: Sitting)   Ht 5' 2" (1.575 m)   Wt 79.6 kg (175 lb 7.8 oz)   LMP 10/31/2021   BMI 32.10 kg/m²       Physical Exam:  General: WDWN,NAD    Ultrasound performed. See viewpoint for full ultrasound report.  1. A detailed fetal anatomic ultrasound examination was performed for the following high risk indication: IVF  2. No fetal structural malformations are identified; however, fetal imaging is incomplete today with limited lat vent, post fossa, n/l, rvot, 3vv, 3vt, ao, da, svc/ivc, diaph . A follow-up study will be scheduled to complete the fetal anatomic survey.   3. Fetal size today is consistent with established gestational age.  4. Cervical length is normal.  5. Placental location is normal without evidence of previa.  6. Amniotic fluid volume appears normal.    ASSESSMENT/PLAN:     31 y.o.  female with IUP at 19w1d presents for [facility] follow up.    IIH (idiopathic intracranial hypertension)  Patient seen as a follow up for IIH. She reports daily pulsatile tinnitus which is not new for her and also reports frequent headaches that are relived by Tylenol. She follows with her Neurologist closely and " currently, there is no indication to start medication as long as her headaches are controlled with Tylenol.  Recommendations:  Continue care with Neurology  Neurophthalmology visit scheduled for 4/29  Close monitoring of symptoms.   Anesthesia consultation may be considered if recurrent episodes in pregnancy.          Pregnancy resulting from in-vitro fertilization  In Vitro Fertilization  This pregnancy is a result of IVF. Patient and her  have been previously counseled however our recommendations are below.    Recommendations:  Consider fetal echocardiogram at 22 weeks if cardiac views are suboptimal on detailed ultrasound. Will return for completion of anatomy in 4 weeks and will reevaluate need for fetal echocardiogram.  Close monitoring for preeclampsia.  Fetal growth ultrasound at 32-34 weeks  Start weekly antepartum testing at 36 weeks  Consider delivery between 39 0/7 - 39 6/7 weeks gestation            F/u in 4 weeks for  US    30 minutes of total time spent on the encounter, which includes face to face time and non-face to face time preparing to see the patient (eg, review of tests), obtaining and/or reviewing separately obtained history, documenting clinical information in the electronic or other health record, independently interpreting results (not separately reported) and communicating results to the patient/family/caregiver, or care coordination (not separately reported)    Jayleen Trinidad MD  Maternal Fetal Medicine   PGY-6  [facility]

## 2022-03-14 NOTE — ASSESSMENT & PLAN NOTE
Patient seen as a follow up for II. She reports daily pulsatile tinnitus which is not new for her and also reports frequent headaches that are relived by Tylenol. She follows with her Neurologist closely and currently, there is no indication to start medication as long as her headaches are controlled with Tylenol.  Recommendations:   Continue care with Neurology   Neurophthalmology visit scheduled for 4/29   Close monitoring of symptoms.    Anesthesia consultation may be considered if recurrent episodes in pregnancy.

## 2022-03-15 ENCOUNTER — PATIENT MESSAGE (OUTPATIENT)
Dept: OBSTETRICS AND GYNECOLOGY | Facility: CLINIC | Age: 32
End: 2022-03-15
Payer: COMMERCIAL

## 2022-03-16 ENCOUNTER — TELEPHONE (OUTPATIENT)
Dept: OBSTETRICS AND GYNECOLOGY | Facility: CLINIC | Age: 32
End: 2022-03-16
Payer: COMMERCIAL

## 2022-03-16 NOTE — TELEPHONE ENCOUNTER
Information was printed and placed on Dr Bunn's desk to call and discuss with the pt if she is able to take the fishing trip with her .

## 2022-03-17 ENCOUNTER — DOCUMENTATION ONLY (OUTPATIENT)
Dept: REHABILITATION | Facility: OTHER | Age: 32
End: 2022-03-17
Payer: COMMERCIAL

## 2022-03-17 ENCOUNTER — CLINICAL SUPPORT (OUTPATIENT)
Dept: REHABILITATION | Facility: OTHER | Age: 32
End: 2022-03-17
Payer: COMMERCIAL

## 2022-03-17 DIAGNOSIS — O26.899 PELVIC PAIN IN PREGNANCY: ICD-10-CM

## 2022-03-17 DIAGNOSIS — R10.2 PELVIC PAIN IN PREGNANCY: ICD-10-CM

## 2022-03-17 DIAGNOSIS — Z3A.17 17 WEEKS GESTATION OF PREGNANCY: ICD-10-CM

## 2022-03-17 PROCEDURE — 97161 PT EVAL LOW COMPLEX 20 MIN: CPT

## 2022-03-17 PROCEDURE — 97112 NEUROMUSCULAR REEDUCATION: CPT

## 2022-03-17 PROCEDURE — 97530 THERAPEUTIC ACTIVITIES: CPT

## 2022-03-17 NOTE — PATIENT INSTRUCTIONS
Home Program 3/17/22:        360 Breath - Inhale long, slow and deep. You should feel as if your lower ribs are expanding in all directions like the way an umbrella opens. You should feel the belly, back and sides gently expand and you may notice a relaxation in the pelvic floor.     Continue to breath like this for 5 breaths. Follow with 5 piston breaths, then 5 more breaths like this.    Piston Breathing    Inhale long, slow and deep, so belly expands with the breath. See if you can feel your pelvic floor gently dropping and lengthening as you breath in. As you exhale, squeeze your pelvic floor muscles, holding the contraction the length of the exhalation.     Repeat 5 times. Follow with 5 more breaths      Repeat above sequence 3 times/day.     2) Clamshells - lay on side with knees bent and top of pelvic rolled slightly forward. Keeping ankles together lift top knee without pelvis rolling backwards. Do 2 sets of 10.       3) Transverse Abdominus isolation        Gently tighten deep core muscles by using your lower abdominals to hug your baby. Do not hold breath, may be easier to contract on an exhalation. Should feel a firming up and no outward or downward bulging.     Repeat 10 times. Perform 2 sets.      4) Rolling in bed  - Bend knees and place pillow in between knees. Squeeze pillow and roll entire body to the other side staying in line from shoulders down.

## 2022-03-17 NOTE — PROGRESS NOTES
Amanda Benjamin, BARRY Arnett, PT  Bryan Spears,     No, she does not have any contraindications to an internal exam.     -NF             Previous Messages       ----- Message -----   From: Tory Arnett PT   Sent: 3/16/2022   5:11 PM CDT   To: Amanda Benjamin NP   Subject: internal exam and treat on pregnant evaluati*     Bryan Patel,     I am seeing Laila tomorrow for her pelvic floor PT eval for pelvic pain during pregnancy.  If it seems like she would benefit from an intravaginal pelvic floor muscle assessment and treatment for her pain, I would like to do that tomorrow. Are there any medical contraindications to performing an internal exam with treatment at this time?     Thank you,     Tory

## 2022-03-18 NOTE — PLAN OF CARE
[facility]  Pelvic Health Physical Therapy Initial Evaluation    Date: 3/17/2022   Name: Laila Collazo  Clinic Number: [id number]  Therapy Diagnosis: No diagnosis found.  Physician: Amanda Benjamin NP    Physician Orders: PT Eval and Treat  Medical Diagnosis from Referral:   Z3A.17 (ICD-10-CM) - 17 weeks gestation of pregnancy   O26.899,R10.2 (ICD-10-CM) - Pelvic pain in pregnancy   Evaluation Date: 3/17/2022  Authorization Period Expiration:  visit  Plan of Care Expiration: 6/15/22  Visit # / Visits authorized:     Time In: 1014  Time Out: 1100  Total Appointment Time (timed & untimed codes): 46 minutes    Precautions: universal    Subjective     Pronouns: she/her    Date of onset: Chronic LBP. Pelvic/hip pain began during pregnancy    History of current condition - Laila reports: Having pelvic floor therapy in 2018 for pelvic floor weakness that caused burning with urination and pain with intercourse.     OB/GYN History:  - miscarriage 1st pregnancy  Surgical History: knee surgery  Birth Control: pregnant  History of chronic yeast, BV or UTIs? No  Sexually active? Yes  Pain with vaginal exams, intercourse or tampon use? Yes - intercourse  Pleasure with sex? yes  Ability to achieve orgasm? yes  Are you comfortable with the appearance of your genitals? Yes    Bladder/Bowel History:    Frequency of urination:   Daytime: 2-4 hours           Nighttime: 1   Difficulty initiating urine stream: No   Urine stream: strong   Complete emptying: Yes   Push to empty bladder: No   Squat/hover to urinate in public restrooms: yes   Bladder leakage: Yes   Frequency of incidents/Type of incontinence: Urine dribbles throughout the day - overflow   Amount leaked (urine): few drops   Urinary Urgency: Yes - if holds for a long time   Frequency of bowel movements: every other day   Difficulty initiating BM: Sometimes   Quality/Shape of BM: [place] Stool Chart between 2 and 4   Does Patient  Feel Empty after BM? Yes   Fiber Supplements or Laxative Use? No   Colon leakage: Sometimes   Frequency of incidents: not often    Fecal Urgency: No   History of coccyx injury: No    Prolapse Screening:  Feeling of vaginal bulge: Yes - has been feeling pressure    Pain:  Location: pelvic pain   and hip pain now. Chronic LBP  Current 0/10, worst 5/10, best 0/10   Description: Aching, Sharp and pressure  Aggravating Factors/Activities that cause symptoms: General movement  - worse at night when turning in bed  Easing Factors: rest and has not tried anything     Medical History: Laila  has a past medical history of Bilateral knee pain (6/19/2018), Endometriosis, Epigastric abdominal pain (12/5/2016), Gastroesophageal reflux disease (7/6/2016), Helicobacter pylori ab+, Helicobacter pylori ab+, High-tone pelvic floor dysfunction (5/2/2016), IIH (idiopathic intracranial hypertension), and Palpitations (7/6/2016).     Surgical History: Laila Collazo  has a past surgical history that includes No past surgeries; Upper gastrointestinal endoscopy; Esophagogastroduodenoscopy (N/A, 10/30/2018); Colonoscopy (N/A, 12/13/2018); Laparoscopy (2017); Synovectomy of knee (Left, 4/6/2021); Knee arthroscopy w/ plica excision (Left, 4/6/2021); Knee arthroscopy w/ debridement (4/6/2021); Arthroscopy of knee (Left, 4/6/2021); and Knee arthroscopy w/ meniscectomy (Right, 4/6/2021).    Medications: Laila has a current medication list which includes the following prescription(s): aspirin, folic acid, prenatal vit 10-iron-folic-dha, and vitamin d.    Allergies:   Review of patient's allergies indicates:   Allergen Reactions    Doxycycline Other (See Comments)     Triggers headaches    Bactrim ds [sulfamethoxazole-trimethoprim] Nausea And Vomiting    Nsaids (non-steroidal anti-inflammatory drug) Other (See Comments)     D/t h/o PUD and H pylori    Codeine Rash          Prior Therapy/Previous treatment included: physical therapy for  weakness  Social History:    Current exercise: no - knee has been causing a lot of pain since surgery -   Occupation: Pt works as a  at dental school and job-related duties sitting.    Types of fluid intake: water 17 x 8  Diet: Some days it can be good and others it can be bad - no specific diet -  Habitus: well developed, well nourished  Abuse/Neglect: No   History of disordered eating: No   History of anxiety/depression: No     Pts goals: To feel better    OBJECTIVE     HIP SCREEN:  Glut Med: 3-/5    See EMR under MEDIA for written consent provided 3/17/2022  Chaperone: Declined    ORTHO SCREEN  Posture in sitting: slouched   Posture in standing: WNL  Pelvic alignment: mild posterior tilt   SLS: instability noted R>L  Lumbar ROM: Decreased ROM  Pubic symphysis: WNL  Sacroiliac Thrust: Negative    ABDOMINAL WALL ASSESSMENT  Palpation: NT  Abdominal strength: NT  Scarring: none  Pelvic Floor Muscle and Transverse Abdominus Synergy: NT  Diastasis: absent      BREATHING MECHANICS ASSESSMENT   Thorax Assessment During Quiet Respiration: WNL excursion of ribcage  Thorax Assessment During Deep Respiration: Decreased excursion of abdominal wall     VAGINAL PELVIC FLOOR EXAM    EXTERNAL ASSESSMENT  Introitus: WNL  Skin condition: lesions  Scarring: none   Sensation: WNL   Pain: b/l LA and STP  Voluntary contraction: visible lift  Voluntary relaxation: visible drop  Involuntary contraction: bulge  Bearing down: visible drop  Perineal descent: absent  Comments: reflexive tightening with approach of examiner's hand      INTERNAL ASSESSMENT  Pain: tender areas noted as follows: throughout with deep palpation and light touch   Sensation: able to localized pressure appropriately   Vaginal vault: WNL   Muscle Bulk: hypertonus   Muscle Power: 2+/5  Muscle Endurance: 10 sec  # Reps To Fatigue: 2    Fast Contractions in 10 seconds: 6     Quality of contraction: incomplete relaxation   Specificity: WNL    Coordination: tends to hold breath during PFM contration and cannot isolate PFM from abdominals   Prolapse check: Grade 1 cystocele  Comments: na    Limitation/Restriction for FOTO PFDI Survey    Therapist reviewed FOTO scores for Laila Collazo on 3/17/2022.   FOTO documents entered into [employer] - see Media section.    Limitation Score: 13%       TREATMENT     Treatment Time In: 1032  Treatment Time Out: 1100  Total Treatment time (time-based codes) separate from Evaluation: 28 minutes      Neuromuscular Re-education to develop Coordination, Control, Down training and Proprioception for 18 minutes including:   diaphragmatic breathing, diaphragmatic breathing with Kegel and pelvic floor mm contractions focus on activation at 3 layers sequentially in isolation and then sequentially  Clamshells  TrA isolation    Therapeutic Activity Patient participated in dynamic functional therapeutic activities to improve functional performance for 10 minutes. Including: Education as described below.  Posture modification for standing to supine and rolling to decrease pain by keeping pelvis symmetrical.  General Exercise     Patient Education provided:   general anatomy/physiology of urinary/ bowel  system and benefits of treatment were discussed with the pt. Additionally, diaphragmatic breathing, kegels, behavior modifications and general exercise, posture with turning in bed and stand to supine. were reviewed.     Home Exercises provided:  Written Home Exercises provided: Yes  Exercises were reviewed and Laila was able to demonstrate them prior to the end of the session.    Laila demonstrated good  understanding of the education provided.     See EMR under Patient Instructions for exercises provided 3/17/2022.    Assessment     Laila is a 31 y.o. female referred to outpatient Physical Therapy with a medical diagnosis of pelvic pain in pregnancy. Pt presents with altered posture, poor knowledge of body mechanics and posture,  poor trunk stability, pelvic floor tenderness, increased tension of the pelvic muscles and unable to co-contract or co-relax abdominal wall and pelvic floor muscles. Pelvic exam reveals increased tension of pelvic floor mm with TTP throughout. Initiated downtraining and coordination training to help decrease pain originating from mm tension as well as to improve mm function. Strengthening of core and hips also initiated for improved lumbopelvic support.  Advised pt on body mechanics for bed mobility to minimize pain and made recommendations for general exercise. She will benefit form skilled therapy, initially weekly until pain under control, and then spaced out at larger intervals through remainder of pregnancy to manage pain, strengthen, and prepare for labor and delivery as well as postpartum recovery.     Pt prognosis is Excellent  Pt will benefit from skilled outpatient Physical Therapy to address the deficits stated above and in the chart below, provide pt/family education, and to maximize pt's level of independence.     Plan of care discussed with patient: Yes  Pt's spiritual, cultural and educational needs considered and patient is agreeable to the plan of care and goals as stated below:     Anticipated Barriers for therapy: None    Medical Necessity is demonstrated by the following:    History  Co-morbidities and personal factors that may impact the plan of care Co-morbidities   pregnant, hx of PFD    Personal Factors  no deficits     low   Examination  Body structures and functions, activity limitations and participation restrictions that may impact the plan of care Body Regions/Systems/Functions:  altered posture, poor knowledge of body mechanics and posture, poor trunk stability, pelvic floor tenderness, increased tension of the pelvic muscles and unable to co-contract or co-relax abdominal wall and pelvic floor muscles    Activity Limitations:  intercourse/vaginal exam/tampon use without pain,  incontinence with ADLs and Pain with ADLs    Participation Restrictions:  all ADLs/iADLs uninterrupted by urinary incontinence/urgency/frequency, relationship with spouse/partner and ADL participation affected by pain    Activity limitations:   Learning and applying knowledge  No deficits    General Tasks and Commands  No deficits    Communication  No deficits    Mobility  No deficits    Self care  No deficits    Domestic Life  No deficits    Interactions/Relationships  No deficits    Life Areas  No deficits    Community and Social Life  No deficits       high   Clinical Presentation evolving clinical presentation with changing clinical characteristics moderate   Decision Making/ Complexity Score: low       Goals:  Short Term Goals: 4 weeks   Pt indep in HEP  Pt indep in functional brace technique for decreased strain of pelvic structures or UI with activities which increase IAP  Pt demo pelvic floor mm strength of 3/5 for improved continence and support of pelvic organs    Long Term Goals: 8 weeks   Pt indep in progressive HEP  Pt reports 0 episodes of leakage in at least 2 weeks for improved ADL participation  Pt reports able to have painfree intercourse for improved participation in ADLs and improved intimacy with partner  Pt demo pelvic floor mm strength of 4/5 for improved continence and support of pelvic organs    Plan     Plan of care Certification: 3/17/2022 to 6/15/22.    Outpatient Physical Therapy 1 times weekly for 12 weeks to include the following interventions: therapeutic exercises, therapeutic activity, neuromuscular re-education, manual therapy, patient/family education and self care/home management    Tory Arnett, PT

## 2022-03-30 ENCOUNTER — ROUTINE PRENATAL (OUTPATIENT)
Dept: OBSTETRICS AND GYNECOLOGY | Facility: CLINIC | Age: 32
End: 2022-03-30
Payer: COMMERCIAL

## 2022-03-30 VITALS
WEIGHT: 176.38 LBS | DIASTOLIC BLOOD PRESSURE: 60 MMHG | SYSTOLIC BLOOD PRESSURE: 110 MMHG | BODY MASS INDEX: 32.26 KG/M2

## 2022-03-30 DIAGNOSIS — E78.5 DYSLIPIDEMIA: ICD-10-CM

## 2022-03-30 DIAGNOSIS — G93.2 IIH (IDIOPATHIC INTRACRANIAL HYPERTENSION): ICD-10-CM

## 2022-03-30 DIAGNOSIS — O09.819 PREGNANCY RESULTING FROM IN-VITRO FERTILIZATION: Primary | ICD-10-CM

## 2022-03-30 DIAGNOSIS — Z34.03 ENCOUNTER FOR SUPERVISION OF NORMAL FIRST PREGNANCY IN THIRD TRIMESTER: ICD-10-CM

## 2022-03-30 DIAGNOSIS — K21.9 GASTROESOPHAGEAL REFLUX DISEASE, UNSPECIFIED WHETHER ESOPHAGITIS PRESENT: ICD-10-CM

## 2022-03-30 PROCEDURE — 99999 PR PBB SHADOW E&M-EST. PATIENT-LVL III: ICD-10-PCS | Mod: PBBFAC,,, | Performed by: OBSTETRICS & GYNECOLOGY

## 2022-03-30 PROCEDURE — 99999 PR PBB SHADOW E&M-EST. PATIENT-LVL III: CPT | Mod: PBBFAC,,, | Performed by: OBSTETRICS & GYNECOLOGY

## 2022-03-30 PROCEDURE — 0502F SUBSEQUENT PRENATAL CARE: CPT | Mod: S$GLB,,, | Performed by: OBSTETRICS & GYNECOLOGY

## 2022-03-30 PROCEDURE — 0502F PR SUBSEQUENT PRENATAL CARE: ICD-10-PCS | Mod: S$GLB,,, | Performed by: OBSTETRICS & GYNECOLOGY

## 2022-03-30 NOTE — PATIENT INSTRUCTIONS
Childbirth and Parenting   Class Schedule  [facility] [address], [place]    Prenatal Classes  In these classes, you will learn about general prenatal   health, choices of pain relief methods in labor, the   process of labor and giving birth, delivery options and   interventions, skin-to-skin contact and post-delivery care   for mother and baby, including rooming-in. Family and   friends are welcome to attend. We recommend all four   prenatal classes to our first time parents.  Cost: Free  Date: , 6:30pm - 8:30pm  Virtual class via Zoom  Registration is required. [place] [email or web address]   Email [email or web address] for more information.    Class 1 - Labor and Delivery  Pregnancy, when to go to the hospital and vaginal birth  Month Day  January 4  February 1  March 8  April 12  May 10  Claudia 7  July 12  August 9  September 6  October 4  November 1, 29    Class 2 - Medical Interventions   Inductions, pain relief options, fetal monitoring, C-sections   and more  Month Day  January 11  February 8  March 22  April 19  May 17  Claudia 14  July 19  August 16  September 13  October 11  November 8  December 6    Class 3 - Postpartum & Baby Safety  Care of new moms after delivery and  safety   information  Month Day  January 18  February 15  March 29  April 26  May 24  Claudia 21  July 26  August 23  September 20  October 18  November 15  December 13    Class 4 - [place] Care  Care of your baby in the hospital and when you get home  Month Day  January 25  February 22  April 5  May 3, 31  July 5  August 2, 30  September 27  October 25  November 22  December 20    All classes will be held virtually via Zoom until further notice. To register for all classes, visit [email or web address].     You will receive instructions for accessing the virtual class via email prior to the scheduled class time.     The ABCs of Low Intervention Childbirth  Our goal is to provide you with  valuable information   and resources to prepare you for one of lifes most   miraculous events - the birth of your child.  Topics will   include:  the mind-body connection, labor support   techniques and resources for a low intervention birth.   Cost: Free  Date: , 6:30pm - 8pm  Virtual class via Zoom  Month Day  January 19  March 16  May 18  July 20  September 21  November 16      Mommy Again / Prenatal Express Class  For moms who have had a baby before, this class reviews   puga points concerning labor, birth, and the  baby.   It also explains how things may have changed since your   last birth experience and how [facility] is such a unique   place to deliver. This class can also be an option for first   time parents who cannot attend our recommended 4   prenatal class series.  Cost: Free   Date: , 6:30pm - 8:30pm  Virtual class via Zoom  Month Day  January 20  March 24  May 19  July 21  September 15  November 17    If you are deaf or hard of hearing and would like the use of a qualified    or other auxiliary aids and services, please call the [employer] line at [phone]. When   requesting a qualified , please give us three days advance notice if possible.   These services are provided free of charge.    To register for all classes, visit  [email or web address]

## 2022-04-08 ENCOUNTER — PATIENT MESSAGE (OUTPATIENT)
Dept: MATERNAL FETAL MEDICINE | Facility: CLINIC | Age: 32
End: 2022-04-08
Payer: COMMERCIAL

## 2022-04-11 ENCOUNTER — PROCEDURE VISIT (OUTPATIENT)
Dept: MATERNAL FETAL MEDICINE | Facility: CLINIC | Age: 32
End: 2022-04-11
Payer: COMMERCIAL

## 2022-04-11 DIAGNOSIS — O09.819 PREGNANCY RESULTING FROM IN-VITRO FERTILIZATION: ICD-10-CM

## 2022-04-11 DIAGNOSIS — G93.2 IIH (IDIOPATHIC INTRACRANIAL HYPERTENSION): ICD-10-CM

## 2022-04-11 DIAGNOSIS — Z36.89 ENCOUNTER FOR ULTRASOUND TO CHECK FETAL GROWTH: Primary | ICD-10-CM

## 2022-04-11 DIAGNOSIS — Z36.89 ENCOUNTER FOR ULTRASOUND TO CHECK FETAL GROWTH: ICD-10-CM

## 2022-04-11 DIAGNOSIS — Z36.2 ENCOUNTER FOR FOLLOW-UP ULTRASOUND OF FETAL ANATOMY: ICD-10-CM

## 2022-04-11 PROCEDURE — 76816 US MFM PROCEDURE (VIEWPOINT): ICD-10-PCS | Mod: S$GLB,,, | Performed by: OBSTETRICS & GYNECOLOGY

## 2022-04-11 PROCEDURE — 76816 OB US FOLLOW-UP PER FETUS: CPT | Mod: S$GLB,,, | Performed by: OBSTETRICS & GYNECOLOGY

## 2022-04-13 ENCOUNTER — LAB VISIT (OUTPATIENT)
Dept: LAB | Facility: HOSPITAL | Age: 32
End: 2022-04-13
Payer: COMMERCIAL

## 2022-04-13 ENCOUNTER — OFFICE VISIT (OUTPATIENT)
Dept: INTERNAL MEDICINE | Facility: CLINIC | Age: 32
End: 2022-04-13
Payer: COMMERCIAL

## 2022-04-13 VITALS
OXYGEN SATURATION: 100 % | DIASTOLIC BLOOD PRESSURE: 74 MMHG | HEIGHT: 62 IN | BODY MASS INDEX: 32.17 KG/M2 | SYSTOLIC BLOOD PRESSURE: 128 MMHG | HEART RATE: 91 BPM | WEIGHT: 174.81 LBS

## 2022-04-13 DIAGNOSIS — R25.2 LEG CRAMPING: ICD-10-CM

## 2022-04-13 DIAGNOSIS — R25.2 LEG CRAMPING: Primary | ICD-10-CM

## 2022-04-13 LAB
ANION GAP SERPL CALC-SCNC: 10 MMOL/L (ref 8–16)
BASOPHILS # BLD AUTO: 0.03 K/UL (ref 0–0.2)
BASOPHILS NFR BLD: 0.3 % (ref 0–1.9)
BUN SERPL-MCNC: 6 MG/DL (ref 6–20)
CALCIUM SERPL-MCNC: 8.7 MG/DL (ref 8.7–10.5)
CHLORIDE SERPL-SCNC: 106 MMOL/L (ref 95–110)
CO2 SERPL-SCNC: 22 MMOL/L (ref 23–29)
CREAT SERPL-MCNC: 0.6 MG/DL (ref 0.5–1.4)
DIFFERENTIAL METHOD: ABNORMAL
EOSINOPHIL # BLD AUTO: 0 K/UL (ref 0–0.5)
EOSINOPHIL NFR BLD: 0.4 % (ref 0–8)
ERYTHROCYTE [DISTWIDTH] IN BLOOD BY AUTOMATED COUNT: 14.4 % (ref 11.5–14.5)
EST. GFR  (AFRICAN AMERICAN): >60 ML/MIN/1.73 M^2
EST. GFR  (NON AFRICAN AMERICAN): >60 ML/MIN/1.73 M^2
FERRITIN SERPL-MCNC: 19 NG/ML (ref 20–300)
GLUCOSE SERPL-MCNC: 75 MG/DL (ref 70–110)
HCT VFR BLD AUTO: 38.9 % (ref 37–48.5)
HGB BLD-MCNC: 12.3 G/DL (ref 12–16)
IMM GRANULOCYTES # BLD AUTO: 0.04 K/UL (ref 0–0.04)
IMM GRANULOCYTES NFR BLD AUTO: 0.4 % (ref 0–0.5)
IRON SERPL-MCNC: 77 UG/DL (ref 30–160)
LYMPHOCYTES # BLD AUTO: 1.9 K/UL (ref 1–4.8)
LYMPHOCYTES NFR BLD: 17.2 % (ref 18–48)
MCH RBC QN AUTO: 30.1 PG (ref 27–31)
MCHC RBC AUTO-ENTMCNC: 31.6 G/DL (ref 32–36)
MCV RBC AUTO: 95 FL (ref 82–98)
MONOCYTES # BLD AUTO: 0.5 K/UL (ref 0.3–1)
MONOCYTES NFR BLD: 4.4 % (ref 4–15)
NEUTROPHILS # BLD AUTO: 8.4 K/UL (ref 1.8–7.7)
NEUTROPHILS NFR BLD: 77.3 % (ref 38–73)
NRBC BLD-RTO: 0 /100 WBC
PLATELET # BLD AUTO: 268 K/UL (ref 150–450)
PMV BLD AUTO: 10.5 FL (ref 9.2–12.9)
POTASSIUM SERPL-SCNC: 3.8 MMOL/L (ref 3.5–5.1)
RBC # BLD AUTO: 4.08 M/UL (ref 4–5.4)
SATURATED IRON: 14 % (ref 20–50)
SODIUM SERPL-SCNC: 138 MMOL/L (ref 136–145)
TOTAL IRON BINDING CAPACITY: 536 UG/DL (ref 250–450)
TRANSFERRIN SERPL-MCNC: 362 MG/DL (ref 200–375)
WBC # BLD AUTO: 10.83 K/UL (ref 3.9–12.7)

## 2022-04-13 PROCEDURE — 80048 BASIC METABOLIC PNL TOTAL CA: CPT | Performed by: NURSE PRACTITIONER

## 2022-04-13 PROCEDURE — 84466 ASSAY OF TRANSFERRIN: CPT | Performed by: NURSE PRACTITIONER

## 2022-04-13 PROCEDURE — 85025 COMPLETE CBC W/AUTO DIFF WBC: CPT | Performed by: NURSE PRACTITIONER

## 2022-04-13 PROCEDURE — 99999 PR PBB SHADOW E&M-EST. PATIENT-LVL III: CPT | Mod: PBBFAC,,, | Performed by: NURSE PRACTITIONER

## 2022-04-13 PROCEDURE — 99214 OFFICE O/P EST MOD 30 MIN: CPT | Mod: S$GLB,,, | Performed by: NURSE PRACTITIONER

## 2022-04-13 PROCEDURE — 99999 PR PBB SHADOW E&M-EST. PATIENT-LVL III: ICD-10-PCS | Mod: PBBFAC,,, | Performed by: NURSE PRACTITIONER

## 2022-04-13 PROCEDURE — 99214 PR OFFICE/OUTPT VISIT, EST, LEVL IV, 30-39 MIN: ICD-10-PCS | Mod: S$GLB,,, | Performed by: NURSE PRACTITIONER

## 2022-04-13 PROCEDURE — 82728 ASSAY OF FERRITIN: CPT | Performed by: NURSE PRACTITIONER

## 2022-04-13 PROCEDURE — 36415 COLL VENOUS BLD VENIPUNCTURE: CPT | Performed by: NURSE PRACTITIONER

## 2022-04-13 NOTE — PROGRESS NOTES
"INTERNAL MEDICINE CLINIC - SAME DAY APPOINTMENT  Progress Note    PRESENTING HISTORY     PCP: Agatha Lombardi, Patient Care Assistant    Chief Complaint/Reason for Visit:   No chief complaint on file.    History of Present Illness & ROS : Ms. Laila Collazo is a 31 y.o. female.    Same day apt.   Very sweet young lady. Known to me.   She is 23 weeks gestation via IVF. Has been having bilateral leg pains / (leg cramping on Monday), numbness and bruising primarily to left leg. Has been 'dealing with sciatica, currently in pelvic floor therapy and sleeping a recliner".   She is being followed very closely by her MFM / OB team at [facility].   No recent travels.   Not wearing her juliette / compression socks.   Review of Systems:  Eyes: denies visual changes at this time denies floaters   ENT: no nasal congestion or sore throat  Respiratory: no cough or shorness of breath  Cardiovascular: no chest pain or palpitations  Gastrointestinal: no nausea or vomiting, no abdominal pain or change in bowel habits  Genitourinary: no hematuria or dysuria; denies frequency  Hematologic/Lymphatic: no easy bruising or lymphadenopathy  Musculoskeletal: no arthralgias or myalgias  Neurological: no seizures or tremors  Endocrine: no heat or cold intolerance      PAST HISTORY:     Past Medical History:   Diagnosis Date    Bilateral knee pain 6/19/2018    Endometriosis     treated by GYN at     Epigastric abdominal pain 12/5/2016    Gastroesophageal reflux disease 7/6/2016    Helicobacter pylori ab+     Helicobacter pylori ab+     High-tone pelvic floor dysfunction 5/2/2016    IIH (idiopathic intracranial hypertension)     Dr. Rogers - [facility]    Palpitations 7/6/2016       Past Surgical History:   Procedure Laterality Date    ARTHROSCOPY OF KNEE Left 4/6/2021    Procedure: ARTHROSCOPY, KNEE;  Surgeon: Deidra Mejias MD;  Location: [facility];  Service: Orthopedics;  Laterality: Left;    COLONOSCOPY N/A 12/13/2018    Procedure: " COLONOSCOPY;  Surgeon: Micki Gross MD;  Location: [facility] ENDO (4TH FLR);  Service: Endoscopy;  Laterality: N/A;  preferrably in Dec 2018, CRS ok if needed.    ESOPHAGOGASTRODUODENOSCOPY N/A 10/30/2018    Procedure: EGD (ESOPHAGOGASTRODUODENOSCOPY);  Surgeon: Greg Leach MD;  Location: [facility] ENDO (2ND FLR);  Service: Endoscopy;  Laterality: N/A;  ok to schedule per Kimmy    KNEE ARTHROSCOPY W/ DEBRIDEMENT  4/6/2021    Procedure: ARTHROSCOPY, KNEE, WITH DEBRIDEMENT;  Surgeon: Deidra Mejias MD;  Location: [facility] OR;  Service: Orthopedics;;    KNEE ARTHROSCOPY W/ MENISCECTOMY Right 4/6/2021    Procedure: ARTHROSCOPY, KNEE, WITH MENISCECTOMY;  Surgeon: Deidra Mejias MD;  Location: [facility] OR;  Service: Orthopedics;  Laterality: Right;    KNEE ARTHROSCOPY W/ PLICA EXCISION Left 4/6/2021    Procedure: EXCISION, PLICA, KNEE, ARTHROSCOPIC;  Surgeon: Deidra Mejias MD;  Location: [facility] OR;  Service: Orthopedics;  Laterality: Left;    LAPAROSCOPY  2017    dx with endo.  no removal    NO PAST SURGERIES      SYNOVECTOMY OF KNEE Left 4/6/2021    Procedure: SYNOVECTOMY, KNEE;  Surgeon: Deidra Mejias MD;  Location: [facility] OR;  Service: Orthopedics;  Laterality: Left;    UPPER GASTROINTESTINAL ENDOSCOPY         Family History   Problem Relation Age of Onset    Diabetes Mother     No Known Problems Father     No Known Problems Sister     Hypertension Brother     Hypertension Maternal Grandmother     Hypertension Maternal Grandfather     No Known Problems Paternal Grandmother     No Known Problems Paternal Grandfather     Colon cancer Neg Hx     Crohn's disease Neg Hx     Esophageal cancer Neg Hx     Inflammatory bowel disease Neg Hx     Irritable bowel syndrome Neg Hx     Rectal cancer Neg Hx     Stomach cancer Neg Hx     Ulcerative colitis Neg Hx        Social History     Socioeconomic History    Marital status: Single   Occupational History     Employer: [employer]     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      folic acid (FOLVITE) 1 MG tablet Take 1 mg by mouth once daily.      prenatal vit 10-iron-folic-dha (VITAFOL-OB+DHA) 65-1-250 mg Cmpk Take 1 tablet by mouth.      vitamin D (VITAMIN D3) 1000 units Tab Take 1,000 Units by mouth once daily.       No current facility-administered medications on file prior to visit.        Review of patient's allergies indicates:   Allergen Reactions    Doxycycline Other (See Comments)     Triggers headaches    Bactrim ds [sulfamethoxazole-trimethoprim] Nausea And Vomiting    Nsaids (non-steroidal anti-inflammatory drug) Other (See Comments)     D/t h/o PUD and H pylori    Codeine Rash       Medications Reconciliation:   I have reconciled the patient's home medications with the patient/family. I have updated all changes.  Refer to After-Visit Medication List.    OBJECTIVE:     Vital Signs:  There were no vitals filed for this visit.  Wt Readings from Last 3 Encounters:   03/30/22 1333 80 kg (176 lb 5.9 oz)   03/14/22 0923 79.6 kg (175 lb 7.8 oz)   03/02/22 1521 73.9 kg (162 lb 14.7 oz)     There is no height or weight on file to calculate BMI.   Wt Readings from Last 3 Encounters:   04/13/22 79.3 kg (174 lb 13.2 oz)   03/30/22 80 kg (176 lb 5.9 oz)   03/14/22 79.6 kg (175 lb 7.8 oz)     Temp Readings from Last 3 Encounters:   02/22/22 98.3 °F (36.8 °C) (Oral)   10/22/21 98.1 °F (36.7 °C) (Oral)   09/24/21 98.3 °F (36.8 °C) (Oral)     BP Readings from Last 3 Encounters:   04/13/22 128/74   03/30/22 110/60   03/14/22 119/68     Pulse Readings from Last 3  Encounters:   04/13/22 91   02/22/22 87   02/02/22 102       Physical Exam:  (Focused Exam)  General: Well developed, well nourished. No distress.  HEENT: Head is normocephalic, atraumatic; ears are normal.   Eyes: Clear conjunctiva.  Neck: Supple, symmetrical neck; trachea midline.  Extremities: No pitting LE edema to either LE  Skin: Skin color, texture, turgor normal. No rashes.  Musculoskeletal: Normal gait.   Neurologic:  No focal numbness or weakness.   Psychiatric: Not depressed.        Laboratory  Lab Results   Component Value Date    WBC 11.35 01/06/2022    HGB 13.4 01/06/2022    HCT 40.7 01/06/2022     01/06/2022    CHOL 204 (H) 10/29/2021    TRIG 46 10/29/2021    HDL 41 10/29/2021    ALT 14 10/29/2021    AST 17 10/29/2021     01/06/2022    K 3.7 01/06/2022     01/06/2022    CREATININE 0.6 01/06/2022    BUN 6 01/06/2022    CO2 23 01/06/2022    TSH 1.554 10/29/2021    INR 1.0 09/22/2021    HGBA1C 4.9 10/29/2021         ASSESSMENT & PLAN:     Same day apt    Leg cramping / Sciatica which suspect is in the setting of pregnancy.   Laila is a very sweet young lady who is 23 weeks gestation via IVF. Based on clinical findings or lack, would not recommend being commenced to any unnecessary radiation (I.e. US, etc).  Notations made to labs in 01/2022.   Will repeat her BMP and CBC today (serum k at 3.7 from her baseline levels of 4s) but understands that coordination will need to occur with her OB team of experts. She is in agreement.   -     Basic Metabolic Panel; Future; Expected date: 04/13/2022  -     CBC Auto Differential; Future; Expected date: 04/13/2022  -     Iron and TIBC; Future; Expected date: 04/13/2022  -     Ferritin; Future; Expected date: 04/13/2022    *Recommend continued, close follow up with her OB team.     Future Appointments   Date Time Provider Department Center   4/20/2022  9:00 AM TERESA Blackman OB THER [facility]   4/27/2022  8:00 AM TERESA Blackman OB  THER [facility]   4/28/2022  3:00 PM Amanda Benjamin, NP [facility] OBGYN50 [facility]   4/29/2022  1:00 PM Summer Bardales MD [facility] OPHTHAL [facility]   5/4/2022  9:00 AM Johny Wilson MD [facility] NEURO [facility]   5/6/2022  9:00 AM Quique Tanner MD [facility] SPINE [facility]   5/9/2022  3:00 PM Tory Fernandoew, PT [facility] OPRBS[facility]   5/16/2022  3:00 PM Tory Chalew, PT [facility]   5/23/2022  3:00 PM Tory Chalew, PT [facility]   5/31/2022  3:00 PM Amanda Benjamin, NP [facility] OBGYN50 [facility]   6/7/2022 10:45 AM Meghan Bunn MD [facility] OBGYN [facility]   6/13/2022  1:00 PM ROOM 3, [facility]   6/21/2022  4:00 PM Amanda Benjamin, NP [facility] OBGYN50 [facility]   7/5/2022  4:00 PM Meghan Bunn MD [facility] OBGYN [facility]   7/19/2022  4:00 PM Meghan Bunn MD [facility] OBGYN [facility]   7/26/2022  3:30 PM Meghan Bunn MD [facility] OBGYN [facility]   8/2/2022  3:30 PM Meghan Bunn MD [facility] OBGYN [facility]        Medication List          Accurate as of April 13, 2022 12:02 PM. If you have any questions, ask your nurse or doctor.            CONTINUE taking these medications    aspirin 81 MG EC tablet  Commonly known as: ECOTRIN     folic acid 1 MG tablet  Commonly known as: FOLVITE     prenatal vit 10-iron-folic-dha 65-1-250 mg Cmpk  Commonly known as: VITAFOL-OB+DHA     vitamin D 1000 units Tab  Commonly known as: VITAMIN D3          Signing Physician:  MORGAN Cage

## 2022-04-14 ENCOUNTER — PATIENT MESSAGE (OUTPATIENT)
Dept: OBSTETRICS AND GYNECOLOGY | Facility: CLINIC | Age: 32
End: 2022-04-14
Payer: COMMERCIAL

## 2022-04-18 ENCOUNTER — PATIENT MESSAGE (OUTPATIENT)
Dept: OBSTETRICS AND GYNECOLOGY | Facility: CLINIC | Age: 32
End: 2022-04-18
Payer: COMMERCIAL

## 2022-04-18 ENCOUNTER — OFFICE VISIT (OUTPATIENT)
Dept: INTERNAL MEDICINE | Facility: CLINIC | Age: 32
End: 2022-04-18
Payer: COMMERCIAL

## 2022-04-18 VITALS
BODY MASS INDEX: 32.54 KG/M2 | WEIGHT: 176.81 LBS | TEMPERATURE: 99 F | HEIGHT: 62 IN | HEART RATE: 101 BPM | OXYGEN SATURATION: 99 % | DIASTOLIC BLOOD PRESSURE: 62 MMHG | SYSTOLIC BLOOD PRESSURE: 100 MMHG

## 2022-04-18 DIAGNOSIS — J02.9 SORE THROAT: Primary | ICD-10-CM

## 2022-04-18 DIAGNOSIS — U07.1 COVID-19: ICD-10-CM

## 2022-04-18 LAB
CTP QC/QA: YES
CTP QC/QA: YES
S PYO RRNA THROAT QL PROBE: NEGATIVE
SARS-COV-2 RDRP RESP QL NAA+PROBE: POSITIVE

## 2022-04-18 PROCEDURE — U0002: ICD-10-PCS | Mod: QW,S$GLB,, | Performed by: INTERNAL MEDICINE

## 2022-04-18 PROCEDURE — 99213 PR OFFICE/OUTPT VISIT, EST, LEVL III, 20-29 MIN: ICD-10-PCS | Mod: S$GLB,,, | Performed by: INTERNAL MEDICINE

## 2022-04-18 PROCEDURE — U0002 COVID-19 LAB TEST NON-CDC: HCPCS | Mod: QW,S$GLB,, | Performed by: INTERNAL MEDICINE

## 2022-04-18 PROCEDURE — 87880 STREP A ASSAY W/OPTIC: CPT | Mod: QW,S$GLB,, | Performed by: INTERNAL MEDICINE

## 2022-04-18 PROCEDURE — 99999 PR PBB SHADOW E&M-EST. PATIENT-LVL IV: ICD-10-PCS | Mod: PBBFAC,,, | Performed by: INTERNAL MEDICINE

## 2022-04-18 PROCEDURE — 99999 PR PBB SHADOW E&M-EST. PATIENT-LVL IV: CPT | Mod: PBBFAC,,, | Performed by: INTERNAL MEDICINE

## 2022-04-18 PROCEDURE — 87880 POCT RAPID STREP A: ICD-10-PCS | Mod: QW,S$GLB,, | Performed by: INTERNAL MEDICINE

## 2022-04-18 PROCEDURE — 99213 OFFICE O/P EST LOW 20 MIN: CPT | Mod: S$GLB,,, | Performed by: INTERNAL MEDICINE

## 2022-04-18 NOTE — PROGRESS NOTES
INTERNAL MEDICINE SAME DAY PRIMARY CARE VISIT NOTE    Subjective:     Chief Complaint: Sore Throat and Otalgia       Patient ID: Laila Pappas is a 31 y.o. female with HLD, hx endometriosis, IIH, hx thyroid nodules, currently 24 weeks pregnant and under the care of Dr. Bunn, here today for focused same-day primary care visit.    Today, patient with complaint of sore throat since Friday.  No fever but has been feeling warm.  Also c h/a.  Minimal cough, dry.  No sick contacts.  Has had strep in the past and states it feels similar.  Has had COVID vaccinations x2 but has not yet had booster.    Past Medical History:  Past Medical History:   Diagnosis Date    Bilateral knee pain 6/19/2018    Endometriosis     treated by GYN at     Epigastric abdominal pain 12/5/2016    Gastroesophageal reflux disease 7/6/2016    Helicobacter pylori ab+     Helicobacter pylori ab+     High-tone pelvic floor dysfunction 5/2/2016    IIH (idiopathic intracranial hypertension)     Dr. Rogers - [facility]    Palpitations 7/6/2016       Home Medications:  Prior to Admission medications    Medication Sig Start Date End Date Taking? Authorizing Provider   aspirin (ECOTRIN) 81 MG EC tablet Take 81 mg by mouth once daily.   Yes Historical Provider   folic acid (FOLVITE) 1 MG tablet Take 1 mg by mouth once daily.   Yes Historical Provider   prenatal vit 10-iron-folic-dha (VITAFOL-OB+DHA) 65-1-250 mg Cmpk Take 1 tablet by mouth.   Yes Historical Provider   vitamin D (VITAMIN D3) 1000 units Tab Take 1,000 Units by mouth once daily.   Yes Historical Provider       Allergies:  Review of patient's allergies indicates:   Allergen Reactions    Doxycycline Other (See Comments)     Triggers headaches    Bactrim ds [sulfamethoxazole-trimethoprim] Nausea And Vomiting    Nsaids (non-steroidal anti-inflammatory drug) Other (See Comments)     D/t h/o PUD and H pylori    Codeine Rash       Social History:  Social History     Tobacco Use     "Smoking status: Never Smoker    Smokeless tobacco: Never Used   Substance Use Topics    Alcohol use: No    Drug use: No         Review of Systems   Constitutional: Positive for fatigue. Negative for chills and fever.   HENT: Positive for ear pain and sore throat. Negative for congestion, ear discharge, postnasal drip, rhinorrhea, sinus pressure, sinus pain, sneezing, trouble swallowing and voice change.    Respiratory: Negative for cough, chest tightness and shortness of breath.    Cardiovascular: Negative for chest pain.   Gastrointestinal: Negative for abdominal pain and blood in stool.   Genitourinary: Negative for dysuria and frequency.           Objective:   /62 (BP Location: Left arm, Patient Position: Sitting, BP Method: Medium (Manual))   Pulse 101   Temp 99 °F (37.2 °C)   Ht 5' 2" (1.575 m)   Wt 80.2 kg (176 lb 12.9 oz)   LMP 10/31/2021   SpO2 99%   BMI 32.34 kg/m²        General: AAO x3, no apparent distress  HEENT: PERRL, slight peritonsillar erythema, no exudates appreciated.  TM clear and gray, no exudates or TM bulging.  No cervical LAD appreciated.  Submandibular glands full.    Labs:         Assessment/Plan     Laila was seen today for sore throat and otalgia.    Diagnoses and all orders for this visit:    Sore throat  COVID 19  Tested positive on POCT today.  Discussed quarantine period and precautions.  Discussed signs and sx to watch for.  Will CC her Ob/Gyn.  Rec COVID booster when able.  rec hydration, rest and Tylenol prn  -     POCT COVID-19 Rapid Screening  -     POCT Rapid Strep A      RTC prn and with PCP as per routine.    Lula Ndiaye MD  Department of Internal Medicine - [facility]  04/18/2022    "

## 2022-04-18 NOTE — Clinical Note
[address] Ms. Pappas tested positive for COVID in clinic today. I have discussed conservative treatment recommendations and precautions with her. Lula Gavin

## 2022-04-18 NOTE — PATIENT INSTRUCTIONS
Your test was POSITIVE for COVID-19 (coronavirus).       Please isolate yourself at home.  You may leave home and/or return to work once the following conditions are met:    If you were not hospitalized and are not moderately to severely immunocompromised:   More than 5 days since symptoms first appeared AND  More than 24 hours fever free without medications AND  Symptoms are improving  Continue to wear a mask around others for 5 additional days.    If you were hospitalized OR are moderately to severely immunocompromised:  More than 20 days since symptoms first appeared  More than 24 hours fever free without medications  Symptoms have improved    If you had no symptoms but tested positive:  More than 5 days since the date of the first positive test (20 days if moderately to severely immunocompromised). If you develop symptoms, then use the guidelines above.  Continue to wear a mask around others for 5 additional days.      Contact Tracing    As one of the next steps, you will receive a call or text from the [place] Department of Health ([facility]) COVID-19 Tracing Team. See the contact information below so you know not to ignore the health departments call. It is important that you contact them back immediately so they can help.      Contact Tracer Number:  [phone]  Caller ID for most carriers: [facility]     What is contact tracing?  Contact tracing is a process that helps identify everyone who has been in close contact with an infected person. Contact tracers let those people know they may have been exposed and guide them on next steps. Confidentiality is important for everyone; no one will be told who may have exposed them to the virus.  Your involvement is important. The more we know about where and how this virus is spreading, the better chance we have at stopping it from spreading further.  What does exposure mean?  Exposure means you have been within 6 feet for more than 15 minutes with a person who  has or had COVID-19.  What kind of questions do the contact tracers ask?  A contact tracer will confirm your basic contact information including name, address, phone number, and next of kin, as well as asking about any symptoms you may have had. Theyll also ask you how you think you may have gotten sick, such as places where you may have been exposed to the virus, and people you were with. Those names will never be shared with anyone outside of that call, and will only be used to help trace and stop the spread of the virus.   I have privacy concerns. How will the state use my information?  Your privacy about your health is important. All calls are completed using call centers that use the appropriate health privacy protection measures (HIPAA compliance), meaning that your patient information is safe. No one will ever ask you any questions related to immigration status. Your health comes first.   Do I have to participate?  You do not have to participate, but we strongly encourage you to. Contact tracing can help us catch and control new outbreaks as theyre developing to keep your friends and family safe.   What if I dont hear from anyone?  If you dont receive a call within 24 hours, you can call the number above right away to inquire about your status. That line is open from 8:00 am - 8:00 p.m., 7 days a week.  Contact tracing saves lives! Together, we have the power to beat this virus and keep our loved ones and neighbors safe.    For more information see CDC link below.      https://www.cdc.gov/coronavirus/2019-ncov/hcp/guidance-prevent-spread.html#precautions        Sources:  [facility], [place] [facility] and [facility]

## 2022-04-21 ENCOUNTER — PATIENT MESSAGE (OUTPATIENT)
Dept: INTERNAL MEDICINE | Facility: CLINIC | Age: 32
End: 2022-04-21
Payer: COMMERCIAL

## 2022-04-22 ENCOUNTER — TELEPHONE (OUTPATIENT)
Dept: INTERNAL MEDICINE | Facility: CLINIC | Age: 32
End: 2022-04-22
Payer: COMMERCIAL

## 2022-04-22 ENCOUNTER — PATIENT MESSAGE (OUTPATIENT)
Dept: INTERNAL MEDICINE | Facility: CLINIC | Age: 32
End: 2022-04-22
Payer: COMMERCIAL

## 2022-04-22 RX ORDER — CLOTRIMAZOLE AND BETAMETHASONE DIPROPIONATE 10; .64 MG/G; MG/G
CREAM TOPICAL DAILY
Qty: 15 G | Refills: 0 | Status: SHIPPED | OUTPATIENT
Start: 2022-04-22 | End: 2022-10-14

## 2022-04-25 NOTE — PROGRESS NOTES
"  Pelvic Health Physical Therapy   Treatment Note     Name: Laila Urban [facility]  Clinic Number: [id number]    Therapy Diagnosis: No diagnosis found.  Physician: Amanda Benjamin NP    Visit Date: 4/27/2022    Physician Orders: PT Eval and Treat  Medical Diagnosis from Referral:   Z3A.17 (ICD-10-CM) - 17 weeks gestation of pregnancy   O26.899,R10.2 (ICD-10-CM) - Pelvic pain in pregnancy   Evaluation Date: 3/17/2022  Authorization Period Expiration: 1 visit  Plan of Care Expiration: 6/15/22  Visit # / Visits authorized: 1/ 1     Time In: 1014  Time Out: 1100  Total Appointment Time (timed & untimed codes): 46 minutes     Precautions: universal    Subjective     Pronouns: She/her    Pt reports: ***.  She {Actions; was/was not:77119} compliant with home exercise program.  Response to previous treatment: ***  Functional change: ***    Pain: {0-10:46028::"0"}/10  Location: {RIGHT/LEFT/BILATERAL:97915} {LOCATION ON BODY:96856}     Occupation: Pt works as a  at dental school and job-related duties sitting.    Pts goals: To feel better    Objective     Laila received therapeutic exercises to develop  {AMB PT PROGRESS OBJECTIVE:33743} for *** minutes including: {Pelvic PT TherEx:43507}  360 breath  Piston breathing 5x5  Clamshell  TrA isolation    Laila received the following manual therapy techniques: to develop {PF manual:58912} for *** minutes including: {Pelvic Floor Manual Ther:35130}  ***    Laila participated in neuromuscular re-education activities to develop {Pelvic Health Neuro Re-Ed:25783} for *** minutes including: {Pelvic PT NMR:54449}  diaphragmatic breathing, diaphragmatic breathing with Kegel and pelvic floor mm contractions focus on activation at 3 layers sequentially in isolation and then sequentially  Clamshells  TrA isolation    Laila participated in dynamic functional therapeutic activities to improve functional performance for ***  minutes, including:  ***     Home Exercises Provided and Patient " "Education Provided     Education provided:   - {Pelvic Floor Edu:45204}  Discussed progression of plan of care with patient; educated pt in activity modification; reviewed HEP with pt. Pt demonstrated and verbalized understanding of all instruction and {Latisha single:44465::"was","was not"} provided with a handout of HEP (see Patient Instructions).    Written Home Exercises Provided: {Latisha single:86220::"yes","Patient instructed to cont prior HEP"}.  Exercises were reviewed and Laila was able to demonstrate them prior to the end of the session.  Laila demonstrated {Desc; good/fair/poor:48328} understanding of the education provided.     See EMR under {Blank single:23174::"Media","Patient Instructions"} for exercises provided {Latisha single:84771::"4/27/2022","prior visit"}.    Assessment     ***  Laila {IS/IS NOT:04599} progressing well towards her goals.   Pt prognosis is Excellent    Pt will continue to benefit from skilled outpatient physical therapy to address the deficits listed in the problem list box on initial evaluation, provide pt/family education and to maximize pt's level of independence in the home and community environment.     Pt's spiritual, cultural and educational needs considered and pt agreeable to plan of care and goals.     Anticipated barriers to physical therapy: None    Goals: Short Term Goals: 4 weeks   Pt indep in HEP  Pt indep in functional brace technique for decreased strain of pelvic structures or UI with activities which increase IAP  Pt demo pelvic floor mm strength of 3/5 for improved continence and support of pelvic organs     Long Term Goals: 8 weeks   Pt indep in progressive HEP  Pt reports 0 episodes of leakage in at least 2 weeks for improved ADL participation  Pt reports able to have painfree intercourse for improved participation in ADLs and improved intimacy with partner  Pt demo pelvic floor mm strength of 4/5 for improved continence and support of pelvic " organs    Plan  Plan of care Certification: 3/17/2022 to 6/15/22.     Outpatient Physical Therapy 1 times weekly for 12 weeks to include the following interventions: therapeutic exercises, therapeutic activity, neuromuscular re-education, manual therapy, patient/family education and self care/home management    Next visit: ***    Natividad Chavez PTA

## 2022-04-26 NOTE — PROGRESS NOTES
Pelvic Health Physical Therapy   Treatment Note     Name: Laila Urban [facility]  Clinic Number: [id number]    Therapy Diagnosis: No diagnosis found.  Physician: Amanda Benjamin NP    Visit Date: 4/27/2022    Physician Orders: PT Eval and Treat  Medical Diagnosis from Referral:   Z3A.17 (ICD-10-CM) - 17 weeks gestation of pregnancy   O26.899,R10.2 (ICD-10-CM) - Pelvic pain in pregnancy   Evaluation Date: 3/17/2022  Authorization Period Expiration: 12 visits 4/20/22 to 4/20/23  Plan of Care Expiration: 6/15/22  Visit # / Visits authorized: 1/ 12    Cancelled Visits: 0  No Show Visits: 0  FOTO: 1    Time In: 1005  Time Out: 1100  Total Billable Time: 55 minutes    Precautions: Standard    Subjective       Pt reports: Things are the same. Sleeps in recliner because laying on L hip hurts too much. R hip uncomfortable too. Lifting up legs to don pants causes lower abdominal pain.   She was compliant with home exercise program.  Response to previous treatment: felt fine  Functional change: no change    Pain: 0/10  Location:     Objective   Informed verbal consent provided 4/27/22 prior to intravaginal treatment, signed consent form also on file.    Laila received therapeutic exercises to develop  strength, endurance, ROM, flexibility, posture and core stabilization for 15 minutes including: Kegels: Quick flicks   360 breathing  Hip IR stretch, quadruped with adduction  Cat/cow  TrA activation in quadruped  leland pose<>4 pt<>PPU dynamic    Laila received the following manual therapy techniques: to develop flexibility and extensibility for 40 minutes including: trigger point/myofascial release of pelvic floor mm  STM/DTM and skin rolling L hip externally    Laila participated in neuromuscular re-education activities to develop Coordination and Control for 00 minutes including: not performed today    Laila participated in dynamic functional therapeutic activities to improve functional performance for 00  minutes, including:  Not  performed today     Home Exercises Provided and Patient Education Provided     Education provided:   - anatomy/physiology of pelvic floor, diaphragmatic breathing, isometric abdominal exercises and kegels  Discussed progression of plan of care with patient; educated pt in activity modification; reviewed HEP with pt. Pt demonstrated and verbalized understanding of all instruction and was provided with a handout of HEP (see Patient Instructions).    Written Home Exercises Provided: yes.  Exercises were reviewed and Laila was able to demonstrate them prior to the end of the session.  Laila demonstrated good  understanding of the education provided.     See EMR under Patient Instructions for exercises provided 4/27/2022.    Assessment     Pelvic exam and treatment performed. Continues elevated tension and TTP throughout. Responded well to manual therapy and cues for relaxation with decreased tension to near normal resting length. With contractions able to completely deactivate and good lengthening attained with breathing. Reviewed her home program with focus on breathing, pelvic contractions, and TrA isolation and progressed for stretching targetting hip and spine ROM.  Laila Is progressing well towards her goals.   Pt prognosis is Excellent.     Pt will continue to benefit from skilled outpatient physical therapy to address the deficits listed in the problem list box on initial evaluation, provide pt/family education and to maximize pt's level of independence in the home and community environment.     Pt's spiritual, cultural and educational needs considered and pt agreeable to plan of care and goals.     Anticipated barriers to physical therapy: none    Goals: Short Term Goals: 4 weeks   Pt indep in HEP  Pt indep in functional brace technique for decreased strain of pelvic structures or UI with activities which increase IAP  Pt demo pelvic floor mm strength of 3/5 for improved continence and support of pelvic organs      Long Term Goals: 8 weeks   Pt indep in progressive HEP  Pt reports 0 episodes of leakage in at least 2 weeks for improved ADL participation  Pt reports able to have painfree intercourse for improved participation in ADLs and improved intimacy with partner  Pt demo pelvic floor mm strength of 4/5 for improved continence and support of pelvic organs     Plan      Plan of care Certification: 3/17/2022 to 6/15/22.     Outpatient Physical Therapy 1 times weekly for 12 weeks to include the following interventions: therapeutic exercises, therapeutic activity, neuromuscular re-education, manual therapy, patient/family education and self care/home management    Next visit: manual - pelvic floor and hip, hip strengthening and stretching, progress core    Tory Arnett, PT

## 2022-04-27 ENCOUNTER — CLINICAL SUPPORT (OUTPATIENT)
Dept: REHABILITATION | Facility: OTHER | Age: 32
End: 2022-04-27
Attending: NURSE PRACTITIONER
Payer: COMMERCIAL

## 2022-04-27 DIAGNOSIS — R19.8 ABDOMINAL WEAKNESS: ICD-10-CM

## 2022-04-27 DIAGNOSIS — M79.10 MYALGIA: ICD-10-CM

## 2022-04-27 PROCEDURE — 97110 THERAPEUTIC EXERCISES: CPT

## 2022-04-27 PROCEDURE — 97140 MANUAL THERAPY 1/> REGIONS: CPT

## 2022-04-28 ENCOUNTER — ROUTINE PRENATAL (OUTPATIENT)
Dept: OBSTETRICS AND GYNECOLOGY | Facility: CLINIC | Age: 32
End: 2022-04-28
Payer: COMMERCIAL

## 2022-04-28 VITALS
WEIGHT: 180.75 LBS | SYSTOLIC BLOOD PRESSURE: 114 MMHG | BODY MASS INDEX: 33.06 KG/M2 | DIASTOLIC BLOOD PRESSURE: 60 MMHG

## 2022-04-28 DIAGNOSIS — Z3A.25 25 WEEKS GESTATION OF PREGNANCY: Primary | ICD-10-CM

## 2022-04-28 DIAGNOSIS — Z67.91 RH NEGATIVE STATE IN ANTEPARTUM PERIOD: ICD-10-CM

## 2022-04-28 DIAGNOSIS — O26.899 RH NEGATIVE STATE IN ANTEPARTUM PERIOD: ICD-10-CM

## 2022-04-28 PROBLEM — R19.8 ABDOMINAL WEAKNESS: Status: ACTIVE | Noted: 2022-04-28

## 2022-04-28 PROBLEM — M79.10 MYALGIA: Status: ACTIVE | Noted: 2022-04-28

## 2022-04-28 PROCEDURE — 0502F PR SUBSEQUENT PRENATAL CARE: ICD-10-PCS | Mod: S$GLB,,, | Performed by: NURSE PRACTITIONER

## 2022-04-28 PROCEDURE — 99999 PR PBB SHADOW E&M-EST. PATIENT-LVL III: CPT | Mod: PBBFAC,,, | Performed by: NURSE PRACTITIONER

## 2022-04-28 PROCEDURE — 99999 PR PBB SHADOW E&M-EST. PATIENT-LVL III: ICD-10-PCS | Mod: PBBFAC,,, | Performed by: NURSE PRACTITIONER

## 2022-04-28 PROCEDURE — 0502F SUBSEQUENT PRENATAL CARE: CPT | Mod: S$GLB,,, | Performed by: NURSE PRACTITIONER

## 2022-04-28 RX ORDER — FERROUS SULFATE 325(65) MG
325 TABLET, DELAYED RELEASE (ENTERIC COATED) ORAL
COMMUNITY
End: 2022-11-21

## 2022-04-28 NOTE — PROGRESS NOTES
Here for routine OB appt at 25w4d. She and her  had covid two weeks ago- mainly congestion, no fever. Feeling better now. Feeling FM. Seeing neuro next week for HAs- Tylenol does help some. Needs glucose. Rh negative. Started taking iron- discussed stool softeners and taking with orange juice to increase absorption. Planning to sing up for classes. Undecided on peds. Denies VB and cramping.  Pain, bleeding, and PTL precautions given.  F/U scheduled 4 weeks  GTT/Tdap/Rhogam next visit  PNT weekly at 36 weeks 2/2 IVF  MFM growth scan at 32 weeks

## 2022-04-28 NOTE — PATIENT INSTRUCTIONS
LABOR AND DELIVERY PHONE NUMBER, [phone] (OPEN 24/7, LOCATED ON 6TH FLOOR OF HOSPITAL)  [address] PHONE NUMBER, [phone] (OPEN MON-FRI, 8a-5p)

## 2022-04-29 ENCOUNTER — TELEPHONE (OUTPATIENT)
Dept: OPHTHALMOLOGY | Facility: CLINIC | Age: 32
End: 2022-04-29
Payer: COMMERCIAL

## 2022-05-02 ENCOUNTER — PATIENT MESSAGE (OUTPATIENT)
Dept: OBSTETRICS AND GYNECOLOGY | Facility: CLINIC | Age: 32
End: 2022-05-02
Payer: COMMERCIAL

## 2022-05-04 ENCOUNTER — OFFICE VISIT (OUTPATIENT)
Dept: NEUROLOGY | Facility: CLINIC | Age: 32
End: 2022-05-04
Payer: COMMERCIAL

## 2022-05-04 DIAGNOSIS — G93.2 IIH (IDIOPATHIC INTRACRANIAL HYPERTENSION): Primary | ICD-10-CM

## 2022-05-04 DIAGNOSIS — R51.9 NONINTRACTABLE HEADACHE, UNSPECIFIED CHRONICITY PATTERN, UNSPECIFIED HEADACHE TYPE: ICD-10-CM

## 2022-05-04 PROCEDURE — 99214 OFFICE O/P EST MOD 30 MIN: CPT | Mod: 95,,, | Performed by: NEUROLOGICAL SURGERY

## 2022-05-04 PROCEDURE — 99214 PR OFFICE/OUTPT VISIT, EST, LEVL IV, 30-39 MIN: ICD-10-PCS | Mod: 95,,, | Performed by: NEUROLOGICAL SURGERY

## 2022-05-04 NOTE — PROGRESS NOTES
Chief Complaint   Patient presents with    Follow-up        Laila Wilmar Pappas is a 31 y.o. female with a history of multiple medical diagnoses as listed below that presents for evaluation of headaches at a suspected to be due to idiopathic intracranial hypertension.  She has been having abnormal sensations that prompted EMG/nerve conduction studies which were essentially unremarkable.  She has been having pulsatile tinnitus and has been headaches that have been intractable.  Workup thus far has included evaluation by Ophthalmology which has been unremarkable, MRV which showed changes which could be compatible with idiopathic intracranial hypertension, but has not had a lumbar puncture to this point.  She says that she White to discuss all options for treatment and management.  She has recently got  and wants to begin her family planning.  She says that she would like to have at least 3 children and would like to start this process in the very near future as long as there are no contraindications from a health standpoint.    Interval History  04/30/2021  She has been accompanied to this visit by her .  She continues to have headaches have been pulsatile in nature but also has been having episodes of a pulsatile ringing sensation has more prominent in the left ear specially if she is lying on that side.  Headache has been increasing in intensity with Valsalva and with position specially she bends at the waist.  She is considered all treatment options including diuretics and discussing is shunt placement will be helpful.  She is somewhat apprehensive about either because she and her  or family cleaning it would like to avoid any medication that could cause any complications to pregnancy or any difficulty with conception.  She is also somewhat concerned about having a potential surgery to have shunt placed.    09/14/2021  She is beginning to undergo evaluation for IVF.  As part of her preprocedure  treatment strategy she was giving antibiotics.  After having a dose of doxycycline she began to have rapid intensification of her headaches and significant positional sensitivity associated with these headaches.  She says that she was given antibiotic once in the past that also brought about her similar symptoms.  She feels that her 1st headaches were shortly after being given the medication in the past.    02/02/2022  She is currently at 13 WGA. After her most recent LP headaches have been present on occasion but have been stable. She has not had any vision changes that have not been corrected by her glasses. Headaches have been tolerable, but she has taken Tylenol for at least one headache.    05/04/2022  Pregnancy has been going well. She contracted covid and has been dealing with an increase in her headache frequency since that time. There has not been any changes to her vision nor any significant deviation in her headache semiology. .    PAST MEDICAL HISTORY:  Past Medical History:   Diagnosis Date    Bilateral knee pain 6/19/2018    Endometriosis     treated by GYN at     Epigastric abdominal pain 12/5/2016    Gastroesophageal reflux disease 7/6/2016    Helicobacter pylori ab+     Helicobacter pylori ab+     High-tone pelvic floor dysfunction 5/2/2016    IIH (idiopathic intracranial hypertension)     Dr. Rogers - [facility]    Palpitations 7/6/2016       PAST SURGICAL HISTORY:  Past Surgical History:   Procedure Laterality Date    ARTHROSCOPY OF KNEE Left 4/6/2021    Procedure: ARTHROSCOPY, KNEE;  Surgeon: Deidra Mejias MD;  Location: [facility];  Service: Orthopedics;  Laterality: Left;    COLONOSCOPY N/A 12/13/2018    Procedure: COLONOSCOPY;  Surgeon: Micki Gross MD;  Location: [address]);  Service: Endoscopy;  Laterality: N/A;  preferrably in Dec 2018, CRS ok if needed.    ESOPHAGOGASTRODUODENOSCOPY N/A 10/30/2018    Procedure: EGD (ESOPHAGOGASTRODUODENOSCOPY);  Surgeon: Greg STOCKTON  MD Hany;  Location: [facility] ENDO (2ND FLR);  Service: Endoscopy;  Laterality: N/A;  ok to schedule per Kimmy    KNEE ARTHROSCOPY W/ DEBRIDEMENT  4/6/2021    Procedure: ARTHROSCOPY, KNEE, WITH DEBRIDEMENT;  Surgeon: Deidra Mejias MD;  Location: [facility] OR;  Service: Orthopedics;;    KNEE ARTHROSCOPY W/ MENISCECTOMY Right 4/6/2021    Procedure: ARTHROSCOPY, KNEE, WITH MENISCECTOMY;  Surgeon: Deidra Mejias MD;  Location: [facility] OR;  Service: Orthopedics;  Laterality: Right;    KNEE ARTHROSCOPY W/ PLICA EXCISION Left 4/6/2021    Procedure: EXCISION, PLICA, KNEE, ARTHROSCOPIC;  Surgeon: Deidra Mejias MD;  Location: [facility] OR;  Service: Orthopedics;  Laterality: Left;    LAPAROSCOPY  2017    dx with endo.  no removal    NO PAST SURGERIES      SYNOVECTOMY OF KNEE Left 4/6/2021    Procedure: SYNOVECTOMY, KNEE;  Surgeon: Deidra Mejias MD;  Location: [facility] OR;  Service: Orthopedics;  Laterality: Left;    UPPER GASTROINTESTINAL ENDOSCOPY         SOCIAL HISTORY:  Social History     Socioeconomic History    Marital status: Single   Occupational History     Employer: [employer] [place]     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   Tobacco Use    Smoking status: Never Smoker    Smokeless tobacco: Never Used   Substance and Sexual Activity    Alcohol use: No    Drug use: No    Sexual activity: Yes     Partners: Male     Birth control/protection: None       FAMILY HISTORY:  Family History   Problem Relation Age of Onset    Diabetes Mother     No Known Problems Father     No Known Problems Sister     Hypertension Brother     Hypertension Maternal Grandmother     Hypertension Maternal Grandfather     No Known Problems Paternal Grandmother     No Known Problems Paternal Grandfather     Colon cancer Neg Hx     Crohn's disease Neg Hx     Esophageal cancer Neg Hx     Inflammatory bowel disease Neg Hx     Irritable bowel syndrome Neg Hx     Rectal cancer Neg Hx     Stomach cancer Neg Hx     Ulcerative colitis Neg Hx         ALLERGIES AND MEDICATIONS: updated and reviewed.  Review of patient's allergies indicates:   Allergen Reactions    Doxycycline Other (See Comments)     Triggers headaches    Bactrim ds [sulfamethoxazole-trimethoprim] Nausea And Vomiting    Nsaids (non-steroidal anti-inflammatory drug) Other (See Comments)     D/t h/o PUD and H pylori    Codeine Rash     Current Outpatient Medications   Medication Sig Dispense Refill    aspirin (ECOTRIN) 81 MG EC tablet Take 81 mg by mouth once daily.      clotrimazole-betamethasone 1-0.05% (LOTRISONE) cream Apply topically Daily. 15 g 0    ferrous sulfate 325 (65 FE) MG EC tablet Take 325 mg by mouth 3 (three) times daily with meals.      folic acid (FOLVITE) 1 MG tablet Take 1 mg by mouth once daily.      prenatal vit 10-iron-folic-dha (VITAFOL-OB+DHA) 65-1-250 mg Cmpk Take 1 tablet by mouth.      vitamin D (VITAMIN D3) 1000 units Tab Take 1,000 Units by mouth once daily.       No current facility-administered medications for this visit.       Review of Systems   Constitutional: Negative for activity change, appetite change, fever and unexpected weight change.   HENT: Negative for trouble swallowing and voice change.    Eyes: Negative for photophobia and visual disturbance.   Respiratory: Negative for apnea and shortness of breath.    Cardiovascular: Negative for chest pain and leg swelling.   Gastrointestinal: Negative for constipation and nausea.   Genitourinary: Negative for difficulty urinating.   Musculoskeletal: Negative for back pain, gait problem and neck pain.   Skin: Negative for color change and pallor.   Neurological: Positive for headaches. Negative for dizziness, seizures, syncope, weakness and numbness.   Hematological: Negative for adenopathy.   Psychiatric/Behavioral: Negative for agitation, confusion and decreased concentration.       Neurologic Exam     Mental Status   Oriented to person, place, and time.   Registration: recalls 3 of 3 objects.    Attention: normal. Concentration: normal.   Speech: speech is normal   Level of consciousness: alert  Knowledge: good.     Cranial Nerves     CN II   Right visual field deficit: none  Left visual field deficit: none     CN III, IV, VI   Extraocular motions are normal.   Right pupil: Size: 3 mm. Shape: regular.   Left pupil: Size: 3 mm. Shape: regular.   CN III: no CN III palsy  CN VI: no CN VI palsy  Nystagmus: none   Diplopia: none  Ophthalmoparesis: none  Upgaze: normal  Downgaze: normal  Conjugate gaze: present    CN VII   Facial expression full, symmetric.   Right facial weakness: none  Left facial weakness: none    CN VIII   CN VIII normal.     CN XI   CN XI normal.     CN XII   CN XII normal.   Tongue deviation: none    Motor Exam   Muscle bulk: normal  Overall muscle tone: normal  Right arm tone: normal  Left arm tone: normal  Right leg tone: normal  Left leg tone: normal    Gait, Coordination, and Reflexes     Gait  Gait: normal    Coordination   Finger to nose coordination: normal    Tremor   Resting tremor: absent      Physical Exam  Vitals reviewed.   Constitutional:       Appearance: She is well-developed.   HENT:      Head: Normocephalic and atraumatic.   Eyes:      Extraocular Movements: EOM normal.   Pulmonary:      Effort: Pulmonary effort is normal. No respiratory distress.   Musculoskeletal:         General: Normal range of motion.      Cervical back: Normal range of motion.   Neurological:      Mental Status: She is alert and oriented to person, place, and time.      Coordination: Finger-Nose-Finger Test normal.      Gait: Gait is intact.   Psychiatric:         Speech: Speech normal.         Behavior: Behavior normal.         Thought Content: Thought content normal.         There were no vitals filed for this visit.    Assessment & Plan:    Problem List Items Addressed This Visit     IIH (idiopathic intracranial hypertension) - Primary    Overview     Pulsatile tinnitus with positional  components definitely raises suspicion for idiopathic intracranial hypertension.  Patient's body habitus does not support this diagnosis.  She has not had any observable deficits or changes on the funduscopic examination documented by her ophthalmologist.  Tetracyclines have been shown in several patients to induce or provoke idiopathic intracranial hypertension.  LP showed OP - 33 cm H20. Off Diamox and since her symptoms have been relatively mild would not start therapy at this time as long as fundoscopic examination does not show significant papilledema.              Other Visit Diagnoses     Nonintractable headache, unspecified chronicity pattern, unspecified headache type        Worse after COVID infection, but improving        The patient location is: home  The chief complaint leading to consultation is: follow-up    Visit type: audiovisual    Face to Face time with patient: 15  25 minutes of total time spent on the encounter, which includes face to face time and non-face to face time preparing to see the patient (eg, review of tests), Obtaining and/or reviewing separately obtained history, Documenting clinical information in the electronic or other health record, Independently interpreting results (not separately reported) and communicating results to the patient/family/caregiver, or Care coordination (not separately reported).         Each patient to whom he or she provides medical services by telemedicine is:  (1) informed of the relationship between the physician and patient and the respective role of any other health care provider with respect to management of the patient; and (2) notified that he or she may decline to receive medical services by telemedicine and may withdraw from such care at any time.    Notes:       Follow-up: Follow up in about 2 months (around 7/4/2022).    This note was done with the assistance of voice recognition software. Some errors may be present after  proofreading.

## 2022-05-09 ENCOUNTER — DOCUMENTATION ONLY (OUTPATIENT)
Dept: REHABILITATION | Facility: OTHER | Age: 32
End: 2022-05-09
Payer: COMMERCIAL

## 2022-05-09 NOTE — PROGRESS NOTES
Physical Therapy No Show Note       Patient was scheduled for physical therapy at [facility] for 5/9/2022. Pt failed to appear for appointment without prior notification.     Appointments scheduled: 2  Cancel: 0  No Show: 1    Reminded patient of date and time of next appt on her voicemail.        Tory Arnett, PT

## 2022-05-10 ENCOUNTER — PATIENT MESSAGE (OUTPATIENT)
Dept: OBSTETRICS AND GYNECOLOGY | Facility: CLINIC | Age: 32
End: 2022-05-10
Payer: COMMERCIAL

## 2022-05-12 ENCOUNTER — HOSPITAL ENCOUNTER (EMERGENCY)
Facility: OTHER | Age: 32
Discharge: HOME | End: 2022-05-12
Attending: OBSTETRICS & GYNECOLOGY
Payer: COMMERCIAL

## 2022-05-12 VITALS
OXYGEN SATURATION: 100 % | TEMPERATURE: 98 F | RESPIRATION RATE: 16 BRPM | HEART RATE: 108 BPM | SYSTOLIC BLOOD PRESSURE: 126 MMHG | DIASTOLIC BLOOD PRESSURE: 58 MMHG

## 2022-05-12 DIAGNOSIS — Z36.89 NST (NON-STRESS TEST) REACTIVE: Primary | ICD-10-CM

## 2022-05-12 DIAGNOSIS — Z3A.27 27 WEEKS GESTATION OF PREGNANCY: ICD-10-CM

## 2022-05-12 DIAGNOSIS — O36.8191 DECREASED FETAL MOVEMENT DURING PREGNANCY, ANTEPARTUM, FETUS 1 OF MULTIPLE GESTATION: ICD-10-CM

## 2022-05-12 PROCEDURE — 99284 EMERGENCY DEPT VISIT MOD MDM: CPT | Mod: 25

## 2022-05-12 PROCEDURE — 59025 PR FETAL 2N-STRESS TEST: ICD-10-PCS | Mod: 26,,, | Performed by: OBSTETRICS & GYNECOLOGY

## 2022-05-12 PROCEDURE — 99284 EMERGENCY DEPT VISIT MOD MDM: CPT | Mod: 25,,, | Performed by: OBSTETRICS & GYNECOLOGY

## 2022-05-12 PROCEDURE — 99284 PR EMERGENCY DEPT VISIT,LEVEL IV: ICD-10-PCS | Mod: 25,,, | Performed by: OBSTETRICS & GYNECOLOGY

## 2022-05-12 PROCEDURE — 59025 FETAL NON-STRESS TEST: CPT | Mod: 26,,, | Performed by: OBSTETRICS & GYNECOLOGY

## 2022-05-12 PROCEDURE — 59025 FETAL NON-STRESS TEST: CPT

## 2022-05-12 NOTE — ED PROVIDER NOTES
Encounter Date: 2022    Laila Pappas is a 31 y.o. [id number] at 27w4d presents complaining of decreased fetal movement. Reports minimal movement since 630. Reports 2-3 movements since being in the OB ED.     This IUP is complicated by IVF pregnancy, Idiopathic intracranial HTN, and Rh neg.  Patient denies contractions, denies vaginal bleeding, denies LOF.   Fetal Movement: normal.        History     Chief Complaint   Patient presents with    Decreased Fetal Movement     HPI  Review of patient's allergies indicates:   Allergen Reactions    Doxycycline Other (See Comments)     Triggers headaches    Bactrim ds [sulfamethoxazole-trimethoprim] Nausea And Vomiting    Nsaids (non-steroidal anti-inflammatory drug) Other (See Comments)     D/t h/o PUD and H pylori    Codeine Rash     Past Medical History:   Diagnosis Date    Bilateral knee pain 2018    Endometriosis     treated by GYN at     Epigastric abdominal pain 2016    Gastroesophageal reflux disease 2016    Helicobacter pylori ab+     Helicobacter pylori ab+     High-tone pelvic floor dysfunction 2016    IIH (idiopathic intracranial hypertension)     Dr. Rogers - [facility]    Palpitations 2016     Past Surgical History:   Procedure Laterality Date    ARTHROSCOPY OF KNEE Left 2021    Procedure: ARTHROSCOPY, KNEE;  Surgeon: Deidar Mejias MD;  Location: [facility];  Service: Orthopedics;  Laterality: Left;    COLONOSCOPY N/A 2018    Procedure: COLONOSCOPY;  Surgeon: Micki Gross MD;  Location: [facility] (4TH FLR);  Service: Endoscopy;  Laterality: N/A;  preferrably in Dec 2018, CRS ok if needed.    ESOPHAGOGASTRODUODENOSCOPY N/A 10/30/2018    Procedure: EGD (ESOPHAGOGASTRODUODENOSCOPY);  Surgeon: Greg Leach MD;  Location: [facility] (2ND FLR);  Service: Endoscopy;  Laterality: N/A;  ok to schedule per Kimmy    KNEE ARTHROSCOPY W/ DEBRIDEMENT  2021    Procedure: ARTHROSCOPY, KNEE, WITH DEBRIDEMENT;   Surgeon: Deidra Mejias MD;  Location: [facility];  Service: Orthopedics;;    KNEE ARTHROSCOPY W/ MENISCECTOMY Right 4/6/2021    Procedure: ARTHROSCOPY, KNEE, WITH MENISCECTOMY;  Surgeon: Deidra Mejias MD;  Location: [facility];  Service: Orthopedics;  Laterality: Right;    KNEE ARTHROSCOPY W/ PLICA EXCISION Left 4/6/2021    Procedure: EXCISION, PLICA, KNEE, ARTHROSCOPIC;  Surgeon: Deidra Mejias MD;  Location: [facility];  Service: Orthopedics;  Laterality: Left;    LAPAROSCOPY  2017    dx with endo.  no removal    NO PAST SURGERIES      SYNOVECTOMY OF KNEE Left 4/6/2021    Procedure: SYNOVECTOMY, KNEE;  Surgeon: Deidra Mejias MD;  Location: [facility];  Service: Orthopedics;  Laterality: Left;    UPPER GASTROINTESTINAL ENDOSCOPY       Family History   Problem Relation Age of Onset    Diabetes Mother     No Known Problems Father     No Known Problems Sister     Hypertension Brother     Hypertension Maternal Grandmother     Hypertension Maternal Grandfather     No Known Problems Paternal Grandmother     No Known Problems Paternal Grandfather     Colon cancer Neg Hx     Crohn's disease Neg Hx     Esophageal cancer Neg Hx     Inflammatory bowel disease Neg Hx     Irritable bowel syndrome Neg Hx     Rectal cancer Neg Hx     Stomach cancer Neg Hx     Ulcerative colitis Neg Hx      Social History     Tobacco Use    Smoking status: Never Smoker    Smokeless tobacco: Never Used   Substance Use Topics    Alcohol use: No    Drug use: No     Review of Systems   Constitutional: Negative for chills and fever.   Eyes: Negative for photophobia and visual disturbance.   Respiratory: Negative for cough and shortness of breath.    Cardiovascular: Negative for chest pain, palpitations and leg swelling.   Gastrointestinal: Negative for abdominal pain, nausea and vomiting.   Genitourinary: Negative for difficulty urinating, dysuria, flank pain, frequency, hematuria, pelvic pain, urgency, vaginal bleeding, vaginal discharge and  vaginal pain.   Neurological: Negative for headaches.       Physical Exam     Initial Vitals   BP Pulse Resp Temp SpO2   05/12/22 1256 05/12/22 1254 05/12/22 1256 05/12/22 1256 05/12/22 1255   (!) 126/58 107 16 97.9 °F (36.6 °C) 100 %      MAP       --                Physical Exam    Vitals reviewed.  Constitutional: She appears well-developed and well-nourished.   HENT:   Head: Normocephalic.   Eyes: Conjunctivae are normal. Pupils are equal, round, and reactive to light.   Neck:   Normal range of motion.  Cardiovascular: Normal rate.   Pulmonary/Chest: Effort normal. No respiratory distress.   Abdominal: Abdomen is soft. There is no abdominal tenderness.   No right CVA tenderness.  No left CVA tenderness. There is no rebound and no guarding.   Musculoskeletal:         General: Normal range of motion.      Cervical back: Normal range of motion.     Neurological: She is alert and oriented to person, place, and time.   Skin: Skin is warm and dry. Capillary refill takes less than 2 seconds.   Psychiatric: She has a normal mood and affect. Her behavior is normal. Judgment and thought content normal.         ED Course   Obtain Fetal nonstress test (NST)    Date/Time: 5/12/2022 1:15 PM  Performed by: Stephanie Kumar MD  Authorized by: Dominique Herrera MD     Nonstress Test:     Variability:  6-25 BPM    Decelerations:  None    Accelerations:  10 bpm    Baseline:  145    Uterine Irritability: No      Contractions:  Not present  Biophysical Profile:     Nonstress Test Interpretation: appropriate for gestational age      Overall Impression:  Reassuring  Post-procedure:     Patient tolerance:  Patient tolerated the procedure well with no immediate complications      Labs Reviewed - No data to display       Imaging Results    None          Medications - No data to display  Medical Decision Making:   ED Management:  1. Decreased Fetal Movement:   - VSS   - NST appropriate for gestational age; TOCO no contractions   - Counseled to  perform kick counts 10 movements per 2 hours at 28 weeks.   - Bedside ultrasound confirmed fetal movement.   - Discussed with OB ED staff, patient stable for discharge.             Attending Attestation:   Physician Attestation Statement for Resident:  As the supervising MD   Physician Attestation Statement: I have personally seen and examined this patient.   I agree with the above history. -:   As the supervising MD I agree with the above PE.    As the supervising MD I agree with the above treatment, course, plan, and disposition.   -:   NST  I independently reviewed the fetal non-stress test with the following interpretation:  145 BPM baseline  Variability: moderate  Accelerations: present  Decelerations: absent  Contractions: none  Category 1    Clinical Interpretation:age appropriate, reactive. Reassurance provided. [facility] precautions reviewed.     Patient evaluated and found to be stable, agree with resident's assessment and plan.    I was personally present during the critical portions of the procedure(s) performed by the resident and was immediately available in the ED to provide services and assistance as needed during the entire procedure.                         Clinical Impression:   Final diagnoses:  [Z36.89] NST (non-stress test) reactive (Primary)  [O36.8191] Decreased fetal movement during pregnancy, antepartum, fetus 1 of multiple gestation          ED Disposition Condition    Discharge Stable        ED Prescriptions     None        Follow-up Information    None          Stephanie Kumar MD  Resident  05/12/22 0818       Andree Espinal MD  05/12/22 3429

## 2022-05-12 NOTE — DISCHARGE INSTRUCTIONS
"Stay hydrated by drinking 2-3 liters of water per day. Call or come to the OB Emergency Department for signs of labor such as painful contractions, leaking of fluid, or bleeding. You may take Tylenol (2 regular strength or 1 extra strength) for discomforts of pregnancy such as mild cramping, soreness, and back pain. If you have a history of elevated blood pressures, call us for increased blood pressure readings at home, a headache that persists after taking Tylenol, blurred vision, shortness of breath, or sharp upper abdominal pain. Monitor your baby's movements. If you are concerned that your baby's movements are decreased, call the [facility] for further evaluation.     OB Emergency Department: [phone]    Pregnancy-safe Over-the-Counter Medications    Allergies/Allergic Reaction: Benadryl, Benadryl Cream, Zyrtec, Allegra, Flonase, Claritin  Heartburn: Maalox, Mylanta, Gaviscon, Tums, Pepcid, Prisolec OTC, Tagamet  Congestion: Sudafed (avoid if you have high blood pressure), Claritin, Mucinex products (avoid "D" version of Mucinex)  Constipation: Milk of Magnesia, Colace, Metamucil, Senokot plain, Fibercon, Miralax, increase dietary fiber, increase water intake  Cough: Robitussin DM, Robitussin PE  Diarrhea: Immodium, Kaopectate  Fever: Tylenol (regular or extra strength) take 2 every 6 hours  Gas: Phazyme 125, Mylicon 80, Gas-X  Headache: Tylenol (regular or extra strength) take 2 every 6 hours  Hemorrhoids: Preparation H, Anusol HC cream 1%, Tucks pads  Motion Sickness: Dramamine, Benadryl  Muscle Aches: Icyhot patches (NOT on abdomen), Thermacare patches (NOT on abdomen)  Nausea: Unisom, Emetrol, Vitamin B6 (25-50mg twice per day), aleksandr products  Rashes/Bug bites: Cortaid, Lanacort (any % hydrocortison cream), Benadryl cream or tablets, Calamine lotion  Sleep: Unisom, Benadryl  Sore throat: Sucrets, Cepacol, Chloraseptic spray, Tylenol   Yeast infection: Monistat regular strength    Aspirin and Ibuprofen should " be AVOIDED.

## 2022-05-17 ENCOUNTER — DOCUMENTATION ONLY (OUTPATIENT)
Dept: REHABILITATION | Facility: OTHER | Age: 32
End: 2022-05-17
Payer: COMMERCIAL

## 2022-05-17 ENCOUNTER — PATIENT MESSAGE (OUTPATIENT)
Dept: NEUROLOGY | Facility: CLINIC | Age: 32
End: 2022-05-17
Payer: COMMERCIAL

## 2022-05-17 NOTE — PROGRESS NOTES
Physical Therapy No Show Note       Patient was scheduled for physical therapy at [facility] for 5/16/2022. Pt failed to appear for appointment without prior notification for today, as well as previous week's appt.     Appointments scheduled: 1  Cancel: 0  No Show: 2    Able to speak with pt and she is apologetic and confirms she will be at next appt on 5/23.      Tory Arnett, PT

## 2022-05-18 ENCOUNTER — PATIENT MESSAGE (OUTPATIENT)
Dept: OBSTETRICS AND GYNECOLOGY | Facility: CLINIC | Age: 32
End: 2022-05-18
Payer: COMMERCIAL

## 2022-05-18 ENCOUNTER — TELEPHONE (OUTPATIENT)
Dept: EMERGENCY MEDICINE | Facility: OTHER | Age: 32
End: 2022-05-18
Payer: COMMERCIAL

## 2022-05-18 NOTE — TELEPHONE ENCOUNTER
Called pt and she spoke to ob ed and was told that they will give her a call back. I suggested that the pt do a covid test just to rule it out and if she gets no call back by today to call the office back in the [place] and I will speak to the provider.

## 2022-05-18 NOTE — TELEPHONE ENCOUNTER
Pt called to say she has had diarrhea since Saturday.  Pt took Imodium x 1 on 5/16/2022.  Pt denies any other symptoms, says she was positive for Covid approx 1 month ago.  Discussed with Dr. Kumar, told pt to take another dose of Imodium and to continue taking as often as instructions allow.  Pt told to come into [facility] if symptoms do not improve within the next few hours, but to come in sooner if she experiences lightheadedness, dizziness, HA, blurry vision, VB, LOF, contractions, or decreased fetal movement.  Pt verbalized understanding.

## 2022-05-20 ENCOUNTER — TELEPHONE (OUTPATIENT)
Dept: OBSTETRICS AND GYNECOLOGY | Facility: CLINIC | Age: 32
End: 2022-05-20
Payer: COMMERCIAL

## 2022-05-20 NOTE — TELEPHONE ENCOUNTER
I spoke to the pt and informed her to stay hydrated and drink plenty of liquids continue the immoduim if nothing changes by late Saturday she needs to come in to the [facility].

## 2022-05-20 NOTE — TELEPHONE ENCOUNTER
----- Message from Radha Bennett sent at 5/20/2022 11:50 AM CDT -----  Pt is returning call  Pt can be contacted at [phone]

## 2022-05-23 ENCOUNTER — CLINICAL SUPPORT (OUTPATIENT)
Dept: REHABILITATION | Facility: OTHER | Age: 32
End: 2022-05-23
Attending: NURSE PRACTITIONER
Payer: COMMERCIAL

## 2022-05-23 DIAGNOSIS — R19.8 ABDOMINAL WEAKNESS: ICD-10-CM

## 2022-05-23 DIAGNOSIS — M79.10 MYALGIA: Primary | ICD-10-CM

## 2022-05-23 NOTE — PROGRESS NOTES
Pelvic Health Physical Therapy   Treatment Note     Name: Laila Urban [facility]  Clinic Number: [id number]    Therapy Diagnosis:   Encounter Diagnoses   Name Primary?    Myalgia Yes    Abdominal weakness      Physician: Amanda Benjamin NP    Visit Date: 5/23/2022    Physician Orders: PT Eval and Treat  Medical Diagnosis from Referral:   Z3A.17 (ICD-10-CM) - 17 weeks gestation of pregnancy   O26.899,R10.2 (ICD-10-CM) - Pelvic pain in pregnancy   Evaluation Date: 3/17/2022  Authorization Period Expiration: 12 visits 4/20/22 to 4/20/23  Plan of Care Expiration: 6/15/22  Visit # / Visits authorized: 2/ 12    Cancelled Visits: 0  No Show Visits: 0  FOTO: 1    Time In: 305  Time Out: 400  Total Billable Time: 55 minutes    Precautions: Standard    Subjective       Pt reports: Hip doesn't hurt anymore and no longer getting numbness in leg. But pelvic area hurts more with laying in bed on side, can be hard to walk if she gets up in the middle of the night. Still sleeping in chair and pelvic area doesn't hurt when she does that. Donning pants can hurt pubic area as well. Has been sitting to put on pants. Has not attempted intercourse due to fear of pain.   She was compliant with home exercise program.  Response to previous treatment: felt fine  Functional change: no change    Pain: 0/10  Location:     Objective   Informed verbal consent provided 4/27/22 prior to intravaginal treatment, signed consent form also on file.    Laila received therapeutic exercises to develop  strength, endurance, ROM, flexibility, posture and core stabilization for 25 minutes including: Kegels: Quick flicks   360 breathing  TrA activation in seated  TrA + hip adduction co-contracts, 5 sec x 10  Seated hip abd/er with RTB  Chair squats    Laila received the following manual therapy techniques: to develop flexibility and extensibility for 20 minutes including: trigger point/myofascial release of pelvic floor mm  MET for pubic symphysis    Laila  participated in neuromuscular re-education activities to develop Coordination and Control for 00 minutes including: not performed today    Laila participated in dynamic functional therapeutic activities to improve functional performance for 10 minutes, including:  Posture, body mechanics and movement strategies for pubic symphysis pain    Home Exercises Provided and Patient Education Provided     Education provided:   - anatomy/physiology of pelvic floor, diaphragmatic breathing, isometric abdominal exercises and kegels  Discussed progression of plan of care with patient; educated pt in activity modification; reviewed HEP with pt. Pt demonstrated and verbalized understanding of all instruction and was provided with a handout of HEP (see Patient Instructions).    Written Home Exercises Provided: yes.  Exercises were reviewed and Laila was able to demonstrate them prior to the end of the session.  Laila demonstrated good  understanding of the education provided.     See EMR under Patient Instructions for exercises provided 4/27/2022.    Assessment     Pubic symphyseal pain greater on L, improves with MET techniques. Reviewed pelvic symmetry and even WB in sitting and standing, as well as body mechanics strategies for pubic symphysis. Pelvic floor continues elevated tension, improved ability to relax with breathing. Progressed for belly bulging for pelvic lengthening. Reviewed her strengthening and reinforced importance for lumbopelvic stabilization for increased support and decreased LBP and pubic symphysis pain.   Laila Is progressing well towards her goals.   Pt prognosis is Excellent.     Pt will continue to benefit from skilled outpatient physical therapy to address the deficits listed in the problem list box on initial evaluation, provide pt/family education and to maximize pt's level of independence in the home and community environment.     Pt's spiritual, cultural and educational needs considered and pt  agreeable to plan of care and goals.     Anticipated barriers to physical therapy: none    Goals: Short Term Goals: 4 weeks   Pt indep in HEP  Pt indep in functional brace technique for decreased strain of pelvic structures or UI with activities which increase IAP  Pt demo pelvic floor mm strength of 3/5 for improved continence and support of pelvic organs     Long Term Goals: 8 weeks   Pt indep in progressive HEP  Pt reports 0 episodes of leakage in at least 2 weeks for improved ADL participation  Pt reports able to have painfree intercourse for improved participation in ADLs and improved intimacy with partner  Pt demo pelvic floor mm strength of 4/5 for improved continence and support of pelvic organs     Plan      Plan of care Certification: 3/17/2022 to 6/15/22.     Outpatient Physical Therapy 1 times weekly for 12 weeks to include the following interventions: therapeutic exercises, therapeutic activity, neuromuscular re-education, manual therapy, patient/family education and self care/home management    Next visit: check pubic symphysis, treat pelvic floor mm, review and progress strengthening    Tory Arnett, PT

## 2022-05-26 ENCOUNTER — PATIENT MESSAGE (OUTPATIENT)
Dept: OBSTETRICS AND GYNECOLOGY | Facility: CLINIC | Age: 32
End: 2022-05-26
Payer: COMMERCIAL

## 2022-05-26 ENCOUNTER — PATIENT MESSAGE (OUTPATIENT)
Dept: NEUROLOGY | Facility: CLINIC | Age: 32
End: 2022-05-26
Payer: COMMERCIAL

## 2022-05-26 ENCOUNTER — HOSPITAL ENCOUNTER (EMERGENCY)
Facility: OTHER | Age: 32
Discharge: HOME | End: 2022-05-26
Attending: OBSTETRICS & GYNECOLOGY
Payer: COMMERCIAL

## 2022-05-26 VITALS
DIASTOLIC BLOOD PRESSURE: 73 MMHG | OXYGEN SATURATION: 99 % | HEART RATE: 109 BPM | TEMPERATURE: 98 F | SYSTOLIC BLOOD PRESSURE: 110 MMHG | RESPIRATION RATE: 16 BRPM

## 2022-05-26 DIAGNOSIS — R51.9 ACUTE NONINTRACTABLE HEADACHE, UNSPECIFIED HEADACHE TYPE: Primary | ICD-10-CM

## 2022-05-26 DIAGNOSIS — G93.2 INTRACRANIAL HYPERTENSION, BENIGN: ICD-10-CM

## 2022-05-26 DIAGNOSIS — Z3A.29 29 WEEKS GESTATION OF PREGNANCY: ICD-10-CM

## 2022-05-26 PROCEDURE — 25000003 PHARM REV CODE 250

## 2022-05-26 PROCEDURE — 99284 EMERGENCY DEPT VISIT MOD MDM: CPT | Mod: 25

## 2022-05-26 PROCEDURE — 59025 FETAL NON-STRESS TEST: CPT

## 2022-05-26 PROCEDURE — 63600175 PHARM REV CODE 636 W HCPCS

## 2022-05-26 PROCEDURE — 96374 THER/PROPH/DIAG INJ IV PUSH: CPT

## 2022-05-26 PROCEDURE — 96375 TX/PRO/DX INJ NEW DRUG ADDON: CPT

## 2022-05-26 RX ORDER — METOCLOPRAMIDE HYDROCHLORIDE 5 MG/ML
10 INJECTION INTRAMUSCULAR; INTRAVENOUS ONCE
Status: COMPLETED | OUTPATIENT
Start: 2022-05-26 | End: 2022-05-26

## 2022-05-26 RX ORDER — DIPHENHYDRAMINE HYDROCHLORIDE 50 MG/ML
25 INJECTION INTRAMUSCULAR; INTRAVENOUS ONCE
Status: COMPLETED | OUTPATIENT
Start: 2022-05-26 | End: 2022-05-26

## 2022-05-26 RX ORDER — ACETAMINOPHEN 500 MG
1000 TABLET ORAL ONCE
Status: COMPLETED | OUTPATIENT
Start: 2022-05-26 | End: 2022-05-26

## 2022-05-26 RX ADMIN — ACETAMINOPHEN 1000 MG: 500 TABLET, FILM COATED ORAL at 09:05

## 2022-05-26 RX ADMIN — DIPHENHYDRAMINE HYDROCHLORIDE 25 MG: 50 INJECTION, SOLUTION INTRAMUSCULAR; INTRAVENOUS at 11:05

## 2022-05-26 RX ADMIN — METOCLOPRAMIDE 10 MG: 5 INJECTION, SOLUTION INTRAMUSCULAR; INTRAVENOUS at 11:05

## 2022-05-26 NOTE — ED TRIAGE NOTES
Pt arrived to [facility] for a HA unrelieved by Tylenol and pain behind her left eye. Pt has history of intracranial HTN. Pt reports +FM, no contractions or vaginal bleeding or LOF. Pt in [facility] 2. MD notified of arrival.

## 2022-05-26 NOTE — ED NOTES
Pt discharge instructions reviewed. RN discussed with pt that she should not drive home and that she will need a ride home.MD discussed with pt as well that she needs a ride. Pt verbalized understanding/.

## 2022-05-26 NOTE — ED PROVIDER NOTES
Encounter Date: 2022       History     Chief Complaint   Patient presents with    Headache     Unrelieved by Tylenol      HPI   Laila Pappas is a 31 y.o.  at 29w4d presents complaining of headache unrelieved by Tylenol.     Patient has a history of IIH. She sees a neurologist, has had therapeutic LP in the past. No medicine at this time. The past few days noticed worsening headache, felt behind the L eye. No excessive tearing or changes in vision. Similar to prior IIH headaches it is worse in certain positions. Denies SOB/RUQ pain/visual disturbance/CP.     Pregnancy complicated by IVF pregnancy, Idiopathic intracranial HTN, and Rh neg.  Patient denies contractions, denies vaginal bleeding, denies LOF.   Fetal Movement: normal.        Review of patient's allergies indicates:   Allergen Reactions    Doxycycline Other (See Comments)     Triggers headaches    Bactrim ds [sulfamethoxazole-trimethoprim] Nausea And Vomiting    Nsaids (non-steroidal anti-inflammatory drug) Other (See Comments)     D/t h/o PUD and H pylori    Codeine Rash     Past Medical History:   Diagnosis Date    Bilateral knee pain 2018    Endometriosis     treated by GYN at     Epigastric abdominal pain 2016    Gastroesophageal reflux disease 2016    Helicobacter pylori ab+     Helicobacter pylori ab+     High-tone pelvic floor dysfunction 2016    IIH (idiopathic intracranial hypertension)     Dr. Rogers - [facility]    Palpitations 2016     Past Surgical History:   Procedure Laterality Date    ARTHROSCOPY OF KNEE Left 2021    Procedure: ARTHROSCOPY, KNEE;  Surgeon: Deidra Mejias MD;  Location: [facility];  Service: Orthopedics;  Laterality: Left;    COLONOSCOPY N/A 2018    Procedure: COLONOSCOPY;  Surgeon: Micki Gross MD;  Location: [facility] ENDO ([address] [place] [address]);  Service: Endoscopy;  Laterality: N/A;  preferrably in Dec 2018, CRS ok if needed.    ESOPHAGOGASTRODUODENOSCOPY N/A 10/30/2018     Procedure: EGD (ESOPHAGOGASTRODUODENOSCOPY);  Surgeon: Greg Leach MD;  [facility]  Service: Endoscopy;  Laterality: N/A;  ok to schedule per Kimmy    KNEE ARTHROSCOPY W/ DEBRIDEMENT  4/6/2021    Procedure: ARTHROSCOPY, KNEE, WITH DEBRIDEMENT;  Surgeon: Deidra Mejias MD;  Location: [facility];  Service: Orthopedics;;    KNEE ARTHROSCOPY W/ MENISCECTOMY Right 4/6/2021    Procedure: ARTHROSCOPY, KNEE, WITH MENISCECTOMY;  Surgeon: Deidra Mejias MD;  Location: [facility];  Service: Orthopedics;  Laterality: Right;    KNEE ARTHROSCOPY W/ PLICA EXCISION Left 4/6/2021    Procedure: EXCISION, PLICA, KNEE, ARTHROSCOPIC;  Surgeon: Deidra Mejias MD;  Location: [facility];  Service: Orthopedics;  Laterality: Left;    LAPAROSCOPY  2017    dx with endo.  no removal    NO PAST SURGERIES      SYNOVECTOMY OF KNEE Left 4/6/2021    Procedure: SYNOVECTOMY, KNEE;  Surgeon: Deidra Mejias MD;  Location: [facility];  Service: Orthopedics;  Laterality: Left;    UPPER GASTROINTESTINAL ENDOSCOPY       Family History   Problem Relation Age of Onset    Diabetes Mother     No Known Problems Father     No Known Problems Sister     Hypertension Brother     Hypertension Maternal Grandmother     Hypertension Maternal Grandfather     No Known Problems Paternal Grandmother     No Known Problems Paternal Grandfather     Colon cancer Neg Hx     Crohn's disease Neg Hx     Esophageal cancer Neg Hx     Inflammatory bowel disease Neg Hx     Irritable bowel syndrome Neg Hx     Rectal cancer Neg Hx     Stomach cancer Neg Hx     Ulcerative colitis Neg Hx      Social History     Tobacco Use    Smoking status: Never Smoker    Smokeless tobacco: Never Used   Substance Use Topics    Alcohol use: No    Drug use: No     Review of Systems   Constitutional: Negative for chills and fever.   HENT: Negative for sore throat.    Eyes: Negative for visual disturbance.   Respiratory: Negative for shortness of breath.    Cardiovascular:  Negative for chest pain.   Gastrointestinal: Negative for abdominal pain, nausea and vomiting.   Genitourinary: Negative for dysuria, vaginal bleeding, vaginal discharge and vaginal pain.   Musculoskeletal: Negative for back pain.   Skin: Negative for rash.   Neurological: Positive for headaches. Negative for weakness.   Hematological: Does not bruise/bleed easily.       Physical Exam     Initial Vitals   BP Pulse Resp Temp SpO2   05/26/22 0947 05/26/22 0944 05/26/22 0947 05/26/22 0947 05/26/22 0944   104/65 (!) 111 16 98.2 °F (36.8 °C) 100 %      MAP       --                Physical Exam    Constitutional: She appears well-developed and well-nourished. No distress.   HENT:   Head: Normocephalic and atraumatic.   Eyes: Conjunctivae are normal.   Neck: Neck supple.   Normal range of motion.  Cardiovascular: Normal rate and intact distal pulses.   Pulmonary/Chest: Breath sounds normal. No respiratory distress. She has no wheezes.   Abdominal: Abdomen is soft. She exhibits no distension. There is no abdominal tenderness.   gravid There is no rebound.   Genitourinary:    Vagina and uterus normal.     Musculoskeletal:         General: No tenderness or edema. Normal range of motion.      Cervical back: Normal range of motion and neck supple.     Neurological: She is alert and oriented to person, place, and time. She has normal reflexes.   Skin: Skin is warm and dry. No rash noted. No erythema.   Psychiatric: She has a normal mood and affect. Her behavior is normal. Judgment and thought content normal.         ED Course   Obtain Fetal nonstress test (NST)    Date/Time: 6/13/2022 3:13 PM  Performed by: Carito Canseco MD  Authorized by: Tracy Avila MD     Nonstress Test:     Variability:  6-25 BPM    Decelerations:  None    Accelerations:  15 bpm    Acoustic Stimulator: No      Baseline:  150    Contractions:  Not present  Biophysical Profile:     Nonstress Test Interpretation: reactive      Overall Impression:   Reassuring      Labs Reviewed - No data to display       Imaging Results    None          Medications   acetaminophen tablet 1,000 mg (1,000 mg Oral Given 5/26/22 0956)   diphenhydrAMINE injection 25 mg (25 mg Intravenous Given 5/26/22 1103)   metoclopramide HCl injection 10 mg (10 mg Intravenous Given 5/26/22 1105)     Medical Decision Making:   ED Management:  VSS   NST RR   1g Tylenol > minimal improvement   Given IV benadryl, reglan > HA improved      Patient reported improvement in HA. Discharged in stable condition, recommended f/u neurologist. Third trimester return precautions given.     Destiny Avila MD  PGY 1  Obstetrics and Gynecology            Attending Attestation:   Physician Attestation Statement for Resident:  As the supervising MD   Physician Attestation Statement: I have personally seen and examined this patient.   I agree with the above history. -:   As the supervising MD I agree with the above PE.    As the supervising MD I agree with the above treatment, course, plan, and disposition.  I was personally present during the critical portions of the procedure(s) performed by the resident and was immediately available in the ED to provide services and assistance as needed during the entire procedure.  I have reviewed the following: old records at this facility.                         Clinical Impression:   Final diagnoses:  [R51.9] Acute nonintractable headache, unspecified headache type (Primary)  [Z3A.29] 29 weeks gestation of pregnancy  [G93.2] Intracranial hypertension, benign          ED Disposition Condition    Discharge Stable        ED Prescriptions     None        Follow-up Information    None          Tracy Avila MD  Resident  05/29/22 [id number]       Carito Canseco MD  06/13/22 8152

## 2022-05-26 NOTE — MEDICAL/APP STUDENT
OBSTETRICS  Emergency Department  Triage    Reason for Visit   Headache (Unrelieved by Tylenol )    HPI   31 y.o.   at 29w4d presents today complaining of a headache that is unrelieved by  Tylenol since yesterday evening. Patient has a past medical history of idiopathic intracranial hypertension. Pt states that her headaches from IIH are typically relieved by tylenol but this one was not.She took 500  mg acetaminophen at 9 pm last night. This headache also has a new feature of L retroocular pressure. She denies vision changes, photophobia, epiphora, loss of balance, n/v. She has had 2 therapeutic LPs this pregnancy but is not on medication for the IIH. She follows with neurology, specifically Dr. Wilson, for her IIH.    This IUP is complicated by IVF pregnancy,  Idiopathic intracranial HTN, and Rh neg status. Patient denies contractions, denies vaginal bleeding, denies LOF. Fetal movement is normal.                OB History    Para Term  AB Living   1 0 0 0 0 0   SAB IAB Ectopic Multiple Live Births   0 0 0 0 0      # Outcome Date GA Lbr Sam/2nd Weight Sex Delivery Anes PTL Lv   1 Current                Contractions: {YES:43841}   Vaginal bleeding: {YES:36057}   Loss of fluid: {YES:84160}   Fetal movement: {YES:13120}   Cramping: {YES:98686}   Syncope: {YES:14272}   Headache: {YES:16859}   Nausea: {YES:60649}   Vomiting: {YES:67378}   Constipation: {YES:54631}   Diarrhea:  {YES:29152}     Dizziness: {YES:12129}   Lightheadedness: {YES:28566}   Vision or hearing changes: {YES:64904}   Back or joint pain: {YES:06810}   Abdominal pain: {YES:70696}   Pelvic pain: {YES:79573}   Trauma, including falls, since last OBGYN visit: {YES:40204}     History of bleeding disorders: {YES:97792}   History of clotting disorders: {YES:10120}   History of  birth defects in previous pregnancies: {YES:37335}   History of preeclampsia or eclampsia in previous pregnancies: {YES:83638}   History of pregnancy  complications in previous pregnancies: {YES:43945}   History of delivery complications in previous pregnancies: {YES:60200}   History of anemia requiring IV iron or blood transfusions: {YES:74390}     Exam   /73   Pulse 109   Temp 98.2 °F (36.8 °C) (Oral)   Resp 16   LMP 10/31/2021   SpO2 99%     GENERAL: No acute distress  ABD: Gravid     Fetal heart tones: {YES:47083}  Fetal heart beat: bpm  Fetal movement:     OBGyn Exam    End of Bed Assessment  Alert and Oriented x 3: {YES:92885}   Pallor or cyanosis: {YES:26924}   Icterus: {YES:50381}   Clubbing: {YES:61756}   Edema: {YES:44127}   Lymph  adenopathy: {YES:79692}   Capillary refill less than 2 seconds:  {YES:12327}     ROS    Assessment and Plan   There are no diagnoses linked to this encounter.    Pregnancy dating, labs, ultrasound reports, prenatal testing, and problem list; prior records and results; and available outside records were reviewed and updated in EMR.     I spent a total of *** minutes of the day on the visit. This includes face-to-face time and non-face to face time preparing to see the patient and completing medical documentation.     --  Alexia Carrillo  Medical Student

## 2022-05-26 NOTE — DISCHARGE INSTRUCTIONS
Call clinic [phone] or L & D after hours [phone] for:   Vaginal bleeding,   Leaking fluids,   Contractions (4-5 in ONE hour),   Decreased fetal movements (10 kicks in 2 hours),   Headache not relived by Tylenol,   Blurry vision, or    Temp of 100.4 or greater.     Begin doing fetal kick counts, at least 10 movements in 2 hours starting at 28 weeks of gestation.    Keep next clinic appointment (see appointment date and time below):

## 2022-05-30 ENCOUNTER — PATIENT MESSAGE (OUTPATIENT)
Dept: OBSTETRICS AND GYNECOLOGY | Facility: CLINIC | Age: 32
End: 2022-05-30
Payer: COMMERCIAL

## 2022-05-31 ENCOUNTER — CLINICAL SUPPORT (OUTPATIENT)
Dept: OBSTETRICS AND GYNECOLOGY | Facility: CLINIC | Age: 32
End: 2022-05-31
Payer: COMMERCIAL

## 2022-05-31 ENCOUNTER — ROUTINE PRENATAL (OUTPATIENT)
Dept: OBSTETRICS AND GYNECOLOGY | Facility: CLINIC | Age: 32
End: 2022-05-31
Payer: COMMERCIAL

## 2022-05-31 ENCOUNTER — LAB VISIT (OUTPATIENT)
Dept: LAB | Facility: OTHER | Age: 32
End: 2022-05-31
Attending: NURSE PRACTITIONER
Payer: COMMERCIAL

## 2022-05-31 ENCOUNTER — PATIENT MESSAGE (OUTPATIENT)
Dept: OBSTETRICS AND GYNECOLOGY | Facility: CLINIC | Age: 32
End: 2022-05-31

## 2022-05-31 VITALS — WEIGHT: 180 LBS | DIASTOLIC BLOOD PRESSURE: 82 MMHG | SYSTOLIC BLOOD PRESSURE: 126 MMHG | BODY MASS INDEX: 32.92 KG/M2

## 2022-05-31 DIAGNOSIS — Z3A.17 17 WEEKS GESTATION OF PREGNANCY: ICD-10-CM

## 2022-05-31 DIAGNOSIS — Z29.13 NEED FOR RHOGAM DUE TO RH NEGATIVE MOTHER: ICD-10-CM

## 2022-05-31 DIAGNOSIS — Z23 NEED FOR TDAP VACCINATION: Primary | ICD-10-CM

## 2022-05-31 DIAGNOSIS — Z3A.30 30 WEEKS GESTATION OF PREGNANCY: Primary | ICD-10-CM

## 2022-05-31 LAB
BASOPHILS # BLD AUTO: 0.04 K/UL (ref 0–0.2)
BASOPHILS NFR BLD: 0.3 % (ref 0–1.9)
DIFFERENTIAL METHOD: ABNORMAL
EOSINOPHIL # BLD AUTO: 0.1 K/UL (ref 0–0.5)
EOSINOPHIL NFR BLD: 0.5 % (ref 0–8)
ERYTHROCYTE [DISTWIDTH] IN BLOOD BY AUTOMATED COUNT: 13.8 % (ref 11.5–14.5)
GLUCOSE SERPL-MCNC: 105 MG/DL (ref 70–140)
HCT VFR BLD AUTO: 34.6 % (ref 37–48.5)
HGB BLD-MCNC: 11.4 G/DL (ref 12–16)
IMM GRANULOCYTES # BLD AUTO: 0.08 K/UL (ref 0–0.04)
IMM GRANULOCYTES NFR BLD AUTO: 0.6 % (ref 0–0.5)
LYMPHOCYTES # BLD AUTO: 2 K/UL (ref 1–4.8)
LYMPHOCYTES NFR BLD: 16.1 % (ref 18–48)
MCH RBC QN AUTO: 31 PG (ref 27–31)
MCHC RBC AUTO-ENTMCNC: 32.9 G/DL (ref 32–36)
MCV RBC AUTO: 94 FL (ref 82–98)
MONOCYTES # BLD AUTO: 0.7 K/UL (ref 0.3–1)
MONOCYTES NFR BLD: 5.7 % (ref 4–15)
NEUTROPHILS # BLD AUTO: 9.5 K/UL (ref 1.8–7.7)
NEUTROPHILS NFR BLD: 76.8 % (ref 38–73)
NRBC BLD-RTO: 0 /100 WBC
PLATELET # BLD AUTO: 240 K/UL (ref 150–450)
PMV BLD AUTO: 10.2 FL (ref 9.2–12.9)
RBC # BLD AUTO: 3.68 M/UL (ref 4–5.4)
WBC # BLD AUTO: 12.33 K/UL (ref 3.9–12.7)

## 2022-05-31 PROCEDURE — 96372 RHO (D) IMMUNE GLOBULIN: ICD-10-PCS | Mod: S$GLB,,, | Performed by: NURSE PRACTITIONER

## 2022-05-31 PROCEDURE — 0502F SUBSEQUENT PRENATAL CARE: CPT | Mod: S$GLB,,, | Performed by: NURSE PRACTITIONER

## 2022-05-31 PROCEDURE — 96372 THER/PROPH/DIAG INJ SC/IM: CPT | Mod: S$GLB,,, | Performed by: NURSE PRACTITIONER

## 2022-05-31 PROCEDURE — 90715 TDAP VACCINE 7 YRS/> IM: CPT | Mod: S$GLB,,, | Performed by: NURSE PRACTITIONER

## 2022-05-31 PROCEDURE — 36415 COLL VENOUS BLD VENIPUNCTURE: CPT | Performed by: NURSE PRACTITIONER

## 2022-05-31 PROCEDURE — 86696 HERPES SIMPLEX TYPE 2 TEST: CPT | Performed by: NURSE PRACTITIONER

## 2022-05-31 PROCEDURE — 99999 PR PBB SHADOW E&M-EST. PATIENT-LVL III: ICD-10-PCS | Mod: PBBFAC,,, | Performed by: NURSE PRACTITIONER

## 2022-05-31 PROCEDURE — 99999 PR PBB SHADOW E&M-EST. PATIENT-LVL I: ICD-10-PCS | Mod: PBBFAC,,,

## 2022-05-31 PROCEDURE — 82950 GLUCOSE TEST: CPT | Performed by: NURSE PRACTITIONER

## 2022-05-31 PROCEDURE — 99999 PR PBB SHADOW E&M-EST. PATIENT-LVL III: CPT | Mod: PBBFAC,,, | Performed by: NURSE PRACTITIONER

## 2022-05-31 PROCEDURE — 90471 IMMUNIZATION ADMIN: CPT | Mod: 59,S$GLB,, | Performed by: NURSE PRACTITIONER

## 2022-05-31 PROCEDURE — 0502F PR SUBSEQUENT PRENATAL CARE: ICD-10-PCS | Mod: S$GLB,,, | Performed by: NURSE PRACTITIONER

## 2022-05-31 PROCEDURE — 99999 PR PBB SHADOW E&M-EST. PATIENT-LVL I: CPT | Mod: PBBFAC,,,

## 2022-05-31 PROCEDURE — 90471 TDAP VACCINE GREATER THAN OR EQUAL TO 7YO IM: ICD-10-PCS | Mod: 59,S$GLB,, | Performed by: NURSE PRACTITIONER

## 2022-05-31 PROCEDURE — 85025 COMPLETE CBC W/AUTO DIFF WBC: CPT | Performed by: NURSE PRACTITIONER

## 2022-05-31 PROCEDURE — 86695 HERPES SIMPLEX TYPE 1 TEST: CPT | Performed by: NURSE PRACTITIONER

## 2022-05-31 PROCEDURE — 90715 TDAP VACCINE GREATER THAN OR EQUAL TO 7YO IM: ICD-10-PCS | Mod: S$GLB,,, | Performed by: NURSE PRACTITIONER

## 2022-05-31 NOTE — PROGRESS NOTES
Here for routine OB appt at 30w2d, with c/o ongoing headaches and right eye pain- saw neuro and optho recently. Discussed starting fluid pills and a LP but needed clearance from OB.  Reports good FM.  Denies LOF, denies VB, denies contractions.  Reviewed warning signs of Labor and Preeclampsia.  Daily FM counts reinforced.  F/U scheduled 2 weeks  Rhogam and Tdap today  GTT pending  FU scan at 32 weeks

## 2022-05-31 NOTE — PROGRESS NOTES
Here for TDAP injection. Patient without complaint of pain at this time, Injection given. Tolerated well. No pain noted after injection.  Advised to wait in lobby 15 minutes and report any adverse reactions.     Site: LD    Here for rhogam injection, no complaints at this time. Verified patient is RH negative. No complaint of pain before or after injection. Patient advised to wait 15 minutes before leaving and report any adverse reactions.    Site:LB

## 2022-05-31 NOTE — PATIENT INSTRUCTIONS
[facility] DEL[facility]ERY PHONE NUMBER, [phone] (OPEN 24/7, LOCATED ON 6TH FLOOR OF HOSPITAL)  [address] PHONE NUMBER, [phone] (OPEN MON-FRI, 8a-5p)

## 2022-06-01 LAB
HSV1 IGG SERPL QL IA: NEGATIVE
HSV2 IGG SERPL QL IA: NEGATIVE

## 2022-06-07 ENCOUNTER — ROUTINE PRENATAL (OUTPATIENT)
Dept: OBSTETRICS AND GYNECOLOGY | Facility: CLINIC | Age: 32
End: 2022-06-07
Payer: COMMERCIAL

## 2022-06-07 ENCOUNTER — TELEPHONE (OUTPATIENT)
Dept: MATERNAL FETAL MEDICINE | Facility: CLINIC | Age: 32
End: 2022-06-07
Payer: COMMERCIAL

## 2022-06-07 VITALS
WEIGHT: 188.94 LBS | DIASTOLIC BLOOD PRESSURE: 76 MMHG | BODY MASS INDEX: 34.56 KG/M2 | SYSTOLIC BLOOD PRESSURE: 102 MMHG

## 2022-06-07 DIAGNOSIS — E04.2 MULTIPLE THYROID NODULES: ICD-10-CM

## 2022-06-07 DIAGNOSIS — G93.2 IIH (IDIOPATHIC INTRACRANIAL HYPERTENSION): Primary | ICD-10-CM

## 2022-06-07 DIAGNOSIS — Z74.09 IMPAIRED FUNCTIONAL MOBILITY, BALANCE, GAIT, AND ENDURANCE: ICD-10-CM

## 2022-06-07 DIAGNOSIS — M25.662 DECREASED RANGE OF MOTION (ROM) OF LEFT KNEE: ICD-10-CM

## 2022-06-07 DIAGNOSIS — E78.5 DYSLIPIDEMIA: ICD-10-CM

## 2022-06-07 DIAGNOSIS — Z34.83 ENCOUNTER FOR SUPERVISION OF OTHER NORMAL PREGNANCY IN THIRD TRIMESTER: ICD-10-CM

## 2022-06-07 DIAGNOSIS — O09.819 PREGNANCY RESULTING FROM IN-VITRO FERTILIZATION: ICD-10-CM

## 2022-06-07 PROCEDURE — 0502F SUBSEQUENT PRENATAL CARE: CPT | Mod: S$GLB,,, | Performed by: OBSTETRICS & GYNECOLOGY

## 2022-06-07 PROCEDURE — 99999 PR PBB SHADOW E&M-EST. PATIENT-LVL II: ICD-10-PCS | Mod: PBBFAC,,, | Performed by: OBSTETRICS & GYNECOLOGY

## 2022-06-07 PROCEDURE — 0502F PR SUBSEQUENT PRENATAL CARE: ICD-10-PCS | Mod: S$GLB,,, | Performed by: OBSTETRICS & GYNECOLOGY

## 2022-06-07 PROCEDURE — 99999 PR PBB SHADOW E&M-EST. PATIENT-LVL II: CPT | Mod: PBBFAC,,, | Performed by: OBSTETRICS & GYNECOLOGY

## 2022-06-07 NOTE — TELEPHONE ENCOUNTER
Called patient and scheduled her.        ----- Message from Pablito Hobson III, MD sent at 6/7/2022 11:37 AM CDT -----  Regarding: IIH/Headache follow up  Bryan Rinaldi,     This patient is one I saw earlier in pregnancy with Naiha. Dr. Bunn says her HAs are worsening and was hoping she could get in to be seen next Monday. I'm out but Naiha is in 5/6 that day, I think.    Thanks,    WW

## 2022-06-09 ENCOUNTER — HOSPITAL ENCOUNTER (EMERGENCY)
Facility: OTHER | Age: 32
Discharge: HOME | End: 2022-06-09
Attending: OBSTETRICS & GYNECOLOGY
Payer: COMMERCIAL

## 2022-06-09 ENCOUNTER — PATIENT MESSAGE (OUTPATIENT)
Dept: OBSTETRICS AND GYNECOLOGY | Facility: CLINIC | Age: 32
End: 2022-06-09
Payer: COMMERCIAL

## 2022-06-09 ENCOUNTER — TELEPHONE (OUTPATIENT)
Dept: OBSTETRICS AND GYNECOLOGY | Facility: CLINIC | Age: 32
End: 2022-06-09
Payer: COMMERCIAL

## 2022-06-09 VITALS
HEART RATE: 97 BPM | RESPIRATION RATE: 16 BRPM | SYSTOLIC BLOOD PRESSURE: 116 MMHG | OXYGEN SATURATION: 100 % | TEMPERATURE: 96 F | DIASTOLIC BLOOD PRESSURE: 69 MMHG

## 2022-06-09 DIAGNOSIS — Z3A.31 31 WEEKS GESTATION OF PREGNANCY: ICD-10-CM

## 2022-06-09 DIAGNOSIS — R07.9 CHEST PAIN: ICD-10-CM

## 2022-06-09 DIAGNOSIS — I49.3 PVC (PREMATURE VENTRICULAR CONTRACTION): ICD-10-CM

## 2022-06-09 DIAGNOSIS — K21.9 GASTROESOPHAGEAL REFLUX DISEASE, UNSPECIFIED WHETHER ESOPHAGITIS PRESENT: Primary | ICD-10-CM

## 2022-06-09 LAB
ALBUMIN SERPL BCP-MCNC: 2.6 G/DL (ref 3.5–5.2)
ALP SERPL-CCNC: 77 U/L (ref 55–135)
ALT SERPL W/O P-5'-P-CCNC: 13 U/L (ref 10–44)
ANION GAP SERPL CALC-SCNC: 13 MMOL/L (ref 8–16)
AST SERPL-CCNC: 18 U/L (ref 10–40)
BASOPHILS # BLD AUTO: 0.04 K/UL (ref 0–0.2)
BASOPHILS NFR BLD: 0.4 % (ref 0–1.9)
BILIRUB SERPL-MCNC: 0.3 MG/DL (ref 0.1–1)
BILIRUB SERPL-MCNC: NORMAL MG/DL
BLOOD URINE, POC: NORMAL
BUN SERPL-MCNC: 5 MG/DL (ref 6–20)
CALCIUM SERPL-MCNC: 8.8 MG/DL (ref 8.7–10.5)
CHLORIDE SERPL-SCNC: 108 MMOL/L (ref 95–110)
CO2 SERPL-SCNC: 18 MMOL/L (ref 23–29)
COLOR, POC UA: NORMAL
CREAT SERPL-MCNC: 0.6 MG/DL (ref 0.5–1.4)
DIFFERENTIAL METHOD: ABNORMAL
EOSINOPHIL # BLD AUTO: 0.1 K/UL (ref 0–0.5)
EOSINOPHIL NFR BLD: 0.5 % (ref 0–8)
ERYTHROCYTE [DISTWIDTH] IN BLOOD BY AUTOMATED COUNT: 14.1 % (ref 11.5–14.5)
EST. GFR  (AFRICAN AMERICAN): >60 ML/MIN/1.73 M^2
EST. GFR  (NON AFRICAN AMERICAN): >60 ML/MIN/1.73 M^2
GLUCOSE SERPL-MCNC: 120 MG/DL (ref 70–110)
GLUCOSE UR QL STRIP: NORMAL
HCT VFR BLD AUTO: 35.8 % (ref 37–48.5)
HGB BLD-MCNC: 12 G/DL (ref 12–16)
IMM GRANULOCYTES # BLD AUTO: 0.06 K/UL (ref 0–0.04)
IMM GRANULOCYTES NFR BLD AUTO: 0.6 % (ref 0–0.5)
INFLUENZA A, MOLECULAR: NEGATIVE
INFLUENZA B, MOLECULAR: NEGATIVE
KETONES UR QL STRIP: NORMAL
LEUKOCYTE ESTERASE URINE, POC: NORMAL
LYMPHOCYTES # BLD AUTO: 2.1 K/UL (ref 1–4.8)
LYMPHOCYTES NFR BLD: 19.2 % (ref 18–48)
MCH RBC QN AUTO: 31.6 PG (ref 27–31)
MCHC RBC AUTO-ENTMCNC: 33.5 G/DL (ref 32–36)
MCV RBC AUTO: 94 FL (ref 82–98)
MONOCYTES # BLD AUTO: 0.6 K/UL (ref 0.3–1)
MONOCYTES NFR BLD: 5.3 % (ref 4–15)
NEUTROPHILS # BLD AUTO: 8 K/UL (ref 1.8–7.7)
NEUTROPHILS NFR BLD: 74 % (ref 38–73)
NITRITE, POC UA: NORMAL
NRBC BLD-RTO: 0 /100 WBC
PH, POC UA: NORMAL
PLATELET # BLD AUTO: 239 K/UL (ref 150–450)
PMV BLD AUTO: 9.8 FL (ref 9.2–12.9)
POCT GLUCOSE: 138 MG/DL (ref 70–110)
POTASSIUM SERPL-SCNC: 3.5 MMOL/L (ref 3.5–5.1)
PROT SERPL-MCNC: 6.1 G/DL (ref 6–8.4)
PROTEIN, POC: NORMAL
RBC # BLD AUTO: 3.8 M/UL (ref 4–5.4)
SARS-COV-2 RDRP RESP QL NAA+PROBE: NEGATIVE
SODIUM SERPL-SCNC: 139 MMOL/L (ref 136–145)
SPECIFIC GRAVITY, POC UA: NORMAL
SPECIMEN SOURCE: NORMAL
UROBILINOGEN, POC UA: NORMAL
WBC # BLD AUTO: 10.74 K/UL (ref 3.9–12.7)

## 2022-06-09 PROCEDURE — 63600175 PHARM REV CODE 636 W HCPCS

## 2022-06-09 PROCEDURE — 99283 EMERGENCY DEPT VISIT LOW MDM: CPT | Mod: 25,,, | Performed by: OBSTETRICS & GYNECOLOGY

## 2022-06-09 PROCEDURE — 93010 ELECTROCARDIOGRAM REPORT: CPT | Mod: ,,, | Performed by: INTERNAL MEDICINE

## 2022-06-09 PROCEDURE — 85025 COMPLETE CBC W/AUTO DIFF WBC: CPT

## 2022-06-09 PROCEDURE — 59025 FETAL NON-STRESS TEST: CPT

## 2022-06-09 PROCEDURE — U0002 COVID-19 LAB TEST NON-CDC: HCPCS

## 2022-06-09 PROCEDURE — 59025 FETAL NON-STRESS TEST: CPT | Mod: 26,,, | Performed by: OBSTETRICS & GYNECOLOGY

## 2022-06-09 PROCEDURE — 59025 PR FETAL 2N-STRESS TEST: ICD-10-PCS | Mod: 26,,, | Performed by: OBSTETRICS & GYNECOLOGY

## 2022-06-09 PROCEDURE — 63600175 PHARM REV CODE 636 W HCPCS: Performed by: STUDENT IN AN ORGANIZED HEALTH CARE EDUCATION/TRAINING PROGRAM

## 2022-06-09 PROCEDURE — 81002 URINALYSIS NONAUTO W/O SCOPE: CPT

## 2022-06-09 PROCEDURE — 99283 PR EMERGENCY DEPT VISIT,LEVEL III: ICD-10-PCS | Mod: 25,,, | Performed by: OBSTETRICS & GYNECOLOGY

## 2022-06-09 PROCEDURE — 93010 EKG 12-LEAD: ICD-10-PCS | Mod: ,,, | Performed by: INTERNAL MEDICINE

## 2022-06-09 PROCEDURE — 93005 ELECTROCARDIOGRAM TRACING: CPT

## 2022-06-09 PROCEDURE — 87502 INFLUENZA DNA AMP PROBE: CPT

## 2022-06-09 PROCEDURE — 25000003 PHARM REV CODE 250

## 2022-06-09 PROCEDURE — 82962 GLUCOSE BLOOD TEST: CPT

## 2022-06-09 PROCEDURE — 80053 COMPREHEN METABOLIC PANEL: CPT

## 2022-06-09 PROCEDURE — 99284 EMERGENCY DEPT VISIT MOD MDM: CPT | Mod: 25

## 2022-06-09 RX ORDER — FAMOTIDINE 20 MG/1
20 TABLET, FILM COATED ORAL ONCE
Status: COMPLETED | OUTPATIENT
Start: 2022-06-09 | End: 2022-06-09

## 2022-06-09 RX ORDER — SODIUM CITRATE AND CITRIC ACID MONOHYDRATE 334; 500 MG/5ML; MG/5ML
30 SOLUTION ORAL ONCE
Status: COMPLETED | OUTPATIENT
Start: 2022-06-09 | End: 2022-06-09

## 2022-06-09 RX ADMIN — FAMOTIDINE 20 MG: 20 TABLET ORAL at 11:06

## 2022-06-09 RX ADMIN — SODIUM CITRATE AND CITRIC ACID MONOHYDRATE 30 ML: 500; 334 SOLUTION ORAL at 11:06

## 2022-06-09 RX ADMIN — SODIUM CHLORIDE, SODIUM LACTATE, POTASSIUM CHLORIDE, AND CALCIUM CHLORIDE 1000 ML: .6; .31; .03; .02 INJECTION, SOLUTION INTRAVENOUS at 11:06

## 2022-06-09 RX ADMIN — SODIUM CHLORIDE, SODIUM LACTATE, POTASSIUM CHLORIDE, AND CALCIUM CHLORIDE 1000 ML: .6; .31; .03; .02 INJECTION, SOLUTION INTRAVENOUS at 12:06

## 2022-06-09 NOTE — ED TRIAGE NOTES
Pt presents to the UGO c/o chest pain and tightness.  Pt suffers from GERD and also endorses nausea.  Denies VB, LOF, and ctx.  +FM.  Pt has history of ICH.  Dr. Jimenez notified of pt's arrival.

## 2022-06-09 NOTE — TELEPHONE ENCOUNTER
Patient called today and had tightness of the chest last night and this morning. Pt was instructed to come in to the OB  ED to be seen. Pt was calling back after her visit with the Ob Ed to see what you think she needs to do now.

## 2022-06-09 NOTE — ED PROVIDER NOTES
Encounter Date: 2022       History     Chief Complaint   Patient presents with    Chest Pain     Laila Pappas is a 31 y.o.  at 31w4d gestation presenting for chest discomfort. Patient reports she had increased heartburn last night, took Tums with some relief. When she went to bed she states her chest pain and burning were worsened with lying down. Also reports some nausea last night, no emesis. When she arrived to the UGO she reported dizziness/lightheadedness and palpitations. No syncope. Denies headaches, vision changes, RUQ pain. Denies contractions, vaginal bleeding, or loss of fluid. This IUP is complicated by IVF preg, idiopathic intracranial hypertension, Rh negative, h/o HSV.        Review of patient's allergies indicates:   Allergen Reactions    Doxycycline Other (See Comments)     Triggers headaches    Bactrim ds [sulfamethoxazole-trimethoprim] Nausea And Vomiting    Nsaids (non-steroidal anti-inflammatory drug) Other (See Comments)     D/t h/o PUD and H pylori    Codeine Rash     Past Medical History:   Diagnosis Date    Bilateral knee pain 2018    Endometriosis     treated by GYN at     Epigastric abdominal pain 2016    Gastroesophageal reflux disease 2016    Helicobacter pylori ab+     Helicobacter pylori ab+     High-tone pelvic floor dysfunction 2016    IIH (idiopathic intracranial hypertension)     Dr. Rogers - [facility]    Palpitations 2016     Past Surgical History:   Procedure Laterality Date    ARTHROSCOPY OF KNEE Left 2021    Procedure: ARTHROSCOPY, KNEE;  Surgeon: Deidra Mejias MD;  Location: [facility] OR;  Service: Orthopedics;  Laterality: Left;    COLONOSCOPY N/A 2018    Procedure: COLONOSCOPY;  Surgeon: Micki Gross MD;  Location: [address]);  Service: Endoscopy;  Laterality: N/A;  preferrably in Dec 2018, CRS ok if needed.    ESOPHAGOGASTRODUODENOSCOPY N/A 10/30/2018    Procedure: EGD (ESOPHAGOGASTRODUODENOSCOPY);   Surgeon: Greg Leach MD;  Location: [facility] ENDO (2ND FLR);  Service: Endoscopy;  Laterality: N/A;  ok to schedule per Kimmy    KNEE ARTHROSCOPY W/ DEBRIDEMENT  4/6/2021    Procedure: ARTHROSCOPY, KNEE, WITH DEBRIDEMENT;  Surgeon: Deidra Mejias MD;  Location: [facility] OR;  Service: Orthopedics;;    KNEE ARTHROSCOPY W/ MENISCECTOMY Right 4/6/2021    Procedure: ARTHROSCOPY, KNEE, WITH MENISCECTOMY;  Surgeon: Deidra Mejias MD;  Location: [facility] OR;  Service: Orthopedics;  Laterality: Right;    KNEE ARTHROSCOPY W/ PLICA EXCISION Left 4/6/2021    Procedure: EXCISION, PLICA, KNEE, ARTHROSCOPIC;  Surgeon: Deidra Mejias MD;  Location: [facility] OR;  Service: Orthopedics;  Laterality: Left;    LAPAROSCOPY  2017    dx with endo.  no removal    NO PAST SURGERIES      SYNOVECTOMY OF KNEE Left 4/6/2021    Procedure: SYNOVECTOMY, KNEE;  Surgeon: Deidra Mejias MD;  Location: [facility];  Service: Orthopedics;  Laterality: Left;    UPPER GASTROINTESTINAL ENDOSCOPY       Family History   Problem Relation Age of Onset    Diabetes Mother     No Known Problems Father     No Known Problems Sister     Hypertension Brother     Hypertension Maternal Grandmother     Hypertension Maternal Grandfather     No Known Problems Paternal Grandmother     No Known Problems Paternal Grandfather     Colon cancer Neg Hx     Crohn's disease Neg Hx     Esophageal cancer Neg Hx     Inflammatory bowel disease Neg Hx     Irritable bowel syndrome Neg Hx     Rectal cancer Neg Hx     Stomach cancer Neg Hx     Ulcerative colitis Neg Hx      Social History     Tobacco Use    Smoking status: Never Smoker    Smokeless tobacco: Never Used   Substance Use Topics    Alcohol use: No    Drug use: No     Review of Systems   Constitutional: Positive for diaphoresis. Negative for chills and fever.   HENT: Negative for congestion and sore throat.    Eyes: Negative for visual disturbance.   Respiratory: Negative for cough and shortness of breath.    Cardiovascular:  Positive for chest pain.   Gastrointestinal: Negative for abdominal pain, constipation, diarrhea, nausea and vomiting.   Genitourinary: Negative for dysuria, vaginal bleeding and vaginal discharge.   Musculoskeletal: Negative for back pain.   Skin: Negative for rash.   Neurological: Positive for dizziness. Negative for weakness and headaches.   Hematological: Does not bruise/bleed easily.       Physical Exam     Initial Vitals   BP Pulse Resp Temp SpO2   06/09/22 1116 06/09/22 1110 06/09/22 1110 06/09/22 1110 06/09/22 1110   (!) 87/52 (!) 135 16 96.4 °F (35.8 °C) 99 %      MAP       --                Physical Exam    Constitutional: She appears well-developed and well-nourished. No distress.   HENT:   Head: Normocephalic and atraumatic.   Eyes: EOM are normal.   Neck:   Normal range of motion.  Cardiovascular: Normal rate.   Pulmonary/Chest: No respiratory distress.   Abdominal: There is no abdominal tenderness. There is no rebound and no guarding.   Musculoskeletal:         General: No edema.      Cervical back: Normal range of motion.     Neurological: She is alert and oriented to person, place, and time. She has normal strength and normal reflexes. She displays normal reflexes. No cranial nerve deficit or sensory deficit.   Skin: Skin is warm and dry.   Psychiatric: She has a normal mood and affect. Thought content normal.         ED Course   Obtain Fetal nonstress test (NST)    Date/Time: 6/9/2022 12:06 PM  Performed by: Morena Jimenez MD  Authorized by: Morena Jimenez MD     Nonstress Test:     Variability:  6-25 BPM    Decelerations:  None    Accelerations:  15 bpm    Acoustic Stimulator: No      Baseline:  140    Uterine Irritability: No      Contractions:  Not present  Biophysical Profile:     Nonstress Test Interpretation: reactive      Overall Impression:  Reassuring  Post-procedure:     Patient tolerance:  Patient tolerated the procedure well with no immediate complications      Labs Reviewed   CBC W/ AUTO  DIFFERENTIAL - Abnormal; Notable for the following components:       Result Value    RBC 3.80 (*)     Hematocrit 35.8 (*)     MCH 31.6 (*)     Immature Granulocytes 0.6 (*)     Gran # (ANC) 8.0 (*)     Immature Grans (Abs) 0.06 (*)     Gran % 74.0 (*)     All other components within normal limits   COMPREHENSIVE METABOLIC PANEL - Abnormal; Notable for the following components:    CO2 18 (*)     Glucose 120 (*)     BUN 5 (*)     Albumin 2.6 (*)     All other components within normal limits   INFLUENZA A & B BY MOLECULAR   SARS-COV-2 RNA AMPLIFICATION, QUAL   POCT URINALYSIS, DIPSTICK OR TABLET REAGENT, AUTOMATED, WITH MICROSCOP   POCT GLUCOSE MONITORING CONTINUOUS          Imaging Results    None          Medications   lactated ringers bolus 1,000 mL (1,000 mLs Intravenous New Bag 22 1206)   lactated ringers bolus 1,000 mL (1,000 mLs Intravenous New Bag 22 1142)   famotidine tablet 20 mg (20 mg Oral Given 22 1148)   sodium citrate-citric acid 500-334 mg/5 ml solution 30 mL (30 mLs Oral Given 22 1148)     Medical Decision Making:   ED Management:  - Afebrile. Hypotensive and tachycardic on arrival. All BP wnl thereafter   - NST RR  - TOCO: not gary   - EKG: occasional PVCs  - Udip: trace ketones   - POCT B  - CBC: 10/12/35/239  - CMP: wnl   - Orthostatics wnl  - Pepcid and bicitra administered with improvement in symptoms   - 2L IVF bolus given  - Due to PVCs patient advised to avoid potential triggers, such as caffeine. Can consider outpatient cardiology consult if she continues to have s/s  - Patient stable for discharge at this time  - ED return precautions given  - She voiced understanding and is in agreement with the plan                          Clinical Impression:   Final diagnoses:  [Z3A.31] 31 weeks gestation of pregnancy  [I49.3] PVC (premature ventricular contraction)  [K21.9] Gastroesophageal reflux disease, unspecified whether esophagitis present (Primary)          ED  Disposition Condition    Discharge Stable        ED Prescriptions     None        Follow-up Information    None         Morena Jimenez MD  OBGYN, PGY-1       Morena Jimenez MD  Resident  06/09/22 1827

## 2022-06-09 NOTE — DISCHARGE INSTRUCTIONS
Contact your primary OB or after hours at [phone] if you experience any of the following:    Contractions every 7-10 minutes for 1 or more hours.   A sudden gush or constant leaking of fluid.  Heavy vaginal bleeding.   If you experience a constant headache, blurry vision, pain underneath your right rib, or sudden swelling of your hands, feet, and face.   If you are 28 weeks pregnant or greater, you can measure kick counts with a goal of 10 or more movements within 2 hours.     Remember to stay hydrated; drink 8-10 bottles of water a day.     Maintain all follow-up appointments.

## 2022-06-10 ENCOUNTER — PATIENT MESSAGE (OUTPATIENT)
Dept: MATERNAL FETAL MEDICINE | Facility: CLINIC | Age: 32
End: 2022-06-10
Payer: COMMERCIAL

## 2022-06-13 ENCOUNTER — TELEPHONE (OUTPATIENT)
Dept: NEUROLOGY | Facility: CLINIC | Age: 32
End: 2022-06-13
Payer: COMMERCIAL

## 2022-06-13 ENCOUNTER — OFFICE VISIT (OUTPATIENT)
Dept: MATERNAL FETAL MEDICINE | Facility: CLINIC | Age: 32
End: 2022-06-13
Attending: OBSTETRICS & GYNECOLOGY
Payer: COMMERCIAL

## 2022-06-13 ENCOUNTER — TELEPHONE (OUTPATIENT)
Dept: OBSTETRICS AND GYNECOLOGY | Facility: CLINIC | Age: 32
End: 2022-06-13
Payer: COMMERCIAL

## 2022-06-13 ENCOUNTER — PROCEDURE VISIT (OUTPATIENT)
Dept: MATERNAL FETAL MEDICINE | Facility: CLINIC | Age: 32
End: 2022-06-13
Payer: COMMERCIAL

## 2022-06-13 VITALS
SYSTOLIC BLOOD PRESSURE: 118 MMHG | WEIGHT: 190.06 LBS | DIASTOLIC BLOOD PRESSURE: 67 MMHG | HEIGHT: 62 IN | BODY MASS INDEX: 34.98 KG/M2

## 2022-06-13 DIAGNOSIS — O09.819 PREGNANCY RESULTING FROM IN-VITRO FERTILIZATION: ICD-10-CM

## 2022-06-13 DIAGNOSIS — G93.2 IIH (IDIOPATHIC INTRACRANIAL HYPERTENSION): ICD-10-CM

## 2022-06-13 DIAGNOSIS — Z36.89 ENCOUNTER FOR ULTRASOUND TO CHECK FETAL GROWTH: ICD-10-CM

## 2022-06-13 PROCEDURE — 76816 OB US FOLLOW-UP PER FETUS: CPT | Mod: S$GLB,,, | Performed by: OBSTETRICS & GYNECOLOGY

## 2022-06-13 PROCEDURE — 99999 PR PBB SHADOW E&M-EST. PATIENT-LVL III: ICD-10-PCS | Mod: PBBFAC,,, | Performed by: OBSTETRICS & GYNECOLOGY

## 2022-06-13 PROCEDURE — 99214 OFFICE O/P EST MOD 30 MIN: CPT | Mod: 25,S$GLB,, | Performed by: OBSTETRICS & GYNECOLOGY

## 2022-06-13 PROCEDURE — 99999 PR PBB SHADOW E&M-EST. PATIENT-LVL III: CPT | Mod: PBBFAC,,, | Performed by: OBSTETRICS & GYNECOLOGY

## 2022-06-13 PROCEDURE — 76816 PR  US,PREGNANT UTERUS,F/U,TRANSABD APP: ICD-10-PCS | Mod: S$GLB,,, | Performed by: OBSTETRICS & GYNECOLOGY

## 2022-06-13 PROCEDURE — 99214 PR OFFICE/OUTPT VISIT, EST, LEVL IV, 30-39 MIN: ICD-10-PCS | Mod: 25,S$GLB,, | Performed by: OBSTETRICS & GYNECOLOGY

## 2022-06-13 NOTE — ASSESSMENT & PLAN NOTE
Patient seen as a follow up for IIH. She reports daily pulsatile tinnitus and continues to complain of headaches. She has seen Neurology and Ophthalmology with little concern for significant papilledema. We discussed the safety profile of medications in pregnancy for IIH. Many patients are on Diamox during their pregnancy and can continue the medication.  I messaged her Neurologist and he recommended starting Fioricet and low dose Diamox. She would like to speak to him first. Our office has reached out to schedule an earlier follow up.  Recommendations:   Continue care with Neurology.    Benadryl, Reglan, Tylenol okay for symptomatic relief.   Close monitoring of symptoms.

## 2022-06-13 NOTE — TELEPHONE ENCOUNTER
----- Message from Emily Beaver sent at 6/13/2022  3:09 PM CDT -----  Type: Sooner Appointment Request       Patient is requesting a sooner appointment. Patient declined first available appointment listed as well as another facility and provider. Patient will not accept being placed on the waitlist and is requesting a message be sent to doctor.       When is the first available appointment?  07/20     Would the patient rather a call back or a response via [facility]? Call Back       Best Call Back Number:    [phone]

## 2022-06-13 NOTE — PROGRESS NOTES
"Maternal Fetal Medicine Follow up    SUBJECTIVE:     Laila Pappas is a 31 y.o.  female with IUP at 32w1d who is seen for [facility] follow up for management of:      Problem   Pregnancy Resulting From in-Vitro Fertilization   Iih (Idiopathic Intracranial Hypertension)           OBH,PMH,PSH,SH,FH reviewed with no changes      Review of patient's allergies indicates:   Allergen Reactions    Doxycycline Other (See Comments)     Triggers headaches    Bactrim ds [sulfamethoxazole-trimethoprim] Nausea And Vomiting    Nsaids (non-steroidal anti-inflammatory drug) Other (See Comments)     D/t h/o PUD and H pylori    Codeine Rash       Medications:  ASA 81 mg  Folate    PNV    Aneuploidy screening:   PGT- normal per patient      Care team members:  MD Oni - Primary OB       OBJECTIVE:     Blood Pressure: /67 (BP Location: Left arm, Patient Position: Sitting)   Ht 5' 2" (1.575 m)   Wt 86.2 kg (190 lb 0.6 oz)   LMP 10/31/2021   BMI 34.76 kg/m²       Physical Exam:  General: WDWN,NAD    Ultrasound performed. See viewpoint for full ultrasound report.  The fetal anatomic survey was completed today, and no fetal structural abnormalities were identified of the structures imaged today.   Interval fetal growth has been normal, and the AFV is normal.         ASSESSMENT/PLAN:     31 y.o.  female with IUP at 32w1d presents for [facility] follow up.    Pregnancy resulting from in-vitro fertilization  In Vitro Fertilization  This pregnancy is a result of IVF. Patient and her  have been previously counseled however our recommendations are below.    Recommendations:   Close monitoring for preeclampsia.   Start weekly antepartum testing at 36 weeks   Consider delivery between 39 0/7 - 39 6/7 weeks gestation      IIH (idiopathic intracranial hypertension)  Patient seen as a follow up for IIH. She reports daily pulsatile tinnitus and continues to complain of headaches. She has seen Neurology and Ophthalmology with " little concern for significant papilledema. We discussed the safety profile of medications in pregnancy for IIH. Many patients are on Diamox during their pregnancy and can continue the medication.  I messaged her Neurologist and he recommended starting Fioricet and low dose Diamox. She would like to speak to him first. Our office has reached out to schedule an earlier follow up.  Recommendations:   Continue care with Neurology.    Benadryl, Reglan, Tylenol okay for symptomatic relief.   Close monitoring of symptoms.                   20 minutes of total time spent on the encounter, which includes face to face time and non-face to face time preparing to see the patient (eg, review of tests), obtaining and/or reviewing separately obtained history, documenting clinical information in the electronic or other health record, independently interpreting results (not separately reported) and communicating results to the patient/family/caregiver, or care coordination (not separately reported)    Jayleen Trinidad MD  Maternal Fetal Medicine   PGY-6  [facility]

## 2022-06-13 NOTE — ASSESSMENT & PLAN NOTE
In Vitro Fertilization  This pregnancy is a result of IVF. Patient and her  have been previously counseled however our recommendations are below.    Recommendations:   Close monitoring for preeclampsia.   Start weekly antepartum testing at 36 weeks   Consider delivery between 39 0/7 - 39 6/7 weeks gestation

## 2022-06-13 NOTE — TELEPHONE ENCOUNTER
----- Message from Radha Bennett sent at 6/13/2022  9:30 AM CDT -----  Pt is returning call  Pt can be contacted at [phone]

## 2022-06-21 ENCOUNTER — ROUTINE PRENATAL (OUTPATIENT)
Dept: OBSTETRICS AND GYNECOLOGY | Facility: CLINIC | Age: 32
End: 2022-06-21
Payer: COMMERCIAL

## 2022-06-21 ENCOUNTER — HOSPITAL ENCOUNTER (OUTPATIENT)
Dept: PERINATAL CARE | Facility: OTHER | Age: 32
Discharge: HOME | End: 2022-06-21
Attending: OBSTETRICS & GYNECOLOGY
Payer: COMMERCIAL

## 2022-06-21 VITALS
DIASTOLIC BLOOD PRESSURE: 66 MMHG | SYSTOLIC BLOOD PRESSURE: 110 MMHG | BODY MASS INDEX: 34.27 KG/M2 | WEIGHT: 187.38 LBS

## 2022-06-21 DIAGNOSIS — Z3A.33 33 WEEKS GESTATION OF PREGNANCY: Primary | ICD-10-CM

## 2022-06-21 DIAGNOSIS — G93.2 IIH (IDIOPATHIC INTRACRANIAL HYPERTENSION): ICD-10-CM

## 2022-06-21 DIAGNOSIS — Z34.83 ENCOUNTER FOR SUPERVISION OF OTHER NORMAL PREGNANCY IN THIRD TRIMESTER: ICD-10-CM

## 2022-06-21 DIAGNOSIS — O09.819 PREGNANCY RESULTING FROM IN-VITRO FERTILIZATION: ICD-10-CM

## 2022-06-21 PROCEDURE — 59025 FETAL NON-STRESS TEST: CPT | Mod: 26,,, | Performed by: OBSTETRICS & GYNECOLOGY

## 2022-06-21 PROCEDURE — 59025 FETAL NON-STRESS TEST: CPT

## 2022-06-21 PROCEDURE — 99999 PR PBB SHADOW E&M-EST. PATIENT-LVL III: CPT | Mod: PBBFAC,,, | Performed by: NURSE PRACTITIONER

## 2022-06-21 PROCEDURE — 0502F SUBSEQUENT PRENATAL CARE: CPT | Mod: S$GLB,,, | Performed by: NURSE PRACTITIONER

## 2022-06-21 PROCEDURE — 99999 PR PBB SHADOW E&M-EST. PATIENT-LVL III: ICD-10-PCS | Mod: PBBFAC,,, | Performed by: NURSE PRACTITIONER

## 2022-06-21 PROCEDURE — 59025 PRENATAL TESTING - NST/AFI: ICD-10-PCS | Mod: 26,,, | Performed by: OBSTETRICS & GYNECOLOGY

## 2022-06-21 PROCEDURE — 76815 PRENATAL TESTING - NST/AFI: ICD-10-PCS | Mod: 26,,, | Performed by: OBSTETRICS & GYNECOLOGY

## 2022-06-21 PROCEDURE — 0502F PR SUBSEQUENT PRENATAL CARE: ICD-10-PCS | Mod: S$GLB,,, | Performed by: NURSE PRACTITIONER

## 2022-06-21 PROCEDURE — 76815 OB US LIMITED FETUS(S): CPT

## 2022-06-21 PROCEDURE — 76815 OB US LIMITED FETUS(S): CPT | Mod: 26,,, | Performed by: OBSTETRICS & GYNECOLOGY

## 2022-06-21 NOTE — PROGRESS NOTES
Here for routine OB appt at 33w2d, with no complaints.  Seen in the [facility] about 2 weeks ago for chest pain- diagnosed with GERD and given Pepcid and Bicitra at the time. EKG saw some PVCs. Still having chest pain, constant, no relief. Denies dizzy episodes. Reports good FM.  Denies LOF, denies VB, denies contractions.  Reviewed warning signs of Labor and Preeclampsia.  Daily FM counts reinforced.  F/U scheduled 2 weeks   Growth scan normal  S/p Tdap and Rhogam  Already started prenatal testing weekly- went today   Cards referral placed

## 2022-06-21 NOTE — PATIENT INSTRUCTIONS
LABOR AND DELIVERY PHONE NUMBER, [phone] (OPEN 24/7, LOCATED ON 6TH FLOOR OF HOSPITAL)  [address] PHONE NUMBER, [phone] (OPEN MON-FRI, 8a-5p)

## 2022-06-23 ENCOUNTER — OFFICE VISIT (OUTPATIENT)
Dept: CARDIOLOGY | Facility: CLINIC | Age: 32
End: 2022-06-23
Payer: COMMERCIAL

## 2022-06-23 ENCOUNTER — PATIENT MESSAGE (OUTPATIENT)
Dept: CARDIOLOGY | Facility: CLINIC | Age: 32
End: 2022-06-23

## 2022-06-23 ENCOUNTER — PATIENT MESSAGE (OUTPATIENT)
Dept: OBSTETRICS AND GYNECOLOGY | Facility: CLINIC | Age: 32
End: 2022-06-23
Payer: COMMERCIAL

## 2022-06-23 VITALS
SYSTOLIC BLOOD PRESSURE: 110 MMHG | OXYGEN SATURATION: 96 % | HEART RATE: 111 BPM | WEIGHT: 188.5 LBS | BODY MASS INDEX: 34.69 KG/M2 | DIASTOLIC BLOOD PRESSURE: 62 MMHG | HEIGHT: 62 IN

## 2022-06-23 DIAGNOSIS — R07.89 OTHER CHEST PAIN: ICD-10-CM

## 2022-06-23 DIAGNOSIS — R06.02 SOB (SHORTNESS OF BREATH): Primary | ICD-10-CM

## 2022-06-23 DIAGNOSIS — Z3A.33 33 WEEKS GESTATION OF PREGNANCY: ICD-10-CM

## 2022-06-23 DIAGNOSIS — R07.9 ACUTE CHEST PAIN: ICD-10-CM

## 2022-06-23 DIAGNOSIS — E78.5 DYSLIPIDEMIA: ICD-10-CM

## 2022-06-23 DIAGNOSIS — G93.2 IIH (IDIOPATHIC INTRACRANIAL HYPERTENSION): ICD-10-CM

## 2022-06-23 PROCEDURE — 99205 PR OFFICE/OUTPT VISIT, NEW, LEVL V, 60-74 MIN: ICD-10-PCS | Mod: S$GLB,,, | Performed by: INTERNAL MEDICINE

## 2022-06-23 PROCEDURE — 99999 PR PBB SHADOW E&M-EST. PATIENT-LVL IV: ICD-10-PCS | Mod: PBBFAC,,, | Performed by: INTERNAL MEDICINE

## 2022-06-23 PROCEDURE — 93010 EKG 12-LEAD: ICD-10-PCS | Mod: S$GLB,,, | Performed by: INTERNAL MEDICINE

## 2022-06-23 PROCEDURE — 99205 OFFICE O/P NEW HI 60 MIN: CPT | Mod: S$GLB,,, | Performed by: INTERNAL MEDICINE

## 2022-06-23 PROCEDURE — 99999 PR PBB SHADOW E&M-EST. PATIENT-LVL IV: CPT | Mod: PBBFAC,,, | Performed by: INTERNAL MEDICINE

## 2022-06-23 PROCEDURE — 93010 ELECTROCARDIOGRAM REPORT: CPT | Mod: S$GLB,,, | Performed by: INTERNAL MEDICINE

## 2022-06-23 PROCEDURE — 93005 EKG 12-LEAD: ICD-10-PCS | Mod: S$GLB,,, | Performed by: INTERNAL MEDICINE

## 2022-06-23 PROCEDURE — 93005 ELECTROCARDIOGRAM TRACING: CPT | Mod: S$GLB,,, | Performed by: INTERNAL MEDICINE

## 2022-06-23 NOTE — PROGRESS NOTES
"[facility]      Chief Complaint   Patient presents with    Shortness of Breath    Chest Pain       Patient ID: Laila Pappas is a 31 y.o. female who is 33 weeks pregnant with GERD, idiopathic intracranial hypertension, who presents for an initial appointment.  Pertinent history/events are as follows:     -Pt kindly referred by Amanda Benjamin NP for evaluation of chest pain and SOB.     HPI:  Ms. Pappas reports chest tightness and SOB which started in 4/2022 after she was diagnosed with COVID.  States chest tightness and SOB is constant and "never goes away".  Nothing makes the symptoms better or worse.  She presented to the ED due to these symptoms on 6/9/2022.  Heart rated documented as 135 at that time.  There is no EKG in Epic from that encounter.  She was diagnosed with GERD and given Pepcid and bicitra administered with improvement in symptoms.  Reports family history of CAD with MI in brother at age 34.  Exercise Stress Echo on 1/8/2021 revealed no evidence of myocardial ischemia; EF 63%; normal LV diastolic and RV systolic function; normal central venous pressure (3 mmHg); estimated PA systolic pressure is 21 mmHg.    Past Medical History:   Diagnosis Date    Bilateral knee pain 6/19/2018    Endometriosis     treated by GYN at     Epigastric abdominal pain 12/5/2016    Gastroesophageal reflux disease 7/6/2016    Helicobacter pylori ab+     Helicobacter pylori ab+     High-tone pelvic floor dysfunction 5/2/2016    IIH (idiopathic intracranial hypertension)     Dr. Rogers - [facility]    Palpitations 7/6/2016     Past Surgical History:   Procedure Laterality Date    ARTHROSCOPY OF KNEE Left 4/6/2021    Procedure: ARTHROSCOPY, KNEE;  Surgeon: Deidra Mejias MD;  Location: [facility] OR;  Service: Orthopedics;  Laterality: Left;    COLONOSCOPY N/A 12/13/2018    Procedure: COLONOSCOPY;  Surgeon: Micki Gross MD;  Location: [facility] ([address] [place] [address]);  Service: Endoscopy;  Laterality: N/A;  " preferrably in Dec 2018, CRS ok if needed.    ESOPHAGOGASTRODUODENOSCOPY N/A 10/30/2018    Procedure: EGD (ESOPHAGOGASTRODUODENOSCOPY);  Surgeon: Greg Leach MD;  Location: [facility] ENDO (2ND FLR);  Service: Endoscopy;  Laterality: N/A;  ok to schedule per Kimmy    KNEE ARTHROSCOPY W/ DEBRIDEMENT  4/6/2021    Procedure: ARTHROSCOPY, KNEE, WITH DEBRIDEMENT;  Surgeon: Deidra Mejias MD;  Location: [facility] OR;  Service: Orthopedics;;    KNEE ARTHROSCOPY W/ MENISCECTOMY Right 4/6/2021    Procedure: ARTHROSCOPY, KNEE, WITH MENISCECTOMY;  Surgeon: Deidra Mejias MD;  Location: [facility] OR;  Service: Orthopedics;  Laterality: Right;    KNEE ARTHROSCOPY W/ PLICA EXCISION Left 4/6/2021    Procedure: EXCISION, PLICA, KNEE, ARTHROSCOPIC;  Surgeon: Deidra Mejias MD;  Location: [facility] OR;  Service: Orthopedics;  Laterality: Left;    LAPAROSCOPY  2017    dx with endo.  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   Tobacco Use    Smoking status: Never Smoker    Smokeless tobacco: Never Used   Substance and Sexual Activity    Alcohol use: No    Drug use: No    Sexual activity: Yes     Partners: Male     Birth control/protection: None     Family History   Problem Relation Age of Onset    Diabetes Mother     No Known Problems Father     No Known Problems Sister     Hypertension Brother     Hypertension Maternal Grandmother     Hypertension Maternal Grandfather     No Known Problems Paternal Grandmother     No Known Problems Paternal Grandfather     Colon cancer Neg Hx     Crohn's disease Neg Hx     Esophageal cancer Neg Hx     Inflammatory bowel disease Neg Hx      "Irritable bowel syndrome Neg Hx     Rectal cancer Neg Hx     Stomach cancer Neg Hx     Ulcerative colitis Neg Hx        Review of patient's allergies indicates:   Allergen Reactions    Doxycycline Other (See Comments)     Triggers headaches    Bactrim ds [sulfamethoxazole-trimethoprim] Nausea And Vomiting    Nsaids (non-steroidal anti-inflammatory drug) Other (See Comments)     D/t h/o PUD and H pylori    Codeine Rash       Medication List with Changes/Refills   Current Medications    ASPIRIN (ECOTRIN) 81 MG EC TABLET    Take 81 mg by mouth once daily.    CLOTRIMAZOLE-BETAMETHASONE 1-0.05% (LOTRISONE) CREAM    Apply topically Daily.    FERROUS SULFATE 325 (65 FE) MG EC TABLET    Take 325 mg by mouth 3 (three) times daily with meals.    FOLIC ACID (FOLVITE) 1 MG TABLET    Take 1 mg by mouth once daily.    PRENATAL VIT 10-IRON-FOLIC-DHA (VITAFOL-OB+DHA) 65-1-250 MG CMPK    Take 1 tablet by mouth.    VITAMIN D (VITAMIN D3) 1000 UNITS TAB    Take 1,000 Units by mouth once daily.       Review of Systems  Constitution: Denies chills, fever, and sweats.  HENT: Denies headaches or blurry vision.  Cardiovascular: Denies chest pain or irregular heart beat.  Respiratory: Denies cough or shortness of breath.  Gastrointestinal: Denies abdominal pain, nausea, or vomiting.  Musculoskeletal: Denies muscle cramps.  Neurological: Denies dizziness or focal weakness.  Psychiatric/Behavioral: Normal mental status.  Hematologic/Lymphatic: Denies bleeding problem or easy bruising/bleeding.  Skin: Denies rash or suspicious lesions    Physical Examination  Ht 5' 2" (1.575 m)   Wt 85.5 kg (188 lb 7.9 oz)   LMP 10/31/2021   BMI 34.48 kg/m²     Constitutional: No acute distress, conversant  HEENT: Sclera anicteric, Pupils equal, round and reactive to light, extraocular motions intact, Oropharynx clear  Neck: No JVD, no carotid bruits  Cardiovascular: regular rate and rhythm, no murmur, rubs or gallops, normal S1/S2  Pulmonary: Clear " to auscultation bilaterally  Abdominal: Abdomen soft, nontender, nondistended, positive bowel sounds  Extremities: No lower extremity edema,   Pulses:  Carotid pulses are 2+ on the right side, and 2+ on the left side.  Radial pulses are 2+ on the right side, and 2+ on the left side.   Femoral pulses are 2+ on the right side, and 2+ on the left side.  Popliteal pulses are 2+ on the right side, and 2+ on the left side.   Dorsalis pedis pulses are 2+ on the right side, and 2+ on the left side.   Posterior tibial pulses are 2+ on the right side, and 2+ on the left side.    Skin: No ecchymosis, erythema, or ulcers  Psych: Alert and oriented x 3, appropriate affect  Neuro: CNII-XII intact, no focal deficits    Labs:  Most Recent Data  CBC:   Lab Results   Component Value Date    WBC 10.74 06/09/2022    HGB 12.0 06/09/2022    HCT 35.8 (L) 06/09/2022     06/09/2022    MCV 94 06/09/2022    RDW 14.1 06/09/2022     BMP:   Lab Results   Component Value Date     06/09/2022    K 3.5 06/09/2022     06/09/2022    CO2 18 (L) 06/09/2022    BUN 5 (L) 06/09/2022    CREATININE 0.6 06/09/2022     (H) 06/09/2022    CALCIUM 8.8 06/09/2022     LFTS;   Lab Results   Component Value Date    PROT 6.1 06/09/2022    ALBUMIN 2.6 (L) 06/09/2022    BILITOT 0.3 06/09/2022    AST 18 06/09/2022    ALKPHOS 77 06/09/2022    ALT 13 06/09/2022     COAGS:   Lab Results   Component Value Date    INR 1.0 09/22/2021     FLP:   Lab Results   Component Value Date    CHOL 204 (H) 10/29/2021    HDL 41 10/29/2021    LDLCALC 153.8 10/29/2021    TRIG 46 10/29/2021    CHOLHDL 20.1 10/29/2021     CARDIAC:   Lab Results   Component Value Date    TROPONINI <0.006 06/17/2021       Imaging:    EKG 6/17/2021:  Normal sinus rhythm  Nonspecific T wave abnormality    Echo 7/8/2021:  · The left ventricle is normal in size with normal systolic function.  · The estimated ejection fraction is 60%.  · Normal left ventricular diastolic function.  · Normal  right ventricular size with normal right ventricular systolic function.  · Normal central venous pressure (3 mmHg).    Exercise Stress Echo 1/8/2021:  · The left ventricle is normal in size with normal systolic function.  · The patient reported chest pain during the stress test.  · The test was stopped because the patient experienced chest pain.  · There were no arrhythmias during stress.  · The patient's exercise capacity was below average.  · The ECG portion of this study is negative for myocardial ischemia.  · The estimated ejection fraction is 63%.  · Normal left ventricular diastolic function.  · Normal right ventricular size with normal right ventricular systolic function.  · Normal central venous pressure (3 mmHg).  · The estimated PA systolic pressure is 21 mmHg.  · The stress ECG and echo portions of this study are negative for myocardial ischemia despite the mild chest pain reported      Assessment/Plan:   Laila Pappas is a 31 y.o. female who is 33 weeks pregnant with GERD, idiopathic intracranial hypertension, who presents for an initial appointment.     1. Chest Pain/SOB- Symptoms likely related to 33 weeks gestation of pregnancy.  Pt also with GERD and previous COVID19 infection.  Exercise Stress Echo on 1/8/2021 revealed no evidence of myocardial ischemia; EF 63%; normal LV diastolic and RV systolic function; normal central venous pressure (3 mmHg); estimated PA systolic pressure is 21 mmHg. EKG today shows.  Check 48 hour holter to evaluate for arrhythmia.  Check V/Q scan and BLE ultrasound to rule out PE/DVT.    Will call pt with results of studies  [facility] follow up in 3 months    Total duration of face to face visit time 45 minutes.  Total time spent counseling greater than fifty percent of total visit time.  Counseling included discussion regarding imaging findings, diagnosis, possibilities, treatment options, risks and benefits.  The patient had many questions regarding the options and  long-term effects.    Gus Gudino MD, PhD  Interventional Cardiology

## 2022-06-24 ENCOUNTER — PATIENT MESSAGE (OUTPATIENT)
Dept: OBSTETRICS AND GYNECOLOGY | Facility: CLINIC | Age: 32
End: 2022-06-24
Payer: COMMERCIAL

## 2022-06-24 ENCOUNTER — TELEPHONE (OUTPATIENT)
Dept: OBSTETRICS AND GYNECOLOGY | Facility: CLINIC | Age: 32
End: 2022-06-24
Payer: COMMERCIAL

## 2022-06-28 ENCOUNTER — HOSPITAL ENCOUNTER (OUTPATIENT)
Dept: PERINATAL CARE | Facility: OTHER | Age: 32
Discharge: HOME | End: 2022-06-28
Attending: OBSTETRICS & GYNECOLOGY
Payer: COMMERCIAL

## 2022-06-28 DIAGNOSIS — Z34.83 ENCOUNTER FOR SUPERVISION OF OTHER NORMAL PREGNANCY IN THIRD TRIMESTER: ICD-10-CM

## 2022-06-28 DIAGNOSIS — O09.819 PREGNANCY RESULTING FROM IN-VITRO FERTILIZATION: ICD-10-CM

## 2022-06-28 DIAGNOSIS — G93.2 IIH (IDIOPATHIC INTRACRANIAL HYPERTENSION): ICD-10-CM

## 2022-06-28 PROCEDURE — 59025 FETAL NON-STRESS TEST: CPT

## 2022-06-28 PROCEDURE — 59025 PRENATAL TESTING - NST/AFI: ICD-10-PCS | Mod: 26,,, | Performed by: OBSTETRICS & GYNECOLOGY

## 2022-06-28 PROCEDURE — 76815 OB US LIMITED FETUS(S): CPT

## 2022-06-28 PROCEDURE — 76815 OB US LIMITED FETUS(S): CPT | Mod: 26,,, | Performed by: OBSTETRICS & GYNECOLOGY

## 2022-06-28 PROCEDURE — 76815 PRENATAL TESTING - NST/AFI: ICD-10-PCS | Mod: 26,,, | Performed by: OBSTETRICS & GYNECOLOGY

## 2022-06-28 PROCEDURE — 59025 FETAL NON-STRESS TEST: CPT | Mod: 26,,, | Performed by: OBSTETRICS & GYNECOLOGY

## 2022-07-05 ENCOUNTER — PATIENT MESSAGE (OUTPATIENT)
Dept: CARDIOLOGY | Facility: CLINIC | Age: 32
End: 2022-07-05
Payer: COMMERCIAL

## 2022-07-05 ENCOUNTER — ROUTINE PRENATAL (OUTPATIENT)
Dept: OBSTETRICS AND GYNECOLOGY | Facility: CLINIC | Age: 32
End: 2022-07-05
Payer: COMMERCIAL

## 2022-07-05 ENCOUNTER — HOSPITAL ENCOUNTER (OUTPATIENT)
Dept: PERINATAL CARE | Facility: OTHER | Age: 32
Discharge: HOME | End: 2022-07-05
Attending: OBSTETRICS & GYNECOLOGY
Payer: COMMERCIAL

## 2022-07-05 VITALS
SYSTOLIC BLOOD PRESSURE: 106 MMHG | BODY MASS INDEX: 34.64 KG/M2 | DIASTOLIC BLOOD PRESSURE: 68 MMHG | WEIGHT: 189.38 LBS

## 2022-07-05 DIAGNOSIS — Z34.83 ENCOUNTER FOR SUPERVISION OF OTHER NORMAL PREGNANCY IN THIRD TRIMESTER: ICD-10-CM

## 2022-07-05 DIAGNOSIS — O09.813 PREGNANCY RESULTING FROM IN VITRO FERTILIZATION IN THIRD TRIMESTER: Primary | ICD-10-CM

## 2022-07-05 DIAGNOSIS — O09.819 PREGNANCY RESULTING FROM IN-VITRO FERTILIZATION: ICD-10-CM

## 2022-07-05 DIAGNOSIS — G93.2 IIH (IDIOPATHIC INTRACRANIAL HYPERTENSION): Primary | ICD-10-CM

## 2022-07-05 DIAGNOSIS — G93.2 IIH (IDIOPATHIC INTRACRANIAL HYPERTENSION): ICD-10-CM

## 2022-07-05 DIAGNOSIS — K21.9 GASTROESOPHAGEAL REFLUX DISEASE, UNSPECIFIED WHETHER ESOPHAGITIS PRESENT: ICD-10-CM

## 2022-07-05 DIAGNOSIS — G93.2 IDIOPATHIC INTRACRANIAL HYPERTENSION: ICD-10-CM

## 2022-07-05 DIAGNOSIS — E78.5 DYSLIPIDEMIA: ICD-10-CM

## 2022-07-05 PROCEDURE — 59025 FETAL NON-STRESS TEST: CPT

## 2022-07-05 PROCEDURE — 99999 PR PBB SHADOW E&M-EST. PATIENT-LVL II: ICD-10-PCS | Mod: PBBFAC,,, | Performed by: OBSTETRICS & GYNECOLOGY

## 2022-07-05 PROCEDURE — 0502F SUBSEQUENT PRENATAL CARE: CPT | Mod: S$GLB,,, | Performed by: OBSTETRICS & GYNECOLOGY

## 2022-07-05 PROCEDURE — 76815 OB US LIMITED FETUS(S): CPT

## 2022-07-05 PROCEDURE — 76815 PRENATAL TESTING - NST/AFI: ICD-10-PCS | Mod: 26,,, | Performed by: OBSTETRICS & GYNECOLOGY

## 2022-07-05 PROCEDURE — 0502F PR SUBSEQUENT PRENATAL CARE: ICD-10-PCS | Mod: S$GLB,,, | Performed by: OBSTETRICS & GYNECOLOGY

## 2022-07-05 PROCEDURE — 99999 PR PBB SHADOW E&M-EST. PATIENT-LVL II: CPT | Mod: PBBFAC,,, | Performed by: OBSTETRICS & GYNECOLOGY

## 2022-07-05 PROCEDURE — 59025 PRENATAL TESTING - NST/AFI: ICD-10-PCS | Mod: 26,,, | Performed by: OBSTETRICS & GYNECOLOGY

## 2022-07-05 PROCEDURE — 76815 OB US LIMITED FETUS(S): CPT | Mod: 26,,, | Performed by: OBSTETRICS & GYNECOLOGY

## 2022-07-05 PROCEDURE — 59025 FETAL NON-STRESS TEST: CPT | Mod: 26,,, | Performed by: OBSTETRICS & GYNECOLOGY

## 2022-07-06 ENCOUNTER — CLINICAL SUPPORT (OUTPATIENT)
Dept: CARDIOLOGY | Facility: HOSPITAL | Age: 32
End: 2022-07-06
Attending: INTERNAL MEDICINE
Payer: COMMERCIAL

## 2022-07-06 ENCOUNTER — HOSPITAL ENCOUNTER (OUTPATIENT)
Dept: CARDIOLOGY | Facility: HOSPITAL | Age: 32
Discharge: HOME | End: 2022-07-06
Attending: INTERNAL MEDICINE
Payer: COMMERCIAL

## 2022-07-06 ENCOUNTER — PATIENT MESSAGE (OUTPATIENT)
Dept: NEUROLOGY | Facility: CLINIC | Age: 32
End: 2022-07-06
Payer: COMMERCIAL

## 2022-07-06 DIAGNOSIS — R06.02 SOB (SHORTNESS OF BREATH): ICD-10-CM

## 2022-07-06 DIAGNOSIS — Z3A.33 33 WEEKS GESTATION OF PREGNANCY: ICD-10-CM

## 2022-07-06 DIAGNOSIS — R07.9 ACUTE CHEST PAIN: ICD-10-CM

## 2022-07-06 PROCEDURE — 93226 XTRNL ECG REC<48 HR SCAN A/R: CPT

## 2022-07-06 PROCEDURE — 93227 HOLTER MONITOR - 48 HOUR (CUPID ONLY): ICD-10-PCS | Mod: ,,, | Performed by: INTERNAL MEDICINE

## 2022-07-06 PROCEDURE — 93227 XTRNL ECG REC<48 HR R&I: CPT | Mod: ,,, | Performed by: INTERNAL MEDICINE

## 2022-07-06 PROCEDURE — 93970 EXTREMITY STUDY: CPT | Mod: 26,,, | Performed by: INTERNAL MEDICINE

## 2022-07-06 PROCEDURE — 93970 CV US DOPPLER VENOUS LEGS BILATERAL (CUPID ONLY): ICD-10-PCS | Mod: 26,,, | Performed by: INTERNAL MEDICINE

## 2022-07-06 PROCEDURE — 93970 EXTREMITY STUDY: CPT | Mod: 50

## 2022-07-08 ENCOUNTER — PATIENT MESSAGE (OUTPATIENT)
Dept: OBSTETRICS AND GYNECOLOGY | Facility: CLINIC | Age: 32
End: 2022-07-08
Payer: COMMERCIAL

## 2022-07-11 ENCOUNTER — HOSPITAL ENCOUNTER (OUTPATIENT)
Dept: PERINATAL CARE | Facility: OTHER | Age: 32
Discharge: HOME | End: 2022-07-11
Attending: OBSTETRICS & GYNECOLOGY
Payer: COMMERCIAL

## 2022-07-11 DIAGNOSIS — Z34.83 ENCOUNTER FOR SUPERVISION OF OTHER NORMAL PREGNANCY IN THIRD TRIMESTER: ICD-10-CM

## 2022-07-11 DIAGNOSIS — G93.2 IIH (IDIOPATHIC INTRACRANIAL HYPERTENSION): ICD-10-CM

## 2022-07-11 DIAGNOSIS — O09.819 PREGNANCY RESULTING FROM IN-VITRO FERTILIZATION: ICD-10-CM

## 2022-07-11 PROCEDURE — 76818 FETAL BIOPHYS PROFILE W/NST: CPT

## 2022-07-11 PROCEDURE — 76818 PRENATAL TESTING - NST/AFI: ICD-10-PCS | Mod: 26,,, | Performed by: OBSTETRICS & GYNECOLOGY

## 2022-07-11 PROCEDURE — 76818 FETAL BIOPHYS PROFILE W/NST: CPT | Mod: 26,,, | Performed by: OBSTETRICS & GYNECOLOGY

## 2022-07-13 ENCOUNTER — HOSPITAL ENCOUNTER (OUTPATIENT)
Dept: RADIOLOGY | Facility: HOSPITAL | Age: 32
Discharge: HOME | End: 2022-07-13
Attending: INTERNAL MEDICINE
Payer: COMMERCIAL

## 2022-07-13 DIAGNOSIS — R07.9 ACUTE CHEST PAIN: ICD-10-CM

## 2022-07-13 PROCEDURE — A9540 TC99M MAA: HCPCS

## 2022-07-13 PROCEDURE — 78580 NM LUNG SCAN PERFUSION: ICD-10-PCS | Mod: 26,,, | Performed by: RADIOLOGY

## 2022-07-13 PROCEDURE — 78580 LUNG PERFUSION IMAGING: CPT | Mod: 26,,, | Performed by: RADIOLOGY

## 2022-07-14 ENCOUNTER — PATIENT MESSAGE (OUTPATIENT)
Dept: OBSTETRICS AND GYNECOLOGY | Facility: CLINIC | Age: 32
End: 2022-07-14
Payer: COMMERCIAL

## 2022-07-15 ENCOUNTER — DOCUMENTATION ONLY (OUTPATIENT)
Dept: REHABILITATION | Facility: OTHER | Age: 32
End: 2022-07-15
Payer: COMMERCIAL

## 2022-07-15 NOTE — PROGRESS NOTES
Physical Therapy No Show Note       Patient was scheduled for physical therapy at [facility] [place] for 7/15/2022. Pt failed to appear for appointment without prior notification for today, as well as 2 previous appts. Multiple voicemails have been left reminding pt of appt times and asking her to cancel if she could not attend.     Appointments scheduled: 1  Cancel: 0  No Show: 3    Per clinic policy, pt has been removed from schedule due to no shows. Pt will need to contact the clinic for future appointments.      Tory Arnett, PT

## 2022-07-19 ENCOUNTER — ROUTINE PRENATAL (OUTPATIENT)
Dept: OBSTETRICS AND GYNECOLOGY | Facility: CLINIC | Age: 32
End: 2022-07-19
Payer: COMMERCIAL

## 2022-07-19 ENCOUNTER — HOSPITAL ENCOUNTER (OUTPATIENT)
Dept: PERINATAL CARE | Facility: OTHER | Age: 32
Discharge: HOME | End: 2022-07-19
Attending: OBSTETRICS & GYNECOLOGY
Payer: COMMERCIAL

## 2022-07-19 VITALS
WEIGHT: 193.13 LBS | SYSTOLIC BLOOD PRESSURE: 111 MMHG | DIASTOLIC BLOOD PRESSURE: 70 MMHG | BODY MASS INDEX: 35.32 KG/M2

## 2022-07-19 DIAGNOSIS — E78.5 DYSLIPIDEMIA: ICD-10-CM

## 2022-07-19 DIAGNOSIS — Z34.83 ENCOUNTER FOR SUPERVISION OF OTHER NORMAL PREGNANCY IN THIRD TRIMESTER: Primary | ICD-10-CM

## 2022-07-19 DIAGNOSIS — Z34.83 ENCOUNTER FOR SUPERVISION OF OTHER NORMAL PREGNANCY IN THIRD TRIMESTER: ICD-10-CM

## 2022-07-19 DIAGNOSIS — G93.2 IIH (IDIOPATHIC INTRACRANIAL HYPERTENSION): ICD-10-CM

## 2022-07-19 DIAGNOSIS — K21.9 GASTROESOPHAGEAL REFLUX DISEASE, UNSPECIFIED WHETHER ESOPHAGITIS PRESENT: ICD-10-CM

## 2022-07-19 DIAGNOSIS — O09.819 PREGNANCY RESULTING FROM IN-VITRO FERTILIZATION: ICD-10-CM

## 2022-07-19 DIAGNOSIS — E04.2 MULTIPLE THYROID NODULES: ICD-10-CM

## 2022-07-19 PROCEDURE — 59025 FETAL NON-STRESS TEST: CPT | Mod: 26,,, | Performed by: OBSTETRICS & GYNECOLOGY

## 2022-07-19 PROCEDURE — 0502F SUBSEQUENT PRENATAL CARE: CPT | Mod: S$GLB,,, | Performed by: OBSTETRICS & GYNECOLOGY

## 2022-07-19 PROCEDURE — 76815 OB US LIMITED FETUS(S): CPT | Mod: 26,,, | Performed by: OBSTETRICS & GYNECOLOGY

## 2022-07-19 PROCEDURE — 99999 PR PBB SHADOW E&M-EST. PATIENT-LVL II: CPT | Mod: PBBFAC,,, | Performed by: OBSTETRICS & GYNECOLOGY

## 2022-07-19 PROCEDURE — 59025 FETAL NON-STRESS TEST: CPT

## 2022-07-19 PROCEDURE — 76815 OB US LIMITED FETUS(S): CPT

## 2022-07-19 PROCEDURE — 0502F PR SUBSEQUENT PRENATAL CARE: ICD-10-PCS | Mod: S$GLB,,, | Performed by: OBSTETRICS & GYNECOLOGY

## 2022-07-19 PROCEDURE — 76815 PRENATAL TESTING - NST/AFI: ICD-10-PCS | Mod: 26,,, | Performed by: OBSTETRICS & GYNECOLOGY

## 2022-07-19 PROCEDURE — 59025 PRENATAL TESTING - NST/AFI: ICD-10-PCS | Mod: 26,,, | Performed by: OBSTETRICS & GYNECOLOGY

## 2022-07-19 PROCEDURE — 99999 PR PBB SHADOW E&M-EST. PATIENT-LVL II: ICD-10-PCS | Mod: PBBFAC,,, | Performed by: OBSTETRICS & GYNECOLOGY

## 2022-07-19 PROCEDURE — 87081 CULTURE SCREEN ONLY: CPT | Performed by: OBSTETRICS & GYNECOLOGY

## 2022-07-20 ENCOUNTER — OFFICE VISIT (OUTPATIENT)
Dept: NEUROLOGY | Facility: CLINIC | Age: 32
End: 2022-07-20
Payer: COMMERCIAL

## 2022-07-20 VITALS
HEIGHT: 62 IN | BODY MASS INDEX: 35.15 KG/M2 | WEIGHT: 191 LBS | SYSTOLIC BLOOD PRESSURE: 118 MMHG | DIASTOLIC BLOOD PRESSURE: 67 MMHG | HEART RATE: 108 BPM

## 2022-07-20 DIAGNOSIS — G93.2 IIH (IDIOPATHIC INTRACRANIAL HYPERTENSION): Primary | ICD-10-CM

## 2022-07-20 PROCEDURE — 99215 PR OFFICE/OUTPT VISIT, EST, LEVL V, 40-54 MIN: ICD-10-PCS | Mod: S$GLB,,, | Performed by: NEUROLOGICAL SURGERY

## 2022-07-20 PROCEDURE — 99999 PR PBB SHADOW E&M-EST. PATIENT-LVL III: ICD-10-PCS | Mod: PBBFAC,,, | Performed by: NEUROLOGICAL SURGERY

## 2022-07-20 PROCEDURE — 99999 PR PBB SHADOW E&M-EST. PATIENT-LVL III: CPT | Mod: PBBFAC,,, | Performed by: NEUROLOGICAL SURGERY

## 2022-07-20 PROCEDURE — 99215 OFFICE O/P EST HI 40 MIN: CPT | Mod: S$GLB,,, | Performed by: NEUROLOGICAL SURGERY

## 2022-07-21 ENCOUNTER — PATIENT MESSAGE (OUTPATIENT)
Dept: OBSTETRICS AND GYNECOLOGY | Facility: CLINIC | Age: 32
End: 2022-07-21
Payer: COMMERCIAL

## 2022-07-23 LAB — BACTERIA SPEC AEROBE CULT: NORMAL

## 2022-07-26 ENCOUNTER — ROUTINE PRENATAL (OUTPATIENT)
Dept: OBSTETRICS AND GYNECOLOGY | Facility: CLINIC | Age: 32
End: 2022-07-26
Payer: COMMERCIAL

## 2022-07-26 ENCOUNTER — HOSPITAL ENCOUNTER (OUTPATIENT)
Dept: PERINATAL CARE | Facility: OTHER | Age: 32
Discharge: HOME | End: 2022-07-26
Attending: OBSTETRICS & GYNECOLOGY
Payer: COMMERCIAL

## 2022-07-26 VITALS
BODY MASS INDEX: 35.08 KG/M2 | WEIGHT: 191.81 LBS | SYSTOLIC BLOOD PRESSURE: 109 MMHG | DIASTOLIC BLOOD PRESSURE: 68 MMHG

## 2022-07-26 DIAGNOSIS — K21.9 GASTROESOPHAGEAL REFLUX DISEASE, UNSPECIFIED WHETHER ESOPHAGITIS PRESENT: ICD-10-CM

## 2022-07-26 DIAGNOSIS — Z34.83 ENCOUNTER FOR SUPERVISION OF OTHER NORMAL PREGNANCY IN THIRD TRIMESTER: ICD-10-CM

## 2022-07-26 DIAGNOSIS — G93.2 IIH (IDIOPATHIC INTRACRANIAL HYPERTENSION): Primary | ICD-10-CM

## 2022-07-26 DIAGNOSIS — O09.819 PREGNANCY RESULTING FROM IN-VITRO FERTILIZATION: ICD-10-CM

## 2022-07-26 DIAGNOSIS — E04.2 MULTIPLE THYROID NODULES: ICD-10-CM

## 2022-07-26 DIAGNOSIS — G93.2 IIH (IDIOPATHIC INTRACRANIAL HYPERTENSION): ICD-10-CM

## 2022-07-26 DIAGNOSIS — Z74.09 IMPAIRED FUNCTIONAL MOBILITY, BALANCE, GAIT, AND ENDURANCE: ICD-10-CM

## 2022-07-26 PROCEDURE — 59025 FETAL NON-STRESS TEST: CPT

## 2022-07-26 PROCEDURE — 76815 OB US LIMITED FETUS(S): CPT | Mod: 26,,, | Performed by: OBSTETRICS & GYNECOLOGY

## 2022-07-26 PROCEDURE — 59025 PRENATAL TESTING - NST/AFI: ICD-10-PCS | Mod: 26,,, | Performed by: OBSTETRICS & GYNECOLOGY

## 2022-07-26 PROCEDURE — 76815 PRENATAL TESTING - NST/AFI: ICD-10-PCS | Mod: 26,,, | Performed by: OBSTETRICS & GYNECOLOGY

## 2022-07-26 PROCEDURE — 0502F PR SUBSEQUENT PRENATAL CARE: ICD-10-PCS | Mod: S$GLB,,, | Performed by: OBSTETRICS & GYNECOLOGY

## 2022-07-26 PROCEDURE — 59025 FETAL NON-STRESS TEST: CPT | Mod: 26,,, | Performed by: OBSTETRICS & GYNECOLOGY

## 2022-07-26 PROCEDURE — 99999 PR PBB SHADOW E&M-EST. PATIENT-LVL II: ICD-10-PCS | Mod: PBBFAC,,, | Performed by: OBSTETRICS & GYNECOLOGY

## 2022-07-26 PROCEDURE — 99999 PR PBB SHADOW E&M-EST. PATIENT-LVL II: CPT | Mod: PBBFAC,,, | Performed by: OBSTETRICS & GYNECOLOGY

## 2022-07-26 PROCEDURE — 0502F SUBSEQUENT PRENATAL CARE: CPT | Mod: S$GLB,,, | Performed by: OBSTETRICS & GYNECOLOGY

## 2022-07-26 PROCEDURE — 76815 OB US LIMITED FETUS(S): CPT

## 2022-07-29 ENCOUNTER — PATIENT MESSAGE (OUTPATIENT)
Dept: OBSTETRICS AND GYNECOLOGY | Facility: CLINIC | Age: 32
End: 2022-07-29
Payer: COMMERCIAL

## 2022-08-01 NOTE — PROGRESS NOTES
Induction discussed in detail.  Will push induction back from August 1st August 4th secondary to cervical exam. .[id number]

## 2022-08-01 NOTE — PROGRESS NOTES
"Hillary Fountain is a 31 y.o. female  presents for a postpartum visit.  She is status post  delivery 3 weeks ago.  Her hospitalization was not complicated, however she was unable to move her right led on post-op day 5.  She had bilateral edema at that time and was evaluated in the [facility].  An U/S of the LLE and CT of head were normal.  Her symptoms have resolved and she is ambulating normally now.  She is breastfeeding.  She denies postpartum depression.      Current Outpatient Prescriptions:     PEDI MULTIVIT 17/IRON FUMARATE (FLINTSTONES PLUS IRON ORAL), Take 1 tablet by mouth once daily., Disp: , Rfl:     Vitals:    18 1115   BP: 104/68   Weight: 69.6 kg (153 lb 5.3 oz)   Height: 5' 2" (1.575 m)   PainSc: 0-No pain     Body mass index is 28.04 kg/m².    Physical Exam:  General: healthy, alert, no distress  Abdomen: Normal, benign. and no masses, hepatosplenomegaly, no hernias  Incision:  Clean, dry, and intact    Assessment:  Normal postpartum exam    Plan:    Postop check     status post  delivery.    Pelvic rest and light activity.  May resume driving.  Call for fever, nausea, vomiting, incisional pain/discharge, or significant pelvic pain/ vaginal bleeding.  Follow up in 4 weeks for 6 week postoperative exam.  " Patient reports good fm, no vaginal bleeding, occasional contractions, no rupture of membranes. Overall doing well. Labs reviewed. Questions answered today. Completed prenatal classes. Labor, ROM and VB precautions given. Patient denies headaches, blurry vision, chest pain, shortness of breath or right upper quadrant pain.  Time spent over 20 minutes   This includes face to face time and non-face to face time preparing to see the patient (eg, review of tests), obtaining and/or reviewing separately obtained history, documenting clinical information in the electronic or other health record, independently interpreting results and communicating results to the patient/family/caregiver, or care coordinator.

## 2022-08-01 NOTE — PROGRESS NOTES
Patient reports good fm, no vaginal bleeding, occasional contractions, no rupture of membranes. Overall doing well. Labs reviewed. Questions answered today. Completed prenatal classes. Labor, ROM and VB precautions given. Patient denies headaches, blurry vision, chest pain, shortness of breath or right upper quadrant pain.  Time spent over 20 minutes   This includes face to face time and non-face to face time preparing to see the patient (eg, review of tests), obtaining and/or reviewing separately obtained history, documenting clinical information in the electronic or other health record, independently interpreting results and communicating results to the patient/family/caregiver, or care coordinator.

## 2022-08-02 ENCOUNTER — HOSPITAL ENCOUNTER (OUTPATIENT)
Dept: PERINATAL CARE | Facility: OTHER | Age: 32
Discharge: HOME | End: 2022-08-02
Attending: OBSTETRICS & GYNECOLOGY
Payer: COMMERCIAL

## 2022-08-02 ENCOUNTER — ROUTINE PRENATAL (OUTPATIENT)
Dept: OBSTETRICS AND GYNECOLOGY | Facility: CLINIC | Age: 32
End: 2022-08-02
Payer: COMMERCIAL

## 2022-08-02 VITALS
DIASTOLIC BLOOD PRESSURE: 70 MMHG | BODY MASS INDEX: 35.89 KG/M2 | WEIGHT: 196.19 LBS | SYSTOLIC BLOOD PRESSURE: 110 MMHG

## 2022-08-02 DIAGNOSIS — G93.2 IIH (IDIOPATHIC INTRACRANIAL HYPERTENSION): Primary | ICD-10-CM

## 2022-08-02 DIAGNOSIS — O09.819 PREGNANCY RESULTING FROM IN-VITRO FERTILIZATION: ICD-10-CM

## 2022-08-02 DIAGNOSIS — G93.2 IIH (IDIOPATHIC INTRACRANIAL HYPERTENSION): ICD-10-CM

## 2022-08-02 DIAGNOSIS — Z34.83 ENCOUNTER FOR SUPERVISION OF OTHER NORMAL PREGNANCY IN THIRD TRIMESTER: ICD-10-CM

## 2022-08-02 PROCEDURE — 99999 PR PBB SHADOW E&M-EST. PATIENT-LVL II: CPT | Mod: PBBFAC,,, | Performed by: OBSTETRICS & GYNECOLOGY

## 2022-08-02 PROCEDURE — 59025 FETAL NON-STRESS TEST: CPT | Mod: 26,,, | Performed by: OBSTETRICS & GYNECOLOGY

## 2022-08-02 PROCEDURE — 59025 PRENATAL TESTING - NST/AFI: ICD-10-PCS | Mod: 26,,, | Performed by: OBSTETRICS & GYNECOLOGY

## 2022-08-02 PROCEDURE — 99999 PR PBB SHADOW E&M-EST. PATIENT-LVL II: ICD-10-PCS | Mod: PBBFAC,,, | Performed by: OBSTETRICS & GYNECOLOGY

## 2022-08-02 PROCEDURE — 0502F SUBSEQUENT PRENATAL CARE: CPT | Mod: S$GLB,,, | Performed by: OBSTETRICS & GYNECOLOGY

## 2022-08-02 PROCEDURE — 76815 PRENATAL TESTING - NST/AFI: ICD-10-PCS | Mod: 26,,, | Performed by: OBSTETRICS & GYNECOLOGY

## 2022-08-02 PROCEDURE — 0502F PR SUBSEQUENT PRENATAL CARE: ICD-10-PCS | Mod: S$GLB,,, | Performed by: OBSTETRICS & GYNECOLOGY

## 2022-08-02 PROCEDURE — 59025 FETAL NON-STRESS TEST: CPT

## 2022-08-02 PROCEDURE — 76815 OB US LIMITED FETUS(S): CPT

## 2022-08-02 PROCEDURE — 76815 OB US LIMITED FETUS(S): CPT | Mod: 26,,, | Performed by: OBSTETRICS & GYNECOLOGY

## 2022-08-02 NOTE — PROGRESS NOTES
Induction discussed.   Patient reports good fm, no vaginal bleeding, occasional contractions, no rupture of membranes. Overall doing well. Labs reviewed. Questions answered today. Completed prenatal classes. Labor, ROM and VB precautions given. Patient denies headaches, blurry vision, chest pain, shortness of breath or right upper quadrant pain.  Time spent over 20 minutes   This includes face to face time and non-face to face time preparing to see the patient (eg, review of tests), obtaining and/or reviewing separately obtained history, documenting clinical information in the electronic or other health record, independently interpreting results and communicating results to the patient/family/caregiver, or care coordinator.

## 2022-08-04 ENCOUNTER — HOSPITAL ENCOUNTER (INPATIENT)
Facility: OTHER | Age: 32
LOS: 4 days | Discharge: HOME | End: 2022-08-08
Attending: OBSTETRICS & GYNECOLOGY | Admitting: OBSTETRICS & GYNECOLOGY
Payer: COMMERCIAL

## 2022-08-04 ENCOUNTER — ANESTHESIA (OUTPATIENT)
Dept: OBSTETRICS AND GYNECOLOGY | Facility: OTHER | Age: 32
End: 2022-08-04
Payer: COMMERCIAL

## 2022-08-04 ENCOUNTER — PATIENT MESSAGE (OUTPATIENT)
Dept: OBSTETRICS AND GYNECOLOGY | Facility: OTHER | Age: 32
End: 2022-08-04
Payer: COMMERCIAL

## 2022-08-04 ENCOUNTER — PATIENT MESSAGE (OUTPATIENT)
Dept: OBSTETRICS AND GYNECOLOGY | Facility: CLINIC | Age: 32
End: 2022-08-04
Payer: COMMERCIAL

## 2022-08-04 ENCOUNTER — ANESTHESIA EVENT (OUTPATIENT)
Dept: OBSTETRICS AND GYNECOLOGY | Facility: OTHER | Age: 32
End: 2022-08-04
Payer: COMMERCIAL

## 2022-08-04 DIAGNOSIS — G93.2 IDIOPATHIC INTRACRANIAL HYPERTENSION: ICD-10-CM

## 2022-08-04 DIAGNOSIS — O09.813 PREGNANCY RESULTING FROM IN VITRO FERTILIZATION IN THIRD TRIMESTER: ICD-10-CM

## 2022-08-04 PROBLEM — Z34.90 ENCOUNTER FOR ELECTIVE INDUCTION OF LABOR: Status: ACTIVE | Noted: 2022-08-04

## 2022-08-04 LAB
BASOPHILS # BLD AUTO: 0.03 K/UL (ref 0–0.2)
BASOPHILS NFR BLD: 0.3 % (ref 0–1.9)
DIFFERENTIAL METHOD: ABNORMAL
EOSINOPHIL # BLD AUTO: 0.1 K/UL (ref 0–0.5)
EOSINOPHIL NFR BLD: 0.5 % (ref 0–8)
ERYTHROCYTE [DISTWIDTH] IN BLOOD BY AUTOMATED COUNT: 14 % (ref 11.5–14.5)
HCT VFR BLD AUTO: 37.6 % (ref 37–48.5)
HGB BLD-MCNC: 12.8 G/DL (ref 12–16)
IMM GRANULOCYTES # BLD AUTO: 0.07 K/UL (ref 0–0.04)
IMM GRANULOCYTES NFR BLD AUTO: 0.6 % (ref 0–0.5)
LYMPHOCYTES # BLD AUTO: 2.3 K/UL (ref 1–4.8)
LYMPHOCYTES NFR BLD: 20.5 % (ref 18–48)
MCH RBC QN AUTO: 31.6 PG (ref 27–31)
MCHC RBC AUTO-ENTMCNC: 34 G/DL (ref 32–36)
MCV RBC AUTO: 93 FL (ref 82–98)
MONOCYTES # BLD AUTO: 0.8 K/UL (ref 0.3–1)
MONOCYTES NFR BLD: 7.4 % (ref 4–15)
NEUTROPHILS # BLD AUTO: 7.8 K/UL (ref 1.8–7.7)
NEUTROPHILS NFR BLD: 70.7 % (ref 38–73)
NRBC BLD-RTO: 0 /100 WBC
PLATELET # BLD AUTO: 199 K/UL (ref 150–450)
PMV BLD AUTO: 10.4 FL (ref 9.2–12.9)
RBC # BLD AUTO: 4.05 M/UL (ref 4–5.4)
WBC # BLD AUTO: 11.01 K/UL (ref 3.9–12.7)

## 2022-08-04 PROCEDURE — 63600175 PHARM REV CODE 636 W HCPCS

## 2022-08-04 PROCEDURE — 85025 COMPLETE CBC W/AUTO DIFF WBC: CPT | Performed by: OBSTETRICS & GYNECOLOGY

## 2022-08-04 PROCEDURE — 25000003 PHARM REV CODE 250

## 2022-08-04 PROCEDURE — 11000001 HC ACUTE MED/SURG PRIVATE ROOM

## 2022-08-04 PROCEDURE — 86901 BLOOD TYPING SEROLOGIC RH(D): CPT | Performed by: OBSTETRICS & GYNECOLOGY

## 2022-08-04 PROCEDURE — 86870 RBC ANTIBODY IDENTIFICATION: CPT | Performed by: OBSTETRICS & GYNECOLOGY

## 2022-08-04 RX ORDER — ACETAMINOPHEN 325 MG/1
650 TABLET ORAL EVERY 6 HOURS PRN
Status: DISCONTINUED | OUTPATIENT
Start: 2022-08-04 | End: 2022-08-07

## 2022-08-04 RX ORDER — LIDOCAINE HYDROCHLORIDE 10 MG/ML
10 INJECTION INFILTRATION; PERINEURAL ONCE AS NEEDED
Status: DISCONTINUED | OUTPATIENT
Start: 2022-08-04 | End: 2022-08-07

## 2022-08-04 RX ORDER — OXYTOCIN/RINGER'S LACTATE 30/500 ML
334 PLASTIC BAG, INJECTION (ML) INTRAVENOUS ONCE
Status: DISCONTINUED | OUTPATIENT
Start: 2022-08-04 | End: 2022-08-07

## 2022-08-04 RX ORDER — SODIUM CHLORIDE, SODIUM LACTATE, POTASSIUM CHLORIDE, CALCIUM CHLORIDE 600; 310; 30; 20 MG/100ML; MG/100ML; MG/100ML; MG/100ML
INJECTION, SOLUTION INTRAVENOUS CONTINUOUS
Status: DISCONTINUED | OUTPATIENT
Start: 2022-08-04 | End: 2022-08-07

## 2022-08-04 RX ORDER — LANOLIN ALCOHOL/MO/W.PET/CERES
1 CREAM (GRAM) TOPICAL 3 TIMES DAILY
Status: DISCONTINUED | OUTPATIENT
Start: 2022-08-05 | End: 2022-08-09 | Stop reason: HOSPADM

## 2022-08-04 RX ORDER — CALCIUM CARBONATE 200(500)MG
500 TABLET,CHEWABLE ORAL 3 TIMES DAILY PRN
Status: DISCONTINUED | OUTPATIENT
Start: 2022-08-04 | End: 2022-08-07

## 2022-08-04 RX ORDER — ONDANSETRON 8 MG/1
8 TABLET, ORALLY DISINTEGRATING ORAL EVERY 8 HOURS PRN
Status: DISCONTINUED | OUTPATIENT
Start: 2022-08-04 | End: 2022-08-07

## 2022-08-04 RX ORDER — TRANEXAMIC ACID 100 MG/ML
1000 INJECTION, SOLUTION INTRAVENOUS ONCE AS NEEDED
Status: DISCONTINUED | OUTPATIENT
Start: 2022-08-04 | End: 2022-08-07

## 2022-08-04 RX ORDER — SODIUM CHLORIDE 9 MG/ML
INJECTION, SOLUTION INTRAVENOUS
Status: DISCONTINUED | OUTPATIENT
Start: 2022-08-04 | End: 2022-08-07

## 2022-08-04 RX ORDER — OXYTOCIN 10 [USP'U]/ML
10 INJECTION, SOLUTION INTRAMUSCULAR; INTRAVENOUS
Status: DISCONTINUED | OUTPATIENT
Start: 2022-08-04 | End: 2022-08-07

## 2022-08-04 RX ORDER — OXYTOCIN/RINGER'S LACTATE 30/500 ML
95 PLASTIC BAG, INJECTION (ML) INTRAVENOUS ONCE
Status: DISCONTINUED | OUTPATIENT
Start: 2022-08-04 | End: 2022-08-07

## 2022-08-04 RX ORDER — CEFAZOLIN SODIUM 2 G/50ML
2 SOLUTION INTRAVENOUS ONCE AS NEEDED
Status: DISCONTINUED | OUTPATIENT
Start: 2022-08-04 | End: 2022-08-07

## 2022-08-04 RX ORDER — PROCHLORPERAZINE EDISYLATE 5 MG/ML
5 INJECTION INTRAMUSCULAR; INTRAVENOUS EVERY 6 HOURS PRN
Status: DISCONTINUED | OUTPATIENT
Start: 2022-08-04 | End: 2022-08-07

## 2022-08-04 RX ORDER — SIMETHICONE 80 MG
1 TABLET,CHEWABLE ORAL 4 TIMES DAILY PRN
Status: DISCONTINUED | OUTPATIENT
Start: 2022-08-04 | End: 2022-08-06 | Stop reason: SDUPTHER

## 2022-08-04 RX ADMIN — MISOPROSTOL 50 MCG: 100 TABLET ORAL at 11:08

## 2022-08-04 RX ADMIN — SODIUM CHLORIDE, SODIUM LACTATE, POTASSIUM CHLORIDE, AND CALCIUM CHLORIDE: .6; .31; .03; .02 INJECTION, SOLUTION INTRAVENOUS at 09:08

## 2022-08-05 LAB
ABO + RH BLD: ABNORMAL
BLD GP AB SCN CELLS X3 SERPL QL: ABNORMAL
BLOOD GROUP ANTIBODIES SERPL: NORMAL

## 2022-08-05 PROCEDURE — 25000003 PHARM REV CODE 250

## 2022-08-05 PROCEDURE — 25000003 PHARM REV CODE 250: Performed by: STUDENT IN AN ORGANIZED HEALTH CARE EDUCATION/TRAINING PROGRAM

## 2022-08-05 PROCEDURE — 27200710 HC EPIDURAL INFUSION PUMP SET: Performed by: ANESTHESIOLOGY

## 2022-08-05 PROCEDURE — C1751 CATH, INF, PER/CENT/MIDLINE: HCPCS | Performed by: ANESTHESIOLOGY

## 2022-08-05 PROCEDURE — 63600175 PHARM REV CODE 636 W HCPCS

## 2022-08-05 PROCEDURE — 59400 OBSTETRICAL CARE: CPT | Mod: QY,,, | Performed by: ANESTHESIOLOGY

## 2022-08-05 PROCEDURE — 59400 PRA FULL ROUT OBSTE CARE,VAGINAL DELIV: ICD-10-PCS | Mod: QY,,, | Performed by: ANESTHESIOLOGY

## 2022-08-05 PROCEDURE — 62326 NJX INTERLAMINAR LMBR/SAC: CPT | Performed by: STUDENT IN AN ORGANIZED HEALTH CARE EDUCATION/TRAINING PROGRAM

## 2022-08-05 PROCEDURE — 11000001 HC ACUTE MED/SURG PRIVATE ROOM

## 2022-08-05 PROCEDURE — 63600175 PHARM REV CODE 636 W HCPCS: Performed by: OBSTETRICS & GYNECOLOGY

## 2022-08-05 RX ORDER — FENTANYL/BUPIVACAINE/NS/PF 2MCG/ML-.1
PLASTIC BAG, INJECTION (ML) INJECTION
Status: DISPENSED
Start: 2022-08-05 | End: 2022-08-05

## 2022-08-05 RX ORDER — DIPHENOXYLATE HYDROCHLORIDE AND ATROPINE SULFATE 2.5; .025 MG/1; MG/1
1 TABLET ORAL 4 TIMES DAILY PRN
Status: DISCONTINUED | OUTPATIENT
Start: 2022-08-05 | End: 2022-08-07

## 2022-08-05 RX ORDER — METHYLERGONOVINE MALEATE 0.2 MG/ML
200 INJECTION INTRAVENOUS
Status: DISCONTINUED | OUTPATIENT
Start: 2022-08-05 | End: 2022-08-07

## 2022-08-05 RX ORDER — MISOPROSTOL 200 UG/1
800 TABLET ORAL
Status: DISCONTINUED | OUTPATIENT
Start: 2022-08-05 | End: 2022-08-07

## 2022-08-05 RX ORDER — OXYTOCIN/RINGER'S LACTATE 30/500 ML
0-40 PLASTIC BAG, INJECTION (ML) INTRAVENOUS CONTINUOUS
Status: DISCONTINUED | OUTPATIENT
Start: 2022-08-05 | End: 2022-08-09 | Stop reason: HOSPADM

## 2022-08-05 RX ORDER — CARBOPROST TROMETHAMINE 250 UG/ML
250 INJECTION, SOLUTION INTRAMUSCULAR
Status: DISCONTINUED | OUTPATIENT
Start: 2022-08-05 | End: 2022-08-07

## 2022-08-05 RX ORDER — FENTANYL CITRATE 50 UG/ML
INJECTION, SOLUTION INTRAMUSCULAR; INTRAVENOUS
Status: COMPLETED
Start: 2022-08-05 | End: 2022-08-05

## 2022-08-05 RX ORDER — LIDOCAINE HYDROCHLORIDE AND EPINEPHRINE 15; 5 MG/ML; UG/ML
INJECTION, SOLUTION EPIDURAL
Status: DISCONTINUED | OUTPATIENT
Start: 2022-08-05 | End: 2022-08-06

## 2022-08-05 RX ORDER — FENTANYL CITRATE 50 UG/ML
INJECTION, SOLUTION INTRAMUSCULAR; INTRAVENOUS
Status: DISCONTINUED | OUTPATIENT
Start: 2022-08-05 | End: 2022-08-06

## 2022-08-05 RX ADMIN — Medication 4 MILLI-UNITS/MIN: at 03:08

## 2022-08-05 RX ADMIN — MISOPROSTOL 50 MCG: 100 TABLET ORAL at 03:08

## 2022-08-05 RX ADMIN — FERROUS SULFATE TAB 325 MG (65 MG ELEMENTAL FE) 1 EACH: 325 (65 FE) TAB at 09:08

## 2022-08-05 RX ADMIN — BUPIVACAINE HYDROCHLORIDE 10 ML: 2.5 INJECTION, SOLUTION EPIDURAL; INFILTRATION; INTRACAUDAL; PERINEURAL at 10:08

## 2022-08-05 RX ADMIN — LIDOCAINE HYDROCHLORIDE,EPINEPHRINE BITARTRATE 3 ML: 15; .005 INJECTION, SOLUTION EPIDURAL; INFILTRATION; INTRACAUDAL; PERINEURAL at 10:08

## 2022-08-05 RX ADMIN — ACETAMINOPHEN 650 MG: 325 TABLET, FILM COATED ORAL at 05:08

## 2022-08-05 RX ADMIN — FENTANYL CITRATE 100 MCG: 50 INJECTION, SOLUTION INTRAMUSCULAR; INTRAVENOUS at 10:08

## 2022-08-05 RX ADMIN — FERROUS SULFATE TAB 325 MG (65 MG ELEMENTAL FE) 1 EACH: 325 (65 FE) TAB at 03:08

## 2022-08-05 RX ADMIN — MISOPROSTOL 50 MCG: 100 TABLET ORAL at 10:08

## 2022-08-05 RX ADMIN — SODIUM CHLORIDE, SODIUM LACTATE, POTASSIUM CHLORIDE, AND CALCIUM CHLORIDE: .6; .31; .03; .02 INJECTION, SOLUTION INTRAVENOUS at 09:08

## 2022-08-05 RX ADMIN — ONDANSETRON 8 MG: 8 TABLET, ORALLY DISINTEGRATING ORAL at 10:08

## 2022-08-05 RX ADMIN — SODIUM CHLORIDE, SODIUM LACTATE, POTASSIUM CHLORIDE, AND CALCIUM CHLORIDE: .6; .31; .03; .02 INJECTION, SOLUTION INTRAVENOUS at 11:08

## 2022-08-05 NOTE — PROGRESS NOTES
"LABOR NOTE    Resident to bedside for routine cervical check.    S:  Complaints: No.  Epidural working: N/A    O: /68   Pulse 88   Temp 98.2 °F (36.8 °C) (Oral)   Resp 16   Ht 5' 2" (1.575 m)   Wt 89 kg (196 lb 3.4 oz)   LMP 10/31/2021   SpO2 98%   Breastfeeding No   BMI 35.89 kg/m²       FHT: Cat 1 (reassuring), baseline 150, moderate BTBV, + accels, no decels  CTX: irritability on toco but no obvious contractions  SVE: 1/40/-3, xie balloon placed via speculum without difficulty    TIMELINE:  0: //HI  2310: Cytotec #1  0230: 3, failed xie balloon placement\  0330: cytotec # 2  0845: 1/40/-3, xie balloon placed, cytotec #3      A/P: 31 y.o.  at 39w5d, admitted for     Labor management:  - Continue Close Maternal/Fetal Monitoring  - Recheck 2-4 hours or PRN    Irma Elaine MD  PGY-2 OB/GYN    "

## 2022-08-05 NOTE — PROGRESS NOTES
"LABOR NOTE    Resident to bedside for routine cervical check.    S:  Complaints: No.  Epidural working: N/A    O: BP (!) 105/53   Pulse 78   Temp 98.2 °F (36.8 °C) (Oral)   Resp 16   Ht 5' 2" (1.575 m)   Wt 89 kg (196 lb 3.4 oz)   LMP 10/31/2021   SpO2 97%   Breastfeeding No   BMI 35.89 kg/m²       FHT: Cat 1 (reassuring), baseline 125, mod yemi, + accels, - decels  CTX: q 2-5 minutes  SVE: /-3, attempted to place xie balloon x 2 but unsuccessful    TIMELINE:  : //HI  2310: Cytotec #1  0230: /-3, failed xie balloon placement    A/P: 31 y.o.  at 39w5d, admitted for     Labor management:  - Continue Close Maternal/Fetal Monitoring  - Will assess at 0310 for cytotec vs. pitocin for next augmentation agent, ideally will give another dose of cytotec for cervical ripening  - Will plan to re-attempt balloon at next check  - Recheck 2-4 hours or PRN      Malaika Moctezuma M.D.  OB/GYN PGY-4  "

## 2022-08-05 NOTE — ANESTHESIA PREPROCEDURE EVALUATION
Laila Pappas is a 31 y.o. female  with IUP at 39w4d weeks gestation who presents for IOL.      This IUP is complicated by IVF preg, idiopathic intracranial HTN, Rh neg, BMI 36.    OB History    Para Term  AB Living   2 0 0 0 1 0   SAB IAB Ectopic Multiple Live Births   1 0 0 0 0      # Outcome Date GA Lbr Sam/2nd Weight Sex Delivery Anes PTL Lv   2 Current            1 SAB                Wt Readings from Last 1 Encounters:   22 2157 89 kg (196 lb 3.4 oz)       BP Readings from Last 3 Encounters:   22 129/79   22 110/70   22 109/68       Patient Active Problem List   Diagnosis    Endometriosis determined by laparoscopy    Multiple thyroid nodules    Poor posture    Mid back pain    Low back pain    Postural imbalance    Other chest pain    Dyslipidemia    IIH (idiopathic intracranial hypertension)    Arm pain, left    Plica syndrome    Decreased range of motion (ROM) of left knee    Impaired functional mobility, balance, gait, and endurance    Chronic pain of left knee    Gastroesophageal reflux disease    Endometriosis    Pregnancy resulting from in-vitro fertilization    Myalgia    Abdominal weakness    Encounter for elective induction of labor       Past Surgical History:   Procedure Laterality Date    ARTHROSCOPY OF KNEE Left 2021    Procedure: ARTHROSCOPY, KNEE;  Surgeon: Deidra Mejias MD;  Location: [facility] OR;  Service: Orthopedics;  Laterality: Left;    COLONOSCOPY N/A 2018    Procedure: COLONOSCOPY;  Surgeon: Micki Gross MD;  Location: [facility] (4TH FLR);  Service: Endoscopy;  Laterality: N/A;  preferrably in Dec 2018, CRS ok if needed.    ESOPHAGOGASTRODUODENOSCOPY N/A 10/30/2018    Procedure: EGD (ESOPHAGOGASTRODUODENOSCOPY);  Surgeon: Greg Leach MD;  Location: [facility] (2ND FLR);  Service: Endoscopy;  Laterality: N/A;  ok to schedule per Kimmy    KNEE ARTHROSCOPY W/ DEBRIDEMENT  2021    Procedure: ARTHROSCOPY,  KNEE, WITH DEBRIDEMENT;  Surgeon: Deidra Mejias MD;  Location: [facility] OR;  Service: Orthopedics;;    KNEE ARTHROSCOPY W/ MENISCECTOMY Right 4/6/2021    Procedure: ARTHROSCOPY, KNEE, WITH MENISCECTOMY;  Surgeon: Deidra Mejias MD;  Location: [facility] OR;  Service: Orthopedics;  Laterality: Right;    KNEE ARTHROSCOPY W/ PLICA EXCISION Left 4/6/2021    Procedure: EXCISION, PLICA, KNEE, ARTHROSCOPIC;  Surgeon: Deidra Mejias MD;  Location: [facility] OR;  Service: Orthopedics;  Laterality: Left;    LAPAROSCOPY  2017    dx with endo.  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   Tobacco Use    Smoking status: Never Smoker    Smokeless tobacco: Never Used   Substance and Sexual Activity    Alcohol use: No    Drug use: No    Sexual activity: Yes     Partners: Male     Birth control/protection: None         Chemistry        Component Value Date/Time     07/26/2022 1505    K 3.8 07/26/2022 1505     07/26/2022 1505    CO2 21 (L) 07/26/2022 1505    BUN 6 07/26/2022 1505    CREATININE 0.7 07/26/2022 1505    GLU 82 07/26/2022 1505        Component Value Date/Time    CALCIUM 8.8 07/26/2022 1505    ALKPHOS 142 (H) 07/26/2022 1505    AST 24 07/26/2022 1505    ALT 28 07/26/2022 1505    BILITOT 0.4 07/26/2022 1505    ESTGFRAFRICA >60 07/26/2022 1505    EGFRNONAA >60 07/26/2022 1505            Lab Results   Component Value Date    WBC 11.01 08/04/2022    HGB 12.8 08/04/2022    HCT 37.6 08/04/2022    MCV 93 08/04/2022     08/04/2022         Pre-op Assessment    I have reviewed the Patient Summary Reports.    I have reviewed the NPO Status.   I have reviewed the  Medications.     Review of Systems      Physical Exam  General: Well nourished, Cooperative and Alert    Airway:  Mallampati: II   Mouth Opening: Normal  TM Distance: Normal  Tongue: Normal  Neck ROM: Normal ROM    Dental:  Intact        Anesthesia Plan  Type of Anesthesia, risks & benefits discussed:    Anesthesia Type: Epidural, Gen ETT, Spinal  Intra-op Monitoring Plan: Standard ASA Monitors  Post Op Pain Control Plan: multimodal analgesia  Induction:  IV  Airway Plan: Direct, Post-Induction  Informed Consent: Informed consent signed with the Patient and all parties understand the risks and agree with anesthesia plan.  All questions answered.   ASA Score: 3    Ready For Surgery From Anesthesia Perspective.     .

## 2022-08-05 NOTE — ANESTHESIA PROCEDURE NOTES
Epidural    Patient location during procedure: OB   Reason for block: primary anesthetic   Reason for block: labor analgesia requested by patient and obstetrician   Start time: 8/5/2022 10:29 AM  Timeout: 8/5/2022 10:28 AM  End time: 8/5/2022 10:48 AM    Staffing  Performing Provider: Maxim Connell MD  Authorizing Provider: Karey Juarez MD        Preanesthetic Checklist  Completed: patient identified, IV checked, site marked, risks and benefits discussed, surgical consent, monitors and equipment checked, pre-op evaluation, timeout performed, anesthesia consent given, hand hygiene performed and patient being monitored  Preparation  Patient position: sitting  Prep: ChloraPrep  Patient monitoring: Blood Pressure and Pulse Ox  Reason for block: primary anesthetic   Epidural  Skin Anesthetic: lidocaine 1%  Skin Wheal: 3 mL  Administration type: single shot  Approach: midline  Interspace: L4-5    Injection technique: SARANYA air  Needle and Epidural Catheter  Needle type: Tuohy   Needle gauge: 18  Needle length: 3.5 inches  Needle insertion depth: 8 cm  Catheter type: springwound  Catheter size: 20 G  Catheter at skin depth: 12 cm  Insertion Attempts: 3  Test dose: 3 mL of lidocaine 1.5% with Epi 1-to-200,000  Additional Documentation: incremental injection, negative aspiration for heme and CSF and no paresthesia on injection  Needle localization: anatomical landmarks  Assessment  Upper dermatomal levels - Left: T10  Right: T10   Dermatomal levels determined by pinch or prick  Ease of block: easy  Patient's tolerance of the procedure: comfortable throughout block and no complaints No inadvertent dural puncture with Tuohy.  Dural puncture performed with spinal needle.

## 2022-08-05 NOTE — H&P
HISTORY AND PHYSICAL                                                OBSTETRICS          Subjective:       Laila Pappas is a 31 y.o.  female with IUP at 39w4d weeks gestation who presents for IOL.    Patient feels occasional contractions, denies vaginal bleeding, denies LOF.   Fetal Movement: normal.    This IUP is complicated by IVF preg, idiopathic intracranial HTN, Rh neg, BMI 36.    Review of Systems   Constitutional: Negative for chills and fever.   Respiratory: Negative for cough and shortness of breath.    Cardiovascular: Negative for chest pain.   Gastrointestinal: Negative for abdominal pain, constipation, diarrhea, nausea and vomiting.   Genitourinary: Positive for pelvic pain (pressure/cramping). Negative for dysuria, hematuria, vaginal bleeding, vaginal discharge and vaginal odor.   Integumentary:  Negative for rash.   Neurological: Negative for headaches.   Hematological: Bruises/bleeds easily (no diagnosed condition).         PMHx:   Past Medical History:   Diagnosis Date    Bilateral knee pain 2018    Endometriosis     treated by GYN at     Epigastric abdominal pain 2016    Gastroesophageal reflux disease 2016    Helicobacter pylori ab+     Helicobacter pylori ab+     High-tone pelvic floor dysfunction 2016    IIH (idiopathic intracranial hypertension)     Dr. Rogers - [facility]    Palpitations 2016       PSHx:   Past Surgical History:   Procedure Laterality Date    ARTHROSCOPY OF KNEE Left 2021    Procedure: ARTHROSCOPY, KNEE;  Surgeon: Deidra Mejias MD;  Location: [facility];  Service: Orthopedics;  Laterality: Left;    COLONOSCOPY N/A 2018    Procedure: COLONOSCOPY;  Surgeon: Micki Gross MD;  Location: [address]);  Service: Endoscopy;  Laterality: N/A;  preferrably in Dec 2018, CRS ok if needed.    ESOPHAGOGASTRODUODENOSCOPY N/A 10/30/2018    Procedure: EGD (ESOPHAGOGASTRODUODENOSCOPY);  Surgeon: Greg Leach MD;  Location: [facility] (2ND FLR);  Service: Endoscopy;  Laterality: N/A;  ok to schedule per Kimmy    KNEE ARTHROSCOPY W/ DEBRIDEMENT  4/6/2021    Procedure: ARTHROSCOPY, KNEE, WITH DEBRIDEMENT;  Surgeon: Deidra Mejias MD;  Location: [facility] OR;  Service: Orthopedics;;    KNEE ARTHROSCOPY W/ MENISCECTOMY Right 4/6/2021    Procedure: ARTHROSCOPY, KNEE, WITH MENISCECTOMY;  Surgeon: Deidra Mejias MD;  Location: [facility] OR;  Service: Orthopedics;  Laterality: Right;    KNEE ARTHROSCOPY W/ PLICA EXCISION Left 4/6/2021    Procedure: EXCISION, PLICA, KNEE, ARTHROSCOPIC;  Surgeon: Deidra Mejias MD;  Location: [facility] OR;  Service: Orthopedics;  Laterality: Left;    LAPAROSCOPY  2017    dx with endo.  no removal    NO PAST SURGERIES      SYNOVECTOMY OF KNEE Left 4/6/2021    Procedure: SYNOVECTOMY, KNEE;  Surgeon: Deidra Mejias MD;  Location: [facility] OR;  Service: Orthopedics;  Laterality: Left;    UPPER GASTROINTESTINAL ENDOSCOPY         All:   Review of patient's allergies indicates:   Allergen Reactions    Doxycycline Other (See Comments)     Triggers headaches    Bactrim ds [sulfamethoxazole-trimethoprim] Nausea And Vomiting    Nsaids (non-steroidal anti-inflammatory drug) Other (See Comments)     D/t h/o PUD and H pylori    Codeine Rash       Meds:   Medications Prior to Admission   Medication Sig Dispense Refill Last Dose    aspirin (ECOTRIN) 81 MG EC tablet Take 81 mg by mouth once daily.       clotrimazole-betamethasone 1-0.05% (LOTRISONE) cream Apply topically Daily. 15 g 0     ferrous sulfate 325 (65 FE) MG EC tablet Take 325 mg by mouth 3 (three) times daily with meals.       folic acid (FOLVITE) 1 MG tablet Take 1 mg by mouth once daily.       prenatal vit 10-iron-folic-dha (VITAFOL-OB+DHA) 65-1-250 mg Cmpk Take 1 tablet by mouth.       vitamin D (VITAMIN D3) 1000 units Tab Take 1,000 Units by mouth once daily.          SH:   Social History     Socioeconomic History    Marital status: Single   Occupational History     Employer: [employer]     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   Tobacco Use    Smoking status: Never Smoker    Smokeless tobacco: Never Used   Substance and Sexual Activity    Alcohol use: No    Drug use: No    Sexual activity: Yes     Partners: Male     Birth control/protection: None       FH:   Family History   Problem Relation Age of Onset    Diabetes Mother     No Known Problems Father     No Known Problems Sister     Hypertension Brother     Hypertension Maternal Grandmother     Hypertension Maternal Grandfather     No Known Problems Paternal Grandmother     No Known Problems Paternal Grandfather     Colon cancer Neg Hx     Crohn's disease Neg Hx     Esophageal cancer Neg Hx     Inflammatory bowel disease Neg Hx     Irritable bowel syndrome Neg Hx     Rectal cancer Neg Hx     Stomach cancer Neg Hx     Ulcerative colitis Neg Hx        OBHx:   OB History    Para Term  AB Living   2 0 0 0 1 0   SAB IAB Ectopic Multiple Live Births   1 0 0 0 0      # Outcome Date GA Lbr Sam/2nd Weight Sex Delivery Anes PTL Lv   2 Current            1 SAB                Objective:       /70   Pulse 103   Temp 98 °F (36.7 °C) (Oral)   Resp 16   LMP 10/31/2021   SpO2 98%     Vitals:    22 2146   BP: 118/70   Pulse: 103   Resp: 16   Temp: 98 °F (36.7 °C)   TempSrc: Oral   SpO2: 98%       General: NAD, alert, oriented, cooperative  HEENT: NCAT, EOM grossly intact  Lungs: Normal WOB  Heart: regular rate  Abdomen: gravid, soft, nondistended, nontender, no rebound or guarding  Extremities: no edema    FHT: 130 bpm, moderate BTBV, +accels, -decels; Cat 1 (reassuring)  [place]: irregular ctx  Presentation: cephalic by ultrasound    Cervix: //HI    Recent Growth Scan: 2164g, 45%ile @ 32w1d ()    Lab Review  Blood Type B NEG  GBBS: negative  Rubella: Immune  RPR: NR  HIV: negative  HepB: negative       Assessment:       39w4d weeks gestation for IOL.    Active Hospital Problems     Diagnosis  POA    *Encounter for elective induction of labor [Z34.90]  Not Applicable      Resolved Hospital Problems   No resolved problems to display.          Plan:     1. IOL   Risks, benefits, alternatives and possible complications have been discussed in detail with the patient.   - Consents signed and to chart  - Admit to Labor and Delivery unit  - Epidural per Anesthesia  - Draw CBC, T&S  - Notify Staff  - Augmentation methods discussed with patient  - Recheck in 4 hrs or PRN  - Postpartum lovenox: no    2. IVF preg  - PGT normal per patient report    3. BMI 36  - Post-Partum Hemorrhage risk - medium    4. Rh neg  - Rhogam w/u postpartum    5. Idiopathic intracranial HTN  - mild symptoms, not on therapy at this time  - followed by neurology, cardiology      Eli Nunez MD   OB/GYN PGY1  [facility]

## 2022-08-06 PROBLEM — Z34.90 ENCOUNTER FOR ELECTIVE INDUCTION OF LABOR: Status: RESOLVED | Noted: 2022-08-04 | Resolved: 2022-08-06

## 2022-08-06 PROCEDURE — 63600175 PHARM REV CODE 636 W HCPCS

## 2022-08-06 PROCEDURE — 25000003 PHARM REV CODE 250: Performed by: STUDENT IN AN ORGANIZED HEALTH CARE EDUCATION/TRAINING PROGRAM

## 2022-08-06 PROCEDURE — 72200005 HC VAGINAL DELIVERY LEVEL II

## 2022-08-06 PROCEDURE — 63600175 PHARM REV CODE 636 W HCPCS: Performed by: STUDENT IN AN ORGANIZED HEALTH CARE EDUCATION/TRAINING PROGRAM

## 2022-08-06 PROCEDURE — 59400 PR FULL ROUT OBSTE CARE,VAGINAL DELIV: ICD-10-PCS | Mod: ,,, | Performed by: OBSTETRICS & GYNECOLOGY

## 2022-08-06 PROCEDURE — 25000003 PHARM REV CODE 250

## 2022-08-06 PROCEDURE — 51702 INSERT TEMP BLADDER CATH: CPT

## 2022-08-06 PROCEDURE — 59400 OBSTETRICAL CARE: CPT | Mod: ,,, | Performed by: OBSTETRICS & GYNECOLOGY

## 2022-08-06 PROCEDURE — 11000001 HC ACUTE MED/SURG PRIVATE ROOM

## 2022-08-06 RX ORDER — OXYTOCIN/RINGER'S LACTATE 30/500 ML
95 PLASTIC BAG, INJECTION (ML) INTRAVENOUS ONCE
Status: DISCONTINUED | OUTPATIENT
Start: 2022-08-06 | End: 2022-08-09 | Stop reason: HOSPADM

## 2022-08-06 RX ORDER — DIPHENHYDRAMINE HYDROCHLORIDE 50 MG/ML
25 INJECTION INTRAMUSCULAR; INTRAVENOUS EVERY 4 HOURS PRN
Status: DISCONTINUED | OUTPATIENT
Start: 2022-08-06 | End: 2022-08-09 | Stop reason: HOSPADM

## 2022-08-06 RX ORDER — PRENATAL WITH FERROUS FUM AND FOLIC ACID 3080; 920; 120; 400; 22; 1.84; 3; 20; 10; 1; 12; 200; 27; 25; 2 [IU]/1; [IU]/1; MG/1; [IU]/1; MG/1; MG/1; MG/1; MG/1; MG/1; MG/1; UG/1; MG/1; MG/1; MG/1; MG/1
1 TABLET ORAL DAILY
Status: DISCONTINUED | OUTPATIENT
Start: 2022-08-07 | End: 2022-08-09 | Stop reason: HOSPADM

## 2022-08-06 RX ORDER — SIMETHICONE 80 MG
1 TABLET,CHEWABLE ORAL EVERY 6 HOURS PRN
Status: DISCONTINUED | OUTPATIENT
Start: 2022-08-06 | End: 2022-08-09 | Stop reason: HOSPADM

## 2022-08-06 RX ORDER — DOCUSATE SODIUM 100 MG/1
200 CAPSULE, LIQUID FILLED ORAL 2 TIMES DAILY PRN
Status: DISCONTINUED | OUTPATIENT
Start: 2022-08-06 | End: 2022-08-09 | Stop reason: HOSPADM

## 2022-08-06 RX ORDER — FENTANYL CITRATE 50 UG/ML
INJECTION, SOLUTION INTRAMUSCULAR; INTRAVENOUS
Status: DISPENSED
Start: 2022-08-06 | End: 2022-08-07

## 2022-08-06 RX ORDER — PROCHLORPERAZINE EDISYLATE 5 MG/ML
5 INJECTION INTRAMUSCULAR; INTRAVENOUS ONCE
Status: COMPLETED | OUTPATIENT
Start: 2022-08-06 | End: 2022-08-06

## 2022-08-06 RX ORDER — DIPHENHYDRAMINE HCL 25 MG
25 CAPSULE ORAL EVERY 4 HOURS PRN
Status: DISCONTINUED | OUTPATIENT
Start: 2022-08-06 | End: 2022-08-09 | Stop reason: HOSPADM

## 2022-08-06 RX ORDER — HYDROCORTISONE 25 MG/G
CREAM TOPICAL 3 TIMES DAILY PRN
Status: DISCONTINUED | OUTPATIENT
Start: 2022-08-06 | End: 2022-08-09 | Stop reason: HOSPADM

## 2022-08-06 RX ORDER — HYDROCODONE BITARTRATE AND ACETAMINOPHEN 10; 325 MG/1; MG/1
1 TABLET ORAL EVERY 4 HOURS PRN
Status: CANCELLED | OUTPATIENT
Start: 2022-08-06

## 2022-08-06 RX ORDER — BUPIVACAINE HYDROCHLORIDE 2.5 MG/ML
INJECTION, SOLUTION INFILTRATION; PERINEURAL
Status: DISCONTINUED | OUTPATIENT
Start: 2022-08-06 | End: 2022-08-06

## 2022-08-06 RX ORDER — ACETAMINOPHEN 325 MG/1
975 TABLET ORAL EVERY 8 HOURS
Status: DISCONTINUED | OUTPATIENT
Start: 2022-08-06 | End: 2022-08-07 | Stop reason: SDUPTHER

## 2022-08-06 RX ORDER — ACETAMINOPHEN 325 MG/1
650 TABLET ORAL EVERY 6 HOURS PRN
Status: CANCELLED | OUTPATIENT
Start: 2022-08-06

## 2022-08-06 RX ORDER — HYDROCODONE BITARTRATE AND ACETAMINOPHEN 5; 325 MG/1; MG/1
1 TABLET ORAL EVERY 4 HOURS PRN
Status: CANCELLED | OUTPATIENT
Start: 2022-08-06

## 2022-08-06 RX ORDER — SODIUM CITRATE AND CITRIC ACID MONOHYDRATE 334; 500 MG/5ML; MG/5ML
30 SOLUTION ORAL ONCE
Status: DISCONTINUED | OUTPATIENT
Start: 2022-08-06 | End: 2022-08-07

## 2022-08-06 RX ORDER — FENTANYL/BUPIVACAINE/NS/PF 2MCG/ML-.1
PLASTIC BAG, INJECTION (ML) INJECTION CONTINUOUS
Status: DISCONTINUED | OUTPATIENT
Start: 2022-08-06 | End: 2022-08-07

## 2022-08-06 RX ADMIN — ONDANSETRON 8 MG: 8 TABLET, ORALLY DISINTEGRATING ORAL at 07:08

## 2022-08-06 RX ADMIN — Medication 250 ML: at 06:08

## 2022-08-06 RX ADMIN — SODIUM CHLORIDE, SODIUM LACTATE, POTASSIUM CHLORIDE, AND CALCIUM CHLORIDE: .6; .31; .03; .02 INJECTION, SOLUTION INTRAVENOUS at 04:08

## 2022-08-06 RX ADMIN — BUPIVACAINE HYDROCHLORIDE 10 ML: 2.5 INJECTION, SOLUTION EPIDURAL; INFILTRATION; INTRACAUDAL; PERINEURAL at 12:08

## 2022-08-06 RX ADMIN — CALCIUM CARBONATE (ANTACID) CHEW TAB 500 MG 500 MG: 500 CHEW TAB at 07:08

## 2022-08-06 RX ADMIN — DEXTROSE, SODIUM CHLORIDE, SODIUM LACTATE, POTASSIUM CHLORIDE, AND CALCIUM CHLORIDE 500 ML: 5; .6; .31; .03; .02 INJECTION, SOLUTION INTRAVENOUS at 02:08

## 2022-08-06 RX ADMIN — FERROUS SULFATE TAB 325 MG (65 MG ELEMENTAL FE) 1 EACH: 325 (65 FE) TAB at 09:08

## 2022-08-06 RX ADMIN — BUPIVACAINE HYDROCHLORIDE 5 ML: 2.5 INJECTION, SOLUTION INFILTRATION; PERINEURAL at 08:08

## 2022-08-06 RX ADMIN — FENTANYL CITRATE 100 MCG: 50 INJECTION, SOLUTION INTRAMUSCULAR; INTRAVENOUS at 05:08

## 2022-08-06 RX ADMIN — FERROUS SULFATE TAB 325 MG (65 MG ELEMENTAL FE) 1 EACH: 325 (65 FE) TAB at 02:08

## 2022-08-06 RX ADMIN — PROCHLORPERAZINE EDISYLATE 5 MG: 5 INJECTION INTRAMUSCULAR; INTRAVENOUS at 11:08

## 2022-08-06 RX ADMIN — FENTANYL CITRATE 100 MCG: 50 INJECTION, SOLUTION INTRAMUSCULAR; INTRAVENOUS at 01:08

## 2022-08-06 RX ADMIN — MISOPROSTOL 800 MCG: 200 TABLET ORAL at 06:08

## 2022-08-06 RX ADMIN — PROCHLORPERAZINE EDISYLATE 5 MG: 5 INJECTION INTRAMUSCULAR; INTRAVENOUS at 01:08

## 2022-08-06 RX ADMIN — BUPIVACAINE HYDROCHLORIDE 4 ML: 2.5 INJECTION, SOLUTION INFILTRATION; PERINEURAL at 05:08

## 2022-08-06 RX ADMIN — FERROUS SULFATE TAB 325 MG (65 MG ELEMENTAL FE) 1 EACH: 325 (65 FE) TAB at 08:08

## 2022-08-06 RX ADMIN — ACETAMINOPHEN 650 MG: 325 TABLET, FILM COATED ORAL at 08:08

## 2022-08-06 RX ADMIN — BUPIVACAINE HYDROCHLORIDE 5 ML: 2.5 INJECTION, SOLUTION INFILTRATION; PERINEURAL at 01:08

## 2022-08-06 RX ADMIN — Medication 250 ML: at 03:08

## 2022-08-06 NOTE — PROGRESS NOTES
"LABOR NOTE    S:  Complaints: no.  Epidural working: yes    O: BP (!) 116/56   Pulse 104   Temp 97.8 °F (36.6 °C) (Axillary)   Resp 20   Ht 5' 2" (1.575 m)   Wt 89 kg (196 lb 3.4 oz)   LMP 10/31/2021   SpO2 96%   Breastfeeding No   BMI 35.89 kg/m²       FHT: Cat 1 (reassuring), baseline 125s, moderate BTBV, - accels, no decels  CTX: q2 minutes  SVE:     TIMELINE:  : //HI  2310: Cytotec #1  0230: /-3, failed xie balloon placement  0330: cytotec # 2  0845: /-3, xie balloon placed, cytotec #3  1420: xie in place  1700: /-3, xie in place  2130: /-3, xie in place, pit at 12  0130: /-3, xie in place, pit at 16  0530: /-3, xie in place, pit at 8, s/p pit pause at 0300  0645: /-3, xie out\  1100: 80/-2, AROM cl, pit @ 30  1440: /-1,   1645:     A/P: 31 y.o.  at 39w5d, admitted for IOL    Labor management:  - Continue Close Maternal/Fetal Monitoring  - Recheck 2 hours or PRN  - Pitocin augmentation per protocol    Brittney Dahl MD PGY-3  Obstetrics and Gynecology          "

## 2022-08-06 NOTE — PROGRESS NOTES
"LABOR NOTE    S:  Complaints: no.  Epidural working: yes    O: BP (!) 101/55   Pulse 85   Temp 98.8 °F (37.1 °C) (Oral)   Resp 16   Ht 5' 2" (1.575 m)   Wt 89 kg (196 lb 3.4 oz)   LMP 10/31/2021   SpO2 97%   Breastfeeding No   BMI 35.89 kg/m²       FHT: Cat 1 (reassuring), baseline 130s, moderate BTBV, - accels, no decels  CTX: q2-3 minutes  SVE: -1    TIMELINE:  : //HI  2310: Cytotec #1  0230: /-3, failed xie balloon placement  0330: cytotec # 2  0845: 3, xie balloon placed, cytotec #3  1420: xie in place  1700: /-3, xie in place  2130: /-3, xie in place, pit at 12  0130: /-3, xie in place, pit at 16  0530: 2/-3, xie in place, pit at 8, s/p pit pause at 0300  0645: /-3, xie out\  1100: /-2, AROM cl, pit @ 30  1440: /-1    A/P: 31 y.o.  at 39w5d, admitted for IOL    Labor management:  - Continue Close Maternal/Fetal Monitoring  - Recheck 4 hours or PRN  - Pitocin augmentation per protocol    Irma Elaine MD  PGY-2 OB/GYN        "

## 2022-08-06 NOTE — PROGRESS NOTES
"LABOR NOTE    S:  Complaints: increasing pressure.  Epidural working: yes    O: /65   Pulse (!) 117   Temp 99 °F (37.2 °C) (Oral)   Resp 20   Ht 5' 2" (1.575 m)   Wt 89 kg (196 lb 3.4 oz)   LMP 10/31/2021   SpO2 99%   Breastfeeding No   BMI 35.89 kg/m²       FHT: Cat 1 (reassuring), baseline 125s, moderate BTBV, - accels, no decels  CTX: q2 minutes  SVE: 10/100/+1    TIMELINE:  : //HI  2310: Cytotec #1  0230: /-3, failed xie balloon placement  0330: cytotec # 2  0845: /-3, xie balloon placed, cytotec #3  1420: xie in place  1700: /-3, xie in place  2130: 80/-3, xie in place, pit at 12  0130: 280/-3, xie in place, pit at 16  0530: 280/-3, xie in place, pit at 8, s/p pit pause at 0300  0645: 80/-3, xie out\  1100: 80/-2, AROM cl, pit @ 30  1440: /-1,   1645: 8/0  1745: 10/100/+1    A/P: 31 y.o.  at 39w5d, admitted for IOL    Labor management:  - Continue Close Maternal/Fetal Monitoring  - Staff notified  - Will set up to push    Irma Elaine MD  PGY-2 OB/GYN          "

## 2022-08-06 NOTE — PROGRESS NOTES
"LABOR NOTE    Resident to bedside for routine cervical check.    S:  Complaints: No.  Epidural working: N/A    O: /66   Pulse 85   Temp 98.3 °F (36.8 °C) (Oral)   Resp 16   Ht 5' 2" (1.575 m)   Wt 89 kg (196 lb 3.4 oz)   LMP 10/31/2021   SpO2 97%   Breastfeeding No   BMI 35.89 kg/m²       FHT: Cat 1 (reassuring), baseline 130, moderate BTBV, + accels, no decels  CTX: q2-3  SVE: 2/80/-3, xie in place, pit at 16    TIMELINE:  2230: //HI  2310: Cytotec #1  0230: 40/-3, failed xie balloon placement  0330: cytotec # 2  0845: 40/-3, xie balloon placed, cytotec #3  1420: xie in place  1700: 2/80/-3, xie in place  2130: 2/80/-3, xie in place, pit at 12  0130: 2/80/-3, xie in place, pit at 16  0530: 2/80/-3, xie in place, pit at 8, s/p pit pause at 0300    A/P: 31 y.o.  at 39w5d, admitted for IOL    Labor management:  - Continue Close Maternal/Fetal Monitoring  - Recheck 4 hours or PRN    Malissa Pickering MD  PGY-1  Obstetrics & Gynecology   "

## 2022-08-06 NOTE — PROGRESS NOTES
08/06/22 0210   TeleStork Sosa Note - Strip   Strip Reviewed by Sosa Nurse? Yes   TeleStork Sosa Note - Communication   Sosa Nurse Communicated with Bedside Nurse Regarding: Fetal Status   TeleStork Sosa Note - Notification   Nurse Notified? Yes  (Spoke with staff nurse. She will ensure Nurse Alie is aware ofstrip.)   Name of Nurse Spoke with Tia

## 2022-08-06 NOTE — PROGRESS NOTES
"LABOR NOTE    Resident to bedside as patient is requesting xie balloon removal due to pain    S:  Complaints: yes, as above.  Epidural working: yes    O: /89   Pulse 88   Temp 98.3 °F (36.8 °C) (Oral)   Resp 16   Ht 5' 2" (1.575 m)   Wt 89 kg (196 lb 3.4 oz)   LMP 10/31/2021   SpO2 96%   Breastfeeding No   BMI 35.89 kg/m²       FHT: Cat 1 (reassuring), baseline 140s, moderate BTBV, + accels, no decels  CTX: q2-3 minutes  SVE: 4/80/-3, xie out    TIMELINE:  2230: //HI  2310: Cytotec #1  0230: 40/-3, failed xie balloon placement  0330: cytotec # 2  0845: 40/-3, xie balloon placed, cytotec #3  1420: xie in place  1700: 2/80/-3, xie in place  2130: 2/80/-3, xie in place, pit at 12  0130: 2/80/-3, xie in place, pit at 16  0530: 2/80/-3, xie in place, pit at 8, s/p pit pause at 0300  0645: 4/80/-3, xie out    A/P: 31 y.o.  at 39w5d, admitted for IOL    Labor management:  - Continue Close Maternal/Fetal Monitoring  - Recheck 4 hours or PRN  - Pitocin augmentation per protocol  - Consider AROM at next check    Irma Elaine MD  PGY-2 OB/GYN    "

## 2022-08-06 NOTE — PROGRESS NOTES
"LABOR NOTE    Resident to bedside for routine cervical check.    S:  Complaints: No.  Epidural working: N/A    O: /69   Pulse 78   Temp 98.3 °F (36.8 °C) (Oral)   Resp 16   Ht 5' 2" (1.575 m)   Wt 89 kg (196 lb 3.4 oz)   LMP 10/31/2021   SpO2 95%   Breastfeeding No   BMI 35.89 kg/m²       FHT: Cat 1 (reassuring), baseline 140, moderate BTBV, + accels, no decels  CTX: q2-3  SVE: 2/80/-3, xie in place, pit at 16    TIMELINE:  2230: //HI  2310: Cytotec #1  0230: /-3, failed xie balloon placement  0330: cytotec # 2  0845: 40/-3, xie balloon placed, cytotec #3  1420: xie in place  1700: 2/80/-3, xie in place  2130: 2/80/-3, xie in place, pit at 12  0130: 2/80/-3, xie in place, pit at 16    A/P: 31 y.o.  at 39w5d, admitted for IOL    Labor management:  - Continue Close Maternal/Fetal Monitoring  - Recheck 4 hours or PRN    Malissa Pickering MD  PGY-1  Obstetrics & Gynecology   "

## 2022-08-06 NOTE — PROGRESS NOTES
"LABOR NOTE    S:  Complaints: no.  Epidural working: yes    O: /76   Pulse 91   Temp 98.7 °F (37.1 °C) (Oral)   Resp 16   Ht 5' 2" (1.575 m)   Wt 89 kg (196 lb 3.4 oz)   LMP 10/31/2021   SpO2 98%   Breastfeeding No   BMI 35.89 kg/m²       FHT: Cat 1 (reassuring), baseline 140s, moderate BTBV, + accels, no decels  CTX: q2-3 minutes  SVE: 80/-2, AROM cl    TIMELINE:  : //HI  2310: Cytotec #1  0230: /-3, failed xie balloon placement  0330: cytotec # 2  0845: /-3, xie balloon placed, cytotec #3  1420: xie in place  1700: 280/-3, xie in place  2130: 280/-3, xie in place, pit at 12  0130: 2/80/-3, xie in place, pit at 16  0530: 2/80/-3, xie in place, pit at 8, s/p pit pause at 0300  0645: 80/-3, xie out\  1100: 4/80/-2, AROM cl, pit @ 30    A/P: 31 y.o.  at 39w5d, admitted for IOL    Labor management:  - Continue Close Maternal/Fetal Monitoring  - Recheck 4 hours or PRN  - Pitocin augmentation per protocol    Brittney Dahl MD PGY-3  Obstetrics and Gynecology      "

## 2022-08-07 LAB
ABO + RH BLD: NORMAL
BASOPHILS # BLD AUTO: 0.05 K/UL (ref 0–0.2)
BASOPHILS NFR BLD: 0.2 % (ref 0–1.9)
BLD GP AB SCN CELLS X3 SERPL QL: NORMAL
DIFFERENTIAL METHOD: ABNORMAL
EOSINOPHIL # BLD AUTO: 0 K/UL (ref 0–0.5)
EOSINOPHIL NFR BLD: 0.1 % (ref 0–8)
ERYTHROCYTE [DISTWIDTH] IN BLOOD BY AUTOMATED COUNT: 14 % (ref 11.5–14.5)
HCT VFR BLD AUTO: 32.9 % (ref 37–48.5)
HGB BLD-MCNC: 11.2 G/DL (ref 12–16)
IMM GRANULOCYTES # BLD AUTO: 0.17 K/UL (ref 0–0.04)
IMM GRANULOCYTES NFR BLD AUTO: 0.8 % (ref 0–0.5)
LYMPHOCYTES # BLD AUTO: 2 K/UL (ref 1–4.8)
LYMPHOCYTES NFR BLD: 9.1 % (ref 18–48)
MCH RBC QN AUTO: 31.7 PG (ref 27–31)
MCHC RBC AUTO-ENTMCNC: 34 G/DL (ref 32–36)
MCV RBC AUTO: 93 FL (ref 82–98)
MONOCYTES # BLD AUTO: 1.6 K/UL (ref 0.3–1)
MONOCYTES NFR BLD: 7.4 % (ref 4–15)
NEUTROPHILS # BLD AUTO: 18.3 K/UL (ref 1.8–7.7)
NEUTROPHILS NFR BLD: 82.4 % (ref 38–73)
NRBC BLD-RTO: 0 /100 WBC
PLATELET # BLD AUTO: 172 K/UL (ref 150–450)
PMV BLD AUTO: 10.4 FL (ref 9.2–12.9)
RBC # BLD AUTO: 3.53 M/UL (ref 4–5.4)
WBC # BLD AUTO: 22.16 K/UL (ref 3.9–12.7)

## 2022-08-07 PROCEDURE — 36415 COLL VENOUS BLD VENIPUNCTURE: CPT | Performed by: OBSTETRICS & GYNECOLOGY

## 2022-08-07 PROCEDURE — 85461 HEMOGLOBIN FETAL: CPT | Performed by: STUDENT IN AN ORGANIZED HEALTH CARE EDUCATION/TRAINING PROGRAM

## 2022-08-07 PROCEDURE — 36415 COLL VENOUS BLD VENIPUNCTURE: CPT | Performed by: STUDENT IN AN ORGANIZED HEALTH CARE EDUCATION/TRAINING PROGRAM

## 2022-08-07 PROCEDURE — 85025 COMPLETE CBC W/AUTO DIFF WBC: CPT | Performed by: OBSTETRICS & GYNECOLOGY

## 2022-08-07 PROCEDURE — 25000003 PHARM REV CODE 250: Performed by: STUDENT IN AN ORGANIZED HEALTH CARE EDUCATION/TRAINING PROGRAM

## 2022-08-07 PROCEDURE — 25000003 PHARM REV CODE 250: Performed by: OBSTETRICS & GYNECOLOGY

## 2022-08-07 PROCEDURE — 86850 RBC ANTIBODY SCREEN: CPT | Performed by: STUDENT IN AN ORGANIZED HEALTH CARE EDUCATION/TRAINING PROGRAM

## 2022-08-07 PROCEDURE — 72100003 HC LABOR CARE, EA. ADDL. 8 HRS

## 2022-08-07 PROCEDURE — 99233 PR SUBSEQUENT HOSPITAL CARE,LEVL III: ICD-10-PCS | Mod: ,,, | Performed by: OBSTETRICS & GYNECOLOGY

## 2022-08-07 PROCEDURE — 72100002 HC LABOR CARE, 1ST 8 HOURS

## 2022-08-07 PROCEDURE — 25000003 PHARM REV CODE 250

## 2022-08-07 PROCEDURE — 99233 SBSQ HOSP IP/OBS HIGH 50: CPT | Mod: ,,, | Performed by: OBSTETRICS & GYNECOLOGY

## 2022-08-07 PROCEDURE — 11000001 HC ACUTE MED/SURG PRIVATE ROOM

## 2022-08-07 RX ORDER — OXYCODONE HYDROCHLORIDE 5 MG/1
5 TABLET ORAL EVERY 6 HOURS PRN
Status: DISCONTINUED | OUTPATIENT
Start: 2022-08-07 | End: 2022-08-09 | Stop reason: HOSPADM

## 2022-08-07 RX ORDER — ACETAMINOPHEN 325 MG/1
975 TABLET ORAL EVERY 8 HOURS
Status: DISCONTINUED | OUTPATIENT
Start: 2022-08-07 | End: 2022-08-09 | Stop reason: HOSPADM

## 2022-08-07 RX ORDER — OXYCODONE HYDROCHLORIDE 5 MG/1
10 TABLET ORAL EVERY 6 HOURS PRN
Status: DISCONTINUED | OUTPATIENT
Start: 2022-08-07 | End: 2022-08-09 | Stop reason: HOSPADM

## 2022-08-07 RX ORDER — ONDANSETRON 8 MG/1
8 TABLET, ORALLY DISINTEGRATING ORAL EVERY 8 HOURS PRN
Status: DISCONTINUED | OUTPATIENT
Start: 2022-08-07 | End: 2022-08-09 | Stop reason: HOSPADM

## 2022-08-07 RX ORDER — SODIUM CHLORIDE 0.9 % (FLUSH) 0.9 %
10 SYRINGE (ML) INJECTION
Status: DISCONTINUED | OUTPATIENT
Start: 2022-08-07 | End: 2022-08-09 | Stop reason: HOSPADM

## 2022-08-07 RX ORDER — PROCHLORPERAZINE EDISYLATE 5 MG/ML
5 INJECTION INTRAMUSCULAR; INTRAVENOUS EVERY 6 HOURS PRN
Status: DISCONTINUED | OUTPATIENT
Start: 2022-08-07 | End: 2022-08-09 | Stop reason: HOSPADM

## 2022-08-07 RX ADMIN — DOCUSATE SODIUM 200 MG: 100 CAPSULE, LIQUID FILLED ORAL at 08:08

## 2022-08-07 RX ADMIN — PRENATAL VIT W/ FE FUMARATE-FA TAB 27-0.8 MG 1 TABLET: 27-0.8 TAB at 08:08

## 2022-08-07 RX ADMIN — DOCUSATE SODIUM 200 MG: 100 CAPSULE, LIQUID FILLED ORAL at 07:08

## 2022-08-07 RX ADMIN — FERROUS SULFATE TAB 325 MG (65 MG ELEMENTAL FE) 1 EACH: 325 (65 FE) TAB at 02:08

## 2022-08-07 RX ADMIN — FERROUS SULFATE TAB 325 MG (65 MG ELEMENTAL FE) 1 EACH: 325 (65 FE) TAB at 08:08

## 2022-08-07 RX ADMIN — ACETAMINOPHEN 975 MG: 325 TABLET, FILM COATED ORAL at 02:08

## 2022-08-07 RX ADMIN — HYDROCORTISONE: 25 CREAM TOPICAL at 10:08

## 2022-08-07 RX ADMIN — ACETAMINOPHEN 975 MG: 325 TABLET, FILM COATED ORAL at 04:08

## 2022-08-07 RX ADMIN — FERROUS SULFATE TAB 325 MG (65 MG ELEMENTAL FE) 1 EACH: 325 (65 FE) TAB at 07:08

## 2022-08-07 RX ADMIN — ACETAMINOPHEN 975 MG: 325 TABLET, FILM COATED ORAL at 10:08

## 2022-08-07 NOTE — LACTATION NOTE
08/07/22 1000   Maternal Assessment   Breast Shape Bilateral:;round   Breast Density Bilateral:;soft;other (see comments)  (firm)   [place] Bilateral:;firm   Nipples Left:;short;graspable;Right:;flat  (right nipple short, but flattens with compression)   Maternal Infant Feeding   Maternal Emotional State assist needed;relaxed   Infant Positioning cross-cradle;clutch/football  (Cr cradle on left; football on right)   Signs of Milk Transfer audible swallow;infant jaw motion present   Pain with Feeding no   Comfort Measures Before/During Feeding infant position adjusted;suction broken using finger   Nipple Shape After Feeding, Left round   Latch Assistance yes   Equipment Type   Breast Pump Type double electric, personal  (Pt has a Medela Max Flow and a Langston for home use)   Assisted pt with breastfeeding.  Attempted to latch baby to the right breast.  Breasts are soft, but firm.  Right nipple is short and flattens with compression.  Baby was unable to sustain a latch for more than one minute at a time.  Tried both cross cradle and football holds.  Baby latches, sucks several times, swallows, then slides off.  Suck assessment completed and noted that baby had tongue thrusting at times.  Discussed signs of tongue thrusting with parents.  Switched to the left side in cross cradle hold.  After a few attempts, infant was able to latch with active suckling and swallowing noted.  Infant did slide off the breast from time to time, but was able to relatch effectively.  Basic breastfeeding education provided.  Praise and encouragement given.  LC number placed on the white board.  Instructed patient to call for further assistance.

## 2022-08-07 NOTE — LACTATION NOTE
RN assisted with last breastfeeding session.  Reported similar pattern of difficulty latching to the right breast.  Rn reported that the patient was able to hand express colostrum from the right into a spoon, then, fed the EBm to the baby.  Breast shell for flat nipples given for the right breast and instructed pt on use.  Instructed pt to call the RN if she needs further assistance.

## 2022-08-07 NOTE — PLAN OF CARE
Patient will breastfeed  at least 8 times in 24 hours and monitor baby for signs of adequate feeding.  Patient will also call the LC if further assistance is needed.    1630 Patient will wear the breast shell on her right breast using a supportive bra to hold it in place in between feedings.  Breast shell will be removed immediately prior to breastfeeding infant and cleaned with soap and water.

## 2022-08-07 NOTE — ANESTHESIA POSTPROCEDURE EVALUATION
Anesthesia Post Evaluation    Patient: Laila Pappas    Procedure(s) Performed: * No procedures listed *    Final Anesthesia Type: epidural      Patient location during evaluation: floor  Patient participation: Yes- Able to Participate  Level of consciousness: awake and alert and oriented  Post-procedure vital signs: reviewed and stable  Pain management: adequate  Airway patency: patent  LUIS ALBERTO mitigation strategies: Multimodal analgesia  PONV status at discharge: No PONV  Anesthetic complications: no      Cardiovascular status: hemodynamically stable and blood pressure returned to baseline  Respiratory status: unassisted, spontaneous ventilation and room air  Hydration status: euvolemic  Follow-up not needed.          Vitals Value Taken Time   /55 08/07/22 0807   Temp 36.7 °C (98.1 °F) 08/07/22 0807   Pulse 81 08/07/22 0807   Resp 16 08/07/22 0807   SpO2 98 % 08/07/22 0807         No case tracking events are documented in the log.      Pain/Stefan Score: Pain Rating Prior to Med Admin: 0 (8/7/2022  4:42 AM)  Pain Rating Post Med Admin: 0 (8/7/2022  5:40 AM)

## 2022-08-07 NOTE — L&D DELIVERY NOTE
[facility] - Labor & Delivery  Vaginal Delivery   Operative Note    SUMMARY       This delivery was complicated by a shoulder dystocia    · After the head delivered, the left shoulder was noted to be anterior and held behind the pubic symphysis.  · Immediately at the identification of dystocia, I asked the nurse to note the time and call assistance to the room   · There was not enough room for manuevers and an episiotomy was required  · Maneuvers Required to relieve the dystocia included: Aiyana/Suprapubic pressure  · Gentle downward traction and NO fundal pressure were used  · Cord gases were not sent  · Pediatricians/ NICU were not in room at delivery  · APGARS were 8 at 1 minute and 9 at 5 minutes  · Birthweight 3620g  ·  was moving both arms without evidence of nerve injury at the time of delivery  · There was not evidence of clavicular fracture  · The Shoulder dystocia lasted a total of 20 seconds    Infant was taken to warmer for evaluation.  Infant delivered position ROP with episiotomy done for shoulder dystocia.  Nuchal cord: Yes, cord reduced at perineum.    Spontaneous delivery of placenta and IV pitocin given noting uterine atony with bleeding. 800 mcg rectal Cytotec given with improvement in uterine tone and bleeding.   2nd degree laceration noted and repaired in normal fashion with 2-0 vicyrl and 2-0 chromic. Esther clitoral laceration noted and repaired with 3-0 vicryl.  Patient tolerated delivery well. Sponge needle and lap counted correctly x2.    Irma Elaine MD  PGY-2 OB/GYN      Indications:  (spontaneous vaginal delivery)  Pregnancy complicated by:   Patient Active Problem List   Diagnosis    Endometriosis determined by laparoscopy    Multiple thyroid nodules    Poor posture    Mid back pain    Low back pain    Postural imbalance    Other chest pain    Dyslipidemia    IIH (idiopathic intracranial hypertension)    Arm pain, left    Plica syndrome    Decreased range of motion  "(ROM) of left knee    Impaired functional mobility, balance, gait, and endurance    Chronic pain of left knee    Gastroesophageal reflux disease    Endometriosis    Pregnancy resulting from in-vitro fertilization    Myalgia    Abdominal weakness     (spontaneous vaginal delivery)     Admitting GA: 39w6d    Delivery Information for Girl Laila Pappas    Birth information:  YOB: 2021   Time of birth: 6:40 PM   Sex: female   Head Delivery Date/Time: 2022  6:39 PM   Delivery type: Vaginal, Spontaneous   Gestational Age: 39w6d    Delivery Providers    Delivering clinician: Meghan Bunn MD   Provider Role    MD Yue Maharaj, RN     Amira Collins, TEODORA Angel, TEODORA Acuna, TEODORA Hobson             Measurements    Weight: 3620 g  Weight (lbs): 7 lb 15.7 oz  Length: 52.1 cm  Length (in): 20.5"  Head circumference: 33.7 cm  Chest circumference: 34.3 cm         Apgars    Living status: Living  Apgars:  1 min.:  5 min.:  10 min.:  15 min.:  20 min.:    Skin color:  0  1       Heart rate:  2  2       Reflex irritability:  2  2       Muscle tone:  2  2       Respiratory effort:  2  2       Total:  8  9       Apgars assigned by: CHANDAN ACUNA RN         Operative Delivery    Forceps attempted?: No  Vacuum extractor attempted?: No         Shoulder Dystocia    Shoulder dystocia present?: Yes  Anterior shoulder: left  Time recognized: 2022 18:39:40  Physician/Provider: SWATHI   Gentle attempt at traction, assisted by maternal expulsive forces?: Yes   First maneuver: Aiyana maneuver  First maneuver performed at: 2022 18:39:40  First maneuver performed by: KVNG  Second maneuver: suprapubic pressure  Second maneuver performed at: 2022 18:39:45  Second maneuver performed by: KVNG           Presentation    Presentation: Vertex  Position: Right Occiput Posterior           Interventions/Resuscitation    Method: Deep Suctioning, " Tactile Stimulation, Bulb Suctioning       Cord    Vessels: 3 vessels  Complications: Nuchal  Nuchal Intervention: reduced  Nuchal Cord Description: loose nuchal cord  Number of Loops: 1  Delayed Cord Clamping?: No  Cord Clamped Date/Time: 2022  6:40 PM  Cord Blood Disposition: Sent with Baby  Gases Sent?: No  Stem Cell Collection (by MD): No       Placenta    Placenta delivery date/time: 2022 1845  Placenta removal: Spontaneous  Placenta appearance: Intact  Placenta disposition: discarded           Labor Events:       labor: No     Labor Onset Date/Time:         Dilation Complete Date/Time: 2022 17:50     Start Pushing Date/Time: 2022 17:58       Start Pushing Date/Time: 2022 17:58     Rupture Date/Time: 22  104         Rupture type:          Fluid Amount:       Fluid Color: Clear      Fluid Odor:       Membrane Status: ARM (Artificial Rupture)               steroids: None     Antibiotics given for GBS: No     Induction: balloon dilation (David);misoprostol     Indications for induction:  Elective     Augmentation: oxytocin;amniotomy     Indications for augmentation: Ineffective Contraction Pattern     Labor complications: Shoulder Dystocia     Additional complications:          Cervical ripening:                     Delivery:      Episiotomy: Median     Indication for Episiotomy: Shoulder Dystocia     Perineal Lacerations: 2nd Repaired:  Yes   Periurethral Laceration:   Repaired:     Labial Laceration:   Repaired:     Sulcus Laceration:   Repaired:     Vaginal Laceration:   Repaired:     Cervical Laceration:   Repaired:     Repair suture:       Repair # of packets: 5     Last Value - EBL - Nursing (mL):       Sum - EBL - Nursing (mL): 0     Last Value - EBL - Anesthesia (mL):      Calculated QBL (mL): 517      Vaginal Sweep Performed: Yes     Surgicount Correct: Yes       Other providers:       Anesthesia    Method: Epidural          Details (if  applicable):  Trial of Labor      Categorization:      Priority:     Indications for :     Incision Type:       Additional  information:  Forceps:    Vacuum:    Breech:    Observed anomalies    Other (Comments):

## 2022-08-07 NOTE — PLAN OF CARE
Problem: Skin Injury Risk Increased  Goal: Skin Health and Integrity  Outcome: Ongoing, Progressing     Problem: Breastfeeding  Goal: Effective Breastfeeding  Outcome: Ongoing, Progressing     Problem: Adjustment to Role Transition (Postpartum Vaginal Delivery)  Goal: Successful Maternal Role Transition  Outcome: Ongoing, Progressing     Problem: Bleeding (Postpartum Vaginal Delivery)  Goal: Hemostasis  Outcome: Ongoing, Progressing     Problem: Infection (Postpartum Vaginal Delivery)  Goal: Absence of Infection Signs/Symptoms  Outcome: Ongoing, Progressing     Problem: Pain (Postpartum Vaginal Delivery)  Goal: Acceptable Pain Control  Outcome: Ongoing, Progressing     Problem: Urinary Retention (Postpartum Vaginal Delivery)  Goal: Effective Urinary Elimination  Outcome: Ongoing, Progressing

## 2022-08-07 NOTE — PROGRESS NOTES
POSTPARTUM PROGRESS NOTE    Subjective:     PPD/POD#: 1   Procedure:    EGA: 39w6d   N/V: No   F/C: No   Abd Pain: Mild, well-controlled with oral pain medication   Lochia: Mild   Voiding: Yes   Ambulating: Yes   Bowel fnc: Yes   Breastfeeding: Yes   Contraception: Per primary OB   Circumcision: N/A, female     Objective:      Temp:  [97.7 °F (36.5 °C)-99 °F (37.2 °C)] 97.7 °F (36.5 °C)  Pulse:  [] 96  Resp:  [16-20] 19  SpO2:  [94 %-100 %] 100 %  BP: (101-138)/(51-89) 110/55    Lung: Normal respiratory effort   Abdomen: Soft, appropriately tender   Uterus: Firm, no fundal tenderness   Incision: N/A   : Deferred   Extremities: No edema     Lab Review    No results for input(s): NA, K, CL, CO2, BUN, CREATININE, GLU, PROT, BILITOT, ALKPHOS, ALT, AST, MG, PHOS in the last 168 hours.    Recent Labs   Lab 22  2154   WBC 11.01   HGB 12.8   HCT 37.6   MCV 93            I/O    Intake/Output Summary (Last 24 hours) at 2022 2204  Last data filed at 2022 2100  Gross per 24 hour   Intake 5998.58 ml   Output 1992 ml   Net 4006.58 ml        Assessment and Plan:   1. Postpartum care:  - Patient doing well.  - Continue routine management and advances.    2. Rh negative  - Baby: B+  - Will need rhogam prior to discharge    3. Idiopathic intracranial HTN  - Followed by neurology and cardiology  - Symptoms stable, not on therapy at this time    4. Obesity  - PrePreg BMI < 40  - Encourage ambulation  - Lovenox: not indicated    5. Peptic ulcer disease  - Avoid NSAIDs    Irma Elaine MD  PGY-2 OB/GYN

## 2022-08-07 NOTE — LACTATION NOTE
Pt called RN for lactation help. Pt expressed concerns about her sore/cracked nipples & said that for now she does not want to latch baby to the breast, instead she wants to try hand expression/pumping and add formula supplementation. RN demonstrated hand expression and colostrum drops were placed onto a spoon and fed to baby. RN also set-up the Medela pump and instructed pt on the use with the information below. Formula was provided and education on the risks of formula and the practice of paced bottle feeding was given using the information listed below.     Mother was taught hand expression of breastmilk/colostrum. She was instructed to:   Sit upright and lean forward, if possible.   When feasible, apply warm, wet compress over breasts for a few minutes.    Perform gentle breast massage.   Form a C with her hand and place it about 1 inch back from the areola with the nipple centered between her index finger and her thumb.   Press, compress, relax:  Using her finger and thumb, apply pressure in an inward direction toward the breast without stretching the tissue, compress the breast tissue between her finger and thumb, then relax her finger and thumb. Repeat process for a few minutes.   Rotate placement of finger and thumb on the breasts to facilitate emptying.   Collect expressed breastmilk/colostrum with a spoon or cup and feed immediately to the baby, if able.   If unable to feed immediately, place breastmilk/colostrum directly into a sterile storage container for later use. Place the babys breast milk label (with the date and time of collection and the names of mother's medications) on the container. Reviewed proper handling and storage of expressed breastmilk.       Medela Symphony pump, tubing, collections containers and labels brought to bedside.  Discussed proper pump setting of initiation phase.  Instructed on proper usage of pump and to adjust suction according to maximum comfort level.   Verified appropriate flange fit.  Educated on the frequency and duration of pumping in order to promote and maintain a full milk supply.  Hands on pumping technique reviewed.  Encouraged hand expression after pumping.  Instructed on cleaning of breast pump parts.  Written instructions also given.  Pt verbalized understanding and appropriate recall for proper milk handling, collection, labeling, storage and transportation.      Instructed on the risks of formula feeding including:   Lacks the nutrients found in colostrums to help prevent infection, mature the gut, aid in digestion and resist allergies   Contains artificial additives and preservatives which increases incidence of contamination   Increase spitting up due to slower digestion   Increased cost and requires preparation, including bottle sanitation and formula refrigeration   Increased incidence of NEC for the  baby   Increased risk of diabetes with family history, SIDS and ear infections   Skipped feedings for the breastfeeding mother increases chance of engorgement, mastitis and plugged ducts   Decreases breastfeeding babys appetite resulting in poor feeding session, decreased breast stimulation and poor milk supply   Exposes the breastfeeding baby to the possibility of allergic reactions and colic       Baby Led Bottle Feeding education    Wash your hands.   Feed Baby on cue, not on a schedule. Babies give cues when ready to feed. Cues are soft sounds like grunts, moving arms and leg, licking lips, turning head to the side with an open mouth, and sucking hands/fingers.   Hold baby skin to skin during feedings. Look into babys eyes, talk to baby, and stroke baby while baby suckles.   Baby should be fed 8 or more times a day depending on babys cues. Some babies may be sleepy and may need to be awakened for their feeds to get the 8 feeds a day needed.   Hold the baby close while feeding and never prop a bottle.   Hold baby upright  supporting head and neck.   Place the tip of nipple below babys nose, rubbing top lip and allow baby to open mouth and accept the nipple.   Hold the bottle horizontally, (level with the ground), tilt the bottle just enough to get milk in the nipple.   Watch for stress cues during feeding. Be alert for baby wrinkling eyebrow, baby turning head away from bottle, or getting fussy. Baby may need a break.   Once baby releases nipple or pulls away, do not force baby to finish bottle. Baby is full when sucks slow or stops, arms relax, turns away from nipple, pushes away or falls asleep.   Pace the feeding, feed slowly so that baby is given 15-20 minutes with breaks to burp every 10-15mls.   Alternate arms part way through feeding to allow stimulation to both babys eyes.   Use formula within one hour of starting feeding. Throw away left over formula.      .

## 2022-08-07 NOTE — PLAN OF CARE
Pt aao, enjoying skin to skin time with baby. Vss, bleeding light to moderate, fundus firm and midline. Post partum plan of care discussed, questions answered, and understanding verbalized.

## 2022-08-07 NOTE — DISCHARGE INSTRUCTIONS
Breastfeeding discharge instructions given with First Alert form and reviewed. Please complete First Alert within 3-5 days after the baby's birth. ( Please call the Breastfeeding Warmline ( [phone]) or the baby's pediatrician if you have any concerns.     Also discussed:  AAP recommendation of exclusive breastfeeding for the first 6 months of life and continued breastfeeding with the introduction of supplemental foods beyond the first year of life. The AAP recommends breastfeeding for for at least a year or longer. Instructed on the recommendation to delay all bottle and pacifier use until after 4 weeks of age and breastfeeding is well established.  Discussed the benefits of exclusive breastfeeding for both mother and baby.  Discussed the risks of supplementation/pacifier use on the exclusivity of breastfeeding in the first 6 months. Feed the baby at the earliest sign of hunger or comfort  Hands to mouth, sucking motions  Rooting or searching for something to suck on  Dont wait for crying - it is a not a late sign of hunger; it is a sign of distress    The feedings may be 8-12 times per 24hrs and will not follow a schedule  Alternate the breast you start the feeding with, or start with the breast that feels the fullest  Switch breasts when the baby takes himself off the breast or falls asleep  Keep offering breasts until the baby looks full, no longer gives hunger signs, and stays asleep when placed on his back in the crib  If the baby is sleepy and wont wake for a feeding, put the baby skin-to-skin dressed in a diaper against the mothers bare chest  Sleep near your baby  The baby should be positioned and latched on to the breast correctly  Chest-to-chest, chin in the breast  Babys lips are flipped outward  Babys mouth is stretched open wide like a shout  Babys sucking should feel like tugging to the mother  The baby should be drinking at the breast:  You should hear swallowing or gulping throughout  the feeding  You should see milk on the babys lips when he comes off the breast  Your breasts should be softer when the baby is finished feeding  The baby should look relaxed at the end of feedings  After the 4th day and your milk is in:  The babys poop should turn bright yellow and be loose, watery, and seedy  The baby should have at least 3-4 poops the size of the palm of your hand per day  The baby should have at least 6-8 wet diapers per day  The urine should be light yellow in color  You should drink when you are thirsty and eat a healthy diet when you are    hungry.     Take naps to get the rest you need.   Take medications and/or drink alcohol only with permission of your obstetrician    or the babys pediatrician.  You can also call the [facility] Risk [facility],   ([phone]), Monday-Friday, 8am-5pm Central time, to get the most   up-to-date evidence-based information on the use of medications during   pregnancy and breastfeeding.      The baby should be examined by a pediatrician at 3-5 days of age and again at 2 weeks of age unless ordered sooner by the pediatrician.  Once your milk production increases the baby should gain at least 1/2-1oz each day and should be back to birth weight no later than 10-14 days of age.If this is not the case, please call the Breastfeeding Warmline ( [phone]) for assistance and support.     Community Resources    [facility] Breastfeeding Warmline: 833.460.8695   Local [facility] clinics: provide incentives and breastpumps to eligible mothers  La Lec[employer] (LLLI):  mother-to-mother support group website        www.lll.org  Local La Leche League mother-to-mother support groups:        www.lllAcumentrics.TrustAlert        [employer] of [place]   Dr. Jacinto Bustillo website for latch videos and general information:        www.breastfeedinginc.ca  [facility] is a call center that provides information about the safety of taking medications while  breastfeeding.  Call 1-352.667.4213, M-F, 8am-5pm, [place].  International Lactation [employer] Association provides resources for assistance:        www.ilca.org  [facility] Breastfeeding Coalition provides informationand resources for parents  and the community    http://[facility].org     Honey Stahl is a mom-to-mom support group:                             www.nolanesting.SampleBoard//breastfeedng-support/  [employer] Healthy Babies:  2-722-826-BABY(6070)  [employer]: a breastfeeding support group for women of color, 332.120.3224

## 2022-08-08 ENCOUNTER — PATIENT MESSAGE (OUTPATIENT)
Dept: OBSTETRICS AND GYNECOLOGY | Facility: CLINIC | Age: 32
End: 2022-08-08
Payer: COMMERCIAL

## 2022-08-08 VITALS
BODY MASS INDEX: 36.1 KG/M2 | DIASTOLIC BLOOD PRESSURE: 70 MMHG | SYSTOLIC BLOOD PRESSURE: 121 MMHG | OXYGEN SATURATION: 98 % | WEIGHT: 196.19 LBS | HEART RATE: 91 BPM | TEMPERATURE: 98 F | HEIGHT: 62 IN | RESPIRATION RATE: 17 BRPM

## 2022-08-08 LAB
FETAL CELL SCN BLD QL ROSETTE: NORMAL
INJECT RH IG VOL PATIENT: NORMAL ML

## 2022-08-08 PROCEDURE — 25000003 PHARM REV CODE 250: Performed by: STUDENT IN AN ORGANIZED HEALTH CARE EDUCATION/TRAINING PROGRAM

## 2022-08-08 PROCEDURE — 63600519 RHOGAM PHARM REV CODE 636 ALT 250 W HCPCS: Performed by: STUDENT IN AN ORGANIZED HEALTH CARE EDUCATION/TRAINING PROGRAM

## 2022-08-08 PROCEDURE — 25000003 PHARM REV CODE 250: Performed by: OBSTETRICS & GYNECOLOGY

## 2022-08-08 PROCEDURE — 25000003 PHARM REV CODE 250

## 2022-08-08 RX ORDER — ACETAMINOPHEN 325 MG/1
650 TABLET ORAL EVERY 6 HOURS PRN
Qty: 30 TABLET | Refills: 1 | Status: SHIPPED | OUTPATIENT
Start: 2022-08-08 | End: 2022-10-17

## 2022-08-08 RX ORDER — DOCUSATE SODIUM 100 MG/1
200 CAPSULE, LIQUID FILLED ORAL 2 TIMES DAILY PRN
Qty: 30 CAPSULE | Refills: 1 | Status: SHIPPED | OUTPATIENT
Start: 2022-08-08 | End: 2022-10-14

## 2022-08-08 RX ADMIN — FERROUS SULFATE TAB 325 MG (65 MG ELEMENTAL FE) 1 EACH: 325 (65 FE) TAB at 09:08

## 2022-08-08 RX ADMIN — ACETAMINOPHEN 975 MG: 325 TABLET, FILM COATED ORAL at 05:08

## 2022-08-08 RX ADMIN — HUMAN RHO(D) IMMUNE GLOBULIN 300 MCG: 300 INJECTION, SOLUTION INTRAMUSCULAR at 12:08

## 2022-08-08 RX ADMIN — PRENATAL VIT W/ FE FUMARATE-FA TAB 27-0.8 MG 1 TABLET: 27-0.8 TAB at 09:08

## 2022-08-08 RX ADMIN — DOCUSATE SODIUM 200 MG: 100 CAPSULE, LIQUID FILLED ORAL at 09:08

## 2022-08-08 NOTE — DISCHARGE SUMMARY
Delivery Discharge Summary  Obstetrics      Primary OB Clinician: Meghan Bunn MD      Admission date: 2022  Discharge date: 2022    Disposition: To home, self care    Discharge Diagnosis List:      Patient Active Problem List   Diagnosis    Endometriosis determined by laparoscopy    Multiple thyroid nodules    Poor posture    Mid back pain    Low back pain    Postural imbalance    Other chest pain    Dyslipidemia    IIH (idiopathic intracranial hypertension)    Arm pain, left    Plica syndrome    Decreased range of motion (ROM) of left knee    Impaired functional mobility, balance, gait, and endurance    Chronic pain of left knee    Gastroesophageal reflux disease    Endometriosis    Pregnancy resulting from in-vitro fertilization    Myalgia    Abdominal weakness     (spontaneous vaginal delivery)       Procedure:     Hospital Course:  Laila Pappas is a 31 y.o. now , PPD #2 who was admitted on 2022 at 39w4d for IOL. Patient was subsequently admitted to labor and delivery unit with signed consents.     Labor course was uncomplicated and resulted in  complicated by shoulder dystocia.    Please see delivery note for further details. Her postpartum course was uncomplicated. On discharge day, patient's pain is controlled with oral pain medications. Pt is tolerating ambulation without SOB or CP, and regular diet without N/V. Reports lochia is mild. Denies any HA, vision changes, F/C, LE swelling. Denies any breast pain/soreness.  She received rhogam prior to discharge.    Pt in stable condition and ready for discharge. She has been instructed to start and/or continue medications and follow up with her obstetrics provider as listed below.    Pertinent studies:  CBC  Recent Labs   Lab 22  2154 22  0427   WBC 11.01 22.16*   HGB 12.8 11.2*   HCT 37.6 32.9*   MCV 93 93    172          Immunization History   Administered Date(s) Administered     COVID-19, MRNA, LN-S, PF (Pfizer) (Purple Cap) 08/05/2021, 08/26/2021    DTP 04/01/1992, 06/11/1992, 08/04/1992, 09/15/1992, 03/05/1996    HIB 02/21/1992, 06/11/1992, 08/04/1992, 09/15/1992    Hepatitis B, Pediatric/Adolescent 07/27/1999, 08/06/2003, 08/04/2011    MMR 10/31/1994, 03/05/1996    Meningococcal Conjugate (MCV4P) 03/13/2009    OPV 02/21/1992, 06/11/1992, 08/04/1992, 09/15/1992, 03/05/1996    PPD Test 03/13/2009, 03/13/2009    Rho (D) Immune Globulin 05/31/2022    Rho (D) Immune Globulin - IM 07/23/2020, 08/08/2022    Td (ADULT) 08/06/2003    Tdap 08/06/2003, 07/22/2019, 05/31/2022        Delivery:    Episiotomy: Median   Lacerations: 2nd   Repair suture:     Repair # of packets: 5   Blood loss (ml):       Birth information:  YOB: 2021   Time of birth: 6:40 PM   Sex: female   Delivery type: Vaginal, Spontaneous   Gestational Age: 39w6d    Delivery Clinician:      Other providers:       Additional  information:  Forceps:    Vacuum:    Breech:    Observed anomalies      Living?:           APGARS  One minute Five minutes Ten minutes   Skin color:         Heart rate:         Grimace:         Muscle tone:         Breathing:         Totals: 8  9        Placenta: Delivered:       appearance      Patient Instructions:   Current Discharge Medication List      START taking these medications    Details   acetaminophen (TYLENOL) 325 MG tablet Take 2 tablets (650 mg total) by mouth every 6 (six) hours as needed for Pain.  Qty: 30 tablet, Refills: 1      docusate sodium (COLACE) 100 MG capsule Take 2 capsules (200 mg total) by mouth 2 (two) times daily as needed for Constipation.  Qty: 30 capsule, Refills: 1         CONTINUE these medications which have NOT CHANGED    Details   clotrimazole-betamethasone 1-0.05% (LOTRISONE) cream Apply topically Daily.  Qty: 15 g, Refills: 0      ferrous sulfate 325 (65 FE) MG EC tablet Take 325 mg by mouth 3 (three) times daily with meals.      prenatal vit  10-iron-folic-dha (VITAFOL-OB+DHA) 65-1-250 mg Cmpk Take 1 tablet by mouth.      vitamin D (VITAMIN D3) 1000 units Tab Take 1,000 Units by mouth once daily.         STOP taking these medications       aspirin (ECOTRIN) 81 MG EC tablet Comments:   Reason for Stopping:         folic acid (FOLVITE) 1 MG tablet Comments:   Reason for Stopping:               Discharge Procedure Orders   Lifting restrictions   Order Comments: Please do not lift anything heavier than your baby for 6 weeks if you've had a      Pelvic Rest   Order Comments: Nothing in the vagina for 6 weeks until evaluation by your OBGYN at your 6 week postpartum visit     Notify your health care provider if you experience any of the following:  temperature >100.4     Notify your health care provider if you experience any of the following:  persistent nausea and vomiting or diarrhea     Notify your health care provider if you experience any of the following:  severe uncontrolled pain     Notify your health care provider if you experience any of the following:  redness, tenderness, or signs of infection (pain, swelling, redness, odor or green/yellow discharge around incision site)     Notify your health care provider if you experience any of the following:  difficulty breathing or increased cough     Notify your health care provider if you experience any of the following:  severe persistent headache     Notify your health care provider if you experience any of the following:  worsening rash     Notify your health care provider if you experience any of the following:  persistent dizziness, light-headedness, or visual disturbances     Notify your health care provider if you experience any of the following:  increased confusion or weakness     Notify your health care provider if you experience any of the following:   Order Comments: Vaginal bleeding that saturates >1 pad an hour for >1 hour     Activity as tolerated        Follow-up Information      Meghan Bunn MD Follow up in 1 week(s).    Specialties: Obstetrics, Obstetrics and Gynecology  Why: BP check  Contact information:  [address] [id number] [phone]                          Irma Elaine MD  PGY-2 OB/GYN

## 2022-08-08 NOTE — PROGRESS NOTES
POSTPARTUM PROGRESS NOTE    Subjective:     PPD/POD#: 2   Procedure:    EGA: 39w6d   N/V: No   F/C: No   Abd Pain: Mild, well-controlled with oral pain medication   Lochia: Mild   Voiding: Yes   Ambulating: Yes   Bowel fnc: Yes   Breastfeeding: Yes   Contraception: Per primary OB   Circumcision: N/A, female     Objective:      Temp:  [97.9 °F (36.6 °C)-99.5 °F (37.5 °C)] 98.4 °F (36.9 °C)  Pulse:  [] 72  Resp:  [16-18] 17  SpO2:  [96 %-100 %] 98 %  BP: ()/(54-80) 90/54    Lung: Normal respiratory effort   Abdomen: Soft, appropriately tender   Uterus: Firm, no fundal tenderness   Incision: N/A   : Deferred   Extremities: No edema     Lab Review    No results for input(s): NA, K, CL, CO2, BUN, CREATININE, GLU, PROT, BILITOT, ALKPHOS, ALT, AST, MG, PHOS in the last 168 hours.    Recent Labs   Lab 22  2154 22  0427   WBC 11.01 22.16*   HGB 12.8 11.2*   HCT 37.6 32.9*   MCV 93 93    172         I/O  No intake or output data in the 24 hours ending 22 0653     Assessment and Plan:   1. Postpartum care:  - Patient doing well.  - Continue routine management and advances.    2. Rh negative  - Baby: B+  - Will need rhogam prior to discharge    3. Idiopathic intracranial HTN  - Followed by neurology and cardiology  - Symptoms stable, not on therapy at this time    4. Obesity  - PrePreg BMI < 40  - Encourage ambulation  - Lovenox: not indicated    5. Peptic ulcer disease  - Avoid NSAIDs  - D/c with tylenol    Cristine Baker MD  OB-GYN, PGY-1

## 2022-08-08 NOTE — PLAN OF CARE
Problem: Adult Inpatient Plan of Care  Goal: Plan of Care Review  Outcome: Met  Goal: Patient-Specific Goal (Individualized)  Outcome: Met  Goal: Absence of Hospital-Acquired Illness or Injury  Outcome: Met  Goal: Optimal Comfort and Wellbeing  Outcome: Met  Goal: Readiness for Transition of Care  Outcome: Met     Problem: Infection  Goal: Absence of Infection Signs and Symptoms  Outcome: Met     Problem: Infection (Labor)  Goal: Absence of Infection Signs and Symptoms  Outcome: Met     Problem: Skin Injury Risk Increased  Goal: Skin Health and Integrity  Outcome: Met     Problem: Breastfeeding  Goal: Effective Breastfeeding  Outcome: Met     Problem: Adjustment to Role Transition (Postpartum Vaginal Delivery)  Goal: Successful Maternal Role Transition  Outcome: Met     Problem: Bleeding (Postpartum Vaginal Delivery)  Goal: Hemostasis  Outcome: Met     Problem: Infection (Postpartum Vaginal Delivery)  Goal: Absence of Infection Signs/Symptoms  Outcome: Met     Problem: Pain (Postpartum Vaginal Delivery)  Goal: Acceptable Pain Control  Outcome: Met     Problem: Urinary Retention (Postpartum Vaginal Delivery)  Goal: Effective Urinary Elimination  Outcome: Met

## 2022-08-08 NOTE — LACTATION NOTE
08/08/22 1130   Maternal Assessment   Breast Shape Bilateral:;round   Breast Density Bilateral:;soft   Left Nipple Symptoms cracked;tender   Right Nipple Symptoms cracked;tender   Equipment Type   Breast Pump Type double electric, hospital grade;double electric, personal;other (see comments)  (has Medela Pump in Style max flow for home use)   Breast Pump Flange Type hard   Breast Pump Flange Size 24 mm   Breast Pumping   Breast Pumping Interventions frequent pumping encouraged   Breast Pumping double electric breast pump utilized   Lactation Referrals   Lactation Referrals outpatient lactation program;pediatric care provider;support group   Patient presently pumping with Symphony pump. Expressed 10 ml. Mother states she has been exclusively pumping since last night due to sore cracked nipples. States nipples are improving but she does not want to attempt to latch baby until nipples are healed. Reviewed pumping of 8 or more times in 24 hours, frequency and duration, cleaning of breast pump kit, storage and handling of breast milk. Observed mother pumping with 24 mm flanges. Appropriate flange fit verified. Demonstrated to mother set up and use of her personal use Medela pump. Mother's Breastfeeding Guide reviewed. Encouraged to wear breast shells to pepper nipples more. Spoke with OB provider for prescription for Jacinto Brooke's APNO for discharge. Outpatient resources discussed and assistance with latch/nursing. Encouraged to call Lactation support line after discharge.  number on board to call as needed prior to discharge.

## 2022-08-09 ENCOUNTER — PATIENT MESSAGE (OUTPATIENT)
Dept: LACTATION | Facility: CLINIC | Age: 32
End: 2022-08-09
Payer: COMMERCIAL

## 2022-08-10 ENCOUNTER — PATIENT MESSAGE (OUTPATIENT)
Dept: LACTATION | Facility: CLINIC | Age: 32
End: 2022-08-10
Payer: COMMERCIAL

## 2022-08-16 ENCOUNTER — HOSPITAL ENCOUNTER (OUTPATIENT)
Facility: OTHER | Age: 32
Discharge: HOME | End: 2022-08-18
Attending: OBSTETRICS & GYNECOLOGY | Admitting: OBSTETRICS & GYNECOLOGY
Payer: COMMERCIAL

## 2022-08-16 ENCOUNTER — CLINICAL SUPPORT (OUTPATIENT)
Dept: OBSTETRICS AND GYNECOLOGY | Facility: CLINIC | Age: 32
End: 2022-08-16
Payer: COMMERCIAL

## 2022-08-16 VITALS
SYSTOLIC BLOOD PRESSURE: 130 MMHG | DIASTOLIC BLOOD PRESSURE: 86 MMHG | BODY MASS INDEX: 31.64 KG/M2 | WEIGHT: 171.94 LBS | HEIGHT: 62 IN

## 2022-08-16 DIAGNOSIS — R51.9 NONINTRACTABLE HEADACHE, UNSPECIFIED CHRONICITY PATTERN, UNSPECIFIED HEADACHE TYPE: ICD-10-CM

## 2022-08-16 DIAGNOSIS — G93.2 IIH (IDIOPATHIC INTRACRANIAL HYPERTENSION): ICD-10-CM

## 2022-08-16 LAB
ALBUMIN SERPL BCP-MCNC: 2.9 G/DL (ref 3.5–5.2)
ALP SERPL-CCNC: 96 U/L (ref 55–135)
ALT SERPL W/O P-5'-P-CCNC: 27 U/L (ref 10–44)
ANION GAP SERPL CALC-SCNC: 12 MMOL/L (ref 8–16)
AST SERPL-CCNC: 16 U/L (ref 10–40)
BASOPHILS # BLD AUTO: 0.02 K/UL (ref 0–0.2)
BASOPHILS NFR BLD: 0.2 % (ref 0–1.9)
BILIRUB SERPL-MCNC: 0.3 MG/DL (ref 0.1–1)
BUN SERPL-MCNC: 8 MG/DL (ref 6–20)
CALCIUM SERPL-MCNC: 8.7 MG/DL (ref 8.7–10.5)
CHLORIDE SERPL-SCNC: 111 MMOL/L (ref 95–110)
CO2 SERPL-SCNC: 21 MMOL/L (ref 23–29)
CREAT SERPL-MCNC: 0.8 MG/DL (ref 0.5–1.4)
DIFFERENTIAL METHOD: ABNORMAL
EOSINOPHIL # BLD AUTO: 0.2 K/UL (ref 0–0.5)
EOSINOPHIL NFR BLD: 1.5 % (ref 0–8)
ERYTHROCYTE [DISTWIDTH] IN BLOOD BY AUTOMATED COUNT: 13.9 % (ref 11.5–14.5)
EST. GFR  (NO RACE VARIABLE): >60 ML/MIN/1.73 M^2
GLUCOSE SERPL-MCNC: 79 MG/DL (ref 70–110)
HCT VFR BLD AUTO: 38.7 % (ref 37–48.5)
HGB BLD-MCNC: 13.2 G/DL (ref 12–16)
IMM GRANULOCYTES # BLD AUTO: 0.05 K/UL (ref 0–0.04)
IMM GRANULOCYTES NFR BLD AUTO: 0.5 % (ref 0–0.5)
LYMPHOCYTES # BLD AUTO: 2.2 K/UL (ref 1–4.8)
LYMPHOCYTES NFR BLD: 21 % (ref 18–48)
MCH RBC QN AUTO: 31.5 PG (ref 27–31)
MCHC RBC AUTO-ENTMCNC: 34.1 G/DL (ref 32–36)
MCV RBC AUTO: 92 FL (ref 82–98)
MONOCYTES # BLD AUTO: 0.7 K/UL (ref 0.3–1)
MONOCYTES NFR BLD: 6.8 % (ref 4–15)
NEUTROPHILS # BLD AUTO: 7.4 K/UL (ref 1.8–7.7)
NEUTROPHILS NFR BLD: 70 % (ref 38–73)
NRBC BLD-RTO: 0 /100 WBC
PLATELET # BLD AUTO: 384 K/UL (ref 150–450)
PMV BLD AUTO: 9.7 FL (ref 9.2–12.9)
POTASSIUM SERPL-SCNC: 3.3 MMOL/L (ref 3.5–5.1)
PROT SERPL-MCNC: 6.2 G/DL (ref 6–8.4)
RBC # BLD AUTO: 4.19 M/UL (ref 4–5.4)
SODIUM SERPL-SCNC: 144 MMOL/L (ref 136–145)
WBC # BLD AUTO: 10.55 K/UL (ref 3.9–12.7)

## 2022-08-16 PROCEDURE — 99285 EMERGENCY DEPT VISIT HI MDM: CPT | Mod: ,,, | Performed by: OBSTETRICS & GYNECOLOGY

## 2022-08-16 PROCEDURE — 11000001 HC ACUTE MED/SURG PRIVATE ROOM

## 2022-08-16 PROCEDURE — 63600175 PHARM REV CODE 636 W HCPCS

## 2022-08-16 PROCEDURE — 96375 TX/PRO/DX INJ NEW DRUG ADDON: CPT

## 2022-08-16 PROCEDURE — 80053 COMPREHEN METABOLIC PANEL: CPT | Performed by: STUDENT IN AN ORGANIZED HEALTH CARE EDUCATION/TRAINING PROGRAM

## 2022-08-16 PROCEDURE — 0503F PR POSTPARTUM CARE VISIT: ICD-10-PCS | Mod: S$GLB,,, | Performed by: OBSTETRICS & GYNECOLOGY

## 2022-08-16 PROCEDURE — 99285 EMERGENCY DEPT VISIT HI MDM: CPT | Mod: 25

## 2022-08-16 PROCEDURE — 99999 PR PBB SHADOW E&M-EST. PATIENT-LVL III: CPT | Mod: PBBFAC,,, | Performed by: OBSTETRICS & GYNECOLOGY

## 2022-08-16 PROCEDURE — 99285 PR EMERGENCY DEPT VISIT,LEVEL V: ICD-10-PCS | Mod: ,,, | Performed by: OBSTETRICS & GYNECOLOGY

## 2022-08-16 PROCEDURE — 99999 PR PBB SHADOW E&M-EST. PATIENT-LVL III: ICD-10-PCS | Mod: PBBFAC,,, | Performed by: OBSTETRICS & GYNECOLOGY

## 2022-08-16 PROCEDURE — G0378 HOSPITAL OBSERVATION PER HR: HCPCS

## 2022-08-16 PROCEDURE — 96365 THER/PROPH/DIAG IV INF INIT: CPT

## 2022-08-16 PROCEDURE — 25000003 PHARM REV CODE 250

## 2022-08-16 PROCEDURE — 85025 COMPLETE CBC W/AUTO DIFF WBC: CPT | Performed by: STUDENT IN AN ORGANIZED HEALTH CARE EDUCATION/TRAINING PROGRAM

## 2022-08-16 PROCEDURE — 0503F POSTPARTUM CARE VISIT: CPT | Mod: S$GLB,,, | Performed by: OBSTETRICS & GYNECOLOGY

## 2022-08-16 PROCEDURE — 25000003 PHARM REV CODE 250: Performed by: STUDENT IN AN ORGANIZED HEALTH CARE EDUCATION/TRAINING PROGRAM

## 2022-08-16 PROCEDURE — 96366 THER/PROPH/DIAG IV INF ADDON: CPT

## 2022-08-16 RX ORDER — HYDROCODONE BITARTRATE AND ACETAMINOPHEN 5; 325 MG/1; MG/1
1 TABLET ORAL EVERY 6 HOURS PRN
Status: DISCONTINUED | OUTPATIENT
Start: 2022-08-16 | End: 2022-08-18 | Stop reason: HOSPADM

## 2022-08-16 RX ORDER — ACETAMINOPHEN 325 MG/1
650 TABLET ORAL EVERY 6 HOURS PRN
Status: DISCONTINUED | OUTPATIENT
Start: 2022-08-16 | End: 2022-08-18 | Stop reason: HOSPADM

## 2022-08-16 RX ORDER — LABETALOL HYDROCHLORIDE 5 MG/ML
20 INJECTION, SOLUTION INTRAVENOUS ONCE
Status: COMPLETED | OUTPATIENT
Start: 2022-08-16 | End: 2022-08-16

## 2022-08-16 RX ORDER — PRENATAL WITH FERROUS FUM AND FOLIC ACID 3080; 920; 120; 400; 22; 1.84; 3; 20; 10; 1; 12; 200; 27; 25; 2 [IU]/1; [IU]/1; MG/1; [IU]/1; MG/1; MG/1; MG/1; MG/1; MG/1; MG/1; UG/1; MG/1; MG/1; MG/1; MG/1
1 TABLET ORAL DAILY
Status: DISCONTINUED | OUTPATIENT
Start: 2022-08-17 | End: 2022-08-18 | Stop reason: HOSPADM

## 2022-08-16 RX ORDER — PROCHLORPERAZINE EDISYLATE 5 MG/ML
5 INJECTION INTRAMUSCULAR; INTRAVENOUS EVERY 6 HOURS PRN
Status: DISCONTINUED | OUTPATIENT
Start: 2022-08-16 | End: 2022-08-18 | Stop reason: HOSPADM

## 2022-08-16 RX ORDER — ONDANSETRON 8 MG/1
8 TABLET, ORALLY DISINTEGRATING ORAL EVERY 8 HOURS PRN
Status: DISCONTINUED | OUTPATIENT
Start: 2022-08-16 | End: 2022-08-18 | Stop reason: HOSPADM

## 2022-08-16 RX ORDER — ACETAMINOPHEN 500 MG
1000 TABLET ORAL ONCE
Status: COMPLETED | OUTPATIENT
Start: 2022-08-16 | End: 2022-08-16

## 2022-08-16 RX ORDER — MAGNESIUM SULFATE HEPTAHYDRATE 40 MG/ML
2 INJECTION, SOLUTION INTRAVENOUS CONTINUOUS
Status: DISCONTINUED | OUTPATIENT
Start: 2022-08-16 | End: 2022-08-18

## 2022-08-16 RX ORDER — DOCUSATE SODIUM 100 MG/1
200 CAPSULE, LIQUID FILLED ORAL 2 TIMES DAILY PRN
Status: DISCONTINUED | OUTPATIENT
Start: 2022-08-16 | End: 2022-08-18 | Stop reason: HOSPADM

## 2022-08-16 RX ORDER — IBUPROFEN 600 MG/1
600 TABLET ORAL EVERY 6 HOURS
Status: DISCONTINUED | OUTPATIENT
Start: 2022-08-16 | End: 2022-08-16

## 2022-08-16 RX ORDER — DIPHENHYDRAMINE HCL 25 MG
25 CAPSULE ORAL EVERY 4 HOURS PRN
Status: DISCONTINUED | OUTPATIENT
Start: 2022-08-16 | End: 2022-08-18 | Stop reason: HOSPADM

## 2022-08-16 RX ORDER — MAGNESIUM SULFATE HEPTAHYDRATE 40 MG/ML
4 INJECTION, SOLUTION INTRAVENOUS ONCE
Status: DISCONTINUED | OUTPATIENT
Start: 2022-08-16 | End: 2022-08-18 | Stop reason: HOSPADM

## 2022-08-16 RX ORDER — HYDROCORTISONE 25 MG/G
CREAM TOPICAL 3 TIMES DAILY PRN
Status: DISCONTINUED | OUTPATIENT
Start: 2022-08-16 | End: 2022-08-18 | Stop reason: HOSPADM

## 2022-08-16 RX ORDER — SIMETHICONE 80 MG
1 TABLET,CHEWABLE ORAL EVERY 6 HOURS PRN
Status: DISCONTINUED | OUTPATIENT
Start: 2022-08-16 | End: 2022-08-18 | Stop reason: HOSPADM

## 2022-08-16 RX ORDER — SODIUM CHLORIDE 0.9 % (FLUSH) 0.9 %
10 SYRINGE (ML) INJECTION
Status: DISCONTINUED | OUTPATIENT
Start: 2022-08-16 | End: 2022-08-18 | Stop reason: HOSPADM

## 2022-08-16 RX ORDER — DIPHENHYDRAMINE HYDROCHLORIDE 50 MG/ML
25 INJECTION INTRAMUSCULAR; INTRAVENOUS EVERY 4 HOURS PRN
Status: DISCONTINUED | OUTPATIENT
Start: 2022-08-16 | End: 2022-08-18 | Stop reason: HOSPADM

## 2022-08-16 RX ORDER — SODIUM CHLORIDE, SODIUM LACTATE, POTASSIUM CHLORIDE, CALCIUM CHLORIDE 600; 310; 30; 20 MG/100ML; MG/100ML; MG/100ML; MG/100ML
INJECTION, SOLUTION INTRAVENOUS CONTINUOUS
Status: DISCONTINUED | OUTPATIENT
Start: 2022-08-16 | End: 2022-08-18 | Stop reason: HOSPADM

## 2022-08-16 RX ORDER — CALCIUM GLUCONATE 98 MG/ML
1 INJECTION, SOLUTION INTRAVENOUS
Status: DISCONTINUED | OUTPATIENT
Start: 2022-08-16 | End: 2022-08-18 | Stop reason: HOSPADM

## 2022-08-16 RX ORDER — NIFEDIPINE 30 MG/1
30 TABLET, EXTENDED RELEASE ORAL DAILY
Status: DISCONTINUED | OUTPATIENT
Start: 2022-08-16 | End: 2022-08-18

## 2022-08-16 RX ADMIN — SODIUM CHLORIDE, SODIUM LACTATE, POTASSIUM CHLORIDE, AND CALCIUM CHLORIDE 1000 ML: .6; .31; .03; .02 INJECTION, SOLUTION INTRAVENOUS at 07:08

## 2022-08-16 RX ADMIN — MAGNESIUM SULFATE IN WATER 2 G/HR: 40 INJECTION, SOLUTION INTRAVENOUS at 08:08

## 2022-08-16 RX ADMIN — MAGNESIUM SULFATE IN WATER 2 G/HR: 40 INJECTION, SOLUTION INTRAVENOUS at 07:08

## 2022-08-16 RX ADMIN — ACETAMINOPHEN 1000 MG: 500 TABLET ORAL at 04:08

## 2022-08-16 RX ADMIN — NIFEDIPINE 30 MG: 30 TABLET, FILM COATED, EXTENDED RELEASE ORAL at 04:08

## 2022-08-16 RX ADMIN — LABETALOL HYDROCHLORIDE 20 MG: 5 INJECTION INTRAVENOUS at 04:08

## 2022-08-16 NOTE — ED PROVIDER NOTES
Encounter Date: 2022       History     Chief Complaint   Patient presents with    Hypertension     HPI   Laila Pappas is a 31 y.o. [id number] s/p  on  who presents complaining of headache and elevated blood pressures in her primary OBGYNs office. She was noted to have a 149/96 today in clinic. She has not tried any medications for her headache.   Pertinent PMH includes idiopathic intracranial HTN, obesity, and PUD. She did not have any problems related to HTN during pregnancy. She denies vision changes, SOB, chest pain, RUQ pain, increased swelling     Review of patient's allergies indicates:   Allergen Reactions    Doxycycline Other (See Comments)     Triggers headaches    Bactrim ds [sulfamethoxazole-trimethoprim] Nausea And Vomiting    Nsaids (non-steroidal anti-inflammatory drug) Other (See Comments)     D/t h/o PUD and H pylori    Codeine Rash     Past Medical History:   Diagnosis Date    Bilateral knee pain 2018    Endometriosis     treated by GYN at     Epigastric abdominal pain 2016    Gastroesophageal reflux disease 2016    Helicobacter pylori ab+     Helicobacter pylori ab+     High-tone pelvic floor dysfunction 2016    IIH (idiopathic intracranial hypertension)     Dr. Rogers - [facility]    Palpitations 2016    Pre-eclampsia in postpartum period 2022     Past Surgical History:   Procedure Laterality Date    ARTHROSCOPY OF KNEE Left 2021    Procedure: ARTHROSCOPY, KNEE;  Surgeon: Deidra Mejias MD;  Location: [facility] OR;  Service: Orthopedics;  Laterality: Left;    COLONOSCOPY N/A 2018    Procedure: COLONOSCOPY;  Surgeon: Micki Gross MD;  Location: [facility] (4TH FLR);  Service: Endoscopy;  Laterality: N/A;  preferrably in Dec 2018, CRS ok if needed.    ESOPHAGOGASTRODUODENOSCOPY N/A 10/30/2018    Procedure: EGD (ESOPHAGOGASTRODUODENOSCOPY);  Surgeon: Greg Leach MD;  Location: [facility] (2ND FLR);  Service: Endoscopy;  Laterality: N/A;   ok to schedule per Kimmy    KNEE ARTHROSCOPY W/ DEBRIDEMENT  4/6/2021    Procedure: ARTHROSCOPY, KNEE, WITH DEBRIDEMENT;  Surgeon: Deidra Meijas MD;  Location: [facility] OR;  Service: Orthopedics;;    KNEE ARTHROSCOPY W/ MENISCECTOMY Right 4/6/2021    Procedure: ARTHROSCOPY, KNEE, WITH MENISCECTOMY;  Surgeon: Deidra Mejias MD;  Location: [facility] OR;  Service: Orthopedics;  Laterality: Right;    KNEE ARTHROSCOPY W/ PLICA EXCISION Left 4/6/2021    Procedure: EXCISION, PLICA, KNEE, ARTHROSCOPIC;  Surgeon: Deidra Mejias MD;  Location: [facility] OR;  Service: Orthopedics;  Laterality: Left;    LAPAROSCOPY  2017    dx with endo.  no removal    NO PAST SURGERIES      SYNOVECTOMY OF KNEE Left 4/6/2021    Procedure: SYNOVECTOMY, KNEE;  Surgeon: Deidra Mejias MD;  Location: [facility] OR;  Service: Orthopedics;  Laterality: Left;    UPPER GASTROINTESTINAL ENDOSCOPY       Family History   Problem Relation Age of Onset    Diabetes Mother     No Known Problems Father     No Known Problems Sister     Hypertension Brother     Hypertension Maternal Grandmother     Hypertension Maternal Grandfather     No Known Problems Paternal Grandmother     No Known Problems Paternal Grandfather     Colon cancer Neg Hx     Crohn's disease Neg Hx     Esophageal cancer Neg Hx     Inflammatory bowel disease Neg Hx     Irritable bowel syndrome Neg Hx     Rectal cancer Neg Hx     Stomach cancer Neg Hx     Ulcerative colitis Neg Hx      Social History     Tobacco Use    Smoking status: Never Smoker    Smokeless tobacco: Never Used   Substance Use Topics    Alcohol use: No    Drug use: No     Review of Systems   Constitutional: Negative for fever.   HENT: Negative for sinus pain and sore throat.    Eyes: Negative for photophobia and visual disturbance.   Respiratory: Negative for chest tightness and shortness of breath.    Cardiovascular: Negative for chest pain and palpitations.   Gastrointestinal: Negative for abdominal distention, abdominal pain,  nausea and vomiting.   Genitourinary: Negative for dysuria and vaginal bleeding.   Musculoskeletal: Negative for back pain.   Skin: Negative for rash.   Neurological: Positive for headaches. Negative for weakness.   Hematological: Does not bruise/bleed easily.       Physical Exam     Initial Vitals   BP Pulse Resp Temp SpO2   08/16/22 1458 08/16/22 1458 08/16/22 1458 08/16/22 1458 08/16/22 1512   120/79 100 18 98.4 °F (36.9 °C) 100 %      MAP       --                Physical Exam    Constitutional: She appears well-developed and well-nourished. She is not diaphoretic. No distress.   HENT:   Head: Normocephalic and atraumatic.   Eyes: EOM are normal.   Neck:   Normal range of motion.  Cardiovascular: Normal rate.   Pulmonary/Chest: No respiratory distress.   Abdominal: Abdomen is soft. She exhibits no distension and no mass. There is no abdominal tenderness. There is no rebound and no guarding.   Musculoskeletal:         General: Edema (1+ pitting edema bilaterally in LE) present. No tenderness. Normal range of motion.      Cervical back: Normal range of motion.     Neurological: She is alert and oriented to person, place, and time.   Skin: Skin is warm and dry. No rash noted.   Psychiatric: She has a normal mood and affect.         ED Course   Procedures  Labs Reviewed   COMPREHENSIVE METABOLIC PANEL - Abnormal; Notable for the following components:       Result Value    Potassium 3.3 (*)     Chloride 111 (*)     CO2 21 (*)     Albumin 2.9 (*)     All other components within normal limits   CBC W/ AUTO DIFFERENTIAL - Abnormal; Notable for the following components:    MCH 31.5 (*)     Immature Grans (Abs) 0.05 (*)     All other components within normal limits          Imaging Results    None          Medications   NIFEdipine 24 hr tablet 30 mg (30 mg Oral Given 8/16/22 1619)   sodium chloride 0.9% flush 10 mL (has no administration in time range)   acetaminophen tablet 650 mg (has no administration in time range)    lanolin cream (has no administration in time range)   benzocaine-lanolin (DERMOPLAST) topical spray (has no administration in time range)   hydrocortisone 2.5 % rectal cream (has no administration in time range)   ondansetron disintegrating tablet 8 mg (has no administration in time range)   prochlorperazine injection Soln 5 mg (has no administration in time range)   docusate sodium capsule 200 mg (has no administration in time range)   simethicone chewable tablet 80 mg (has no administration in time range)   diphenhydrAMINE capsule 25 mg (has no administration in time range)   diphenhydrAMINE injection 25 mg (has no administration in time range)   prenatal vitamin oral tablet (has no administration in time range)   acetaminophen tablet 650 mg (has no administration in time range)   HYDROcodone-acetaminophen 5-325 mg per tablet 1 tablet (has no administration in time range)   acetaminophen tablet 1,000 mg (1,000 mg Oral Given 8/16/22 1610)   labetaloL injection 20 mg (20 mg Intravenous Given 8/16/22 1636)     Medical Decision Making:   ED Management:  While in the UGO noted to have 2 non sustained severe range blood pressures with mild ranges between. Started on procardia 30XL  Patient subsequently had 2 severe sustained blood pressures. Labetalol 20mg IV was pushed   Tylenol given for headache with improvement from 5/10 to 3/10  CBC- 13/39/384  CMP- Cr 0.8; AST/ALT 16/27  Patient now meets criteria for preE w/ SF  Admit for observation of blood pressures  Continue to monitor headache while inpatient for indication to start Magnesium   Patient verbalized understanding and agreed with the plan             Attending Attestation:   Physician Attestation Statement for Resident:  As the supervising MD   Physician Attestation Statement: I have personally seen and examined this patient.   I agree with the above history. -:   As the supervising MD I agree with the above PE.    As the supervising MD I agree with the above  treatment, course, plan, and disposition.   -: New diagnosis of pre E with SF. HA still present but improving. If DAY persists would plan for magnesium for neurological symptom.  Started on procardia and treated with fast acting antihypernstives in UGO.  I was personally present during the critical portions of the procedure(s) performed by the resident and was immediately available in the ED to provide services and assistance as needed during the entire procedure.                         Clinical Impression:   Final diagnoses:  [O14.95] Preeclampsia in postpartum period (Primary)  [R51.9] Nonintractable headache, unspecified chronicity pattern, unspecified headache type          ED Disposition Condition    Observation             Mihaela Julian MD  [facility]   OBN PGY1       Mihaela Julian MD  Resident  08/16/22 1811       Andree Espinal MD  08/16/22 1818

## 2022-08-16 NOTE — CARE UPDATE
Resident to bedside for evaluation of headache. Reports headache is less than on intake, but still present. Denies scotoma, chest pain, shortness of breath, RUQ pain, or worsening edema.     Temp:  [98.4 °F (36.9 °C)] 98.4 °F (36.9 °C)  Pulse:  [] 73  Resp:  [18-20] 20  SpO2:  [97 %-100 %] 98 %  BP: (120-171)/() 135/85     - Labetalol 20 mg x1 on intake   - Started on procardia 30 mg today   - Given tylenol x1; reports headache slightly improved; however, still present. Discussed that we can give benadryl/reglan.   - In the setting of continued headache and new diagnosis of pre-eclampsia with severe features, will start magnesium for 24 hours for seizure prophylaxis. Counseled patient on benefits, risks, and alternatives. Patient agreeable to plan. Discussed recommendation for ASA 81 in future pregnancies started at 81 mg.   - Continue magnesium checks q 4hrs   - Will continue to closely monitor for signs/ symptoms of pre-eclampsia   - From postpartum stand point, patient meeting postpartum milestones. Pumping for baby.     Stephanie Kumar MD/MPH  OB/GYN PGY-2  [facility]

## 2022-08-16 NOTE — H&P
HISTORY AND PHYSICAL                                                OBSTETRICS          Subjective:    Laila Pappas is a 31 y.o. [id number] s/p  on  who presents complaining of headache and elevated blood pressures in her primary OBGYNs office. She was noted to have a 149/96 today in clinic. She has not tried any medications for her headache.   Pertinent PMH includes idiopathic intracranial HTN, obesity, and PUD. She did not have any problems related to HTN during pregnancy. She denies vision changes, SOB, chest pain, RUQ pain, increased swelling.  While in the U[facility], was noted to have severe range blood pressures, now meeting criteria for preeclampsia with SF.     Review of Systems   Constitutional: Negative for fever.   Respiratory: Negative for shortness of breath.    Cardiovascular: Negative for chest pain.   Gastrointestinal: Negative for abdominal pain, nausea and vomiting.   Endocrine: Negative for hot flashes.   Genitourinary: Negative for menstrual problem.   Integumentary:  Negative for breast mass, nipple discharge and breast skin changes.   Neurological: Positive for headaches.   Hematological: Does not bruise/bleed easily.   Psychiatric/Behavioral: Negative for depression.   Breast: Negative for mass, mastodynia, nipple discharge and skin changes      PMHx:   Past Medical History:   Diagnosis Date    Bilateral knee pain 2018    Endometriosis     treated by GYN at     Epigastric abdominal pain 2016    Gastroesophageal reflux disease 2016    Helicobacter pylori ab+     Helicobacter pylori ab+     High-tone pelvic floor dysfunction 2016    IIH (idiopathic intracranial hypertension)     Dr. Rogers - [facility]    Palpitations 2016    Pre-eclampsia in postpartum period 2022       PSHx:   Past Surgical History:   Procedure Laterality Date    ARTHROSCOPY OF KNEE Left 2021    Procedure: ARTHROSCOPY, KNEE;  Surgeon: Deidra Mejias MD;  Location: [facility] OR;  Service:  Orthopedics;  Laterality: Left;    COLONOSCOPY N/A 12/13/2018    Procedure: COLONOSCOPY;  Surgeon: Micki Gross MD;  Location: [facility] ENDO (4TH FLR);  Service: Endoscopy;  Laterality: N/A;  preferrably in Dec 2018, CRS ok if needed.    ESOPHAGOGASTRODUODENOSCOPY N/A 10/30/2018    Procedure: EGD (ESOPHAGOGASTRODUODENOSCOPY);  Surgeon: Greg Leach MD;  Location: [facility] ENDO (2ND FLR);  Service: Endoscopy;  Laterality: N/A;  ok to schedule per Kimmy    KNEE ARTHROSCOPY W/ DEBRIDEMENT  4/6/2021    Procedure: ARTHROSCOPY, KNEE, WITH DEBRIDEMENT;  Surgeon: Deidra Mejias MD;  Location: [facility] OR;  Service: Orthopedics;;    KNEE ARTHROSCOPY W/ MENISCECTOMY Right 4/6/2021    Procedure: ARTHROSCOPY, KNEE, WITH MENISCECTOMY;  Surgeon: Deidra Mejias MD;  Location: [facility] OR;  Service: Orthopedics;  Laterality: Right;    KNEE ARTHROSCOPY W/ PLICA EXCISION Left 4/6/2021    Procedure: EXCISION, PLICA, KNEE, ARTHROSCOPIC;  Surgeon: Deidra Mejias MD;  Location: [facility] OR;  Service: Orthopedics;  Laterality: Left;    LAPAROSCOPY  2017    dx with endo.  no removal    NO PAST SURGERIES      SYNOVECTOMY OF KNEE Left 4/6/2021    Procedure: SYNOVECTOMY, KNEE;  Surgeon: Deidra Mejias MD;  Location: [facility] OR;  Service: Orthopedics;  Laterality: Left;    UPPER GASTROINTESTINAL ENDOSCOPY         All:   Review of patient's allergies indicates:   Allergen Reactions    Doxycycline Other (See Comments)     Triggers headaches    Bactrim ds [sulfamethoxazole-trimethoprim] Nausea And Vomiting    Nsaids (non-steroidal anti-inflammatory drug) Other (See Comments)     D/t h/o PUD and H pylori    Codeine Rash       Meds:   Medications Prior to Admission   Medication Sig Dispense Refill Last Dose    acetaminophen (TYLENOL) 325 MG tablet Take 2 tablets (650 mg total) by mouth every 6 (six) hours as needed for Pain. 30 tablet 1     clotrimazole-betamethasone 1-0.05% (LOTRISONE) cream Apply topically Daily. 15 g 0     docusate sodium (COLACE) 100 MG  capsule Take 2 capsules (200 mg total) by mouth 2 (two) times daily as needed for Constipation. (Patient not taking: Reported on 2022) 30 capsule 1     ferrous sulfate 325 (65 FE) MG EC tablet Take 325 mg by mouth 3 (three) times daily with meals.       ramses gregory ointment Apply topically 3 (three) times daily. Apply after feeding. Do not wash off. This compounded medication expires in 30 days. 30 g 0     prenatal vit 10-iron-folic-dha (VITAFOL-OB+DHA) 65-1-250 mg Cmpk Take 1 tablet by mouth.       vitamin D (VITAMIN D3) 1000 units Tab Take 1,000 Units by mouth once daily.          SH:   Social History     Socioeconomic History    Marital status: Single   Occupational History     Employer: [employer] [place]     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   Tobacco Use    Smoking status: Never Smoker    Smokeless tobacco: Never Used   Substance and Sexual Activity    Alcohol use: No    Drug use: No    Sexual activity: Yes     Partners: Male     Birth control/protection: None       FH:   Family History   Problem Relation Age of Onset    Diabetes Mother     No Known Problems Father     No Known Problems Sister     Hypertension Brother     Hypertension Maternal Grandmother     Hypertension Maternal Grandfather     No Known Problems Paternal Grandmother     No Known Problems Paternal Grandfather     Colon cancer Neg Hx     Crohn's disease Neg Hx     Esophageal cancer Neg Hx     Inflammatory bowel disease Neg Hx     Irritable bowel syndrome Neg Hx     Rectal cancer Neg Hx     Stomach cancer Neg Hx     Ulcerative colitis Neg Hx        OBHx:   OB History    Para Term  AB Living   2 1 1 0 1 1   SAB IAB Ectopic Multiple Live Births   1 0 0 0 1      # Outcome Date GA Lbr Sam/2nd Weight Sex Delivery Anes PTL Lv   2 Term 22 39w6d / 00:50 3.62 kg (7 lb 15.7 oz) F Vag-Spont EPI N SADA      Complications: Shoulder Dystocia      Name: SAEED,GIRL YVES      Apgar1: 8  Apgar5: 9   1 SAB                 Objective:       /85   Pulse 73   Temp 98.4 °F (36.9 °C) (Oral)   Resp 20   LMP 10/31/2021   SpO2 98%   Breastfeeding Yes     Vitals:    08/16/22 1612 08/16/22 1654 08/16/22 1656 08/16/22 1657   BP: (!) 164/95 135/85     Pulse: 75 72 76 73   Resp:  20     Temp:       TempSrc:       SpO2: 100%   98%       General:   alert, appears stated age and cooperative, no apparent distress   HENT:  normocephalic, atraumatic   Eyes:  extraocular movements and conjunctivae normal   Neck:  supple, range of motion normal, no thyromegaly   Lungs:   no respiratory distress   Heart:   regular rate   Abdomen:  soft, non-tender, non-distended, no rebound or guarding      Recent Labs   Lab 08/16/22  1606   WBC 10.55   HGB 13.2   HCT 38.7      BUN 8   CREATININE 0.8   ALT 27   AST 16        Assessment:       39w6d weeks gestation with preeclampsia w/ SF.    Active Hospital Problems    Diagnosis  POA    Pre-eclampsia in postpartum period [O14.95]  Yes      Resolved Hospital Problems   No resolved problems to display.          Plan:     1. Preeclampsia w/ SF  Patient with sustained severe blood pressured in UGO.   Labetalol 20 pushed in UGO  Procardia 30 XL started in UGO  Tylenol given with improvement of headache from 5/10 to 3/10  See preE labs as above, within normal limits  Admit patient for observation of blood pressures  Closely monitor headache for indication to start Magnesium  Patient counseled diagnosis of preeclampsia w/SF and need to admit for further observation of blood pressures. Patient also counseled on the potential need to start Magnesium if headache persists despite pain medication     2. PUD  -Avoid NSAIDS    3. Obesity  -BMI- 31   -Encourage ambulation     4. Idiopathic intracranial HTN  -Stable  -If headache continues, will possible reach out to neurology for evaluation    Mihaela Julian MD  [facility]   OBGYN PGY1

## 2022-08-17 PROCEDURE — 25000003 PHARM REV CODE 250

## 2022-08-17 PROCEDURE — 63600175 PHARM REV CODE 636 W HCPCS

## 2022-08-17 PROCEDURE — 96366 THER/PROPH/DIAG IV INF ADDON: CPT

## 2022-08-17 PROCEDURE — G0378 HOSPITAL OBSERVATION PER HR: HCPCS

## 2022-08-17 RX ORDER — ACETAMINOPHEN 500 MG
1000 TABLET ORAL ONCE
Status: COMPLETED | OUTPATIENT
Start: 2022-08-17 | End: 2022-08-17

## 2022-08-17 RX ADMIN — ACETAMINOPHEN 650 MG: 325 TABLET, FILM COATED ORAL at 03:08

## 2022-08-17 RX ADMIN — PRENATAL VIT W/ FE FUMARATE-FA TAB 27-0.8 MG 1 TABLET: 27-0.8 TAB at 03:08

## 2022-08-17 RX ADMIN — ACETAMINOPHEN 1000 MG: 500 TABLET, FILM COATED ORAL at 05:08

## 2022-08-17 RX ADMIN — SODIUM CHLORIDE, SODIUM LACTATE, POTASSIUM CHLORIDE, AND CALCIUM CHLORIDE 1000 ML: .6; .31; .03; .02 INJECTION, SOLUTION INTRAVENOUS at 08:08

## 2022-08-17 RX ADMIN — MAGNESIUM SULFATE IN WATER 2 G/HR: 40 INJECTION, SOLUTION INTRAVENOUS at 02:08

## 2022-08-17 NOTE — PROGRESS NOTES
[facility] - [facility] ([place])  Obstetrics & Gynecology  Progress Note    Patient Name: Laila Pappas  MRN: [id number]  Admission Date: 8/16/2022  Primary Care Provider: Agatha Lombardi, Patient Care Assistant  Principal Problem: <principal problem not specified>    Subjective:     Interval History: Patient reports feeling well overnight. Reports headache resolved overnight, however, has since returned. Currently rated 3/10. Denies vision changes, CP, SOB, RUQ pain. Lochia is mild.     Scheduled Meds:   magnesium sulfate in water  4 g Intravenous Once    NIFEdipine  30 mg Oral Daily    prenatal vitamin  1 tablet Oral Daily     Continuous Infusions:   benzocaine-lanolin      lactated ringers 75 mL/hr at 08/17/22 0553    magnesium sulfate in water 2 g/hr (08/17/22 0553)     PRN Meds:acetaminophen, acetaminophen, benzocaine-lanolin, calcium gluconate, diphenhydrAMINE, diphenhydrAMINE, docusate sodium, HYDROcodone-acetaminophen, hydrocortisone, lanolin, ondansetron, prochlorperazine, simethicone, sodium chloride 0.9%    Review of patient's allergies indicates:   Allergen Reactions    Doxycycline Other (See Comments)     Triggers headaches    Bactrim ds [sulfamethoxazole-trimethoprim] Nausea And Vomiting    Nsaids (non-steroidal anti-inflammatory drug) Other (See Comments)     D/t h/o PUD and H pylori    Codeine Rash       Objective:     Vital Signs (Most Recent):  Temp: 96.4 °F (35.8 °C) (08/17/22 0332)  Pulse: 81 (08/17/22 0333)  Resp: 16 (08/17/22 0332)  BP: (!) 90/54 (08/17/22 0333)  SpO2: 98 % (08/17/22 0332) Vital Signs (24h Range):  Temp:  [96.3 °F (35.7 °C)-98.4 °F (36.9 °C)] 96.4 °F (35.8 °C)  Pulse:  [] 81  Resp:  [16-20] 16  SpO2:  [95 %-100 %] 98 %  BP: ()/() 90/54        There is no height or weight on file to calculate BMI.  Patient's last menstrual period was 10/31/2021.    I&O (Last 24H):    Intake/Output Summary (Last 24 hours) at 8/17/2022 0622  Last data filed at 8/17/2022  0553  Gross per 24 hour   Intake 1353.57 ml   Output 3650 ml   Net -2296.43 ml       Physical Exam:   Constitutional: She is oriented to person, place, and time. She appears well-developed and well-nourished. No distress.    HENT:   Head: Normocephalic and atraumatic.    Eyes: EOM are normal.     Cardiovascular: Normal rate.     Pulmonary/Chest: Effort normal and breath sounds normal. No respiratory distress.        Abdominal: Soft. There is no abdominal tenderness. There is no rebound and no guarding.             Musculoskeletal: Normal range of motion.       Neurological: She is alert and oriented to person, place, and time. She has normal reflexes.    Skin: Skin is warm and dry.    Psychiatric: She has a normal mood and affect. Her behavior is normal.       Laboratory:  CBC:   Recent Labs   Lab 08/16/22  1606   WBC 10.55   RBC 4.19   HGB 13.2   HCT 38.7      MCV 92   MCH 31.5*   MCHC 34.1     CMP:   Recent Labs   Lab 08/16/22  1606   GLU 79   CALCIUM 8.7   ALBUMIN 2.9*   PROT 6.2      K 3.3*   CO2 21*   *   BUN 8   CREATININE 0.8   ALKPHOS 96   ALT 27   AST 16   BILITOT 0.3     I have personallly reviewed all pertinent lab results from the last 24 hours.      Assessment/Plan:     Active Diagnoses:    Diagnosis Date Noted POA    Pre-eclampsia in postpartum period [O14.95] 08/16/2022 Yes      Problems Resolved During this Admission:       1. Postpartum Preeclampsia with Severe Features   - BP: ()/() 90/54  - S/p labetalol 20 x1 on intake, no pushes since  - Persistent headache unrelieved by tylenol so magnesium initiated  - Tylenol 1g this AM due to headache   - Continue magnesium for seizure prophylaxis x24 hours (started at 1945)  - Antihypertensive regimen: Procardia 30XL    2. Obesity   - BMI 31  - Encourage ambulation, SCDs in bed   - Lovenox not indicated at this time     3. PUD   - Avoid NSAIDs    4. Idiopathic Intracranial Hypertension   - Stable   - States current headache  is different than those associated with ICH      Morena Jimenez MD  Obstetrics & Gynecology  [facility] - [facility]te[facility] ([place])

## 2022-08-18 ENCOUNTER — PATIENT MESSAGE (OUTPATIENT)
Dept: OBSTETRICS AND GYNECOLOGY | Facility: CLINIC | Age: 32
End: 2022-08-18
Payer: COMMERCIAL

## 2022-08-18 VITALS
OXYGEN SATURATION: 96 % | TEMPERATURE: 97 F | HEART RATE: 88 BPM | SYSTOLIC BLOOD PRESSURE: 115 MMHG | RESPIRATION RATE: 18 BRPM | DIASTOLIC BLOOD PRESSURE: 65 MMHG

## 2022-08-18 PROCEDURE — 25000003 PHARM REV CODE 250

## 2022-08-18 PROCEDURE — G0378 HOSPITAL OBSERVATION PER HR: HCPCS

## 2022-08-18 RX ADMIN — PRENATAL VIT W/ FE FUMARATE-FA TAB 27-0.8 MG 1 TABLET: 27-0.8 TAB at 08:08

## 2022-08-18 NOTE — PLAN OF CARE
08/18/22 1031   Discharge Assessment   Assessment Type Discharge Planning Assessment   Confirmed/corrected address, phone number and insurance Yes   Confirmed Demographics Correct on Facesheet   Source of Information patient   Lives With alone   Do you expect to return to your current living situation? Yes   Prior to hospitilization cognitive status: Alert/Oriented   Current cognitive status: Alert/Oriented   Do you take prescription medications? Yes   Do you have prescription coverage? Yes   Do you have any problems affording any of your prescribed medications? No   Is the patient taking medications as prescribed? yes   Who is going to help you get home at discharge? Vlad Pappas   How do you get to doctors appointments? car, drives self;family or friend will provide   Are you on dialysis? No   Do you take coumadin? No   Discharge Plan A Home   DME Needed Upon Discharge  none   Introduced self to pt and explained sw role. Patient states that she has a ride home for d/c. No anticipated d/c needs at this time. Should any d/c needs arise, please consult social work. Emotional support provided.

## 2022-08-18 NOTE — DISCHARGE INSTRUCTIONS
Call Labor and Delivery [phone] for any signs of infection, pain unrelieved with pain medication, shortness of breath, or any pre-eclampsia signs or symptoms written in the handout. Continue following post partum discharge instructions. For an emergency, call 911.

## 2022-08-18 NOTE — DISCHARGE SUMMARY
[facility] - [facility]te[facility] ([place])  Obstetrics & Gynecology  Discharge Summary    Patient Name: Laila Pappas  MRN: [id number]  Admission Date: 2022  Hospital Length of Stay: 0 days  Discharge Date and Time:  2022 6:28 AM  Attending Physician: Meghan Bunn MD   Discharging Provider: Morena Jimenez MD  Primary Care Provider: Agatha Lombardi, Patient Care Assistant    HPI:  Laila Pappas is a 31 y.o. [id number] s/p  on  who presents complaining of headache and elevated blood pressures in her primary OBGYNs office. She was noted to have a 149/96 today in clinic. She has not tried any medications for her headache.   Pertinent PMH includes idiopathic intracranial HTN, obesity, and PUD. She did not have any problems related to HTN during pregnancy. She denies vision changes, SOB, chest pain, RUQ pain, increased swelling.  While in the U[facility], was noted to have severe range blood pressures, now meeting criteria for preeclampsia with SF.     Hospital Course: Patient received magnesium x24 hours. BP remained well controlled, procardia discontinued due to low BP. On day of discharge patient was asymptomatic; denied headaches, vision changes, CP, SOB, RUQ pain. She was discharged home in stable condition with instructions to follow up with Dr. Bunn as instructed below.     * No surgery found *     Consults:     Significant Diagnostic Studies: Labs:   CMP   Recent Labs   Lab 22  1606      K 3.3*   *   CO2 21*   GLU 79   BUN 8   CREATININE 0.8   CALCIUM 8.7   PROT 6.2   ALBUMIN 2.9*   BILITOT 0.3   ALKPHOS 96   AST 16   ALT 27   ANIONGAP 12   , CBC   Recent Labs   Lab 22  1606   WBC 10.55   HGB 13.2   HCT 38.7       and All labs within the past 24 hours have been reviewed    Pending Diagnostic Studies:     None        Final Active Diagnoses:    Diagnosis Date Noted POA    PRINCIPAL PROBLEM:  Pre-eclampsia in postpartum period [O14.95] 2022 Yes      Problems Resolved During this  Admission:        Discharged Condition: good    Disposition: Home or Self Care    Follow Up:   Follow-up Information     Meghan Bunn MD Follow up in 1 week(s).    Specialties: Obstetrics, Obstetrics and Gynecology  Why: BP check  Contact information:  [address] [address]  [phone]                       Patient Instructions:      Diet Adult Regular     Pelvic Rest   Order Comments: Pelvic rest until follow up visit. Nothing in vagina -no sex, tampons, douching, etc.]     Notify your health care provider if you experience any of the following:   Order Comments: - Heavy vaginal bleeding soaking through more than 2 pads/hour for > 2 hours  - Severe abdominal pain  - Headache not relieved with Tylenol  - Vision changes  - Chest pain or shortness or breath     Notify your health care provider if you experience any of the following:  persistent dizziness, light-headedness, or visual disturbances     Notify your health care provider if you experience any of the following:  increased confusion or weakness     Notify your health care provider if you experience any of the following:  severe persistent headache     Notify your health care provider if you experience any of the following:  difficulty breathing or increased cough     Notify your health care provider if you experience any of the following:  persistent nausea and vomiting or diarrhea     Notify your health care provider if you experience any of the following:  severe uncontrolled pain     Notify your health care provider if you experience any of the following:  temperature >100.4     Medications:  Reconciled Home Medications:      Medication List      CONTINUE taking these medications    acetaminophen 325 MG tablet  Commonly known as: TYLENOL  Take 2 tablets (650 mg total) by mouth every 6 (six) hours as needed for Pain.     clotrimazole-betamethasone 1-0.05% cream  Commonly known as: LOTRISONE  Apply topically Daily.      docusate sodium 100 MG capsule  Commonly known as: COLACE  Take 2 capsules (200 mg total) by mouth 2 (two) times daily as needed for Constipation.     ferrous sulfate 325 (65 FE) MG EC tablet  Take 325 mg by mouth 3 (three) times daily with meals.     jack gregory ointment  Apply topically 3 (three) times daily. Apply after feeding. Do not wash off. This compounded medication expires in 30 days.     prenatal vit 10-iron-folic-dha 65-1-250 mg Cmpk  Commonly known as: VITAFOL-OB+DHA  Take 1 tablet by mouth.     vitamin D 1000 units Tab  Commonly known as: VITAMIN D3  Take 1,000 Units by mouth once daily.        STOP taking these medications    folic acid 1 MG tablet  Commonly known as: VIOLETVITE            Morena Jimenez MD  Obstetrics & Gynecology  [facility] - [facility] ([place])

## 2022-08-18 NOTE — PROGRESS NOTES
[facility] - [facility] ([place])  Obstetrics & Gynecology  Progress Note    Patient Name: Laila Pappas  MRN: [id number]  Admission Date: 8/16/2022  Primary Care Provider: Agatha Lombardi, Patient Care Assistant  Principal Problem: <principal problem not specified>    Subjective:     Interval History: Patient reports feeling well overnight. Denies headache, vision changes, CP, SOB, RUQ pain. Lochia is mild.     Scheduled Meds:   magnesium sulfate in water  4 g Intravenous Once    prenatal vitamin  1 tablet Oral Daily     Continuous Infusions:   benzocaine-lanolin      lactated ringers Stopped (08/17/22 2030)     PRN Meds:acetaminophen, acetaminophen, benzocaine-lanolin, calcium gluconate, diphenhydrAMINE, diphenhydrAMINE, docusate sodium, HYDROcodone-acetaminophen, hydrocortisone, lanolin, ondansetron, prochlorperazine, simethicone, sodium chloride 0.9%    Review of patient's allergies indicates:   Allergen Reactions    Doxycycline Other (See Comments)     Triggers headaches    Bactrim ds [sulfamethoxazole-trimethoprim] Nausea And Vomiting    Nsaids (non-steroidal anti-inflammatory drug) Other (See Comments)     D/t h/o PUD and H pylori    Codeine Rash       Objective:     Vital Signs (Most Recent):  Temp: 97.3 °F (36.3 °C) (08/18/22 0402)  Pulse: 88 (08/18/22 0401)  Resp: 18 (08/18/22 0402)  BP: 115/65 (08/18/22 0401)  SpO2: 96 % (08/18/22 0401) Vital Signs (24h Range):  Temp:  [97.3 °F (36.3 °C)-98.4 °F (36.9 °C)] 97.3 °F (36.3 °C)  Pulse:  [0-104] 88  Resp:  [17-18] 18  SpO2:  [96 %-99 %] 96 %  BP: ()/(55-73) 115/65        There is no height or weight on file to calculate BMI.  Patient's last menstrual period was 10/31/2021.    I&O (Last 24H):    Intake/Output Summary (Last 24 hours) at 8/18/2022 0622  Last data filed at 8/17/2022 2038  Gross per 24 hour   Intake 1745.33 ml   Output --   Net 1745.33 ml       Physical Exam:   Constitutional: She is oriented to person, place, and time. She appears  well-developed and well-nourished. No distress.    HENT:   Head: Normocephalic and atraumatic.    Eyes: EOM are normal.     Cardiovascular: Normal rate.     Pulmonary/Chest: Effort normal and breath sounds normal. No respiratory distress.        Abdominal: Soft. There is no abdominal tenderness. There is no rebound and no guarding.             Musculoskeletal: Normal range of motion.       Neurological: She is alert and oriented to person, place, and time. She has normal reflexes.    Skin: Skin is warm and dry.    Psychiatric: She has a normal mood and affect. Her behavior is normal.       Laboratory:  CBC:   Recent Labs   Lab 08/16/22  1606   WBC 10.55   RBC 4.19   HGB 13.2   HCT 38.7      MCV 92   MCH 31.5*   MCHC 34.1     CMP:   Recent Labs   Lab 08/16/22  1606   GLU 79   CALCIUM 8.7   ALBUMIN 2.9*   PROT 6.2      K 3.3*   CO2 21*   *   BUN 8   CREATININE 0.8   ALKPHOS 96   ALT 27   AST 16   BILITOT 0.3     I have personallly reviewed all pertinent lab results from the last 24 hours.      Assessment/Plan:     Active Diagnoses:    Diagnosis Date Noted POA    Pre-eclampsia in postpartum period [O14.95] 08/16/2022 Yes      Problems Resolved During this Admission:       1. Postpartum Preeclampsia with Severe Features   - BP: ()/(55-73) 115/65  - S/p labetalol 20 x1 on intake, no pushes since  - Persistent headache unrelieved by tylenol so magnesium initiated  - Tylenol 1g this AM due to headache   - S/p magnesium x24  - Antihypertensive regimen: none > procardia discontinued due to low BP     2. Obesity   - BMI 31  - Encourage ambulation, SCDs in bed   - Lovenox not indicated at this time     3. PUD   - Avoid NSAIDs    4. Idiopathic Intracranial Hypertension   - Stable   - States current headaches are different than those associated with ICH      Morena Jimenez MD  Obstetrics & Gynecology  [facility] - [facility] ([place])

## 2022-08-22 ENCOUNTER — OFFICE VISIT (OUTPATIENT)
Dept: OBSTETRICS AND GYNECOLOGY | Facility: CLINIC | Age: 32
End: 2022-08-22
Payer: COMMERCIAL

## 2022-08-22 VITALS
BODY MASS INDEX: 30.52 KG/M2 | WEIGHT: 166.88 LBS | SYSTOLIC BLOOD PRESSURE: 104 MMHG | DIASTOLIC BLOOD PRESSURE: 75 MMHG

## 2022-08-22 PROCEDURE — 0503F POSTPARTUM CARE VISIT: CPT | Mod: S$GLB,,, | Performed by: OBSTETRICS & GYNECOLOGY

## 2022-08-22 PROCEDURE — 0503F PR POSTPARTUM CARE VISIT: ICD-10-PCS | Mod: S$GLB,,, | Performed by: OBSTETRICS & GYNECOLOGY

## 2022-08-22 PROCEDURE — 99999 PR PBB SHADOW E&M-EST. PATIENT-LVL III: CPT | Mod: PBBFAC,,, | Performed by: OBSTETRICS & GYNECOLOGY

## 2022-08-22 PROCEDURE — 99999 PR PBB SHADOW E&M-EST. PATIENT-LVL III: ICD-10-PCS | Mod: PBBFAC,,, | Performed by: OBSTETRICS & GYNECOLOGY

## 2022-08-22 NOTE — PROGRESS NOTES
HISTORY OF PRESENT ILLNESS:    Laila Pappas is a 31 y.o. female  Patient's last menstrual period was 10/31/2021. presents today for postpartum visit.   Here for BP check. Patient denies headaches, blurry vision, chest pain, shortness of breath or right upper quadrant pain.    Past Medical History:   Diagnosis Date    Bilateral knee pain 2018    Endometriosis     treated by GYN at     Epigastric abdominal pain 2016    Gastroesophageal reflux disease 2016    Helicobacter pylori ab+     Helicobacter pylori ab+     High-tone pelvic floor dysfunction 2016    IIH (idiopathic intracranial hypertension)     Dr. Rogers - [facility]    Palpitations 2016    Pre-eclampsia in postpartum period 2022       Past Surgical History:   Procedure Laterality Date    ARTHROSCOPY OF KNEE Left 2021    Procedure: ARTHROSCOPY, KNEE;  Surgeon: Deidra Mejias MD;  Location: [facility] OR;  Service: Orthopedics;  Laterality: Left;    COLONOSCOPY N/A 2018    Procedure: COLONOSCOPY;  Surgeon: Micki Gross MD;  Location: [facility] (4TH FLR);  Service: Endoscopy;  Laterality: N/A;  preferrably in Dec 2018, CRS ok if needed.    ESOPHAGOGASTRODUODENOSCOPY N/A 10/30/2018    Procedure: EGD (ESOPHAGOGASTRODUODENOSCOPY);  Surgeon: Greg Leach MD;  Location: [facility] (2ND FLR);  Service: Endoscopy;  Laterality: N/A;  ok to schedule per Kimmy    KNEE ARTHROSCOPY W/ DEBRIDEMENT  2021    Procedure: ARTHROSCOPY, KNEE, WITH DEBRIDEMENT;  Surgeon: Deidra Mejias MD;  Location: [facility] OR;  Service: Orthopedics;;    KNEE ARTHROSCOPY W/ MENISCECTOMY Right 2021    Procedure: ARTHROSCOPY, KNEE, WITH MENISCECTOMY;  Surgeon: Deidra Mejias MD;  Location: [facility] OR;  Service: Orthopedics;  Laterality: Right;    KNEE ARTHROSCOPY W/ PLICA EXCISION Left 2021    Procedure: EXCISION, PLICA, KNEE, ARTHROSCOPIC;  Surgeon: Deidra Mejias MD;  Location: [facility] OR;  Service: Orthopedics;  Laterality: Left;     LAPAROSCOPY  2017    dx with endo.  no removal    NO PAST SURGERIES      SYNOVECTOMY OF KNEE Left 4/6/2021    Procedure: SYNOVECTOMY, KNEE;  Surgeon: Deidra Mejias MD;  Location: [facility];  Service: Orthopedics;  Laterality: Left;    UPPER GASTROINTESTINAL ENDOSCOPY         MEDICATIONS AND ALLERGIES:      Current Outpatient Medications:     acetaminophen (TYLENOL) 325 MG tablet, Take 2 tablets (650 mg total) by mouth every 6 (six) hours as needed for Pain., Disp: 30 tablet, Rfl: 1    clotrimazole-betamethasone 1-0.05% (LOTRISONE) cream, Apply topically Daily., Disp: 15 g, Rfl: 0    ferrous sulfate 325 (65 FE) MG EC tablet, Take 325 mg by mouth 3 (three) times daily with meals., Disp: , Rfl:     ramses gregory ointment, Apply topically 3 (three) times daily. Apply after feeding. Do not wash off. This compounded medication expires in 30 days., Disp: 30 g, Rfl: 0    prenatal vit 10-iron-folic-dha (VITAFOL-OB+DHA) 65-1-250 mg Cmpk, Take 1 tablet by mouth., Disp: , Rfl:     vitamin D (VITAMIN D3) 1000 units Tab, Take 1,000 Units by mouth once daily., Disp: , Rfl:     docusate sodium (COLACE) 100 MG capsule, Take 2 capsules (200 mg total) by mouth 2 (two) times daily as needed for Constipation. (Patient not taking: Reported on 8/16/2022), Disp: 30 capsule, Rfl: 1    Review of patient's allergies indicates:   Allergen Reactions    Doxycycline Other (See Comments)     Triggers headaches    Bactrim ds [sulfamethoxazole-trimethoprim] Nausea And Vomiting    Nsaids (non-steroidal anti-inflammatory drug) Other (See Comments)     D/t h/o PUD and H pylori    Codeine Rash       Family History   Problem Relation Age of Onset    Diabetes Mother     No Known Problems Father     No Known Problems Sister     Hypertension Brother     Hypertension Maternal Grandmother     Hypertension Maternal Grandfather     No Known Problems Paternal Grandmother     No Known Problems Paternal Grandfather     Colon cancer Neg Hx      "Crohn's disease Neg Hx     Esophageal cancer Neg Hx     Inflammatory bowel disease Neg Hx     Irritable bowel syndrome Neg Hx     Rectal cancer Neg Hx     Stomach cancer Neg Hx     Ulcerative colitis Neg Hx        Social History     Socioeconomic History    Marital status: Single   Occupational History     Employer: [employer]     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  Infection History: Denies STDs. Denies PID.  Benign History: Denies uterine fibroids. Denies ovarian cysts. Denies endometriosis. Denies other conditions.  Cancer History: Denies cervical cancer. Denies uterine cancer or hyperplasia. Denies ovarian cancer. Denies vulvar cancer or pre-cancer. Denies vaginal cancer or pre-cancer. Denies breast cancer. Denies colon cancer.  Sexual Activity History: Reports currently being sexually active  Menstrual History: Monthly. Mod then light flow.   Dysmenorrhea History: Reports mild dysmenorrhea.       ROS:  GENERAL: No weight changes. No swelling. No fatigue. No fever.  CARDIOVASCULAR: No chest pain. No shortness of breath. No leg cramps.   NEUROLOGICAL: No headaches. No vision changes.  BREASTS: No pain. No lumps. No discharge.  ABDOMEN: No pain. No nausea. No vomiting. No diarrhea. No constipation.  REPRODUCTIVE: No abnormal bleeding.   VULVA: No pain. No lesions. No itching.  VAGINA: No relaxation. No itching. No odor. No discharge. No lesions.  URINARY: No incontinence. No nocturia. No frequency. No dysuria.    /86   Ht 5' 2" (1.575 m)   Wt 78 kg (171 lb 15.3 oz)   LMP 10/31/2021   Breastfeeding Yes   BMI 31.45 kg/m²     PE:  APPEARANCE: Well nourished, well developed, in no acute " distress.  AFFECT: WNL, alert and oriented x 3.  Deferred    DIAGNOSIS:  1. Pre-eclampsia in postpartum period    2.  (spontaneous vaginal delivery)    3. IIH (idiopathic intracranial hypertension)        PLAN:    FOLLOW-UP with me in 2-3 weeks

## 2022-08-23 NOTE — PROGRESS NOTES
HISTORY OF PRESENT ILLNESS:    Laila Pappas is a 31 y.o. female  Patient's last menstrual period was 10/31/2021 (exact date). presents today for postpartum visit. Here for BP check. Patient denies headaches, blurry vision, chest pain, shortness of breath or right upper quadrant pain. Reports doing much better.     Past Medical History:   Diagnosis Date    Bilateral knee pain 2018    Endometriosis     treated by GYN at     Epigastric abdominal pain 2016    Gastroesophageal reflux disease 2016    Helicobacter pylori ab+     Helicobacter pylori ab+     High-tone pelvic floor dysfunction 2016    IIH (idiopathic intracranial hypertension)     Dr. Rogers - [facility]    Palpitations 2016    Pre-eclampsia in postpartum period 2022       Past Surgical History:   Procedure Laterality Date    ARTHROSCOPY OF KNEE Left 2021    Procedure: ARTHROSCOPY, KNEE;  Surgeon: Deidra Mejias MD;  Location: [facility] OR;  Service: Orthopedics;  Laterality: Left;    COLONOSCOPY N/A 2018    Procedure: COLONOSCOPY;  Surgeon: Micki Gross MD;  Location: [facility] (4TH FLR);  Service: Endoscopy;  Laterality: N/A;  preferrably in Dec 2018, CRS ok if needed.    ESOPHAGOGASTRODUODENOSCOPY N/A 10/30/2018    Procedure: EGD (ESOPHAGOGASTRODUODENOSCOPY);  Surgeon: Greg Leach MD;  Location: [facility] (2ND FLR);  Service: Endoscopy;  Laterality: N/A;  ok to schedule per Kimmy    KNEE ARTHROSCOPY W/ DEBRIDEMENT  2021    Procedure: ARTHROSCOPY, KNEE, WITH DEBRIDEMENT;  Surgeon: Deidra Mejias MD;  Location: [facility] OR;  Service: Orthopedics;;    KNEE ARTHROSCOPY W/ MENISCECTOMY Right 2021    Procedure: ARTHROSCOPY, KNEE, WITH MENISCECTOMY;  Surgeon: Deidra Mejias MD;  Location: [facility] OR;  Service: Orthopedics;  Laterality: Right;    KNEE ARTHROSCOPY W/ PLICA EXCISION Left 2021    Procedure: EXCISION, PLICA, KNEE, ARTHROSCOPIC;  Surgeon: Deidra Mejias MD;  Location: [facility] OR;  Service:  Orthopedics;  Laterality: Left;    LAPAROSCOPY  2017    dx with endo.  no removal    NO PAST SURGERIES      SYNOVECTOMY OF KNEE Left 4/6/2021    Procedure: SYNOVECTOMY, KNEE;  Surgeon: Deidra Mejias MD;  Location: [facility];  Service: Orthopedics;  Laterality: Left;    UPPER GASTROINTESTINAL ENDOSCOPY         MEDICATIONS AND ALLERGIES:      Current Outpatient Medications:     acetaminophen (TYLENOL) 325 MG tablet, Take 2 tablets (650 mg total) by mouth every 6 (six) hours as needed for Pain., Disp: 30 tablet, Rfl: 1    clotrimazole-betamethasone 1-0.05% (LOTRISONE) cream, Apply topically Daily., Disp: 15 g, Rfl: 0    docusate sodium (COLACE) 100 MG capsule, Take 2 capsules (200 mg total) by mouth 2 (two) times daily as needed for Constipation. (Patient not taking: Reported on 8/16/2022), Disp: 30 capsule, Rfl: 1    ferrous sulfate 325 (65 FE) MG EC tablet, Take 325 mg by mouth 3 (three) times daily with meals., Disp: , Rfl:     ramses gregory ointment, Apply topically 3 (three) times daily. Apply after feeding. Do not wash off. This compounded medication expires in 30 days., Disp: 30 g, Rfl: 0    prenatal vit 10-iron-folic-dha (VITAFOL-OB+DHA) 65-1-250 mg Cmpk, Take 1 tablet by mouth., Disp: , Rfl:     vitamin D (VITAMIN D3) 1000 units Tab, Take 1,000 Units by mouth once daily., Disp: , Rfl:     Review of patient's allergies indicates:   Allergen Reactions    Doxycycline Other (See Comments)     Triggers headaches    Bactrim ds [sulfamethoxazole-trimethoprim] Nausea And Vomiting    Nsaids (non-steroidal anti-inflammatory drug) Other (See Comments)     D/t h/o PUD and H pylori    Codeine Rash       Family History   Problem Relation Age of Onset    Diabetes Mother     No Known Problems Father     No Known Problems Sister     Hypertension Brother     Hypertension Maternal Grandmother     Hypertension Maternal Grandfather     No Known Problems Paternal Grandmother     No Known Problems Paternal  Grandfather     Colon cancer Neg Hx     Crohn's disease Neg Hx     Esophageal cancer Neg Hx     Inflammatory bowel disease Neg Hx     Irritable bowel syndrome Neg Hx     Rectal cancer Neg Hx     Stomach cancer Neg Hx     Ulcerative colitis Neg Hx        Social History     Socioeconomic History    Marital status: Single   Occupational History     Employer: [employer]     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  Infection History: Denies STDs. Denies PID.  Benign History: Denies uterine fibroids. Denies ovarian cysts. Denies endometriosis. Denies other conditions.  Cancer History: Denies cervical cancer. Denies uterine cancer or hyperplasia. Denies ovarian cancer. Denies vulvar cancer or pre-cancer. Denies vaginal cancer or pre-cancer. Denies breast cancer. Denies colon cancer.  Sexual Activity History: Reports currently being sexually active  Menstrual History: Monthly. Mod then light flow.   Dysmenorrhea History: Reports mild dysmenorrhea.     ROS:  GENERAL: No weight changes. No swelling. No fatigue. No fever.  CARDIOVASCULAR: No chest pain. No shortness of breath. No leg cramps.   NEUROLOGICAL: No headaches. No vision changes.  BREASTS: No pain. No lumps. No discharge.  ABDOMEN: No pain. No nausea. No vomiting. No diarrhea. No constipation.  REPRODUCTIVE: No abnormal bleeding.   VULVA: No pain. No lesions. No itching.  VAGINA: No relaxation. No itching. No odor. No discharge. No lesions.  URINARY: No incontinence. No nocturia. No frequency. No dysuria.    /75   Wt 75.7 kg (166 lb 14.2 oz)   LMP 10/31/2021 (Exact Date)   Breastfeeding Yes   BMI 30.52 kg/m²     PE:  APPEARANCE: Well nourished,  well developed, in no acute distress.  AFFECT: WNL, alert and oriented x 3.  Deferred    DIAGNOSIS:  1. Pre-eclampsia in postpartum period    2.  (spontaneous vaginal delivery)        PLAN:    FOLLOW-UP with me in 2-3 weeks

## 2022-08-26 ENCOUNTER — PATIENT MESSAGE (OUTPATIENT)
Dept: LACTATION | Facility: CLINIC | Age: 32
End: 2022-08-26
Payer: COMMERCIAL

## 2022-08-26 LAB
OHS CV EVENT MONITOR DAY: 0
OHS CV HOLTER LENGTH DECIMAL HOURS: 48
OHS CV HOLTER LENGTH HOURS: 48
OHS CV HOLTER LENGTH MINUTES: 0
OHS CV HOLTER SINUS AVERAGE HR: 99
OHS CV HOLTER SINUS MAX HR: 158
OHS CV HOLTER SINUS MIN HR: 70

## 2022-08-27 ENCOUNTER — PATIENT MESSAGE (OUTPATIENT)
Dept: SURGERY | Facility: CLINIC | Age: 32
End: 2022-08-27
Payer: COMMERCIAL

## 2022-08-30 ENCOUNTER — PATIENT MESSAGE (OUTPATIENT)
Dept: LACTATION | Facility: CLINIC | Age: 32
End: 2022-08-30
Payer: COMMERCIAL

## 2022-08-31 ENCOUNTER — PATIENT MESSAGE (OUTPATIENT)
Dept: OBSTETRICS AND GYNECOLOGY | Facility: CLINIC | Age: 32
End: 2022-08-31
Payer: COMMERCIAL

## 2022-09-01 ENCOUNTER — HOSPITAL ENCOUNTER (OUTPATIENT)
Dept: RADIOLOGY | Facility: HOSPITAL | Age: 32
Discharge: HOME | End: 2022-09-01
Attending: NURSE PRACTITIONER
Payer: COMMERCIAL

## 2022-09-01 DIAGNOSIS — N64.4 BREAST PAIN: ICD-10-CM

## 2022-09-01 PROCEDURE — 76641 ULTRASOUND BREAST COMPLETE: CPT | Mod: 26,50,, | Performed by: RADIOLOGY

## 2022-09-01 PROCEDURE — 77066 DX MAMMO INCL CAD BI: CPT | Mod: TC

## 2022-09-01 PROCEDURE — 77066 MAMMO DIGITAL DIAGNOSTIC BILAT WITH TOMO: ICD-10-PCS | Mod: 26,,, | Performed by: RADIOLOGY

## 2022-09-01 PROCEDURE — 77062 BREAST TOMOSYNTHESIS BI: CPT | Mod: 26,,, | Performed by: RADIOLOGY

## 2022-09-01 PROCEDURE — 77066 DX MAMMO INCL CAD BI: CPT | Mod: 26,,, | Performed by: RADIOLOGY

## 2022-09-01 PROCEDURE — 76641 ULTRASOUND BREAST COMPLETE: CPT | Mod: TC,50

## 2022-09-01 PROCEDURE — 77062 MAMMO DIGITAL DIAGNOSTIC BILAT WITH TOMO: ICD-10-PCS | Mod: 26,,, | Performed by: RADIOLOGY

## 2022-09-01 PROCEDURE — 76641 US BREAST BILATERAL COMPLETE: ICD-10-PCS | Mod: 26,50,, | Performed by: RADIOLOGY

## 2022-09-01 NOTE — PROGRESS NOTES
Chief Complaint   Patient presents with    Neurologic Problem     [facility]        Laila Pappas is a 31 y.o. female with a history of multiple medical diagnoses as listed below that presents for evaluation of headaches at a suspected to be due to idiopathic intracranial hypertension.  She has been having abnormal sensations that prompted EMG/nerve conduction studies which were essentially unremarkable.  She has been having pulsatile tinnitus and has been headaches that have been intractable.  Workup thus far has included evaluation by Ophthalmology which has been unremarkable, MRV which showed changes which could be compatible with idiopathic intracranial hypertension, but has not had a lumbar puncture to this point.  She says that she White to discuss all options for treatment and management.  She has recently got  and wants to begin her family planning.  She says that she would like to have at least 3 children and would like to start this process in the very near future as long as there are no contraindications from a health standpoint.    Interval History  04/30/2021  She has been accompanied to this visit by her .  She continues to have headaches have been pulsatile in nature but also has been having episodes of a pulsatile ringing sensation has more prominent in the left ear specially if she is lying on that side.  Headache has been increasing in intensity with Valsalva and with position specially she bends at the waist.  She is considered all treatment options including diuretics and discussing is shunt placement will be helpful.  She is somewhat apprehensive about either because she and her  or family cleaning it would like to avoid any medication that could cause any complications to pregnancy or any difficulty with conception.  She is also somewhat concerned about having a potential surgery to have shunt placed.    09/14/2021  She is beginning to undergo evaluation for IVF.  As part of her  preprocedure treatment strategy she was giving antibiotics.  After having a dose of doxycycline she began to have rapid intensification of her headaches and significant positional sensitivity associated with these headaches.  She says that she was given antibiotic once in the past that also brought about her similar symptoms.  She feels that her 1st headaches were shortly after being given the medication in the past.    02/02/2022  She is currently at 13 WGA. After her most recent LP headaches have been present on occasion but have been stable. She has not had any vision changes that have not been corrected by her glasses. Headaches have been tolerable, but she has taken Tylenol for at least one headache.    05/04/2022  Pregnancy has been going well. She contracted covid and has been dealing with an increase in her headache frequency since that time. There has not been any changes to her vision nor any significant deviation in her headache semiology.     07/20/2022  She continues to have difficulty with head pain.  She feels the headaches have been increasing although have not been completely intolerable this yet.  She does have significant pain still with.  Medications she has tried thus far on limited due to pregnancy.  She says that she has not had any significant changes her vision since she was last seen clinic.    PAST MEDICAL HISTORY:  Past Medical History:   Diagnosis Date    Bilateral knee pain 06/19/2018    Endometriosis     treated by GYN at     Epigastric abdominal pain 12/05/2016    Gastroesophageal reflux disease 07/06/2016    Helicobacter pylori ab+     Helicobacter pylori ab+     High-tone pelvic floor dysfunction 05/02/2016    IIH (idiopathic intracranial hypertension)     Dr. Rogers - [facility]    Palpitations 07/06/2016    Pre-eclampsia in postpartum period 08/16/2022       PAST SURGICAL HISTORY:  Past Surgical History:   Procedure Laterality Date    ARTHROSCOPY OF KNEE Left 04/06/2021     Procedure: ARTHROSCOPY, KNEE;  Surgeon: Deidra Mejias MD;  Location: [facility] OR;  Service: Orthopedics;  Laterality: Left;    COLONOSCOPY N/A 12/13/2018    Procedure: COLONOSCOPY;  Surgeon: Micki Gross MD;  Location: [facility]O (4TH FLR);  Service: Endoscopy;  Laterality: N/A;  preferrably in Dec 2018, CRS ok if needed.    ESOPHAGOGASTRODUODENOSCOPY N/A 10/30/2018    Procedure: EGD (ESOPHAGOGASTRODUODENOSCOPY);  Surgeon: Greg Leach MD;  Location: [facility] ENDO (2ND FLR);  Service: Endoscopy;  Laterality: N/A;  ok to schedule per Kimmy    KNEE ARTHROSCOPY W/ DEBRIDEMENT  04/06/2021    Procedure: ARTHROSCOPY, KNEE, WITH DEBRIDEMENT;  Surgeon: Deidra Mejias MD;  Location: [facility] OR;  Service: Orthopedics;;    KNEE ARTHROSCOPY W/ MENISCECTOMY Right 04/06/2021    Procedure: ARTHROSCOPY, KNEE, WITH MENISCECTOMY;  Surgeon: Deidra Mejias MD;  Location: [facility] OR;  Service: Orthopedics;  Laterality: Right;    KNEE ARTHROSCOPY W/ PLICA EXCISION Left 04/06/2021    Procedure: EXCISION, PLICA, KNEE, ARTHROSCOPIC;  Surgeon: Deidra Mejias MD;  Location: [facility] OR;  Service: Orthopedics;  Laterality: Left;    LAPAROSCOPY  2017    dx with endo.  no removal    NO PAST SURGERIES      SYNOVECTOMY OF KNEE Left 04/06/2021    Procedure: SYNOVECTOMY, KNEE;  Surgeon: Deidra Mejias MD;  Location: [facility] OR;  Service: Orthopedics;  Laterality: Left;    UPPER GASTROINTESTINAL ENDOSCOPY         SOCIAL HISTORY:  Social History     Socioeconomic History    Marital status: Single   Occupational History     Employer: [employer]     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   Tobacco Use    Smoking status: Never    Smokeless tobacco: Never   Substance and Sexual Activity    Alcohol use: No    Drug use: No    Sexual activity: Yes     Partners: Male     Birth control/protection: None       FAMILY HISTORY:  Family History   Problem Relation Age of Onset    Diabetes Mother     No Known Problems Father     No Known Problems Sister     Hypertension Brother      Hypertension Maternal Grandmother     Hypertension Maternal Grandfather     No Known Problems Paternal Grandmother     No Known Problems Paternal Grandfather     Colon cancer Neg Hx     Crohn's disease Neg Hx     Esophageal cancer Neg Hx     Inflammatory bowel disease Neg Hx     Irritable bowel syndrome Neg Hx     Rectal cancer Neg Hx     Stomach cancer Neg Hx     Ulcerative colitis Neg Hx        ALLERGIES AND MEDICATIONS: updated and reviewed.  Review of patient's allergies indicates:   Allergen Reactions    Doxycycline Other (See Comments)     Triggers headaches    Bactrim ds [sulfamethoxazole-trimethoprim] Nausea And Vomiting    Nsaids (non-steroidal anti-inflammatory drug) Other (See Comments)     D/t h/o PUD and H pylori    Codeine Rash     Current Outpatient Medications   Medication Sig Dispense Refill    acetaminophen (TYLENOL) 325 MG tablet Take 2 tablets (650 mg total) by mouth every 6 (six) hours as needed for Pain. 30 tablet 1    clotrimazole-betamethasone 1-0.05% (LOTRISONE) cream Apply topically Daily. 15 g 0    docusate sodium (COLACE) 100 MG capsule Take 2 capsules (200 mg total) by mouth 2 (two) times daily as needed for Constipation. (Patient not taking: Reported on 8/16/2022) 30 capsule 1    ferrous sulfate 325 (65 FE) MG EC tablet Take 325 mg by mouth 3 (three) times daily with meals.      jack gregory ointment Apply topically 3 (three) times daily. Apply after feeding. Do not wash off. This compounded medication expires in 30 days. 30 g 0    prenatal vit 10-iron-folic-dha (VITAFOL-OB+DHA) 65-1-250 mg Cmpk Take 1 tablet by mouth.      vitamin D (VITAMIN D3) 1000 units Tab Take 1,000 Units by mouth once daily.       No current facility-administered medications for this visit.       Review of Systems   Constitutional: Negative for activity change, appetite change, fever and unexpected weight change.   HENT: Negative for trouble swallowing and voice change.    Eyes: Negative for photophobia and visual  disturbance.   Respiratory: Negative for apnea and shortness of breath.    Cardiovascular: Negative for chest pain and leg swelling.   Gastrointestinal: Negative for constipation and nausea.   Genitourinary: Negative for difficulty urinating.   Musculoskeletal: Negative for back pain, gait problem and neck pain.   Skin: Negative for color change and pallor.   Neurological: Positive for headaches. Negative for dizziness, seizures, syncope, weakness and numbness.   Hematological: Negative for adenopathy.   Psychiatric/Behavioral: Negative for agitation, confusion and decreased concentration.       Neurologic Exam     Mental Status   Oriented to person, place, and time.   Registration: recalls 3 of 3 objects.   Attention: normal. Concentration: normal.   Speech: speech is normal   Level of consciousness: alert  Knowledge: good.     Cranial Nerves     CN II   Right visual field deficit: none  Left visual field deficit: none     CN III, IV, VI   Extraocular motions are normal.   Right pupil: Size: 3 mm. Shape: regular.   Left pupil: Size: 3 mm. Shape: regular.   CN III: no CN III palsy  CN VI: no CN VI palsy  Nystagmus: none   Diplopia: none  Ophthalmoparesis: none  Upgaze: normal  Downgaze: normal  Conjugate gaze: present    CN VII   Facial expression full, symmetric.   Right facial weakness: none  Left facial weakness: none    CN VIII   CN VIII normal.     CN XI   CN XI normal.     CN XII   CN XII normal.   Tongue deviation: none    Motor Exam   Muscle bulk: normal  Overall muscle tone: normal  Right arm tone: normal  Left arm tone: normal  Right leg tone: normal  Left leg tone: normal    Gait, Coordination, and Reflexes     Gait  Gait: normal    Coordination   Finger to nose coordination: normal    Tremor   Resting tremor: absent      Physical Exam  Vitals reviewed.   Constitutional:       Appearance: She is well-developed.   HENT:      Head: Normocephalic and atraumatic.   Eyes:      Extraocular Movements: EOM  "normal.   Pulmonary:      Effort: Pulmonary effort is normal. No respiratory distress.   Musculoskeletal:         General: Normal range of motion.      Cervical back: Normal range of motion.   Neurological:      Mental Status: She is alert and oriented to person, place, and time.      Coordination: Finger-Nose-Finger Test normal.      Gait: Gait is intact.   Psychiatric:         Speech: Speech normal.         Behavior: Behavior normal.         Thought Content: Thought content normal.         Vitals:    07/20/22 1117   BP: 118/67   Pulse: 108   Weight: 86.6 kg (191 lb)   Height: 5' 2" (1.575 m)       Assessment & Plan:    Problem List Items Addressed This Visit       IIH (idiopathic intracranial hypertension) - Primary    Overview     Pulsatile tinnitus with positional components definitely raises suspicion for idiopathic intracranial hypertension.  Patient's body habitus does not support this diagnosis.  She has not had any observable deficits or changes on the funduscopic examination documented by her ophthalmologist.  Tetracyclines have been shown in several patients to induce or provoke idiopathic intracranial hypertension.  LP showed OP - 33 cm H20. Off Diamox and since her symptoms have been relatively mild would not start therapy at this time as long as fundoscopic examination does not show significant papilledema.             More than 50% of this 40 minute encounter was spent in counseling and coordinating care of idiopathic intracranial hypertension.        Follow-up: No follow-ups on file.    This note was done with the assistance of voice recognition software. Some errors may be present after proofreading.                "

## 2022-09-03 ENCOUNTER — PATIENT MESSAGE (OUTPATIENT)
Dept: LACTATION | Facility: CLINIC | Age: 32
End: 2022-09-03
Payer: COMMERCIAL

## 2022-09-07 ENCOUNTER — PATIENT MESSAGE (OUTPATIENT)
Dept: LACTATION | Facility: CLINIC | Age: 32
End: 2022-09-07
Payer: COMMERCIAL

## 2022-09-20 ENCOUNTER — PATIENT MESSAGE (OUTPATIENT)
Dept: NEUROLOGY | Facility: CLINIC | Age: 32
End: 2022-09-20
Payer: COMMERCIAL

## 2022-09-20 ENCOUNTER — POSTPARTUM VISIT (OUTPATIENT)
Dept: OBSTETRICS AND GYNECOLOGY | Facility: CLINIC | Age: 32
End: 2022-09-20
Payer: COMMERCIAL

## 2022-09-20 VITALS
WEIGHT: 156.5 LBS | DIASTOLIC BLOOD PRESSURE: 70 MMHG | SYSTOLIC BLOOD PRESSURE: 120 MMHG | BODY MASS INDEX: 28.8 KG/M2 | HEIGHT: 62 IN

## 2022-09-20 DIAGNOSIS — E78.5 DYSLIPIDEMIA: ICD-10-CM

## 2022-09-20 DIAGNOSIS — G93.2 IIH (IDIOPATHIC INTRACRANIAL HYPERTENSION): ICD-10-CM

## 2022-09-20 DIAGNOSIS — Z78.9 BREASTFEEDING (INFANT): Primary | ICD-10-CM

## 2022-09-20 PROCEDURE — 99999 PR PBB SHADOW E&M-EST. PATIENT-LVL III: ICD-10-PCS | Mod: PBBFAC,,, | Performed by: OBSTETRICS & GYNECOLOGY

## 2022-09-20 PROCEDURE — 99999 PR PBB SHADOW E&M-EST. PATIENT-LVL III: CPT | Mod: PBBFAC,,, | Performed by: OBSTETRICS & GYNECOLOGY

## 2022-09-20 PROCEDURE — 0503F POSTPARTUM CARE VISIT: CPT | Mod: S$GLB,,, | Performed by: OBSTETRICS & GYNECOLOGY

## 2022-09-20 PROCEDURE — 0503F PR POSTPARTUM CARE VISIT: ICD-10-PCS | Mod: S$GLB,,, | Performed by: OBSTETRICS & GYNECOLOGY

## 2022-09-20 NOTE — PROGRESS NOTES
HISTORY OF PRESENT ILLNESS:    Laila Pappas is a 31 y.o. female  Patient's last menstrual period was 10/31/2021. presents today for postpartum visit.    She is status post  6 weeks ago.  Her hospitalization was not complicated.  She is breastfeeding.  She desires no method for contraception.  She denies postpartum depression.    The use of the oral contraceptive, Depo Provera, Nexplanon, Nuva Ring and IUD has been fully discussed with the patient. Warnings about anticipated minor side effects such as breakthrough spotting, nausea, breast tenderness, weight changes, acne, headaches, etc. She has been told of the more serious potential side effects such as MI, stroke, and deep vein thrombosis, all of which are very unlikely. She has been asked to report any signs of such serious problems immediately.  The need for additional protection, such as a condom, during the first month of taking pills, while taking antibiotics and to prevent exposure to sexually transmitted diseases has also been discussed- the patient has been clearly reminded that contraception cannot protect her against diseases such as HIV and others.     Past Medical History:   Diagnosis Date    Bilateral knee pain 2018    Endometriosis     treated by GYN at     Epigastric abdominal pain 2016    Gastroesophageal reflux disease 2016    Helicobacter pylori ab+     Helicobacter pylori ab+     High-tone pelvic floor dysfunction 2016    IIH (idiopathic intracranial hypertension)     Dr. Rogers - [facility]    Palpitations 2016    Pre-eclampsia in postpartum period 2022       Past Surgical History:   Procedure Laterality Date    ARTHROSCOPY OF KNEE Left 2021    Procedure: ARTHROSCOPY, KNEE;  Surgeon: Deidra Mejias MD;  Location: [facility] OR;  Service: Orthopedics;  Laterality: Left;    COLONOSCOPY N/A 2018    Procedure: COLONOSCOPY;  Surgeon: Micki Gross MD;  Location: [address]);  Service:  Endoscopy;  Laterality: N/A;  preferrably in Dec 2018, CRS ok if needed.    ESOPHAGOGASTRODUODENOSCOPY N/A 10/30/2018    Procedure: EGD (ESOPHAGOGASTRODUODENOSCOPY);  Surgeon: Greg Leach MD;  Location: [facility] ([facility]);  Service: Endoscopy;  Laterality: N/A;  ok to schedule per Kimmy    KNEE ARTHROSCOPY W/ DEBRIDEMENT  04/06/2021    Procedure: ARTHROSCOPY, KNEE, WITH DEBRIDEMENT;  Surgeon: Deidra Mejias MD;  Location: [facility] OR;  Service: Orthopedics;;    KNEE ARTHROSCOPY W/ MENISCECTOMY Right 04/06/2021    Procedure: ARTHROSCOPY, KNEE, WITH MENISCECTOMY;  Surgeon: Deidra Mejias MD;  Location: [facility] OR;  Service: Orthopedics;  Laterality: Right;    KNEE ARTHROSCOPY W/ PLICA EXCISION Left 04/06/2021    Procedure: EXCISION, PLICA, KNEE, ARTHROSCOPIC;  Surgeon: Deidra Mejias MD;  Location: [facility] OR;  Service: Orthopedics;  Laterality: Left;    LAPAROSCOPY  2017    dx with endo.  no removal    NO PAST SURGERIES      SYNOVECTOMY OF KNEE Left 04/06/2021    Procedure: SYNOVECTOMY, KNEE;  Surgeon: Deidra Mejias MD;  Location: [facility] OR;  Service: Orthopedics;  Laterality: Left;    UPPER GASTROINTESTINAL ENDOSCOPY         MEDICATIONS AND ALLERGIES:      Current Outpatient Medications:     acetaminophen (TYLENOL) 325 MG tablet, Take 2 tablets (650 mg total) by mouth every 6 (six) hours as needed for Pain., Disp: 30 tablet, Rfl: 1    clotrimazole-betamethasone 1-0.05% (LOTRISONE) cream, Apply topically Daily., Disp: 15 g, Rfl: 0    docusate sodium (COLACE) 100 MG capsule, Take 2 capsules (200 mg total) by mouth 2 (two) times daily as needed for Constipation., Disp: 30 capsule, Rfl: 1    ferrous sulfate 325 (65 FE) MG EC tablet, Take 325 mg by mouth 3 (three) times daily with meals., Disp: , Rfl:     ramses gregory ointment, Apply topically 3 (three) times daily. Apply after feeding. Do not wash off. This compounded medication expires in 30 days., Disp: 30 g, Rfl: 0    prenatal vit 10-iron-folic-dha (VITAFOL-OB+DHA) 65-1-250 mg Cmpk,  Take 1 tablet by mouth., Disp: , Rfl:     vitamin D (VITAMIN D3) 1000 units Tab, Take 1,000 Units by mouth once daily., Disp: , Rfl:     mupirocin (BACTROBAN) 2 % ointment, Jacinto Brooke Nipple cream:  30 grams of mixture which includes: 15 gm of 2% Mupirocin 15 gm of 0.1% Betamethasone 0.6 gm of Miconazole powder, Disp: 30 g, Rfl: 2    Review of patient's allergies indicates:   Allergen Reactions    Doxycycline Other (See Comments)     Triggers headaches    Bactrim ds [sulfamethoxazole-trimethoprim] Nausea And Vomiting    Nsaids (non-steroidal anti-inflammatory drug) Other (See Comments)     D/t h/o PUD and H pylori    Codeine Rash       Family History   Problem Relation Age of Onset    Diabetes Mother     No Known Problems Father     No Known Problems Sister     Hypertension Brother     Hypertension Maternal Grandmother     Hypertension Maternal Grandfather     No Known Problems Paternal Grandmother     No Known Problems Paternal Grandfather     Colon cancer Neg Hx     Crohn's disease Neg Hx     Esophageal cancer Neg Hx     Inflammatory bowel disease Neg Hx     Irritable bowel syndrome Neg Hx     Rectal cancer Neg Hx     Stomach cancer Neg Hx     Ulcerative colitis Neg Hx        Social History     Socioeconomic History    Marital status: Single   Occupational History     Employer: [employer]     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   Tobacco Use    Smoking status: Never    Smokeless tobacco: Never   Substance and Sexual Activity    Alcohol use: No    Drug use: No    Sexual activity: Yes     Partners: Male     Birth control/protection: None       COMPREHENSIVE GYN HISTORY:  PAP History: Denies abnormal Paps.  Infection History: Denies STDs. Denies PID.  Benign History: Denies uterine fibroids. Denies ovarian cysts. Denies endometriosis. Denies other conditions.  Cancer History: Denies cervical cancer. Denies uterine cancer or hyperplasia. Denies ovarian cancer. Denies vulvar cancer or pre-cancer. Denies  "vaginal cancer or pre-cancer. Denies breast cancer. Denies colon cancer.  Sexual Activity History: Reports currently being sexually active  Menstrual History: Monthly. Mod then light flow.   Dysmenorrhea History: Reports mild dysmenorrhea.       ROS:  GENERAL: No weight changes. No swelling. No fatigue. No fever.  CARDIOVASCULAR: No chest pain. No shortness of breath. No leg cramps.   NEUROLOGICAL: No headaches. No vision changes.  BREASTS: No pain. No lumps. No discharge.  ABDOMEN: No pain. No nausea. No vomiting. No diarrhea. No constipation.  REPRODUCTIVE: No abnormal bleeding.   VULVA: No pain. No lesions. No itching.  VAGINA: No relaxation. No itching. No odor. No discharge. No lesions.  URINARY: No incontinence. No nocturia. No frequency. No dysuria.    /70   Ht 5' 2" (1.575 m)   Wt 71 kg (156 lb 8.4 oz)   LMP 10/31/2021   Breastfeeding Yes   BMI 28.63 kg/m²     PE:  APPEARANCE: Well nourished, well developed, in no acute distress.  AFFECT: WNL, alert and oriented x 3.  SKIN: No acne or hirsutism.  NECK: Neck symmetric, without masses or thyromegaly.  NODES: No inguinal, cervical, axillary or femoral lymph node enlargement.  CHEST: Good respiratory effort.   ABDOMEN: Soft. No tenderness or masses. No hepatosplenomegaly. No hernias.  BREASTS: Symmetrical, no skin changes, visible lesions, palpable masses or nipple discharge bilaterally.  PELVIC: External female genitalia without lesions.  Female hair distribution. Adequate perineal body, Normal urethral meatus. Vagina moist and well rugated without lesions or discharge.  No significant cystocele or rectocele present. Cervix pink without lesions, discharge or tenderness. Uterus is 4-6 week size, regular, mobile and nontender. Adnexa without masses or tenderness.  EXTREMITIES: No edema    DIAGNOSIS:  1. Breastfeeding (infant)        PLAN:    Orders Placed This Encounter    mupirocin (BACTROBAN) 2 % ointment         FOLLOW-UP with me in 3 months "   Resume normal activities

## 2022-09-23 RX ORDER — MUPIROCIN 20 MG/G
OINTMENT TOPICAL
Qty: 30 G | Refills: 2 | Status: SHIPPED | OUTPATIENT
Start: 2022-09-23 | End: 2022-10-14

## 2022-10-09 ENCOUNTER — PATIENT MESSAGE (OUTPATIENT)
Dept: ENDOCRINOLOGY | Facility: CLINIC | Age: 32
End: 2022-10-09
Payer: COMMERCIAL

## 2022-10-14 ENCOUNTER — OFFICE VISIT (OUTPATIENT)
Dept: INTERNAL MEDICINE | Facility: CLINIC | Age: 32
End: 2022-10-14
Payer: COMMERCIAL

## 2022-10-14 ENCOUNTER — LAB VISIT (OUTPATIENT)
Dept: LAB | Facility: HOSPITAL | Age: 32
End: 2022-10-14
Payer: COMMERCIAL

## 2022-10-14 VITALS
HEIGHT: 62 IN | DIASTOLIC BLOOD PRESSURE: 60 MMHG | WEIGHT: 151.25 LBS | OXYGEN SATURATION: 92 % | SYSTOLIC BLOOD PRESSURE: 102 MMHG | BODY MASS INDEX: 27.83 KG/M2 | HEART RATE: 112 BPM

## 2022-10-14 DIAGNOSIS — Z76.89 ENCOUNTER TO ESTABLISH CARE: ICD-10-CM

## 2022-10-14 DIAGNOSIS — Z00.00 ANNUAL PHYSICAL EXAM: ICD-10-CM

## 2022-10-14 DIAGNOSIS — Z00.00 ANNUAL PHYSICAL EXAM: Primary | ICD-10-CM

## 2022-10-14 DIAGNOSIS — E61.1 IRON DEFICIENCY: ICD-10-CM

## 2022-10-14 DIAGNOSIS — E55.9 VITAMIN D DEFICIENCY: ICD-10-CM

## 2022-10-14 DIAGNOSIS — F41.9 ANXIETY: ICD-10-CM

## 2022-10-14 DIAGNOSIS — E04.2 MULTIPLE THYROID NODULES: ICD-10-CM

## 2022-10-14 LAB
25(OH)D3+25(OH)D2 SERPL-MCNC: 26 NG/ML (ref 30–96)
ALBUMIN SERPL BCP-MCNC: 4.1 G/DL (ref 3.5–5.2)
ALP SERPL-CCNC: 106 U/L (ref 55–135)
ALT SERPL W/O P-5'-P-CCNC: 15 U/L (ref 10–44)
ANION GAP SERPL CALC-SCNC: 14 MMOL/L (ref 8–16)
AST SERPL-CCNC: 20 U/L (ref 10–40)
BASOPHILS # BLD AUTO: 0.06 K/UL (ref 0–0.2)
BASOPHILS NFR BLD: 0.6 % (ref 0–1.9)
BILIRUB SERPL-MCNC: 0.6 MG/DL (ref 0.1–1)
BUN SERPL-MCNC: 10 MG/DL (ref 6–20)
CALCIUM SERPL-MCNC: 9.3 MG/DL (ref 8.7–10.5)
CHLORIDE SERPL-SCNC: 103 MMOL/L (ref 95–110)
CO2 SERPL-SCNC: 22 MMOL/L (ref 23–29)
CREAT SERPL-MCNC: 0.7 MG/DL (ref 0.5–1.4)
DIFFERENTIAL METHOD: ABNORMAL
EOSINOPHIL # BLD AUTO: 0 K/UL (ref 0–0.5)
EOSINOPHIL NFR BLD: 0.1 % (ref 0–8)
ERYTHROCYTE [DISTWIDTH] IN BLOOD BY AUTOMATED COUNT: 13 % (ref 11.5–14.5)
EST. GFR  (NO RACE VARIABLE): >60 ML/MIN/1.73 M^2
FERRITIN SERPL-MCNC: 158 NG/ML (ref 20–300)
GLUCOSE SERPL-MCNC: 59 MG/DL (ref 70–110)
HCT VFR BLD AUTO: 44.3 % (ref 37–48.5)
HGB BLD-MCNC: 14.2 G/DL (ref 12–16)
IMM GRANULOCYTES # BLD AUTO: 0.02 K/UL (ref 0–0.04)
IMM GRANULOCYTES NFR BLD AUTO: 0.2 % (ref 0–0.5)
IRON SERPL-MCNC: 88 UG/DL (ref 30–160)
LYMPHOCYTES # BLD AUTO: 1.8 K/UL (ref 1–4.8)
LYMPHOCYTES NFR BLD: 17.2 % (ref 18–48)
MCH RBC QN AUTO: 29.5 PG (ref 27–31)
MCHC RBC AUTO-ENTMCNC: 32.1 G/DL (ref 32–36)
MCV RBC AUTO: 92 FL (ref 82–98)
MONOCYTES # BLD AUTO: 0.5 K/UL (ref 0.3–1)
MONOCYTES NFR BLD: 4.8 % (ref 4–15)
NEUTROPHILS # BLD AUTO: 8 K/UL (ref 1.8–7.7)
NEUTROPHILS NFR BLD: 77.1 % (ref 38–73)
NRBC BLD-RTO: 0 /100 WBC
PLATELET # BLD AUTO: 315 K/UL (ref 150–450)
PMV BLD AUTO: 11.3 FL (ref 9.2–12.9)
POTASSIUM SERPL-SCNC: 4.3 MMOL/L (ref 3.5–5.1)
PROT SERPL-MCNC: 7.7 G/DL (ref 6–8.4)
RBC # BLD AUTO: 4.82 M/UL (ref 4–5.4)
SATURATED IRON: 24 % (ref 20–50)
SODIUM SERPL-SCNC: 139 MMOL/L (ref 136–145)
TOTAL IRON BINDING CAPACITY: 373 UG/DL (ref 250–450)
TRANSFERRIN SERPL-MCNC: 252 MG/DL (ref 200–375)
TSH SERPL DL<=0.005 MIU/L-ACNC: 1.28 UIU/ML (ref 0.4–4)
WBC # BLD AUTO: 10.32 K/UL (ref 3.9–12.7)

## 2022-10-14 PROCEDURE — 99395 PREV VISIT EST AGE 18-39: CPT | Mod: S$GLB,,,

## 2022-10-14 PROCEDURE — 99999 PR PBB SHADOW E&M-EST. PATIENT-LVL III: ICD-10-PCS | Mod: PBBFAC,,,

## 2022-10-14 PROCEDURE — 85025 COMPLETE CBC W/AUTO DIFF WBC: CPT

## 2022-10-14 PROCEDURE — 36415 COLL VENOUS BLD VENIPUNCTURE: CPT

## 2022-10-14 PROCEDURE — 82728 ASSAY OF FERRITIN: CPT

## 2022-10-14 PROCEDURE — 80053 COMPREHEN METABOLIC PANEL: CPT

## 2022-10-14 PROCEDURE — 84443 ASSAY THYROID STIM HORMONE: CPT

## 2022-10-14 PROCEDURE — 82306 VITAMIN D 25 HYDROXY: CPT

## 2022-10-14 PROCEDURE — 99999 PR PBB SHADOW E&M-EST. PATIENT-LVL III: CPT | Mod: PBBFAC,,,

## 2022-10-14 PROCEDURE — 99395 PR PREVENTIVE VISIT,EST,18-39: ICD-10-PCS | Mod: S$GLB,,,

## 2022-10-14 PROCEDURE — 84466 ASSAY OF TRANSFERRIN: CPT

## 2022-10-14 NOTE — PROGRESS NOTES
[facility]INE [facility]IDENT [facility]INIC  CLINIC NOTE    Patient Name: Laila Pappas  YOB: 1990    PRESENTING HISTORY     History of Present Illness:  Ms. Laila Pappas is a 31 y.o. female with GERD, IIH, and thyroid nodules presenting for an annual physical exam. Had CBC and CMP completed in August 2022. CMP remarkable for mild hypokalemia, otherwise NAD.     She mainly wants to discuss her weight loss and anxiety today. She delivered her baby (first child) on August 6; admitted later in the month for pre-eclampsia. States she has been losing lots of weight since giving birth and is now below the weight she was when she got pregnant (weighed 170 lbs when she got pregnant). Says she has had a lot of anxiety since giving birth; not worried about anything in particular, just worried in general but does note that there are some health concerns for the baby, as the baby may have Hirschsprung's disease. States she occasionally feels nauseous and has a poor appetite, sometimes going the whole day without remembering to eat. States she's sleeping well and denies anhedonia. Denies low mood, thoughts of self-harm, or thoughts of harming her baby. Lives at home with her  and has family in the area so feels well supported. PHQ-9 score 6 indicating mild depression. KE score 14 indicating moderate anxiety. She is not currently seeing a therapist or taking any psychiatric medications; never been diagnosed with anxiety or depression before. Reassured her that anxiety is normal given she just gave birth and it's her first baby. She has a h/o thyroid nodules which were previously investigated with a normal workup; US thyroid earlier this year which showed several small thyroid nodules which did not need further follow-up; previously seen by endocrine.     Review of Systems   Constitutional:  Positive for weight loss. Negative for chills, fever and malaise/fatigue.   HENT:  Negative for congestion and sore throat.     Eyes:  Negative for discharge and redness.   Respiratory:  Negative for cough, shortness of breath and wheezing.    Cardiovascular:  Negative for chest pain, palpitations and leg swelling.   Gastrointestinal:  Positive for nausea. Negative for abdominal pain, constipation, diarrhea and vomiting.   Genitourinary:  Negative for dysuria and hematuria.   Musculoskeletal:  Negative for joint pain and myalgias.   Skin:  Negative for itching and rash.   Neurological:  Negative for dizziness, weakness and headaches.   Psychiatric/Behavioral:  Negative for depression. The patient is nervous/anxious.      PAST HISTORY:     Past Medical History:   Diagnosis Date    Bilateral knee pain 06/19/2018    Endometriosis     treated by GYN at     Epigastric abdominal pain 12/05/2016    Gastroesophageal reflux disease 07/06/2016    Helicobacter pylori ab+     Helicobacter pylori ab+     High-tone pelvic floor dysfunction 05/02/2016    IIH (idiopathic intracranial hypertension)     Dr. Rogers - [facility]    Palpitations 07/06/2016    Pre-eclampsia in postpartum period 08/16/2022     Past Surgical History:   Procedure Laterality Date    ARTHROSCOPY OF KNEE Left 04/06/2021    Procedure: ARTHROSCOPY, KNEE;  Surgeon: Deidra Meijas MD;  Location: [facility] OR;  Service: Orthopedics;  Laterality: Left;    COLONOSCOPY N/A 12/13/2018    Procedure: COLONOSCOPY;  Surgeon: Micki Gross MD;  Location: [facility] (4TH FLR);  Service: Endoscopy;  Laterality: N/A;  preferrably in Dec 2018, CRS ok if needed.    ESOPHAGOGASTRODUODENOSCOPY N/A 10/30/2018    Procedure: EGD (ESOPHAGOGASTRODUODENOSCOPY);  Surgeon: Greg Leach MD;  Location: [facility] (2ND FLR);  Service: Endoscopy;  Laterality: N/A;  ok to schedule per Kimmy    KNEE ARTHROSCOPY W/ DEBRIDEMENT  04/06/2021    Procedure: ARTHROSCOPY, KNEE, WITH DEBRIDEMENT;  Surgeon: Deidra Mejias MD;  Location: [facility] OR;  Service: Orthopedics;;    KNEE ARTHROSCOPY W/ MENISCECTOMY Right 04/06/2021     Procedure: ARTHROSCOPY, KNEE, WITH MENISCECTOMY;  Surgeon: Deidra Mejias MD;  Location: [facility] OR;  Service: Orthopedics;  Laterality: Right;    KNEE ARTHROSCOPY W/ PLICA EXCISION Left 04/06/2021    Procedure: EXCISION, PLICA, KNEE, ARTHROSCOPIC;  Surgeon: Deidra Mejias MD;  Location: [facility] OR;  Service: Orthopedics;  Laterality: Left;    LAPAROSCOPY  2017    dx with endo.  no removal    NO PAST SURGERIES      SYNOVECTOMY OF KNEE Left 04/06/2021    Procedure: SYNOVECTOMY, KNEE;  Surgeon: Deidra Mejias MD;  Location: [facility] OR;  Service: Orthopedics;  Laterality: Left;    UPPER GASTROINTESTINAL ENDOSCOPY       Family History   Problem Relation Age of Onset    Diabetes Mother     No Known Problems Father     No Known Problems Sister     Hypertension Brother     Hypertension Maternal Grandmother     Hypertension Maternal Grandfather     No Known Problems Paternal Grandmother     No Known Problems Paternal Grandfather     Colon cancer Neg Hx     Crohn's disease Neg Hx     Esophageal cancer Neg Hx     Inflammatory bowel disease Neg Hx     Irritable bowel syndrome Neg Hx     Rectal cancer Neg Hx     Stomach cancer Neg Hx     Ulcerative colitis Neg Hx      Social History     Socioeconomic History    Marital status: Single   Occupational History     Employer: [employer]     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   Tobacco Use    Smoking status: Never    Smokeless tobacco: Never   Substance and Sexual Activity    Alcohol use: No    Drug use: No    Sexual activity: Yes     Partners: Male     Birth control/protection: None     MEDICATIONS & ALLERGIES:     Current Outpatient Medications on File Prior to Visit   Medication Sig    acetaminophen (TYLENOL) 325 MG tablet Take 2 tablets (650 mg total) by mouth every 6 (six) hours as needed for Pain.    ferrous sulfate 325 (65 FE) MG EC tablet Take 325 mg by mouth 3 (three) times daily with meals.    prenatal vit 10-iron-folic-dha (VITAFOL-OB+DHA) 65-1-250 mg Cmpk Take 1 tablet by  "mouth.    vitamin D (VITAMIN D3) 1000 units Tab Take 1,000 Units by mouth once daily.    [DISCONTINUED] clotrimazole-betamethasone 1-0.05% (LOTRISONE) cream Apply topically Daily.    [DISCONTINUED] docusate sodium (COLACE) 100 MG capsule Take 2 capsules (200 mg total) by mouth 2 (two) times daily as needed for Constipation.    [DISCONTINUED] folic acid (FOLVITE) 1 MG tablet Take 1 mg by mouth once daily.    [DISCONTINUED] ramses gregory ointment Apply topically 3 (three) times daily. Apply after feeding. Do not wash off. This compounded medication expires in 30 days.    [DISCONTINUED] mupirocin (BACTROBAN) 2 % ointment Ramses Gregory Nipple cream:    30 grams of mixture which includes:  15 gm of 2% Mupirocin  15 gm of 0.1% Betamethasone  0.6 gm of Miconazole powder     No current facility-administered medications on file prior to visit.     Review of patient's allergies indicates:   Allergen Reactions    Doxycycline Other (See Comments)     Triggers headaches    Bactrim ds [sulfamethoxazole-trimethoprim] Nausea And Vomiting    Nsaids (non-steroidal anti-inflammatory drug) Other (See Comments)     D/t h/o PUD and H pylori    Codeine Rash     OBJECTIVE:   Vital Signs:  Vitals:    10/14/22 1052   BP: 102/60   Pulse: (!) 112   SpO2: (!) 92%   Weight: 68.6 kg (151 lb 3.8 oz)   Height: 5' 2" (1.575 m)     No results found for this or any previous visit (from the past 24 hour(s)).      Physical Exam  Vitals and nursing note reviewed.   Constitutional:       Appearance: Normal appearance.   HENT:      Head: Normocephalic and atraumatic.      Mouth/Throat:      Mouth: Mucous membranes are moist.   Eyes:      Extraocular Movements: Extraocular movements intact.      Pupils: Pupils are equal, round, and reactive to light.   Cardiovascular:      Rate and Rhythm: Normal rate and regular rhythm.      Pulses: Normal pulses.      Heart sounds: Normal heart sounds. No murmur heard.  Pulmonary:      Effort: Pulmonary effort is normal.      " Breath sounds: Normal breath sounds. No wheezing, rhonchi or rales.   Abdominal:      General: Bowel sounds are normal. There is no distension.      Palpations: Abdomen is soft.      Tenderness: There is no abdominal tenderness.   Musculoskeletal:         General: No tenderness.      Right lower leg: No edema.      Left lower leg: No edema.   Skin:     General: Skin is warm.      Capillary Refill: Capillary refill takes less than 2 seconds.      Findings: No erythema or rash.   Neurological:      General: No focal deficit present.      Mental Status: She is alert and oriented to person, place, and time.   Psychiatric:         Mood and Affect: Mood is anxious. Mood is not depressed.         Speech: Speech normal.         Behavior: Behavior normal.     ASSESSMENT & PLAN:     Laila was seen today for annual exam.    Diagnoses and all orders for this visit:    Encounter to establish care  Annual physical exam  -     COMPREHENSIVE METABOLIC PANEL; Future  -     CBC W/ AUTO DIFFERENTIAL; Future    Anxiety  -     Ambulatory referral/consult to Behavioral Health; Future    Multiple thyroid nodules  -     TSH; Future    Vitamin D deficiency  -     Vitamin D; Future    Iron deficiency  -     Iron and TIBC; Future  -     FERRITIN; Future    Presenting for annual physical and to establish care  Mainly wanted to discuss her post-partum weight loss and anxiety  Has lost a lot of weight since giving birth, but has had a lot of anxiety and hasn't been eating very well  Does have a h/o thyroid nodules, however these have previously been benign and not active; will check TSH  Denies low mood and feels safe within herself; no thoughts to harm herself or her baby  Discussed trialling an antidepressant for KE but Pt prefers to try therapy for now (she is still breastfeeding and is worried about starting a new medication)  Also has a h/o vitamin D deficiency and iron deficiency so would like her levels checked    Discussed with   [facility]    RTC in 4 weeks    Jhoana Trejo MD  Internal Medicine PGY-2  [facility]

## 2022-10-14 NOTE — PATIENT INSTRUCTIONS
Please your labs completed today. We will contact you with the results.  We have referred you to behavioral health for therapy.   Follow-up in 4 weeks

## 2022-10-17 ENCOUNTER — PATIENT MESSAGE (OUTPATIENT)
Dept: GASTROENTEROLOGY | Facility: CLINIC | Age: 32
End: 2022-10-17

## 2022-10-17 ENCOUNTER — OFFICE VISIT (OUTPATIENT)
Dept: GASTROENTEROLOGY | Facility: CLINIC | Age: 32
End: 2022-10-17
Payer: COMMERCIAL

## 2022-10-17 ENCOUNTER — OFFICE VISIT (OUTPATIENT)
Dept: NEUROLOGY | Facility: CLINIC | Age: 32
End: 2022-10-17
Payer: COMMERCIAL

## 2022-10-17 ENCOUNTER — LAB VISIT (OUTPATIENT)
Dept: LAB | Facility: HOSPITAL | Age: 32
End: 2022-10-17
Attending: INTERNAL MEDICINE
Payer: COMMERCIAL

## 2022-10-17 VITALS — DIASTOLIC BLOOD PRESSURE: 88 MMHG | HEART RATE: 97 BPM | SYSTOLIC BLOOD PRESSURE: 134 MMHG

## 2022-10-17 VITALS
BODY MASS INDEX: 27.83 KG/M2 | DIASTOLIC BLOOD PRESSURE: 74 MMHG | WEIGHT: 151.25 LBS | HEIGHT: 62 IN | SYSTOLIC BLOOD PRESSURE: 116 MMHG | HEART RATE: 89 BPM

## 2022-10-17 DIAGNOSIS — R10.11 RUQ PAIN: Primary | ICD-10-CM

## 2022-10-17 DIAGNOSIS — Z86.19 HISTORY OF HELICOBACTER PYLORI INFECTION: ICD-10-CM

## 2022-10-17 DIAGNOSIS — G93.2 IIH (IDIOPATHIC INTRACRANIAL HYPERTENSION): Primary | ICD-10-CM

## 2022-10-17 DIAGNOSIS — R10.11 RUQ PAIN: ICD-10-CM

## 2022-10-17 LAB
HCG INTACT+B SERPL-ACNC: <2.4 MIU/ML
IGA SERPL-MCNC: 161 MG/DL (ref 40–350)
LIPASE SERPL-CCNC: 20 U/L (ref 4–60)

## 2022-10-17 PROCEDURE — 82784 ASSAY IGA/IGD/IGG/IGM EACH: CPT | Performed by: INTERNAL MEDICINE

## 2022-10-17 PROCEDURE — 99214 PR OFFICE/OUTPT VISIT, EST, LEVL IV, 30-39 MIN: ICD-10-PCS | Mod: S$GLB,,, | Performed by: INTERNAL MEDICINE

## 2022-10-17 PROCEDURE — 84702 CHORIONIC GONADOTROPIN TEST: CPT | Performed by: INTERNAL MEDICINE

## 2022-10-17 PROCEDURE — 99999 PR PBB SHADOW E&M-EST. PATIENT-LVL III: CPT | Mod: PBBFAC,,, | Performed by: INTERNAL MEDICINE

## 2022-10-17 PROCEDURE — 99215 OFFICE O/P EST HI 40 MIN: CPT | Mod: S$GLB,,, | Performed by: NEUROLOGICAL SURGERY

## 2022-10-17 PROCEDURE — 99215 PR OFFICE/OUTPT VISIT, EST, LEVL V, 40-54 MIN: ICD-10-PCS | Mod: S$GLB,,, | Performed by: NEUROLOGICAL SURGERY

## 2022-10-17 PROCEDURE — 36415 COLL VENOUS BLD VENIPUNCTURE: CPT | Performed by: INTERNAL MEDICINE

## 2022-10-17 PROCEDURE — 99999 PR PBB SHADOW E&M-EST. PATIENT-LVL III: ICD-10-PCS | Mod: PBBFAC,,, | Performed by: NEUROLOGICAL SURGERY

## 2022-10-17 PROCEDURE — 86364 TISS TRNSGLTMNASE EA IG CLAS: CPT | Performed by: INTERNAL MEDICINE

## 2022-10-17 PROCEDURE — 99214 OFFICE O/P EST MOD 30 MIN: CPT | Mod: S$GLB,,, | Performed by: INTERNAL MEDICINE

## 2022-10-17 PROCEDURE — 99999 PR PBB SHADOW E&M-EST. PATIENT-LVL III: ICD-10-PCS | Mod: PBBFAC,,, | Performed by: INTERNAL MEDICINE

## 2022-10-17 PROCEDURE — 83690 ASSAY OF LIPASE: CPT | Performed by: INTERNAL MEDICINE

## 2022-10-17 PROCEDURE — 99999 PR PBB SHADOW E&M-EST. PATIENT-LVL III: CPT | Mod: PBBFAC,,, | Performed by: NEUROLOGICAL SURGERY

## 2022-10-17 NOTE — PROGRESS NOTES
Chief Complaint   Patient presents with    Follow-up     [facility]        Laila Pappas is a 31 y.o. female with a history of multiple medical diagnoses as listed below that presents for evaluation of headaches at a suspected to be due to idiopathic intracranial hypertension.  She has been having abnormal sensations that prompted EMG/nerve conduction studies which were essentially unremarkable.  She has been having pulsatile tinnitus and has been headaches that have been intractable.  Workup thus far has included evaluation by Ophthalmology which has been unremarkable, MRV which showed changes which could be compatible with idiopathic intracranial hypertension, but has not had a lumbar puncture to this point.  She says that she White to discuss all options for treatment and management.  She has recently got  and wants to begin her family planning.  She says that she would like to have at least 3 children and would like to start this process in the very near future as long as there are no contraindications from a health standpoint.    Interval History  04/30/2021  She has been accompanied to this visit by her .  She continues to have headaches have been pulsatile in nature but also has been having episodes of a pulsatile ringing sensation has more prominent in the left ear specially if she is lying on that side.  Headache has been increasing in intensity with Valsalva and with position specially she bends at the waist.  She is considered all treatment options including diuretics and discussing is shunt placement will be helpful.  She is somewhat apprehensive about either because she and her  or family cleaning it would like to avoid any medication that could cause any complications to pregnancy or any difficulty with conception.  She is also somewhat concerned about having a potential surgery to have shunt placed.    09/14/2021  She is beginning to undergo evaluation for IVF.  As part of her  preprocedure treatment strategy she was giving antibiotics.  After having a dose of doxycycline she began to have rapid intensification of her headaches and significant positional sensitivity associated with these headaches.  She says that she was given antibiotic once in the past that also brought about her similar symptoms.  She feels that her 1st headaches were shortly after being given the medication in the past.    02/02/2022  She is currently at 13 WGA. After her most recent LP headaches have been present on occasion but have been stable. She has not had any vision changes that have not been corrected by her glasses. Headaches have been tolerable, but she has taken Tylenol for at least one headache.    05/04/2022  Pregnancy has been going well. She contracted covid and has been dealing with an increase in her headache frequency since that time. There has not been any changes to her vision nor any significant deviation in her headache semiology.     07/20/2022  She continues to have difficulty with head pain.  She feels the headaches have been increasing although have not been completely intolerable this yet.  She does have significant pain still with.  Medications she has tried thus far on limited due to pregnancy.  She says that she has not had any significant changes her vision since she was last seen clinic.    10/17/2022  She continues to do very well with regards to her headache control. Her daughter has been doing well. She is currently breastfeeding..    PAST MEDICAL HISTORY:  Past Medical History:   Diagnosis Date    Bilateral knee pain 06/19/2018    Endometriosis     treated by GYN at     Epigastric abdominal pain 12/05/2016    Gastroesophageal reflux disease 07/06/2016    Helicobacter pylori ab+     Helicobacter pylori ab+     High-tone pelvic floor dysfunction 05/02/2016    IIH (idiopathic intracranial hypertension)     Dr. Rogers - [facility]    Palpitations 07/06/2016    Pre-eclampsia in  postpartum period 08/16/2022       PAST SURGICAL HISTORY:  Past Surgical History:   Procedure Laterality Date    ARTHROSCOPY OF KNEE Left 04/06/2021    Procedure: ARTHROSCOPY, KNEE;  Surgeon: Deidra Mejias MD;  Location: [facility] OR;  Service: Orthopedics;  Laterality: Left;    COLONOSCOPY N/A 12/13/2018    Procedure: COLONOSCOPY;  Surgeon: Micki Gross MD;  Location: [facility] (4TH FLR);  Service: Endoscopy;  Laterality: N/A;  preferrably in Dec 2018, CRS ok if needed.    COLONOSCOPY N/A 11/5/2022    Procedure: COLONOSCOPY;  Surgeon: Greg Leach MD;  Location: [facility] (4TH FLR);  Service: Endoscopy;  Laterality: N/A;  Endoscopy schedulers please schedule patient for colonoscopy with me for further evaluation of painless hematochezia thank you    ESOPHAGOGASTRODUODENOSCOPY N/A 10/30/2018    Procedure: EGD (ESOPHAGOGASTRODUODENOSCOPY);  Surgeon: Greg Leach MD;  Location: [facility] (2ND FLR);  Service: Endoscopy;  Laterality: N/A;  ok to schedule per Kimmy    ESOPHAGOGASTRODUODENOSCOPY N/A 11/5/2022    Procedure: EGD (ESOPHAGOGASTRODUODENOSCOPY);  Surgeon: Greg Leach MD;  Location: [facility] (4TH FLR);  Service: Endoscopy;  Laterality: N/A;  Endoscopy schedulers please schedule patient for EGD for evaluation of right upper quadrant pain thank you  prep instr portal--ml  11/2/22 room closed-pt okay with Dr. Leach-50 min case okay per Katlyn-ml    KNEE ARTHROSCOPY W/ DEBRIDEMENT  04/06/2021    Procedure: ARTHROSCOPY, KNEE, WITH DEBRIDEMENT;  Surgeon: Deidra Mejias MD;  Location: [facility] OR;  Service: Orthopedics;;    KNEE ARTHROSCOPY W/ MENISCECTOMY Right 04/06/2021    Procedure: ARTHROSCOPY, KNEE, WITH MENISCECTOMY;  Surgeon: Deidra Mejias MD;  Location: [facility] OR;  Service: Orthopedics;  Laterality: Right;    KNEE ARTHROSCOPY W/ PLICA EXCISION Left 04/06/2021    Procedure: EXCISION, PLICA, KNEE, ARTHROSCOPIC;  Surgeon: Deidra Mejias MD;  Location: [facility] OR;  Service: Orthopedics;  Laterality: Left;    LAPAROSCOPY   2017    dx with endo.  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   Tobacco Use    Smoking status: Never    Smokeless tobacco: Never   Substance and Sexual Activity    Alcohol use: No    Drug use: No    Sexual activity: Yes     Partners: Male     Birth control/protection: None   Social History Narrative    She had her 1st baby in 2022 healthy vaginal delivery no      Social Determinants of Health     Financial Resource Strain: Unknown    Difficulty of Paying Living Expenses: Patient refused   Food Insecurity: Unknown    Worried About Running Out of Food in the Last Year: Patient refused    Ran Out of Food in the Last Year: Patient refused   Transportation Needs: Unknown    Lack of Transportation (Medical): Patient refused    Lack of Transportation (Non-Medical): Patient refused   Physical Activity: Unknown    Days of Exercise per Week: Patient refused   Social Connections: Unknown    Frequency of Communication with Friends and Family: Patient refused    Frequency of Social Gatherings with Friends and Family: Patient refused    Active Member of Clubs or Organizations: Patient refused    Attends Club or Organization Meetings: Patient refused    Marital Status: Patient refused   Housing Stability: Unknown    Unable to Pay for Housing in the Last Year: Patient refused    Unstable Housing in the Last Year: Patient refused       FAMILY HISTORY:  Family History   Problem Relation Age of Onset    Diabetes Mother     No Known Problems Father     No Known Problems Sister     Hypertension Brother     Hypertension Maternal Grandmother      Hypertension Maternal Grandfather     No Known Problems Paternal Grandmother     No Known Problems Paternal Grandfather     Colon cancer Neg Hx     Crohn's disease Neg Hx     Esophageal cancer Neg Hx     Inflammatory bowel disease Neg Hx     Irritable bowel syndrome Neg Hx     Rectal cancer Neg Hx     Stomach cancer Neg Hx     Ulcerative colitis Neg Hx     Celiac disease Neg Hx     Cirrhosis Neg Hx     Colon polyps Neg Hx     Liver cancer Neg Hx     Liver disease Neg Hx        ALLERGIES AND MEDICATIONS: updated and reviewed.  Review of patient's allergies indicates:   Allergen Reactions    Doxycycline Other (See Comments)     Triggers headaches    Bactrim ds [sulfamethoxazole-trimethoprim] Nausea And Vomiting    Nsaids (non-steroidal anti-inflammatory drug) Other (See Comments)     D/t h/o PUD and H pylori    Codeine Rash     Current Outpatient Medications   Medication Sig Dispense Refill    ferrous sulfate 325 (65 FE) MG EC tablet Take 325 mg by mouth 3 (three) times daily with meals.      multivitamin capsule Take 1 capsule by mouth once daily.       No current facility-administered medications for this visit.       Review of Systems   Constitutional: Negative for activity change, appetite change, fever and unexpected weight change.   HENT: Negative for trouble swallowing and voice change.    Eyes: Negative for photophobia and visual disturbance.   Respiratory: Negative for apnea and shortness of breath.    Cardiovascular: Negative for chest pain and leg swelling.   Gastrointestinal: Negative for constipation and nausea.   Genitourinary: Negative for difficulty urinating.   Musculoskeletal: Negative for back pain, gait problem and neck pain.   Skin: Negative for color change and pallor.   Neurological: Positive for headaches. Negative for dizziness, seizures, syncope, weakness and numbness.   Hematological: Negative for adenopathy.   Psychiatric/Behavioral: Negative for agitation, confusion and decreased  concentration.       Neurologic Exam     Mental Status   Oriented to person, place, and time.   Registration: recalls 3 of 3 objects.   Attention: normal. Concentration: normal.   Speech: speech is normal   Level of consciousness: alert  Knowledge: good.     Cranial Nerves     CN II   Right visual field deficit: none  Left visual field deficit: none     CN III, IV, VI   Extraocular motions are normal.   Right pupil: Size: 3 mm. Shape: regular.   Left pupil: Size: 3 mm. Shape: regular.   CN III: no CN III palsy  CN VI: no CN VI palsy  Nystagmus: none   Diplopia: none  Ophthalmoparesis: none  Upgaze: normal  Downgaze: normal  Conjugate gaze: present    CN VII   Facial expression full, symmetric.   Right facial weakness: none  Left facial weakness: none    CN VIII   CN VIII normal.     CN XI   CN XI normal.     CN XII   CN XII normal.   Tongue deviation: none    Motor Exam   Muscle bulk: normal  Overall muscle tone: normal  Right arm tone: normal  Left arm tone: normal  Right leg tone: normal  Left leg tone: normal    Gait, Coordination, and Reflexes     Gait  Gait: normal    Coordination   Finger to nose coordination: normal    Tremor   Resting tremor: absent      Physical Exam  Vitals reviewed.   Constitutional:       Appearance: She is well-developed.   HENT:      Head: Normocephalic and atraumatic.   Eyes:      Extraocular Movements: EOM normal.   Pulmonary:      Effort: Pulmonary effort is normal. No respiratory distress.   Musculoskeletal:         General: Normal range of motion.      Cervical back: Normal range of motion.   Neurological:      Mental Status: She is alert and oriented to person, place, and time.      Coordination: Finger-Nose-Finger Test normal.      Gait: Gait is intact.   Psychiatric:         Speech: Speech normal.         Behavior: Behavior normal.         Thought Content: Thought content normal.         Vitals:    10/17/22 0934   BP: 116/74   Pulse: 89   Weight: 68.6 kg (151 lb 3.8 oz)  "  Height: 5' 2" (1.575 m)       Assessment & Plan:    Problem List Items Addressed This Visit       IIH (idiopathic intracranial hypertension) - Primary    Overview     Pulsatile tinnitus with positional components definitely raises suspicion for idiopathic intracranial hypertension.  Patient's body habitus does not support this diagnosis.  She has not had any observable deficits or changes on the funduscopic examination documented by her ophthalmologist.  Tetracyclines have been shown in several patients to induce or provoke idiopathic intracranial hypertension.  LP showed OP - 33 cm H20. Off Diamox and since her symptoms have been relatively mild would not start therapy at this time as long as fundoscopic examination does not show significant papilledema.               More than 50% of this 40 minute encounter was spent in counseling and coordinating care of idiopathic intracranial hypertension.        Follow-up: No follow-ups on file.    This note was done with the assistance of voice recognition software. Some errors may be present after proofreading.                  "

## 2022-10-17 NOTE — Clinical Note
Rey can we order stool for H pylori antigen to check for her H pylori eradication I can not see that this was checked after her treatment orders placed.  Thank you

## 2022-10-17 NOTE — PROGRESS NOTES
INTERNAL MEDICINE CLINIC - SAME DAY APPOINTMENT  Progress Note    PRESENTING HISTORY     PCP: Jhoana Trejo MD    Chief Complaint/Reason for Visit:   No chief complaint on file.    History of Present Illness & ROS : Ms. Laila Pappas is a 31 y.o. female.    Same day apptMartinez Ulloa is a very pleasant young lady, whom is known to me. Here with her supportive spouse and their little infant of 2 months old.   Had her annual last week in [facility], would like to establish with 'staff' if possible.   Currently undergoing work up with our GI expert, Dr. GEE Horvath. Still with weight loss and some abdominal pain, occurring since the delivery.   Some occassional nausea.   She is concerned that something 'health' related may be going on.   No fever or chills.       Review of Systems:  Eyes: denies visual changes at this time denies floaters   ENT: no nasal congestion or sore throat  Respiratory: no cough or shorness of breath  Cardiovascular: no chest pain or palpitations  Genitourinary: no hematuria or dysuria; denies frequency  Hematologic/Lymphatic: no easy bruising or lymphadenopathy  Musculoskeletal: no arthralgias or myalgias  Neurological: no seizures or tremors  Endocrine: no heat or cold intolerance      PAST HISTORY:     Past Medical History:   Diagnosis Date    Bilateral knee pain 06/19/2018    Endometriosis     treated by GYN at     Epigastric abdominal pain 12/05/2016    Gastroesophageal reflux disease 07/06/2016    Helicobacter pylori ab+     Helicobacter pylori ab+     High-tone pelvic floor dysfunction 05/02/2016    IIH (idiopathic intracranial hypertension)     Dr. Rogers - [facility]    Palpitations 07/06/2016    Pre-eclampsia in postpartum period 08/16/2022       Past Surgical History:   Procedure Laterality Date    ARTHROSCOPY OF KNEE Left 04/06/2021    Procedure: ARTHROSCOPY, KNEE;  Surgeon: Deidra Mejias MD;  Location: [facility];  Service: Orthopedics;  Laterality: Left;    COLONOSCOPY N/A  12/13/2018    Procedure: COLONOSCOPY;  Surgeon: Micki Gross MD;  Location: [facility] (4TH FLR);  Service: Endoscopy;  Laterality: N/A;  preferrably in Dec 2018, CRS ok if needed.    ESOPHAGOGASTRODUODENOSCOPY N/A 10/30/2018    Procedure: EGD (ESOPHAGOGASTRODUODENOSCOPY);  Surgeon: Greg Leach MD;  Location: [facility] (2ND FLR);  Service: Endoscopy;  Laterality: N/A;  ok to schedule per Kimmy    KNEE ARTHROSCOPY W/ DEBRIDEMENT  04/06/2021    Procedure: ARTHROSCOPY, KNEE, WITH DEBRIDEMENT;  Surgeon: Deidra Mejias MD;  Location: [facility] OR;  Service: Orthopedics;;    KNEE ARTHROSCOPY W/ MENISCECTOMY Right 04/06/2021    Procedure: ARTHROSCOPY, KNEE, WITH MENISCECTOMY;  Surgeon: Deidra Mejias MD;  Location: [facility] OR;  Service: Orthopedics;  Laterality: Right;    KNEE ARTHROSCOPY W/ PLICA EXCISION Left 04/06/2021    Procedure: EXCISION, PLICA, KNEE, ARTHROSCOPIC;  Surgeon: Deidra Mejias MD;  Location: [facility] OR;  Service: Orthopedics;  Laterality: Left;    LAPAROSCOPY  2017    dx with endo.  no removal    NO PAST SURGERIES      SYNOVECTOMY OF KNEE Left 04/06/2021    Procedure: SYNOVECTOMY, KNEE;  Surgeon: Deidra Mejias MD;  Location: [facility] OR;  Service: Orthopedics;  Laterality: Left;    UPPER GASTROINTESTINAL ENDOSCOPY         Family History   Problem Relation Age of Onset    Diabetes Mother     No Known Problems Father     No Known Problems Sister     Hypertension Brother     Hypertension Maternal Grandmother     Hypertension Maternal Grandfather     No Known Problems Paternal Grandmother     No Known Problems Paternal Grandfather     Colon cancer Neg Hx     Crohn's disease Neg Hx     Esophageal cancer Neg Hx     Inflammatory bowel disease Neg Hx     Irritable bowel syndrome Neg Hx     Rectal cancer Neg Hx     Stomach cancer Neg Hx     Ulcerative colitis Neg Hx     Celiac disease Neg Hx     Cirrhosis Neg Hx     Colon polyps Neg Hx     Liver cancer Neg Hx     Liver disease Neg Hx        Social History     Socioeconomic  History    Marital status: Single   Occupational History     Employer: [employer]     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      prenatal vit 10-iron-folic-dha (VITAFOL-OB+DHA) 65-1-250 mg Cmpk Take 1 tablet by mouth.      vitamin D (VITAMIN D3) 1000 units Tab Take 1,000 Units by mouth once daily.      [DISCONTINUED] acetaminophen (TYLENOL) 325 MG tablet Take 2 tablets (650 mg total) by mouth every 6 (six) hours as needed for Pain. (Patient not taking: Reported on 10/17/2022) 30 tablet 1    [DISCONTINUED] folic acid (FOLVITE) 1 MG tablet Take 1 mg by mouth once daily.       No current facility-administered medications on file prior to visit.        Review of patient's allergies indicates:   Allergen Reactions    Doxycycline Other (See Comments)     Triggers headaches    Bactrim ds [sulfamethoxazole-trimethoprim] Nausea And Vomiting    Nsaids (non-steroidal anti-inflammatory drug) Other (See Comments)     D/t h/o PUD and H pylori    Codeine Rash       Medications Reconciliation:   I have reconciled the patient's home medications with the patient/family. I have updated all changes.  Refer to After-Visit Medication List.    OBJECTIVE:     Vital Signs:  There were no vitals filed for this visit.  Wt Readings from Last 3 Encounters:   10/17/22 0934 68.6 kg (151 lb 3.8 oz)   10/14/22 1052 68.6 kg (151 lb 3.8 oz)   22 0852 71 kg (156 lb 8.4 oz)     There is no height or weight  on file to calculate BMI.   Wt Readings from Last 3 Encounters:   10/20/22 65.8 kg (145 lb 1 oz)   10/17/22 68.6 kg (151 lb 3.8 oz)   10/14/22 68.6 kg (151 lb 3.8 oz)     Temp Readings from Last 3 Encounters:   10/18/22 97.7 °F (36.5 °C) (Temporal)   08/18/22 97.3 °F (36.3 °C) (Temporal)   08/08/22 98.4 °F (36.9 °C) (Oral)     BP Readings from Last 3 Encounters:   10/20/22 122/86   10/18/22 118/76   10/17/22 134/88     Pulse Readings from Last 3 Encounters:   10/20/22 99   10/18/22 78   10/17/22 97       Physical Exam:  General: Well developed, well nourished. No distress.  HEENT: Head is normocephalic, atraumatic  Eyes: Clear conjunctiva.  Neck: Supple, symmetrical neck; trachea midline.  Lungs: Clear to auscultation bilaterally and normal respiratory effort.  Cardiovascular: Heart with regular rate and rhythm. No murmurs, gallops or rubs  Extremities: No LE edema. Pulses 2+ and symmetric.   Abdomen: Abdomen is soft, + induced palpable tenderness to RUQ,  with normal bowel sounds.  Skin: Skin color, texture, turgor normal. No rashes.  Musculoskeletal: Normal gait.   Neurologic: No focal numbness or weakness.       Laboratory  Lab Results   Component Value Date    WBC 10.32 10/14/2022    HGB 14.2 10/14/2022    HCT 44.3 10/14/2022     10/14/2022    CHOL 204 (H) 10/29/2021    TRIG 46 10/29/2021    HDL 41 10/29/2021    ALT 15 10/14/2022    AST 20 10/14/2022     10/14/2022    K 4.3 10/14/2022     10/14/2022    CREATININE 0.7 10/14/2022    BUN 10 10/14/2022    CO2 22 (L) 10/14/2022    TSH 1.277 10/14/2022    INR 1.0 09/22/2021    HGBA1C 4.9 10/29/2021         ASSESSMENT & PLAN:     Same day apt    Abdominal pain, unspecified abdominal location / Weight loss  *Will check some additional labs and nutritional markers today..  *Post Partum and based on symptomatology and clinical exam findings, suspect possible GB / biliary. Scheduled for an US today per GI expert. Labs as noted. Will defer.   -      FOLATE; Future; Expected date: 10/20/2022  -     VITAMIN B12; Future; Expected date: 10/20/2022  -     VITAMIN B1; Future; Expected date: 10/20/2022  -     IRON AND TIBC; Future; Expected date: 10/20/2022  -     Comprehensive Metabolic Panel; Future; Expected date: 10/20/2022  -     PREALBUMIN; Future; Expected date: 10/20/2022    Future Appointments   Date Time Provider Department Center   1/18/2023  8:40 AM Johny Wilson MD [facility]   2/7/2023  1:00 PM Meghan Bunn MD [facility]        Medication List            Accurate as of October 20, 2022 10:46 AM. If you have any questions, ask your nurse or doctor.                CONTINUE taking these medications      ferrous sulfate 325 (65 FE) MG EC tablet     prenatal vit 10-iron-folic-dha 65-1-250 mg Cmpk  Commonly known as: VITAFOL-OB+DHA     vitamin D 1000 units Tab  Commonly known as: VITAMIN D3                Signing Physician:  MORGAN Cage

## 2022-10-17 NOTE — PATIENT INSTRUCTIONS
H. Pylori antigen test which tells us if H. Pylori has been successfully eradicated with the prior treatment medications, and please tell patient that Stool H. Pylori antigen needs to be done off the following medications for the following amount of time:    1. Off all Antibiotics for 4 (Four) weeks before stool collection.      2. Off all Proton Pump Inhibitors medications for 2 (Two) weeks before collecting stool for H. Pylori Antigen:  :  Nexium (esomeprazole)  Prilosec (omeprazole)   Protonix (pantoprazole)  Prevacid (lansoprazole)  Aciphex (rabeprazole)  Dexilant (dexlansoprazole)    Zegerid     3. Off all H2 blockers medications for 2 (Two) weeks before collecting stool for H. Pylori Antigen:    Zantac (ranitidine)  Tagamet (cimetadine)  Axid (nizatidine)   Pepcid (famotidine)    4. Off Pepto-Bismol for 4 (four) weeks prior to collecting stool for H. Pylori Antigen.

## 2022-10-17 NOTE — PROGRESS NOTES
[facility] Consultation Note    Reason for Consult:  The encounter diagnosis was RUQ pain.    PCP:   Jhoana Trejo       Referring MD:  No referring provider defined for this encounter.    Initial History of Present Illness (HPI):  This is a 31 y.o. female here for evaluation of right upper quadrant abdominal pain no nausea no vomiting no heartburn those symptoms went away after delivering her baby in August 2022.  She still has intermittent right upper quadrant pain does not radiate to her back nothing seems to make it worse although occasionally she can feel it when she is sitting in bed no dysuria no urgency no frequency no fever no chills no shortness of breath no abdominal trauma no unintentional weight loss but her pre pregnancy weight was about 175 lb she gained about 21 lb in pregnancy or max weight was about 196 lb and she is down to 151 lb today.  She had her thyroid checked recently she is not anemic.  No vomiting no blood in her stool no early satiety.  No change in bowel habits.  EGD and colonoscopies have been done not too long ago about 3 and half to 4 years ago.    Abdominal pain - as above  Reflux - no  Dysphagia - no   Bowel habits - normal  GI bleeding - none  NSAID usage - none    Interval HPI 10/17/2022:  The patient's last visit with me was on Visit date not found.      ROS:  Constitutional: No fevers, chills, No weight loss  ENT:  No heartburn no dysphagia no odynophagia no hoarseness  CV: No chest pain, no palpitation  Pulm: No cough, No shortness of breath, no wheezing  Ophtho: No vision changes  GI: see HPI  Derm: No rash, no itching  Heme: No lymphadenopathy  MSK: No significant arthritis  : No dysuria, No hematuria  Endo: No hot or cold intolerance  Neuro: No syncope, No seizure, no strokes  Psych: No uncontrolled anxiety but some anxiety, No uncontrolled depression    Medical History:  has a past medical history of Bilateral knee pain (06/19/2018),  Endometriosis, Epigastric abdominal pain (12/05/2016), Gastroesophageal reflux disease (07/06/2016), Helicobacter pylori ab+, Helicobacter pylori ab+, High-tone pelvic floor dysfunction (05/02/2016), IIH (idiopathic intracranial hypertension), Palpitations (07/06/2016), and Pre-eclampsia in postpartum period (08/16/2022).    Surgical History:  has a past surgical history that includes No past surgeries; Upper gastrointestinal endoscopy; Esophagogastroduodenoscopy (N/A, 10/30/2018); Colonoscopy (N/A, 12/13/2018); Laparoscopy (2017); Synovectomy of knee (Left, 04/06/2021); Knee arthroscopy w/ plica excision (Left, 04/06/2021); Knee arthroscopy w/ debridement (04/06/2021); Arthroscopy of knee (Left, 04/06/2021); and Knee arthroscopy w/ meniscectomy (Right, 04/06/2021).    Family History: family history includes Diabetes in her mother; Hypertension in her brother, maternal grandfather, and maternal grandmother; No Known Problems in her father, paternal grandfather, paternal grandmother, and sister..     Social History:  reports that she has never smoked. She has never used smokeless tobacco. She reports that she does not drink alcohol and does not use drugs.    Review of patient's allergies indicates:   Allergen Reactions    Doxycycline Other (See Comments)     Triggers headaches    Bactrim ds [sulfamethoxazole-trimethoprim] Nausea And Vomiting    Nsaids (non-steroidal anti-inflammatory drug) Other (See Comments)     D/t h/o PUD and H pylori    Codeine Rash       Medication List with Changes/Refills   Current Medications    ACETAMINOPHEN (TYLENOL) 325 MG TABLET    Take 2 tablets (650 mg total) by mouth every 6 (six) hours as needed for Pain.    FERROUS SULFATE 325 (65 FE) MG EC TABLET    Take 325 mg by mouth 3 (three) times daily with meals.    PRENATAL VIT 10-IRON-FOLIC-DHA (VITAFOL-OB+DHA) 65-1-250 MG CMPK    Take 1 tablet by mouth.    VITAMIN D (VITAMIN D3) 1000 UNITS TAB    Take 1,000 Units by mouth once daily.          Objective Findings:    Vital Signs:  /88 (BP Location: Right arm, Patient Position: Sitting, BP Method: Medium (Automatic))   Pulse 97   LMP 10/10/2022 (Approximate)   There is no height or weight on file to calculate BMI.    Physical Exam:  General Appearance: Well appearing in no acute distress  Eyes:    No scleral icterus  ENT:  No lesions or masses   Lungs: CTA bilaterally, no wheezes, no rhonchi, no rales  Heart:  S1, S2 normal, no murmurs heard  Abdomen:  Non distended, soft, no guarding, no rebound, mild right upper quadrant tenderness, no appreciated ascites, no bruits, no hepatosplenomegaly,  No CVA tenderness, no appreciated hernias, no Moore sign, no McBurney point tenderness  Musculoskeletal:  No major joint deformities  Skin: No petechiae or rash on exposed skin areas  Neurologic:  Alert and oriented x4  Psychiatric:  Normal speech mentation and affect    Labs:  Lab Results   Component Value Date    WBC 10.32 10/14/2022    HGB 14.2 10/14/2022    HCT 44.3 10/14/2022     10/14/2022    CHOL 204 (H) 10/29/2021    TRIG 46 10/29/2021    HDL 41 10/29/2021    ALT 15 10/14/2022    AST 20 10/14/2022     10/14/2022    K 4.3 10/14/2022     10/14/2022    CREATININE 0.7 10/14/2022    BUN 10 10/14/2022    CO2 22 (L) 10/14/2022    TSH 1.277 10/14/2022    INR 1.0 09/22/2021    HGBA1C 4.9 10/29/2021               Medical Decision Making:  Lab work reviewed  Ultrasound talk given  Potential for EGD talk given  Lab work talk given  Total time with patient reviewing prior medical records EGD colonoscopy  Taking history physical exam answering questions been about 30 minutes  With 50% of the time face-to-face in room with patient today   The Olympus scope PCF-H190DL ([id number]) was                         introduced through the anus and advanced to the                         cecum, identified by appendiceal orifice and                         ileocecal valve. The colonoscopy was performed                          without difficulty. The patient tolerated the                         procedure well. The quality of the bowel                         preparation was excellent. The terminal ileum,                         ileocecal valve, appendiceal orifice, and rectum                         were photographed.   Findings:        The perianal and digital rectal examinations were normal.        The colon (entire examined portion) appeared normal.        The terminal ileum appeared normal.   Impression:           - The entire examined colon is normal.                         - The examined portion of the ileum was normal.                         - No specimens collected.   Recommendation:       - Discharge patient to home.                         - Patient has a contact number available for                         emergencies. The signs and symptoms of potential                         delayed complications were discussed with the                         patient. Return to normal activities tomorrow.                         Written discharge instructions were provided to the                         patient.                         - Resume previous diet.                         - Continue present medications.                         - Return to referring physician.                         - Repeat colonoscopy at age 52 yo for screening                         purposes.   Attending Participation:        I personally performed the entire procedure.   Micki Gross MD   12/13/2018    The                         Olympus scope GIF- ([id number]) was introduced                         through the mouth, and advanced to the second part                         of duodenum. The upper GI endoscopy was                         accomplished without difficulty. The patient                         tolerated the procedure well.   Findings:        The examined esophagus was normal.        Esophagogastric landmarks were  identified: the Z-line was found at        37 cm and the gastroesophageal junction was found at 37 cm from the        incisors.        The cardia and gastric fundus were normal on retroflexion.        Diffuse mild inflammation characterized by congestion (edema),        erythema and granularity was found in the gastric body.        The gastric antrum was normal.        The examined duodenum was normal.        Biopsies were taken with a cold forceps in the gastric body and in        the gastric antrum for histology.   Impression:           - Normal esophagus.                         - Esophagogastric landmarks identified.                         - Gastritis.                         - Normal antrum.                         - Normal examined duodenum.                         - Biopsies were taken with a cold forceps for                         histology in the gastric body and in the gastric                         antrum.   Recommendation:       - Discharge patient to home.                         - Patient has a contact number available for                         emergencies. The signs and symptoms of potential                         delayed complications were discussed with the                         patient. Return to normal activities tomorrow.                         Written discharge instructions were provided to the                         patient.                         - Resume previous diet.                         - Continue present medications.                         - Await pathology results.                         - Telephone GI clinic for pathology results in 1                         week.   Attending Participation:        I personally performed the entire procedure.   Greg Leach MD   10/30/2018  Assessment:  1. RUQ pain    2.      History of H pylori     Recommendations:  1. Lab work today  2. Abdominal ultrasound rule out gallstone  3. Stool for H pylori antigen off PPIs and off H2  blockers for at least 2 weeks in off all antibiotics for least 4 weeks.    4. Return to GI clinic 8 weeks for follow-up sooner if symptoms worsen if above evaluation is negative patient will let us know if she would like EGD for further evaluation.    Follow up in about 8 weeks (around 12/12/2022).      Order summary:  Orders Placed This Encounter    US Abdomen Complete         Thank you so much for allowing me to participate in the care of Laila Burks MD

## 2022-10-17 NOTE — Clinical Note
Rey please schedule patient for right upper quadrant ultrasound orders placed  Return GI clinic 8 weeks okay for telemedicine video visit

## 2022-10-18 ENCOUNTER — PATIENT MESSAGE (OUTPATIENT)
Dept: INTERNAL MEDICINE | Facility: CLINIC | Age: 32
End: 2022-10-18
Payer: COMMERCIAL

## 2022-10-18 ENCOUNTER — OFFICE VISIT (OUTPATIENT)
Dept: OTOLARYNGOLOGY | Facility: CLINIC | Age: 32
End: 2022-10-18
Payer: COMMERCIAL

## 2022-10-18 VITALS — TEMPERATURE: 98 F | SYSTOLIC BLOOD PRESSURE: 118 MMHG | HEART RATE: 78 BPM | DIASTOLIC BLOOD PRESSURE: 76 MMHG

## 2022-10-18 DIAGNOSIS — R07.0 THROAT PAIN: Primary | ICD-10-CM

## 2022-10-18 PROCEDURE — 99214 PR OFFICE/OUTPT VISIT, EST, LEVL IV, 30-39 MIN: ICD-10-PCS | Mod: S$GLB,,, | Performed by: OTOLARYNGOLOGY

## 2022-10-18 PROCEDURE — 99214 OFFICE O/P EST MOD 30 MIN: CPT | Mod: S$GLB,,, | Performed by: OTOLARYNGOLOGY

## 2022-10-18 NOTE — PROGRESS NOTES
Ms. Pappas     There were no vitals filed for this visit.    Chief Complaint:   Throat and neck pain    HPI:  Ms. Pappas is a 31-year-old black female who is recently postpartum and is breastfeeding and presents with complaints of throat and neck pain.  She states that this has been going on for several weeks.  She has had this in the past.  She denies any fever or cough.  She is COVID negative on testing.  She denies any allergy symptoms or history of recurrent sinus disease.  She denies any nasal obstruction.  She does complain of postpartum anxiety.  She did have a history of GERD during pregnancy.    SNOT22- 8 NOSE- 0    Review of Systems:  Constitutional:   weight loss or weight gain: Negative  Allergy/Immunologic:   Negative  Nasal Congestion/Obstruction:   Negative  Nosebleeds:   Negative  Sinus infections:   Negative  Headache/Facial Pain:   Negative  Snoring/LUIS ALBERTO:   Negative  Throat: Infections/Pain:   Positive for throat and neck pain.  Hoarseness/Speech Disturbance:   Negative  Trauma Hx:  Negative    Cardiovascular:  M/I Angina: Negative  Hypertension: Negative  Endocrine:    DM/Steroids: Negative  GI:   Dysphagia/Reflux:  Positive history of GERD during pregnancy.  :   GYN Pregnancy: Negative  Renal:   Dialysis: Negative  Lymphatic:   Neck Mass/Lymphadenopathy: Negative  Muscoloskeletal:   Negative  Hematologic:   Bleeding Disorders/Anemia: Negative  Neurologic:    Cranial/Neuralgia: Negative  Pulmonary:   Asthma/SOB/Cough: Negative  Skin Disorders: Negative    Past Medical/Surgical/Family/Social History:    ENT Surgery: Negative  Occupational Exposure: Negative   Problems: Negative  Cancer: Negative    Past Family History:   Family history of Cancer: Negative    Past Social History:   Tobacco: Nonsmoker   Alcohol: Social Drinker      Allergies and medications: Reviewed per med card.    Physical Examination:  Ears:   External auditory canals:  Clear   Hearing: Grossly intact   Tympanic Membranes:  Clear  Nose:   External: Normal with good valve support.   Intranasal:  Her septum is straight, turbinates 1 to 2+, nasal airway clear at this time.  Mouth:   Intraorally: Lips, teeth, and gums: Normal   Oropharynx: Normal   Mucosa: Normal   Tongue: Normal  Throat:      Palate: Normal palate with elevation, Mallampati 1   Tonsils:  1+ imbedded with no signs of infection.   Posterior Pharynx: Normal with no erythema or edema.  Fiberoptic exam: Not performed  Head/Face:     Inspection: Normal and atraumatic   Palpation/Percussion: Non tender   Facial strength: Normal and symmetric   Salivary glands: Normal  Neck: Supple with no palpable enlarged or tender nodes or masses.  Thyroid: No masses  Lymphatics: No nodes or masses  Respiratory:   Effort: Normal  Eyes:   Ocular Mobility: Normal   Vision: Grossly intact  Neuro/Psych:   Cranial Nerves: Grossly Intact   Orientation: Normal   Mood/Affect: Normal      Assessment/Plan:  I have discussed my findings with her in detail as well as my recommendations for treatment.  I have given her reflux precaution sheet and discussed all the recommendations with her in detail as this may be a residual effect from her previous GERD and her postpartum anxiety.  She is concerned that she looked up her symptoms on [employer] and told her she had throat cancer.  I have assured her that her symptoms and findings do not point at this.  She is to return to clinic if she develops any tonsillar swelling or purulence or palpable enlarged neck nodes.

## 2022-10-19 NOTE — ADDENDUM NOTE
Addendum  created 10/19/22 1058 by Maxim Connell MD    Clinical Note Signed, Intraprocedure Blocks edited, SmartForm saved

## 2022-10-20 ENCOUNTER — OFFICE VISIT (OUTPATIENT)
Dept: INTERNAL MEDICINE | Facility: CLINIC | Age: 32
End: 2022-10-20
Payer: COMMERCIAL

## 2022-10-20 ENCOUNTER — PATIENT MESSAGE (OUTPATIENT)
Dept: GASTROENTEROLOGY | Facility: CLINIC | Age: 32
End: 2022-10-20
Payer: COMMERCIAL

## 2022-10-20 ENCOUNTER — PATIENT MESSAGE (OUTPATIENT)
Dept: INTERNAL MEDICINE | Facility: CLINIC | Age: 32
End: 2022-10-20

## 2022-10-20 VITALS
BODY MASS INDEX: 26.69 KG/M2 | SYSTOLIC BLOOD PRESSURE: 122 MMHG | OXYGEN SATURATION: 98 % | HEIGHT: 62 IN | HEART RATE: 99 BPM | WEIGHT: 145.06 LBS | DIASTOLIC BLOOD PRESSURE: 86 MMHG

## 2022-10-20 DIAGNOSIS — R63.4 WEIGHT LOSS: Primary | ICD-10-CM

## 2022-10-20 DIAGNOSIS — R10.9 ABDOMINAL PAIN, UNSPECIFIED ABDOMINAL LOCATION: ICD-10-CM

## 2022-10-20 LAB — TTG IGA SER-ACNC: 5 UNITS

## 2022-10-20 PROCEDURE — 99999 PR PBB SHADOW E&M-EST. PATIENT-LVL III: ICD-10-PCS | Mod: PBBFAC,,, | Performed by: NURSE PRACTITIONER

## 2022-10-20 PROCEDURE — 99999 PR PBB SHADOW E&M-EST. PATIENT-LVL III: CPT | Mod: PBBFAC,,, | Performed by: NURSE PRACTITIONER

## 2022-10-20 PROCEDURE — 99214 PR OFFICE/OUTPT VISIT, EST, LEVL IV, 30-39 MIN: ICD-10-PCS | Mod: S$GLB,,, | Performed by: NURSE PRACTITIONER

## 2022-10-20 PROCEDURE — 99214 OFFICE O/P EST MOD 30 MIN: CPT | Mod: S$GLB,,, | Performed by: NURSE PRACTITIONER

## 2022-10-21 ENCOUNTER — PATIENT MESSAGE (OUTPATIENT)
Dept: GASTROENTEROLOGY | Facility: CLINIC | Age: 32
End: 2022-10-21
Payer: COMMERCIAL

## 2022-10-22 ENCOUNTER — PATIENT MESSAGE (OUTPATIENT)
Dept: GASTROENTEROLOGY | Facility: CLINIC | Age: 32
End: 2022-10-22
Payer: COMMERCIAL

## 2022-10-22 DIAGNOSIS — R10.11 CHRONIC RIGHT UPPER QUADRANT PAIN: Primary | ICD-10-CM

## 2022-10-22 DIAGNOSIS — G89.29 CHRONIC RIGHT UPPER QUADRANT PAIN: Primary | ICD-10-CM

## 2022-10-23 ENCOUNTER — PATIENT MESSAGE (OUTPATIENT)
Dept: GASTROENTEROLOGY | Facility: CLINIC | Age: 32
End: 2022-10-23
Payer: COMMERCIAL

## 2022-10-23 ENCOUNTER — PATIENT MESSAGE (OUTPATIENT)
Dept: INTERNAL MEDICINE | Facility: CLINIC | Age: 32
End: 2022-10-23
Payer: COMMERCIAL

## 2022-10-23 NOTE — PROGRESS NOTES
INTERNAL MEDICINE CLINIC - SAME DAY APPOINTMENT  Progress Note    PRESENTING HISTORY     PCP: Jhoana Trejo MD    Chief Complaint/Reason for Visit:   No chief complaint on file.      History of Present Illness & ROS : Ms. Laila Pappas is a 31 y.o. female.    Same day apt        Review of Systems:  Eyes: denies visual changes at this time denies floaters   ENT: no nasal congestion or sore throat  Respiratory: no cough or shorness of breath  Cardiovascular: no chest pain or palpitations  Gastrointestinal: no nausea or vomiting, no abdominal pain or change in bowel habits  Genitourinary: no hematuria or dysuria; denies frequency  Hematologic/Lymphatic: no easy bruising or lymphadenopathy  Musculoskeletal: no arthralgias or myalgias  Neurological: no seizures or tremors  Endocrine: no heat or cold intolerance      PAST HISTORY:     Past Medical History:   Diagnosis Date    Bilateral knee pain 06/19/2018    Endometriosis     treated by GYN at     Epigastric abdominal pain 12/05/2016    Gastroesophageal reflux disease 07/06/2016    Helicobacter pylori ab+     Helicobacter pylori ab+     High-tone pelvic floor dysfunction 05/02/2016    IIH (idiopathic intracranial hypertension)     Dr. Rogers - [facility]    Palpitations 07/06/2016    Pre-eclampsia in postpartum period 08/16/2022       Past Surgical History:   Procedure Laterality Date    ARTHROSCOPY OF KNEE Left 04/06/2021    Procedure: ARTHROSCOPY, KNEE;  Surgeon: Deidra Mejias MD;  [facility];  Service: Orthopedics;  Laterality: Left;    COLONOSCOPY N/A 12/13/2018    Procedure: COLONOSCOPY;  Surgeon: Micki Gross MD;  Location: [facility] (4TH FLR);  Service: Endoscopy;  Laterality: N/A;  preferrably in Dec 2018, CRS ok if needed.    ESOPHAGOGASTRODUODENOSCOPY N/A 10/30/2018    Procedure: EGD (ESOPHAGOGASTRODUODENOSCOPY);  Surgeon: Greg Leach MD;  Location: [facility] (2ND FLR);  Service: Endoscopy;  Laterality: N/A;  ok to schedule per Kimmy    KNEE  ARTHROSCOPY W/ DEBRIDEMENT  04/06/2021    Procedure: ARTHROSCOPY, KNEE, WITH DEBRIDEMENT;  Surgeon: Deidra Mejias MD;  Location: [facility] OR;  Service: Orthopedics;;    KNEE ARTHROSCOPY W/ MENISCECTOMY Right 04/06/2021    Procedure: ARTHROSCOPY, KNEE, WITH MENISCECTOMY;  Surgeon: Deidra Mejias MD;  Location: [facility] OR;  Service: Orthopedics;  Laterality: Right;    KNEE ARTHROSCOPY W/ PLICA EXCISION Left 04/06/2021    Procedure: EXCISION, PLICA, KNEE, ARTHROSCOPIC;  Surgeon: Deidra Mejias MD;  Location: [facility] OR;  Service: Orthopedics;  Laterality: Left;    LAPAROSCOPY  2017    dx with endo.  no removal    NO PAST SURGERIES      SYNOVECTOMY OF KNEE Left 04/06/2021    Procedure: SYNOVECTOMY, KNEE;  Surgeon: Deidra Mejias MD;  Location: [facility] OR;  Service: Orthopedics;  Laterality: Left;    UPPER GASTROINTESTINAL ENDOSCOPY         Family History   Problem Relation Age of Onset    Diabetes Mother     No Known Problems Father     No Known Problems Sister     Hypertension Brother     Hypertension Maternal Grandmother     Hypertension Maternal Grandfather     No Known Problems Paternal Grandmother     No Known Problems Paternal Grandfather     Colon cancer Neg Hx     Crohn's disease Neg Hx     Esophageal cancer Neg Hx     Inflammatory bowel disease Neg Hx     Irritable bowel syndrome Neg Hx     Rectal cancer Neg Hx     Stomach cancer Neg Hx     Ulcerative colitis Neg Hx     Celiac disease Neg Hx     Cirrhosis Neg Hx     Colon polyps Neg Hx     Liver cancer Neg Hx     Liver disease Neg Hx        Social History     Socioeconomic History    Marital status: Single   Occupational History     Employer: [employer]     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      prenatal vit 10-iron-folic-dha (VITAFOL-OB+DHA) 65-1-250 mg Cmpk Take 1 tablet by mouth.      vitamin D (VITAMIN D3) 1000 units Tab Take 1,000 Units by mouth once daily.      [DISCONTINUED] folic acid (FOLVITE) 1 MG tablet Take 1 mg by mouth once daily.       No current facility-administered medications on file prior to visit.        Review of patient's allergies indicates:   Allergen Reactions    Doxycycline Other (See Comments)     Triggers headaches    Bactrim ds [sulfamethoxazole-trimethoprim] Nausea And Vomiting    Nsaids (non-steroidal anti-inflammatory drug) Other (See Comments)     D/t h/o PUD and H pylori    Codeine Rash       Medications Reconciliation:   I have reconciled the patient's home medications with the patient/family. I have updated all changes.  Refer to After-Visit Medication List.    OBJECTIVE:     Vital Signs:  There were no vitals filed for this visit.  Wt Readings from Last 3 Encounters:   10/20/22 0938 65.8 kg (145 lb 1 oz)   10/17/22 0934 68.6 kg (151 lb 3.8 oz)   10/14/22 1052 68.6 kg (151 lb 3.8 oz)     There is no height or weight on file to calculate BMI.     Wt Readings from Last 3 Encounters:   10/20/22 65.8 kg (145 lb 1 oz)   10/17/22 68.6 kg (151 lb 3.8 oz)   10/14/22 68.6 kg (151 lb 3.8 oz)     Temp Readings from Last 3 Encounters:   10/18/22 97.7 °F (36.5 °C) (Temporal)   22 97.3 °F (36.3 °C) (Temporal)   22 98.4 °F (36.9 °C) (Oral)     BP Readings from Last 3 Encounters:   10/20/22 122/86   10/18/22 118/76   10/17/22 134/88     Pulse Readings from Last 3 Encounters:   10/20/22 99   10/18/22 78   10/17/22 97       Physical Exam:  General: Well developed, well nourished. No distress.  HEENT: Head is  normocephalic, atraumatic; ears are normal.   Eyes: Clear conjunctiva.  Neck: Supple, symmetrical neck; trachea midline.  Lungs: Clear to auscultation bilaterally and normal respiratory effort.  Cardiovascular: Heart with regular rate and rhythm. No murmurs, gallops or rubs  Extremities: No LE edema. Pulses 2+ and symmetric.   Abdomen: Abdomen is soft, non-tender non-distended with normal bowel sounds.  Skin: Skin color, texture, turgor normal. No rashes.  Musculoskeletal: Normal gait.   Lymph Nodes: No cervical or supraclavicular adenopathy.  Neurologic: Normal strength and tone. No focal numbness or weakness.   Psychiatric: Not depressed.      Laboratory  Lab Results   Component Value Date    WBC 10.32 10/14/2022    HGB 14.2 10/14/2022    HCT 44.3 10/14/2022     10/14/2022    CHOL 204 (H) 10/29/2021    TRIG 46 10/29/2021    HDL 41 10/29/2021    ALT 12 10/20/2022    AST 15 10/20/2022     10/20/2022    K 3.8 10/20/2022     10/20/2022    CREATININE 0.8 10/20/2022    BUN 10 10/20/2022    CO2 23 10/20/2022    TSH 1.277 10/14/2022    INR 1.0 09/22/2021    HGBA1C 4.9 10/29/2021       ASSESSMENT & PLAN:           Future Appointments   Date Time Provider Department Center   10/25/2022  1:30 PM MORGAN Sebastian [facility] IM [facility]   10/26/2022  1:00 PM Elver Ayala DNP, FNP-C [facility]   12/2/2022  1:30 PM Beverly Arora MD [facility] IM [facility]   12/6/2022  4:30 PM Lamine Burks MD [facility] GASTRO [facility]   1/18/2023  8:40 AM Johny Wilson MD [facility] NEURO [facility]   2/7/2023  1:00 PM Meghan Bunn MD [facility] OBGYN [facility]       After Visit Medication List :     Medication List            Accurate as of October 23, 2022  3:54 PM. If you have any questions, ask your nurse or doctor.                CONTINUE taking these medications      ferrous sulfate 325 (65 FE) MG EC tablet     prenatal vit 10-iron-folic-dha 65-1-250 mg Cmpk  Commonly known as:  VITAFOL-OB+DHA     vitamin D 1000 units Tab  Commonly known as: VITAMIN D3              Signing Physician:  MORGAN Cage

## 2022-10-24 ENCOUNTER — HOSPITAL ENCOUNTER (EMERGENCY)
Facility: HOSPITAL | Age: 32
Discharge: HOME | End: 2022-10-25
Attending: EMERGENCY MEDICINE
Payer: COMMERCIAL

## 2022-10-24 ENCOUNTER — PATIENT MESSAGE (OUTPATIENT)
Dept: GASTROENTEROLOGY | Facility: CLINIC | Age: 32
End: 2022-10-24
Payer: COMMERCIAL

## 2022-10-24 VITALS
WEIGHT: 145 LBS | DIASTOLIC BLOOD PRESSURE: 84 MMHG | HEIGHT: 62 IN | OXYGEN SATURATION: 99 % | RESPIRATION RATE: 18 BRPM | BODY MASS INDEX: 26.68 KG/M2 | SYSTOLIC BLOOD PRESSURE: 138 MMHG | TEMPERATURE: 98 F | HEART RATE: 92 BPM

## 2022-10-24 DIAGNOSIS — E87.6 HYPOKALEMIA: ICD-10-CM

## 2022-10-24 DIAGNOSIS — R51.9 ACUTE NONINTRACTABLE HEADACHE, UNSPECIFIED HEADACHE TYPE: Primary | ICD-10-CM

## 2022-10-24 DIAGNOSIS — R20.2 PARESTHESIA: ICD-10-CM

## 2022-10-24 PROCEDURE — 99285 EMERGENCY DEPT VISIT HI MDM: CPT | Mod: 25

## 2022-10-24 PROCEDURE — 99284 PR EMERGENCY DEPT VISIT,LEVEL IV: ICD-10-PCS | Mod: ,,, | Performed by: PHYSICIAN ASSISTANT

## 2022-10-24 PROCEDURE — 81001 URINALYSIS AUTO W/SCOPE: CPT | Performed by: EMERGENCY MEDICINE

## 2022-10-24 PROCEDURE — 96374 THER/PROPH/DIAG INJ IV PUSH: CPT

## 2022-10-24 PROCEDURE — 63600175 PHARM REV CODE 636 W HCPCS: Performed by: PHYSICIAN ASSISTANT

## 2022-10-24 PROCEDURE — 87088 URINE BACTERIA CULTURE: CPT | Performed by: EMERGENCY MEDICINE

## 2022-10-24 PROCEDURE — 96375 TX/PRO/DX INJ NEW DRUG ADDON: CPT

## 2022-10-24 PROCEDURE — 25000003 PHARM REV CODE 250: Performed by: PHYSICIAN ASSISTANT

## 2022-10-24 PROCEDURE — 87086 URINE CULTURE/COLONY COUNT: CPT | Performed by: EMERGENCY MEDICINE

## 2022-10-24 PROCEDURE — 99284 EMERGENCY DEPT VISIT MOD MDM: CPT | Mod: ,,, | Performed by: PHYSICIAN ASSISTANT

## 2022-10-24 PROCEDURE — 96361 HYDRATE IV INFUSION ADD-ON: CPT

## 2022-10-24 RX ORDER — PROCHLORPERAZINE EDISYLATE 5 MG/ML
10 INJECTION INTRAMUSCULAR; INTRAVENOUS
Status: COMPLETED | OUTPATIENT
Start: 2022-10-24 | End: 2022-10-24

## 2022-10-24 RX ORDER — DIPHENHYDRAMINE HYDROCHLORIDE 50 MG/ML
25 INJECTION INTRAMUSCULAR; INTRAVENOUS
Status: COMPLETED | OUTPATIENT
Start: 2022-10-24 | End: 2022-10-24

## 2022-10-24 RX ADMIN — PROCHLORPERAZINE EDISYLATE 10 MG: 5 INJECTION INTRAMUSCULAR; INTRAVENOUS at 10:10

## 2022-10-24 RX ADMIN — DIPHENHYDRAMINE HYDROCHLORIDE 25 MG: 50 INJECTION, SOLUTION INTRAMUSCULAR; INTRAVENOUS at 10:10

## 2022-10-24 RX ADMIN — SODIUM CHLORIDE 1000 ML: 0.9 INJECTION, SOLUTION INTRAVENOUS at 10:10

## 2022-10-25 ENCOUNTER — TELEPHONE (OUTPATIENT)
Dept: GASTROENTEROLOGY | Facility: CLINIC | Age: 32
End: 2022-10-25
Payer: COMMERCIAL

## 2022-10-25 ENCOUNTER — OFFICE VISIT (OUTPATIENT)
Dept: INTERNAL MEDICINE | Facility: CLINIC | Age: 32
End: 2022-10-25
Payer: COMMERCIAL

## 2022-10-25 ENCOUNTER — PATIENT MESSAGE (OUTPATIENT)
Dept: GASTROENTEROLOGY | Facility: CLINIC | Age: 32
End: 2022-10-25
Payer: COMMERCIAL

## 2022-10-25 DIAGNOSIS — R82.4 URINE KETONES: ICD-10-CM

## 2022-10-25 DIAGNOSIS — R63.4 WEIGHT LOSS: Primary | ICD-10-CM

## 2022-10-25 DIAGNOSIS — R63.0 LOSS OF APPETITE: ICD-10-CM

## 2022-10-25 DIAGNOSIS — R09.89 THROAT FULLNESS: ICD-10-CM

## 2022-10-25 LAB
BACTERIA #/AREA URNS AUTO: ABNORMAL /HPF
BILIRUB UR QL STRIP: NEGATIVE
BUN SERPL-MCNC: 4 MG/DL (ref 6–30)
CHLORIDE SERPL-SCNC: 108 MMOL/L (ref 95–110)
CLARITY UR REFRACT.AUTO: ABNORMAL
COLOR UR AUTO: YELLOW
CREAT SERPL-MCNC: 0.6 MG/DL (ref 0.5–1.4)
GLUCOSE SERPL-MCNC: 84 MG/DL (ref 70–110)
GLUCOSE UR QL STRIP: NEGATIVE
HCT VFR BLD CALC: 37 %PCV (ref 36–54)
HGB UR QL STRIP: NEGATIVE
HYALINE CASTS UR QL AUTO: 0 /LPF
KETONES UR QL STRIP: ABNORMAL
LEUKOCYTE ESTERASE UR QL STRIP: ABNORMAL
MICROSCOPIC COMMENT: ABNORMAL
NITRITE UR QL STRIP: NEGATIVE
PH UR STRIP: 6 [PH] (ref 5–8)
POC IONIZED CALCIUM: 1.11 MMOL/L (ref 1.06–1.42)
POC TCO2 (MEASURED): 22 MMOL/L (ref 23–29)
POTASSIUM BLD-SCNC: 3.3 MMOL/L (ref 3.5–5.1)
PROT UR QL STRIP: ABNORMAL
RBC #/AREA URNS AUTO: 2 /HPF (ref 0–4)
SAMPLE: ABNORMAL
SODIUM BLD-SCNC: 142 MMOL/L (ref 136–145)
SP GR UR STRIP: 1.03 (ref 1–1.03)
SQUAMOUS #/AREA URNS AUTO: 6 /HPF
URN SPEC COLLECT METH UR: ABNORMAL
WBC #/AREA URNS AUTO: 27 /HPF (ref 0–5)

## 2022-10-25 PROCEDURE — 99213 PR OFFICE/OUTPT VISIT, EST, LEVL III, 20-29 MIN: ICD-10-PCS | Mod: 95,,, | Performed by: NURSE PRACTITIONER

## 2022-10-25 PROCEDURE — 99213 OFFICE O/P EST LOW 20 MIN: CPT | Mod: 95,,, | Performed by: NURSE PRACTITIONER

## 2022-10-25 NOTE — ED PROVIDER NOTES
Encounter Date: 10/24/2022       History     Chief Complaint   Patient presents with    Headache     Numbness and burning to r side of neck , face and arm, burning to r side of abd, started at 6 pm, hx intracranial hypertension, dr farmer in triage no stroke code ok for intake     Patient is a 31-year-old female with history of idiopathic intracranial hypertension and preeclampsia and postpartum period who presents to the emergency department with headache.  Patient reports at approximately 6:00 p.m. this evening, she developed a mild headache on the right side of her head.  She reports she also had some burning tingling sensation to the right side of her face, right upper arm, and right flank.  She states she has had the burning tingling sensation in the right flank intermittently over the last couple of weeks.  She reports she also has some diffuse tingling in the right leg.  She denies any weakness.  She denies visual disturbance.  She denies nausea or vomiting.  She states this does not feel like any symptoms she had when she was diagnosed with IIH.  She denies neck pain.  She denies facial asymmetry.  Denies extremities weakness.    The history is provided by the patient.   Review of patient's allergies indicates:   Allergen Reactions    Doxycycline Other (See Comments)     Triggers headaches    Bactrim ds [sulfamethoxazole-trimethoprim] Nausea And Vomiting    Nsaids (non-steroidal anti-inflammatory drug) Other (See Comments)     D/t h/o PUD and H pylori    Codeine Rash     Past Medical History:   Diagnosis Date    Bilateral knee pain 06/19/2018    Endometriosis     treated by GYN at     Epigastric abdominal pain 12/05/2016    Gastroesophageal reflux disease 07/06/2016    Helicobacter pylori ab+     Helicobacter pylori ab+     High-tone pelvic floor dysfunction 05/02/2016    IIH (idiopathic intracranial hypertension)     Dr. Rogers - [facility]    Palpitations 07/06/2016    Pre-eclampsia in postpartum  period 08/16/2022     Past Surgical History:   Procedure Laterality Date    ARTHROSCOPY OF KNEE Left 04/06/2021    Procedure: ARTHROSCOPY, KNEE;  Surgeon: Deidra Mejias MD;  Location: [facility] OR;  Service: Orthopedics;  Laterality: Left;    COLONOSCOPY N/A 12/13/2018    Procedure: COLONOSCOPY;  Surgeon: Micki Gross MD;  Location: [facility] ENDO (4TH FLR);  Service: Endoscopy;  Laterality: N/A;  preferrably in Dec 2018, CRS ok if needed.    ESOPHAGOGASTRODUODENOSCOPY N/A 10/30/2018    Procedure: EGD (ESOPHAGOGASTRODUODENOSCOPY);  Surgeon: Greg Leach MD;  Location: [facility] ENDO (2ND FLR);  Service: Endoscopy;  Laterality: N/A;  ok to schedule per Kimmy    KNEE ARTHROSCOPY W/ DEBRIDEMENT  04/06/2021    Procedure: ARTHROSCOPY, KNEE, WITH DEBRIDEMENT;  Surgeon: Deidra Mejias MD;  Location: [facility] OR;  Service: Orthopedics;;    KNEE ARTHROSCOPY W/ MENISCECTOMY Right 04/06/2021    Procedure: ARTHROSCOPY, KNEE, WITH MENISCECTOMY;  Surgeon: Deidra Mejias MD;  Location: [facility] OR;  Service: Orthopedics;  Laterality: Right;    KNEE ARTHROSCOPY W/ PLICA EXCISION Left 04/06/2021    Procedure: EXCISION, PLICA, KNEE, ARTHROSCOPIC;  Surgeon: Deidra Mejias MD;  Location: [facility] OR;  Service: Orthopedics;  Laterality: Left;    LAPAROSCOPY  2017    dx with endo.  no removal    NO PAST SURGERIES      SYNOVECTOMY OF KNEE Left 04/06/2021    Procedure: SYNOVECTOMY, KNEE;  Surgeon: Deidra Mejias MD;  Location: [facility] OR;  Service: Orthopedics;  Laterality: Left;    UPPER GASTROINTESTINAL ENDOSCOPY       Family History   Problem Relation Age of Onset    Diabetes Mother     No Known Problems Father     No Known Problems Sister     Hypertension Brother     Hypertension Maternal Grandmother     Hypertension Maternal Grandfather     No Known Problems Paternal Grandmother     No Known Problems Paternal Grandfather     Colon cancer Neg Hx     Crohn's disease Neg Hx     Esophageal cancer Neg Hx     Inflammatory bowel disease Neg Hx     Irritable bowel syndrome Neg  Hx     Rectal cancer Neg Hx     Stomach cancer Neg Hx     Ulcerative colitis Neg Hx     Celiac disease Neg Hx     Cirrhosis Neg Hx     Colon polyps Neg Hx     Liver cancer Neg Hx     Liver disease Neg Hx      Social History     Tobacco Use    Smoking status: Never    Smokeless tobacco: Never   Substance Use Topics    Alcohol use: No    Drug use: No     Review of Systems   Constitutional:  Negative for activity change, appetite change, chills, fatigue and fever.   HENT:  Negative for congestion, ear discharge, ear pain, nosebleeds, postnasal drip, rhinorrhea, sore throat and trouble swallowing.    Respiratory:  Negative for cough and shortness of breath.    Cardiovascular:  Negative for chest pain.   Gastrointestinal:  Negative for abdominal pain, blood in stool, constipation, diarrhea, nausea and vomiting.   Genitourinary:  Negative for dysuria, flank pain and hematuria.   Musculoskeletal:  Negative for back pain, neck pain and neck stiffness.   Skin:  Negative for rash and wound.   Neurological:  Positive for numbness (and tingling/burning) and headaches.     Physical Exam     Initial Vitals [10/24/22 1844]   BP Pulse Resp Temp SpO2   138/84 92 18 98.3 °F (36.8 °C) 99 %      MAP       --         Physical Exam    Nursing note and vitals reviewed.  Constitutional: She appears well-developed and well-nourished. She is not diaphoretic.  Non-toxic appearance. No distress.   HENT:   Head: Normocephalic.   Right Ear: Hearing and external ear normal.   Left Ear: Hearing and external ear normal.   Nose: Nose normal.   Mouth/Throat: Uvula is midline, oropharynx is clear and moist and mucous membranes are normal. No oropharyngeal exudate.   Eyes: Conjunctivae and EOM are normal. Pupils are equal, round, and reactive to light.   Neck: Neck supple.   Normal range of motion.   Full passive range of motion without pain.     Cardiovascular:  Normal rate and regular rhythm.           Pulmonary/Chest: Breath sounds normal.    Abdominal: Abdomen is soft. Bowel sounds are normal. There is no abdominal tenderness.   Musculoskeletal:      Cervical back: Full passive range of motion without pain, normal range of motion and neck supple. No rigidity. Normal range of motion.     Lymphadenopathy:     She has no cervical adenopathy.   Neurological: She is alert and oriented to person, place, and time. She has normal strength. No cranial nerve deficit or sensory deficit. Coordination and gait normal. GCS score is 15. GCS eye subscore is 4. GCS verbal subscore is 5. GCS motor subscore is 6.   Skin: Skin is warm and dry. Capillary refill takes less than 2 seconds.   Psychiatric: She has a normal mood and affect.       ED Course   Procedures  Labs Reviewed   URINALYSIS, REFLEX TO URINE CULTURE - Abnormal; Notable for the following components:       Result Value    Appearance, UA Hazy (*)     Protein, UA 1+ (*)     Ketones, UA 2+ (*)     Leukocytes, UA 1+ (*)     All other components within normal limits    Narrative:     Specimen Source->Urine   URINALYSIS MICROSCOPIC - Abnormal; Notable for the following components:    WBC, UA 27 (*)     Bacteria Many (*)     All other components within normal limits    Narrative:     Specimen Source->Urine   ISTAT PROCEDURE - Abnormal; Notable for the following components:    POC BUN 4 (*)     POC Potassium 3.3 (*)     POC TCO2 (MEASURED) 22 (*)     All other components within normal limits   CULTURE, URINE          Imaging Results              CT Head Without Contrast (Final result)  Result time 10/24/22 23:12:58      Final result by Josh Camarena MD (10/24/22 23:12:58)                   Impression:      No evidence of acute hemorrhage or major vascular distribution infarct.    Electronically signed by resident: Karla Mcpherson  Date:    10/24/2022  Time:    22:46    Electronically signed by: Josh Camarena MD  Date:    10/24/2022  Time:    23:12               Narrative:    EXAMINATION:  CT HEAD WITHOUT  CONTRAST    CLINICAL HISTORY:  Headache, new or worsening, neuro deficit (Age 19-49y);    TECHNIQUE:  Low dose axial CT images obtained throughout the head without the use of intravenous contrast.  Axial, sagittal and coronal reconstructions were performed.    COMPARISON:  MRI and MRA brain 05/19/2021    FINDINGS:  Intracranial compartment:    Ventricles and sulci are normal in size for age without evidence of hydrocephalus.    The brain parenchyma appears within normal limits.  No parenchymal mass, hemorrhage, edema or major vascular distribution infarct.    No extra-axial blood or fluid collections.    Skull/extracranial contents (limited evaluation):    No fracture. Mastoid air cells and paranasal sinuses are essentially clear.                                       Medications   prochlorperazine injection Soln 10 mg (10 mg Intravenous Given 10/24/22 2217)   sodium chloride 0.9% bolus 1,000 mL (0 mLs Intravenous Stopped 10/24/22 2318)   diphenhydrAMINE injection 25 mg (25 mg Intravenous Given 10/24/22 2212)     Medical Decision Making:   Initial Assessment:   Urgent evaluation of a 31-year-old female with history of IIH and preeclampsia in postpartum period who presents to the emergency department with headache and paresthesias.  Patient is afebrile, nontoxic appearing, hemodynamically stable.  No nuchal rigidity.  Normal neuro exam.  Patient's paresthesias are very nonspecific.  Her pain is on the right side of her head along with paresthesias on the right side of her body.  I am not overly concerned for central etiology.  Istat will be obtained to determine if significant electrolyte disturbance.  Will obtain head ct , although not overly suspicious for intracranial abnormality.  Pt will be given headache concoction to see if symptoms improve - as this could be complex migraine.  Pt is more than 8 wks postpartum, so not concerned for preeclampsia cause.                        Clinical Impression:   Final  diagnoses:  [R51.9] Acute nonintractable headache, unspecified headache type (Primary)  [R20.2] Paresthesia  [E87.6] Hypokalemia      ED Disposition Condition    Discharge Stable          ED Prescriptions    None       Follow-up Information       Follow up With Specialties Details Why Contact Info    Jhoana Trejo MD Internal Medicine In 2 days  [address]  [place] [address]  [phone]               Angie Briones PA-C  10/25/22 5141

## 2022-10-25 NOTE — ED NOTES
I-STAT Chem-8+ Results:   Value Reference Range   Sodium 142 136-145 mmol/L   Potassium  3.3 3.5-5.1 mmol/L   Chloride 108  mmol/L   Ionized Calcium 1.11 1.06-1.42 mmol/L   CO2 (measured) 22 23-29 mmol/L   Glucose 84  mg/dL   BUN 4 6-30 mg/dL   Creatinine 0.6 0.5-1.4 mg/dL   Hematocrit 37 36-54%

## 2022-10-25 NOTE — ED TRIAGE NOTES
Patient arrived to the ED by personal vehicle with significant other. Patient has complaints of a headache and numbness and burning on R side that started today. Back and side has been hurting patient for two days. She is also nauseous. Pain rating currently at 5/10.

## 2022-10-25 NOTE — TELEPHONE ENCOUNTER
Contact patient per Dr. Burks, please schedule Derrek for blood work this week for further evaluation please then set her up for CCK HIDA scan to see her for gallbladder has abnormality with ejection of bile.     Derrek your ultrasound shows no stones but some sludge which can cause right upper quadrant pain will see if that is the case by doing CCK HIDA scan which stimulates her gallbladder to contract if that reproduces your right upper quadrant pain that is pretty specific for gallbladder causing your symptoms.     Patient verbalized understanding.

## 2022-10-25 NOTE — PROGRESS NOTES
The patient location is: Home  The chief complaint leading to consultation is: Follow up on Appetite and recent ER visit and labs / testing      Visit type: audiovisual    Face to Face time with patient: 15 mins  minutes of total time spent on the encounter, which includes face to face time and non-face to face time preparing to see the patient (eg, review of tests), Obtaining and/or reviewing separately obtained history, Documenting clinical information in the electronic or other health record, Independently interpreting results (not separately reported) and communicating results to the patient/family/caregiver, or Care coordination (not separately reported).       Each patient to whom he or she provides medical services by telemedicine is:  (1) informed of the relationship between the physician and patient and the respective role of any other health care provider with respect to management of the patient; and (2) notified that he or she may decline to receive medical services by telemedicine and may withdraw from such care at any time.    Notes:    Same day apptMartinez Ulloa is pleasant young lady.  Seen by me on 10/20; being followed by DR. Burks, whom is thoroughly working up and addressing the GI discomforts, with some surprising results on the US, negative of stones, +sludge and will be undergoing CCK Hida per Dr. Burks; agree with.   She was in the ER last night for right sided 'head' discomforts, underwent CT Head and labs, which were unremarkable; however, her urine is notably found to the spilling 1+ protein, ketones, White cells with 'many' bacteria on count, pending culture noted with Dr. GEE Duke (ER MD).   She is 'feeling better since the ER visit'.   She is scheduled to have the HIDA on 11/4/2022.       Weight loss     Loss of appetite    Urine ketones (suspect in the setting of no appetite and possibly being in ketosis...)    Throat fullness   -     US Soft Tissue Head Neck Thyroid; Future;  Expected date: 10/25/2022  -     HEMOGLOBIN A1C; Future; Expected date: 10/25/2022      Recommend having the underlying GI issue addressed and completed first for cause as per Dr. Burks. She was in agreement with this.   Future Appointments   Date Time Provider Department Center   10/26/2022  1:00 PM Elver Ayala, IVETTE, FNP-C [facility] ENT [place]   11/4/2022  8:30 AM [facility] NM4 MG2 400LB LIMIT [facility] NUCLEAR [address]   12/2/2022  1:30 PM Beverly Arora MD [facility] IM [address] PCW   12/6/2022  4:30 PM Lamine Burks MD [facility] GASTRO [address]   12/9/2022  9:00 AM Quique Tanner MD [facility] SPINE [facility]   1/18/2023  8:40 AM Johny Wilson MD [facility] NEURO [facility]   2/7/2023  1:00 PM Meghan Bunn MD [facility] OBGYN [facility]        Medication List            Accurate as of October 25, 2022  2:00 PM. If you have any questions, ask your nurse or doctor.                CONTINUE taking these medications      ferrous sulfate 325 (65 FE) MG EC tablet     prenatal vit 10-iron-folic-dha 65-1-250 mg Cmpk  Commonly known as: VITAFOL-OB+DHA     vitamin D 1000 units Tab  Commonly known as: VITAMIN D3              SHELBIE Schmidt NP

## 2022-10-25 NOTE — TELEPHONE ENCOUNTER
----- Message from Lamine Burks MD sent at 10/22/2022  2:01 PM CDT -----  Rey please schedule Derrek for blood work this week for further evaluation please then set her up for CCK HIDA scan to see her for gallbladder has abnormality with ejection of bile.    Derrek your ultrasound shows no stones but some sludge which can cause right upper quadrant pain will see if that is the case by doing CCK HIDA scan which stimulates her gallbladder to contract if that reproduces your right upper quadrant pain that is pretty specific for gallbladder causing your symptoms.

## 2022-10-26 ENCOUNTER — TELEPHONE (OUTPATIENT)
Dept: GASTROENTEROLOGY | Facility: CLINIC | Age: 32
End: 2022-10-26
Payer: COMMERCIAL

## 2022-10-26 ENCOUNTER — PATIENT MESSAGE (OUTPATIENT)
Dept: GASTROENTEROLOGY | Facility: CLINIC | Age: 32
End: 2022-10-26
Payer: COMMERCIAL

## 2022-10-26 DIAGNOSIS — R10.11 RIGHT UPPER QUADRANT PAIN: ICD-10-CM

## 2022-10-26 DIAGNOSIS — R10.11 RIGHT UPPER QUADRANT PAIN: Primary | ICD-10-CM

## 2022-10-26 DIAGNOSIS — R10.13 EPIGASTRIC PAIN: Primary | ICD-10-CM

## 2022-10-26 DIAGNOSIS — K82.8 GALLBLADDER SLUDGE: ICD-10-CM

## 2022-10-26 LAB — BACTERIA UR CULT: ABNORMAL

## 2022-10-27 ENCOUNTER — PATIENT MESSAGE (OUTPATIENT)
Dept: INTERNAL MEDICINE | Facility: CLINIC | Age: 32
End: 2022-10-27
Payer: COMMERCIAL

## 2022-10-27 ENCOUNTER — LAB VISIT (OUTPATIENT)
Dept: LAB | Facility: HOSPITAL | Age: 32
End: 2022-10-27
Attending: INTERNAL MEDICINE
Payer: COMMERCIAL

## 2022-10-27 DIAGNOSIS — R10.13 EPIGASTRIC PAIN: ICD-10-CM

## 2022-10-27 DIAGNOSIS — K82.8 GALLBLADDER SLUDGE: ICD-10-CM

## 2022-10-27 DIAGNOSIS — R10.11 RIGHT UPPER QUADRANT PAIN: ICD-10-CM

## 2022-10-27 DIAGNOSIS — R63.0 LOSS OF APPETITE: ICD-10-CM

## 2022-10-27 DIAGNOSIS — R63.4 WEIGHT LOSS: ICD-10-CM

## 2022-10-27 LAB
ALBUMIN SERPL BCP-MCNC: 4.2 G/DL (ref 3.5–5.2)
ALP SERPL-CCNC: 103 U/L (ref 55–135)
ALT SERPL W/O P-5'-P-CCNC: 17 U/L (ref 10–44)
ANION GAP SERPL CALC-SCNC: 14 MMOL/L (ref 8–16)
AST SERPL-CCNC: 18 U/L (ref 10–40)
BILIRUB DIRECT SERPL-MCNC: 0.2 MG/DL (ref 0.1–0.3)
BILIRUB SERPL-MCNC: 0.4 MG/DL (ref 0.1–1)
BUN SERPL-MCNC: 7 MG/DL (ref 6–20)
CALCIUM SERPL-MCNC: 10.3 MG/DL (ref 8.7–10.5)
CHLORIDE SERPL-SCNC: 106 MMOL/L (ref 95–110)
CO2 SERPL-SCNC: 24 MMOL/L (ref 23–29)
CREAT SERPL-MCNC: 0.9 MG/DL (ref 0.5–1.4)
EST. GFR  (NO RACE VARIABLE): >60 ML/MIN/1.73 M^2
ESTIMATED AVG GLUCOSE: 97 MG/DL (ref 68–131)
GLUCOSE SERPL-MCNC: 87 MG/DL (ref 70–110)
HBA1C MFR BLD: 5 % (ref 4–5.6)
HCG INTACT+B SERPL-ACNC: <2.4 MIU/ML
LIPASE SERPL-CCNC: 19 U/L (ref 4–60)
POTASSIUM SERPL-SCNC: 4.5 MMOL/L (ref 3.5–5.1)
PROT SERPL-MCNC: 7.6 G/DL (ref 6–8.4)
SODIUM SERPL-SCNC: 144 MMOL/L (ref 136–145)

## 2022-10-27 PROCEDURE — 80048 BASIC METABOLIC PNL TOTAL CA: CPT | Performed by: INTERNAL MEDICINE

## 2022-10-27 PROCEDURE — 36415 COLL VENOUS BLD VENIPUNCTURE: CPT | Performed by: INTERNAL MEDICINE

## 2022-10-27 PROCEDURE — 80076 HEPATIC FUNCTION PANEL: CPT | Performed by: INTERNAL MEDICINE

## 2022-10-27 PROCEDURE — 83690 ASSAY OF LIPASE: CPT | Performed by: INTERNAL MEDICINE

## 2022-10-27 PROCEDURE — 83036 HEMOGLOBIN GLYCOSYLATED A1C: CPT | Performed by: NURSE PRACTITIONER

## 2022-10-27 PROCEDURE — 84702 CHORIONIC GONADOTROPIN TEST: CPT | Performed by: INTERNAL MEDICINE

## 2022-10-28 ENCOUNTER — PATIENT MESSAGE (OUTPATIENT)
Dept: INTERNAL MEDICINE | Facility: CLINIC | Age: 32
End: 2022-10-28
Payer: COMMERCIAL

## 2022-10-28 ENCOUNTER — TELEPHONE (OUTPATIENT)
Dept: INTERNAL MEDICINE | Facility: CLINIC | Age: 32
End: 2022-10-28
Payer: COMMERCIAL

## 2022-10-28 ENCOUNTER — HOSPITAL ENCOUNTER (OUTPATIENT)
Dept: RADIOLOGY | Facility: HOSPITAL | Age: 32
Discharge: HOME | End: 2022-10-28
Attending: INTERNAL MEDICINE
Payer: COMMERCIAL

## 2022-10-28 DIAGNOSIS — R10.13 EPIGASTRIC PAIN: ICD-10-CM

## 2022-10-28 DIAGNOSIS — R10.11 RIGHT UPPER QUADRANT PAIN: ICD-10-CM

## 2022-10-28 DIAGNOSIS — R35.0 URINARY FREQUENCY: Primary | ICD-10-CM

## 2022-10-28 DIAGNOSIS — K82.8 GALLBLADDER SLUDGE: ICD-10-CM

## 2022-10-28 PROCEDURE — 74183 MRI ABDOMEN WITH AND WO_INC MRCP (XPD): ICD-10-PCS | Mod: 26,,, | Performed by: RADIOLOGY

## 2022-10-28 PROCEDURE — 76376 3D RENDER W/INTRP POSTPROCES: CPT | Mod: 26,,, | Performed by: RADIOLOGY

## 2022-10-28 PROCEDURE — 76376 MRI ABDOMEN WITH AND WO_INC MRCP (XPD): ICD-10-PCS | Mod: 26,,, | Performed by: RADIOLOGY

## 2022-10-28 PROCEDURE — A9585 GADOBUTROL INJECTION: HCPCS | Performed by: INTERNAL MEDICINE

## 2022-10-28 PROCEDURE — 74183 MRI ABD W/O CNTR FLWD CNTR: CPT | Mod: 26,,, | Performed by: RADIOLOGY

## 2022-10-28 PROCEDURE — 25500020 PHARM REV CODE 255: Performed by: INTERNAL MEDICINE

## 2022-10-28 PROCEDURE — 74183 MRI ABD W/O CNTR FLWD CNTR: CPT | Mod: TC

## 2022-10-28 RX ORDER — GADOBUTROL 604.72 MG/ML
10 INJECTION INTRAVENOUS
Status: COMPLETED | OUTPATIENT
Start: 2022-10-28 | End: 2022-10-28

## 2022-10-28 RX ADMIN — GADOBUTROL 10 ML: 604.72 INJECTION INTRAVENOUS at 07:10

## 2022-10-28 NOTE — TELEPHONE ENCOUNTER
Pt stated she would like for you to order urine cultures. Pt also stated she scheduled a follow up appt with you on 11/4 due to her experiencing burning sensation coming from her back and radiating throughout her whole body.

## 2022-10-30 ENCOUNTER — PATIENT MESSAGE (OUTPATIENT)
Dept: GASTROENTEROLOGY | Facility: CLINIC | Age: 32
End: 2022-10-30
Payer: COMMERCIAL

## 2022-10-30 NOTE — PROGRESS NOTES
Laila your MRI MRCP was read as follows.    Impression:     No biliary dilatation.  No focal filling defect to suggest choledocholithiasis allowing for motion artifact limitations.     No focal pancreatic lesion or pancreatic duct dilatation.     Electronically signed by resident: Bria Ndiaye  Date:                                            10/28/2022  Time:                                           10:45     Electronically signed by: Santos Carnes Jr  Date:                                            10/28/2022

## 2022-10-31 ENCOUNTER — HOSPITAL ENCOUNTER (OUTPATIENT)
Dept: RADIOLOGY | Facility: HOSPITAL | Age: 32
Discharge: HOME | End: 2022-10-31
Attending: ORTHOPAEDIC SURGERY
Payer: COMMERCIAL

## 2022-10-31 ENCOUNTER — PATIENT MESSAGE (OUTPATIENT)
Dept: INTERNAL MEDICINE | Facility: CLINIC | Age: 32
End: 2022-10-31
Payer: COMMERCIAL

## 2022-10-31 ENCOUNTER — TELEPHONE (OUTPATIENT)
Dept: ORTHOPEDICS | Facility: CLINIC | Age: 32
End: 2022-10-31
Payer: COMMERCIAL

## 2022-10-31 ENCOUNTER — PATIENT MESSAGE (OUTPATIENT)
Dept: GASTROENTEROLOGY | Facility: CLINIC | Age: 32
End: 2022-10-31
Payer: COMMERCIAL

## 2022-10-31 ENCOUNTER — OFFICE VISIT (OUTPATIENT)
Dept: ORTHOPEDICS | Facility: CLINIC | Age: 32
End: 2022-10-31
Payer: COMMERCIAL

## 2022-10-31 VITALS — HEIGHT: 64 IN | WEIGHT: 144.19 LBS | BODY MASS INDEX: 24.62 KG/M2

## 2022-10-31 DIAGNOSIS — M50.30 DDD (DEGENERATIVE DISC DISEASE), CERVICAL: Primary | ICD-10-CM

## 2022-10-31 DIAGNOSIS — M54.50 ACUTE MIDLINE LOW BACK PAIN WITHOUT SCIATICA: Primary | ICD-10-CM

## 2022-10-31 DIAGNOSIS — M51.34 DDD (DEGENERATIVE DISC DISEASE), THORACIC: ICD-10-CM

## 2022-10-31 DIAGNOSIS — M51.36 DDD (DEGENERATIVE DISC DISEASE), LUMBAR: ICD-10-CM

## 2022-10-31 DIAGNOSIS — M54.2 NECK PAIN: ICD-10-CM

## 2022-10-31 DIAGNOSIS — M50.30 DDD (DEGENERATIVE DISC DISEASE), CERVICAL: ICD-10-CM

## 2022-10-31 PROCEDURE — 72110 XR LUMBAR SPINE AP AND LAT WITH FLEX/EXT: ICD-10-PCS | Mod: 26,,, | Performed by: RADIOLOGY

## 2022-10-31 PROCEDURE — 99999 PR PBB SHADOW E&M-EST. PATIENT-LVL III: ICD-10-PCS | Mod: PBBFAC,,, | Performed by: ORTHOPAEDIC SURGERY

## 2022-10-31 PROCEDURE — 72050 X-RAY EXAM NECK SPINE 4/5VWS: CPT | Mod: TC

## 2022-10-31 PROCEDURE — 72070 X-RAY EXAM THORAC SPINE 2VWS: CPT | Mod: TC

## 2022-10-31 PROCEDURE — 99214 PR OFFICE/OUTPT VISIT, EST, LEVL IV, 30-39 MIN: ICD-10-PCS | Mod: S$GLB,,, | Performed by: ORTHOPAEDIC SURGERY

## 2022-10-31 PROCEDURE — 99214 OFFICE O/P EST MOD 30 MIN: CPT | Mod: S$GLB,,, | Performed by: ORTHOPAEDIC SURGERY

## 2022-10-31 PROCEDURE — 72110 X-RAY EXAM L-2 SPINE 4/>VWS: CPT | Mod: TC

## 2022-10-31 PROCEDURE — 72070 XR THORACIC SPINE AP LATERAL: ICD-10-PCS | Mod: 26,,, | Performed by: RADIOLOGY

## 2022-10-31 PROCEDURE — 99999 PR PBB SHADOW E&M-EST. PATIENT-LVL III: CPT | Mod: PBBFAC,,, | Performed by: ORTHOPAEDIC SURGERY

## 2022-10-31 PROCEDURE — 72110 X-RAY EXAM L-2 SPINE 4/>VWS: CPT | Mod: 26,,, | Performed by: RADIOLOGY

## 2022-10-31 PROCEDURE — 72050 XR CERVICAL SPINE AP LAT WITH FLEX EXTEN: ICD-10-PCS | Mod: 26,,, | Performed by: RADIOLOGY

## 2022-10-31 PROCEDURE — 72050 X-RAY EXAM NECK SPINE 4/5VWS: CPT | Mod: 26,,, | Performed by: RADIOLOGY

## 2022-10-31 PROCEDURE — 72070 X-RAY EXAM THORAC SPINE 2VWS: CPT | Mod: 26,,, | Performed by: RADIOLOGY

## 2022-10-31 NOTE — PROGRESS NOTES
DATE: 10/31/2022  PATIENT: Laila Pappas    Attending Physician: Miguel Angel Malhotra M.D.    CHIEF COMPLAINT: neck and BUE pain; LBP    HISTORY:  Laila Pappas is a 31 y.o. female here for initial evaluation of neck and b/l arm pain (Neck - 3, Arm - 3). The pain has been present for 2 weeks. The patient describes the pain as dull and it radiates down both shoulders to mid-arms. The pain is worse with activity and improved by rest. There is BUE associated numbness and tingling. There is no subjective weakness. Prior treatments have included nothing. Patient had seen Dr. Tanner for similar complaints in 2020, which was resolved by PT.     The Patient denies myelopathic symptoms such as handwriting changes or difficulty with buttons/coins/keys. Denies perineal paresthesias, bowel/bladder dysfunction.    The patient does not smoke, have DM or endorse IVDU. The patient is not on any blood thinners and does not take chronic narcotics. Patient works as a . She is currently breastfeeding.    PAST MEDICAL/SURGICAL HISTORY:  Past Medical History:   Diagnosis Date    Bilateral knee pain 06/19/2018    Endometriosis     treated by GYN at     Epigastric abdominal pain 12/05/2016    Gastroesophageal reflux disease 07/06/2016    Helicobacter pylori ab+     Helicobacter pylori ab+     High-tone pelvic floor dysfunction 05/02/2016    IIH (idiopathic intracranial hypertension)     Dr. Rogers - [facility]    Palpitations 07/06/2016    Pre-eclampsia in postpartum period 08/16/2022     Past Surgical History:   Procedure Laterality Date    ARTHROSCOPY OF KNEE Left 04/06/2021    Procedure: ARTHROSCOPY, KNEE;  Surgeon: Deidra Mejias MD;  Location: [facility] OR;  Service: Orthopedics;  Laterality: Left;    COLONOSCOPY N/A 12/13/2018    Procedure: COLONOSCOPY;  Surgeon: Micki Gross MD;  Location: [facility] ([address] [place] [address]);  Service: Endoscopy;  Laterality: N/A;  preferrably in Dec 2018, CRS ok if needed.    ESOPHAGOGASTRODUODENOSCOPY N/A  10/30/2018    Procedure: EGD (ESOPHAGOGASTRODUODENOSCOPY);  Surgeon: Greg Leach MD;  Location: [facility] ENDO (2ND FLR);  Service: Endoscopy;  Laterality: N/A;  ok to schedule per Kimmy    KNEE ARTHROSCOPY W/ DEBRIDEMENT  2021    Procedure: ARTHROSCOPY, KNEE, WITH DEBRIDEMENT;  Surgeon: Deidra Mejias MD;  Location: [facility] OR;  Service: Orthopedics;;    KNEE ARTHROSCOPY W/ MENISCECTOMY Right 2021    Procedure: ARTHROSCOPY, KNEE, WITH MENISCECTOMY;  Surgeon: Deidra Mejias MD;  Location: [facility] OR;  Service: Orthopedics;  Laterality: Right;    KNEE ARTHROSCOPY W/ PLICA EXCISION Left 2021    Procedure: EXCISION, PLICA, KNEE, ARTHROSCOPIC;  Surgeon: Deidra Mejias MD;  Location: [facility] OR;  Service: Orthopedics;  Laterality: Left;    LAPAROSCOPY  2017    dx with endo.  no removal    NO PAST SURGERIES      SYNOVECTOMY OF KNEE Left 2021    Procedure: SYNOVECTOMY, KNEE;  Surgeon: Deidra Mejias MD;  Location: [facility] OR;  Service: Orthopedics;  Laterality: Left;    UPPER GASTROINTESTINAL ENDOSCOPY         Current Medications:   Current Outpatient Medications:     ferrous sulfate 325 (65 FE) MG EC tablet, Take 325 mg by mouth 3 (three) times daily with meals., Disp: , Rfl:     vitamin D (VITAMIN D3) 1000 units Tab, Take 1,000 Units by mouth once daily., Disp: , Rfl:     prenatal vit 10-iron-folic-dha (VITAFOL-OB+DHA) 65-1-250 mg Cmpk, Take 1 tablet by mouth., Disp: , Rfl:     Social History:   Social History     Socioeconomic History    Marital status: Single   Occupational History     Employer: [employer]     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   Tobacco Use    Smoking status: Never    Smokeless tobacco: Never   Substance and Sexual Activity    Alcohol use: No    Drug use: No    Sexual activity: Yes     Partners: Male     Birth control/protection: None   Social History Narrative    She had her 1st baby in 2022 healthy vaginal delivery no      Social Determinants of Health     Financial  "Resource Strain: Unknown    Difficulty of Paying Living Expenses: Patient refused   Food Insecurity: Unknown    Worried About Running Out of Food in the Last Year: Patient refused    Ran Out of Food in the Last Year: Patient refused   Transportation Needs: Unknown    Lack of Transportation (Medical): Patient refused    Lack of Transportation (Non-Medical): Patient refused   Physical Activity: Unknown    Days of Exercise per Week: Patient refused   Social Connections: Unknown    Frequency of Communication with Friends and Family: Patient refused    Frequency of Social Gatherings with Friends and Family: Patient refused    Active Member of Clubs or Organizations: Patient refused    Attends Club or Organization Meetings: Patient refused    Marital Status: Patient refused   Housing Stability: Unknown    Unable to Pay for Housing in the Last Year: Patient refused    Unstable Housing in the Last Year: Patient refused       REVIEW OF SYSTEMS:  Constitution: Negative. Negative for chills, fever and night sweats.   Cardiovascular: Negative for chest pain and syncope.   Respiratory: Negative for cough and shortness of breath.   Gastrointestinal: See HPI. Negative for nausea/vomiting. Negative for abdominal pain.  Genitourinary: See HPI. Negative for discoloration or dysuria.  Skin: Negative for dry skin, itching and rash.   Hematologic/Lymphatic: negative for bleeding/clotting disorders.   Musculoskeletal: Negative for falls and muscle weakness.   Neurological: See HPI. no history of seizures. no history of cranial surgery or shunts.  Endocrine: Negative for polydipsia, polyphagia and polyuria.   Allergic/Immunologic: Negative for hives and persistent infections.  Psychiatric/Behavioral: Negative for depression and insomnia.         EXAM:  Ht 5' 3.5" (1.613 m)   Wt 65.4 kg (144 lb 2.9 oz)   LMP 10/10/2022 (Approximate)   Breastfeeding Yes   BMI 25.14 kg/m²     General: The patient is a 31 y.o. female in no apparent " distress, the patient is orientatied to person, place and time.  Psych: Normal mood and affect  HEENT: Vision grossly intact, hearing intact to the spoken word.  Lungs: Respirations unlabored.  Gait: Normal station and gait, no difficulty with toe or heel walk.   Skin: Cervical skin negative for rashes, lesions, hairy patches and surgical scars.  Range of motion: Cervical range of motion is acceptable. There is posterior cervical and lower lumbar tenderness to palpation.  Spinal Balance: Global saggital and coronal spinal balance acceptable, no significant for scoliosis and kyphosis.  Musculoskeletal: No pain with the range of motion of the bilateral shoulders and elbows. Normal bulk and contour of the bilateral hands.  Vascular: Bilateral hands warm and well perfused, Radial pulses 2+ bilaterally.  Neurological: Normal strength and tone in all major motor groups in the bilateral upper and lower extremities. Normal sensation to light touch in the C5-T1 and L2-S1 dermatomes bilaterally.  Deep tendon reflexes symmetric 2+ in the bilateral upper and lower extremities.  Negative Inverted Radial Reflex and Nielson's bilaterally. Negative Babinski bilaterally.     IMAGING:   Today I independently reviewed the following images and my interpretations are as follows:    AP, Lat and Flex/Ex  upright C-spine films demonstrate no fractures or listhesis.    Thoracic Xrs showed no fractures or listhesis.    Lumbar Xrs showed no fractures or listhesis.    MRI cervical and lumbar from 2020 showed no significant central or foraminal stenosis.     EMG BUE in 2020 was normal.     Body mass index is 25.14 kg/m².  Hemoglobin A1C   Date Value Ref Range Status   10/27/2022 5.0 4.0 - 5.6 % Final     Comment:     ADA Screening Guidelines:  5.7-6.4%  Consistent with prediabetes  >or=6.5%  Consistent with diabetes    High levels of fetal hemoglobin interfere with the HbA1C  assay. Heterozygous hemoglobin variants (HbS, HgC, etc)do  not  significantly interfere with this assay.   However, presence of multiple variants may affect accuracy.     10/29/2021 4.9 4.0 - 5.6 % Final     Comment:     ADA Screening Guidelines:  5.7-6.4%  Consistent with prediabetes  >or=6.5%  Consistent with diabetes    High levels of fetal hemoglobin interfere with the HbA1C  assay. Heterozygous hemoglobin variants (HbS, HgC, etc)do  not significantly interfere with this assay.   However, presence of multiple variants may affect accuracy.     07/22/2020 5.1 4.0 - 5.6 % Final     Comment:     ADA Screening Guidelines:  5.7-6.4%  Consistent with prediabetes  >or=6.5%  Consistent with diabetes  High levels of fetal hemoglobin interfere with the HbA1C  assay. Heterozygous hemoglobin variants (HbS, HgC, etc)do  not significantly interfere with this assay.   However, presence of multiple variants may affect accuracy.         ASSESSMENT/PLAN:  Acute midline low back pain without sciatica  -     Ambulatory referral/consult to Physical/Occupational Therapy; Future; Expected date: 11/07/2022    Neck pain  -     Ambulatory referral/consult to Physical/Occupational Therapy; Future; Expected date: 11/07/2022      Follow up if symptoms worsen or fail to improve.    Patient has neck and LBP. I discussed the natural history of their diagnoses as well as surgical and nonsurgical treatment options. I educated the patient on the importance of core/back strengthening, correct posture, bending/lifting ergonomics, and low-impact aerobic exercises (walking, elliptical, and aquatherapy). Continue medications (OTC tylenol). I will refer the patient to PT for neck and lower back. Patient will follow up PRN. Next step is lumbar and/or cervical MRI.    Miguel Angel Malhotra MD  Orthopaedic Spine Surgeon  Department of Orthopaedic Surgery  [phone]

## 2022-11-01 ENCOUNTER — OFFICE VISIT (OUTPATIENT)
Dept: GASTROENTEROLOGY | Facility: CLINIC | Age: 32
End: 2022-11-01
Payer: COMMERCIAL

## 2022-11-01 VITALS — BODY MASS INDEX: 24.59 KG/M2 | HEIGHT: 64 IN | WEIGHT: 144 LBS

## 2022-11-01 DIAGNOSIS — R10.11 RIGHT UPPER QUADRANT PAIN: ICD-10-CM

## 2022-11-01 DIAGNOSIS — K92.1 HEMATOCHEZIA: Primary | ICD-10-CM

## 2022-11-01 PROCEDURE — 99999 PR PBB SHADOW E&M-EST. PATIENT-LVL III: ICD-10-PCS | Mod: PBBFAC,,, | Performed by: INTERNAL MEDICINE

## 2022-11-01 PROCEDURE — 99999 PR PBB SHADOW E&M-EST. PATIENT-LVL III: CPT | Mod: PBBFAC,,, | Performed by: INTERNAL MEDICINE

## 2022-11-01 PROCEDURE — 99214 PR OFFICE/OUTPT VISIT, EST, LEVL IV, 30-39 MIN: ICD-10-PCS | Mod: S$GLB,,, | Performed by: INTERNAL MEDICINE

## 2022-11-01 PROCEDURE — 99214 OFFICE O/P EST MOD 30 MIN: CPT | Mod: S$GLB,,, | Performed by: INTERNAL MEDICINE

## 2022-11-01 NOTE — H&P (VIEW-ONLY)
[facility] Consultation Note    Reason for Consult:  The primary encounter diagnosis was Hematochezia. A diagnosis of Right upper quadrant pain was also pertinent to this visit.    PCP:   Jhoana Trejo   No address on file    Referring MD:  Aaareferral Self  No address on file    Initial History of Present Illness (HPI):  This is a 31 y.o. female here for evaluation of new onset painless hematochezia.  She does not feel like she has hemorrhoids.  Her last colonoscopy was in 2018.  She would like colonoscopy expedited for further evaluation.  She also has been having right upper quadrant abdominal pain no nausea no vomiting no heartburn those symptoms went away after delivering her baby in August 2022.  She still has intermittent right upper quadrant pain does not radiate to her back nothing seems to make it worse although occasionally she can feel it when she is sitting in bed no dysuria no urgency no frequency no fever no chills no shortness of breath no abdominal trauma no unintentional weight loss but her pre pregnancy weight was about 175 lb she gained about 21 lb in pregnancy or max weight was about 196 lb and she is down to 151 lb today.  She had her thyroid checked recently she is not anemic.  No vomiting no blood in her stool no early satiety.  No change in bowel habits.  Patient had recent MRI no acute findings ultrasound shows gallbladder sludge she would like to have a CCK HIDA scan radiology is recommended she pump and dump her breast milk for several days after.  She is Bank enough breast milk that she can do this she would like to proceed with that.     Abdominal pain - as above  Reflux - no  Dysphagia - no   Bowel habits - normal  GI bleeding -as above  NSAID usage - none     Interval HPI 10/17/2022:  The patient's last visit with me was on Visit date not found.        ROS:  Constitutional: No fevers, chills, No weight loss  ENT:  No heartburn no dysphagia no odynophagia no  hoarseness  CV: No chest pain, no palpitation  Pulm: No cough, No shortness of breath, no wheezing  Ophtho: No vision changes  GI: see HPI  Derm: No rash, no itching  Heme: No lymphadenopathy  MSK: No significant arthritis  : No dysuria, No hematuria  Endo: No hot or cold intolerance  Neuro: No syncope, No seizure, no strokes  Psych: No uncontrolled anxiety but some anxiety, No uncontrolled depression    Interval HPI 11/01/2022:  The patient's last visit with me was on 10/17/2022.    Medical History:  has a past medical history of Bilateral knee pain (06/19/2018), Endometriosis, Epigastric abdominal pain (12/05/2016), Gastroesophageal reflux disease (07/06/2016), Helicobacter pylori ab+, Helicobacter pylori ab+, High-tone pelvic floor dysfunction (05/02/2016), IIH (idiopathic intracranial hypertension), Palpitations (07/06/2016), and Pre-eclampsia in postpartum period (08/16/2022).    Surgical History:  has a past surgical history that includes No past surgeries; Upper gastrointestinal endoscopy; Esophagogastroduodenoscopy (N/A, 10/30/2018); Colonoscopy (N/A, 12/13/2018); Laparoscopy (2017); Synovectomy of knee (Left, 04/06/2021); Knee arthroscopy w/ plica excision (Left, 04/06/2021); Knee arthroscopy w/ debridement (04/06/2021); Arthroscopy of knee (Left, 04/06/2021); and Knee arthroscopy w/ meniscectomy (Right, 04/06/2021).    Family History: family history includes Diabetes in her mother; Hypertension in her brother, maternal grandfather, and maternal grandmother; No Known Problems in her father, paternal grandfather, paternal grandmother, and sister..     Social History:  reports that she has never smoked. She has never used smokeless tobacco. She reports that she does not drink alcohol and does not use drugs.    Review of patient's allergies indicates:   Allergen Reactions    Doxycycline Other (See Comments)     Triggers headaches    Bactrim ds [sulfamethoxazole-trimethoprim] Nausea And Vomiting    Nsaids  "(non-steroidal anti-inflammatory drug) Other (See Comments)     D/t h/o PUD and H pylori    Codeine Rash       Medication List with Changes/Refills   Current Medications    FERROUS SULFATE 325 (65 FE) MG EC TABLET    Take 325 mg by mouth 3 (three) times daily with meals.    MULTIVITAMIN CAPSULE    Take 1 capsule by mouth once daily.    PRENATAL VIT 10-IRON-FOLIC-DHA (VITAFOL-OB+DHA) 65-1-250 MG CMPK    Take 1 tablet by mouth.    VITAMIN D (VITAMIN D3) 1000 UNITS TAB    Take 1,000 Units by mouth once daily.         Objective Findings:    Vital Signs:  Ht 5' 3.5" (1.613 m)   Wt 65.3 kg (144 lb)   LMP 10/10/2022 (Approximate)   Breastfeeding Yes   BMI 25.11 kg/m²   Body mass index is 25.11 kg/m².    Physical Exam:  General Appearance: Well appearing in no acute distress  Eyes:    No scleral icterus  ENT:  No lesions or masses   Lungs: CTA bilaterally, no wheezes, no rhonchi, no rales  Heart:  S1, S2 normal, no murmurs heard  Abdomen:  Non distended, soft, no guarding, no rebound, mild epigastric tenderness, no appreciated ascites, no bruits, no hepatosplenomegaly,  No CVA tenderness, no appreciated hernias, no Moore sign, no McBurney point tenderness  Musculoskeletal:  No major joint deformities  Skin: No petechiae or rash on exposed skin areas  Neurologic:  Alert and oriented x4  Psychiatric:  Normal speech mentation and affect    Labs:  Lab Results   Component Value Date    WBC 10.32 10/14/2022    HGB 14.2 10/14/2022    HCT 37 10/24/2022     10/14/2022    CHOL 204 (H) 10/29/2021    TRIG 46 10/29/2021    HDL 41 10/29/2021    ALT 17 10/27/2022    AST 18 10/27/2022     10/27/2022    K 4.5 10/27/2022     10/27/2022    CREATININE 0.9 10/27/2022    BUN 7 10/27/2022    CO2 24 10/27/2022    TSH 1.277 10/14/2022    INR 1.0 09/22/2021    HGBA1C 5.0 10/27/2022             Medical Decision Making:    EXAMINATION:  MRI ABDOMEN WITH AND WO_INC MRCP (XPD)     CLINICAL HISTORY:  Patient with right upper " quadrant pain epigastric pain radiating to the back with gallbladder sludge rule out common bile duct stone or pancreatic lesion;  Epigastric pain     TECHNIQUE:  Multisequence, multiplanar MRI of the abdomen performed per liver protocol before and after administration of 10 mL Gadavist intravenous contrast.  Additionally 2D and 3D MRCP sequences were performed as well as MIP images.     COMPARISON:  Ultrasound abdomen 10/20/2022     FINDINGS:  Lower thorax: No significant abnormality.     Liver: Normal homogeneous background signal.  No focal lesions.  Hepatic and portal veins are patent.     Biliary: Gallbladder is unremarkable.  No intrahepatic or extrahepatic biliary dilatation.  No focal filling defect.  MRCP is degraded by motion artifact.     Pancreas: Unremarkable.  No pancreatic ductal dilatation.     Spleen: Normal size.  No focal lesions.     Adrenal glands: Unremarkable.     Kidneys: Subcentimeter right renal cyst (14:51).  No hydronephrosis.     Miscellaneous: Visualized bowel loops are unremarkable.  No evidence for lymphadenopathy.  Marrow signal is unremarkable     Impression:     No biliary dilatation.  No focal filling defect to suggest choledocholithiasis allowing for motion artifact limitations.     No focal pancreatic lesion or pancreatic duct dilatation.     Electronically signed by resident: Bria Ndiaye  Date:                                            10/28/2022  Time:                                           10:45     Electronically signed by: Santos Carnes Jr  Date:                                            10/28/2022  Lab work reviewed  Ultrasound talk given  Potential for EGD talk given  Lab work talk given  Total time with patient reviewing prior medical records EGD colonoscopy  Taking history physical exam answering questions been about 30 minutes  With 50% of the time face-to-face in room with patient today   The Olympus scope PCF-H190DL ([id number]) was                          introduced through the anus and advanced to the                         cecum, identified by appendiceal orifice and                         ileocecal valve. The colonoscopy was performed                         without difficulty. The patient tolerated the                         procedure well. The quality of the bowel                         preparation was excellent. The terminal ileum,                         ileocecal valve, appendiceal orifice, and rectum                         were photographed.   Findings:        The perianal and digital rectal examinations were normal.        The colon (entire examined portion) appeared normal.        The terminal ileum appeared normal.   Impression:           - The entire examined colon is normal.                         - The examined portion of the ileum was normal.                         - No specimens collected.   Recommendation:       - Discharge patient to home.                         - Patient has a contact number available for                         emergencies. The signs and symptoms of potential                         delayed complications were discussed with the                         patient. Return to normal activities tomorrow.                         Written discharge instructions were provided to the                         patient.                         - Resume previous diet.                         - Continue present medications.                         - Return to referring physician.                         - Repeat colonoscopy at age 50 yo for screening                         purposes.   Attending Participation:        I personally performed the entire procedure.   Micki Gross MD   12/13/2018     The                         Olympus scope GIF- ([id number]) was introduced                         through the mouth, and advanced to the second part                         of duodenum. The upper GI endoscopy was                          accomplished without difficulty. The patient                         tolerated the procedure well.   Findings:        The examined esophagus was normal.        Esophagogastric landmarks were identified: the Z-line was found at        37 cm and the gastroesophageal junction was found at 37 cm from the        incisors.        The cardia and gastric fundus were normal on retroflexion.        Diffuse mild inflammation characterized by congestion (edema),        erythema and granularity was found in the gastric body.        The gastric antrum was normal.        The examined duodenum was normal.        Biopsies were taken with a cold forceps in the gastric body and in        the gastric antrum for histology.   Impression:           - Normal esophagus.                         - Esophagogastric landmarks identified.                         - Gastritis.                         - Normal antrum.                         - Normal examined duodenum.                         - Biopsies were taken with a cold forceps for                         histology in the gastric body and in the gastric                         antrum.   Recommendation:       - Discharge patient to home.                         - Patient has a contact number available for                         emergencies. The signs and symptoms of potential                         delayed complications were discussed with the                         patient. Return to normal activities tomorrow.                         Written discharge instructions were provided to the                         patient.                         - Resume previous diet.                         - Continue present medications.                         - Await pathology results.                         - Telephone GI clinic for pathology results in 1                         week.   Attending Participation:        I personally performed the entire procedure.   Greg Leach MD    10/30/2018    Ultrasound images personally reviewed by myself  EXAMINATION:  US ABDOMEN LIMITED     CLINICAL HISTORY:  Patient right upper quadrant pain please rule out gallstone;.     Right upper quadrant pain     TECHNIQUE:  Limited ultrasound of the right upper quadrant of the abdomen including pancreas, liver, gallbladder, common bile duct was performed.     COMPARISON:  Ultrasound 12/20/2018     FINDINGS:  Liver: Normal in size, measuring 12 cm. Diffusely increased parenchymal echogenicity suggestive for steatosis.  No focal hepatic lesions.     Gallbladder: Mild biliary sludge.  No calculi, wall thickening, or pericholecystic fluid.  No sonographic Moore's sign.     Biliary system: The common duct is not dilated, measuring 3 mm.  No intrahepatic ductal dilatation.     Spleen: Normal in size with a homogeneous echotexture, measuring 9.8 x 3.8 cm.     Pancreas: The visualized portions of pancreas appear normal.     Miscellaneous: No ascites.     Impression:     Mild gallbladder sludge.  No evidence of acute cholecystitis.     Findings suggestive for hepatic steatosis.        Electronically signed by: Yang Avery  Date:                                            10/20/2022  Assessment:  1. Hematochezia    2. Right upper quadrant pain         Recommendations:  1. CCK HIDA scan patient knows that she will have to pump and dump her breast milk per radiology's recommendations  2. EGD for right upper quadrant pain colonoscopy for hematochezia patient request urgent referral urgent referral to endoscopy schedule  3. Return GI clinic 4-8 weeks for follow-up sooner if symptoms worsen      Follow up in about 8 weeks (around 12/27/2022).      Order summary:  Orders Placed This Encounter    Ambulatory referral/consult to Endo Procedure          Thank you so much for allowing me to participate in the care of Laila Burks MD    EGD

## 2022-11-01 NOTE — PROGRESS NOTES
[facility] Consultation Note    Reason for Consult:  The primary encounter diagnosis was Hematochezia. A diagnosis of Right upper quadrant pain was also pertinent to this visit.    PCP:   Jhoana Trejo   No address on file    Referring MD:  Aaareferral Self  No address on file    Initial History of Present Illness (HPI):  This is a 31 y.o. female here for evaluation of new onset painless hematochezia.  She does not feel like she has hemorrhoids.  Her last colonoscopy was in 2018.  She would like colonoscopy expedited for further evaluation.  She also has been having right upper quadrant abdominal pain no nausea no vomiting no heartburn those symptoms went away after delivering her baby in August 2022.  She still has intermittent right upper quadrant pain does not radiate to her back nothing seems to make it worse although occasionally she can feel it when she is sitting in bed no dysuria no urgency no frequency no fever no chills no shortness of breath no abdominal trauma no unintentional weight loss but her pre pregnancy weight was about 175 lb she gained about 21 lb in pregnancy or max weight was about 196 lb and she is down to 151 lb today.  She had her thyroid checked recently she is not anemic.  No vomiting no blood in her stool no early satiety.  No change in bowel habits.  Patient had recent MRI no acute findings ultrasound shows gallbladder sludge she would like to have a CCK HIDA scan radiology is recommended she pump and dump her breast milk for several days after.  She is Bank enough breast milk that she can do this she would like to proceed with that.     Abdominal pain - as above  Reflux - no  Dysphagia - no   Bowel habits - normal  GI bleeding -as above  NSAID usage - none     Interval HPI 10/17/2022:  The patient's last visit with me was on Visit date not found.        ROS:  Constitutional: No fevers, chills, No weight loss  ENT:  No heartburn no dysphagia no odynophagia no  hoarseness  CV: No chest pain, no palpitation  Pulm: No cough, No shortness of breath, no wheezing  Ophtho: No vision changes  GI: see HPI  Derm: No rash, no itching  Heme: No lymphadenopathy  MSK: No significant arthritis  : No dysuria, No hematuria  Endo: No hot or cold intolerance  Neuro: No syncope, No seizure, no strokes  Psych: No uncontrolled anxiety but some anxiety, No uncontrolled depression    Interval HPI 11/01/2022:  The patient's last visit with me was on 10/17/2022.    Medical History:  has a past medical history of Bilateral knee pain (06/19/2018), Endometriosis, Epigastric abdominal pain (12/05/2016), Gastroesophageal reflux disease (07/06/2016), Helicobacter pylori ab+, Helicobacter pylori ab+, High-tone pelvic floor dysfunction (05/02/2016), IIH (idiopathic intracranial hypertension), Palpitations (07/06/2016), and Pre-eclampsia in postpartum period (08/16/2022).    Surgical History:  has a past surgical history that includes No past surgeries; Upper gastrointestinal endoscopy; Esophagogastroduodenoscopy (N/A, 10/30/2018); Colonoscopy (N/A, 12/13/2018); Laparoscopy (2017); Synovectomy of knee (Left, 04/06/2021); Knee arthroscopy w/ plica excision (Left, 04/06/2021); Knee arthroscopy w/ debridement (04/06/2021); Arthroscopy of knee (Left, 04/06/2021); and Knee arthroscopy w/ meniscectomy (Right, 04/06/2021).    Family History: family history includes Diabetes in her mother; Hypertension in her brother, maternal grandfather, and maternal grandmother; No Known Problems in her father, paternal grandfather, paternal grandmother, and sister..     Social History:  reports that she has never smoked. She has never used smokeless tobacco. She reports that she does not drink alcohol and does not use drugs.    Review of patient's allergies indicates:   Allergen Reactions    Doxycycline Other (See Comments)     Triggers headaches    Bactrim ds [sulfamethoxazole-trimethoprim] Nausea And Vomiting    Nsaids  "(non-steroidal anti-inflammatory drug) Other (See Comments)     D/t h/o PUD and H pylori    Codeine Rash       Medication List with Changes/Refills   Current Medications    FERROUS SULFATE 325 (65 FE) MG EC TABLET    Take 325 mg by mouth 3 (three) times daily with meals.    MULTIVITAMIN CAPSULE    Take 1 capsule by mouth once daily.    PRENATAL VIT 10-IRON-FOLIC-DHA (VITAFOL-OB+DHA) 65-1-250 MG CMPK    Take 1 tablet by mouth.    VITAMIN D (VITAMIN D3) 1000 UNITS TAB    Take 1,000 Units by mouth once daily.         Objective Findings:    Vital Signs:  Ht 5' 3.5" (1.613 m)   Wt 65.3 kg (144 lb)   LMP 10/10/2022 (Approximate)   Breastfeeding Yes   BMI 25.11 kg/m²   Body mass index is 25.11 kg/m².    Physical Exam:  General Appearance: Well appearing in no acute distress  Eyes:    No scleral icterus  ENT:  No lesions or masses   Lungs: CTA bilaterally, no wheezes, no rhonchi, no rales  Heart:  S1, S2 normal, no murmurs heard  Abdomen:  Non distended, soft, no guarding, no rebound, mild epigastric tenderness, no appreciated ascites, no bruits, no hepatosplenomegaly,  No CVA tenderness, no appreciated hernias, no Moore sign, no McBurney point tenderness  Musculoskeletal:  No major joint deformities  Skin: No petechiae or rash on exposed skin areas  Neurologic:  Alert and oriented x4  Psychiatric:  Normal speech mentation and affect    Labs:  Lab Results   Component Value Date    WBC 10.32 10/14/2022    HGB 14.2 10/14/2022    HCT 37 10/24/2022     10/14/2022    CHOL 204 (H) 10/29/2021    TRIG 46 10/29/2021    HDL 41 10/29/2021    ALT 17 10/27/2022    AST 18 10/27/2022     10/27/2022    K 4.5 10/27/2022     10/27/2022    CREATININE 0.9 10/27/2022    BUN 7 10/27/2022    CO2 24 10/27/2022    TSH 1.277 10/14/2022    INR 1.0 09/22/2021    HGBA1C 5.0 10/27/2022             Medical Decision Making:    EXAMINATION:  MRI ABDOMEN WITH AND WO_INC MRCP (XPD)     CLINICAL HISTORY:  Patient with right upper " quadrant pain epigastric pain radiating to the back with gallbladder sludge rule out common bile duct stone or pancreatic lesion;  Epigastric pain     TECHNIQUE:  Multisequence, multiplanar MRI of the abdomen performed per liver protocol before and after administration of 10 mL Gadavist intravenous contrast.  Additionally 2D and 3D MRCP sequences were performed as well as MIP images.     COMPARISON:  Ultrasound abdomen 10/20/2022     FINDINGS:  Lower thorax: No significant abnormality.     Liver: Normal homogeneous background signal.  No focal lesions.  Hepatic and portal veins are patent.     Biliary: Gallbladder is unremarkable.  No intrahepatic or extrahepatic biliary dilatation.  No focal filling defect.  MRCP is degraded by motion artifact.     Pancreas: Unremarkable.  No pancreatic ductal dilatation.     Spleen: Normal size.  No focal lesions.     Adrenal glands: Unremarkable.     Kidneys: Subcentimeter right renal cyst (14:51).  No hydronephrosis.     Miscellaneous: Visualized bowel loops are unremarkable.  No evidence for lymphadenopathy.  Marrow signal is unremarkable     Impression:     No biliary dilatation.  No focal filling defect to suggest choledocholithiasis allowing for motion artifact limitations.     No focal pancreatic lesion or pancreatic duct dilatation.     Electronically signed by resident: Bria Ndiaye  Date:                                            10/28/2022  Time:                                           10:45     Electronically signed by: Santos Carnes Jr  Date:                                            10/28/2022  Lab work reviewed  Ultrasound talk given  Potential for EGD talk given  Lab work talk given  Total time with patient reviewing prior medical records EGD colonoscopy  Taking history physical exam answering questions been about 30 minutes  With 50% of the time face-to-face in room with patient today   The Olympus scope PCF-H190DL ([id number]) was                          introduced through the anus and advanced to the                         cecum, identified by appendiceal orifice and                         ileocecal valve. The colonoscopy was performed                         without difficulty. The patient tolerated the                         procedure well. The quality of the bowel                         preparation was excellent. The terminal ileum,                         ileocecal valve, appendiceal orifice, and rectum                         were photographed.   Findings:        The perianal and digital rectal examinations were normal.        The colon (entire examined portion) appeared normal.        The terminal ileum appeared normal.   Impression:           - The entire examined colon is normal.                         - The examined portion of the ileum was normal.                         - No specimens collected.   Recommendation:       - Discharge patient to home.                         - Patient has a contact number available for                         emergencies. The signs and symptoms of potential                         delayed complications were discussed with the                         patient. Return to normal activities tomorrow.                         Written discharge instructions were provided to the                         patient.                         - Resume previous diet.                         - Continue present medications.                         - Return to referring physician.                         - Repeat colonoscopy at age 52 yo for screening                         purposes.   Attending Participation:        I personally performed the entire procedure.   Micki Gross MD   12/13/2018     The                         Olympus scope GIF- ([id number]) was introduced                         through the mouth, and advanced to the second part                         of duodenum. The upper GI endoscopy was                          accomplished without difficulty. The patient                         tolerated the procedure well.   Findings:        The examined esophagus was normal.        Esophagogastric landmarks were identified: the Z-line was found at        37 cm and the gastroesophageal junction was found at 37 cm from the        incisors.        The cardia and gastric fundus were normal on retroflexion.        Diffuse mild inflammation characterized by congestion (edema),        erythema and granularity was found in the gastric body.        The gastric antrum was normal.        The examined duodenum was normal.        Biopsies were taken with a cold forceps in the gastric body and in        the gastric antrum for histology.   Impression:           - Normal esophagus.                         - Esophagogastric landmarks identified.                         - Gastritis.                         - Normal antrum.                         - Normal examined duodenum.                         - Biopsies were taken with a cold forceps for                         histology in the gastric body and in the gastric                         antrum.   Recommendation:       - Discharge patient to home.                         - Patient has a contact number available for                         emergencies. The signs and symptoms of potential                         delayed complications were discussed with the                         patient. Return to normal activities tomorrow.                         Written discharge instructions were provided to the                         patient.                         - Resume previous diet.                         - Continue present medications.                         - Await pathology results.                         - Telephone GI clinic for pathology results in 1                         week.   Attending Participation:        I personally performed the entire procedure.   Greg Leach MD    10/30/2018    Ultrasound images personally reviewed by myself  EXAMINATION:  US ABDOMEN LIMITED     CLINICAL HISTORY:  Patient right upper quadrant pain please rule out gallstone;.     Right upper quadrant pain     TECHNIQUE:  Limited ultrasound of the right upper quadrant of the abdomen including pancreas, liver, gallbladder, common bile duct was performed.     COMPARISON:  Ultrasound 12/20/2018     FINDINGS:  Liver: Normal in size, measuring 12 cm. Diffusely increased parenchymal echogenicity suggestive for steatosis.  No focal hepatic lesions.     Gallbladder: Mild biliary sludge.  No calculi, wall thickening, or pericholecystic fluid.  No sonographic Moore's sign.     Biliary system: The common duct is not dilated, measuring 3 mm.  No intrahepatic ductal dilatation.     Spleen: Normal in size with a homogeneous echotexture, measuring 9.8 x 3.8 cm.     Pancreas: The visualized portions of pancreas appear normal.     Miscellaneous: No ascites.     Impression:     Mild gallbladder sludge.  No evidence of acute cholecystitis.     Findings suggestive for hepatic steatosis.        Electronically signed by: Yang Avery  Date:                                            10/20/2022  Assessment:  1. Hematochezia    2. Right upper quadrant pain         Recommendations:  1. CCK HIDA scan patient knows that she will have to pump and dump her breast milk per radiology's recommendations  2. EGD for right upper quadrant pain colonoscopy for hematochezia patient request urgent referral urgent referral to endoscopy schedule  3. Return GI clinic 4-8 weeks for follow-up sooner if symptoms worsen      Follow up in about 8 weeks (around 12/27/2022).      Order summary:  Orders Placed This Encounter    Ambulatory referral/consult to Endo Procedure          Thank you so much for allowing me to participate in the care of Laila Burks MD    EGD

## 2022-11-01 NOTE — Clinical Note
Rey patient would like to schedule her CCK HIDA scan orders placed also please schedule her for a telephone visit with endoscopy schedulers for colonoscopy referral for colonoscopy placed.  Return to GI clinic 6-8 weeks for follow-up NM Hepatobiliary (HIDA) W Pharm and Ejec [[id number]] (Order [id number]) Imaging Date and Time: 10/22/2022  2:00 PM Department: [facility] Rel By/Authorizing: Lamine Burks MD  [address] [place] [address] Phone: [phone] Fax: [phone]

## 2022-11-02 ENCOUNTER — TELEPHONE (OUTPATIENT)
Dept: ENDOSCOPY | Facility: HOSPITAL | Age: 32
End: 2022-11-02
Payer: COMMERCIAL

## 2022-11-02 ENCOUNTER — PATIENT MESSAGE (OUTPATIENT)
Dept: ENDOSCOPY | Facility: HOSPITAL | Age: 32
End: 2022-11-02
Payer: COMMERCIAL

## 2022-11-02 VITALS — BODY MASS INDEX: 25.52 KG/M2 | HEIGHT: 63 IN | WEIGHT: 144 LBS

## 2022-11-02 DIAGNOSIS — R10.11 RIGHT UPPER QUADRANT PAIN: ICD-10-CM

## 2022-11-02 DIAGNOSIS — Z12.11 SPECIAL SCREENING FOR MALIGNANT NEOPLASMS, COLON: ICD-10-CM

## 2022-11-02 DIAGNOSIS — K92.1 HEMATOCHEZIA: Primary | ICD-10-CM

## 2022-11-02 RX ORDER — POLYETHYLENE GLYCOL 3350, SODIUM SULFATE ANHYDROUS, SODIUM BICARBONATE, SODIUM CHLORIDE, POTASSIUM CHLORIDE 236; 22.74; 6.74; 5.86; 2.97 G/4L; G/4L; G/4L; G/4L; G/4L
4 POWDER, FOR SOLUTION ORAL ONCE
Qty: 4000 ML | Refills: 0 | Status: ON HOLD | OUTPATIENT
Start: 2022-11-02 | End: 2022-11-05 | Stop reason: HOSPADM

## 2022-11-02 NOTE — TELEPHONE ENCOUNTER
Called patient to schedule for a colonoscopy with Dr. Burks.  No answer. Left message for patient to call Endoscopy Scheduling. Phone number provided.

## 2022-11-05 ENCOUNTER — ANESTHESIA EVENT (OUTPATIENT)
Dept: ENDOSCOPY | Facility: HOSPITAL | Age: 32
End: 2022-11-05
Payer: COMMERCIAL

## 2022-11-05 ENCOUNTER — ANESTHESIA (OUTPATIENT)
Dept: ENDOSCOPY | Facility: HOSPITAL | Age: 32
End: 2022-11-05
Payer: COMMERCIAL

## 2022-11-05 ENCOUNTER — HOSPITAL ENCOUNTER (OUTPATIENT)
Facility: HOSPITAL | Age: 32
Discharge: HOME | End: 2022-11-05
Attending: INTERNAL MEDICINE | Admitting: INTERNAL MEDICINE
Payer: COMMERCIAL

## 2022-11-05 VITALS
TEMPERATURE: 98 F | HEART RATE: 84 BPM | SYSTOLIC BLOOD PRESSURE: 107 MMHG | RESPIRATION RATE: 18 BRPM | HEIGHT: 63 IN | BODY MASS INDEX: 25.52 KG/M2 | OXYGEN SATURATION: 98 % | WEIGHT: 144 LBS | DIASTOLIC BLOOD PRESSURE: 77 MMHG

## 2022-11-05 DIAGNOSIS — K92.1 HEMATOCHEZIA: Primary | ICD-10-CM

## 2022-11-05 LAB
B-HCG UR QL: NEGATIVE
CTP QC/QA: YES

## 2022-11-05 PROCEDURE — 88305 TISSUE EXAM BY PATHOLOGIST: ICD-10-PCS | Mod: 26,,, | Performed by: PATHOLOGY

## 2022-11-05 PROCEDURE — 88342 CHG IMMUNOCYTOCHEMISTRY: ICD-10-PCS | Mod: 26,,, | Performed by: PATHOLOGY

## 2022-11-05 PROCEDURE — 43239 EGD BIOPSY SINGLE/MULTIPLE: CPT | Mod: 51,,, | Performed by: INTERNAL MEDICINE

## 2022-11-05 PROCEDURE — 25000003 PHARM REV CODE 250: Performed by: INTERNAL MEDICINE

## 2022-11-05 PROCEDURE — 45380 PR COLONOSCOPY,BIOPSY: ICD-10-PCS | Mod: ,,, | Performed by: INTERNAL MEDICINE

## 2022-11-05 PROCEDURE — 81025 URINE PREGNANCY TEST: CPT | Performed by: INTERNAL MEDICINE

## 2022-11-05 PROCEDURE — 37000008 HC ANESTHESIA 1ST 15 MINUTES: Performed by: INTERNAL MEDICINE

## 2022-11-05 PROCEDURE — 88305 TISSUE EXAM BY PATHOLOGIST: CPT | Mod: 59 | Performed by: PATHOLOGY

## 2022-11-05 PROCEDURE — 43239 EGD BIOPSY SINGLE/MULTIPLE: CPT | Performed by: INTERNAL MEDICINE

## 2022-11-05 PROCEDURE — E9220 PRA ENDO ANESTHESIA: ICD-10-PCS | Mod: ,,, | Performed by: NURSE ANESTHETIST, CERTIFIED REGISTERED

## 2022-11-05 PROCEDURE — 88305 TISSUE EXAM BY PATHOLOGIST: CPT | Mod: 26,,, | Performed by: PATHOLOGY

## 2022-11-05 PROCEDURE — 27201012 HC FORCEPS, HOT/COLD, DISP: Performed by: INTERNAL MEDICINE

## 2022-11-05 PROCEDURE — 37000009 HC ANESTHESIA EA ADD 15 MINS: Performed by: INTERNAL MEDICINE

## 2022-11-05 PROCEDURE — 63600175 PHARM REV CODE 636 W HCPCS: Performed by: NURSE ANESTHETIST, CERTIFIED REGISTERED

## 2022-11-05 PROCEDURE — 45380 COLONOSCOPY AND BIOPSY: CPT | Performed by: INTERNAL MEDICINE

## 2022-11-05 PROCEDURE — E9220 PRA ENDO ANESTHESIA: HCPCS | Mod: ,,, | Performed by: NURSE ANESTHETIST, CERTIFIED REGISTERED

## 2022-11-05 PROCEDURE — 43239 PR EGD, FLEX, W/BIOPSY, SGL/MULTI: ICD-10-PCS | Mod: 51,,, | Performed by: INTERNAL MEDICINE

## 2022-11-05 PROCEDURE — 88342 IMHCHEM/IMCYTCHM 1ST ANTB: CPT | Mod: 26,,, | Performed by: PATHOLOGY

## 2022-11-05 PROCEDURE — 88342 IMHCHEM/IMCYTCHM 1ST ANTB: CPT | Performed by: PATHOLOGY

## 2022-11-05 PROCEDURE — 45380 COLONOSCOPY AND BIOPSY: CPT | Mod: ,,, | Performed by: INTERNAL MEDICINE

## 2022-11-05 RX ORDER — SODIUM CHLORIDE 9 MG/ML
INJECTION, SOLUTION INTRAVENOUS CONTINUOUS
Status: DISCONTINUED | OUTPATIENT
Start: 2022-11-05 | End: 2022-11-05 | Stop reason: HOSPADM

## 2022-11-05 RX ORDER — PROPOFOL 10 MG/ML
VIAL (ML) INTRAVENOUS CONTINUOUS PRN
Status: DISCONTINUED | OUTPATIENT
Start: 2022-11-05 | End: 2022-11-05

## 2022-11-05 RX ORDER — PROPOFOL 10 MG/ML
VIAL (ML) INTRAVENOUS
Status: DISCONTINUED | OUTPATIENT
Start: 2022-11-05 | End: 2022-11-05

## 2022-11-05 RX ORDER — LIDOCAINE HYDROCHLORIDE 20 MG/ML
INJECTION, SOLUTION EPIDURAL; INFILTRATION; INTRACAUDAL; PERINEURAL
Status: DISCONTINUED | OUTPATIENT
Start: 2022-11-05 | End: 2022-11-05

## 2022-11-05 RX ADMIN — PROPOFOL 100 MG: 10 INJECTION, EMULSION INTRAVENOUS at 08:11

## 2022-11-05 RX ADMIN — Medication 150 MCG/KG/MIN: at 08:11

## 2022-11-05 RX ADMIN — SODIUM CHLORIDE: 0.9 INJECTION, SOLUTION INTRAVENOUS at 08:11

## 2022-11-05 RX ADMIN — SODIUM CHLORIDE: 0.9 INJECTION, SOLUTION INTRAVENOUS at 07:11

## 2022-11-05 RX ADMIN — LIDOCAINE HYDROCHLORIDE 50 MG: 20 INJECTION, SOLUTION EPIDURAL; INFILTRATION; INTRACAUDAL; PERINEURAL at 08:11

## 2022-11-05 NOTE — INTERVAL H&P NOTE
The patient has been examined and the H&P has been reviewed:    Pre-Procedure H and P Addendum    Patient seen and examined.  History and exam unchanged from prior history and physical.      Procedure: EGD and colonoscopy   Indication: RUQ abdominal pain, history of H pylori, hematochezia   ASA Class: per anesthesiology  Airway: normal  Neck Mobility: full range of motion  Mallampatti score: per anesthesia  History of anesthesia problems: no  Family history of anesthesia problems: no  Anesthesia Plan: MAC

## 2022-11-05 NOTE — ANESTHESIA POSTPROCEDURE EVALUATION
Anesthesia Post Evaluation    Patient: Laila Pappas    Procedure(s) Performed: Procedure(s) (LRB):  EGD (ESOPHAGOGASTRODUODENOSCOPY) (N/A)  COLONOSCOPY (N/A)    Final Anesthesia Type: general      Patient location during evaluation: GI PACU  Patient participation: Yes- Able to Participate  Level of consciousness: awake and alert and oriented  Post-procedure vital signs: reviewed and stable  Pain management: adequate  Airway patency: patent    PONV status at discharge: No PONV  Anesthetic complications: no      Cardiovascular status: blood pressure returned to baseline and hemodynamically stable  Respiratory status: unassisted, spontaneous ventilation and room air  Hydration status: euvolemic  Follow-up not needed.          Vitals Value Taken Time   /77 11/05/22 1003   Temp  11/05/22 1241   Pulse 84 11/05/22 1003   Resp 18 11/05/22 1003   SpO2 98 % 11/05/22 1003         Event Time   Out of Recovery 10:07:27         Pain/Stefan Score: Stefan Score: 10 (11/5/2022  9:45 AM)

## 2022-11-05 NOTE — PROVATION PATIENT INSTRUCTIONS
Discharge Summary/Instructions after an Endoscopic Procedure  Patient Name: Laila Pappas  Patient MRN: [id number]  Patient YOB: 1990 Saturday, November 5, 2022  Greg Leach MD  Dear patient,  As a result of recent federal legislation (The Federal Cures Act), you may   receive lab or pathology results from your procedure in your [facility]   account before your physician is able to contact you. Your physician or   their representative will relay the results to you with their   recommendations at their soonest availability.  Thank you,  RESTRICTIONS:  During your procedure today, you received medications for sedation.  These   medications may affect your judgment, balance and coordination.  Therefore,   for 24 hours, you have the following restrictions:   - DO NOT drive a car, operate machinery, make legal/financial decisions,   sign important papers or drink alcohol.    ACTIVITY:  Today: no heavy lifting, straining or running due to procedural   sedation/anesthesia.  The following day: return to full activity including work.  DIET:  Eat and drink normally unless instructed otherwise.     TREATMENT FOR COMMON SIDE EFFECTS:  - Mild abdominal pain, nausea, belching, bloating or excessive gas:  rest,   eat lightly and use a heating pad.  - Sore Throat: treat with throat lozenges and/or gargle with warm salt   water.  - Because air was used during the procedure, expelling large amounts of air   from your rectum or belching is normal.  - If a bowel prep was taken, you may not have a bowel movement for 1-3 days.    This is normal.  SYMPTOMS TO WATCH FOR AND REPORT TO YOUR PHYSICIAN:  1. Abdominal pain or bloating, other than gas cramps.  2. Chest pain.  3. Back pain.  4. Signs of infection such as: chills or fever occurring within 24 hours   after the procedure.  5. Rectal bleeding, which would show as bright red, maroon, or black stools.   (A tablespoon of blood from the rectum is not serious, especially if    hemorrhoids are present.)  6. Vomiting.  7. Weakness or dizziness.  GO DIRECTLY TO THE NEAREST EMERGENCY ROOM IF YOU HAVE ANY OF THE FOLLOWING:      Difficulty breathing              Chills and/or fever over 101 F   Persistent vomiting and/or vomiting blood   Severe abdominal pain   Severe chest pain   Black, tarry stools   Bleeding- more than one tablespoon   Any other symptom or condition that you feel may need urgent attention  Your doctor recommends these additional instructions:  If any biopsies were taken, your doctors clinic will contact you in 1 to 2   weeks with any results.  - Discharge patient to home.   - Patient has a contact number available for emergencies.  The signs and   symptoms of potential delayed complications were discussed with the   patient.  Return to normal activities tomorrow.  Written discharge   instructions were provided to the patient.   - Resume previous diet.   - Continue present medications.   - Await pathology results.   - Repeat colonoscopy at age 46 for screening purposes.   - Return to referring physician.  For questions, problems or results please call your physician - Greg Leach MD at Work:  [phone].  [facility], EMERGENCY ROOM PHONE NUMBER: [phone]  IF A COMPLICATION OR EMERGENCY SITUATION ARISES AND YOU ARE UNABLE TO REACH   YOUR PHYSICIAN - GO DIRECTLY TO THE EMERGENCY ROOM.  Greg Leach MD  11/5/2022 9:25:56 AM  This report has been verified and signed electronically.  Dear patient,  As a result of recent federal legislation (The Federal Cures Act), you may   receive lab or pathology results from your procedure in your [facility]   account before your physician is able to contact you. Your physician or   their representative will relay the results to you with their   recommendations at their soonest availability.  Thank you,  PROVATION

## 2022-11-05 NOTE — TRANSFER OF CARE
"Anesthesia Transfer of Care Note    Patient: Laila Pappas    Procedure(s) Performed: Procedure(s) (LRB):  EGD (ESOPHAGOGASTRODUODENOSCOPY) (N/A)  COLONOSCOPY (N/A)    Patient location: PACU    Anesthesia Type: general    Transport from OR: Transported from OR on 2-3 L/min O2 by NC with adequate spontaneous ventilation    Post pain: adequate analgesia    Post assessment: no apparent anesthetic complications and tolerated procedure well    Post vital signs: stable    Level of consciousness: lethargic    Nausea/Vomiting: no nausea/vomiting    Complications: none    Transfer of care protocol was followed      Last vitals:   Visit Vitals  /74 (BP Location: Left arm, Patient Position: Lying)   Pulse (!) 118   Temp 36.7 °C (98.1 °F) (Temporal)   Resp 16   Ht 5' 3" (1.6 m)   Wt 65.3 kg (144 lb)   LMP 10/10/2022 (Approximate)   SpO2 100%   Breastfeeding Yes   BMI 25.51 kg/m²     "

## 2022-11-05 NOTE — ANESTHESIA PREPROCEDURE EVALUATION
11/05/2022  Laila Pappas is a 31 y.o., female.      Pre-op Assessment    I have reviewed the Patient Summary Reports.     I have reviewed the Nursing Notes. I have reviewed the NPO Status.   I have reviewed the Medications.     Review of Systems  Anesthesia Hx:  No problems with previous Anesthesia  History of prior surgery of interest to airway management or planning:  Denies Personal Hx of Anesthesia complications.   Social:  Non-Smoker, No Alcohol Use    Hematology/Oncology:     Oncology Normal    -- Anemia:   EENT/Dental:EENT/Dental Normal   Cardiovascular:   Hypertension  Denies Angina.  Denies CLARK.    Pulmonary:   Denies Shortness of breath.    Renal/:  Renal/ Normal     Hepatic/GI:   Bowel Prep. GERD RUQ pain; hematochezia   Musculoskeletal:  Musculoskeletal Normal    Neurological:   IIH   Endocrine:  Endocrine Normal    Dermatological:  Skin Normal    Psych:  Psychiatric Normal           Physical Exam  General: Well nourished, Cooperative, Alert and Oriented    Airway:  Mallampati: II   Mouth Opening: Normal  TM Distance: Normal  Tongue: Normal  Neck ROM: Normal ROM    Dental:  Intact    Chest/Lungs:  Normal Respiratory Rate    Heart:  Rate: Normal        Anesthesia Plan  Type of Anesthesia, risks & benefits discussed:    Anesthesia Type: Gen Natural Airway  Intra-op Monitoring Plan: Standard ASA Monitors  Post Op Pain Control Plan:   (medical reason for not using multimodal pain management)  Induction:  IV  Informed Consent: Informed consent signed with the Patient and all parties understand the risks and agree with anesthesia plan.  All questions answered.   ASA Score: 2  Day of Surgery Review of History & Physical: H&P Update referred to the surgeon/provider.    Ready For Surgery From Anesthesia Perspective.     .

## 2022-11-05 NOTE — PROVATION PATIENT INSTRUCTIONS
Discharge Summary/Instructions after an Endoscopic Procedure  Patient Name: Laila Pappas  Patient MRN: [id number]  Patient YOB: 1990 Saturday, November 5, 2022  Greg Leach MD  Dear patient,  As a result of recent federal legislation (The Federal Cures Act), you may   receive lab or pathology results from your procedure in your [facility]   account before your physician is able to contact you. Your physician or   their representative will relay the results to you with their   recommendations at their soonest availability.  Thank you,  RESTRICTIONS:  During your procedure today, you received medications for sedation.  These   medications may affect your judgment, balance and coordination.  Therefore,   for 24 hours, you have the following restrictions:   - DO NOT drive a car, operate machinery, make legal/financial decisions,   sign important papers or drink alcohol.    ACTIVITY:  Today: no heavy lifting, straining or running due to procedural   sedation/anesthesia.  The following day: return to full activity including work.  DIET:  Eat and drink normally unless instructed otherwise.     TREATMENT FOR COMMON SIDE EFFECTS:  - Mild abdominal pain, nausea, belching, bloating or excessive gas:  rest,   eat lightly and use a heating pad.  - Sore Throat: treat with throat lozenges and/or gargle with warm salt   water.  - Because air was used during the procedure, expelling large amounts of air   from your rectum or belching is normal.  - If a bowel prep was taken, you may not have a bowel movement for 1-3 days.    This is normal.  SYMPTOMS TO WATCH FOR AND REPORT TO YOUR PHYSICIAN:  1. Abdominal pain or bloating, other than gas cramps.  2. Chest pain.  3. Back pain.  4. Signs of infection such as: chills or fever occurring within 24 hours   after the procedure.  5. Rectal bleeding, which would show as bright red, maroon, or black stools.   (A tablespoon of blood from the rectum is not serious, especially if    hemorrhoids are present.)  6. Vomiting.  7. Weakness or dizziness.  GO DIRECTLY TO THE NEAREST EMERGENCY ROOM IF YOU HAVE ANY OF THE FOLLOWING:      Difficulty breathing              Chills and/or fever over 101 F   Persistent vomiting and/or vomiting blood   Severe abdominal pain   Severe chest pain   Black, tarry stools   Bleeding- more than one tablespoon   Any other symptom or condition that you feel may need urgent attention  Your doctor recommends these additional instructions:  If any biopsies were taken, your doctors clinic will contact you in 1 to 2   weeks with any results.  - Await pathology results.   - Perform a colonoscopy now.   - See colonoscopy report for further details.  For questions, problems or results please call your physician - Greg Leach MD at Work:  [phone].  [facility], EMERGENCY ROOM PHONE NUMBER: [phone]  IF A COMPLICATION OR EMERGENCY SITUATION ARISES AND YOU ARE UNABLE TO REACH   YOUR PHYSICIAN - GO DIRECTLY TO THE EMERGENCY ROOM.  Greg Leach MD  11/5/2022 9:03:00 AM  This report has been verified and signed electronically.  Dear patient,  As a result of recent federal legislation (The Federal Cures Act), you may   receive lab or pathology results from your procedure in your [facility]   account before your physician is able to contact you. Your physician or   their representative will relay the results to you with their   recommendations at their soonest availability.  Thank you,  PROVATION

## 2022-11-07 NOTE — PROGRESS NOTES
INTERNAL MEDICINE CLINIC - SAME DAY APPOINTMENT  Progress Note    PRESENTING HISTORY     PCP: Jhoana Trejo MD    Chief Complaint/Reason for Visit:   No chief complaint on file.      History of Present Illness & ROS : Ms. Laila Pappas is a 31 y.o. female.    Same day apt.   Chart reviewed.  Laila is here today with her supportive spouse and their new born daughter.  Very pleasant young lady. Having some expressed emotionally challenges with 'trying to understand why she is feeling uncomfortable and has been reading a lot online'. She is continuing to experience pain to the right side of abdomen, bowels are not moving very well, but no blood to stools, generalized numbness all over with joint discomforts (had before conceiving), US results, Urine test from the recent ER visit, eating a 'little' better now compared to shortly after the delivery per she and her spouse.   No chest pain or SOB. No headaches or dizziness. No diarrhea, N/V.   She is also concerned about 'easy bruising' that she has been experiencing since the birth of her daughter; was on ASA for an extended period of time, as part of her fertility regimen and stopped taking in August.   *11/5/2022: EGD and C Scope per Dr. Leach, colon polyp, otherwise unremarkable study.     Review of Systems:  Eyes: denies visual changes at this time denies floaters   ENT: no nasal congestion or sore throat  Respiratory: no cough or shorness of breath  Cardiovascular: no chest pain or palpitations  Gastrointestinal: no nausea or vomiting, no abdominal pain or change in bowel habits  Genitourinary: no hematuria or dysuria; denies frequency  Hematologic/Lymphatic: no easy bruising or lymphadenopathy  Neurological: no seizures or tremors  Endocrine: no heat or cold intolerance      PAST HISTORY:     Past Medical History:   Diagnosis Date    Bilateral knee pain 06/19/2018    Endometriosis     treated by GYN at     Epigastric abdominal pain 12/05/2016    Gastroesophageal  reflux disease 07/06/2016    Helicobacter pylori ab+     Helicobacter pylori ab+     High-tone pelvic floor dysfunction 05/02/2016    IIH (idiopathic intracranial hypertension)     Dr. Rogers - [facility]    Palpitations 07/06/2016    Pre-eclampsia in postpartum period 08/16/2022       Past Surgical History:   Procedure Laterality Date    ARTHROSCOPY OF KNEE Left 04/06/2021    Procedure: ARTHROSCOPY, KNEE;  Surgeon: Deidra Mejias MD;  Location: [facility] OR;  Service: Orthopedics;  Laterality: Left;    COLONOSCOPY N/A 12/13/2018    Procedure: COLONOSCOPY;  Surgeon: Micki Gross MD;  Location: [facility] (4TH FLR);  Service: Endoscopy;  Laterality: N/A;  preferrably in Dec 2018, CRS ok if needed.    COLONOSCOPY N/A 11/5/2022    Procedure: COLONOSCOPY;  Surgeon: Greg Leach MD;  Location: [facility] (4TH FLR);  Service: Endoscopy;  Laterality: N/A;  Endoscopy schedulers please schedule patient for colonoscopy with me for further evaluation of painless hematochezia thank you    ESOPHAGOGASTRODUODENOSCOPY N/A 10/30/2018    Procedure: EGD (ESOPHAGOGASTRODUODENOSCOPY);  Surgeon: Greg Leach MD;  Location: [facility] (2ND FLR);  Service: Endoscopy;  Laterality: N/A;  ok to schedule per Kimmy    ESOPHAGOGASTRODUODENOSCOPY N/A 11/5/2022    Procedure: EGD (ESOPHAGOGASTRODUODENOSCOPY);  Surgeon: Greg Leach MD;  Location: [facility] (4TH FLR);  Service: Endoscopy;  Laterality: N/A;  Endoscopy schedulers please schedule patient for EGD for evaluation of right upper quadrant pain thank you  prep instr portal--ml  11/2/22 room closed-pt okay with Dr. Leach-50 min case okay per Katlyn-ml    KNEE ARTHROSCOPY W/ DEBRIDEMENT  04/06/2021    Procedure: ARTHROSCOPY, KNEE, WITH DEBRIDEMENT;  Surgeon: Deidra Mejias MD;  Location: [facility] OR;  Service: Orthopedics;;    KNEE ARTHROSCOPY W/ MENISCECTOMY Right 04/06/2021    Procedure: ARTHROSCOPY, KNEE, WITH MENISCECTOMY;  Surgeon: Deidra Mejias MD;  Location: [facility] OR;  Service: Orthopedics;   Laterality: Right;    KNEE ARTHROSCOPY W/ PLICA EXCISION Left 2021    Procedure: EXCISION, PLICA, KNEE, ARTHROSCOPIC;  Surgeon: Deidra Mejias MD;  Location: [facility] OR;  Service: Orthopedics;  Laterality: Left;    LAPAROSCOPY  2017    dx with endo.  no removal    NO PAST SURGERIES      SYNOVECTOMY OF KNEE Left 2021    Procedure: SYNOVECTOMY, KNEE;  Surgeon: Deidra Mejias MD;  Location: [facility] OR;  Service: Orthopedics;  Laterality: Left;    UPPER GASTROINTESTINAL ENDOSCOPY         Family History   Problem Relation Age of Onset    Diabetes Mother     No Known Problems Father     No Known Problems Sister     Hypertension Brother     Hypertension Maternal Grandmother     Hypertension Maternal Grandfather     No Known Problems Paternal Grandmother     No Known Problems Paternal Grandfather     Colon cancer Neg Hx     Crohn's disease Neg Hx     Esophageal cancer Neg Hx     Inflammatory bowel disease Neg Hx     Irritable bowel syndrome Neg Hx     Rectal cancer Neg Hx     Stomach cancer Neg Hx     Ulcerative colitis Neg Hx     Celiac disease Neg Hx     Cirrhosis Neg Hx     Colon polyps Neg Hx     Liver cancer Neg Hx     Liver disease Neg Hx        Social History     Socioeconomic History    Marital status: Single   Occupational History     Employer: [employer]     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   Tobacco Use    Smoking status: Never    Smokeless tobacco: Never   Substance and Sexual Activity    Alcohol use: No    Drug use: No    Sexual activity: Yes     Partners: Male     Birth control/protection: None   Social History Narrative    She had her 1st baby in 2022 healthy vaginal delivery no      Social Determinants of Health     Financial Resource Strain: Unknown    Difficulty of Paying Living Expenses: Patient refused   Food Insecurity: Unknown    Worried About Running Out of Food in the Last Year: Patient refused    Ran Out of Food in the Last Year: Patient refused   Transportation  Needs: Unknown    Lack of Transportation (Medical): Patient refused    Lack of Transportation (Non-Medical): Patient refused   Physical Activity: Unknown    Days of Exercise per Week: Patient refused   Social Connections: Unknown    Frequency of Communication with Friends and Family: Patient refused    Frequency of Social Gatherings with Friends and Family: Patient refused    Active Member of Clubs or Organizations: Patient refused    Attends Club or Organization Meetings: Patient refused    Marital Status: Patient refused   Housing Stability: Unknown    Unable to Pay for Housing in the Last Year: Patient refused    Unstable Housing in the Last Year: Patient refused       MEDICATIONS & ALLERGIES:     Current Outpatient Medications on File Prior to Visit   Medication Sig Dispense Refill    ferrous sulfate 325 (65 FE) MG EC tablet Take 325 mg by mouth 3 (three) times daily with meals.      multivitamin capsule Take 1 capsule by mouth once daily.      [DISCONTINUED] folic acid (FOLVITE) 1 MG tablet Take 1 mg by mouth once daily.       No current facility-administered medications on file prior to visit.        Review of patient's allergies indicates:   Allergen Reactions    Doxycycline Other (See Comments)     Triggers headaches    Bactrim ds [sulfamethoxazole-trimethoprim] Nausea And Vomiting    Nsaids (non-steroidal anti-inflammatory drug) Other (See Comments)     D/t h/o PUD and H pylori    Codeine Rash       Medications Reconciliation:   I have reconciled the patient's home medications with the patient/family. I have updated all changes.  Refer to After-Visit Medication List.    OBJECTIVE:     Vital Signs:  There were no vitals filed for this visit.  Wt Readings from Last 3 Encounters:   11/05/22 0759 65.3 kg (144 lb)   11/02/22 1155 65.3 kg (144 lb)   11/01/22 1626 65.3 kg (144 lb)     There is no height or weight on file to calculate BMI.   Wt Readings from Last 3 Encounters:   11/10/22 66.4 kg (146 lb 6.2 oz)    11/05/22 65.3 kg (144 lb)   11/02/22 65.3 kg (144 lb)     Temp Readings from Last 3 Encounters:   11/05/22 98.1 °F (36.7 °C) (Temporal)   10/24/22 98.3 °F (36.8 °C) (Oral)   10/18/22 97.7 °F (36.5 °C) (Temporal)     BP Readings from Last 3 Encounters:   11/10/22 114/78   11/05/22 107/77   10/24/22 138/84     Pulse Readings from Last 3 Encounters:   11/10/22 87   11/05/22 84   10/24/22 92       Physical Exam:  General: Well developed, well nourished. No distress.  HEENT: Head is normocephalic, atraumatic  Eyes: Clear conjunctiva.  Neck: Supple, symmetrical neck; trachea midline.  Lungs: Clear to auscultation bilaterally and normal respiratory effort.  Cardiovascular: Heart with regular rate and rhythm. No murmurs, gallops or rubs  Extremities: No LE edema. Pulses 2+ and symmetric.   Abdomen: Abdomen is soft, non-tender non-distended with normal bowel sounds.  Skin: Skin color, texture, turgor normal. No rashes.  Musculoskeletal: Normal gait.   Neurologic: Normal strength and tone. No focal numbness or weakness.       Laboratory  Lab Results   Component Value Date    WBC 10.32 10/14/2022    HGB 14.2 10/14/2022    HCT 37 10/24/2022     10/14/2022    CHOL 204 (H) 10/29/2021    TRIG 46 10/29/2021    HDL 41 10/29/2021    ALT 17 10/27/2022    AST 18 10/27/2022     10/27/2022    K 4.5 10/27/2022     10/27/2022    CREATININE 0.9 10/27/2022    BUN 7 10/27/2022    CO2 24 10/27/2022    TSH 1.277 10/14/2022    INR 1.0 09/22/2021    HGBA1C 5.0 10/27/2022       ASSESSMENT & PLAN:   Same day appt.   Follow up    Bruising  -     CBC Auto Differential; Future; Expected date: 11/10/2022  -     COPPER, SERUM; Future; Expected date: 11/10/2022  -     PROTIME-INR; Future; Expected date: 11/10/2022  -     MIKE Screen w/Reflex; Future; Expected date: 11/10/2022    Hepatic steatosis      Multiple thyroid nodules  Normal serum thyroid levels  Pending US    Weight loss, unintentional  Arthralgia, unspecified  "joint  Neuropathy  -     MIKE Screen w/Reflex; Future; Expected date: 11/10/2022    Hematochezia:  Being followed by GI.   Last seen on 11/1 per Dr. Burks for Hematochezia and GI issues with wt changes and loss of appetite. Work up in progress.   *11/5/2022: EGD and C Scope per Dr. Leach, colon polyp, otherwise unremarkable study.   ` CCK HIDA pending  ` follow up rec'd per Dr. Burks ~ 12/27/2022.      ER visit with abnormal 'urine culture":   ` repeat UA and Culture    *Reassurance has been given to Laila today; labs and vast majority of her studies have yield no significant abnormalities. Will check some additional labs based on symptomatology and plan on seeing her back in 2-3 weeks.     Future Appointments   Date Time Provider Department Center   11/28/2022  7:30 AM [facility] NM4 MG2 400LB LIMIT [facility] NUCLEAR [address]   11/29/2022  2:00 PM MORGAN Sebastian [facility] IM [address] PCW   11/29/2022  2:45 PM [facility] OIC-US1 MASTER [facility] ULTR [facility]   12/2/2022  1:30 PM Beverly Arora MD [facility] IM [address] PCW   12/6/2022  4:30 PM Lamine Burks MD [facility] GASTRO [facility]   12/9/2022  9:00 AM Quique Tanner MD [facility] SPINE [address]   1/18/2023  8:40 AM Johny Wilson MD [facility]URO [facility]   2/7/2023  1:00 PM Meghan Bunn MD [facility] OBGYN [facility]        Medication List            Accurate as of November 10, 2022  8:34 PM. If you have any questions, ask your nurse or doctor.                CONTINUE taking these medications      ferrous sulfate 325 (65 FE) MG EC tablet     multivitamin capsule              Signing Physician:  MORGAN Cage      "

## 2022-11-08 ENCOUNTER — PATIENT MESSAGE (OUTPATIENT)
Dept: GASTROENTEROLOGY | Facility: CLINIC | Age: 32
End: 2022-11-08
Payer: COMMERCIAL

## 2022-11-10 ENCOUNTER — PATIENT MESSAGE (OUTPATIENT)
Dept: OBSTETRICS AND GYNECOLOGY | Facility: CLINIC | Age: 32
End: 2022-11-10
Payer: COMMERCIAL

## 2022-11-10 ENCOUNTER — LAB VISIT (OUTPATIENT)
Dept: LAB | Facility: HOSPITAL | Age: 32
End: 2022-11-10
Payer: COMMERCIAL

## 2022-11-10 ENCOUNTER — OFFICE VISIT (OUTPATIENT)
Dept: INTERNAL MEDICINE | Facility: CLINIC | Age: 32
End: 2022-11-10
Payer: COMMERCIAL

## 2022-11-10 VITALS
DIASTOLIC BLOOD PRESSURE: 78 MMHG | HEIGHT: 63 IN | SYSTOLIC BLOOD PRESSURE: 114 MMHG | OXYGEN SATURATION: 100 % | BODY MASS INDEX: 25.94 KG/M2 | HEART RATE: 87 BPM | WEIGHT: 146.38 LBS

## 2022-11-10 DIAGNOSIS — G62.9 NEUROPATHY: ICD-10-CM

## 2022-11-10 DIAGNOSIS — E04.2 MULTIPLE THYROID NODULES: ICD-10-CM

## 2022-11-10 DIAGNOSIS — R63.4 WEIGHT LOSS, UNINTENTIONAL: ICD-10-CM

## 2022-11-10 DIAGNOSIS — T14.8XXA BRUISING: Primary | ICD-10-CM

## 2022-11-10 DIAGNOSIS — T14.8XXA BRUISING: ICD-10-CM

## 2022-11-10 DIAGNOSIS — M25.50 ARTHRALGIA, UNSPECIFIED JOINT: ICD-10-CM

## 2022-11-10 DIAGNOSIS — R35.0 URINARY FREQUENCY: ICD-10-CM

## 2022-11-10 DIAGNOSIS — K76.0 HEPATIC STEATOSIS: ICD-10-CM

## 2022-11-10 LAB
BASOPHILS # BLD AUTO: 0.06 K/UL (ref 0–0.2)
BASOPHILS NFR BLD: 0.7 % (ref 0–1.9)
BILIRUB UR QL STRIP: NEGATIVE
CLARITY UR REFRACT.AUTO: ABNORMAL
COLOR UR AUTO: ABNORMAL
DIFFERENTIAL METHOD: ABNORMAL
EOSINOPHIL # BLD AUTO: 0.1 K/UL (ref 0–0.5)
EOSINOPHIL NFR BLD: 1.5 % (ref 0–8)
ERYTHROCYTE [DISTWIDTH] IN BLOOD BY AUTOMATED COUNT: 14.3 % (ref 11.5–14.5)
GLUCOSE UR QL STRIP: NEGATIVE
HCT VFR BLD AUTO: 42.7 % (ref 37–48.5)
HGB BLD-MCNC: 13.5 G/DL (ref 12–16)
HGB UR QL STRIP: NEGATIVE
IMM GRANULOCYTES # BLD AUTO: 0.02 K/UL (ref 0–0.04)
IMM GRANULOCYTES NFR BLD AUTO: 0.2 % (ref 0–0.5)
INR PPP: 1 (ref 0.8–1.2)
KETONES UR QL STRIP: NEGATIVE
LEUKOCYTE ESTERASE UR QL STRIP: NEGATIVE
LYMPHOCYTES # BLD AUTO: 2.9 K/UL (ref 1–4.8)
LYMPHOCYTES NFR BLD: 36.4 % (ref 18–48)
MCH RBC QN AUTO: 29.5 PG (ref 27–31)
MCHC RBC AUTO-ENTMCNC: 31.6 G/DL (ref 32–36)
MCV RBC AUTO: 93 FL (ref 82–98)
MONOCYTES # BLD AUTO: 0.4 K/UL (ref 0.3–1)
MONOCYTES NFR BLD: 5 % (ref 4–15)
NEUTROPHILS # BLD AUTO: 4.5 K/UL (ref 1.8–7.7)
NEUTROPHILS NFR BLD: 56.2 % (ref 38–73)
NITRITE UR QL STRIP: NEGATIVE
NRBC BLD-RTO: 0 /100 WBC
PH UR STRIP: 6 [PH] (ref 5–8)
PLATELET # BLD AUTO: 258 K/UL (ref 150–450)
PMV BLD AUTO: 11.4 FL (ref 9.2–12.9)
PROT UR QL STRIP: NEGATIVE
PROTHROMBIN TIME: 10.6 SEC (ref 9–12.5)
RBC # BLD AUTO: 4.57 M/UL (ref 4–5.4)
SP GR UR STRIP: 1.01 (ref 1–1.03)
URN SPEC COLLECT METH UR: ABNORMAL
WBC # BLD AUTO: 8.07 K/UL (ref 3.9–12.7)

## 2022-11-10 PROCEDURE — 82525 ASSAY OF COPPER: CPT | Performed by: NURSE PRACTITIONER

## 2022-11-10 PROCEDURE — 86235 NUCLEAR ANTIGEN ANTIBODY: CPT | Mod: 59 | Performed by: NURSE PRACTITIONER

## 2022-11-10 PROCEDURE — 86039 ANTINUCLEAR ANTIBODIES (ANA): CPT | Performed by: NURSE PRACTITIONER

## 2022-11-10 PROCEDURE — 85025 COMPLETE CBC W/AUTO DIFF WBC: CPT | Performed by: NURSE PRACTITIONER

## 2022-11-10 PROCEDURE — 99999 PR PBB SHADOW E&M-EST. PATIENT-LVL III: ICD-10-PCS | Mod: PBBFAC,,, | Performed by: NURSE PRACTITIONER

## 2022-11-10 PROCEDURE — 86038 ANTINUCLEAR ANTIBODIES: CPT | Performed by: NURSE PRACTITIONER

## 2022-11-10 PROCEDURE — 99214 OFFICE O/P EST MOD 30 MIN: CPT | Mod: S$GLB,,, | Performed by: NURSE PRACTITIONER

## 2022-11-10 PROCEDURE — 81003 URINALYSIS AUTO W/O SCOPE: CPT | Performed by: NURSE PRACTITIONER

## 2022-11-10 PROCEDURE — 99214 PR OFFICE/OUTPT VISIT, EST, LEVL IV, 30-39 MIN: ICD-10-PCS | Mod: S$GLB,,, | Performed by: NURSE PRACTITIONER

## 2022-11-10 PROCEDURE — 85610 PROTHROMBIN TIME: CPT | Performed by: NURSE PRACTITIONER

## 2022-11-10 PROCEDURE — 36415 COLL VENOUS BLD VENIPUNCTURE: CPT | Performed by: NURSE PRACTITIONER

## 2022-11-10 PROCEDURE — 99999 PR PBB SHADOW E&M-EST. PATIENT-LVL III: CPT | Mod: PBBFAC,,, | Performed by: NURSE PRACTITIONER

## 2022-11-11 ENCOUNTER — PATIENT MESSAGE (OUTPATIENT)
Dept: NEUROLOGY | Facility: CLINIC | Age: 32
End: 2022-11-11
Payer: COMMERCIAL

## 2022-11-11 LAB
ANA PATTERN 1: NORMAL
ANA PATTERN 2: NORMAL
ANA SER QL IF: POSITIVE
ANA TITER 2: NORMAL
ANA TITR SER IF: NORMAL {TITER}

## 2022-11-14 ENCOUNTER — TELEPHONE (OUTPATIENT)
Dept: INTERNAL MEDICINE | Facility: CLINIC | Age: 32
End: 2022-11-14
Payer: COMMERCIAL

## 2022-11-14 ENCOUNTER — OFFICE VISIT (OUTPATIENT)
Dept: NEUROLOGY | Facility: CLINIC | Age: 32
End: 2022-11-14
Payer: COMMERCIAL

## 2022-11-14 ENCOUNTER — OFFICE VISIT (OUTPATIENT)
Dept: SPORTS MEDICINE | Facility: CLINIC | Age: 32
End: 2022-11-14
Payer: COMMERCIAL

## 2022-11-14 ENCOUNTER — PATIENT MESSAGE (OUTPATIENT)
Dept: INTERNAL MEDICINE | Facility: CLINIC | Age: 32
End: 2022-11-14
Payer: COMMERCIAL

## 2022-11-14 ENCOUNTER — HOSPITAL ENCOUNTER (OUTPATIENT)
Dept: RADIOLOGY | Facility: HOSPITAL | Age: 32
Discharge: HOME | End: 2022-11-14
Attending: ORTHOPAEDIC SURGERY
Payer: COMMERCIAL

## 2022-11-14 VITALS
HEART RATE: 98 BPM | HEIGHT: 63 IN | SYSTOLIC BLOOD PRESSURE: 107 MMHG | BODY MASS INDEX: 25.69 KG/M2 | DIASTOLIC BLOOD PRESSURE: 76 MMHG | WEIGHT: 145 LBS

## 2022-11-14 VITALS
OXYGEN SATURATION: 99 % | WEIGHT: 145.5 LBS | DIASTOLIC BLOOD PRESSURE: 71 MMHG | BODY MASS INDEX: 25.78 KG/M2 | HEART RATE: 86 BPM | SYSTOLIC BLOOD PRESSURE: 119 MMHG | HEIGHT: 63 IN

## 2022-11-14 DIAGNOSIS — M25.552 BILATERAL HIP PAIN: Primary | ICD-10-CM

## 2022-11-14 DIAGNOSIS — M25.552 BILATERAL HIP PAIN: ICD-10-CM

## 2022-11-14 DIAGNOSIS — R76.8 POSITIVE ANA (ANTINUCLEAR ANTIBODY): Primary | ICD-10-CM

## 2022-11-14 DIAGNOSIS — M25.551 BILATERAL HIP PAIN: Primary | ICD-10-CM

## 2022-11-14 DIAGNOSIS — R20.0 NUMBNESS: Primary | ICD-10-CM

## 2022-11-14 DIAGNOSIS — M25.551 BILATERAL HIP PAIN: ICD-10-CM

## 2022-11-14 LAB — COPPER SERPL-MCNC: 766 UG/L (ref 810–1990)

## 2022-11-14 PROCEDURE — 73523 X-RAY EXAM HIPS BI 5/> VIEWS: CPT | Mod: 26,,, | Performed by: RADIOLOGY

## 2022-11-14 PROCEDURE — 99213 PR OFFICE/OUTPT VISIT, EST, LEVL III, 20-29 MIN: ICD-10-PCS | Mod: S$GLB,,, | Performed by: ORTHOPAEDIC SURGERY

## 2022-11-14 PROCEDURE — 99999 PR PBB SHADOW E&M-EST. PATIENT-LVL III: ICD-10-PCS | Mod: PBBFAC,,, | Performed by: NEUROLOGICAL SURGERY

## 2022-11-14 PROCEDURE — 99214 PR OFFICE/OUTPT VISIT, EST, LEVL IV, 30-39 MIN: ICD-10-PCS | Mod: S$GLB,,, | Performed by: NEUROLOGICAL SURGERY

## 2022-11-14 PROCEDURE — 99999 PR PBB SHADOW E&M-EST. PATIENT-LVL III: ICD-10-PCS | Mod: PBBFAC,,, | Performed by: ORTHOPAEDIC SURGERY

## 2022-11-14 PROCEDURE — 99999 PR PBB SHADOW E&M-EST. PATIENT-LVL III: CPT | Mod: PBBFAC,,, | Performed by: ORTHOPAEDIC SURGERY

## 2022-11-14 PROCEDURE — 99214 OFFICE O/P EST MOD 30 MIN: CPT | Mod: S$GLB,,, | Performed by: NEUROLOGICAL SURGERY

## 2022-11-14 PROCEDURE — 99213 OFFICE O/P EST LOW 20 MIN: CPT | Mod: S$GLB,,, | Performed by: ORTHOPAEDIC SURGERY

## 2022-11-14 PROCEDURE — 99999 PR PBB SHADOW E&M-EST. PATIENT-LVL III: CPT | Mod: PBBFAC,,, | Performed by: NEUROLOGICAL SURGERY

## 2022-11-14 PROCEDURE — 73523 X-RAY EXAM HIPS BI 5/> VIEWS: CPT | Mod: TC

## 2022-11-14 PROCEDURE — 73523 XR HIP 5 OR MORE VIEWS BILATERAL: ICD-10-PCS | Mod: 26,,, | Performed by: RADIOLOGY

## 2022-11-14 NOTE — PROGRESS NOTES
Chief Complaint   Patient presents with    Numbness          Laila Pappas is a 31 y.o. female with a history of multiple medical diagnoses as listed below that presents for evaluation of headaches at a suspected to be due to idiopathic intracranial hypertension.  She has been having abnormal sensations that prompted EMG/nerve conduction studies which were essentially unremarkable.  She has been having pulsatile tinnitus and has been headaches that have been intractable.  Workup thus far has included evaluation by Ophthalmology which has been unremarkable, MRV which showed changes which could be compatible with idiopathic intracranial hypertension, but has not had a lumbar puncture to this point.  She says that she White to discuss all options for treatment and management.  She has recently got  and wants to begin her family planning.  She says that she would like to have at least 3 children and would like to start this process in the very near future as long as there are no contraindications from a health standpoint.    Interval History  04/30/2021  She has been accompanied to this visit by her .  She continues to have headaches have been pulsatile in nature but also has been having episodes of a pulsatile ringing sensation has more prominent in the left ear specially if she is lying on that side.  Headache has been increasing in intensity with Valsalva and with position specially she bends at the waist.  She is considered all treatment options including diuretics and discussing is shunt placement will be helpful.  She is somewhat apprehensive about either because she and her  or family cleaning it would like to avoid any medication that could cause any complications to pregnancy or any difficulty with conception.  She is also somewhat concerned about having a potential surgery to have shunt placed.    09/14/2021  She is beginning to undergo evaluation for IVF.  As part of her preprocedure  treatment strategy she was giving antibiotics.  After having a dose of doxycycline she began to have rapid intensification of her headaches and significant positional sensitivity associated with these headaches.  She says that she was given antibiotic once in the past that also brought about her similar symptoms.  She feels that her 1st headaches were shortly after being given the medication in the past.    02/02/2022  She is currently at 13 WGA. After her most recent LP headaches have been present on occasion but have been stable. She has not had any vision changes that have not been corrected by her glasses. Headaches have been tolerable, but she has taken Tylenol for at least one headache.    05/04/2022  Pregnancy has been going well. She contracted covid and has been dealing with an increase in her headache frequency since that time. There has not been any changes to her vision nor any significant deviation in her headache semiology.     07/20/2022  She continues to have difficulty with head pain.  She feels the headaches have been increasing although have not been completely intolerable this yet.  She does have significant pain still with.  Medications she has tried thus far on limited due to pregnancy.  She says that she has not had any significant changes her vision since she was last seen clinic.    10/17/2022  She continues to do very well.    11/14/2022  She is been concerned about persistent numbness along the left arm.  She is also been having intermittent numbness in the face and along thumb.  She is been having these episodes occur almost daily.  Episodes happened without any specific warning nor any provocation.  She is not been able to find any specific pattern.  The episodes can last for approximately an hour at a time, but she is unsure about the span where she would have the experience.  She is also had the numbness along the entire side of her body as well..    PAST MEDICAL HISTORY:  Past Medical  History:   Diagnosis Date    Bilateral knee pain 06/19/2018    Endometriosis     treated by GYN at     Epigastric abdominal pain 12/05/2016    Gastroesophageal reflux disease 07/06/2016    Helicobacter pylori ab+     Helicobacter pylori ab+     High-tone pelvic floor dysfunction 05/02/2016    IIH (idiopathic intracranial hypertension)     Dr. Rogers - [facility]    Palpitations 07/06/2016    Pre-eclampsia in postpartum period 08/16/2022       PAST SURGICAL HISTORY:  Past Surgical History:   Procedure Laterality Date    ARTHROSCOPY OF KNEE Left 04/06/2021    Procedure: ARTHROSCOPY, KNEE;  Surgeon: Deidra Mejias MD;  Location: [facility] OR;  Service: Orthopedics;  Laterality: Left;    COLONOSCOPY N/A 12/13/2018    Procedure: COLONOSCOPY;  Surgeon: Micki Gross MD;  Location: [facility] (4TH FLR);  Service: Endoscopy;  Laterality: N/A;  preferrably in Dec 2018, CRS ok if needed.    COLONOSCOPY N/A 11/5/2022    Procedure: COLONOSCOPY;  Surgeon: Greg Leach MD;  Location: [facility] (4TH FLR);  Service: Endoscopy;  Laterality: N/A;  Endoscopy schedulers please schedule patient for colonoscopy with me for further evaluation of painless hematochezia thank you    ESOPHAGOGASTRODUODENOSCOPY N/A 10/30/2018    Procedure: EGD (ESOPHAGOGASTRODUODENOSCOPY);  Surgeon: Greg Leach MD;  Location: [facility] (2ND FLR);  Service: Endoscopy;  Laterality: N/A;  ok to schedule per Kimmy    ESOPHAGOGASTRODUODENOSCOPY N/A 11/5/2022    Procedure: EGD (ESOPHAGOGASTRODUODENOSCOPY);  Surgeon: Greg Leach MD;  Location: [facility] (4TH FLR);  Service: Endoscopy;  Laterality: N/A;  Endoscopy schedulers please schedule patient for EGD for evaluation of right upper quadrant pain thank you  prep instr portal--ml  11/2/22 room closed-pt okay with Dr. Leach-50 min case okay per Katlyn-ml    KNEE ARTHROSCOPY W/ DEBRIDEMENT  04/06/2021    Procedure: ARTHROSCOPY, KNEE, WITH DEBRIDEMENT;  Surgeon: Deidra Mejias MD;  Location: [facility] OR;  Service:  Orthopedics;;    KNEE ARTHROSCOPY W/ MENISCECTOMY Right 2021    Procedure: ARTHROSCOPY, KNEE, WITH MENISCECTOMY;  Surgeon: Deidra Mejias MD;  Location: [facility] OR;  Service: Orthopedics;  Laterality: Right;    KNEE ARTHROSCOPY W/ PLICA EXCISION Left 2021    Procedure: EXCISION, PLICA, KNEE, ARTHROSCOPIC;  Surgeon: Deidra Mejias MD;  Location: [facility] OR;  Service: Orthopedics;  Laterality: Left;    LAPAROSCOPY  2017    dx with endo.  no removal    NO PAST SURGERIES      SYNOVECTOMY OF KNEE Left 2021    Procedure: SYNOVECTOMY, KNEE;  Surgeon: Deidra Mejias MD;  Location: [facility] OR;  Service: Orthopedics;  Laterality: Left;    UPPER GASTROINTESTINAL ENDOSCOPY         SOCIAL HISTORY:  Social History     Socioeconomic History    Marital status: Single   Occupational History     Employer: [employer]     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   Tobacco Use    Smoking status: Never    Smokeless tobacco: Never   Substance and Sexual Activity    Alcohol use: No    Drug use: No    Sexual activity: Yes     Partners: Male     Birth control/protection: None   Social History Narrative    She had her 1st baby in 2022 healthy vaginal delivery no      Social Determinants of Health     Financial Resource Strain: Unknown    Difficulty of Paying Living Expenses: Patient refused   Food Insecurity: Unknown    Worried About Running Out of Food in the Last Year: Patient refused    Ran Out of Food in the Last Year: Patient refused   Transportation Needs: Unknown    Lack of Transportation (Medical): Patient refused    Lack of Transportation (Non-Medical): Patient refused   Physical Activity: Unknown    Days of Exercise per Week: Patient refused   Social Connections: Unknown    Frequency of Communication with Friends and Family: Patient refused    Frequency of Social Gatherings with Friends and Family: Patient refused    Active Member of Clubs or Organizations: Patient refused    Attends Club or Organization  Meetings: Patient refused    Marital Status: Patient refused   Housing Stability: Unknown    Unable to Pay for Housing in the Last Year: Patient refused    Unstable Housing in the Last Year: Patient refused       FAMILY HISTORY:  Family History   Problem Relation Age of Onset    Diabetes Mother     No Known Problems Father     No Known Problems Sister     Hypertension Brother     Hypertension Maternal Grandmother     Hypertension Maternal Grandfather     No Known Problems Paternal Grandmother     No Known Problems Paternal Grandfather     Colon cancer Neg Hx     Crohn's disease Neg Hx     Esophageal cancer Neg Hx     Inflammatory bowel disease Neg Hx     Irritable bowel syndrome Neg Hx     Rectal cancer Neg Hx     Stomach cancer Neg Hx     Ulcerative colitis Neg Hx     Celiac disease Neg Hx     Cirrhosis Neg Hx     Colon polyps Neg Hx     Liver cancer Neg Hx     Liver disease Neg Hx        ALLERGIES AND MEDICATIONS: updated and reviewed.  Review of patient's allergies indicates:   Allergen Reactions    Doxycycline Other (See Comments)     Triggers headaches    Bactrim ds [sulfamethoxazole-trimethoprim] Nausea And Vomiting    Nsaids (non-steroidal anti-inflammatory drug) Other (See Comments)     D/t h/o PUD and H pylori    Codeine Rash     Current Outpatient Medications   Medication Sig Dispense Refill    ferrous sulfate 325 (65 FE) MG EC tablet Take 325 mg by mouth 3 (three) times daily with meals.      multivitamin capsule Take 1 capsule by mouth once daily.       No current facility-administered medications for this visit.       Review of Systems   Constitutional: Negative for activity change, appetite change, fever and unexpected weight change.   HENT: Negative for trouble swallowing and voice change.    Eyes: Negative for photophobia and visual disturbance.   Respiratory: Negative for apnea and shortness of breath.    Cardiovascular: Negative for chest pain and leg swelling.   Gastrointestinal: Negative for  constipation and nausea.   Genitourinary: Negative for difficulty urinating.   Musculoskeletal: Negative for back pain, gait problem and neck pain.   Skin: Negative for color change and pallor.   Neurological: Positive for headaches. Negative for dizziness, seizures, syncope, weakness and numbness.   Hematological: Negative for adenopathy.   Psychiatric/Behavioral: Negative for agitation, confusion and decreased concentration.       Neurologic Exam     Mental Status   Oriented to person, place, and time.   Registration: recalls 3 of 3 objects.   Attention: normal. Concentration: normal.   Speech: speech is normal   Level of consciousness: alert  Knowledge: good.     Cranial Nerves     CN II   Right visual field deficit: none  Left visual field deficit: none     CN III, IV, VI   Extraocular motions are normal.   Right pupil: Size: 3 mm. Shape: regular.   Left pupil: Size: 3 mm. Shape: regular.   CN III: no CN III palsy  CN VI: no CN VI palsy  Nystagmus: none   Diplopia: none  Ophthalmoparesis: none  Upgaze: normal  Downgaze: normal  Conjugate gaze: present    CN VII   Facial expression full, symmetric.   Right facial weakness: none  Left facial weakness: none    CN VIII   CN VIII normal.     CN XI   CN XI normal.     CN XII   CN XII normal.   Tongue deviation: none    Motor Exam   Muscle bulk: normal  Overall muscle tone: normal  Right arm tone: normal  Left arm tone: normal  Right leg tone: normal  Left leg tone: normal    Gait, Coordination, and Reflexes     Gait  Gait: normal    Coordination   Finger to nose coordination: normal    Tremor   Resting tremor: absent      Physical Exam  Vitals reviewed.   Constitutional:       Appearance: She is well-developed.   HENT:      Head: Normocephalic and atraumatic.   Eyes:      Extraocular Movements: EOM normal.   Pulmonary:      Effort: Pulmonary effort is normal. No respiratory distress.   Musculoskeletal:         General: Normal range of motion.      Cervical back:  "Normal range of motion.   Neurological:      Mental Status: She is alert and oriented to person, place, and time.      Coordination: Finger-Nose-Finger Test normal.      Gait: Gait is intact.   Psychiatric:         Speech: Speech normal.         Behavior: Behavior normal.         Thought Content: Thought content normal.         Vitals:    11/14/22 0922   BP: 119/71   BP Location: Left arm   Patient Position: Sitting   BP Method: Large (Automatic)   Pulse: 86   SpO2: 99%   Weight: 66 kg (145 lb 8.1 oz)   Height: 5' 3" (1.6 m)       Assessment & Plan:    Problem List Items Addressed This Visit    None  Visit Diagnoses       Numbness    -  Primary    Relevant Orders    MRI Cervical Spine Demyelinating W W/O Contrast    MRI Brain Demyelinating W W/O Contrast    Creatinine, Serum    Pregnancy, urine rapid          Questionable episodes of numbness could be related to a migrainous versus an epileptic phenomenon.  Patient will benefit from routine and likely long-term EEG to try to capture these events to better clarify the source.  She will be referred for long-term ambulatory video EEG after her ambulatory EEG to better delineate.    Follow-up: No follow-ups on file.    This note was done with the assistance of voice recognition software. Some errors may be present after proofreading.                    "

## 2022-11-14 NOTE — PROGRESS NOTES
CC: left hip pain. Magalie did arthroscopic synovectomy of L knee 1.5yrs ago.     HPI:   Laila Pappas is a pleasant 31 y.o. patient who reports to clinic with left hip pain. No trauma, no mech sxs/instabilty.   Recently gave birth to 3mo old baby girl (healthy) - vaginal  Had radicular pain (numbness) that would start in L glut and radiation down post leg to feet. Worse when laying on her L hip. Had to sleep in recliner most of pregnancy.  Pain resolved for 1 month after pregnancy but returned  Depending on side she is sleeping on it can transition to her R side w similar symptoms  Denies any weakness. Symptoms are constant and is not positional.   Has baseline back pain that hasn't changed.     Today the patient rates pain at a 3/10 on visual analog scale.      Affecting ADLs and exercising    Saw Dr. Malhotra recently after L spine xrays and recommended PT, but presents to our clinic to address her hip prior to starting her PT    SANE: 60    Review of Systems   Constitution: Negative. Negative for chills, fever and night sweats.   HENT: Negative for congestion and headaches.    Eyes: Negative for blurred vision, left vision loss and right vision loss.   Cardiovascular: Negative for chest pain and syncope.   Respiratory: Negative for cough and shortness of breath.    Endocrine: Negative for polydipsia, polyphagia and polyuria.   Hematologic/Lymphatic: Negative for bleeding problem. Does not bruise/bleed easily.   Skin: Negative for dry skin, itching and rash.   Musculoskeletal: Negative for falls and muscle weakness.   Gastrointestinal: Negative for abdominal pain and bowel incontinence.   Genitourinary: Negative for bladder incontinence and nocturia.   Neurological: Negative for disturbances in coordination, loss of balance and seizures.   Psychiatric/Behavioral: Negative for depression. The patient does not have insomnia.    Allergic/Immunologic: Negative for hives and persistent infections.     PAST MEDICAL HISTORY:    Past Medical History:   Diagnosis Date    Bilateral knee pain 06/19/2018    Endometriosis     treated by GYN at     Epigastric abdominal pain 12/05/2016    Gastroesophageal reflux disease 07/06/2016    Helicobacter pylori ab+     Helicobacter pylori ab+     High-tone pelvic floor dysfunction 05/02/2016    IIH (idiopathic intracranial hypertension)     Dr. Rogers - [facility]    Palpitations 07/06/2016    Pre-eclampsia in postpartum period 08/16/2022     PAST SURGICAL HISTORY:   Past Surgical History:   Procedure Laterality Date    ARTHROSCOPY OF KNEE Left 04/06/2021    Procedure: ARTHROSCOPY, KNEE;  Surgeon: Deidra Mejias MD;  Location: [facility] OR;  Service: Orthopedics;  Laterality: Left;    COLONOSCOPY N/A 12/13/2018    Procedure: COLONOSCOPY;  Surgeon: Micki Gross MD;  Location: [facility] (4TH FLR);  Service: Endoscopy;  Laterality: N/A;  preferrably in Dec 2018, CRS ok if needed.    COLONOSCOPY N/A 11/5/2022    Procedure: COLONOSCOPY;  Surgeon: Greg Leach MD;  Location: [facility] (4TH FLR);  Service: Endoscopy;  Laterality: N/A;  Endoscopy schedulers please schedule patient for colonoscopy with me for further evaluation of painless hematochezia thank you    ESOPHAGOGASTRODUODENOSCOPY N/A 10/30/2018    Procedure: EGD (ESOPHAGOGASTRODUODENOSCOPY);  Surgeon: Greg Leach MD;  Location: [facility] (2ND FLR);  Service: Endoscopy;  Laterality: N/A;  ok to schedule per Kimmy    ESOPHAGOGASTRODUODENOSCOPY N/A 11/5/2022    Procedure: EGD (ESOPHAGOGASTRODUODENOSCOPY);  Surgeon: Greg Leach MD;  Location: [facility] (4TH FLR);  Service: Endoscopy;  Laterality: N/A;  Endoscopy schedulers please schedule patient for EGD for evaluation of right upper quadrant pain thank you  prep instr portal--ml  11/2/22 room closed-pt okay with Dr. Leach-50 min case okay per Katlyn-ml    KNEE ARTHROSCOPY W/ DEBRIDEMENT  04/06/2021    Procedure: ARTHROSCOPY, KNEE, WITH DEBRIDEMENT;  Surgeon: Deidra Mejias MD;  Location: [facility] OR;   Service: Orthopedics;;    KNEE ARTHROSCOPY W/ MENISCECTOMY Right 2021    Procedure: ARTHROSCOPY, KNEE, WITH MENISCECTOMY;  Surgeon: Deidra Mejias MD;  Location: [facility] OR;  Service: Orthopedics;  Laterality: Right;    KNEE ARTHROSCOPY W/ PLICA EXCISION Left 2021    Procedure: EXCISION, PLICA, KNEE, ARTHROSCOPIC;  Surgeon: Deidra Mejias MD;  Location: [facility] OR;  Service: Orthopedics;  Laterality: Left;    LAPAROSCOPY  2017    dx with endo.  no removal    NO PAST SURGERIES      SYNOVECTOMY OF KNEE Left 2021    Procedure: SYNOVECTOMY, KNEE;  Surgeon: Deidra Mejias MD;  Location: [facility] OR;  Service: Orthopedics;  Laterality: Left;    UPPER GASTROINTESTINAL ENDOSCOPY       FAMILY HISTORY:   Family History   Problem Relation Age of Onset    Diabetes Mother     No Known Problems Father     No Known Problems Sister     Hypertension Brother     Hypertension Maternal Grandmother     Hypertension Maternal Grandfather     No Known Problems Paternal Grandmother     No Known Problems Paternal Grandfather     Colon cancer Neg Hx     Crohn's disease Neg Hx     Esophageal cancer Neg Hx     Inflammatory bowel disease Neg Hx     Irritable bowel syndrome Neg Hx     Rectal cancer Neg Hx     Stomach cancer Neg Hx     Ulcerative colitis Neg Hx     Celiac disease Neg Hx     Cirrhosis Neg Hx     Colon polyps Neg Hx     Liver cancer Neg Hx     Liver disease Neg Hx      SOCIAL HISTORY:   Social History     Socioeconomic History    Marital status: Single   Occupational History     Employer: [employer]     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   Tobacco Use    Smoking status: Never    Smokeless tobacco: Never   Substance and Sexual Activity    Alcohol use: No    Drug use: No    Sexual activity: Yes     Partners: Male     Birth control/protection: None   Social History Narrative    She had her 1st baby in 2022 healthy vaginal delivery no      Social Determinants of Health     Financial Resource Strain:  "Unknown    Difficulty of Paying Living Expenses: Patient refused   Food Insecurity: Unknown    Worried About Running Out of Food in the Last Year: Patient refused    Ran Out of Food in the Last Year: Patient refused   Transportation Needs: Unknown    Lack of Transportation (Medical): Patient refused    Lack of Transportation (Non-Medical): Patient refused   Physical Activity: Unknown    Days of Exercise per Week: Patient refused   Social Connections: Unknown    Frequency of Communication with Friends and Family: Patient refused    Frequency of Social Gatherings with Friends and Family: Patient refused    Active Member of Clubs or Organizations: Patient refused    Attends Club or Organization Meetings: Patient refused    Marital Status: Patient refused   Housing Stability: Unknown    Unable to Pay for Housing in the Last Year: Patient refused    Unstable Housing in the Last Year: Patient refused       MEDICATIONS:   Current Outpatient Medications:     ferrous sulfate 325 (65 FE) MG EC tablet, Take 325 mg by mouth 3 (three) times daily with meals., Disp: , Rfl:     multivitamin capsule, Take 1 capsule by mouth once daily., Disp: , Rfl:   ALLERGIES:   Review of patient's allergies indicates:   Allergen Reactions    Doxycycline Other (See Comments)     Triggers headaches    Bactrim ds [sulfamethoxazole-trimethoprim] Nausea And Vomiting    Nsaids (non-steroidal anti-inflammatory drug) Other (See Comments)     D/t h/o PUD and H pylori    Codeine Rash       VITAL SIGNS: /76 (BP Location: Right arm, Patient Position: Sitting, BP Method: Large (Automatic))   Pulse 98   Ht 5' 3" (1.6 m)   Wt 65.8 kg (145 lb)   Breastfeeding Yes   BMI 25.69 kg/m²        PHYSICAL EXAM /  HIP  PHYSICAL EXAMINATION  General:  The patient is alert and oriented x 3.  Mood is pleasant.  Observation of ears, eyes and nose reveal no gross abnormalities.  HEENT: NCAT, sclera nonicteric  Lungs: Respirations are equal and unlabored..    left " HIP EXAMINATION     OBSERVATION / INSPECTION  Gait:   Nonantalgic   Alignment:  Neutral   Scars:   None   Muscle atrophy: None   Effusion:  None   Warmth:  None   Discoloration:   None   Leg lengths:   Equal   Pelvis:   Level     TENDERNESS / CREPITUS (T/C):      T / C  Trochanteric bursa   - / -  Piriformis    + / -  SI joint    + / -  Psoas tendon   - / -  Rectus insertion  - / -  Adductor insertion  - / -  Pubic symphysis  - / -    ROM: (* = pain)    Flexion:    120 degrees  External rotation: 40 degrees  Internal rotation with axial load: 30 degrees  Internal rotation without axial load: 40 degrees  Abduction:  45 degrees  Adduction:   20 degrees    SPECIAL TESTS:  Pain w/ forced internal rotation (FADIR): -   Pain w/ forced external rotation (CAMPOS): +  Circumduction test:    -  Stinchfield test:    Negative   Log roll:      Negative   Snapping hip (internal):   Negative   Sit-up pain:     Negative   Resisted sit-up pain:    Negative   Resisted sit-up with adductor contraction pain:  Negative   Step-down test:    +  Trendelenburg test:    Negative  Bridge test     +     EXTREMITY NEURO-VASCULAR EXAMINATION:   Sensation:  Grossly intact to light touch all dermatomal regions.   Motor Function:  Fully intact motor function at hip, knee, foot and ankle    DTRs;  quadriceps and  achilles 2+.  No clonus and downgoing Babinski.    Vascular status:  DP and PT pulses 2+, brisk capillary refill, symmetric.    Skin:  intact, compartments soft.    OTHER FINDINGS:      XRAYS reviewed and interpreted personally by me:  CE angle: 34  Crossover: Neg  CAM: +  Joint space narrowing: none  Tonnis: 4    ASSESSMENT:    1. Low back pain with LLE radiculopathy likely from L SIJ inflammation postpartum.    PLAN:  1. PT for low back pain w LLE radiculopathy ordered by Dr. Malhotra.  2. NSAIDS prn, but defer to discussion w her OBGYN whether can be used given she is breastfeeding  3. Recommended that if in the event her symptoms do not  improve for her to schedule f/u w Dr. Malhotra as he would be the most appropriate place to assist with her symptoms as I do not think her primary problem based on imaging and her exam is coming from her hip.   4. All questions were answered, pt will contact us for questions or concerns in the interim.  5. F/u PRN

## 2022-11-14 NOTE — TELEPHONE ENCOUNTER
----- Message from MORGAN Sebastian sent at 11/14/2022  8:01 AM CST -----  Bryan Parmar,   Can you see what may have happened to the urine culture?   The UA is done....    Thanks

## 2022-11-15 ENCOUNTER — PATIENT MESSAGE (OUTPATIENT)
Dept: LACTATION | Facility: CLINIC | Age: 32
End: 2022-11-15
Payer: COMMERCIAL

## 2022-11-15 ENCOUNTER — TELEPHONE (OUTPATIENT)
Dept: INTERNAL MEDICINE | Facility: CLINIC | Age: 32
End: 2022-11-15
Payer: COMMERCIAL

## 2022-11-15 ENCOUNTER — PATIENT MESSAGE (OUTPATIENT)
Dept: NEUROLOGY | Facility: HOSPITAL | Age: 32
End: 2022-11-15
Payer: COMMERCIAL

## 2022-11-15 DIAGNOSIS — R23.3 EASY BRUISING: Primary | ICD-10-CM

## 2022-11-15 LAB
ANTI SM ANTIBODY: 0.1 RATIO (ref 0–0.99)
ANTI SM/RNP ANTIBODY: 0.14 RATIO (ref 0–0.99)
ANTI-SM INTERPRETATION: NEGATIVE
ANTI-SM/RNP INTERPRETATION: NEGATIVE
ANTI-SSA ANTIBODY: 0.08 RATIO (ref 0–0.99)
ANTI-SSA INTERPRETATION: NEGATIVE
ANTI-SSB ANTIBODY: 0.04 RATIO (ref 0–0.99)
ANTI-SSB INTERPRETATION: NEGATIVE
DSDNA AB SER-ACNC: NORMAL [IU]/ML

## 2022-11-16 ENCOUNTER — OFFICE VISIT (OUTPATIENT)
Dept: SPORTS MEDICINE | Facility: CLINIC | Age: 32
End: 2022-11-16
Payer: COMMERCIAL

## 2022-11-16 ENCOUNTER — HOSPITAL ENCOUNTER (OUTPATIENT)
Dept: RADIOLOGY | Facility: HOSPITAL | Age: 32
Discharge: HOME | End: 2022-11-16
Attending: PHYSICIAN ASSISTANT
Payer: COMMERCIAL

## 2022-11-16 VITALS
SYSTOLIC BLOOD PRESSURE: 117 MMHG | HEART RATE: 94 BPM | DIASTOLIC BLOOD PRESSURE: 77 MMHG | BODY MASS INDEX: 26.05 KG/M2 | HEIGHT: 63 IN | WEIGHT: 147 LBS

## 2022-11-16 DIAGNOSIS — M25.512 CHRONIC LEFT SHOULDER PAIN: Primary | ICD-10-CM

## 2022-11-16 DIAGNOSIS — M25.512 LEFT SHOULDER PAIN, UNSPECIFIED CHRONICITY: ICD-10-CM

## 2022-11-16 DIAGNOSIS — G89.29 CHRONIC LEFT SHOULDER PAIN: Primary | ICD-10-CM

## 2022-11-16 PROCEDURE — 99214 PR OFFICE/OUTPT VISIT, EST, LEVL IV, 30-39 MIN: ICD-10-PCS | Mod: S$GLB,,, | Performed by: PHYSICIAN ASSISTANT

## 2022-11-16 PROCEDURE — 73030 X-RAY EXAM OF SHOULDER: CPT | Mod: TC,LT

## 2022-11-16 PROCEDURE — 99214 OFFICE O/P EST MOD 30 MIN: CPT | Mod: S$GLB,,, | Performed by: PHYSICIAN ASSISTANT

## 2022-11-16 PROCEDURE — 73030 XR SHOULDER COMPLETE 2 OR MORE VIEWS LEFT: ICD-10-PCS | Mod: 26,LT,, | Performed by: RADIOLOGY

## 2022-11-16 PROCEDURE — 73030 X-RAY EXAM OF SHOULDER: CPT | Mod: 26,LT,, | Performed by: RADIOLOGY

## 2022-11-16 PROCEDURE — 99999 PR PBB SHADOW E&M-EST. PATIENT-LVL III: CPT | Mod: PBBFAC,,, | Performed by: PHYSICIAN ASSISTANT

## 2022-11-16 PROCEDURE — 99999 PR PBB SHADOW E&M-EST. PATIENT-LVL III: ICD-10-PCS | Mod: PBBFAC,,, | Performed by: PHYSICIAN ASSISTANT

## 2022-11-17 ENCOUNTER — PATIENT MESSAGE (OUTPATIENT)
Dept: SPORTS MEDICINE | Facility: CLINIC | Age: 32
End: 2022-11-17
Payer: COMMERCIAL

## 2022-11-17 NOTE — PROGRESS NOTES
CC: left shoulder pain     31 y.o. Female [employer], who reports that the pain is severe and not responding to any conservative care.      Pain has been ongoing for over 1 year. Shoulder pain is constant and located of the shoulder, upper arm, and scapula area. Sometimes the pain keeps her awake at night. She also reports diffuse intermittent numbness radiating down her arm to all of her fingers that is short lived. She has not done any PT for this issue.     RHD  SANE 70%    She also previously had a negative cervical spine MRI and left upper extremity EMG.    She reports that the pain is worse with overhead activity. It also bothers her at night.    Is affecting ADLs.     Review of Systems   Constitution: Negative. Negative for chills, fever and night sweats.   HENT: Negative for congestion and headaches.    Eyes: Negative for blurred vision, left vision loss and right vision loss.   Cardiovascular: Negative for chest pain and syncope.   Respiratory: Negative for cough and shortness of breath.    Endocrine: Negative for polydipsia, polyphagia and polyuria.   Hematologic/Lymphatic: Negative for bleeding problem. Does not bruise/bleed easily.   Skin: Negative for dry skin, itching and rash.   Musculoskeletal: Negative for falls and muscle weakness.   Gastrointestinal: Negative for abdominal pain and bowel incontinence.   Genitourinary: Negative for bladder incontinence and nocturia.   Neurological: Negative for disturbances in coordination, loss of balance and seizures.   Psychiatric/Behavioral: Negative for depression. The patient does not have insomnia.    Allergic/Immunologic: Negative for hives and persistent infections.     PAST MEDICAL HISTORY:   Past Medical History:   Diagnosis Date    Bilateral knee pain 06/19/2018    Endometriosis     treated by GYN at     Epigastric abdominal pain 12/05/2016    Gastroesophageal reflux disease 07/06/2016    Helicobacter pylori ab+     Helicobacter pylori ab+      High-tone pelvic floor dysfunction 05/02/2016    IIH (idiopathic intracranial hypertension)     Dr. Rogers - [facility]    Palpitations 07/06/2016    Pre-eclampsia in postpartum period 08/16/2022     PAST SURGICAL HISTORY:   Past Surgical History:   Procedure Laterality Date    ARTHROSCOPY OF KNEE Left 04/06/2021    Procedure: ARTHROSCOPY, KNEE;  Surgeon: Deidra Mejias MD;  Location: [facility] OR;  Service: Orthopedics;  Laterality: Left;    COLONOSCOPY N/A 12/13/2018    Procedure: COLONOSCOPY;  Surgeon: Micki Gross MD;  Location: [facility] (4TH FLR);  Service: Endoscopy;  Laterality: N/A;  preferrably in Dec 2018, CRS ok if needed.    COLONOSCOPY N/A 11/5/2022    Procedure: COLONOSCOPY;  Surgeon: Greg Leach MD;  Location: [facility] (4TH FLR);  Service: Endoscopy;  Laterality: N/A;  Endoscopy schedulers please schedule patient for colonoscopy with me for further evaluation of painless hematochezia thank you    ESOPHAGOGASTRODUODENOSCOPY N/A 10/30/2018    Procedure: EGD (ESOPHAGOGASTRODUODENOSCOPY);  Surgeon: Greg Leach MD;  Location: [facility] (2ND FLR);  Service: Endoscopy;  Laterality: N/A;  ok to schedule per Kimmy    ESOPHAGOGASTRODUODENOSCOPY N/A 11/5/2022    Procedure: EGD (ESOPHAGOGASTRODUODENOSCOPY);  Surgeon: Greg Leach MD;  Location: [facility] (4TH FLR);  Service: Endoscopy;  Laterality: N/A;  Endoscopy schedulers please schedule patient for EGD for evaluation of right upper quadrant pain thank you  prep instr portal--ml  11/2/22 room closed-pt okay with Dr. Leach-50 min case okay per Katlyn-ml    KNEE ARTHROSCOPY W/ DEBRIDEMENT  04/06/2021    Procedure: ARTHROSCOPY, KNEE, WITH DEBRIDEMENT;  Surgeon: Deidra Mejias MD;  Location: [facility] OR;  Service: Orthopedics;;    KNEE ARTHROSCOPY W/ MENISCECTOMY Right 04/06/2021    Procedure: ARTHROSCOPY, KNEE, WITH MENISCECTOMY;  Surgeon: Deidra Mejias MD;  Location: [facility] OR;  Service: Orthopedics;  Laterality: Right;    KNEE ARTHROSCOPY W/ PLICA EXCISION  Left 2021    Procedure: EXCISION, PLICA, KNEE, ARTHROSCOPIC;  Surgeon: Deidra Mejias MD;  Location: [facility] OR;  Service: Orthopedics;  Laterality: Left;    LAPAROSCOPY  2017    dx with endo.  no removal    NO PAST SURGERIES      SYNOVECTOMY OF KNEE Left 2021    Procedure: SYNOVECTOMY, KNEE;  Surgeon: Deidra Mejias MD;  Location: [facility] OR;  Service: Orthopedics;  Laterality: Left;    UPPER GASTROINTESTINAL ENDOSCOPY       FAMILY HISTORY:   Family History   Problem Relation Age of Onset    Diabetes Mother     No Known Problems Father     No Known Problems Sister     Hypertension Brother     Hypertension Maternal Grandmother     Hypertension Maternal Grandfather     No Known Problems Paternal Grandmother     No Known Problems Paternal Grandfather     Colon cancer Neg Hx     Crohn's disease Neg Hx     Esophageal cancer Neg Hx     Inflammatory bowel disease Neg Hx     Irritable bowel syndrome Neg Hx     Rectal cancer Neg Hx     Stomach cancer Neg Hx     Ulcerative colitis Neg Hx     Celiac disease Neg Hx     Cirrhosis Neg Hx     Colon polyps Neg Hx     Liver cancer Neg Hx     Liver disease Neg Hx      SOCIAL HISTORY:   Social History     Socioeconomic History    Marital status: Single   Occupational History     Employer: [employer]     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   Tobacco Use    Smoking status: Never    Smokeless tobacco: Never   Substance and Sexual Activity    Alcohol use: No    Drug use: No    Sexual activity: Yes     Partners: Male     Birth control/protection: None   Social History Narrative    She had her 1st baby in 2022 healthy vaginal delivery no      Social Determinants of Health     Financial Resource Strain: Unknown    Difficulty of Paying Living Expenses: Patient refused   Food Insecurity: Unknown    Worried About Running Out of Food in the Last Year: Patient refused    Ran Out of Food in the Last Year: Patient refused   Transportation Needs: Unknown    Lack of  "Transportation (Medical): Patient refused    Lack of Transportation (Non-Medical): Patient refused   Physical Activity: Unknown    Days of Exercise per Week: Patient refused   Social Connections: Unknown    Frequency of Communication with Friends and Family: Patient refused    Frequency of Social Gatherings with Friends and Family: Patient refused    Active Member of Clubs or Organizations: Patient refused    Attends Club or Organization Meetings: Patient refused    Marital Status: Patient refused   Housing Stability: Unknown    Unable to Pay for Housing in the Last Year: Patient refused    Unstable Housing in the Last Year: Patient refused       MEDICATIONS:   Current Outpatient Medications:     ferrous sulfate 325 (65 FE) MG EC tablet, Take 325 mg by mouth 3 (three) times daily with meals., Disp: , Rfl:     multivitamin capsule, Take 1 capsule by mouth once daily., Disp: , Rfl:   ALLERGIES:   Review of patient's allergies indicates:   Allergen Reactions    Doxycycline Other (See Comments)     Triggers headaches    Bactrim ds [sulfamethoxazole-trimethoprim] Nausea And Vomiting    Nsaids (non-steroidal anti-inflammatory drug) Other (See Comments)     D/t h/o PUD and H pylori    Codeine Rash       VITAL SIGNS: /77   Pulse 94   Ht 5' 3" (1.6 m)   Wt 66.7 kg (147 lb)   BMI 26.04 kg/m²      PHYSICAL EXAMINATION:  General:  The patient is alert and oriented x 3.  Mood is pleasant.  Observation of ears, eyes and nose reveal no gross abnormalities.  No labored breathing observed.  Gait is coordinated. Patient can toe walk and heel walk without difficulty.      left SHOULDER / UPPER EXTREMITY EXAM    OBSERVATION:     Swelling  none  Deformity  none   Discoloration  none   Scapular winging none   Scars   none  Atrophy  none    TENDERNESS / CREPITUS (T/C):          T/C      T/C   Clavicle   -/-  SUPRAspinatus    -/-     AC Jt.    -/-  INFRAspinatus  -/-    SC Jt.    -/-  Deltoid    -/-      G. Tuberosity  -/-  LH " BICEP groove  +/-   Acromion:  -/-  Midline Neck   -/-     Scapular Spine -/-  Trapezium   -/-   SMA Scapula  -/-  GH jt. line - post  -/-     Scapulothoracic  -/-         ROM: (* = with pain)  Right shoulder   Left shoulder        AROM (PROM)   AROM (PROM)   FE    170° (175°)     170° (175°)     ER at 0°    60°  (65°)    60°  (65°)   ER at 90° ABD  90°  (90°)    90°  (90°)*   IR at 90°  ABD   NA  (40°)     NA  (40°)      IR (spine level)   T10     T10*    STRENGTH: (* = with pain) RIGHT SHOULDER  LEFT SHOULDER   SCAPTION   5/5    5/5    IR    5/5    5/5   ER    5/5    5/5   BICEPS   5/5    5/5   Deltoid    5/5    5/5     SIGNS:  Painful side       NEER   +    OJENNIFERS  neg    FELDMAN   neg    SPEEDS  neg     DROP ARM   neg   BELLY PRESS neg   Superior escape none    LIFT-OFF  neg   X-Body ADD    neg    MOVING VALGUS neg        STABILITY TESTING    RIGHT SHOULDER   LEFT SHOULDER     Translation     Anterior  up face     up face    Posterior  up face    up face    Sulcus   < 10mm    < 10 mm     Signs   Apprehension   neg      neg       Relocation   no change     no change      Jerk test  neg     neg    EXTREMITY NEURO-VASCULAR EXAM    Sensation grossly intact to light touch all dermatomal regions.    DTR 2+ Biceps, Triceps, BR and Negative Avas sign   Grossly intact motor function at Elbow, Wrist and Hand   Distal pulses radial and ulnar 2+, brisk cap refill, symmetric.      NECK:  Painless FROM and spinous processes non-tender.     + left Spurlings sign.      OTHER FINDINGS:  + scapular dyskinesia      XRAYS:  Shoulder 3 view series left,  were obtained and reviewed  No convincing fracture or dislocation is noted. The osseous structures appear well mineralized and well aligned      ASSESSMENT:   left:  1. Shoulder pain, chronic   2.   Neck pain  3.   Scapular dyskinesia      PLAN:    She is currently breastfeeding. OTC pain control with ice or heat.   Activity modifications discussed. Talked about  activities to avoid with a new baby.   3.  MRI to evaluate pathology.   PT for left neck and shoulder pain likely from scapular dyskinesia and poor shoulder mechanics. Eval and treat with HEP. She has previously had negative EMG and MRI for cervical radiculopathy  4. RTC in 8 weeks if no improvement.       All questions were answered, pt will contact us for questions or concerns in the interim.

## 2022-11-18 ENCOUNTER — OFFICE VISIT (OUTPATIENT)
Dept: HEMATOLOGY/ONCOLOGY | Facility: CLINIC | Age: 32
End: 2022-11-18
Payer: COMMERCIAL

## 2022-11-18 ENCOUNTER — PATIENT MESSAGE (OUTPATIENT)
Dept: GASTROENTEROLOGY | Facility: CLINIC | Age: 32
End: 2022-11-18
Payer: COMMERCIAL

## 2022-11-18 ENCOUNTER — LAB VISIT (OUTPATIENT)
Dept: LAB | Facility: HOSPITAL | Age: 32
End: 2022-11-18
Attending: INTERNAL MEDICINE
Payer: COMMERCIAL

## 2022-11-18 VITALS
WEIGHT: 145.94 LBS | HEART RATE: 80 BPM | OXYGEN SATURATION: 99 % | HEIGHT: 63 IN | RESPIRATION RATE: 16 BRPM | DIASTOLIC BLOOD PRESSURE: 76 MMHG | SYSTOLIC BLOOD PRESSURE: 129 MMHG | BODY MASS INDEX: 25.86 KG/M2

## 2022-11-18 DIAGNOSIS — E61.0 COPPER DEFICIENCY: ICD-10-CM

## 2022-11-18 DIAGNOSIS — T14.8XXA BRUISING: ICD-10-CM

## 2022-11-18 DIAGNOSIS — T14.8XXA BRUISING: Primary | ICD-10-CM

## 2022-11-18 LAB
ALBUMIN SERPL BCP-MCNC: 3.9 G/DL (ref 3.5–5.2)
ALP SERPL-CCNC: 103 U/L (ref 55–135)
ALT SERPL W/O P-5'-P-CCNC: 13 U/L (ref 10–44)
ANION GAP SERPL CALC-SCNC: 8 MMOL/L (ref 8–16)
APTT BLDCRRT: 25.6 SEC (ref 21–32)
AST SERPL-CCNC: 16 U/L (ref 10–40)
BASOPHILS # BLD AUTO: 0.06 K/UL (ref 0–0.2)
BASOPHILS NFR BLD: 0.7 % (ref 0–1.9)
BILIRUB SERPL-MCNC: 0.5 MG/DL (ref 0.1–1)
BUN SERPL-MCNC: 13 MG/DL (ref 6–20)
CALCIUM SERPL-MCNC: 9.5 MG/DL (ref 8.7–10.5)
CHLORIDE SERPL-SCNC: 106 MMOL/L (ref 95–110)
CO2 SERPL-SCNC: 27 MMOL/L (ref 23–29)
CREAT SERPL-MCNC: 0.8 MG/DL (ref 0.5–1.4)
DIFFERENTIAL METHOD: NORMAL
EOSINOPHIL # BLD AUTO: 0.1 K/UL (ref 0–0.5)
EOSINOPHIL NFR BLD: 1 % (ref 0–8)
ERYTHROCYTE [DISTWIDTH] IN BLOOD BY AUTOMATED COUNT: 14.3 % (ref 11.5–14.5)
EST. GFR  (NO RACE VARIABLE): >60 ML/MIN/1.73 M^2
FIBRINOGEN PPP-MCNC: 361 MG/DL (ref 182–400)
GLUCOSE SERPL-MCNC: 89 MG/DL (ref 70–110)
HCT VFR BLD AUTO: 44 % (ref 37–48.5)
HGB BLD-MCNC: 14.1 G/DL (ref 12–16)
IMM GRANULOCYTES # BLD AUTO: 0.02 K/UL (ref 0–0.04)
IMM GRANULOCYTES NFR BLD AUTO: 0.2 % (ref 0–0.5)
INR PPP: 1.1 (ref 0.8–1.2)
LYMPHOCYTES # BLD AUTO: 2.4 K/UL (ref 1–4.8)
LYMPHOCYTES NFR BLD: 28.4 % (ref 18–48)
MCH RBC QN AUTO: 30.1 PG (ref 27–31)
MCHC RBC AUTO-ENTMCNC: 32 G/DL (ref 32–36)
MCV RBC AUTO: 94 FL (ref 82–98)
MONOCYTES # BLD AUTO: 0.7 K/UL (ref 0.3–1)
MONOCYTES NFR BLD: 7.7 % (ref 4–15)
NEUTROPHILS # BLD AUTO: 5.3 K/UL (ref 1.8–7.7)
NEUTROPHILS NFR BLD: 62 % (ref 38–73)
NRBC BLD-RTO: 0 /100 WBC
PLATELET # BLD AUTO: 303 K/UL (ref 150–450)
PMV BLD AUTO: 10.4 FL (ref 9.2–12.9)
POTASSIUM SERPL-SCNC: 4.7 MMOL/L (ref 3.5–5.1)
PROT SERPL-MCNC: 7.3 G/DL (ref 6–8.4)
PROTHROMBIN TIME: 10.9 SEC (ref 9–12.5)
RBC # BLD AUTO: 4.68 M/UL (ref 4–5.4)
SODIUM SERPL-SCNC: 141 MMOL/L (ref 136–145)
WBC # BLD AUTO: 8.58 K/UL (ref 3.9–12.7)

## 2022-11-18 PROCEDURE — 85025 COMPLETE CBC W/AUTO DIFF WBC: CPT | Performed by: INTERNAL MEDICINE

## 2022-11-18 PROCEDURE — 99999 PR PBB SHADOW E&M-EST. PATIENT-LVL III: ICD-10-PCS | Mod: PBBFAC,,, | Performed by: INTERNAL MEDICINE

## 2022-11-18 PROCEDURE — 85384 FIBRINOGEN ACTIVITY: CPT | Performed by: INTERNAL MEDICINE

## 2022-11-18 PROCEDURE — 99203 PR OFFICE/OUTPT VISIT, NEW, LEVL III, 30-44 MIN: ICD-10-PCS | Mod: S$GLB,,, | Performed by: INTERNAL MEDICINE

## 2022-11-18 PROCEDURE — 36415 COLL VENOUS BLD VENIPUNCTURE: CPT | Performed by: INTERNAL MEDICINE

## 2022-11-18 PROCEDURE — 99203 OFFICE O/P NEW LOW 30 MIN: CPT | Mod: S$GLB,,, | Performed by: INTERNAL MEDICINE

## 2022-11-18 PROCEDURE — 99999 PR PBB SHADOW E&M-EST. PATIENT-LVL III: CPT | Mod: PBBFAC,,, | Performed by: INTERNAL MEDICINE

## 2022-11-18 PROCEDURE — 85730 THROMBOPLASTIN TIME PARTIAL: CPT | Performed by: INTERNAL MEDICINE

## 2022-11-18 PROCEDURE — 85610 PROTHROMBIN TIME: CPT | Performed by: INTERNAL MEDICINE

## 2022-11-18 PROCEDURE — 80053 COMPREHEN METABOLIC PANEL: CPT | Performed by: INTERNAL MEDICINE

## 2022-11-19 ENCOUNTER — HOSPITAL ENCOUNTER (OUTPATIENT)
Dept: RADIOLOGY | Facility: HOSPITAL | Age: 32
Discharge: HOME | End: 2022-11-19
Attending: NEUROLOGICAL SURGERY
Payer: COMMERCIAL

## 2022-11-19 DIAGNOSIS — R20.0 NUMBNESS: ICD-10-CM

## 2022-11-19 PROCEDURE — 25500020 PHARM REV CODE 255: Performed by: NEUROLOGICAL SURGERY

## 2022-11-19 PROCEDURE — 72156 MRI NECK SPINE W/O & W/DYE: CPT | Mod: 26,,, | Performed by: RADIOLOGY

## 2022-11-19 PROCEDURE — 70553 MRI BRAIN STEM W/O & W/DYE: CPT | Mod: 26,,, | Performed by: RADIOLOGY

## 2022-11-19 PROCEDURE — 72156 MRI CERVICAL SPINE DEMYELINATING W W/O CONTRAST: ICD-10-PCS | Mod: 26,,, | Performed by: RADIOLOGY

## 2022-11-19 PROCEDURE — A9585 GADOBUTROL INJECTION: HCPCS | Performed by: NEUROLOGICAL SURGERY

## 2022-11-19 PROCEDURE — 72156 MRI NECK SPINE W/O & W/DYE: CPT | Mod: TC

## 2022-11-19 PROCEDURE — 70553 MRI BRAIN STEM W/O & W/DYE: CPT | Mod: TC

## 2022-11-19 PROCEDURE — 70553 MRI BRAIN DEMYELINATING W/ WO CONTRAST: ICD-10-PCS | Mod: 26,,, | Performed by: RADIOLOGY

## 2022-11-19 RX ORDER — GADOBUTROL 604.72 MG/ML
6 INJECTION INTRAVENOUS
Status: COMPLETED | OUTPATIENT
Start: 2022-11-19 | End: 2022-11-19

## 2022-11-19 RX ADMIN — GADOBUTROL 6 ML: 604.72 INJECTION INTRAVENOUS at 09:11

## 2022-11-19 NOTE — PROGRESS NOTES
Section of Hematology and Stem Cell Transplantation  New Patient Consult     PCP: Jhoana Trejo MD  Date of visit: 11/18/22    Reason for visit: Bruising [T14.8XXA]    History of Present Ilness:   Laila Pappas (Laila) is a pleasant 31 y.o.female who presents for further workup of copper deficiency and bruising. She has noticed increased bruising over the past few months primarily involving her legs. She delivered her first child approximately 3 months ago. She was on aspirin throughout her pregnancy, but she is no longer taking aspirin. Otherwise she has not had new medication changes. No significant bleeding post-partum. She has had intermittent gum bleeding when brushing her teeth. No fevers or chills. She feels well.     PAST MEDICAL HISTORY:   Past Medical History:   Diagnosis Date    Bilateral knee pain 06/19/2018    Endometriosis     treated by GYN at     Epigastric abdominal pain 12/05/2016    Gastroesophageal reflux disease 07/06/2016    Helicobacter pylori ab+     Helicobacter pylori ab+     High-tone pelvic floor dysfunction 05/02/2016    IIH (idiopathic intracranial hypertension)     Dr. Rogers - [facility]    Palpitations 07/06/2016    Pre-eclampsia in postpartum period 08/16/2022       PAST SURGICAL HISTORY:   Past Surgical History:   Procedure Laterality Date    ARTHROSCOPY OF KNEE Left 04/06/2021    Procedure: ARTHROSCOPY, KNEE;  Surgeon: Deidra Mejias MD;  Location: [facility] OR;  Service: Orthopedics;  Laterality: Left;    COLONOSCOPY N/A 12/13/2018    Procedure: COLONOSCOPY;  Surgeon: iMcki Gross MD;  Location: [address]);  Service: Endoscopy;  Laterality: N/A;  preferrably in Dec 2018, CRS ok if needed.    COLONOSCOPY N/A 11/5/2022    Procedure: COLONOSCOPY;  Surgeon: Greg Leach MD;  Location: [facility] ([address] [place] [address]);  Service: Endoscopy;  Laterality: N/A;  Endoscopy schedulers please schedule patient for colonoscopy with me for further evaluation of painless hematochezia thank you     ESOPHAGOGASTRODUODENOSCOPY N/A 10/30/2018    Procedure: EGD (ESOPHAGOGASTRODUODENOSCOPY);  Surgeon: Greg Leach MD;  Location: [facility] (2ND FLR);  Service: Endoscopy;  Laterality: N/A;  ok to schedule per Kimmy    ESOPHAGOGASTRODUODENOSCOPY N/A 11/5/2022    Procedure: EGD (ESOPHAGOGASTRODUODENOSCOPY);  Surgeon: Greg Leach MD;  Location: [facility] (4TH FLR);  Service: Endoscopy;  Laterality: N/A;  Endoscopy schedulers please schedule patient for EGD for evaluation of right upper quadrant pain thank you  prep instr portal--ml  11/2/22 room closed-pt okay with Dr. Leach-50 min case okay per Katlyn-ml    KNEE ARTHROSCOPY W/ DEBRIDEMENT  04/06/2021    Procedure: ARTHROSCOPY, KNEE, WITH DEBRIDEMENT;  Surgeon: Deidra Mejias MD;  Location: [facility] OR;  Service: Orthopedics;;    KNEE ARTHROSCOPY W/ MENISCECTOMY Right 04/06/2021    Procedure: ARTHROSCOPY, KNEE, WITH MENISCECTOMY;  Surgeon: Deidra Mejias MD;  Location: [facility] OR;  Service: Orthopedics;  Laterality: Right;    KNEE ARTHROSCOPY W/ PLICA EXCISION Left 04/06/2021    Procedure: EXCISION, PLICA, KNEE, ARTHROSCOPIC;  Surgeon: Deidra Mejias MD;  Location: [facility] OR;  Service: Orthopedics;  Laterality: Left;    LAPAROSCOPY  2017    dx with endo.  no removal    NO PAST SURGERIES      SYNOVECTOMY OF KNEE Left 04/06/2021    Procedure: SYNOVECTOMY, KNEE;  Surgeon: Deidra Mejias MD;  Location: [facility] OR;  Service: Orthopedics;  Laterality: Left;    UPPER GASTROINTESTINAL ENDOSCOPY         PAST SOCIAL HISTORY:  Social History     Tobacco Use    Smoking status: Never    Smokeless tobacco: Never   Substance Use Topics    Alcohol use: No    Drug use: No       FAMILY HISTORY:  Family History   Problem Relation Age of Onset    Diabetes Mother     No Known Problems Father     No Known Problems Sister     Hypertension Brother     Hypertension Maternal Grandmother     Hypertension Maternal Grandfather     No Known Problems Paternal Grandmother     No Known Problems Paternal Grandfather      Colon cancer Neg Hx     Crohn's disease Neg Hx     Esophageal cancer Neg Hx     Inflammatory bowel disease Neg Hx     Irritable bowel syndrome Neg Hx     Rectal cancer Neg Hx     Stomach cancer Neg Hx     Ulcerative colitis Neg Hx     Celiac disease Neg Hx     Cirrhosis Neg Hx     Colon polyps Neg Hx     Liver cancer Neg Hx     Liver disease Neg Hx        CURRENT MEDICATIONS:   Current Outpatient Medications   Medication Sig    ferrous sulfate 325 (65 FE) MG EC tablet Take 325 mg by mouth 3 (three) times daily with meals.    multivitamin capsule Take 1 capsule by mouth once daily.     No current facility-administered medications for this visit.       ALLERGIES:   Review of patient's allergies indicates:   Allergen Reactions    Doxycycline Other (See Comments)     Triggers headaches    Bactrim ds [sulfamethoxazole-trimethoprim] Nausea And Vomiting    Nsaids (non-steroidal anti-inflammatory drug) Other (See Comments)     D/t h/o PUD and H pylori    Codeine Rash       Review of Systems:     Pertinent positives and negatives included in the HPI. Otherwise a complete review of systems is negative.    Physical Exam:     Vitals:    11/18/22 1508   BP: 129/76   Pulse: 80   Resp: 16     General: Appears well, NAD  HEENT: MMM, no OP lesions  Neck: Supple, no LAD  Pulmonary: CTAB, no increased work of breathing, no W/R/C  Cardiovascular: S1S2 normal, RRR, no M/R/G  Abdominal: Soft, NT, ND, BS+, no HSM  Extremities: No C/C/E  Neurological: AAOx4, grossly normal, no focal deficits  Dermatologic: No appreciable rashes or lesions    Labs:   Lab Results   Component Value Date    WBC 8.58 11/18/2022    HGB 14.1 11/18/2022    HCT 44.0 11/18/2022    MCV 94 11/18/2022     11/18/2022       Lab Results   Component Value Date     11/18/2022    K 4.7 11/18/2022     11/18/2022    CO2 27 11/18/2022    BUN 13 11/18/2022    CREATININE 0.8 11/18/2022    ALBUMIN 3.9 11/18/2022    BILITOT 0.5 11/18/2022    ALKPHOS 103  11/18/2022    AST 16 11/18/2022    ALT 13 11/18/2022       Lab Results   Component Value Date    IRON 106 10/20/2022    TIBC 321 10/20/2022    FERRITIN 158 10/14/2022       No components found for: RETICULOCYTES    No results found for: HAPTOGLOBIN, LDH    Imaging:          Assessment and Plan:   Laila Lujan) is a pleasant 31 y.o.female who presents for evaluation of easy bruising and copper deficiency.    Easy bruising: Possibly related to recent aspirin use. No personal history of bleeding complications, and she had an unremarkable delivery. Labs (including coags/fibrinogen) today within normal limits. Continue to monitor. No further workup/management needed.    Copper deficiency: Mild copper deficiency noted. She is not anemic. Continue to monitor.     Orders/Follow Up:      Orders Placed This Encounter    CBC Auto Differential    Comprehensive Metabolic Panel    PT/INR    PTT    Fibrinogen     Route Chart for Scheduling    BMT Chart Routing      Follow up with physician No follow up needed. PRN   Follow up with ARIEL    Provider visit type    Infusion scheduling note    Injection scheduling note    Labs    Imaging    Pharmacy appointment    Other referrals             Thank you for allowing me to partake in the care of this patient. If there are any questions, please do not hesitate to reach out.    Lacho Reed MD  Hematology, Oncology, and Stem Cell Transplantation  [facility]

## 2022-11-21 ENCOUNTER — LAB VISIT (OUTPATIENT)
Dept: LAB | Facility: HOSPITAL | Age: 32
End: 2022-11-21
Attending: INTERNAL MEDICINE
Payer: COMMERCIAL

## 2022-11-21 ENCOUNTER — OFFICE VISIT (OUTPATIENT)
Dept: RHEUMATOLOGY | Facility: CLINIC | Age: 32
End: 2022-11-21
Payer: COMMERCIAL

## 2022-11-21 VITALS
SYSTOLIC BLOOD PRESSURE: 109 MMHG | HEIGHT: 63 IN | HEART RATE: 80 BPM | TEMPERATURE: 99 F | BODY MASS INDEX: 25.55 KG/M2 | WEIGHT: 144.19 LBS | DIASTOLIC BLOOD PRESSURE: 77 MMHG

## 2022-11-21 DIAGNOSIS — R76.8 ANA POSITIVE: Primary | ICD-10-CM

## 2022-11-21 DIAGNOSIS — R76.8 ANA POSITIVE: ICD-10-CM

## 2022-11-21 LAB
C3 SERPL-MCNC: 115 MG/DL (ref 50–180)
C4 SERPL-MCNC: 46 MG/DL (ref 11–44)
CCP AB SER IA-ACNC: <0.5 U/ML
CRP SERPL-MCNC: 1 MG/L (ref 0–8.2)
DAT IGG-SP REAG RBC-IMP: NORMAL
ERYTHROCYTE [SEDIMENTATION RATE] IN BLOOD BY PHOTOMETRIC METHOD: 8 MM/HR (ref 0–36)
HBV CORE AB SERPL QL IA: NORMAL
HBV SURFACE AB SER-ACNC: >1000 MIU/ML
HBV SURFACE AB SER-ACNC: REACTIVE M[IU]/ML
HBV SURFACE AG SERPL QL IA: NORMAL
HCV AB SERPL QL IA: NORMAL
RHEUMATOID FACT SERPL-ACNC: <13 IU/ML (ref 0–15)

## 2022-11-21 PROCEDURE — 86160 COMPLEMENT ANTIGEN: CPT | Mod: 59 | Performed by: INTERNAL MEDICINE

## 2022-11-21 PROCEDURE — 99205 OFFICE O/P NEW HI 60 MIN: CPT | Mod: S$GLB,,, | Performed by: INTERNAL MEDICINE

## 2022-11-21 PROCEDURE — 85613 RUSSELL VIPER VENOM DILUTED: CPT | Performed by: INTERNAL MEDICINE

## 2022-11-21 PROCEDURE — 86704 HEP B CORE ANTIBODY TOTAL: CPT | Performed by: INTERNAL MEDICINE

## 2022-11-21 PROCEDURE — 36415 COLL VENOUS BLD VENIPUNCTURE: CPT | Performed by: INTERNAL MEDICINE

## 2022-11-21 PROCEDURE — 86160 COMPLEMENT ANTIGEN: CPT | Performed by: INTERNAL MEDICINE

## 2022-11-21 PROCEDURE — 86200 CCP ANTIBODY: CPT | Performed by: INTERNAL MEDICINE

## 2022-11-21 PROCEDURE — 86880 COOMBS TEST DIRECT: CPT | Performed by: INTERNAL MEDICINE

## 2022-11-21 PROCEDURE — 87340 HEPATITIS B SURFACE AG IA: CPT | Performed by: INTERNAL MEDICINE

## 2022-11-21 PROCEDURE — 86803 HEPATITIS C AB TEST: CPT | Performed by: INTERNAL MEDICINE

## 2022-11-21 PROCEDURE — 86147 CARDIOLIPIN ANTIBODY EA IG: CPT | Mod: 59 | Performed by: INTERNAL MEDICINE

## 2022-11-21 PROCEDURE — 99999 PR PBB SHADOW E&M-EST. PATIENT-LVL III: ICD-10-PCS | Mod: PBBFAC,,, | Performed by: INTERNAL MEDICINE

## 2022-11-21 PROCEDURE — 99205 PR OFFICE/OUTPT VISIT, NEW, LEVL V, 60-74 MIN: ICD-10-PCS | Mod: S$GLB,,, | Performed by: INTERNAL MEDICINE

## 2022-11-21 PROCEDURE — 86431 RHEUMATOID FACTOR QUANT: CPT | Performed by: INTERNAL MEDICINE

## 2022-11-21 PROCEDURE — 86140 C-REACTIVE PROTEIN: CPT | Performed by: INTERNAL MEDICINE

## 2022-11-21 PROCEDURE — 86706 HEP B SURFACE ANTIBODY: CPT | Mod: 91 | Performed by: INTERNAL MEDICINE

## 2022-11-21 PROCEDURE — 86146 BETA-2 GLYCOPROTEIN ANTIBODY: CPT | Performed by: INTERNAL MEDICINE

## 2022-11-21 PROCEDURE — 99999 PR PBB SHADOW E&M-EST. PATIENT-LVL III: CPT | Mod: PBBFAC,,, | Performed by: INTERNAL MEDICINE

## 2022-11-21 PROCEDURE — 85652 RBC SED RATE AUTOMATED: CPT | Performed by: INTERNAL MEDICINE

## 2022-11-21 NOTE — PROGRESS NOTES
Chief Complaint   Patient presents with    Disease Management       Patient was referred by     History of presenting illness      31 year old female presents for a positive MIKE    She presented to her PCP  She has not been feeling comfortable      She had covid in 2020,2021,2022  She had covid 3 times already    She is questioning if she has long covid      She has joint pains   Knee pain and low back pain started in middle school  Xrays C,T,LS spine nml  MRI Left knee is normal  Arthroscopy left knee nml  B/l knee xrays    Left arm goes numb and tingles-EMG/NCS nml  Hand xrays nml  Brain MRI nml-no MS  MRI left shoulder pending  Left shoulder xrays nml  MRI C spine nml  No seizures,strokes,psychosis    Both hips left > right started to hurt  Hip xrays normal  Ortho said its not the hip problem- its the back problem  This started during pregnancy-did get better after delivery and a month after delivery-pain is back  Pelvis floor therapy didn't help at all  Now it hurts to sit on the buttocks    Right upper and lower quadrant of the abdomen hurts and burns  MRI abdomen nml  Colonoscopy and EGD- colon polyp  HIDa scan pending  Nml US results, Urine test from the recent ER visit  Eats better  Nausea at times,GERD  Constipated  No diarrhea  Had blood in stools- but nothing on colonoscopy     She had lost weight  174 pounds pre pregnancy now 145 pounds,she has a 3 month old baby and she is breast feeding.  No fever,chills,night sweats    Feels light headed a whole lot- she has benign intracranial HTN-No headaches    SOB- with climbing a flight of stairs, walking long distances  No cough,chest pain,palpitations  Doesn't exercise at all so cant tell anything about exercise tolerance   PE study neg and cardiology w/u nml  No pleural and pericardial effusions  Echo nml  Holter monitor ok  US-venous b/l LE nml  Stress test nml    Bruises easy  Aspirin - she quit in September 2022    She had thyroid nodules but is  euthyroid    She has left anterior neck pain    No skin rashes,malar rash,photosensitivity   No telangiectasias   No calcinosis   No psoriasis   No patchy alopecia   No oral and nasal ulcers   No dry eyes and dry mouth   MIKE positive  Profile neg  1: 640 and 1: 1260    No dysphagia,diplopia and dysphonia and muscle weakness     No raynaud's+   No digital ulcers   No cytopenias -mild anemia during pregnancy and few episodes of leukopenia  No renal issues -has had proteinuria and hematuria intermittently   No blood clots     One pregnancy loss-first trimester- no cause known  One successful pregnancy-IVF/fertility issues  She had high blood pressures s/p delivery  Normal delivery    Hepatic steatosis  Nml LFTs    No recurrent conjunctivitis or uveitis or scleritis or episcleritis     No vaginal or urethral  d/c/STDs/no ulcers     No unexplained lymphadenopathy,parotitis     No sclerodactyly  No puffy hands  No perioral tightness     Sleep is healthy  No problems falling asleep,staying asleep and waking up from sleep    Past history : anxiety never diagnosed  IIH  GERD  Endometriosis    Family history : nothing autoimmune    Social history : no smoking,drugs and alcohol        Review of Systems    As detailed above        Only significant exam    Left shoulder ROM-some pain  Left hip- internal rotation -pain  Low back-stretch across the low back with forward flexion    Physical Exam   Constitutional: She is oriented to person, place, and time. No distress.   HENT:   Head: Normocephalic.   Mouth/Throat: Oropharynx is clear and moist.   Eyes: Pupils are equal, round, and reactive to light. Conjunctivae are normal. Right eye exhibits no discharge. Left eye exhibits no discharge. No scleral icterus.   Neck: No thyromegaly present.   Cardiovascular: Normal rate, regular rhythm and normal heart sounds.   Pulmonary/Chest: Effort normal and breath sounds normal. No stridor.   Abdominal: Soft. Bowel sounds are normal.    Musculoskeletal:         General: Normal range of motion.      Cervical back: Normal range of motion.   Lymphadenopathy:     She has no cervical adenopathy.   Neurological: She is alert and oriented to person, place, and time.   Skin: Skin is warm. No rash noted. She is not diaphoretic.   Psychiatric: Affect and judgment normal.     Widespread pain index  Note the areas which the patient has had pain over the last week:                   Shoulder-girdle, left 1               Shoulder-girdle, right                         Upper arm left 2                       Upper arm right                          Lower arm left                        Lower arm right    Hip (buttock, trochanter) left 3  Hip (buttock, trochanter) right 4                           Upper leg, left 5                         Upper leg, right 6                           Lower leg, left 7                         Lower leg, right 8                                     Jaw, left 9                                   Jaw, right                                         Chest                                  Abdomen 10                               Upper back 11                              Lower back  12                                        Neck   13    Score will be from 0-19: 13/19                                         Symptom severity score  Fatigue 1  Waking Unrefreshed 0  Cognitive Symptoms 1   0 = no problem, 1=slight or mild problem 2= moderate; considerable problems often present and/or at a moderate level, 3 = severe, pervasive, continuous, life disturbing problem  For each of the 3 symptoms, indicate the level of severity over the past week using the Scale.  The symptom severity score is the sum of the severity of the 3 symptoms (fatigue, waking unrefreshed, and cognitive symptoms) plus the number of the following symptoms occurring during the previous 6 months:   Headaches 0  Pain or cramps in the lower abdomen 0  Depression 0    The final score  is between 0 and 12 : 2/12                                          Criteria  Patient has fibromyalgia if the following 3 conditions are met:  1.  Widespread pain index greater than or equal to 7 and symptom severity score greater than or equal to 5 or widespread pain index between 3- 6, and symptom severity score greater than or equal to 9.    2.  Symptoms have been present in a similar level for at least 3 months  3.  The patient does not have a disorder that would otherwise sufficiently explain the pain        Assessment     31 year old female with    Joint pains    Exam-  Left shoulder ROM-some pain  Left hip- internal rotation -pain  Low back-stretch across the low back with forward flexion  No synovitis,dactylitis,enthesitis    Knees- nml knee xrays,left knee MRI and arthroscopy -plica removal and meniscectomy  Spine-nml C,LS spine MRI  Hips- nml hip xrays and hip ortho eval  Left shoulder- but nml xray and MRI pending    Pain outside the joints  WSPI 13/19  But SSS 2/12  So fibromyalgia diagnosis not favored    But joint pain and pain outside the joint and a normal w/u so far- mostly points towards chronic pain syndrome    Left shoulder pain might be due to rotator cuff    Left hip- pain only with flexion and internal rotation- some internal derangement possible    MIKE positive  Anemia-only due to pregnancy  Low lymphocyte count on 2 occasions  Proteinuria-never quantified  Hematuria-night have been due to endometriosis  One pregnancy loss first trimester and high Blood pressures s/p deliver second pregnancy  No symptoms of systemic lupus otherwise-needs investigations      Thyroid nodules  Nml TSH    Hepatic steatosis  Nml AST,ALT      1. MIKE positive        Reviewed labs/xrays  Reviewed medications    Ordered labs /xrays      New problem     Plan    RF,CCP,ESR,CRP    Complete the lupus panel  UA,UPCR  Complements  KYRA  APLAS panel    Hepatology- will complete their work up and tell us if she has autoimmune  liver disease-which can explain the MIKE    She is breast feeding  Tylenol -as needed only for now    Hiv neg  Never had a thorough hep b and c w/u  Will send the labs    Left shoulder MRI    Dynamic left hip arthrogram    Rtc in 3 months      Laila was seen today for disease management.    Diagnoses and all orders for this visit:    MIKE positive  -     Rheumatoid Factor; Future  -     Cyclic Citrullinated Peptide Antibody, IgG; Future  -     Sedimentation rate; Future  -     C-Reactive Protein; Future  -     C3 Complement; Future  -     C4 Complement; Future  -     Protein/Creatinine Ratio, Urine; Future  -     Urinalysis; Future  -     Cardiolipin antibody; Future  -     Beta-2 Glycoprotein Abs (IgA, IgG, IgM); Future  -     DRVVT; Future  -     Direct antiglobulin test; Future  -     Hepatitis B Surface Antigen; Future  -     Hepatitis B Surface Ab, Qualitative; Future  -     Hepatitis B Core Antibody, Total; Future  -     Hepatitis C Antibody; Future

## 2022-11-22 ENCOUNTER — PATIENT MESSAGE (OUTPATIENT)
Dept: SPORTS MEDICINE | Facility: CLINIC | Age: 32
End: 2022-11-22
Payer: COMMERCIAL

## 2022-11-24 LAB
B2 GLYCOPROT1 IGA SER QL: <9 SAU
B2 GLYCOPROT1 IGG SER QL: <9 SGU
B2 GLYCOPROT1 IGM SER QL: <9 SMU
CARDIOLIPIN IGG SER IA-ACNC: <9.4 GPL (ref 0–14.99)
CARDIOLIPIN IGM SER IA-ACNC: 10 MPL (ref 0–12.49)

## 2022-11-25 ENCOUNTER — TELEPHONE (OUTPATIENT)
Dept: ORTHOPEDICS | Facility: CLINIC | Age: 32
End: 2022-11-25
Payer: COMMERCIAL

## 2022-11-28 LAB
APTT IMM NP PPP: NORMAL SEC (ref 32–48)
APTT P HEP NEUT PPP: NORMAL SEC (ref 32–48)
CONFIRM APTT STACLOT: NORMAL
DRVVT SCREEN TO CONFIRM RATIO: NORMAL RATIO
LA 3 SCREEN W REFLEX-IMP: NORMAL
LA NT DPL PPP QL: NORMAL
MIXING DRVVT: NORMAL SEC (ref 33–44)
PROTHROMBIN TIME: 12.5 SEC (ref 12–15.5)
REPTILASE TIME: NORMAL SEC
SCREEN APTT: 42 SEC (ref 32–48)
SCREEN DRVVT: 35 SEC (ref 33–44)
THROMBIN TIME: NORMAL SEC (ref 14.7–19.5)

## 2022-11-28 NOTE — PROGRESS NOTES
"INTERNAL MEDICINE CLINIC - SAME DAY APPOINTMENT  Progress Note    PRESENTING HISTORY     PCP: Jhoana Trejo MD    Chief Complaint/Reason for Visit:   No chief complaint on file.      History of Present Illness & ROS : Ms. Laila Pappas is a 31 y.o. female.    Same day apt.   Very pleasant young lady.   Here with her supportive spouse.   Seen by Rheum on 11/21/2022. Will follow up with Dr. NELSON in 3 months.   Seen by Hem / Onc on 11/18/2022. Discharged.   Needs Hepatology work up for ? Autoimmune liver disease and has a pending appt.   Seen by Ortho for shoulder pain. RtC rec'd 2 months with MRI.  She is back at work and seems to be doing 'better', with admission of the same.   Still with some 'lingering discomforts to right rib region'. No trauma, injury, falls, cough and / or SOB. "Hurts with movement".       Review of Systems:  Eyes: denies visual changes at this time denies floaters   ENT: no nasal congestion or sore throat  Respiratory: no cough or shorness of breath  Cardiovascular: no chest pain or palpitations  Gastrointestinal: no nausea or vomiting, no abdominal pain or change in bowel habits  Genitourinary: no hematuria or dysuria; denies frequency  Hematologic/Lymphatic: no easy bruising or lymphadenopathy  Musculoskeletal: no arthralgias or myalgias  Neurological: no seizures or tremors  Endocrine: no heat or cold intolerance      PAST HISTORY:     Past Medical History:   Diagnosis Date    Bilateral knee pain 06/19/2018    Endometriosis     treated by GYN at     Epigastric abdominal pain 12/05/2016    Gastroesophageal reflux disease 07/06/2016    Helicobacter pylori ab+     Helicobacter pylori ab+     High-tone pelvic floor dysfunction 05/02/2016    IIH (idiopathic intracranial hypertension)     Dr. Rogers - [facility]    Palpitations 07/06/2016    Pre-eclampsia in postpartum period 08/16/2022       Past Surgical History:   Procedure Laterality Date    ARTHROSCOPY OF KNEE Left 04/06/2021    Procedure: " ARTHROSCOPY, KNEE;  Surgeon: Deidra Mejias MD;  Location: [facility] OR;  Service: Orthopedics;  Laterality: Left;    COLONOSCOPY N/A 12/13/2018    Procedure: COLONOSCOPY;  Surgeon: Micki Gross MD;  Location: [facility] ENDO (4TH FLR);  Service: Endoscopy;  Laterality: N/A;  preferrably in Dec 2018, CRS ok if needed.    COLONOSCOPY N/A 11/5/2022    Procedure: COLONOSCOPY;  Surgeon: Greg Leach MD;  Location: [facility] ENDO (4TH FLR);  Service: Endoscopy;  Laterality: N/A;  Endoscopy schedulers please schedule patient for colonoscopy with me for further evaluation of painless hematochezia thank you    ESOPHAGOGASTRODUODENOSCOPY N/A 10/30/2018    Procedure: EGD (ESOPHAGOGASTRODUODENOSCOPY);  Surgeon: Greg Leach MD;  Location: [facility] (2ND FLR);  Service: Endoscopy;  Laterality: N/A;  ok to schedule per Kimmy    ESOPHAGOGASTRODUODENOSCOPY N/A 11/5/2022    Procedure: EGD (ESOPHAGOGASTRODUODENOSCOPY);  Surgeon: Greg Leach MD;  Location: [facility] (4TH FLR);  Service: Endoscopy;  Laterality: N/A;  Endoscopy schedulers please schedule patient for EGD for evaluation of right upper quadrant pain thank you  prep instr portal--ml  11/2/22 room closed-pt okay with Dr. Leach-50 min case okay per Katlyn-ml    KNEE ARTHROSCOPY W/ DEBRIDEMENT  04/06/2021    Procedure: ARTHROSCOPY, KNEE, WITH DEBRIDEMENT;  Surgeon: Deidra Mejias MD;  Location: [facility] OR;  Service: Orthopedics;;    KNEE ARTHROSCOPY W/ MENISCECTOMY Right 04/06/2021    Procedure: ARTHROSCOPY, KNEE, WITH MENISCECTOMY;  Surgeon: Deidra Mejias MD;  Location: [facility] OR;  Service: Orthopedics;  Laterality: Right;    KNEE ARTHROSCOPY W/ PLICA EXCISION Left 04/06/2021    Procedure: EXCISION, PLICA, KNEE, ARTHROSCOPIC;  Surgeon: Deidra Mejias MD;  Location: [facility] OR;  Service: Orthopedics;  Laterality: Left;    LAPAROSCOPY  2017    dx with endo.  no removal    NO PAST SURGERIES      SYNOVECTOMY OF KNEE Left 04/06/2021    Procedure: SYNOVECTOMY, KNEE;  Surgeon: Deidra Mejias MD;   Location: [facility];  Service: Orthopedics;  Laterality: Left;    UPPER GASTROINTESTINAL ENDOSCOPY         Family History   Problem Relation Age of Onset    Diabetes Mother     No Known Problems Father     No Known Problems Sister     Hypertension Brother     Hypertension Maternal Grandmother     Hypertension Maternal Grandfather     No Known Problems Paternal Grandmother     No Known Problems Paternal Grandfather     Colon cancer Neg Hx     Crohn's disease Neg Hx     Esophageal cancer Neg Hx     Inflammatory bowel disease Neg Hx     Irritable bowel syndrome Neg Hx     Rectal cancer Neg Hx     Stomach cancer Neg Hx     Ulcerative colitis Neg Hx     Celiac disease Neg Hx     Cirrhosis Neg Hx     Colon polyps Neg Hx     Liver cancer Neg Hx     Liver disease Neg Hx        Social History     Socioeconomic History    Marital status: Single   Occupational History     Employer: [employer]     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   Tobacco Use    Smoking status: Never    Smokeless tobacco: Never   Substance and Sexual Activity    Alcohol use: No    Drug use: No    Sexual activity: Yes     Partners: Male     Birth control/protection: None   Social History Narrative    She had her 1st baby in 2022 healthy vaginal delivery no      Social Determinants of Health     Financial Resource Strain: Unknown    Difficulty of Paying Living Expenses: Patient refused   Food Insecurity: Unknown    Worried About Running Out of Food in the Last Year: Patient refused    Ran Out of Food in the Last Year: Patient refused   Transportation Needs: Unknown    Lack of Transportation (Medical): Patient refused    Lack of Transportation (Non-Medical): Patient refused   Physical Activity: Unknown    Days of Exercise per Week: Patient refused   Social Connections: Unknown    Frequency of Communication with Friends and Family: Patient refused    Frequency of Social Gatherings with Friends and Family: Patient refused    Active  Member of Clubs or Organizations: Patient refused    Attends Club or Organization Meetings: Patient refused    Marital Status: Patient refused   Housing Stability: Unknown    Unable to Pay for Housing in the Last Year: Patient refused    Unstable Housing in the Last Year: Patient refused       MEDICATIONS & ALLERGIES:     Current Outpatient Medications on File Prior to Visit   Medication Sig Dispense Refill    multivitamin capsule Take 1 capsule by mouth once daily.      [DISCONTINUED] folic acid (FOLVITE) 1 MG tablet Take 1 mg by mouth once daily.       No current facility-administered medications on file prior to visit.        Review of patient's allergies indicates:   Allergen Reactions    Doxycycline Other (See Comments)     Triggers headaches    Bactrim ds [sulfamethoxazole-trimethoprim] Nausea And Vomiting    Nsaids (non-steroidal anti-inflammatory drug) Other (See Comments)     D/t h/o PUD and H pylori    Codeine Rash       Medications Reconciliation:   I have reconciled the patient's home medications with the patient/family. I have updated all changes.  Refer to After-Visit Medication List.    OBJECTIVE:     Vital Signs:  There were no vitals filed for this visit.  Wt Readings from Last 3 Encounters:   11/21/22 1316 65.4 kg (144 lb 2.9 oz)   11/18/22 1508 66.2 kg (145 lb 15.1 oz)   11/16/22 1438 66.7 kg (147 lb)     There is no height or weight on file to calculate BMI.   Wt Readings from Last 3 Encounters:   11/29/22 57.1 kg (125 lb 14.1 oz)   11/21/22 65.4 kg (144 lb 2.9 oz)   11/18/22 66.2 kg (145 lb 15.1 oz)     Temp Readings from Last 3 Encounters:   11/21/22 98.9 °F (37.2 °C)   11/05/22 98.1 °F (36.7 °C) (Temporal)   10/24/22 98.3 °F (36.8 °C) (Oral)     BP Readings from Last 3 Encounters:   11/29/22 118/64   11/21/22 109/77   11/18/22 129/76     Pulse Readings from Last 3 Encounters:   11/29/22 90   11/21/22 80   11/18/22 80       Physical Exam:  (Focused Exam)  General: Well developed, well  nourished. No distress.  HEENT: Head is normocephalic, atraumatic  Eyes: Clear conjunctiva.  Neck: Supple, symmetrical neck; trachea midline.  Lungs: Clear to auscultation bilaterally and normal respiratory effort.  Cardiovascular: Heart with regular rate and rhythm. No murmurs, gallops or rubs   Abdomen: Abdomen is soft, non-tender non-distended with normal bowel sounds.  Skin: Skin color, texture, turgor normal. No rashes.  Musculoskeletal: Normal gait.   Neurologic: Normal strength and tone. No focal numbness or weakness.       Laboratory  Lab Results   Component Value Date    WBC 8.58 11/18/2022    HGB 14.1 11/18/2022    HCT 44.0 11/18/2022     11/18/2022    CHOL 204 (H) 10/29/2021    TRIG 46 10/29/2021    HDL 41 10/29/2021    ALT 13 11/18/2022    AST 16 11/18/2022     11/18/2022    K 4.7 11/18/2022     11/18/2022    CREATININE 0.8 11/18/2022    BUN 13 11/18/2022    CO2 27 11/18/2022    TSH 1.277 10/14/2022    INR 1.1 11/18/2022    HGBA1C 5.0 10/27/2022         ASSESSMENT & PLAN:     Same day apt  Follow up    Rib pain  Suspect Costochondritis   -     X-Ray Ribs 3 Views Bilateral; Future; Expected date: 11/29/2022    Chronic midline low back pain without sciatica  *Being followed by [facility] with Dr. Malhotra and Dr. Tanner    Positive MIKE (antinuclear antibody)  Seen by Rheum on 11/21/2022. Will follow up with Dr. NELSON in 3 months.   ` work up in progress    Easy bruising/  Copper deficiency  Easy Bruising  / Mild Low Copper level:   Seen by Hem / Onc on 11/18/2022.  Likely in the setting of pre partum medications taken / fertility med regimen.     Hepatic steatosis  Abnormal US / Hepatic Steatosis:  Needs Hepatology work up for ? Autoimmune liver disease    Left Shoulder Pain:   Seen by Ortho for shoulder pain. RtC rec'd 2 months with MRI.    Hematochezia  Followed by [facility]  ` EGD  / C Scope (path report still pending on polyp retrieval from study done on 11/5/2022)    Future Appointments    Date Time Provider Department Center   12/6/2022  3:15 PM Miguel Angel Malhotra MD [facility] SPINE [facility]   12/6/2022  4:30 PM Lamine Burks MD [facility] GASTRO [facility]   12/6/2022  7:45 PM [facility] OIC-MRI3 [facility] MRI [facility]   12/12/2022  4:00 PM aJmey Barclay, PT [facility]   12/14/2022  2:30 PM Joselin Parker PA-C [facility] HEPAT [facility]   12/15/2022  4:00 PM Greg Sy, PT [facility]   12/23/2022 10:30 AM Mira Flores NP [facility] HEPAT [facility]   1/10/2023  8:00 AM Jose Francisco Evans MD [facility] PULMSVC [facility]   1/18/2023  8:40 AM Johny Wilson MD [facility]URO [facility]   2/3/2023 11:30 AM Beverly Arora MD [facility] IM [facility]   2/7/2023  1:00 PM Meghan Bunn MD [facility] OBGYN [facility]   2/8/2023  4:00 PM Lamine Burks MD [facility] GASTRO [facility]   3/8/2023 10:00 AM Alon Felix MD [facility] RHEUM [facility]        Medication List            Accurate as of November 29, 2022  3:37 PM. If you have any questions, ask your nurse or doctor.                CONTINUE taking these medications      multivitamin capsule              Signing Physician:  MORGAN Cage

## 2022-11-29 ENCOUNTER — PATIENT MESSAGE (OUTPATIENT)
Dept: INTERNAL MEDICINE | Facility: CLINIC | Age: 32
End: 2022-11-29
Payer: COMMERCIAL

## 2022-11-29 ENCOUNTER — PATIENT MESSAGE (OUTPATIENT)
Dept: GASTROENTEROLOGY | Facility: CLINIC | Age: 32
End: 2022-11-29
Payer: COMMERCIAL

## 2022-11-29 ENCOUNTER — HOSPITAL ENCOUNTER (OUTPATIENT)
Dept: RADIOLOGY | Facility: HOSPITAL | Age: 32
Discharge: HOME | End: 2022-11-29
Attending: NURSE PRACTITIONER
Payer: COMMERCIAL

## 2022-11-29 ENCOUNTER — OFFICE VISIT (OUTPATIENT)
Dept: INTERNAL MEDICINE | Facility: CLINIC | Age: 32
End: 2022-11-29
Payer: COMMERCIAL

## 2022-11-29 VITALS
SYSTOLIC BLOOD PRESSURE: 118 MMHG | OXYGEN SATURATION: 99 % | WEIGHT: 125.88 LBS | HEART RATE: 90 BPM | HEIGHT: 63 IN | DIASTOLIC BLOOD PRESSURE: 64 MMHG | BODY MASS INDEX: 22.3 KG/M2

## 2022-11-29 DIAGNOSIS — R76.8 POSITIVE ANA (ANTINUCLEAR ANTIBODY): ICD-10-CM

## 2022-11-29 DIAGNOSIS — R07.81 RIB PAIN: ICD-10-CM

## 2022-11-29 DIAGNOSIS — G89.29 CHRONIC MIDLINE LOW BACK PAIN WITHOUT SCIATICA: ICD-10-CM

## 2022-11-29 DIAGNOSIS — G89.29 CHRONIC LEFT SHOULDER PAIN: ICD-10-CM

## 2022-11-29 DIAGNOSIS — E61.0 COPPER DEFICIENCY: ICD-10-CM

## 2022-11-29 DIAGNOSIS — R23.3 EASY BRUISING: ICD-10-CM

## 2022-11-29 DIAGNOSIS — M54.50 CHRONIC MIDLINE LOW BACK PAIN WITHOUT SCIATICA: ICD-10-CM

## 2022-11-29 DIAGNOSIS — R07.81 RIB PAIN: Primary | ICD-10-CM

## 2022-11-29 DIAGNOSIS — K92.1 HEMATOCHEZIA: ICD-10-CM

## 2022-11-29 DIAGNOSIS — M25.512 CHRONIC LEFT SHOULDER PAIN: ICD-10-CM

## 2022-11-29 DIAGNOSIS — K76.0 HEPATIC STEATOSIS: ICD-10-CM

## 2022-11-29 PROCEDURE — 99999 PR PBB SHADOW E&M-EST. PATIENT-LVL III: ICD-10-PCS | Mod: PBBFAC,,, | Performed by: NURSE PRACTITIONER

## 2022-11-29 PROCEDURE — 99214 OFFICE O/P EST MOD 30 MIN: CPT | Mod: S$GLB,,, | Performed by: NURSE PRACTITIONER

## 2022-11-29 PROCEDURE — 71110 X-RAY EXAM RIBS BIL 3 VIEWS: CPT | Mod: 26,,, | Performed by: RADIOLOGY

## 2022-11-29 PROCEDURE — 99214 PR OFFICE/OUTPT VISIT, EST, LEVL IV, 30-39 MIN: ICD-10-PCS | Mod: S$GLB,,, | Performed by: NURSE PRACTITIONER

## 2022-11-29 PROCEDURE — 71110 X-RAY EXAM RIBS BIL 3 VIEWS: CPT | Mod: TC

## 2022-11-29 PROCEDURE — 99999 PR PBB SHADOW E&M-EST. PATIENT-LVL III: CPT | Mod: PBBFAC,,, | Performed by: NURSE PRACTITIONER

## 2022-11-29 PROCEDURE — 71110 XR RIBS 3 VIEWS BILATERAL: ICD-10-PCS | Mod: 26,,, | Performed by: RADIOLOGY

## 2022-12-05 LAB
FINAL PATHOLOGIC DIAGNOSIS: NORMAL
GROSS: NORMAL
Lab: NORMAL
SUPPLEMENTAL DIAGNOSIS: NORMAL

## 2022-12-06 ENCOUNTER — HOSPITAL ENCOUNTER (OUTPATIENT)
Dept: RADIOLOGY | Facility: HOSPITAL | Age: 32
Discharge: HOME | End: 2022-12-06
Attending: PHYSICIAN ASSISTANT
Payer: COMMERCIAL

## 2022-12-06 ENCOUNTER — OFFICE VISIT (OUTPATIENT)
Dept: GASTROENTEROLOGY | Facility: CLINIC | Age: 32
End: 2022-12-06
Payer: COMMERCIAL

## 2022-12-06 DIAGNOSIS — K29.70 HELICOBACTER PYLORI GASTRITIS: Primary | ICD-10-CM

## 2022-12-06 DIAGNOSIS — B96.81 HELICOBACTER PYLORI GASTRITIS: Primary | ICD-10-CM

## 2022-12-06 DIAGNOSIS — M25.512 CHRONIC LEFT SHOULDER PAIN: ICD-10-CM

## 2022-12-06 DIAGNOSIS — R10.11 INTERMITTENT RIGHT UPPER QUADRANT ABDOMINAL PAIN: ICD-10-CM

## 2022-12-06 DIAGNOSIS — G89.29 CHRONIC LEFT SHOULDER PAIN: ICD-10-CM

## 2022-12-06 PROCEDURE — 99213 OFFICE O/P EST LOW 20 MIN: CPT | Mod: 95,,, | Performed by: INTERNAL MEDICINE

## 2022-12-06 PROCEDURE — 73221 MRI JOINT UPR EXTREM W/O DYE: CPT | Mod: 26,LT,, | Performed by: INTERNAL MEDICINE

## 2022-12-06 PROCEDURE — 99213 PR OFFICE/OUTPT VISIT, EST, LEVL III, 20-29 MIN: ICD-10-PCS | Mod: 95,,, | Performed by: INTERNAL MEDICINE

## 2022-12-06 PROCEDURE — 73221 MRI SHOULDER WITHOUT CONTRAST LEFT: ICD-10-PCS | Mod: 26,LT,, | Performed by: INTERNAL MEDICINE

## 2022-12-06 PROCEDURE — 73221 MRI JOINT UPR EXTREM W/O DYE: CPT | Mod: TC,LT

## 2022-12-06 NOTE — PROGRESS NOTES
The patient location is:  Near her home  The chief complaint leading to consultation is:  Follow-up EGD colonoscopy and path and H pylori and right upper quadrant pain    Visit type: audio only technical difficulties with the video visit image capture    Face to Face time with patient: 20 minutes of total time spent on the encounter, which includes face to face time and non-face to face time preparing to see the patient (eg, review of tests), Obtaining and/or reviewing separately obtained history, Documenting clinical information in the electronic or other health record, Independently interpreting results (not separately reported) and communicating results to the patient/family/caregiver, or Care coordination (not separately reported).         Each patient to whom he or she provides medical services by telemedicine is:  (1) informed of the relationship between the physician and patient and the respective role of any other health care provider with respect to management of the patient; and (2) notified that he or she may decline to receive medical services by telemedicine and may withdraw from such care at any time.    Notes:           [facility] Consultation Note    Reason for Consult:  The primary encounter diagnosis was Helicobacter pylori gastritis. Diagnoses of Mother currently breast-feeding and Intermittent right upper quadrant abdominal pain were also pertinent to this visit.    PCP:   Jhoana Trejo       Referring MD:  No referring provider defined for this encounter.    Initial History of Present Illness (HPI):  This is a 31 y.o. female here for evaluation of recent EGD colonoscopy results and path benign hyperplastic small polyp in the rectum Dr. Leach is recommended next screening colonoscopy to start at average age 46 average risk EGD path is positive for H pylori no dysplasia patient would like to defer treatment currently due to breast feeding her child she thinks she may have at  least 6 more months of breast-feeding will have her come back to clinic to discuss triple therapy for H pylori treatment in 6 months she can not get quadruple therapy due to tetracycline allergy.  No further GI bleeding.  She still has intermittent right upper quadrant pain does not radiate to her back nothing seems to make it worse although occasionally she can feel it when she is sitting in bed no dysuria no urgency no frequency no fever no chills no shortness of breath no abdominal trauma no unintentional weight loss but her pre pregnancy weight was about 175 lb she gained about 21 lb in pregnancy or max weight was about 196 lb and she is down to 151 lb today.  She had her thyroid checked recently she is not anemic.  No vomiting no blood in her stool no early satiety.  No change in bowel habits.  Patient had recent MRI no acute findings ultrasound shows gallbladder sludge she would like to have a CCK HIDA scan radiology is recommended she pump and dump her breast milk for several days after.  She is Bank enough breast milk that she can do this she would like to proceed with that.       Abdominal pain -as an  Reflux - as a  Dysphagia - no   Bowel habits - normal  GI bleeding - none  NSAID usage - none    Interval HPI 12/06/2022:  The patient's last visit with me was on 11/1/2022.      ROS:  Constitutional: No fevers, chills, No weight loss  ENT:  As a heartburn no dysphagia no odynophagia no hoarseness  CV: No chest pain, no palpitation  Pulm: No cough, No shortness of breath, no wheezing  Ophtho: No vision changes  GI: see HPI  Derm: No rash, no itching  Heme: No lymphadenopathy, No easy bruising  MSK: No significant arthritis  : No dysuria, No hematuria  Endo: No hot or cold intolerance  Neuro: No syncope, No seizure, no strokes  Psych: No uncontrolled anxiety but some anxiety, No uncontrolled depression    Medical History:  has a past medical history of Bilateral knee pain (06/19/2018), Endometriosis,  Epigastric abdominal pain (12/05/2016), Gastroesophageal reflux disease (07/06/2016), Helicobacter pylori ab+, Helicobacter pylori ab+, High-tone pelvic floor dysfunction (05/02/2016), IIH (idiopathic intracranial hypertension), Palpitations (07/06/2016), and Pre-eclampsia in postpartum period (08/16/2022).    Surgical History:  has a past surgical history that includes No past surgeries; Upper gastrointestinal endoscopy; Esophagogastroduodenoscopy (N/A, 10/30/2018); Colonoscopy (N/A, 12/13/2018); Laparoscopy (2017); Synovectomy of knee (Left, 04/06/2021); Knee arthroscopy w/ plica excision (Left, 04/06/2021); Knee arthroscopy w/ debridement (04/06/2021); Arthroscopy of knee (Left, 04/06/2021); Knee arthroscopy w/ meniscectomy (Right, 04/06/2021); Esophagogastroduodenoscopy (N/A, 11/5/2022); and Colonoscopy (N/A, 11/5/2022).    Family History: family history includes Diabetes in her mother; Hypertension in her brother, maternal grandfather, and maternal grandmother; No Known Problems in her father, paternal grandfather, paternal grandmother, and sister..     Social History:  reports that she has never smoked. She has never used smokeless tobacco. She reports that she does not drink alcohol and does not use drugs.    Review of patient's allergies indicates:   Allergen Reactions    Doxycycline Other (See Comments)     Triggers headaches    Bactrim ds [sulfamethoxazole-trimethoprim] Nausea And Vomiting    Nsaids (non-steroidal anti-inflammatory drug) Other (See Comments)     D/t h/o PUD and H pylori    Codeine Rash       Medication List with Changes/Refills   Current Medications    MULTIVITAMIN CAPSULE    Take 1 capsule by mouth once daily.         Objective Findings:    Vital Signs:  There were no vitals taken for this visit.  There is no height or weight on file to calculate BMI.    Physical Exam:  Telemedicine  General Appearance: Well appearing in no acute distress  Psychiatric:  Normal speech mentation and  affect    Labs:  Lab Results   Component Value Date    WBC 8.58 11/18/2022    HGB 14.1 11/18/2022    HCT 44.0 11/18/2022     11/18/2022    CHOL 204 (H) 10/29/2021    TRIG 46 10/29/2021    HDL 41 10/29/2021    ALT 13 11/18/2022    AST 16 11/18/2022     11/18/2022    K 4.7 11/18/2022     11/18/2022    CREATININE 0.8 11/18/2022    BUN 13 11/18/2022    CO2 27 11/18/2022    TSH 1.277 10/14/2022    INR 1.1 11/18/2022    HGBA1C 5.0 10/27/2022               Medical Decision Making:  H pylori treatment  Triple therapy talk given  Ultrasound reviewed  Breast-feeding talk given which triple therapy  Breast-feeding talk given with CCK HIDA scan  EGD colonoscopy images and path personally reviewed by myself  The Olympus scope [id number]-H190DL ([id number]) was                          introduced through the anus and advanced to the                          terminal ileum. The colonoscopy was performed                          without difficulty. The patient tolerated the                          procedure well. The quality of the bowel                          preparation was excellent. The terminal ileum,                          ileocecal valve, appendiceal orifice, and rectum                          were photographed.   Findings:        The perianal and digital rectal examinations were normal.        A 2 mm polyp was found in the rectum. The polyp was hyperplastic.        The polyp was removed with a jumbo cold forceps. Resection and        retrieval were complete.        The exam was otherwise without abnormality on direct and        retroflexion views.        The terminal ileum appeared normal.   Impression:            - One 2 mm polyp in the rectum, removed with a                          jumbo cold forceps. Resected and retrieved.                          - The examination was otherwise normal on direct                          and retroflexion views.                          - The examined portion of the ileum  was normal.   Recommendation:        - Discharge patient to home.                          - Patient has a contact number available for                          emergencies. The signs and symptoms of potential                          delayed complications were discussed with the                          patient. Return to normal activities tomorrow.                          Written discharge instructions were provided to                          the patient.                          - Resume previous diet.                          - Continue present medications.                          - Await pathology results.                          - Repeat colonoscopy at age 46 for screening                          purposes.                          - Return to referring physician.   Attending Participation:        I personally performed the entire procedure.   Greg Leach MD   11/5/2022     The Olympus scope GIF-                          ([id number]) was introduced through the mouth, and                          advanced to the third part of duodenum. The upper                          GI endoscopy was accomplished without difficulty.                          The patient tolerated the procedure well.   Findings:        The examined esophagus was normal.        Esophagogastric landmarks were identified: the Z-line was found at        37 cm and the gastroesophageal junction was found at 37 cm from the        incisors. No hiatal hernia seen.        The cardia and gastric fundus were normal on retroflexion.        The gastric body and gastric antrum were normal. Biopsies were taken        with a cold forceps for histology.        The examined duodenum was normal.   Impression:            - Normal esophagus.                          - Esophagogastric landmarks identified. No hiatal                          hernia seen.                          - Normal gastric body and antrum. Biopsied.                          - Normal  examined duodenum.   Recommendation:        - Await pathology results.                          - Perform a colonoscopy now.                          - See colonoscopy report for further details.   Attending Participation:        I personally performed the entire procedure.   Greg Leach MD   11/5/2022     1. STOMACH, BIOPSY:   Mildly active and chronic gastritis.   Helicobacter-like organisms are identified on routine staining; H pylori   immunostain pending.   Negative for intestinal metaplasia, dysplasia or malignancy.   2. RECTAL POLYP, BIOPSY:   Hyperplastic polyp.   Negative for dysplasia or malignancy.  VC       Comment: Interp By ASIA Dawkins M.D., Signed on 12/05/2022 at 15:12   Supplemental Diagnosis 1. An immunostain for H pylori is positive for organisms, consistent with H   pylori gastritis.  Controls are adequate.         Assessment:  1. Helicobacter pylori gastritis    2. Mother currently breast-feeding    3. Intermittent right upper quadrant abdominal pain         Recommendations:  1. EGD pathology is positive for H pylori no dysplasia patient is currently breast-feeding does not want to interrupt breast-feeding she thinks she may breast-feeding for another 6 months  We have recommend that she return GI clinic in 6 months for treatment for her H pylori.  No family history of gastric cancer no ulceration.  2. History of intermittent right upper quadrant pain ultrasound negative CCK HIDA scan has been ordered.  3. Return GI clinic 6 months for H pylori treatment follow-up sooner if symptoms worsen    No follow-ups on file.      Order summary:         Thank you so much for allowing me to participate in the care of Laila Pappas    Lamine Burks MD    DISCLAIMER: This note was prepared with [employer] voice recognition transcription software. Garbled syntax, mangled or inadvertent pronouns, and other bizarre constructions may be attributed to that software system. While efforts were made to  correct any mistakes made by this voice recognition program, some errors and/or omissions may remain in the note that were missed when the note was originally created.

## 2022-12-09 ENCOUNTER — OFFICE VISIT (OUTPATIENT)
Dept: ORTHOPEDICS | Facility: CLINIC | Age: 32
End: 2022-12-09
Payer: COMMERCIAL

## 2022-12-09 ENCOUNTER — HOSPITAL ENCOUNTER (OUTPATIENT)
Dept: RADIOLOGY | Facility: HOSPITAL | Age: 32
Discharge: HOME | End: 2022-12-09
Attending: ORTHOPAEDIC SURGERY
Payer: COMMERCIAL

## 2022-12-09 VITALS — BODY MASS INDEX: 25.33 KG/M2 | WEIGHT: 143 LBS

## 2022-12-09 DIAGNOSIS — M53.3 SACRAL PAIN: ICD-10-CM

## 2022-12-09 DIAGNOSIS — M70.62 GREATER TROCHANTERIC BURSITIS OF LEFT HIP: Primary | ICD-10-CM

## 2022-12-09 PROCEDURE — 99999 PR PBB SHADOW E&M-EST. PATIENT-LVL I: CPT | Mod: PBBFAC,,, | Performed by: ORTHOPAEDIC SURGERY

## 2022-12-09 PROCEDURE — 99213 PR OFFICE/OUTPT VISIT, EST, LEVL III, 20-29 MIN: ICD-10-PCS | Mod: S$GLB,,, | Performed by: ORTHOPAEDIC SURGERY

## 2022-12-09 PROCEDURE — 72220 X-RAY EXAM SACRUM TAILBONE: CPT | Mod: TC

## 2022-12-09 PROCEDURE — 99999 PR PBB SHADOW E&M-EST. PATIENT-LVL I: ICD-10-PCS | Mod: PBBFAC,,, | Performed by: ORTHOPAEDIC SURGERY

## 2022-12-09 PROCEDURE — 72220 X-RAY EXAM SACRUM TAILBONE: CPT | Mod: 26,,, | Performed by: RADIOLOGY

## 2022-12-09 PROCEDURE — 99213 OFFICE O/P EST LOW 20 MIN: CPT | Mod: S$GLB,,, | Performed by: ORTHOPAEDIC SURGERY

## 2022-12-09 PROCEDURE — 72220 XR SACRUM AND COCCYX: ICD-10-PCS | Mod: 26,,, | Performed by: RADIOLOGY

## 2022-12-09 NOTE — PROGRESS NOTES
The patient returns for follow-up.  She has a history of left-sided lateral thigh pain during pregnancy, this has been bothersome since delivering 4 months ago.      The patient currently complains of left-sided lateral thigh pain particularly when lying on her left side, or sitting.  She is not taking any medications while breastfeeding.  She is not tried any other treatment.      Today reviewed new AP and lateral x-rays of the left hip that demonstrate no evidence of bony pathology.    On physical examination she has severe left trochanteric tenderness palpation.      Impression:  Left trochanteric bursitis     Plan:  I recommended a trochanteric bursa injection with pain management.

## 2022-12-12 ENCOUNTER — TELEPHONE (OUTPATIENT)
Dept: SPORTS MEDICINE | Facility: CLINIC | Age: 32
End: 2022-12-12
Payer: COMMERCIAL

## 2022-12-13 ENCOUNTER — PATIENT MESSAGE (OUTPATIENT)
Dept: PAIN MEDICINE | Facility: OTHER | Age: 32
End: 2022-12-13
Payer: COMMERCIAL

## 2022-12-13 NOTE — TELEPHONE ENCOUNTER
Spoke to patient in person at her 's appointment about Left shoulder MRI which was normal. Recommended activity modifications when holding her  and PT. RTC as needed in 8 weeks if no improvement.     All patients questions were answered. Patient was advised to call us with any concerns or questions.

## 2022-12-14 ENCOUNTER — OFFICE VISIT (OUTPATIENT)
Dept: HEPATOLOGY | Facility: CLINIC | Age: 32
End: 2022-12-14
Payer: COMMERCIAL

## 2022-12-14 DIAGNOSIS — K76.0 HEPATIC STEATOSIS: Primary | ICD-10-CM

## 2022-12-14 DIAGNOSIS — R76.8 ANA POSITIVE: ICD-10-CM

## 2022-12-14 PROCEDURE — 99214 OFFICE O/P EST MOD 30 MIN: CPT | Mod: 95,,, | Performed by: PHYSICIAN ASSISTANT

## 2022-12-14 PROCEDURE — 99214 PR OFFICE/OUTPT VISIT, EST, LEVL IV, 30-39 MIN: ICD-10-PCS | Mod: 95,,, | Performed by: PHYSICIAN ASSISTANT

## 2022-12-14 NOTE — PATIENT INSTRUCTIONS
Fibroscan soon   At this time liver enzymes normal  Do not suspect steatosis causing pain with normal sized liver  Follow-up in January/Feb

## 2022-12-14 NOTE — PROGRESS NOTES
The patient location is: [place], [place]   The chief complaint leading to consultation is: MIKE, hepatic steatosis    Visit type: audiovisual    Face to Face time with patient: 20  45 minutes of total time spent on the encounter, which includes face to face time and non-face to face time preparing to see the patient (eg, review of tests), Obtaining and/or reviewing separately obtained history, Documenting clinical information in the electronic or other health record, Independently interpreting results (not separately reported) and communicating results to the patient/family/caregiver, or Care coordination (not separately reported).     Each patient to whom he or she provides medical services by telemedicine is:  (1) informed of the relationship between the physician and patient and the respective role of any other health care provider with respect to management of the patient; and (2) notified that he or she may decline to receive medical services by telemedicine and may withdraw from such care at any time.    Notes:   Hepatology Consultation: [facility]    NEW PATIENT   PCP: Jhoana Trejo MD    Subjective      Ms. Pappas is a 31 y.o. female with PMH as listed below who presents to clinic for hepatic steatosis & MIKE    In relation to metabolic risk factors:   Lab Results   Component Value Date    HGBA1C 5.0 10/27/2022    CHOL 204 (H) 10/29/2021    TSH 1.277 10/14/2022     There is no height or weight on file to calculate BMI.    Lab Results   Component Value Date    ALT 13 11/18/2022    AST 16 11/18/2022    ALKPHOS 103 11/18/2022    BILITOT 0.5 11/18/2022    ALBUMIN 3.9 11/18/2022    INR 1.1 11/18/2022     11/18/2022       Initial history 12/14/2022  Laila Pappas arrives today alone.  She is well-appearing and feels well. Denies symptoms of hepatic decompensation including jaundice, ascites, cognitive problems to suggest hepatic encephalopathy, or GI bleeding.    She reports  she delivered her first child in August and since then has been experiencing weight loss and RUQ pain. PCP did a full work-up which revealed MIKE positive 1:640, 1:1280  Upper quadrant pain since she delivered, of which no etiology found.   Ultrasound suggested perhaps steatosis 2022, then saw Elvira.  Patient also suffers with joint pain throughout her body.  She reports generally a fast food diet - she realizes there is room for improvement.  She is currently breastfeeding.  Only on multivitamin, no other medications.       PMH Laila has a past medical history of Bilateral knee pain (06/19/2018), Endometriosis, Epigastric abdominal pain (12/05/2016), Gastroesophageal reflux disease (07/06/2016), Helicobacter pylori ab+, Helicobacter pylori ab+, High-tone pelvic floor dysfunction (05/02/2016), IIH (idiopathic intracranial hypertension), Palpitations (07/06/2016), and Pre-eclampsia in postpartum period (08/16/2022).   PSXH Laila has a past surgical history that includes No past surgeries; Upper gastrointestinal endoscopy; Esophagogastroduodenoscopy (N/A, 10/30/2018); Colonoscopy (N/A, 12/13/2018); Laparoscopy (2017); Synovectomy of knee (Left, 04/06/2021); Knee arthroscopy w/ plica excision (Left, 04/06/2021); Knee arthroscopy w/ debridement (04/06/2021); Arthroscopy of knee (Left, 04/06/2021); Knee arthroscopy w/ meniscectomy (Right, 04/06/2021); Esophagogastroduodenoscopy (N/A, 11/5/2022); and Colonoscopy (N/A, 11/5/2022).    Laila's family history includes Diabetes in her mother; Hypertension in her brother, maternal grandfather, and maternal grandmother; No Known Problems in her father, paternal grandfather, paternal grandmother, and sister.   CECIL Ulloa reports that she has never smoked. She has never used smokeless tobacco. She reports that she does not drink alcohol and does not use drugs.   MALATHI Ulloa is allergic to doxycycline, bactrim ds [sulfamethoxazole-trimethoprim], nsaids (non-steroidal  anti-inflammatory drug), and codeine.   REBEKAH Ulloa has a current medication list which includes the following prescription(s): multivitamin and [DISCONTINUED] folic acid.         ROS:   GENERAL: Denies fatigue  HEENT: Denies yellowing of eyes   CARDIOVASCULAR: Denies edema  GI: (+) RUQ abdominal pain  SKIN: Denies rash, itching       Objective     There were no vitals taken for this visit.    Physical exam is limited by video visit:   Friendly woman, in no acute distress; alert and oriented to person, place and time  VITALS: There were no vitals taken for this visit.   SKIN/EYES: No obvious jaundice or yellowing of the eyes.   HENT: Normocephalic, without obvious abnormality.   NECK: Supple.   CARDIOVASCULAR: DAMASO on video visit  RESPIRATORY: Normal respiratory effort.   GI: DAMASO hepatosplenomegaly  PSYCH: Thought and speech pattern appropriate.           DIAGNOSTICS  Lab Results   Component Value Date    ALT 13 11/18/2022    AST 16 11/18/2022    ALKPHOS 103 11/18/2022    BILITOT 0.5 11/18/2022    ALBUMIN 3.9 11/18/2022    INR 1.1 11/18/2022     11/18/2022     No results found for: AFP    Prior serologic workup:   Lab Results   Component Value Date    ANASCREEN Positive (A) 11/10/2022    FERRITIN 158 10/14/2022    FESATURATED 33 10/20/2022    HEPBSAG Non-reactive 11/21/2022    HEPCAB Non-reactive 11/21/2022    HEPAIGM Negative 03/01/2017       Imaging:  MRI ABDOMEN WITH AND WO_INC MRCP (XPD)     CLINICAL HISTORY:  Patient with right upper quadrant pain epigastric pain radiating to the back with gallbladder sludge rule out common bile duct stone or pancreatic lesion;  Epigastric pain     TECHNIQUE:  Multisequence, multiplanar MRI of the abdomen performed per liver protocol before and after administration of 10 mL Gadavist intravenous contrast.  Additionally 2D and 3D MRCP sequences were performed as well as MIP images.     COMPARISON:  Ultrasound abdomen 10/20/2022     FINDINGS:  Lower thorax: No significant  abnormality.     Liver: Normal homogeneous background signal.  No focal lesions.  Hepatic and portal veins are patent.     Biliary: Gallbladder is unremarkable.  No intrahepatic or extrahepatic biliary dilatation.  No focal filling defect.  MRCP is degraded by motion artifact.     Pancreas: Unremarkable.  No pancreatic ductal dilatation.     Spleen: Normal size.  No focal lesions.     Adrenal glands: Unremarkable.     Kidneys: Subcentimeter right renal cyst (14:51).  No hydronephrosis.     Miscellaneous: Visualized bowel loops are unremarkable.  No evidence for lymphadenopathy.  Marrow signal is unremarkable     Impression:     No biliary dilatation.  No focal filling defect to suggest choledocholithiasis allowing for motion artifact limitations.     No focal pancreatic lesion or pancreatic duct dilatation.     Electronically signed by resident: Bria Ndiaye  Date:                                            10/28/2022  Time:                                           10:45     Electronically signed by: Santos Carnes Jr  Date:                                            10/28/2022  Time:                                           11:30    Assessment/Plan     31 y.o. female with:    1. Hepatic steatosis  - FibroScan [place] (Vibration Controlled Transient Elastography); Future  - questionable if true, MRI did not show any specific steatosis  - do not suspect causing pain     2. MIKE positive  - at this time not significant in regards to steatosis  - f/u with rheumatologist     Orders Placed This Encounter   Procedures    FibroScan [place] (Vibration Controlled Transient Elastography)     Follow up in about 8 weeks (around 2/8/2023).    I spent 45 minutes on the day of this encounter preparing for, evaluating, treating, and managing this patient.     Thank you for allowing me to participate in the care of Laila Parker PA-C  Hepatology & Liver Transplant

## 2022-12-19 ENCOUNTER — PATIENT MESSAGE (OUTPATIENT)
Dept: NEUROLOGY | Facility: CLINIC | Age: 32
End: 2022-12-19
Payer: COMMERCIAL

## 2022-12-20 ENCOUNTER — PATIENT MESSAGE (OUTPATIENT)
Dept: PAIN MEDICINE | Facility: OTHER | Age: 32
End: 2022-12-20
Payer: COMMERCIAL

## 2022-12-20 ENCOUNTER — OFFICE VISIT (OUTPATIENT)
Dept: NEUROLOGY | Facility: CLINIC | Age: 32
End: 2022-12-20
Payer: COMMERCIAL

## 2022-12-20 ENCOUNTER — LAB VISIT (OUTPATIENT)
Dept: LAB | Facility: HOSPITAL | Age: 32
End: 2022-12-20
Attending: NEUROLOGICAL SURGERY
Payer: COMMERCIAL

## 2022-12-20 VITALS
SYSTOLIC BLOOD PRESSURE: 114 MMHG | HEIGHT: 63 IN | DIASTOLIC BLOOD PRESSURE: 57 MMHG | HEART RATE: 104 BPM | BODY MASS INDEX: 26.13 KG/M2 | WEIGHT: 147.5 LBS

## 2022-12-20 DIAGNOSIS — M62.838 MUSCLE SPASM: ICD-10-CM

## 2022-12-20 DIAGNOSIS — R20.0 NUMBNESS: Primary | ICD-10-CM

## 2022-12-20 LAB
ALBUMIN SERPL BCP-MCNC: 4 G/DL (ref 3.5–5.2)
ALP SERPL-CCNC: 108 U/L (ref 55–135)
ALT SERPL W/O P-5'-P-CCNC: 13 U/L (ref 10–44)
ANION GAP SERPL CALC-SCNC: 8 MMOL/L (ref 8–16)
AST SERPL-CCNC: 16 U/L (ref 10–40)
BASOPHILS # BLD AUTO: 0.05 K/UL (ref 0–0.2)
BASOPHILS NFR BLD: 0.5 % (ref 0–1.9)
BILIRUB SERPL-MCNC: 0.5 MG/DL (ref 0.1–1)
BUN SERPL-MCNC: 11 MG/DL (ref 6–20)
CALCIUM SERPL-MCNC: 9.3 MG/DL (ref 8.7–10.5)
CHLORIDE SERPL-SCNC: 107 MMOL/L (ref 95–110)
CK SERPL-CCNC: 75 U/L (ref 20–180)
CO2 SERPL-SCNC: 27 MMOL/L (ref 23–29)
CREAT SERPL-MCNC: 0.7 MG/DL (ref 0.5–1.4)
DIFFERENTIAL METHOD: ABNORMAL
EOSINOPHIL # BLD AUTO: 0 K/UL (ref 0–0.5)
EOSINOPHIL NFR BLD: 0.4 % (ref 0–8)
ERYTHROCYTE [DISTWIDTH] IN BLOOD BY AUTOMATED COUNT: 14.1 % (ref 11.5–14.5)
EST. GFR  (NO RACE VARIABLE): >60 ML/MIN/1.73 M^2
GLUCOSE SERPL-MCNC: 82 MG/DL (ref 70–110)
HCT VFR BLD AUTO: 44.3 % (ref 37–48.5)
HGB BLD-MCNC: 14.2 G/DL (ref 12–16)
IMM GRANULOCYTES # BLD AUTO: 0.02 K/UL (ref 0–0.04)
IMM GRANULOCYTES NFR BLD AUTO: 0.2 % (ref 0–0.5)
LYMPHOCYTES # BLD AUTO: 1.5 K/UL (ref 1–4.8)
LYMPHOCYTES NFR BLD: 15.7 % (ref 18–48)
MAGNESIUM SERPL-MCNC: 2.1 MG/DL (ref 1.6–2.6)
MCH RBC QN AUTO: 29.3 PG (ref 27–31)
MCHC RBC AUTO-ENTMCNC: 32.1 G/DL (ref 32–36)
MCV RBC AUTO: 92 FL (ref 82–98)
MONOCYTES # BLD AUTO: 0.4 K/UL (ref 0.3–1)
MONOCYTES NFR BLD: 3.8 % (ref 4–15)
NEUTROPHILS # BLD AUTO: 7.4 K/UL (ref 1.8–7.7)
NEUTROPHILS NFR BLD: 79.4 % (ref 38–73)
NRBC BLD-RTO: 0 /100 WBC
PHOSPHATE SERPL-MCNC: 3.9 MG/DL (ref 2.7–4.5)
PLATELET # BLD AUTO: 317 K/UL (ref 150–450)
PMV BLD AUTO: 10 FL (ref 9.2–12.9)
POTASSIUM SERPL-SCNC: 4.3 MMOL/L (ref 3.5–5.1)
PROT SERPL-MCNC: 7.6 G/DL (ref 6–8.4)
RBC # BLD AUTO: 4.84 M/UL (ref 4–5.4)
SODIUM SERPL-SCNC: 142 MMOL/L (ref 136–145)
WBC # BLD AUTO: 9.27 K/UL (ref 3.9–12.7)

## 2022-12-20 PROCEDURE — 99999 PR PBB SHADOW E&M-EST. PATIENT-LVL III: CPT | Mod: PBBFAC,,, | Performed by: NEUROLOGICAL SURGERY

## 2022-12-20 PROCEDURE — 80053 COMPREHEN METABOLIC PANEL: CPT | Performed by: NEUROLOGICAL SURGERY

## 2022-12-20 PROCEDURE — 99214 PR OFFICE/OUTPT VISIT, EST, LEVL IV, 30-39 MIN: ICD-10-PCS | Mod: S$GLB,,, | Performed by: NEUROLOGICAL SURGERY

## 2022-12-20 PROCEDURE — 83735 ASSAY OF MAGNESIUM: CPT | Performed by: NEUROLOGICAL SURGERY

## 2022-12-20 PROCEDURE — 84100 ASSAY OF PHOSPHORUS: CPT | Performed by: NEUROLOGICAL SURGERY

## 2022-12-20 PROCEDURE — 82085 ASSAY OF ALDOLASE: CPT | Performed by: NEUROLOGICAL SURGERY

## 2022-12-20 PROCEDURE — 36415 COLL VENOUS BLD VENIPUNCTURE: CPT | Performed by: NEUROLOGICAL SURGERY

## 2022-12-20 PROCEDURE — 82550 ASSAY OF CK (CPK): CPT | Performed by: NEUROLOGICAL SURGERY

## 2022-12-20 PROCEDURE — 99214 OFFICE O/P EST MOD 30 MIN: CPT | Mod: S$GLB,,, | Performed by: NEUROLOGICAL SURGERY

## 2022-12-20 PROCEDURE — 85025 COMPLETE CBC W/AUTO DIFF WBC: CPT | Performed by: NEUROLOGICAL SURGERY

## 2022-12-20 PROCEDURE — 99999 PR PBB SHADOW E&M-EST. PATIENT-LVL III: ICD-10-PCS | Mod: PBBFAC,,, | Performed by: NEUROLOGICAL SURGERY

## 2022-12-20 NOTE — PROGRESS NOTES
Chief Complaint   Patient presents with    Facial Pain          Laila Pappas is a 31 y.o. female with a history of multiple medical diagnoses as listed below that presents for evaluation of headaches at a suspected to be due to idiopathic intracranial hypertension.  She has been having abnormal sensations that prompted EMG/nerve conduction studies which were essentially unremarkable.  She has been having pulsatile tinnitus and has been headaches that have been intractable.  Workup thus far has included evaluation by Ophthalmology which has been unremarkable, MRV which showed changes which could be compatible with idiopathic intracranial hypertension, but has not had a lumbar puncture to this point.  She says that she White to discuss all options for treatment and management.  She has recently got  and wants to begin her family planning.  She says that she would like to have at least 3 children and would like to start this process in the very near future as long as there are no contraindications from a health standpoint.    Interval History  04/30/2021  She has been accompanied to this visit by her .  She continues to have headaches have been pulsatile in nature but also has been having episodes of a pulsatile ringing sensation has more prominent in the left ear specially if she is lying on that side.  Headache has been increasing in intensity with Valsalva and with position specially she bends at the waist.  She is considered all treatment options including diuretics and discussing is shunt placement will be helpful.  She is somewhat apprehensive about either because she and her  or family cleaning it would like to avoid any medication that could cause any complications to pregnancy or any difficulty with conception.  She is also somewhat concerned about having a potential surgery to have shunt placed.    09/14/2021  She is beginning to undergo evaluation for IVF.  As part of her preprocedure  treatment strategy she was giving antibiotics.  After having a dose of doxycycline she began to have rapid intensification of her headaches and significant positional sensitivity associated with these headaches.  She says that she was given antibiotic once in the past that also brought about her similar symptoms.  She feels that her 1st headaches were shortly after being given the medication in the past.    02/02/2022  She is currently at [facility]GA. After her most recent LP headaches have been present on occasion but have been stable. She has not had any vision changes that have not been corrected by her glasses. Headaches have been tolerable, but she has taken Tylenol for at least one headache.    05/04/2022  Pregnancy has been going well. She contracted covid and has been dealing with an increase in her headache frequency since that time. There has not been any changes to her vision nor any significant deviation in her headache semiology.     07/20/2022  She continues to have difficulty with head pain.  She feels the headaches have been increasing although have not been completely intolerable this yet.  She does have significant pain still with.  Medications she has tried thus far on limited due to pregnancy.  She says that she has not had any significant changes her vision since she was last seen clinic.    10/17/2022  She continues to do very well.    11/14/2022  She is been concerned about persistent numbness along the left arm.  She is also been having intermittent numbness in the face and along thumb.  She is been having these episodes occur almost daily.  Episodes happened without any specific warning nor any provocation.  She is not been able to find any specific pattern.  The episodes can last for approximately an hour at a time, but she is unsure about the span where she would have the experience.  She is also had the numbness along the entire side of her body as well.    12/20/2022  She is been having  persistent episodes of muscle spasms.  The episodes have been mainly noticed in the face along the left side but has also been present on the right side.  More recently she is began to have symptoms along the trunk, back of the leg on the left, and the trunk on the right on occasion.  Symptoms have been present throughout the day.  The been fairly persistent without any specific provocation.  She can not do anything to make them stop nor has she been able to identify anything that would have triggered the symptoms.  She is not had any muscle spasms.  She denies any muscle weakness.  No changes in her walking.  No changes in her vision..    PAST MEDICAL HISTORY:  Past Medical History:   Diagnosis Date    Bilateral knee pain 06/19/2018    Endometriosis     treated by GYN at     Epigastric abdominal pain 12/05/2016    Gastroesophageal reflux disease 07/06/2016    Helicobacter pylori ab+     Helicobacter pylori ab+     High-tone pelvic floor dysfunction 05/02/2016    IIH (idiopathic intracranial hypertension)     Dr. Rogers - [facility]    Palpitations 07/06/2016    Pre-eclampsia in postpartum period 08/16/2022       PAST SURGICAL HISTORY:  Past Surgical History:   Procedure Laterality Date    ARTHROSCOPY OF KNEE Left 04/06/2021    Procedure: ARTHROSCOPY, KNEE;  Surgeon: Deidra Mejias MD;  Location: [facility] OR;  Service: Orthopedics;  Laterality: Left;    COLONOSCOPY N/A 12/13/2018    Procedure: COLONOSCOPY;  Surgeon: Micki Gross MD;  Location: [address]);  Service: Endoscopy;  Laterality: N/A;  preferrably in Dec 2018, CRS ok if needed.    COLONOSCOPY N/A 11/05/2022    Procedure: COLONOSCOPY;  Surgeon: Greg Leach MD;  Location: [facility] ([address] [place] [address]);  Service: Endoscopy;  Laterality: N/A;  Endoscopy schedulers please schedule patient for colonoscopy with me for further evaluation of painless hematochezia thank you    ESOPHAGOGASTRODUODENOSCOPY N/A 10/30/2018    Procedure: EGD  (ESOPHAGOGASTRODUODENOSCOPY);  Surgeon: Greg Leach MD;  Location: [facility] ENDO (2ND FLR);  Service: Endoscopy;  Laterality: N/A;  ok to schedule per Kimmy    ESOPHAGOGASTRODUODENOSCOPY N/A 2022    Procedure: EGD (ESOPHAGOGASTRODUODENOSCOPY);  Surgeon: Greg Leach MD;  Location: [facility] ENDO (4TH FLR);  Service: Endoscopy;  Laterality: N/A;  Endoscopy schedulers please schedule patient for EGD for evaluation of right upper quadrant pain thank you  prep instr portal--ml  22 room closed-pt okay with Dr. Leach-50 min case okay per Katlyn-ml    KNEE ARTHROSCOPY W/ DEBRIDEMENT  2021    Procedure: ARTHROSCOPY, KNEE, WITH DEBRIDEMENT;  Surgeon: Deidra Mejias MD;  Location: [facility] OR;  Service: Orthopedics;;    KNEE ARTHROSCOPY W/ MENISCECTOMY Right 2021    Procedure: ARTHROSCOPY, KNEE, WITH MENISCECTOMY;  Surgeon: Deidra Mejias MD;  Location: [facility] OR;  Service: Orthopedics;  Laterality: Right;    KNEE ARTHROSCOPY W/ PLICA EXCISION Left 2021    Procedure: EXCISION, PLICA, KNEE, ARTHROSCOPIC;  Surgeon: Deidra Mejias MD;  Location: [facility] OR;  Service: Orthopedics;  Laterality: Left;    LAPAROSCOPY  2017    dx with endo.  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   Tobacco Use    Smoking status: Never    Smokeless tobacco: Never   Substance and Sexual Activity    Alcohol use: No    Drug use: No    Sexual activity: Yes     Partners: Male     Birth control/protection: None   Social History Narrative    She had her 1st baby in 2022 healthy vaginal delivery no      Social Determinants of Health     Financial Resource  Strain: Unknown    Difficulty of Paying Living Expenses: Patient refused   Food Insecurity: Unknown    Worried About Running Out of Food in the Last Year: Patient refused    Ran Out of Food in the Last Year: Patient refused   Transportation Needs: Unknown    Lack of Transportation (Medical): Patient refused    Lack of Transportation (Non-Medical): Patient refused   Physical Activity: Unknown    Days of Exercise per Week: Patient refused   Social Connections: Unknown    Frequency of Communication with Friends and Family: Patient refused    Frequency of Social Gatherings with Friends and Family: Patient refused    Active Member of Clubs or Organizations: Patient refused    Attends Club or Organization Meetings: Patient refused    Marital Status: Patient refused   Housing Stability: Unknown    Unable to Pay for Housing in the Last Year: Patient refused    Unstable Housing in the Last Year: Patient refused       FAMILY HISTORY:  Family History   Problem Relation Age of Onset    Diabetes Mother     No Known Problems Father     No Known Problems Sister     Hypertension Brother     Hypertension Maternal Grandmother     Hypertension Maternal Grandfather     No Known Problems Paternal Grandmother     No Known Problems Paternal Grandfather     Colon cancer Neg Hx     Crohn's disease Neg Hx     Esophageal cancer Neg Hx     Inflammatory bowel disease Neg Hx     Irritable bowel syndrome Neg Hx     Rectal cancer Neg Hx     Stomach cancer Neg Hx     Ulcerative colitis Neg Hx     Celiac disease Neg Hx     Cirrhosis Neg Hx     Colon polyps Neg Hx     Liver cancer Neg Hx     Liver disease Neg Hx        ALLERGIES AND MEDICATIONS: updated and reviewed.  Review of patient's allergies indicates:   Allergen Reactions    Doxycycline Other (See Comments)     Triggers headaches    Bactrim ds [sulfamethoxazole-trimethoprim] Nausea And Vomiting    Nsaids (non-steroidal anti-inflammatory drug) Other (See Comments)     D/t h/o PUD and H pylori     Codeine Rash     Current Outpatient Medications   Medication Sig Dispense Refill    multivitamin capsule Take 1 capsule by mouth once daily.       No current facility-administered medications for this visit.       Review of Systems   Constitutional: Negative for activity change, appetite change, fever and unexpected weight change.   HENT: Negative for trouble swallowing and voice change.    Eyes: Negative for photophobia and visual disturbance.   Respiratory: Negative for apnea and shortness of breath.    Cardiovascular: Negative for chest pain and leg swelling.   Gastrointestinal: Negative for constipation and nausea.   Genitourinary: Negative for difficulty urinating.   Musculoskeletal: Negative for back pain, gait problem and neck pain.   Skin: Negative for color change and pallor.   Neurological: Positive for headaches. Negative for dizziness, seizures, syncope, weakness and numbness.   Hematological: Negative for adenopathy.   Psychiatric/Behavioral: Negative for agitation, confusion and decreased concentration.       Neurologic Exam     Mental Status   Oriented to person, place, and time.   Registration: recalls 3 of 3 objects.   Attention: normal. Concentration: normal.   Speech: speech is normal   Level of consciousness: alert  Knowledge: good.     Cranial Nerves     CN II   Right visual field deficit: none  Left visual field deficit: none     CN III, IV, VI   Extraocular motions are normal.   Right pupil: Size: 3 mm. Shape: regular.   Left pupil: Size: 3 mm. Shape: regular.   CN III: no CN III palsy  CN VI: no CN VI palsy  Nystagmus: none   Diplopia: none  Ophthalmoparesis: none  Upgaze: normal  Downgaze: normal  Conjugate gaze: present    CN VII   Facial expression full, symmetric.   Right facial weakness: none  Left facial weakness: none    CN VIII   CN VIII normal.     CN XI   CN XI normal.     CN XII   CN XII normal.   Tongue deviation: none    Motor Exam   Muscle bulk: normal  Overall muscle tone:  "normal  Right arm tone: normal  Left arm tone: normal  Right leg tone: normal  Left leg tone: normal    Gait, Coordination, and Reflexes     Gait  Gait: normal    Coordination   Finger to nose coordination: normal    Tremor   Resting tremor: absent      Physical Exam  Vitals reviewed.   Constitutional:       Appearance: She is well-developed.   HENT:      Head: Normocephalic and atraumatic.   Eyes:      Extraocular Movements: EOM normal.   Pulmonary:      Effort: Pulmonary effort is normal. No respiratory distress.   Musculoskeletal:         General: Normal range of motion.      Cervical back: Normal range of motion.   Neurological:      Mental Status: She is alert and oriented to person, place, and time.      Coordination: Finger-Nose-Finger Test normal.      Gait: Gait is intact.   Psychiatric:         Speech: Speech normal.         Behavior: Behavior normal.         Thought Content: Thought content normal.         Vitals:    12/20/22 0857   BP: (!) 114/57   Pulse: 104   Weight: 66.9 kg (147 lb 7.8 oz)   Height: 5' 3" (1.6 m)       Assessment & Plan:    Problem List Items Addressed This Visit    None  Visit Diagnoses       Numbness    -  Primary    Relevant Orders    EMG W/ ULTRASOUND AND NERVE CONDUCTION TEST 2 Extremities    Muscle spasm        Relevant Orders    CBC auto differential    Comprehensive metabolic panel    Magnesium    Phosphorus    CK    Aldolase          Questionable episodes of numbness could be related to a migrainous versus an epileptic phenomenon.  Patient will benefit from routine and likely long-term EEG to try to capture these events to better clarify the source.  She will be referred for long-term ambulatory video EEG after her ambulatory EEG to better delineate.    Follow-up: No follow-ups on file.    This note was done with the assistance of voice recognition software. Some errors may be present after proofreading.                      "

## 2022-12-21 LAB — ALDOLASE SERPL-CCNC: 4.5 U/L (ref 1.2–7.6)

## 2023-01-03 ENCOUNTER — PATIENT MESSAGE (OUTPATIENT)
Dept: NEUROLOGY | Facility: CLINIC | Age: 33
End: 2023-01-03
Payer: COMMERCIAL

## 2023-01-13 ENCOUNTER — PROCEDURE VISIT (OUTPATIENT)
Dept: NEUROLOGY | Facility: CLINIC | Age: 33
End: 2023-01-13
Payer: COMMERCIAL

## 2023-01-13 DIAGNOSIS — R20.0 NUMBNESS: ICD-10-CM

## 2023-01-13 DIAGNOSIS — M62.838 MUSCLE SPASM: Primary | ICD-10-CM

## 2023-01-13 PROCEDURE — 95886 PR EMG COMPLETE, W/ NERVE CONDUCTION STUDIES, 5+ MUSCLES: ICD-10-PCS | Mod: S$GLB,,, | Performed by: STUDENT IN AN ORGANIZED HEALTH CARE EDUCATION/TRAINING PROGRAM

## 2023-01-13 PROCEDURE — 95911 NRV CNDJ TEST 9-10 STUDIES: CPT | Mod: S$GLB,,, | Performed by: STUDENT IN AN ORGANIZED HEALTH CARE EDUCATION/TRAINING PROGRAM

## 2023-01-13 PROCEDURE — 95886 MUSC TEST DONE W/N TEST COMP: CPT | Mod: S$GLB,,, | Performed by: STUDENT IN AN ORGANIZED HEALTH CARE EDUCATION/TRAINING PROGRAM

## 2023-01-13 PROCEDURE — 99214 OFFICE O/P EST MOD 30 MIN: CPT | Mod: S$GLB,,, | Performed by: STUDENT IN AN ORGANIZED HEALTH CARE EDUCATION/TRAINING PROGRAM

## 2023-01-13 PROCEDURE — 95911 PR NERVE CONDUCTION STUDY; 9-10 STUDIES: ICD-10-PCS | Mod: S$GLB,,, | Performed by: STUDENT IN AN ORGANIZED HEALTH CARE EDUCATION/TRAINING PROGRAM

## 2023-01-13 PROCEDURE — 99214 PR OFFICE/OUTPT VISIT, EST, LEVL IV, 30-39 MIN: ICD-10-PCS | Mod: S$GLB,,, | Performed by: STUDENT IN AN ORGANIZED HEALTH CARE EDUCATION/TRAINING PROGRAM

## 2023-01-17 NOTE — PROCEDURES
Procedures    CC: Numbness, tingling    Laila Pappas is a 32 y.o. female with a history of multiple medical diagnoses as listed below that presents for evaluation of headaches at a suspected to be due to idiopathic intracranial hypertension.  She has been having abnormal sensations that prompted EMG/nerve conduction studies which were essentially unremarkable.  She has been having pulsatile tinnitus and has been headaches that have been intractable.  Workup thus far has included evaluation by Ophthalmology which has been unremarkable, MRV which showed changes which could be compatible with idiopathic intracranial hypertension, but has not had a lumbar puncture to this point.  She says that she White to discuss all options for treatment and management.  She has recently got  and wants to begin her family planning.  She says that she would like to have at least 3 children and would like to start this process in the very near future as long as there are no contraindications from a health standpoint.    Interval History  04/30/2021  She has been accompanied to this visit by her .  She continues to have headaches have been pulsatile in nature but also has been having episodes of a pulsatile ringing sensation has more prominent in the left ear specially if she is lying on that side.  Headache has been increasing in intensity with Valsalva and with position specially she bends at the waist.  She is considered all treatment options including diuretics and discussing is shunt placement will be helpful.  She is somewhat apprehensive about either because she and her  or family cleaning it would like to avoid any medication that could cause any complications to pregnancy or any difficulty with conception.  She is also somewhat concerned about having a potential surgery to have shunt placed.    09/14/2021  She is beginning to undergo evaluation for IVF.  As part of her preprocedure treatment strategy she was  giving antibiotics.  After having a dose of doxycycline she began to have rapid intensification of her headaches and significant positional sensitivity associated with these headaches.  She says that she was given antibiotic once in the past that also brought about her similar symptoms.  She feels that her 1st headaches were shortly after being given the medication in the past.    02/02/2022  She is currently at 13 WGA. After her most recent LP headaches have been present on occasion but have been stable. She has not had any vision changes that have not been corrected by her glasses. Headaches have been tolerable, but she has taken Tylenol for at least one headache.    05/04/2022  Pregnancy has been going well. She contracted covid and has been dealing with an increase in her headache frequency since that time. There has not been any changes to her vision nor any significant deviation in her headache semiology.     07/20/2022  She continues to have difficulty with head pain.  She feels the headaches have been increasing although have not been completely intolerable this yet.  She does have significant pain still with.  Medications she has tried thus far on limited due to pregnancy.  She says that she has not had any significant changes her vision since she was last seen clinic.    10/17/2022  She continues to do very well.    11/14/2022  She is been concerned about persistent numbness along the left arm.  She is also been having intermittent numbness in the face and along thumb.  She is been having these episodes occur almost daily.  Episodes happened without any specific warning nor any provocation.  She is not been able to find any specific pattern.  The episodes can last for approximately an hour at a time, but she is unsure about the span where she would have the experience.  She is also had the numbness along the entire side of her body as well.    12/20/2022  She is been having persistent episodes of muscle  spasms.  The episodes have been mainly noticed in the face along the left side but has also been present on the right side.  More recently she is began to have symptoms along the trunk, back of the leg on the left, and the trunk on the right on occasion.  Symptoms have been present throughout the day.  The been fairly persistent without any specific provocation.  She can not do anything to make them stop nor has she been able to identify anything that would have triggered the symptoms.  She is not had any muscle spasms.  She denies any muscle weakness.  No changes in her walking.  No changes in her vision..    3/28/23  Continued muscle spasms.     PAST MEDICAL HISTORY:  Past Medical History:   Diagnosis Date    Bilateral knee pain 06/19/2018    Endometriosis     treated by GYN at     Epigastric abdominal pain 12/05/2016    Gastroesophageal reflux disease 07/06/2016    Helicobacter pylori ab+     Helicobacter pylori ab+     High-tone pelvic floor dysfunction 05/02/2016    IIH (idiopathic intracranial hypertension)     Dr. Rogers - [facility]    Palpitations 07/06/2016    Pre-eclampsia in postpartum period 08/16/2022       PAST SURGICAL HISTORY:  Past Surgical History:   Procedure Laterality Date    ARTHROSCOPY OF KNEE Left 04/06/2021    Procedure: ARTHROSCOPY, KNEE;  Surgeon: Deidra Mejias MD;  Location: [facility] OR;  Service: Orthopedics;  Laterality: Left;    COLONOSCOPY N/A 12/13/2018    Procedure: COLONOSCOPY;  Surgeon: Micki Gross MD;  Location: [address]);  Service: Endoscopy;  Laterality: N/A;  preferrably in Dec 2018, CRS ok if needed.    COLONOSCOPY N/A 11/05/2022    Procedure: COLONOSCOPY;  Surgeon: Greg Leach MD;  Location: [address]);  Service: Endoscopy;  Laterality: N/A;  Endoscopy schedulers please schedule patient for colonoscopy with me for further evaluation of painless hematochezia thank you    ESOPHAGOGASTRODUODENOSCOPY N/A 10/30/2018    Procedure: EGD  (ESOPHAGOGASTRODUODENOSCOPY);  Surgeon: Greg Leach MD;  Location: [facility] ENDO (2ND FLR);  Service: Endoscopy;  Laterality: N/A;  ok to schedule per Kimmy    ESOPHAGOGASTRODUODENOSCOPY N/A 2022    Procedure: EGD (ESOPHAGOGASTRODUODENOSCOPY);  Surgeon: Greg Leach MD;  Location: [facility] ENDO (4TH FLR);  Service: Endoscopy;  Laterality: N/A;  Endoscopy schedulers please schedule patient for EGD for evaluation of right upper quadrant pain thank you  prep instr portal--ml  22 room closed-pt okay with Dr. Leach-50 min case okay per Katlyn-ml    KNEE ARTHROSCOPY W/ DEBRIDEMENT  2021    Procedure: ARTHROSCOPY, KNEE, WITH DEBRIDEMENT;  Surgeon: Deidra Mejias MD;  Location: [facility] OR;  Service: Orthopedics;;    KNEE ARTHROSCOPY W/ MENISCECTOMY Right 2021    Procedure: ARTHROSCOPY, KNEE, WITH MENISCECTOMY;  Surgeon: Deidra Mejias MD;  Location: [facility] OR;  Service: Orthopedics;  Laterality: Right;    KNEE ARTHROSCOPY W/ PLICA EXCISION Left 2021    Procedure: EXCISION, PLICA, KNEE, ARTHROSCOPIC;  Surgeon: Deidra Mejias MD;  Location: [facility] OR;  Service: Orthopedics;  Laterality: Left;    LAPAROSCOPY  2017    dx with endo.  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   Tobacco Use    Smoking status: Never    Smokeless tobacco: Never   Substance and Sexual Activity    Alcohol use: No    Drug use: No    Sexual activity: Yes     Partners: Male     Birth control/protection: None   Social History Narrative    She had her 1st baby in 2022 healthy vaginal delivery no      Social Determinants of Health     Financial Resource  Strain: Unknown    Difficulty of Paying Living Expenses: Patient refused   Food Insecurity: Unknown    Worried About Running Out of Food in the Last Year: Patient refused    Ran Out of Food in the Last Year: Patient refused   Transportation Needs: Unknown    Lack of Transportation (Medical): Patient refused    Lack of Transportation (Non-Medical): Patient refused   Physical Activity: Unknown    Days of Exercise per Week: Patient refused   Social Connections: Unknown    Frequency of Communication with Friends and Family: Patient refused    Frequency of Social Gatherings with Friends and Family: Patient refused    Active Member of Clubs or Organizations: Patient refused    Attends Club or Organization Meetings: Patient refused    Marital Status: Patient refused   Housing Stability: Unknown    Unable to Pay for Housing in the Last Year: Patient refused    Unstable Housing in the Last Year: Patient refused       FAMILY HISTORY:  Family History   Problem Relation Age of Onset    Diabetes Mother     No Known Problems Father     No Known Problems Sister     Hypertension Brother     Hypertension Maternal Grandmother     Hypertension Maternal Grandfather     No Known Problems Paternal Grandmother     No Known Problems Paternal Grandfather     Colon cancer Neg Hx     Crohn's disease Neg Hx     Esophageal cancer Neg Hx     Inflammatory bowel disease Neg Hx     Irritable bowel syndrome Neg Hx     Rectal cancer Neg Hx     Stomach cancer Neg Hx     Ulcerative colitis Neg Hx     Celiac disease Neg Hx     Cirrhosis Neg Hx     Colon polyps Neg Hx     Liver cancer Neg Hx     Liver disease Neg Hx        ALLERGIES AND MEDICATIONS: updated and reviewed.  Review of patient's allergies indicates:   Allergen Reactions    Doxycycline Other (See Comments)     Triggers headaches    Bactrim ds [sulfamethoxazole-trimethoprim] Nausea And Vomiting    Nsaids (non-steroidal anti-inflammatory drug) Other (See Comments)     D/t h/o PUD and H pylori     Codeine Rash     Current Outpatient Medications   Medication Sig Dispense Refill    multivitamin capsule Take 1 capsule by mouth once daily.       No current facility-administered medications for this visit.       Review of Systems   Constitutional: Negative for activity change, appetite change, fever and unexpected weight change.   HENT: Negative for trouble swallowing and voice change.    Eyes: Negative for photophobia and visual disturbance.   Respiratory: Negative for apnea and shortness of breath.    Cardiovascular: Negative for chest pain and leg swelling.   Gastrointestinal: Negative for constipation and nausea.   Genitourinary: Negative for difficulty urinating.   Musculoskeletal: Negative for back pain, gait problem and neck pain.   Skin: Negative for color change and pallor.   Neurological: Positive for headaches. Negative for dizziness, seizures, syncope, weakness and numbness.   Hematological: Negative for adenopathy.   Psychiatric/Behavioral: Negative for agitation, confusion and decreased concentration.       Neurologic Exam     Mental Status   Oriented to person, place, and time.   Registration: recalls 3 of 3 objects.   Attention: normal. Concentration: normal.   Speech: speech is normal   Level of consciousness: alert  Knowledge: good.     Cranial Nerves     CN II   Right visual field deficit: none  Left visual field deficit: none     CN III, IV, VI   Extraocular motions are normal.   Right pupil: Size: 3 mm. Shape: regular.   Left pupil: Size: 3 mm. Shape: regular.   CN III: no CN III palsy  CN VI: no CN VI palsy  Nystagmus: none   Diplopia: none  Ophthalmoparesis: none  Upgaze: normal  Downgaze: normal  Conjugate gaze: present    CN VII   Facial expression full, symmetric.   Right facial weakness: none  Left facial weakness: none    CN VIII   CN VIII normal.     CN XI   CN XI normal.     CN XII   CN XII normal.   Tongue deviation: none    Motor Exam   Muscle bulk: normal  Overall muscle tone:  normal  Right arm tone: normal  Left arm tone: normal  Right leg tone: normal  Left leg tone: normal    Gait, Coordination, and Reflexes     Gait  Gait: normal    Coordination   Finger to nose coordination: normal    Tremor   Resting tremor: absent      Physical Exam  Vitals reviewed.   Constitutional:       Appearance: She is well-developed.   HENT:      Head: Normocephalic and atraumatic.   Eyes:      Extraocular Movements: EOM normal.   Pulmonary:      Effort: Pulmonary effort is normal. No respiratory distress.   Musculoskeletal:         General: Normal range of motion.      Cervical back: Normal range of motion.   Neurological:      Mental Status: She is alert and oriented to person, place, and time.      Coordination: Finger-Nose-Finger Test normal.      Gait: Gait is intact.   Psychiatric:         Speech: Speech normal.         Behavior: Behavior normal.         Thought Content: Thought content normal.     There were no vitals filed for this visit.    Assessment & Plan:    Problem List Items Addressed This Visit          Orthopedic    Muscle spasm - Primary       Other    Numbness     Questionable episodes of numbness could be related to a migrainous versus an epileptic phenomenon. NCS/EMG of upper and lower extremities normal.     Follow-up: No follow-ups on file.

## 2023-03-28 PROBLEM — M62.838 MUSCLE SPASM: Status: ACTIVE | Noted: 2023-03-28

## 2023-03-28 PROBLEM — R20.0 NUMBNESS: Status: ACTIVE | Noted: 2023-03-28

## 2023-05-19 ENCOUNTER — TELEPHONE (OUTPATIENT)
Dept: INTERNAL MEDICINE | Facility: CLINIC | Age: 33
End: 2023-05-19
Payer: COMMERCIAL

## 2023-08-16 ENCOUNTER — OFFICE VISIT (OUTPATIENT)
Dept: CARDIOLOGY | Facility: CLINIC | Age: 33
End: 2023-08-16
Payer: COMMERCIAL

## 2023-08-16 VITALS
DIASTOLIC BLOOD PRESSURE: 78 MMHG | SYSTOLIC BLOOD PRESSURE: 105 MMHG | HEART RATE: 100 BPM | HEIGHT: 63 IN | WEIGHT: 159.63 LBS | BODY MASS INDEX: 28.29 KG/M2

## 2023-08-16 DIAGNOSIS — R07.89 OTHER CHEST PAIN: Primary | ICD-10-CM

## 2023-08-16 DIAGNOSIS — E78.5 DYSLIPIDEMIA: ICD-10-CM

## 2023-08-16 PROCEDURE — 99999 PR PBB SHADOW E&M-EST. PATIENT-LVL III: ICD-10-PCS | Mod: PBBFAC,,, | Performed by: INTERNAL MEDICINE

## 2023-08-16 PROCEDURE — 99215 PR OFFICE/OUTPT VISIT, EST, LEVL V, 40-54 MIN: ICD-10-PCS | Mod: 25,S$GLB,, | Performed by: INTERNAL MEDICINE

## 2023-08-16 PROCEDURE — 93000 ELECTROCARDIOGRAM COMPLETE: CPT | Mod: S$GLB,,, | Performed by: INTERNAL MEDICINE

## 2023-08-16 PROCEDURE — 93000 EKG 12-LEAD: ICD-10-PCS | Mod: S$GLB,,, | Performed by: INTERNAL MEDICINE

## 2023-08-16 PROCEDURE — 99999 PR PBB SHADOW E&M-EST. PATIENT-LVL III: CPT | Mod: PBBFAC,,, | Performed by: INTERNAL MEDICINE

## 2023-08-16 PROCEDURE — 99215 OFFICE O/P EST HI 40 MIN: CPT | Mod: 25,S$GLB,, | Performed by: INTERNAL MEDICINE

## 2023-08-16 NOTE — PATIENT INSTRUCTIONS
Assessment/Plan:   Laila Pappas is a 31 y.o. female who is 33 weeks pregnant with GERD, idiopathic intracranial hypertension, who presents for an initial appointment.     1. Chest Pain- Pt with family history of premature CAD/MI.  10 year ASCVD risk score is low given age and lack of comorbid conditions.  Check exercise stress echo with doppler to evaluate for myocardial ischemia.      Will call pt with results of studies  [facility] follow up in 3 months

## 2023-08-16 NOTE — PROGRESS NOTES
"[facility]      Chief Complaint   Patient presents with    chest discomfort/pain       Patient ID: Laila Pappas is a 32 y.o. female with HLD, GERD, idiopathic intracranial hypertension, who presents for a scheduled appointment.  Pertinent history/events are as follows:     -Pt kindly referred by Amanda Benjamin NP for evaluation of chest pain and SOB.     -At our initial clinic visit on 6/3/2022, Ms. Pappas reports chest tightness and SOB which started in 4/2022 after she was diagnosed with COVID.  States chest tightness and SOB is constant and "never goes away".  Nothing makes the symptoms better or worse.  She presented to the ED due to these symptoms on 6/9/2022.  Heart rated documented as 135 at that time.  There is no EKG in Epic from that encounter.  She was diagnosed with GERD and given Pepcid and bicitra administered with improvement in symptoms.  Reports family history of CAD with MI in brother at age 34.  Exercise Stress Echo on 1/8/2021 revealed no evidence of myocardial ischemia; EF 63%; normal LV diastolic and RV systolic function; normal central venous pressure (3 mmHg); estimated PA systolic pressure is 21 mmHg.  Plan:   Chest Pain/SOB- Symptoms likely related to 33 weeks gestation of pregnancy.  Pt also with GERD and previous COVID19 infection.  Exercise Stress Echo on 1/8/2021 revealed no evidence of myocardial ischemia; EF 63%; normal LV diastolic and RV systolic function; normal central venous pressure (3 mmHg); estimated PA systolic pressure is 21 mmHg. EKG shows normal sinus rhythm with nonspecific T wave abnormality.  Check 48 hour holter to evaluate for arrhythmia.  Check V/Q scan and BLE ultrasound to rule out PE/DVT.    HPI:  Ms. Pappas reports an episode of chest tightness on yesterday while sitting at her desk at work.  Episode lasted "over an hour".  She reports family history of CAD with MI in her brother at age 34 and maternal uncle at age 45.  EKG today shows normal " sinus rhythm/right atrial enlargement/non-specific ST/T wave changes.    Past Medical History:   Diagnosis Date    Bilateral knee pain 06/19/2018    Endometriosis     treated by GYN at     Epigastric abdominal pain 12/05/2016    Gastroesophageal reflux disease 07/06/2016    Helicobacter pylori ab+     Helicobacter pylori ab+     High-tone pelvic floor dysfunction 05/02/2016    IIH (idiopathic intracranial hypertension)     Dr. Rogers - [facility]    Palpitations 07/06/2016    Pre-eclampsia in postpartum period 08/16/2022     Past Surgical History:   Procedure Laterality Date    ARTHROSCOPY OF KNEE Left 04/06/2021    Procedure: ARTHROSCOPY, KNEE;  Surgeon: Deidra Mejias MD;  Location: [facility] OR;  Service: Orthopedics;  Laterality: Left;    COLONOSCOPY N/A 12/13/2018    Procedure: COLONOSCOPY;  Surgeon: Micki Gross MD;  Location: [facility] (4TH FLR);  Service: Endoscopy;  Laterality: N/A;  preferrably in Dec 2018, CRS ok if needed.    COLONOSCOPY N/A 11/05/2022    Procedure: COLONOSCOPY;  Surgeon: Greg Leach MD;  Location: [facility] (4TH FLR);  Service: Endoscopy;  Laterality: N/A;  Endoscopy schedulers please schedule patient for colonoscopy with me for further evaluation of painless hematochezia thank you    ESOPHAGOGASTRODUODENOSCOPY N/A 10/30/2018    Procedure: EGD (ESOPHAGOGASTRODUODENOSCOPY);  Surgeon: Greg Leach MD;  [facility]  Service: Endoscopy;  Laterality: N/A;  ok to schedule per Kimmy    ESOPHAGOGASTRODUODENOSCOPY N/A 11/05/2022    Procedure: EGD (ESOPHAGOGASTRODUODENOSCOPY);  Surgeon: Greg Leach MD;  Location: [facility] (4TH FLR);  Service: Endoscopy;  Laterality: N/A;  Endoscopy schedulers please schedule patient for EGD for evaluation of right upper quadrant pain thank you  prep instr portal--ml  11/2/22 room closed-pt okay with Dr. Leach-50 min case okay per Katlyn-ml    KNEE ARTHROSCOPY W/ DEBRIDEMENT  04/06/2021    Procedure: ARTHROSCOPY, KNEE, WITH  DEBRIDEMENT;  Surgeon: Deidra Mejias MD;  Location: [facility] OR;  Service: Orthopedics;;    KNEE ARTHROSCOPY W/ MENISCECTOMY Right 2021    Procedure: ARTHROSCOPY, KNEE, WITH MENISCECTOMY;  Surgeon: Deidra Mejias MD;  Location: [facility] OR;  Service: Orthopedics;  Laterality: Right;    KNEE ARTHROSCOPY W/ PLICA EXCISION Left 2021    Procedure: EXCISION, PLICA, KNEE, ARTHROSCOPIC;  Surgeon: Deidra Mejias MD;  Location: [facility] OR;  Service: Orthopedics;  Laterality: Left;    LAPAROSCOPY  2017    dx with endo.  no removal    NO PAST SURGERIES      SYNOVECTOMY OF KNEE Left 2021    Procedure: SYNOVECTOMY, KNEE;  Surgeon: Deidra Mejias MD;  Location: [facility] OR;  Service: Orthopedics;  Laterality: Left;    UPPER GASTROINTESTINAL ENDOSCOPY       Social History     Socioeconomic History    Marital status: Single   Occupational History     Employer: [employer]     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   Tobacco Use    Smoking status: Never    Smokeless tobacco: Never   Substance and Sexual Activity    Alcohol use: No    Drug use: No    Sexual activity: Yes     Partners: Male     Birth control/protection: None   Social History Narrative    She had her 1st baby in 2022 healthy vaginal delivery no      Social Determinants of Health     Financial Resource Strain: Unknown (10/25/2022)    Overall Financial Resource Strain (CARDIA)     Difficulty of Paying Living Expenses: Patient refused   Food Insecurity: Unknown (10/25/2022)    Hunger Vital Sign     Worried About Running Out of Food in the Last Year: Patient refused     Ran Out of Food in the Last Year: Patient refused   Transportation Needs: Unknown (10/25/2022)    PRAPARE - Transportation     Lack of Transportation (Medical): Patient refused     Lack of Transportation (Non-Medical): Patient refused   Physical Activity: Unknown (10/25/2022)    Exercise Vital Sign     Days of Exercise per Week: Patient refused   Stress: No Stress Concern Present  (9/29/2020)    [employer] [place] [employer] - Occupational Stress Questionnaire     Feeling of Stress : Only a little   Social Connections: Unknown (10/25/2022)    Social Connection and Isolation Panel [NHANES]     Frequency of Communication with Friends and Family: Patient refused     Frequency of Social Gatherings with Friends and Family: Patient refused     Active Member of Clubs or Organizations: Patient refused     Attends Club or Organization Meetings: Patient refused     Marital Status: Patient refused   Housing Stability: Unknown (10/25/2022)    Housing Stability Vital Sign     Unable to Pay for Housing in the Last Year: Patient refused     Unstable Housing in the Last Year: Patient refused     Family History   Problem Relation Age of Onset    Diabetes Mother     No Known Problems Father     No Known Problems Sister     Hypertension Brother     Hypertension Maternal Grandmother     Hypertension Maternal Grandfather     No Known Problems Paternal Grandmother     No Known Problems Paternal Grandfather     Colon cancer Neg Hx     Crohn's disease Neg Hx     Esophageal cancer Neg Hx     Inflammatory bowel disease Neg Hx     Irritable bowel syndrome Neg Hx     Rectal cancer Neg Hx     Stomach cancer Neg Hx     Ulcerative colitis Neg Hx     Celiac disease Neg Hx     Cirrhosis Neg Hx     Colon polyps Neg Hx     Liver cancer Neg Hx     Liver disease Neg Hx        Review of patient's allergies indicates:   Allergen Reactions    Doxycycline Other (See Comments)     Triggers headaches    Bactrim ds [sulfamethoxazole-trimethoprim] Nausea And Vomiting    Nsaids (non-steroidal anti-inflammatory drug) Other (See Comments)     D/t h/o PUD and H pylori    Codeine Rash       Medication List with Changes/Refills   Discontinued Medications    MULTIVITAMIN CAPSULE    Take 1 capsule by mouth once daily.       Review of Systems  Constitution: Denies chills, fever, and sweats.  HENT: Denies headaches or blurry  "vision.  Cardiovascular: Denies chest pain or irregular heart beat.  Respiratory: Denies cough or shortness of breath.  Gastrointestinal: Denies abdominal pain, nausea, or vomiting.  Musculoskeletal: Denies muscle cramps.  Neurological: Denies dizziness or focal weakness.  Psychiatric/Behavioral: Normal mental status.  Hematologic/Lymphatic: Denies bleeding problem or easy bruising/bleeding.  Skin: Denies rash or suspicious lesions    Physical Examination  /78   Pulse 100   Ht 5' 3" (1.6 m)   Wt 72.4 kg (159 lb 9.8 oz)   BMI 28.27 kg/m²     Constitutional: No acute distress, conversant  HEENT: Sclera anicteric, Pupils equal, round and reactive to light, extraocular motions intact, Oropharynx clear  Neck: No JVD, no carotid bruits  Cardiovascular: regular rate and rhythm, no murmur, rubs or gallops, normal S1/S2  Pulmonary: Clear to auscultation bilaterally  Abdominal: Abdomen soft, nontender, nondistended, positive bowel sounds  Extremities: No lower extremity edema,   Pulses:  Carotid pulses are 2+ on the right side, and 2+ on the left side.  Radial pulses are 2+ on the right side, and 2+ on the left side.   Femoral pulses are 2+ on the right side, and 2+ on the left side.  Popliteal pulses are 2+ on the right side, and 2+ on the left side.   Dorsalis pedis pulses are 2+ on the right side, and 2+ on the left side.   Posterior tibial pulses are 2+ on the right side, and 2+ on the left side.    Skin: No ecchymosis, erythema, or ulcers  Psych: Alert and oriented x 3, appropriate affect  Neuro: CNII-XII intact, no focal deficits    Labs:  Most Recent Data  CBC:   Lab Results   Component Value Date    WBC 9.27 12/20/2022    HGB 14.2 12/20/2022    HCT 44.3 12/20/2022     12/20/2022    MCV 92 12/20/2022    RDW 14.1 12/20/2022     BMP:   Lab Results   Component Value Date     12/20/2022    K 4.3 12/20/2022     12/20/2022    CO2 27 12/20/2022    BUN 11 12/20/2022    CREATININE 0.7 12/20/2022    " GLU 82 12/20/2022    CALCIUM 9.3 12/20/2022    MG 2.1 12/20/2022    PHOS 3.9 12/20/2022     LFTS;   Lab Results   Component Value Date    PROT 7.6 12/20/2022    ALBUMIN 4.0 12/20/2022    BILITOT 0.5 12/20/2022    AST 16 12/20/2022    ALKPHOS 108 12/20/2022    ALT 13 12/20/2022     COAGS:   Lab Results   Component Value Date    INR 1.1 11/18/2022     FLP:   Lab Results   Component Value Date    CHOL 204 (H) 10/29/2021    HDL 41 10/29/2021    LDLCALC 153.8 10/29/2021    TRIG 46 10/29/2021    CHOLHDL 20.1 10/29/2021     CARDIAC:   Lab Results   Component Value Date    TROPONINI <0.006 06/17/2021       Imaging:    EKG 8/16/2023:  Normal sinus rhythm  Right atrial enlargement  Nonspecific T wave abnormality    V/Q Scan 7/13/2022:  Normal lung perfusion.    48 Hour Holter Monitor 7/6/2022:  Sinus rhythm  Normal heart rate behavior  Very rare ectopy    Echo 7/8/2021:  The left ventricle is normal in size with normal systolic function.  The estimated ejection fraction is 60%.  Normal left ventricular diastolic function.  Normal right ventricular size with normal right ventricular systolic function.  Normal central venous pressure (3 mmHg).    Exercise Stress Echo 1/8/2021:  The left ventricle is normal in size with normal systolic function.  The patient reported chest pain during the stress test.  The test was stopped because the patient experienced chest pain.  There were no arrhythmias during stress.  The patient's exercise capacity was below average.  The ECG portion of this study is negative for myocardial ischemia.  The estimated ejection fraction is 63%.  Normal left ventricular diastolic function.  Normal right ventricular size with normal right ventricular systolic function.  Normal central venous pressure (3 mmHg).  The estimated PA systolic pressure is 21 mmHg.  The stress ECG and echo portions of this study are negative for myocardial ischemia despite the mild chest pain reported    Assessment/Plan:   Laila Urban  Mian is a 32 y.o. female with HLD, GERD, idiopathic intracranial hypertension, who presents for a scheduled appointment.     1. Chest Pain- Pt with family history of premature CAD/MI.  Check exercise stress echo with doppler to evaluate for myocardial ischemia.      2. HLD- 10 year ASCVD risk score is low given age and lack of comorbid conditions.  Encourage diet, exercise, and weight loss.     Will call pt with results of studies  [facility] follow up in 3 months    Total duration of face to face visit time 45 minutes.  Total time spent counseling greater than fifty percent of total visit time.  Counseling included discussion regarding imaging findings, diagnosis, possibilities, treatment options, risks and benefits.  The patient had many questions regarding the options and long-term effects.    Gus Gudino MD, PhD  Interventional Cardiology

## 2023-09-05 ENCOUNTER — HOSPITAL ENCOUNTER (EMERGENCY)
Facility: HOSPITAL | Age: 33
Discharge: HOME | End: 2023-09-05
Attending: EMERGENCY MEDICINE
Payer: COMMERCIAL

## 2023-09-05 VITALS
HEIGHT: 63 IN | DIASTOLIC BLOOD PRESSURE: 76 MMHG | HEART RATE: 88 BPM | SYSTOLIC BLOOD PRESSURE: 120 MMHG | OXYGEN SATURATION: 100 % | BODY MASS INDEX: 27.46 KG/M2 | WEIGHT: 155 LBS | RESPIRATION RATE: 19 BRPM | TEMPERATURE: 98 F

## 2023-09-05 DIAGNOSIS — R07.9 CHEST PAIN, UNSPECIFIED TYPE: Primary | ICD-10-CM

## 2023-09-05 DIAGNOSIS — R07.9 CHEST PAIN: ICD-10-CM

## 2023-09-05 LAB
ALBUMIN SERPL BCP-MCNC: 4.1 G/DL (ref 3.5–5.2)
ALP SERPL-CCNC: 104 U/L (ref 55–135)
ALT SERPL W/O P-5'-P-CCNC: 11 U/L (ref 10–44)
ANION GAP SERPL CALC-SCNC: 10 MMOL/L (ref 8–16)
AST SERPL-CCNC: 16 U/L (ref 10–40)
BASOPHILS # BLD AUTO: 0.05 K/UL (ref 0–0.2)
BASOPHILS NFR BLD: 0.7 % (ref 0–1.9)
BILIRUB SERPL-MCNC: 0.4 MG/DL (ref 0.1–1)
BNP SERPL-MCNC: 16 PG/ML (ref 0–99)
BUN SERPL-MCNC: 12 MG/DL (ref 6–20)
CALCIUM SERPL-MCNC: 9.1 MG/DL (ref 8.7–10.5)
CHLORIDE SERPL-SCNC: 106 MMOL/L (ref 95–110)
CO2 SERPL-SCNC: 23 MMOL/L (ref 23–29)
CREAT SERPL-MCNC: 0.7 MG/DL (ref 0.5–1.4)
D DIMER PPP IA.FEU-MCNC: <0.19 MG/L FEU
DIFFERENTIAL METHOD: NORMAL
EOSINOPHIL # BLD AUTO: 0.2 K/UL (ref 0–0.5)
EOSINOPHIL NFR BLD: 2.7 % (ref 0–8)
ERYTHROCYTE [DISTWIDTH] IN BLOOD BY AUTOMATED COUNT: 13 % (ref 11.5–14.5)
EST. GFR  (NO RACE VARIABLE): >60 ML/MIN/1.73 M^2
GLUCOSE SERPL-MCNC: 84 MG/DL (ref 70–110)
HCT VFR BLD AUTO: 47.1 % (ref 37–48.5)
HGB BLD-MCNC: 15.3 G/DL (ref 12–16)
IMM GRANULOCYTES # BLD AUTO: 0.01 K/UL (ref 0–0.04)
IMM GRANULOCYTES NFR BLD AUTO: 0.1 % (ref 0–0.5)
LIPASE SERPL-CCNC: 13 U/L (ref 4–60)
LYMPHOCYTES # BLD AUTO: 2.2 K/UL (ref 1–4.8)
LYMPHOCYTES NFR BLD: 30.2 % (ref 18–48)
MCH RBC QN AUTO: 29.4 PG (ref 27–31)
MCHC RBC AUTO-ENTMCNC: 32.5 G/DL (ref 32–36)
MCV RBC AUTO: 90 FL (ref 82–98)
MONOCYTES # BLD AUTO: 0.3 K/UL (ref 0.3–1)
MONOCYTES NFR BLD: 4.5 % (ref 4–15)
NEUTROPHILS # BLD AUTO: 4.6 K/UL (ref 1.8–7.7)
NEUTROPHILS NFR BLD: 61.8 % (ref 38–73)
NRBC BLD-RTO: 0 /100 WBC
PLATELET # BLD AUTO: 326 K/UL (ref 150–450)
PMV BLD AUTO: 10.2 FL (ref 9.2–12.9)
POTASSIUM SERPL-SCNC: 4.2 MMOL/L (ref 3.5–5.1)
PROT SERPL-MCNC: 7.7 G/DL (ref 6–8.4)
RBC # BLD AUTO: 5.21 M/UL (ref 4–5.4)
SODIUM SERPL-SCNC: 139 MMOL/L (ref 136–145)
TROPONIN I SERPL DL<=0.01 NG/ML-MCNC: <0.006 NG/ML (ref 0–0.03)
WBC # BLD AUTO: 7.39 K/UL (ref 3.9–12.7)

## 2023-09-05 PROCEDURE — 85379 FIBRIN DEGRADATION QUANT: CPT | Performed by: EMERGENCY MEDICINE

## 2023-09-05 PROCEDURE — 83880 ASSAY OF NATRIURETIC PEPTIDE: CPT | Performed by: EMERGENCY MEDICINE

## 2023-09-05 PROCEDURE — 93010 EKG 12-LEAD: ICD-10-PCS | Mod: ,,, | Performed by: INTERNAL MEDICINE

## 2023-09-05 PROCEDURE — 93005 ELECTROCARDIOGRAM TRACING: CPT

## 2023-09-05 PROCEDURE — 85025 COMPLETE CBC W/AUTO DIFF WBC: CPT | Performed by: EMERGENCY MEDICINE

## 2023-09-05 PROCEDURE — 84484 ASSAY OF TROPONIN QUANT: CPT | Performed by: EMERGENCY MEDICINE

## 2023-09-05 PROCEDURE — 80053 COMPREHEN METABOLIC PANEL: CPT | Performed by: EMERGENCY MEDICINE

## 2023-09-05 PROCEDURE — 83690 ASSAY OF LIPASE: CPT | Performed by: EMERGENCY MEDICINE

## 2023-09-05 PROCEDURE — 99285 EMERGENCY DEPT VISIT HI MDM: CPT | Mod: 25

## 2023-09-05 PROCEDURE — 93010 ELECTROCARDIOGRAM REPORT: CPT | Mod: ,,, | Performed by: INTERNAL MEDICINE

## 2023-09-05 NOTE — ED PROVIDER NOTES
Encounter Date: 9/5/2023       History     Chief Complaint   Patient presents with    Vomiting     Vomited twice, diarrhea once, yest cp on r side and arm pain,     Chest Pain     HPI  32-year-old female with a past medical history of GERD who presents with vomiting as well as chest pain.  She reports that the pain started yesterday and has been constant since.  She noticed some right-sided breast pain as well as pain in her right chest that radiates to the right arm and right shoulder.  Nothing makes it better or worse including position, exertion, breathing.  No shortness of breath or pain with inspiration, history of DVT or PE, recent travel or surgery, leg swelling, hormone use including birth control.  She denies any diaphoresis.  No fevers or chills or productive cough.  No hemoptysis.  She does not have any significant cardiac history though does have a significant family history.  Her brother had an MI in his 30s and her uncle had an MI in his 30s.  She does not smoke.  No high blood pressure, high cholesterol, diabetes.  She did see a cardiologist recently who reported that they did not think her pain was anything significant however just to be safe they have ordered a stress test outpatient which is scheduled for the end of the month.  She does report that she still breast feeds so does not have any significant pain when her child breastfeeds.  Lasted yesterday and there was normal milk production.  She denies any redness, swelling, masses, abnormal discharge from the breast.      Review of patient's allergies indicates:   Allergen Reactions    Doxycycline Other (See Comments)     Triggers headaches    Bactrim ds [sulfamethoxazole-trimethoprim] Nausea And Vomiting    Nsaids (non-steroidal anti-inflammatory drug) Other (See Comments)     D/t h/o PUD and H pylori    Codeine Rash     Past Medical History:   Diagnosis Date    Bilateral knee pain 06/19/2018    Endometriosis     treated by GYN at      Epigastric abdominal pain 12/05/2016    Gastroesophageal reflux disease 07/06/2016    Helicobacter pylori ab+     Helicobacter pylori ab+     High-tone pelvic floor dysfunction 05/02/2016    IIH (idiopathic intracranial hypertension)     Dr. Rogers - [facility]    Palpitations 07/06/2016    Pre-eclampsia in postpartum period 08/16/2022     Past Surgical History:   Procedure Laterality Date    ARTHROSCOPY OF KNEE Left 04/06/2021    Procedure: ARTHROSCOPY, KNEE;  Surgeon: Deidra Mejias MD;  Location: [facility] OR;  Service: Orthopedics;  Laterality: Left;    COLONOSCOPY N/A 12/13/2018    Procedure: COLONOSCOPY;  Surgeon: Micki Gross MD;  Location: [facility] (4TH FLR);  Service: Endoscopy;  Laterality: N/A;  preferrably in Dec 2018, CRS ok if needed.    COLONOSCOPY N/A 11/05/2022    Procedure: COLONOSCOPY;  Surgeon: Greg Leach MD;  Location: [facility] (4TH FLR);  Service: Endoscopy;  Laterality: N/A;  Endoscopy schedulers please schedule patient for colonoscopy with me for further evaluation of painless hematochezia thank you    ESOPHAGOGASTRODUODENOSCOPY N/A 10/30/2018    Procedure: EGD (ESOPHAGOGASTRODUODENOSCOPY);  Surgeon: Greg Leach MD;  Location: [facility] (2ND FLR);  Service: Endoscopy;  Laterality: N/A;  ok to schedule per Kimmy    ESOPHAGOGASTRODUODENOSCOPY N/A 11/05/2022    Procedure: EGD (ESOPHAGOGASTRODUODENOSCOPY);  Surgeon: Greg Leach MD;  Location: [facility] (4TH FLR);  Service: Endoscopy;  Laterality: N/A;  Endoscopy schedulers please schedule patient for EGD for evaluation of right upper quadrant pain thank you  prep instr portal--ml  11/2/22 room closed-pt okay with Dr. Leach-50 min case okay per Katlyn-ml    KNEE ARTHROSCOPY W/ DEBRIDEMENT  04/06/2021    Procedure: ARTHROSCOPY, KNEE, WITH DEBRIDEMENT;  Surgeon: Deidra Mejias MD;  Location: [facility] OR;  Service: Orthopedics;;    KNEE ARTHROSCOPY W/ MENISCECTOMY Right 04/06/2021    Procedure: ARTHROSCOPY, KNEE, WITH MENISCECTOMY;  Surgeon:  Deidra Mejias MD;  Location: [facility] OR;  Service: Orthopedics;  Laterality: Right;    KNEE ARTHROSCOPY W/ PLICA EXCISION Left 04/06/2021    Procedure: EXCISION, PLICA, KNEE, ARTHROSCOPIC;  Surgeon: Deidra Mejias MD;  Location: [facility] OR;  Service: Orthopedics;  Laterality: Left;    LAPAROSCOPY  2017    dx with endo.  no removal    NO PAST SURGERIES      SYNOVECTOMY OF KNEE Left 04/06/2021    Procedure: SYNOVECTOMY, KNEE;  Surgeon: Deidra Mejias MD;  Location: [facility] OR;  Service: Orthopedics;  Laterality: Left;    UPPER GASTROINTESTINAL ENDOSCOPY       Family History   Problem Relation Age of Onset    Diabetes Mother     No Known Problems Father     No Known Problems Sister     Hypertension Brother     Hypertension Maternal Grandmother     Hypertension Maternal Grandfather     No Known Problems Paternal Grandmother     No Known Problems Paternal Grandfather     Colon cancer Neg Hx     Crohn's disease Neg Hx     Esophageal cancer Neg Hx     Inflammatory bowel disease Neg Hx     Irritable bowel syndrome Neg Hx     Rectal cancer Neg Hx     Stomach cancer Neg Hx     Ulcerative colitis Neg Hx     Celiac disease Neg Hx     Cirrhosis Neg Hx     Colon polyps Neg Hx     Liver cancer Neg Hx     Liver disease Neg Hx      Social History     Tobacco Use    Smoking status: Never    Smokeless tobacco: Never   Substance Use Topics    Alcohol use: No    Drug use: No     Review of Systems   Cardiovascular:  Positive for chest pain.       Physical Exam     Initial Vitals [09/05/23 1004]   BP Pulse Resp Temp SpO2   119/78 100 18 98.4 °F (36.9 °C) 99 %      MAP       --         Physical Exam    Nursing note and vitals reviewed.  Constitutional: She appears well-developed and well-nourished. No distress.   HENT:   Head: Normocephalic and atraumatic.   Eyes: Conjunctivae and EOM are normal. Pupils are equal, round, and reactive to light.   Neck: Neck supple.   Normal range of motion.  Cardiovascular:  Normal rate, regular rhythm and normal heart  sounds.     Exam reveals no gallop and no friction rub.       No murmur heard.  Pulmonary/Chest: Breath sounds normal. No respiratory distress. She has no wheezes. She has no rhonchi. She has no rales.   She is some mild tenderness along the chest wall just lateral to the sternum on the right.  Her right breast is normal in appearance with no erythema, induration, discharge, or masses or tenderness   Abdominal: Abdomen is soft. She exhibits no distension. There is abdominal tenderness.   Very mild epigastric tenderness There is no rebound.   Musculoskeletal:      Cervical back: Normal range of motion and neck supple.     Neurological: She is alert and oriented to person, place, and time.   Skin: Skin is warm and dry.   Psychiatric: She has a normal mood and affect.         ED Course   Procedures  Labs Reviewed   CBC W/ AUTO DIFFERENTIAL   COMPREHENSIVE METABOLIC PANEL    Narrative:     add on lipase per Be Young RN/Mansi Ohara MD order#   [id number] 09/05/2023 @ 11:48    TROPONIN I    Narrative:     add on lipase per Be Young RN/Mansi Ohara MD order#   [id number] 09/05/2023 @ 11:48    B-TYPE NATRIURETIC PEPTIDE    Narrative:     add on lipase per Be Young RN/Mansi Ohara MD order#   [id number] 09/05/2023 @ 11:48    D DIMER, QUANTITATIVE    Narrative:     add on lipase per Be Young RN/Mansi Ohara MD order#   [id number] 09/05/2023 @ 11:48    LIPASE   LIPASE    Narrative:     add on lipase per Be Young RN/Mansi Ohara MD order#   [id number] 09/05/2023 @ 11:48    POCT TROPONIN     EKG Readings: (Independently Interpreted)   Sinus, regular, rate 100, normal intervals, no ST changes-overall normal EKG     ECG Results              EKG 12-lead (Final result)  Result time 09/05/23 11:08:12      Final result by Interface, [facility] (09/05/23 11:08:12)                   Narrative:    Test Reason : R07.9,    Vent. Rate : 105 BPM     Atrial Rate : 105 BPM     P-R Int : 142 ms           QRS Dur : 078 ms      QT Int : 340 ms       P-R-T Axes : 082 079 080 degrees     QTc Int : 449 ms    Sinus tachycardia  Nonspecific T wave abnormality  Abnormal ECG  When compared with ECG of 16-AUG-2023 12:10,  No significant change was found  Confirmed by Colton KEEN MD ([id number]) on 9/5/2023 11:08:02 AM    Referred By:             Confirmed By:Colton KEEN MD                                  Imaging Results              X-Ray Chest AP Portable (Final result)  Result time 09/05/23 12:54:02      Final result by Vishal Phan MD (09/05/23 12:54:02)                   Impression:      No significant intrathoracic abnormality.  No significant detrimental interval change in the appearance of the chest since the examinations referenced above is appreciated.      Electronically signed by: Vishal Phan MD  Date:    09/05/2023  Time:    12:54               Narrative:    EXAMINATION:  XR CHEST AP PORTABLE    CLINICAL HISTORY:  chest pain;    TECHNIQUE:  One view    COMPARISON:  Comparison is made to 11/29/2022 and 06/17/2021.    FINDINGS:  Heart size is normal, as is the appearance of the pulmonary vascularity.  Lung zones are clear, and are free of significant airspace consolidation or volume loss.  No pleural fluid.  No abnormal mediastinal widening.  No pneumothorax or pneumomediastinum.                                       Medications - No data to display  Medical Decision Making  32-year-old female who is overall quite healthy who presents with right-sided chest pain x2 days.  Overall based on her history and exam, I have a low suspicion for anything acute such as ACS, PE, dissection, pericarditis, referred intra-abdominal pain, pneumonia.  However, she does have significant risk factors for ACS including a family history of a brother with an MI in his 30s and an uncle with an MI in his 30s.  Her EKGs show any ischemia.  Her pain has been constant for 2 days.  Because of my low suspicion, we will get a single troponin  along with further workup including blood work and a chest x-ray.  Can not be PERC negative because her heart rate was in the 100s here.  However still low suspicion for PE.  We will get a D-dimer.  If this is negative, we will just stick with the a chest x-ray.  No signs of mastitis or breast mass or infection.  If workup negative, will be safe for discharge home with outpatient follow-up with Cardiology.  I do not think she needs to stay for further provocative testing with low suspicion and a low heart score.      Patient is feeling better.  Her workup is benign.  I do not think she needs to stay for a 2nd troponin as her heart score is low and it has been constant for days.  She is safe for discharge with follow-up and return precautions.    Heart score: 1    Amount and/or Complexity of Data Reviewed  Labs: ordered.  Radiology: ordered.                               Clinical Impression:   Final diagnoses:  [R07.9] Chest pain  [R07.9] Chest pain, unspecified type (Primary)        ED Disposition Condition    Discharge Stable          ED Prescriptions    None       Follow-up Information    None          Mansi Ohara MD  09/05/23 4150

## 2023-09-05 NOTE — ED NOTES
Patient identifiers verified and correct for  Ms Pappas  C/C:  CP, right breast pain, diarrhea SEE NN  APPEARANCE: awake and alert in NAD. PAIN  7/10  SKIN: warm, dry and intact. No breakdown or bruising.  MUSCULOSKELETAL: Patient moving all extremities spontaneously, no obvious swelling or deformities noted. Ambulates independently.  RESPIRATORY: Denies shortness of breath.Respirations unlabored.   CARDIAC: Positive midsternalupper epigastric pain  CP, 2+ distal pulses; no peripheral edema  ABomen: Abdomen soft, paint lurdes upper epigateric region, positive nausea, diarrhea x 1 today   : voids spontaneously, denies difficulty  Neurologic: AAO x 4; follows commands equal strength in all extremities; denies numbness/tingling. Denies dizziness  Denies new wekaness

## 2023-09-05 NOTE — ED NOTES
Patient states CP to mid upper abdomnen onset yesterday, deneis SOB, reports diarrhea x1 today, nausea today

## 2023-09-15 ENCOUNTER — PATIENT MESSAGE (OUTPATIENT)
Dept: ORTHOPEDICS | Facility: CLINIC | Age: 33
End: 2023-09-15
Payer: COMMERCIAL

## 2023-09-17 NOTE — PROGRESS NOTES
ANNUAL VISIT NOTE     PRESENTING HISTORY     Reason for Visit:  Annual visit.    No chief complaint on file.    History of Present Illness & ROS: Ms. Laila Pappas is a 32 y.o. female.  Same day appt.   Too early for her annual and will RTC to have this done after 10/14/2023.   Very pleasant young lady. Here with her toddler.  Reports that for the past 3 weeks, has been having pain to 'right breast'. No dizziness, GI discomforts, SOB or chest wall sternal discomforts. She is lactating, but does not endorse symptoms of mastitis. Does report that had a mammogram and US in 1/2023 (confirmed) due to 'breast pain and discomforts when pregnant with daughter, told had dense breast tissue, did the Ultrasound and everything was ok'. Confirmed in records.     Review of Systems:  Eyes: denies visual changes at this time denies floaters   ENT: no nasal congestion or sore throat  Respiratory: no cough or shorness of breath  Cardiovascular: no chest pain or palpitations  Gastrointestinal: no nausea or vomiting, no abdominal pain or change in bowel habits  Genitourinary: no hematuria or dysuria; denies frequency  Hematologic/Lymphatic: no easy bruising or lymphadenopathy  Musculoskeletal: no arthralgias or myalgias  Neurological: no seizures or tremors  Endocrine: no heat or cold intolerance    PAST HISTORY:     Past Medical History:   Diagnosis Date    Bilateral knee pain 06/19/2018    Endometriosis     treated by GYN at     Epigastric abdominal pain 12/05/2016    Gastroesophageal reflux disease 07/06/2016    Helicobacter pylori ab+     Helicobacter pylori ab+     High-tone pelvic floor dysfunction 05/02/2016    IIH (idiopathic intracranial hypertension)     Dr. Rogers - [facility]    Palpitations 07/06/2016    Pre-eclampsia in postpartum period 08/16/2022       Past Surgical History:   Procedure Laterality Date    ARTHROSCOPY OF KNEE Left 04/06/2021    Procedure: ARTHROSCOPY, KNEE;  Surgeon: Deidra Mejias MD;  Location: [facility] OR;   Service: Orthopedics;  Laterality: Left;    COLONOSCOPY N/A 12/13/2018    Procedure: COLONOSCOPY;  Surgeon: Micki Gross MD;  Location: [facility] (4TH FLR);  Service: Endoscopy;  Laterality: N/A;  preferrably in Dec 2018, CRS ok if needed.    COLONOSCOPY N/A 11/05/2022    Procedure: COLONOSCOPY;  Surgeon: Greg Leach MD;  Location: [facility] (4TH FLR);  Service: Endoscopy;  Laterality: N/A;  Endoscopy schedulers please schedule patient for colonoscopy with me for further evaluation of painless hematochezia thank you    ESOPHAGOGASTRODUODENOSCOPY N/A 10/30/2018    Procedure: EGD (ESOPHAGOGASTRODUODENOSCOPY);  Surgeon: Greg Leach MD;  Location: [facility] (2ND FLR);  Service: Endoscopy;  Laterality: N/A;  ok to schedule per Kimmy    ESOPHAGOGASTRODUODENOSCOPY N/A 11/05/2022    Procedure: EGD (ESOPHAGOGASTRODUODENOSCOPY);  Surgeon: Greg Leach MD;  Location: [facility] (4TH FLR);  Service: Endoscopy;  Laterality: N/A;  Endoscopy schedulers please schedule patient for EGD for evaluation of right upper quadrant pain thank you  prep instr portal--ml  11/2/22 room closed-pt okay with Dr. Leach-50 min case okay per Katlyn-ml    KNEE ARTHROSCOPY W/ DEBRIDEMENT  04/06/2021    Procedure: ARTHROSCOPY, KNEE, WITH DEBRIDEMENT;  Surgeon: Deidra Mejias MD;  Location: [facility] OR;  Service: Orthopedics;;    KNEE ARTHROSCOPY W/ MENISCECTOMY Right 04/06/2021    Procedure: ARTHROSCOPY, KNEE, WITH MENISCECTOMY;  Surgeon: Deidra Mejias MD;  Location: [facility] OR;  Service: Orthopedics;  Laterality: Right;    KNEE ARTHROSCOPY W/ PLICA EXCISION Left 04/06/2021    Procedure: EXCISION, PLICA, KNEE, ARTHROSCOPIC;  Surgeon: Deidra Mejias MD;  Location: [facility] OR;  Service: Orthopedics;  Laterality: Left;    LAPAROSCOPY  2017    dx with endo.  no removal    NO PAST SURGERIES      SYNOVECTOMY OF KNEE Left 04/06/2021    Procedure: SYNOVECTOMY, KNEE;  Surgeon: Deidra Mejias MD;  Location: [facility] OR;  Service: Orthopedics;  Laterality: Left;    UPPER  GASTROINTESTINAL ENDOSCOPY         Family History   Problem Relation Age of Onset    Diabetes Mother     No Known Problems Father     No Known Problems Sister     Hypertension Brother     Hypertension Maternal Grandmother     Hypertension Maternal Grandfather     No Known Problems Paternal Grandmother     No Known Problems Paternal Grandfather     Colon cancer Neg Hx     Crohn's disease Neg Hx     Esophageal cancer Neg Hx     Inflammatory bowel disease Neg Hx     Irritable bowel syndrome Neg Hx     Rectal cancer Neg Hx     Stomach cancer Neg Hx     Ulcerative colitis Neg Hx     Celiac disease Neg Hx     Cirrhosis Neg Hx     Colon polyps Neg Hx     Liver cancer Neg Hx     Liver disease Neg Hx        Social History     Socioeconomic History    Marital status: Single   Occupational History     Employer: [employer]     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   Tobacco Use    Smoking status: Never    Smokeless tobacco: Never   Substance and Sexual Activity    Alcohol use: No    Drug use: No    Sexual activity: Yes     Partners: Male     Birth control/protection: None   Social History Narrative    She had her 1st baby in 2022 healthy vaginal delivery no      Social Determinants of Health     Financial Resource Strain: Unknown (10/25/2022)    Overall Financial Resource Strain (CARDIA)     Difficulty of Paying Living Expenses: Patient refused   Food Insecurity: Unknown (10/25/2022)    Hunger Vital Sign     Worried About Running Out of Food in the Last Year: Patient refused     Ran Out of Food in the Last Year: Patient refused   Transportation Needs: Unknown (10/25/2022)    PRAPARE - Transportation     Lack of Transportation (Medical): Patient refused     Lack of Transportation (Non-Medical): Patient refused   Physical Activity: Unknown (10/25/2022)    Exercise Vital Sign     Days of Exercise per Week: Patient refused   Stress: No Stress Concern Present (2020)    [place] of  Occupational Health - Occupational Stress Questionnaire     Feeling of Stress : Only a little   Social Connections: Unknown (10/25/2022)    Social Connection and Isolation Panel [NHANES]     Frequency of Communication with Friends and Family: Patient refused     Frequency of Social Gatherings with Friends and Family: Patient refused     Active Member of Clubs or Organizations: Patient refused     Attends Club or Organization Meetings: Patient refused     Marital Status: Patient refused   Housing Stability: Unknown (10/25/2022)    Housing Stability Vital Sign     Unable to Pay for Housing in the Last Year: Patient refused     Unstable Housing in the Last Year: Patient refused       MEDICATIONS & ALLERGIES:     Current Outpatient Medications on File Prior to Visit   Medication Sig Dispense Refill    [DISCONTINUED] folic acid (FOLVITE) 1 MG tablet Take 1 mg by mouth once daily.       No current facility-administered medications on file prior to visit.        Review of patient's allergies indicates:   Allergen Reactions    Doxycycline Other (See Comments)     Triggers headaches    Bactrim ds [sulfamethoxazole-trimethoprim] Nausea And Vomiting    Nsaids (non-steroidal anti-inflammatory drug) Other (See Comments)     D/t h/o PUD and H pylori    Codeine Rash       Medications Reconciliation:   I have reconciled the patient's home medications and discharge medications with the patient/family. I have updated all changes.  Refer to After-Visit Medication List.    OBJECTIVE:     Vital Signs:  There were no vitals filed for this visit.  Wt Readings from Last 3 Encounters:   09/05/23 1004 70.3 kg (155 lb)   08/16/23 1134 72.4 kg (159 lb 9.8 oz)   12/20/22 0857 66.9 kg (147 lb 7.8 oz)     There is no height or weight on file to calculate BMI.    Wt Readings from Last 3 Encounters:   09/22/23 70.2 kg (154 lb 12.2 oz)   09/05/23 70.3 kg (155 lb)   08/16/23 72.4 kg (159 lb 9.8 oz)     Temp Readings from Last 3 Encounters:   09/05/23  97.8 °F (36.6 °C) (Oral)   11/21/22 98.9 °F (37.2 °C)   11/05/22 98.1 °F (36.7 °C) (Temporal)     BP Readings from Last 3 Encounters:   09/22/23 118/78   09/05/23 120/76   08/16/23 105/78     Pulse Readings from Last 3 Encounters:   09/22/23 101   09/05/23 88   08/16/23 100       Physical Exam:  General: Well developed, well nourished. No distress.  HEENT: Head is normocephalic, atraumatic  Eyes: Clear conjunctiva.  Neck: Supple, symmetrical neck; trachea midline.  Extremities: No LE edema. Pulses 2+ and symmetric.   Skin: Skin color, texture, turgor normal. No rashes.  Musculoskeletal: Normal gait.   Neurologic: No focal numbness or weakness.       Laboratory  Lab Results   Component Value Date    WBC 7.39 09/05/2023    HGB 15.3 09/05/2023    HCT 47.1 09/05/2023     09/05/2023    CHOL 204 (H) 10/29/2021    TRIG 46 10/29/2021    HDL 41 10/29/2021    ALT 11 09/05/2023    AST 16 09/05/2023     09/05/2023    K 4.2 09/05/2023     09/05/2023    CREATININE 0.7 09/05/2023    BUN 12 09/05/2023    CO2 23 09/05/2023    TSH 1.277 10/14/2022    INR 1.1 11/18/2022    HGBA1C 5.0 10/27/2022      Mammogram  Impression:  There is no mammographic or sonographic evidence of malignancy to account for the clinical finding of breast pain. A negative imaging study should not supersede suspicious clinical findings.      BI-RADS Category 1: Negative     Recommendation:  Annual mammogram is recommended beginning at age 40. Findings and recommendation were discussed with the patient at the time of the procedure.     Breast Ultrasound:  Complete bilateral breast ultrasound (4 quadrants, subareolar) performed as ordered. Ultrasound evaluation demonstrates no suspicious abnormality.        ASSESSMENT & PLAN:     *Too early for her Annual; last with Dr. Trejo in 10/2022.  Encouraged to RTC after 10/14/2023 for full annual PE and Fasting labs.     Breast pain right  -     Mammo Digital Diagnostic Bilat with [facility]; Future;  Expected date: 09/22/2023  -      Breast Right Limited; Future; Expected date: 09/22/2023  *Evaluate with specialized imaging to ascertain no underlying concerning findings.     Numbness:   *Evaluated by Dr. Wilson and pending EMG / NCS    Hepatic Steatosis:   *Evaluated by Dr. Parker    Greater Trochanteric Bursitis:   *Evaluated by Dr. Tanner    H Pylori and GI Issues:   *Evaluated by Dr. Burks    History of Positive MIKE:   *Evaluated by Dr. Willoughby    History of chronic chest pain:   *Under care of Dr. RODDY Gudino      Future Appointments   Date Time Provider Department Center   9/22/2023  4:00 PM Agatha Causey FNP [facility] IM [facility]   10/3/2023  2:00 PM Meghan Bunn MD [facility]   10/20/2023  8:00 AM [facility]   10/20/2023  8:15 AM [facility]   10/20/2023  8:45 AM Quique Tanner MD [facility] SPINE [facility]   1/9/2024  8:30 AM Meghan Bunn MD [facility]        Medication List      as of September 22, 2023  3:56 PM     You have not been prescribed any medications.       Signing Physician:  MORGAN Cage

## 2023-09-22 ENCOUNTER — OFFICE VISIT (OUTPATIENT)
Dept: INTERNAL MEDICINE | Facility: CLINIC | Age: 33
End: 2023-09-22
Payer: COMMERCIAL

## 2023-09-22 VITALS
DIASTOLIC BLOOD PRESSURE: 78 MMHG | BODY MASS INDEX: 27.42 KG/M2 | WEIGHT: 154.75 LBS | OXYGEN SATURATION: 98 % | HEART RATE: 101 BPM | SYSTOLIC BLOOD PRESSURE: 118 MMHG | HEIGHT: 63 IN

## 2023-09-22 DIAGNOSIS — N64.4 BREAST PAIN, LEFT: Primary | ICD-10-CM

## 2023-09-22 PROCEDURE — 99999 PR PBB SHADOW E&M-EST. PATIENT-LVL IV: CPT | Mod: PBBFAC,,, | Performed by: NURSE PRACTITIONER

## 2023-09-22 PROCEDURE — 99999 PR PBB SHADOW E&M-EST. PATIENT-LVL IV: ICD-10-PCS | Mod: PBBFAC,,, | Performed by: NURSE PRACTITIONER

## 2023-09-22 PROCEDURE — 99214 OFFICE O/P EST MOD 30 MIN: CPT | Mod: S$GLB,,, | Performed by: NURSE PRACTITIONER

## 2023-09-22 PROCEDURE — 99214 PR OFFICE/OUTPT VISIT, EST, LEVL IV, 30-39 MIN: ICD-10-PCS | Mod: S$GLB,,, | Performed by: NURSE PRACTITIONER

## 2023-09-22 RX ORDER — AMOXICILLIN 875 MG/1
875 TABLET, FILM COATED ORAL EVERY 12 HOURS
Qty: 14 TABLET | Refills: 0 | Status: CANCELLED | OUTPATIENT
Start: 2023-09-22

## 2023-09-27 ENCOUNTER — PATIENT MESSAGE (OUTPATIENT)
Dept: ORTHOPEDICS | Facility: CLINIC | Age: 33
End: 2023-09-27
Payer: COMMERCIAL

## 2023-09-28 ENCOUNTER — OFFICE VISIT (OUTPATIENT)
Dept: OBSTETRICS AND GYNECOLOGY | Facility: CLINIC | Age: 33
End: 2023-09-28
Payer: COMMERCIAL

## 2023-09-28 ENCOUNTER — TELEPHONE (OUTPATIENT)
Dept: INTERNAL MEDICINE | Facility: CLINIC | Age: 33
End: 2023-09-28
Payer: COMMERCIAL

## 2023-09-28 VITALS
BODY MASS INDEX: 26.95 KG/M2 | HEIGHT: 63 IN | WEIGHT: 152.13 LBS | DIASTOLIC BLOOD PRESSURE: 68 MMHG | SYSTOLIC BLOOD PRESSURE: 112 MMHG

## 2023-09-28 DIAGNOSIS — E78.5 DYSLIPIDEMIA: ICD-10-CM

## 2023-09-28 DIAGNOSIS — N64.4 BREAST PAIN: ICD-10-CM

## 2023-09-28 DIAGNOSIS — K21.9 GASTROESOPHAGEAL REFLUX DISEASE, UNSPECIFIED WHETHER ESOPHAGITIS PRESENT: ICD-10-CM

## 2023-09-28 DIAGNOSIS — N80.9 ENDOMETRIOSIS DETERMINED BY LAPAROSCOPY: ICD-10-CM

## 2023-09-28 DIAGNOSIS — G93.2 IIH (IDIOPATHIC INTRACRANIAL HYPERTENSION): ICD-10-CM

## 2023-09-28 DIAGNOSIS — Z01.419 ENCOUNTER FOR GYNECOLOGICAL EXAMINATION WITHOUT ABNORMAL FINDING: Primary | ICD-10-CM

## 2023-09-28 DIAGNOSIS — R10.2 PELVIC PAIN: ICD-10-CM

## 2023-09-28 DIAGNOSIS — E04.2 MULTIPLE THYROID NODULES: ICD-10-CM

## 2023-09-28 PROCEDURE — 99395 PR PREVENTIVE VISIT,EST,18-39: ICD-10-PCS | Mod: S$GLB,,, | Performed by: OBSTETRICS & GYNECOLOGY

## 2023-09-28 PROCEDURE — 99999 PR PBB SHADOW E&M-EST. PATIENT-LVL III: ICD-10-PCS | Mod: PBBFAC,,, | Performed by: OBSTETRICS & GYNECOLOGY

## 2023-09-28 PROCEDURE — 99395 PREV VISIT EST AGE 18-39: CPT | Mod: S$GLB,,, | Performed by: OBSTETRICS & GYNECOLOGY

## 2023-09-28 PROCEDURE — 99999 PR PBB SHADOW E&M-EST. PATIENT-LVL III: CPT | Mod: PBBFAC,,, | Performed by: OBSTETRICS & GYNECOLOGY

## 2023-09-28 NOTE — TELEPHONE ENCOUNTER
----- Message from Ramakrishna Maher sent at 9/28/2023 10:12 AM CDT -----  Regarding: pt call back  Name of Who is Calling: YVES TIPTON [[id number]]              What is the request in detail: pt would like a call back to see if she can get a soon appt than 10/5 , please advise.              Can the clinic reply by [facility]: no                   [facility] Back [facility] [phone]

## 2023-09-29 ENCOUNTER — HOSPITAL ENCOUNTER (OUTPATIENT)
Dept: RADIOLOGY | Facility: OTHER | Age: 33
Discharge: HOME | End: 2023-09-29
Attending: NURSE PRACTITIONER
Payer: COMMERCIAL

## 2023-09-29 DIAGNOSIS — N64.4 BREAST PAIN, LEFT: ICD-10-CM

## 2023-09-29 PROCEDURE — 76642 ULTRASOUND BREAST LIMITED: CPT | Mod: TC,RT

## 2023-09-29 PROCEDURE — 77066 DX MAMMO INCL CAD BI: CPT | Mod: 26,,, | Performed by: RADIOLOGY

## 2023-09-29 PROCEDURE — 77062 MAMMO DIGITAL DIAGNOSTIC BILAT WITH TOMO: ICD-10-PCS | Mod: 26,,, | Performed by: RADIOLOGY

## 2023-09-29 PROCEDURE — 77066 DX MAMMO INCL CAD BI: CPT | Mod: TC

## 2023-09-29 PROCEDURE — 77066 MAMMO DIGITAL DIAGNOSTIC BILAT WITH TOMO: ICD-10-PCS | Mod: 26,,, | Performed by: RADIOLOGY

## 2023-09-29 PROCEDURE — 77062 BREAST TOMOSYNTHESIS BI: CPT | Mod: 26,,, | Performed by: RADIOLOGY

## 2023-09-29 PROCEDURE — 76642 US BREAST RIGHT LIMITED: ICD-10-PCS | Mod: 26,RT,, | Performed by: RADIOLOGY

## 2023-09-29 PROCEDURE — 76642 ULTRASOUND BREAST LIMITED: CPT | Mod: 26,RT,, | Performed by: RADIOLOGY

## 2023-10-03 ENCOUNTER — OFFICE VISIT (OUTPATIENT)
Dept: OBSTETRICS AND GYNECOLOGY | Facility: CLINIC | Age: 33
End: 2023-10-03
Payer: COMMERCIAL

## 2023-10-03 ENCOUNTER — PATIENT MESSAGE (OUTPATIENT)
Dept: OBSTETRICS AND GYNECOLOGY | Facility: CLINIC | Age: 33
End: 2023-10-03

## 2023-10-03 ENCOUNTER — LAB VISIT (OUTPATIENT)
Dept: LAB | Facility: OTHER | Age: 33
End: 2023-10-03
Attending: OBSTETRICS & GYNECOLOGY
Payer: COMMERCIAL

## 2023-10-03 VITALS
BODY MASS INDEX: 27.34 KG/M2 | DIASTOLIC BLOOD PRESSURE: 78 MMHG | HEIGHT: 63 IN | SYSTOLIC BLOOD PRESSURE: 108 MMHG | WEIGHT: 154.31 LBS

## 2023-10-03 DIAGNOSIS — Z11.3 SCREENING EXAMINATION FOR STD (SEXUALLY TRANSMITTED DISEASE): ICD-10-CM

## 2023-10-03 DIAGNOSIS — N92.6 MISSED PERIOD: ICD-10-CM

## 2023-10-03 DIAGNOSIS — N92.6 MISSED PERIOD: Primary | ICD-10-CM

## 2023-10-03 DIAGNOSIS — R10.2 PELVIC PAIN: ICD-10-CM

## 2023-10-03 LAB
B-HCG UR QL: NEGATIVE
CTP QC/QA: YES
HCG INTACT+B SERPL-ACNC: <1.2 MIU/ML

## 2023-10-03 PROCEDURE — 99999 PR PBB SHADOW E&M-EST. PATIENT-LVL III: CPT | Mod: PBBFAC,,, | Performed by: OBSTETRICS & GYNECOLOGY

## 2023-10-03 PROCEDURE — 81025 POCT URINE PREGNANCY: ICD-10-PCS | Mod: S$GLB,,, | Performed by: OBSTETRICS & GYNECOLOGY

## 2023-10-03 PROCEDURE — 99999 PR PBB SHADOW E&M-EST. PATIENT-LVL III: ICD-10-PCS | Mod: PBBFAC,,, | Performed by: OBSTETRICS & GYNECOLOGY

## 2023-10-03 PROCEDURE — 99214 PR OFFICE/OUTPT VISIT, EST, LEVL IV, 30-39 MIN: ICD-10-PCS | Mod: S$GLB,,, | Performed by: OBSTETRICS & GYNECOLOGY

## 2023-10-03 PROCEDURE — 87491 CHLMYD TRACH DNA AMP PROBE: CPT | Performed by: OBSTETRICS & GYNECOLOGY

## 2023-10-03 PROCEDURE — 84702 CHORIONIC GONADOTROPIN TEST: CPT | Performed by: OBSTETRICS & GYNECOLOGY

## 2023-10-03 PROCEDURE — 81025 URINE PREGNANCY TEST: CPT | Mod: S$GLB,,, | Performed by: OBSTETRICS & GYNECOLOGY

## 2023-10-03 PROCEDURE — 36415 COLL VENOUS BLD VENIPUNCTURE: CPT | Performed by: OBSTETRICS & GYNECOLOGY

## 2023-10-03 PROCEDURE — 87086 URINE CULTURE/COLONY COUNT: CPT | Performed by: OBSTETRICS & GYNECOLOGY

## 2023-10-03 PROCEDURE — 99214 OFFICE O/P EST MOD 30 MIN: CPT | Mod: S$GLB,,, | Performed by: OBSTETRICS & GYNECOLOGY

## 2023-10-03 NOTE — PROGRESS NOTES
HISTORY OF PRESENT ILLNESS:    Laila Pappas is a 32 y.o. female, , Patient's last menstrual period was 2023.,  presents for a routine exam and has no complaints.  Patient reports cycles occur every 4 weeks lasting 5 days using 4 pads per day.   She denies any BTB.     History of abnormal pap: No  Last Pap: 2022  Last MMG: N/A  Last Colonoscopy: N/A     She uses no birth control.   She does not desire STD screening.    The patient participates in regular exercise: yes.    The patient does not smoke.    The patient wears seatbelts.     Pt denies any domestic violence.      Breast pain for 2 weeks     Pelvic pain - midline for 4 months, intermittent, more constant lately.     Not sexually active      Past Medical History:   Diagnosis Date    Bilateral knee pain 2018    Endometriosis     treated by GYN at     Epigastric abdominal pain 2016    Gastroesophageal reflux disease 2016    Helicobacter pylori ab+     Helicobacter pylori ab+     High-tone pelvic floor dysfunction 2016    IIH (idiopathic intracranial hypertension)     Dr. Rogers - [facility]    Palpitations 2016    Pre-eclampsia in postpartum period 2022       Past Surgical History:   Procedure Laterality Date    ARTHROSCOPY OF KNEE Left 2021    Procedure: ARTHROSCOPY, KNEE;  Surgeon: Deidra Mejias MD;  Location: [facility];  Service: Orthopedics;  Laterality: Left;    COLONOSCOPY N/A 2018    Procedure: COLONOSCOPY;  Surgeon: Micki Gross MD;  Location: [address]);  Service: Endoscopy;  Laterality: N/A;  preferrably in Dec 2018, CRS ok if needed.    COLONOSCOPY N/A 2022    Procedure: COLONOSCOPY;  Surgeon: Greg Leach MD;  Location: [address]);  Service: Endoscopy;  Laterality: N/A;  Endoscopy schedulers please schedule patient for colonoscopy with me for further evaluation of painless hematochezia thank you    ESOPHAGOGASTRODUODENOSCOPY N/A 10/30/2018    Procedure: EGD  (ESOPHAGOGASTRODUODENOSCOPY);  Surgeon: Greg Leach MD;  Location: [facility] ENDO (2ND FLR);  Service: Endoscopy;  Laterality: N/A;  ok to schedule per Kimmy    ESOPHAGOGASTRODUODENOSCOPY N/A 11/05/2022    Procedure: EGD (ESOPHAGOGASTRODUODENOSCOPY);  Surgeon: Greg Leach MD;  Location: [facility] ENDO (4TH FLR);  Service: Endoscopy;  Laterality: N/A;  Endoscopy schedulers please schedule patient for EGD for evaluation of right upper quadrant pain thank you  prep instr portal--ml  11/2/22 room closed-pt okay with Dr. Leach-50 min case okay per Katlyn-ml    KNEE ARTHROSCOPY W/ DEBRIDEMENT  04/06/2021    Procedure: ARTHROSCOPY, KNEE, WITH DEBRIDEMENT;  Surgeon: Deidra Mejias MD;  Location: [facility] OR;  Service: Orthopedics;;    KNEE ARTHROSCOPY W/ MENISCECTOMY Right 04/06/2021    Procedure: ARTHROSCOPY, KNEE, WITH MENISCECTOMY;  Surgeon: Deidra Mejias MD;  Location: [facility] OR;  Service: Orthopedics;  Laterality: Right;    KNEE ARTHROSCOPY W/ PLICA EXCISION Left 04/06/2021    Procedure: EXCISION, PLICA, KNEE, ARTHROSCOPIC;  Surgeon: Deidra Mejias MD;  Location: [facility] OR;  Service: Orthopedics;  Laterality: Left;    LAPAROSCOPY  2017    dx with endo.  no removal    NO PAST SURGERIES      SYNOVECTOMY OF KNEE Left 04/06/2021    Procedure: SYNOVECTOMY, KNEE;  Surgeon: Deidra Mejias MD;  Location: [facility] OR;  Service: Orthopedics;  Laterality: Left;    UPPER GASTROINTESTINAL ENDOSCOPY         MEDICATIONS AND ALLERGIES:    No current outpatient medications on file.    Review of patient's allergies indicates:   Allergen Reactions    Doxycycline Other (See Comments)     Triggers headaches    Bactrim ds [sulfamethoxazole-trimethoprim] Nausea And Vomiting    Nsaids (non-steroidal anti-inflammatory drug) Other (See Comments)     D/t h/o PUD and H pylori    Codeine Rash       Family History   Problem Relation Age of Onset    Diabetes Mother     No Known Problems Father     No Known Problems Sister     Hypertension Brother     Hypertension Maternal  Grandmother     Hypertension Maternal Grandfather     No Known Problems Paternal Grandmother     No Known Problems Paternal Grandfather     Colon cancer Neg Hx     Crohn's disease Neg Hx     Esophageal cancer Neg Hx     Inflammatory bowel disease Neg Hx     Irritable bowel syndrome Neg Hx     Rectal cancer Neg Hx     Stomach cancer Neg Hx     Ulcerative colitis Neg Hx     Celiac disease Neg Hx     Cirrhosis Neg Hx     Colon polyps Neg Hx     Liver cancer Neg Hx     Liver disease Neg Hx        Social History     Socioeconomic History    Marital status: Single   Occupational History     Employer: [employer]     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   Tobacco Use    Smoking status: Never     Passive exposure: Never    Smokeless tobacco: Never   Substance and Sexual Activity    Alcohol use: No    Drug use: No    Sexual activity: Yes     Partners: Male     Birth control/protection: None   Social History Narrative    She had her 1st baby in 2022 healthy vaginal delivery no      Social Determinants of Health     Financial Resource Strain: Unknown (10/25/2022)    Overall Financial Resource Strain (CARDIA)     Difficulty of Paying Living Expenses: Patient refused   Food Insecurity: Unknown (10/25/2022)    Hunger Vital Sign     Worried About Running Out of Food in the Last Year: Patient refused     Ran Out of Food in the Last Year: Patient refused   Transportation Needs: Unknown (10/25/2022)    PRAPARE - Transportation     Lack of Transportation (Medical): Patient refused     Lack of Transportation (Non-Medical): Patient refused   Physical Activity: Unknown (10/25/2022)    Exercise Vital Sign     Days of Exercise per Week: Patient refused   Stress: No Stress Concern Present (2020)    [place] of Occupational Health - Occupational Stress Questionnaire     Feeling of Stress : Only a little   Social Connections: Unknown (10/25/2022)    Social Connection and Isolation Panel [NHANES]      "Frequency of Communication with Friends and Family: Patient refused     Frequency of Social Gatherings with Friends and Family: Patient refused     Active Member of Clubs or Organizations: Patient refused     Attends Club or Organization Meetings: Patient refused     Marital Status: Patient refused   Housing Stability: Unknown (10/25/2022)    Housing Stability Vital Sign     Unable to Pay for Housing in the Last Year: Patient refused     Unstable Housing in the Last Year: Patient refused       COMPREHENSIVE GYN HISTORY:  PAP History: Denies abnormal Paps.  Infection History: Denies STDs. Denies PID.  Benign History: Denies uterine fibroids. Denies ovarian cysts. Denies endometriosis. Denies other conditions.  Cancer History: Denies cervical cancer. Denies uterine cancer or hyperplasia. Denies ovarian cancer. Denies vulvar cancer or pre-cancer. Denies vaginal cancer or pre-cancer. Denies breast cancer. Denies colon cancer.  Sexual Activity History: Reports currently being sexually active  Menstrual History: Monthly. Mod then light flow.   Dysmenorrhea History: Reports mild dysmenorrhea.       ROS:  GENERAL: No weight changes. No swelling. No fatigue. No fever.  CARDIOVASCULAR: No chest pain. No shortness of breath. No leg cramps.   NEUROLOGICAL: No headaches. No vision changes.  BREASTS: No pain. No lumps. No discharge.  ABDOMEN: No pain. No nausea. No vomiting. No diarrhea. No constipation.  REPRODUCTIVE: No abnormal bleeding.   VULVA: No pain. No lesions. No itching.  VAGINA: No relaxation. No itching. No odor. No discharge. No lesions.  URINARY: No incontinence. No nocturia. No frequency. No dysuria.    /68   Ht 5' 3" (1.6 m)   Wt 69 kg (152 lb 1.9 oz)   LMP 09/05/2023   BMI 26.95 kg/m²     PE:  APPEARANCE: Well nourished, well developed, in no acute distress.  AFFECT: WNL, alert and oriented x 3.  SKIN: No acne or hirsutism.  NECK: Neck symmetric, without masses or thyromegaly.  NODES: No inguinal, " cervical, axillary or femoral lymph node enlargement.  CHEST: Good respiratory effort.   ABDOMEN: Soft. No tenderness or masses. No hepatosplenomegaly. No hernias.  BREASTS: Symmetrical, no skin changes, visible lesions, palpable masses or nipple discharge bilaterally.  PELVIC: External female genitalia without lesions.  Female hair distribution. Adequate perineal body, Normal urethral meatus. Vagina moist and well rugated without lesions or discharge.  No significant cystocele or rectocele present. Cervix pink without lesions, discharge or tenderness. Uterus is 4-6 week size, regular, mobile and nontender. Adnexa without masses or tenderness.  EXTREMITIES: No edema    DIAGNOSIS:  1. Encounter for gynecological examination without abnormal finding    2. Breast pain    3. Pelvic pain    4. IIH (idiopathic intracranial hypertension)    5. Dyslipidemia    6. Endometriosis determined by laparoscopy    7. Multiple thyroid nodules    8. Gastroesophageal reflux disease, unspecified whether esophagitis present      PLAN:    COUNSELING:  The patient was counseled today on:  -A.C.S. Pap and pelvic exam guidelines (pap every 3 years), recomendations for yearly mammogram;  -to follow up with her PCP for other health maintenance.  Long discussion with patient and  concerning weaning baby out of the bed.     FOLLOW-UP with me in 4 weeks

## 2023-10-03 NOTE — PROGRESS NOTES
HISTORY OF PRESENT ILLNESS:    Laila Pappas is a 32 y.o. female  Patient's last menstrual period was 2023 (exact date). presents today complaining of pelvic pian. Breast pain has resolved.     Pelvic pain - midline for 4 months, intermittent, more constant lately.   Hx of endometriosis     Past Medical History:   Diagnosis Date    Bilateral knee pain 2018    Endometriosis     treated by GYN at     Epigastric abdominal pain 2016    Gastroesophageal reflux disease 2016    Helicobacter pylori ab+     Helicobacter pylori ab+     High-tone pelvic floor dysfunction 2016    IIH (idiopathic intracranial hypertension)     Dr. Rogers - [facility]    Palpitations 2016    Pre-eclampsia in postpartum period 2022       Past Surgical History:   Procedure Laterality Date    ARTHROSCOPY OF KNEE Left 2021    Procedure: ARTHROSCOPY, KNEE;  Surgeon: Deidra Mejias MD;  Location: [facility] OR;  Service: Orthopedics;  Laterality: Left;    COLONOSCOPY N/A 2018    Procedure: COLONOSCOPY;  Surgeon: Micki Gross MD;  Location: [facility] (4TH FLR);  Service: Endoscopy;  Laterality: N/A;  preferrably in Dec 2018, CRS ok if needed.    COLONOSCOPY N/A 2022    Procedure: COLONOSCOPY;  Surgeon: Greg Leach MD;  Location: [facility] (4TH FLR);  Service: Endoscopy;  Laterality: N/A;  Endoscopy schedulers please schedule patient for colonoscopy with me for further evaluation of painless hematochezia thank you    ESOPHAGOGASTRODUODENOSCOPY N/A 10/30/2018    Procedure: EGD (ESOPHAGOGASTRODUODENOSCOPY);  Surgeon: Greg Leach MD;  Location: [facility] (2ND FLR);  Service: Endoscopy;  Laterality: N/A;  ok to schedule per Kimmy    ESOPHAGOGASTRODUODENOSCOPY N/A 2022    Procedure: EGD (ESOPHAGOGASTRODUODENOSCOPY);  Surgeon: Greg Leach MD;  Location: [facility] (4TH FLR);  Service: Endoscopy;  Laterality: N/A;  Endoscopy schedulers please schedule patient for EGD for  evaluation of right upper quadrant pain thank you  prep instr portal--ml  11/2/22 room closed-pt okay with Dr. Leach-50 min case okay per Katlyn-ml    KNEE ARTHROSCOPY W/ DEBRIDEMENT  04/06/2021    Procedure: ARTHROSCOPY, KNEE, WITH DEBRIDEMENT;  Surgeon: Deidra Mejias MD;  Location: [facility] OR;  Service: Orthopedics;;    KNEE ARTHROSCOPY W/ MENISCECTOMY Right 04/06/2021    Procedure: ARTHROSCOPY, KNEE, WITH MENISCECTOMY;  Surgeon: Deidra Mejias MD;  Location: [facility] OR;  Service: Orthopedics;  Laterality: Right;    KNEE ARTHROSCOPY W/ PLICA EXCISION Left 04/06/2021    Procedure: EXCISION, PLICA, KNEE, ARTHROSCOPIC;  Surgeon: Deidra Mejias MD;  Location: [facility] OR;  Service: Orthopedics;  Laterality: Left;    LAPAROSCOPY  2017    dx with endo.  no removal    NO PAST SURGERIES      SYNOVECTOMY OF KNEE Left 04/06/2021    Procedure: SYNOVECTOMY, KNEE;  Surgeon: Deidra Mejias MD;  Location: [facility] OR;  Service: Orthopedics;  Laterality: Left;    UPPER GASTROINTESTINAL ENDOSCOPY         MEDICATIONS AND ALLERGIES:    No current outpatient medications on file.    Review of patient's allergies indicates:   Allergen Reactions    Doxycycline Other (See Comments)     Triggers headaches    Bactrim ds [sulfamethoxazole-trimethoprim] Nausea And Vomiting    Nsaids (non-steroidal anti-inflammatory drug) Other (See Comments)     D/t h/o PUD and H pylori    Codeine Rash       COMPREHENSIVE GYN HISTORY:  PAP History: Denies abnormal Paps.  Infection History: Denies STDs. Denies PID.  Benign History: Denies uterine fibroids. Denies ovarian cysts. Denies endometriosis. Denies other conditions.  Cancer History: Denies cervical cancer. Denies uterine cancer or hyperplasia. Denies ovarian cancer. Denies vulvar cancer or pre-cancer. Denies vaginal cancer or pre-cancer. Denies breast cancer. Denies colon cancer.  Sexual Activity History: Reports currently being sexually active  Menstrual History: Every 28 days, flows for 4 days. Light flow.  Dysmenorrhea  "History: Denies dysmenorrhea.       ROS:  GENERAL: No fever or chills.  BREASTS: No pain. No lumps. No discharge.  ABDOMEN: No pain. No nausea. No vomiting. No diarrhea. No constipation.  REPRODUCTIVE: No abnormal bleeding.   VULVA: No pain. No lesions. No itching.  VAGINA: No relaxation. No itching. No odor. No discharge. No lesions.  URINARY: No incontinence. No nocturia. No frequency. No dysuria.    /78   Ht 5' 3" (1.6 m)   Wt 70 kg (154 lb 5.2 oz)   LMP 08/30/2023 (Exact Date)   BMI 27.34 kg/m²     PE:  APPEARANCE: Well nourished, well developed, in no acute distress.  AFFECT: WNL, alert and oriented x 3.  ABDOMEN: Soft. No tenderness or masses. No hepatosplenomegaly. No hernias.  BREASTS: Symmetrical, no skin changes or visible lesions. No palpable masses, nipple discharge bilaterally.  PELVIC: Normal external female genitalia without lesions. Normal hair distribution. Adequate perineal body, normal urethral meatus. Vagina pink and well rugated without lesions or discharge. Cervix pink without lesions, discharge or tenderness. No significant cystocele or rectocele. Bimanual exam shows uterus to be 4-6 weeks size, regular, mobile and nontender. Adnexa without masses or tenderness.    PROCEDURES:  Cath UA     1. Missed period    2. Pelvic pain        PLAN:    Orders Placed This Encounter    Urine culture    US Pelvis Comp with Transvag NON-OB (xpd    HCG, Quantitative    POCT urine pregnancy       COUNSELING:  The patient was counseled today on:    I spent a total of 20 minutes on the day of the visit. addressing problems separate from annual exam.  This includes face to face time and non-face to face time preparing to see the patient (eg, review of tests), Obtaining and/or reviewing separately obtained history, Documenting clinical information in the electronic or other health record, Independently interpreting resultsand communicating results to the patient/family/caregiver, or Care coordination. "     FOLLOW-UP with me in January

## 2023-10-04 ENCOUNTER — HOSPITAL ENCOUNTER (OUTPATIENT)
Dept: RADIOLOGY | Facility: OTHER | Age: 33
Discharge: HOME | End: 2023-10-04
Attending: OBSTETRICS & GYNECOLOGY
Payer: COMMERCIAL

## 2023-10-04 DIAGNOSIS — R10.2 PELVIC PAIN: ICD-10-CM

## 2023-10-04 LAB
BACTERIA UR CULT: NO GROWTH
C TRACH DNA SPEC QL NAA+PROBE: NOT DETECTED
N GONORRHOEA DNA SPEC QL NAA+PROBE: NOT DETECTED

## 2023-10-04 PROCEDURE — 76830 TRANSVAGINAL US NON-OB: CPT | Mod: 26,,, | Performed by: RADIOLOGY

## 2023-10-04 PROCEDURE — 76856 US EXAM PELVIC COMPLETE: CPT | Mod: 26,,, | Performed by: RADIOLOGY

## 2023-10-04 PROCEDURE — 76830 US PELVIS COMP WITH TRANSVAG NON-OB (XPD): ICD-10-PCS | Mod: 26,,, | Performed by: RADIOLOGY

## 2023-10-04 PROCEDURE — 76856 US PELVIS COMP WITH TRANSVAG NON-OB (XPD): ICD-10-PCS | Mod: 26,,, | Performed by: RADIOLOGY

## 2023-10-04 PROCEDURE — 76830 TRANSVAGINAL US NON-OB: CPT | Mod: TC

## 2023-10-06 ENCOUNTER — PATIENT MESSAGE (OUTPATIENT)
Dept: OBSTETRICS AND GYNECOLOGY | Facility: CLINIC | Age: 33
End: 2023-10-06
Payer: COMMERCIAL

## 2023-10-16 ENCOUNTER — PATIENT MESSAGE (OUTPATIENT)
Dept: OBSTETRICS AND GYNECOLOGY | Facility: CLINIC | Age: 33
End: 2023-10-16
Payer: COMMERCIAL

## 2023-10-25 NOTE — PROGRESS NOTES
ANNUAL VISIT NOTE     PRESENTING HISTORY     Reason for Visit:  Annual visit.    No chief complaint on file.    History of Present Illness & ROS: Ms. Laila Pappas is a 32 y.o. female.  Annual today.   Here with her spouse. Pleasant couple.  Fasting for labs today (last ate at 8am)  Has a couple of issues would like to discuss today:   ` Upper chest wall swelling on right (negative Mammogram)  ` chronic low back pain (missed to follow up with Ortho)  `  chronic bruising     *She is hoping to get in with her GYN to discuss her recent US results.   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     Review of Systems:  Eyes: denies visual changes at this time denies floaters   ENT: no nasal congestion or sore throat  Respiratory: no cough or shorness of breath  Cardiovascular: no chest pain or palpitations  Gastrointestinal: no nausea or vomiting, no abdominal pain or change in bowel habits  Genitourinary: no hematuria or dysuria; denies frequency  Hematologic/Lymphatic: no easy bruising or lymphadenopathy  Musculoskeletal: no arthralgias or myalgias  Neurological: no seizures or tremors  Endocrine: no heat or cold intolerance    PAST HISTORY:     Past Medical History:   Diagnosis Date    Bilateral knee pain 06/19/2018    Endometriosis     treated by GYN at     Epigastric abdominal pain 12/05/2016    Gastroesophageal reflux disease 07/06/2016    Helicobacter pylori ab+     Helicobacter pylori ab+     High-tone pelvic floor dysfunction 05/02/2016    IIH (idiopathic intracranial hypertension)     Dr. Rogers - [facility]    Palpitations 07/06/2016    Pre-eclampsia in postpartum period 08/16/2022       Past Surgical History:   Procedure Laterality Date    ARTHROSCOPY OF KNEE Left 04/06/2021    Procedure: ARTHROSCOPY, KNEE;  Surgeon: Deidra Mejias MD;  Location: [facility] OR;  Service: Orthopedics;  Laterality: Left;    COLONOSCOPY N/A 12/13/2018    Procedure: COLONOSCOPY;  Surgeon: Micki Gross MD;  Location: [facility] ENDO (4TH FLR);   Service: Endoscopy;  Laterality: N/A;  preferrably in Dec 2018, CRS ok if needed.    COLONOSCOPY N/A 11/05/2022    Procedure: COLONOSCOPY;  Surgeon: Greg Leach MD;  Location: [facility] (4TH FLR);  Service: Endoscopy;  Laterality: N/A;  Endoscopy schedulers please schedule patient for colonoscopy with me for further evaluation of painless hematochezia thank you    ESOPHAGOGASTRODUODENOSCOPY N/A 10/30/2018    Procedure: EGD (ESOPHAGOGASTRODUODENOSCOPY);  Surgeon: Greg Leach MD;  Location: [facility] (2ND FLR);  Service: Endoscopy;  Laterality: N/A;  ok to schedule per Kimmy    ESOPHAGOGASTRODUODENOSCOPY N/A 11/05/2022    Procedure: EGD (ESOPHAGOGASTRODUODENOSCOPY);  Surgeon: Greg Leach MD;  Location: [facility] (4TH FLR);  Service: Endoscopy;  Laterality: N/A;  Endoscopy schedulers please schedule patient for EGD for evaluation of right upper quadrant pain thank you  prep instr portal--ml  11/2/22 room closed-pt okay with Dr. Leach-50 min case okay per Katlyn-ml    KNEE ARTHROSCOPY W/ DEBRIDEMENT  04/06/2021    Procedure: ARTHROSCOPY, KNEE, WITH DEBRIDEMENT;  Surgeon: Deidra Mejias MD;  Location: [facility] OR;  Service: Orthopedics;;    KNEE ARTHROSCOPY W/ MENISCECTOMY Right 04/06/2021    Procedure: ARTHROSCOPY, KNEE, WITH MENISCECTOMY;  Surgeon: Deidra Mejias MD;  Location: [facility] OR;  Service: Orthopedics;  Laterality: Right;    KNEE ARTHROSCOPY W/ PLICA EXCISION Left 04/06/2021    Procedure: EXCISION, PLICA, KNEE, ARTHROSCOPIC;  Surgeon: Deidra Mejias MD;  Location: [facility] OR;  Service: Orthopedics;  Laterality: Left;    LAPAROSCOPY  2017    dx with endo.  no removal    NO PAST SURGERIES      SYNOVECTOMY OF KNEE Left 04/06/2021    Procedure: SYNOVECTOMY, KNEE;  Surgeon: Deidra Mejias MD;  Location: [facility] OR;  Service: Orthopedics;  Laterality: Left;    UPPER GASTROINTESTINAL ENDOSCOPY         Family History   Problem Relation Age of Onset    Diabetes Mother     No Known Problems Father     No Known Problems Sister      Hypertension Brother     Hypertension Maternal Grandmother     Hypertension Maternal Grandfather     No Known Problems Paternal Grandmother     No Known Problems Paternal Grandfather     Colon cancer Neg Hx     Crohn's disease Neg Hx     Esophageal cancer Neg Hx     Inflammatory bowel disease Neg Hx     Irritable bowel syndrome Neg Hx     Rectal cancer Neg Hx     Stomach cancer Neg Hx     Ulcerative colitis Neg Hx     Celiac disease Neg Hx     Cirrhosis Neg Hx     Colon polyps Neg Hx     Liver cancer Neg Hx     Liver disease Neg Hx        Social History     Socioeconomic History    Marital status: Single   Occupational History     Employer: [employer] [place]     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   Tobacco Use    Smoking status: Never     Passive exposure: Never    Smokeless tobacco: Never   Substance and Sexual Activity    Alcohol use: No    Drug use: No    Sexual activity: Yes     Partners: Male     Birth control/protection: None   Social History Narrative    She had her 1st baby in 2022 healthy vaginal delivery no      Social Determinants of Health     Financial Resource Strain: Unknown (10/25/2022)    Overall Financial Resource Strain (CARDIA)     Difficulty of Paying Living Expenses: Patient refused   Food Insecurity: Unknown (10/25/2022)    Hunger Vital Sign     Worried About Running Out of Food in the Last Year: Patient refused     Ran Out of Food in the Last Year: Patient refused   Transportation Needs: Unknown (10/25/2022)    PRAPARE - Transportation     Lack of Transportation (Medical): Patient refused     Lack of Transportation (Non-Medical): Patient refused   Physical Activity: Unknown (10/25/2022)    Exercise Vital Sign     Days of Exercise per Week: Patient refused   Stress: No Stress Concern Present (2020)    [place] [employer] - Occupational Stress Questionnaire     Feeling of Stress : Only a little   Social Connections: Unknown (10/25/2022)     Social Connection and Isolation Panel [NHANES]     Frequency of Communication with Friends and Family: Patient refused     Frequency of Social Gatherings with Friends and Family: Patient refused     Active Member of Clubs or Organizations: Patient refused     Attends Club or Organization Meetings: Patient refused     Marital Status: Patient refused   Housing Stability: Unknown (10/25/2022)    Housing Stability Vital Sign     Unable to Pay for Housing in the Last Year: Patient refused     Unstable Housing in the Last Year: Patient refused       MEDICATIONS & ALLERGIES:     Current Outpatient Medications on File Prior to Visit   Medication Sig Dispense Refill    [DISCONTINUED] folic acid (FOLVITE) 1 MG tablet Take 1 mg by mouth once daily.       No current facility-administered medications on file prior to visit.        Review of patient's allergies indicates:   Allergen Reactions    Doxycycline Other (See Comments)     Triggers headaches    Bactrim ds [sulfamethoxazole-trimethoprim] Nausea And Vomiting    Nsaids (non-steroidal anti-inflammatory drug) Other (See Comments)     D/t h/o PUD and H pylori    Codeine Rash       Medications Reconciliation:   I have reconciled the patient's home medications and discharge medications with the patient/family. I have updated all changes.  Refer to After-Visit Medication List.    OBJECTIVE:     Vital Signs:  There were no vitals filed for this visit.  Wt Readings from Last 3 Encounters:   10/03/23 1336 70 kg (154 lb 5.2 oz)   09/28/23 0918 69 kg (152 lb 1.9 oz)   09/22/23 1421 70.2 kg (154 lb 12.2 oz)     There is no height or weight on file to calculate BMI.   Wt Readings from Last 3 Encounters:   10/26/23 69.8 kg (153 lb 14.1 oz)   10/03/23 70 kg (154 lb 5.2 oz)   09/28/23 69 kg (152 lb 1.9 oz)     Temp Readings from Last 3 Encounters:   09/05/23 97.8 °F (36.6 °C) (Oral)   11/21/22 98.9 °F (37.2 °C)   11/05/22 98.1 °F (36.7 °C) (Temporal)     BP Readings from Last 3 Encounters:    10/26/23 120/74   10/03/23 108/78   09/28/23 112/68     Pulse Readings from Last 3 Encounters:   10/26/23 97   09/22/23 101   09/05/23 88       Physical Exam:  General: Well developed, well nourished. No distress.  HEENT: Head is normocephalic, atraumatic  Eyes: Clear conjunctiva.  Neck: Supple, symmetrical neck; trachea midline.  Lungs: Clear to auscultation bilaterally and normal respiratory effort.  Cardiovascular: Heart with regular rate and rhythm. No murmurs, gallops or rubs  Extremities: No LE edema. Pulses 2+ and symmetric.   Abdomen: Abdomen is soft, non-tender non-distended with normal bowel sounds.  Skin: Skin color, texture, turgor normal. No rashes.  Musculoskeletal: Normal gait.   Neurologic: Normal strength and tone. No focal numbness or weakness.     Laboratory  Lab Results   Component Value Date    WBC 7.39 09/05/2023    HGB 15.3 09/05/2023    HCT 47.1 09/05/2023     09/05/2023    CHOL 204 (H) 10/29/2021    TRIG 46 10/29/2021    HDL 41 10/29/2021    ALT 11 09/05/2023    AST 16 09/05/2023     09/05/2023    K 4.2 09/05/2023     09/05/2023    CREATININE 0.7 09/05/2023    BUN 12 09/05/2023    CO2 23 09/05/2023    TSH 1.277 10/14/2022    INR 1.1 11/18/2022    HGBA1C 5.0 10/27/2022       ASSESSMENT & PLAN:     Annual: last with Dr. Trejo in 10/2022.     Per History:  Numbness:   Resolved   *Under care of DR. Wilson and pending EMG / NCS    Hep Steatosis:   *Under care of Dr. Parker    Greater Trochanteric Bursitis:   *Declines injections   *Under Care of Dr. Tanner    H Pylori and GI Issues:   Stable   *Under care of Dr. Burks    History of Positive MIKE:   *Under care of Dr. Willoughby      Annual physical exam  -     Comprehensive Metabolic Panel; Future; Expected date: 10/26/2023  -     CBC Auto Differential; Future; Expected date: 10/26/2023  -     Lipid Panel; Future; Expected date: 10/26/2023  -     Hemoglobin A1C; Future; Expected date: 10/26/2023  -     TSH; Future;  Expected date: 10/26/2023  -     Vitamin D; Future; Expected date: 10/26/2023  -     CT Chest Without Contrast; Future; Expected date: 10/26/2023  -     IRON AND TIBC; Future; Expected date: 10/26/2023  -     FERRITIN; Future; Expected date: 10/26/2023  -     COPPER, SERUM; Future; Expected date: 10/26/2023    Breast pain, chronic / Right Upper Chestwall discomfort  Chronic pelvic pain in female  Encouraged follow up with GYN  -     CT Chest Without Contrast; Future; Expected date: 10/26/2023    Chronic midline low back pain without sciatica  Encouraged follow up with Dr. Ciro Epstein  -     COPPER, SERUM; Future; Expected date: 10/26/2023  -     CBC Auto Differential; Future; Expected date: 10/26/2023    Future Appointments   Date Time Provider Department Center   10/26/2023  2:10 PM LAB, APPOINTMENT [facility] LAB IM [facility]   11/6/2023  4:00 PM EXERCISE, STRESS ECHO [facility] ECHOSTR [facility]   11/8/2023  9:40 AM Amanda Benjamin NP [id number]   12/22/2023  9:00 AM [facility]   1/9/2024  8:30 AM Meghan [facility]unn [facility]        Medication List      as of October 26, 2023  2:07 PM     You have not been prescribed any medications.           Signing Physician:  MORGAN Cage

## 2023-10-26 ENCOUNTER — OFFICE VISIT (OUTPATIENT)
Dept: INTERNAL MEDICINE | Facility: CLINIC | Age: 33
End: 2023-10-26
Payer: COMMERCIAL

## 2023-10-26 ENCOUNTER — LAB VISIT (OUTPATIENT)
Dept: LAB | Facility: HOSPITAL | Age: 33
End: 2023-10-26
Payer: COMMERCIAL

## 2023-10-26 VITALS
HEIGHT: 63 IN | SYSTOLIC BLOOD PRESSURE: 120 MMHG | DIASTOLIC BLOOD PRESSURE: 74 MMHG | WEIGHT: 153.88 LBS | HEART RATE: 97 BPM | BODY MASS INDEX: 27.27 KG/M2 | OXYGEN SATURATION: 99 %

## 2023-10-26 DIAGNOSIS — M54.50 CHRONIC MIDLINE LOW BACK PAIN WITHOUT SCIATICA: ICD-10-CM

## 2023-10-26 DIAGNOSIS — N64.4 BREAST PAIN: ICD-10-CM

## 2023-10-26 DIAGNOSIS — K21.9 GASTROESOPHAGEAL REFLUX DISEASE, UNSPECIFIED WHETHER ESOPHAGITIS PRESENT: ICD-10-CM

## 2023-10-26 DIAGNOSIS — G89.29 CHRONIC MIDLINE LOW BACK PAIN WITHOUT SCIATICA: ICD-10-CM

## 2023-10-26 DIAGNOSIS — R10.2 CHRONIC PELVIC PAIN IN FEMALE: ICD-10-CM

## 2023-10-26 DIAGNOSIS — T14.8XXA BRUISING: ICD-10-CM

## 2023-10-26 DIAGNOSIS — R76.8 ANA POSITIVE: ICD-10-CM

## 2023-10-26 DIAGNOSIS — G89.29 CHRONIC PELVIC PAIN IN FEMALE: ICD-10-CM

## 2023-10-26 DIAGNOSIS — K76.0 HEPATIC STEATOSIS: ICD-10-CM

## 2023-10-26 DIAGNOSIS — Z00.00 ANNUAL PHYSICAL EXAM: Primary | ICD-10-CM

## 2023-10-26 DIAGNOSIS — Z00.00 ANNUAL PHYSICAL EXAM: ICD-10-CM

## 2023-10-26 LAB
25(OH)D3+25(OH)D2 SERPL-MCNC: 18 NG/ML (ref 30–96)
ALBUMIN SERPL BCP-MCNC: 3.6 G/DL (ref 3.5–5.2)
ALP SERPL-CCNC: 96 U/L (ref 55–135)
ALT SERPL W/O P-5'-P-CCNC: 22 U/L (ref 10–44)
ANION GAP SERPL CALC-SCNC: 11 MMOL/L (ref 8–16)
AST SERPL-CCNC: 19 U/L (ref 10–40)
BASOPHILS # BLD AUTO: 0.04 K/UL (ref 0–0.2)
BASOPHILS NFR BLD: 0.7 % (ref 0–1.9)
BILIRUB SERPL-MCNC: 0.2 MG/DL (ref 0.1–1)
BUN SERPL-MCNC: 10 MG/DL (ref 6–20)
CALCIUM SERPL-MCNC: 9.4 MG/DL (ref 8.7–10.5)
CHLORIDE SERPL-SCNC: 107 MMOL/L (ref 95–110)
CHOLEST SERPL-MCNC: 195 MG/DL (ref 120–199)
CHOLEST/HDLC SERPL: 4.5 {RATIO} (ref 2–5)
CO2 SERPL-SCNC: 22 MMOL/L (ref 23–29)
CREAT SERPL-MCNC: 0.7 MG/DL (ref 0.5–1.4)
DIFFERENTIAL METHOD: ABNORMAL
EOSINOPHIL # BLD AUTO: 0 K/UL (ref 0–0.5)
EOSINOPHIL NFR BLD: 0.5 % (ref 0–8)
ERYTHROCYTE [DISTWIDTH] IN BLOOD BY AUTOMATED COUNT: 13.4 % (ref 11.5–14.5)
EST. GFR  (NO RACE VARIABLE): >60 ML/MIN/1.73 M^2
ESTIMATED AVG GLUCOSE: 94 MG/DL (ref 68–131)
FERRITIN SERPL-MCNC: 66 NG/ML (ref 20–300)
GLUCOSE SERPL-MCNC: 83 MG/DL (ref 70–110)
HBA1C MFR BLD: 4.9 % (ref 4–5.6)
HCT VFR BLD AUTO: 44.6 % (ref 37–48.5)
HDLC SERPL-MCNC: 43 MG/DL (ref 40–75)
HDLC SERPL: 22.1 % (ref 20–50)
HGB BLD-MCNC: 14.2 G/DL (ref 12–16)
IMM GRANULOCYTES # BLD AUTO: 0.01 K/UL (ref 0–0.04)
IMM GRANULOCYTES NFR BLD AUTO: 0.2 % (ref 0–0.5)
IRON SERPL-MCNC: 43 UG/DL (ref 30–160)
LDLC SERPL CALC-MCNC: 135.2 MG/DL (ref 63–159)
LYMPHOCYTES # BLD AUTO: 1.7 K/UL (ref 1–4.8)
LYMPHOCYTES NFR BLD: 29.2 % (ref 18–48)
MCH RBC QN AUTO: 29.3 PG (ref 27–31)
MCHC RBC AUTO-ENTMCNC: 31.8 G/DL (ref 32–36)
MCV RBC AUTO: 92 FL (ref 82–98)
MONOCYTES # BLD AUTO: 0.6 K/UL (ref 0.3–1)
MONOCYTES NFR BLD: 10.9 % (ref 4–15)
NEUTROPHILS # BLD AUTO: 3.4 K/UL (ref 1.8–7.7)
NEUTROPHILS NFR BLD: 58.5 % (ref 38–73)
NONHDLC SERPL-MCNC: 152 MG/DL
NRBC BLD-RTO: 0 /100 WBC
PLATELET # BLD AUTO: 269 K/UL (ref 150–450)
PMV BLD AUTO: 10.5 FL (ref 9.2–12.9)
POTASSIUM SERPL-SCNC: 4.1 MMOL/L (ref 3.5–5.1)
PROT SERPL-MCNC: 7.1 G/DL (ref 6–8.4)
RBC # BLD AUTO: 4.84 M/UL (ref 4–5.4)
SATURATED IRON: 12 % (ref 20–50)
SODIUM SERPL-SCNC: 140 MMOL/L (ref 136–145)
TOTAL IRON BINDING CAPACITY: 351 UG/DL (ref 250–450)
TRANSFERRIN SERPL-MCNC: 237 MG/DL (ref 200–375)
TRIGL SERPL-MCNC: 84 MG/DL (ref 30–150)
TSH SERPL DL<=0.005 MIU/L-ACNC: 2.13 UIU/ML (ref 0.4–4)
WBC # BLD AUTO: 5.85 K/UL (ref 3.9–12.7)

## 2023-10-26 PROCEDURE — 83540 ASSAY OF IRON: CPT | Performed by: NURSE PRACTITIONER

## 2023-10-26 PROCEDURE — 99999 PR PBB SHADOW E&M-EST. PATIENT-LVL IV: ICD-10-PCS | Mod: PBBFAC,,, | Performed by: NURSE PRACTITIONER

## 2023-10-26 PROCEDURE — 99395 PREV VISIT EST AGE 18-39: CPT | Mod: S$GLB,,, | Performed by: NURSE PRACTITIONER

## 2023-10-26 PROCEDURE — 36415 COLL VENOUS BLD VENIPUNCTURE: CPT | Performed by: NURSE PRACTITIONER

## 2023-10-26 PROCEDURE — 80053 COMPREHEN METABOLIC PANEL: CPT | Performed by: NURSE PRACTITIONER

## 2023-10-26 PROCEDURE — 82728 ASSAY OF FERRITIN: CPT | Performed by: NURSE PRACTITIONER

## 2023-10-26 PROCEDURE — 80061 LIPID PANEL: CPT | Performed by: NURSE PRACTITIONER

## 2023-10-26 PROCEDURE — 84466 ASSAY OF TRANSFERRIN: CPT | Performed by: NURSE PRACTITIONER

## 2023-10-26 PROCEDURE — 82306 VITAMIN D 25 HYDROXY: CPT | Performed by: NURSE PRACTITIONER

## 2023-10-26 PROCEDURE — 85025 COMPLETE CBC W/AUTO DIFF WBC: CPT | Performed by: NURSE PRACTITIONER

## 2023-10-26 PROCEDURE — 83036 HEMOGLOBIN GLYCOSYLATED A1C: CPT | Performed by: NURSE PRACTITIONER

## 2023-10-26 PROCEDURE — 99999 PR PBB SHADOW E&M-EST. PATIENT-LVL IV: CPT | Mod: PBBFAC,,, | Performed by: NURSE PRACTITIONER

## 2023-10-26 PROCEDURE — 82525 ASSAY OF COPPER: CPT | Performed by: NURSE PRACTITIONER

## 2023-10-26 PROCEDURE — 84443 ASSAY THYROID STIM HORMONE: CPT | Performed by: NURSE PRACTITIONER

## 2023-10-26 PROCEDURE — 99395 PR PREVENTIVE VISIT,EST,18-39: ICD-10-PCS | Mod: S$GLB,,, | Performed by: NURSE PRACTITIONER

## 2023-10-27 ENCOUNTER — TELEPHONE (OUTPATIENT)
Dept: INTERNAL MEDICINE | Facility: CLINIC | Age: 33
End: 2023-10-27
Payer: COMMERCIAL

## 2023-10-27 ENCOUNTER — PATIENT MESSAGE (OUTPATIENT)
Dept: INTERNAL MEDICINE | Facility: CLINIC | Age: 33
End: 2023-10-27

## 2023-10-27 ENCOUNTER — OFFICE VISIT (OUTPATIENT)
Dept: INTERNAL MEDICINE | Facility: CLINIC | Age: 33
End: 2023-10-27
Payer: COMMERCIAL

## 2023-10-27 VITALS
BODY MASS INDEX: 27.34 KG/M2 | DIASTOLIC BLOOD PRESSURE: 80 MMHG | WEIGHT: 154.31 LBS | OXYGEN SATURATION: 98 % | SYSTOLIC BLOOD PRESSURE: 112 MMHG | HEIGHT: 63 IN | HEART RATE: 109 BPM

## 2023-10-27 DIAGNOSIS — R50.9 FEVER, UNSPECIFIED FEVER CAUSE: ICD-10-CM

## 2023-10-27 DIAGNOSIS — R51.9 GENERALIZED HEADACHES: ICD-10-CM

## 2023-10-27 DIAGNOSIS — R52 BODY ACHES: ICD-10-CM

## 2023-10-27 DIAGNOSIS — B34.9 ACUTE VIRAL SYNDROME: Primary | ICD-10-CM

## 2023-10-27 LAB
CTP QC/QA: YES
POC MOLECULAR INFLUENZA A AGN: NEGATIVE
POC MOLECULAR INFLUENZA B AGN: NEGATIVE
S PYO RRNA THROAT QL PROBE: NEGATIVE
SARS-COV-2 RDRP RESP QL NAA+PROBE: NEGATIVE

## 2023-10-27 PROCEDURE — 87880 POCT RAPID STREP A: ICD-10-PCS | Mod: QW,S$GLB,, | Performed by: NURSE PRACTITIONER

## 2023-10-27 PROCEDURE — 99215 PR OFFICE/OUTPT VISIT, EST, LEVL V, 40-54 MIN: ICD-10-PCS | Mod: S$GLB,,, | Performed by: NURSE PRACTITIONER

## 2023-10-27 PROCEDURE — 87502 INFLUENZA DNA AMP PROBE: CPT | Mod: QW,S$GLB,, | Performed by: NURSE PRACTITIONER

## 2023-10-27 PROCEDURE — 99215 OFFICE O/P EST HI 40 MIN: CPT | Mod: S$GLB,,, | Performed by: NURSE PRACTITIONER

## 2023-10-27 PROCEDURE — 87502 POCT INFLUENZA A/B MOLECULAR: ICD-10-PCS | Mod: QW,S$GLB,, | Performed by: NURSE PRACTITIONER

## 2023-10-27 PROCEDURE — 87635 SARS-COV-2 COVID-19 AMP PRB: CPT | Mod: QW,S$GLB,, | Performed by: NURSE PRACTITIONER

## 2023-10-27 PROCEDURE — 99999 PR PBB SHADOW E&M-EST. PATIENT-LVL III: ICD-10-PCS | Mod: PBBFAC,,, | Performed by: NURSE PRACTITIONER

## 2023-10-27 PROCEDURE — 87635: ICD-10-PCS | Mod: QW,S$GLB,, | Performed by: NURSE PRACTITIONER

## 2023-10-27 PROCEDURE — 99999 PR PBB SHADOW E&M-EST. PATIENT-LVL III: CPT | Mod: PBBFAC,,, | Performed by: NURSE PRACTITIONER

## 2023-10-27 PROCEDURE — 87880 STREP A ASSAY W/OPTIC: CPT | Mod: QW,S$GLB,, | Performed by: NURSE PRACTITIONER

## 2023-10-27 RX ORDER — FERROUS SULFATE 325(65) MG
325 TABLET, DELAYED RELEASE (ENTERIC COATED) ORAL DAILY
Qty: 90 TABLET | Refills: 1 | Status: SHIPPED | OUTPATIENT
Start: 2023-10-27

## 2023-10-27 RX ORDER — ACETAMINOPHEN 325 MG/1
650 TABLET ORAL
Status: SHIPPED | OUTPATIENT
Start: 2023-10-27

## 2023-10-27 RX ORDER — SODIUM CHLORIDE 9 MG/ML
INJECTION, SOLUTION INTRAVENOUS
Status: SHIPPED | OUTPATIENT
Start: 2023-10-27

## 2023-10-27 RX ORDER — OSELTAMIVIR PHOSPHATE 75 MG/1
75 CAPSULE ORAL 2 TIMES DAILY
Qty: 10 CAPSULE | Refills: 0 | Status: SHIPPED | OUTPATIENT
Start: 2023-10-27 | End: 2023-11-01

## 2023-10-27 RX ORDER — ASPIRIN 325 MG
50000 TABLET, DELAYED RELEASE (ENTERIC COATED) ORAL
Qty: 12 CAPSULE | Refills: 1 | Status: SHIPPED | OUTPATIENT
Start: 2023-10-27

## 2023-10-27 NOTE — PROGRESS NOTES
INTERNAL MEDICINE CLINIC - SAME DAY APPOINTMENT  Progress Note    PRESENTING HISTORY     PCP: Jhoana Trejo MD    Chief Complaint/Reason for Visit:   No chief complaint on file.    History of Present Illness & ROS : Ms. Laila Pappas is a 32 y.o. female.    Same day apt.   Very pleasant young lady.   Here with her supportive .   Reports that began to experience some random symptoms of 'headache and nausea earlier this week', her  had some similar symptoms days prior and their daughter was also ill with some GI symptoms prior. She denies any sneezing, coughing, SOB, chestwall discomforts, nasal congestion, ear pain or sore throat. She reports that awakened this morning 'feeling very bad with headaches, body aches, feverish and chills. Took Tylenol prior the appt today'. No GI symptoms endorsed.   Of note, she was seen on yesterday for her wellness with me, and these symptoms are 'new and acute' for her.     Review of Systems:  Eyes: denies visual changes at this time denies floaters   ENT: no nasal congestion or sore throat  Respiratory: no cough or shorness of breath  Cardiovascular: no chest pain or palpitations  Gastrointestinal: no nausea or vomiting, no abdominal pain or change in bowel habits  Genitourinary: no hematuria or dysuria; denies frequency  Hematologic/Lymphatic: no easy bruising or lymphadenopathy  Musculoskeletal: no arthralgias or myalgias  Neurological: no seizures or tremors  Endocrine: no heat or cold intolerance      PAST HISTORY:     Past Medical History:   Diagnosis Date    Bilateral knee pain 06/19/2018    Endometriosis     treated by GYN at     Epigastric abdominal pain 12/05/2016    Gastroesophageal reflux disease 07/06/2016    Helicobacter pylori ab+     Helicobacter pylori ab+     High-tone pelvic floor dysfunction 05/02/2016    IIH (idiopathic intracranial hypertension)     Dr. Rogers - [facility]    Palpitations 07/06/2016    Pre-eclampsia in postpartum period  08/16/2022       Past Surgical History:   Procedure Laterality Date    ARTHROSCOPY OF KNEE Left 04/06/2021    Procedure: ARTHROSCOPY, KNEE;  Surgeon: Deidra Mejias MD;  Location: [facility] OR;  Service: Orthopedics;  Laterality: Left;    COLONOSCOPY N/A 12/13/2018    Procedure: COLONOSCOPY;  Surgeon: Micki Gross MD;  Location: [facility] (4TH FLR);  Service: Endoscopy;  Laterality: N/A;  preferrably in Dec 2018, CRS ok if needed.    COLONOSCOPY N/A 11/05/2022    Procedure: COLONOSCOPY;  Surgeon: Greg Leach MD;  Location: [facility] (4TH FLR);  Service: Endoscopy;  Laterality: N/A;  Endoscopy schedulers please schedule patient for colonoscopy with me for further evaluation of painless hematochezia thank you    ESOPHAGOGASTRODUODENOSCOPY N/A 10/30/2018    Procedure: EGD (ESOPHAGOGASTRODUODENOSCOPY);  Surgeon: Greg Leach MD;  Location: [facility] (2ND FLR);  Service: Endoscopy;  Laterality: N/A;  ok to schedule per Kimmy    ESOPHAGOGASTRODUODENOSCOPY N/A 11/05/2022    Procedure: EGD (ESOPHAGOGASTRODUODENOSCOPY);  Surgeon: Greg Leach MD;  Location: [facility] (4TH FLR);  Service: Endoscopy;  Laterality: N/A;  Endoscopy schedulers please schedule patient for EGD for evaluation of right upper quadrant pain thank you  prep instr portal--ml  11/2/22 room closed-pt okay with Dr. Leach-50 min case okay per Katlyn-ml    KNEE ARTHROSCOPY W/ DEBRIDEMENT  04/06/2021    Procedure: ARTHROSCOPY, KNEE, WITH DEBRIDEMENT;  Surgeon: Deidra Mejias MD;  Location: [facility] OR;  Service: Orthopedics;;    KNEE ARTHROSCOPY W/ MENISCECTOMY Right 04/06/2021    Procedure: ARTHROSCOPY, KNEE, WITH MENISCECTOMY;  Surgeon: Deidra Mejias MD;  Location: [facility] OR;  Service: Orthopedics;  Laterality: Right;    KNEE ARTHROSCOPY W/ PLICA EXCISION Left 04/06/2021    Procedure: EXCISION, PLICA, KNEE, ARTHROSCOPIC;  Surgeon: Deidra Mejias MD;  Location: [facility] OR;  Service: Orthopedics;  Laterality: Left;    LAPAROSCOPY  2017    dx with endo.  no removal    NO  PAST SURGERIES      SYNOVECTOMY OF KNEE Left 2021    Procedure: SYNOVECTOMY, KNEE;  Surgeon: Deidra Mejias MD;  Location: [facility];  Service: Orthopedics;  Laterality: Left;    UPPER GASTROINTESTINAL ENDOSCOPY         Family History   Problem Relation Age of Onset    Diabetes Mother     No Known Problems Father     No Known Problems Sister     Hypertension Brother     Hypertension Maternal Grandmother     Hypertension Maternal Grandfather     No Known Problems Paternal Grandmother     No Known Problems Paternal Grandfather     Colon cancer Neg Hx     Crohn's disease Neg Hx     Esophageal cancer Neg Hx     Inflammatory bowel disease Neg Hx     Irritable bowel syndrome Neg Hx     Rectal cancer Neg Hx     Stomach cancer Neg Hx     Ulcerative colitis Neg Hx     Celiac disease Neg Hx     Cirrhosis Neg Hx     Colon polyps Neg Hx     Liver cancer Neg Hx     Liver disease Neg Hx        Social History     Socioeconomic History    Marital status: Single   Occupational History     Employer: [employer]     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   Tobacco Use    Smoking status: Never     Passive exposure: Never    Smokeless tobacco: Never   Substance and Sexual Activity    Alcohol use: No    Drug use: No    Sexual activity: Yes     Partners: Male     Birth control/protection: None   Social History Narrative    She had her 1st baby in 2022 healthy vaginal delivery no      Social Determinants of Health     Financial Resource Strain: Unknown (10/25/2022)    Overall Financial Resource Strain (CARDIA)     Difficulty of Paying Living Expenses: Patient refused   Food Insecurity: Unknown (10/25/2022)    Hunger Vital Sign     Worried About Running Out of Food in the Last Year: Patient refused     Ran Out of Food in the Last Year: Patient refused   Transportation Needs: Unknown (10/25/2022)    PRAPARE - Transportation     Lack of Transportation (Medical): Patient refused     Lack of Transportation  (Non-Medical): Patient refused   Physical Activity: Unknown (10/25/2022)    Exercise Vital Sign     Days of Exercise per Week: Patient refused   Stress: No Stress Concern Present (9/29/2020)    [employer] - Occupational Stress Questionnaire     Feeling of Stress : Only a little   Social Connections: Unknown (10/25/2022)    Social Connection and Isolation Panel [NHANES]     Frequency of Communication with Friends and Family: Patient refused     Frequency of Social Gatherings with Friends and Family: Patient refused     Active Member of Clubs or Organizations: Patient refused     Attends Club or Organization Meetings: Patient refused     Marital Status: Patient refused   Housing Stability: Unknown (10/25/2022)    Housing Stability Vital Sign     Unable to Pay for Housing in the Last Year: Patient refused     Unstable Housing in the Last Year: Patient refused       MEDICATIONS & ALLERGIES:     Current Outpatient Medications on File Prior to Visit   Medication Sig Dispense Refill    [DISCONTINUED] folic acid (FOLVITE) 1 MG tablet Take 1 mg by mouth once daily.       No current facility-administered medications on file prior to visit.        Review of patient's allergies indicates:   Allergen Reactions    Doxycycline Other (See Comments)     Triggers headaches    Bactrim ds [sulfamethoxazole-trimethoprim] Nausea And Vomiting    Nsaids (non-steroidal anti-inflammatory drug) Other (See Comments)     D/t h/o PUD and H pylori    Codeine Rash       Medications Reconciliation:   I have reconciled the patient's home medications with the patient/family. I have updated all changes.  Refer to After-Visit Medication List.    OBJECTIVE:     Vital Signs:  There were no vitals filed for this visit.  Wt Readings from Last 3 Encounters:   10/26/23 1306 69.8 kg (153 lb 14.1 oz)   10/03/23 1336 70 kg (154 lb 5.2 oz)   09/28/23 0918 69 kg (152 lb 1.9 oz)     There is no height or weight on file to calculate  BMI.   Wt Readings from Last 3 Encounters:   10/27/23 70 kg (154 lb 5.2 oz)   10/26/23 69.8 kg (153 lb 14.1 oz)   10/03/23 70 kg (154 lb 5.2 oz)     Temp Readings from Last 3 Encounters:   23 97.8 °F (36.6 °C) (Oral)   22 98.9 °F (37.2 °C)   22 98.1 °F (36.7 °C) (Temporal)     BP Readings from Last 3 Encounters:   10/27/23 112/80   10/26/23 120/74   10/03/23 108/78     Pulse Readings from Last 3 Encounters:   10/27/23 109   10/26/23 97   23 101       Physical Exam:  General: Well developed, well nourished. No distress.  HEENT: Head is normocephalic, atraumatic; ears are normal.   Eyes: Clear conjunctiva.  Neck: Supple, symmetrical neck; trachea midline.  Lungs: Clear to auscultation bilaterally and normal respiratory effort.  Cardiovascular: Heart with regular rate and rhythm. No murmurs, gallops or rubs  Skin: Skin color, texture, turgor normal. No rashes.  Musculoskeletal: Normal gait.   Lymph Nodes: No cervical or supraclavicular adenopathy.  Neurologic: Normal strength and tone. No focal numbness or weakness.     *Unremarkable clinical exam today.     Laboratory  Lab Results   Component Value Date    WBC 5.85 10/26/2023    HGB 14.2 10/26/2023    HCT 44.6 10/26/2023     10/26/2023    CHOL 195 10/26/2023    TRIG 84 10/26/2023    HDL 43 10/26/2023    ALT 22 10/26/2023    AST 19 10/26/2023     10/26/2023    K 4.1 10/26/2023     10/26/2023    CREATININE 0.7 10/26/2023    BUN 10 10/26/2023    CO2 22 (L) 10/26/2023    TSH 2.131 10/26/2023    INR 1.1 2022    HGBA1C 4.9 10/26/2023       ASSESSMENT & PLAN:     Same day appt.       Acute viral syndrome (high suspicion for Flu)    Fever, unspecified fever cause    Body aches    Generalized headaches  Temp in clinic: 99.1   Satin% RA  Given the acute onset of her symptoms, in relation to her family having similar symptoms preceding, suspect this is of a viral syndrome, don't suspect RSV in the setting of lack of cough and  no respiratory symptoms. Viral panel is negative, however, based on symptomatology and the acuteness of her symptoms, suspect Influenza and recommend starting Tamilflu and she was amendable to this. In addition, suspect her oral intake may not be up to par, although she does mention that she tries to keep up with her water given that she is breastfeeding. Will administer a liter of Normal Saline in clinic today.   -     POCT COVID-19 Rapid Screening (negative)  -     POCT Influenza A/B Molecular (negative)  -     POCT Rapid Strep A (negative)    Other orders  -     oseltamivir (TAMIFLU) 75 MG capsule; Take 1 capsule (75 mg total) by mouth 2 (two) times daily. for 5 days  Dispense: 10 capsule; Refill: 0  -     0.9%  NaCl infusion  (HR post IV hydration: 89)  -     acetaminophen tablet 650 mg  Started IV: 11:45am  IV Finished: 1:05pm    *Advised to ER red flags if not improving over the next 24 hours.   Future Appointments   Date Time Provider Department Center   10/27/2023  3:30 PM Agatha Causey FNP [facility] IM [facility]   10/31/2023  7:15 AM [facility]-CT[facility] 500 LB LIMIT [facility] IC Imaging Ctr   11/6/2023  4:00 PM EXERCISE, STRESS ECHO [facility] ECHOSTR [facility]   11/8/2023  9:40 AM Amanda Benjamin NP [facility] OBGYN50 [facility]   12/22/2023  9:00 AM Quique Tanner MD [facility] SPINE [facility]   1/9/2024  8:30 AM Meghan Bunn MD [facility] OBN [facility]   1/30/2024  9:15 AM Chanelle Guillaume MD [facility]        Medication List            Accurate as of October 27, 2023  1:17 PM. If you have any questions, ask your nurse or doctor.                START taking these medications      cholecalciferol (vitamin D3) 1,250 mcg (50,000 unit) capsule  Take 1 capsule (50,000 Units total) by mouth every 7 days.  Started by: MORGAN Cage     ferrous sulfate 325 (65 FE) MG EC tablet  Take 1 tablet (325 mg total) by mouth once daily.  Started by: MORGAN Cage      oseltamivir 75 MG capsule  Commonly known as: TAMIFLU  Take 1 capsule (75 mg total) by mouth 2 (two) times daily. for 5 days  Started by: MORGAN Cage               Where to Get Your Medications        These medications were sent to [id number] - [place], [place] - [address]  [address], [facility] [address]      Hours: 24-hours Phone: [phone]   cholecalciferol (vitamin D3) 1,250 mcg (50,000 unit) capsule  ferrous sulfate 325 (65 FE) MG EC tablet  oseltamivir 75 MG capsule       Total time spent with patient today: assessment, education, interventions and coordination of care: >2 hours     Signing Physician:  MORGAN Cage

## 2023-10-31 LAB — COPPER SERPL-MCNC: 847 UG/L (ref 810–1990)

## 2023-11-07 ENCOUNTER — PATIENT MESSAGE (OUTPATIENT)
Dept: OBSTETRICS AND GYNECOLOGY | Facility: CLINIC | Age: 33
End: 2023-11-07
Payer: COMMERCIAL

## 2023-11-28 ENCOUNTER — OFFICE VISIT (OUTPATIENT)
Dept: SPINE | Facility: CLINIC | Age: 33
End: 2023-11-28
Payer: COMMERCIAL

## 2023-11-28 VITALS
DIASTOLIC BLOOD PRESSURE: 74 MMHG | SYSTOLIC BLOOD PRESSURE: 127 MMHG | HEART RATE: 89 BPM | RESPIRATION RATE: 18 BRPM | TEMPERATURE: 98 F | BODY MASS INDEX: 27.28 KG/M2 | OXYGEN SATURATION: 100 % | WEIGHT: 154 LBS

## 2023-11-28 DIAGNOSIS — M54.9 DORSALGIA, UNSPECIFIED: Primary | ICD-10-CM

## 2023-11-28 DIAGNOSIS — M79.18 MYOFASCIAL PAIN: ICD-10-CM

## 2023-11-28 PROCEDURE — 99204 OFFICE O/P NEW MOD 45 MIN: CPT | Mod: S$GLB,,, | Performed by: NURSE PRACTITIONER

## 2023-11-28 PROCEDURE — 99999 PR PBB SHADOW E&M-EST. PATIENT-LVL IV: CPT | Mod: PBBFAC,,, | Performed by: NURSE PRACTITIONER

## 2023-11-28 PROCEDURE — 99204 PR OFFICE/OUTPT VISIT, NEW, LEVL IV, 45-59 MIN: ICD-10-PCS | Mod: S$GLB,,, | Performed by: NURSE PRACTITIONER

## 2023-11-28 PROCEDURE — 99999 PR PBB SHADOW E&M-EST. PATIENT-LVL IV: ICD-10-PCS | Mod: PBBFAC,,, | Performed by: NURSE PRACTITIONER

## 2023-11-28 NOTE — PROGRESS NOTES
Subjective:     Patient ID: Laila Pappas is a 32 y.o. female.    Chief Complaint: No chief complaint on file.    HPI Ms. Pappas is a 32-year-old female here for evaluation of low back pain    Established patient with Dr. Tanner  Scheduled for appointment on 12/22/2023 with x-rays  Complaints of chronic mid to low back pain on the right  Denies leg pain or paresthesias  Did PT in 2022 without relief  Exclusively breastfeeding not taking any medications    Past Medical History:   Diagnosis Date    Bilateral knee pain 06/19/2018    Endometriosis     treated by GYN at     Epigastric abdominal pain 12/05/2016    Gastroesophageal reflux disease 07/06/2016    Helicobacter pylori ab+     Helicobacter pylori ab+     High-tone pelvic floor dysfunction 05/02/2016    IIH (idiopathic intracranial hypertension)     Dr. Rogers - [facility]    Palpitations 07/06/2016    Pre-eclampsia in postpartum period 08/16/2022       Past Surgical History:   Procedure Laterality Date    ARTHROSCOPY OF KNEE Left 04/06/2021    Procedure: ARTHROSCOPY, KNEE;  Surgeon: Deidra Mejias MD;  Location: [facility];  Service: Orthopedics;  Laterality: Left;    COLONOSCOPY N/A 12/13/2018    Procedure: COLONOSCOPY;  Surgeon: Micki Gross MD;  Location: [facility] (4TH FLR);  Service: Endoscopy;  Laterality: N/A;  preferrably in Dec 2018, CRS ok if needed.    COLONOSCOPY N/A 11/05/2022    Procedure: COLONOSCOPY;  Surgeon: Greg Leach MD;  Location: [facility] (4TH FLR);  Service: Endoscopy;  Laterality: N/A;  Endoscopy schedulers please schedule patient for colonoscopy with me for further evaluation of painless hematochezia thank you    ESOPHAGOGASTRODUODENOSCOPY N/A 10/30/2018    Procedure: EGD (ESOPHAGOGASTRODUODENOSCOPY);  Surgeon: Greg Leach MD;  Location: [facility] (2ND FLR);  Service: Endoscopy;  Laterality: N/A;  ok to schedule per Kimmy    ESOPHAGOGASTRODUODENOSCOPY N/A 11/05/2022    Procedure: EGD (ESOPHAGOGASTRODUODENOSCOPY);  Surgeon:  Greg Leach MD;  Location: [facility] ENDO (4TH FLR);  Service: Endoscopy;  Laterality: N/A;  Endoscopy schedulers please schedule patient for EGD for evaluation of right upper quadrant pain thank you  prep instr portal--ml  11/2/22 room closed-pt okay with Dr. Leach-50 min case okay per Katlyn-ml    KNEE ARTHROSCOPY W/ DEBRIDEMENT  04/06/2021    Procedure: ARTHROSCOPY, KNEE, WITH DEBRIDEMENT;  Surgeon: Deidra Mejias MD;  Location: [facility] OR;  Service: Orthopedics;;    KNEE ARTHROSCOPY W/ MENISCECTOMY Right 04/06/2021    Procedure: ARTHROSCOPY, KNEE, WITH MENISCECTOMY;  Surgeon: Deidra Mejias MD;  Location: [facility] OR;  Service: Orthopedics;  Laterality: Right;    KNEE ARTHROSCOPY W/ PLICA EXCISION Left 04/06/2021    Procedure: EXCISION, PLICA, KNEE, ARTHROSCOPIC;  Surgeon: Deidra Mejias MD;  Location: [facility] OR;  Service: Orthopedics;  Laterality: Left;    LAPAROSCOPY  2017    dx with endo.  no removal    NO PAST SURGERIES      SYNOVECTOMY OF KNEE Left 04/06/2021    Procedure: SYNOVECTOMY, KNEE;  Surgeon: Deidra Mejias MD;  Location: [facility] OR;  Service: Orthopedics;  Laterality: Left;    UPPER GASTROINTESTINAL ENDOSCOPY         Family History   Problem Relation Age of Onset    Diabetes Mother     No Known Problems Father     No Known Problems Sister     Hypertension Brother     Hypertension Maternal Grandmother     Hypertension Maternal Grandfather     No Known Problems Paternal Grandmother     No Known Problems Paternal Grandfather     Colon cancer Neg Hx     Crohn's disease Neg Hx     Esophageal cancer Neg Hx     Inflammatory bowel disease Neg Hx     Irritable bowel syndrome Neg Hx     Rectal cancer Neg Hx     Stomach cancer Neg Hx     Ulcerative colitis Neg Hx     Celiac disease Neg Hx     Cirrhosis Neg Hx     Colon polyps Neg Hx     Liver cancer Neg Hx     Liver disease Neg Hx        Social History     Socioeconomic History    Marital status: Single   Occupational History     Employer: [employer]     Comment:   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   Tobacco Use    Smoking status: Never     Passive exposure: Never    Smokeless tobacco: Never   Substance and Sexual Activity    Alcohol use: No    Drug use: No    Sexual activity: Yes     Partners: Male     Birth control/protection: None   Social History Narrative    She had her 1st baby in 2022 healthy vaginal delivery no      Social Determinants of Health     Financial Resource Strain: Unknown (10/25/2022)    Overall Financial Resource Strain (CARDIA)     Difficulty of Paying Living Expenses: Patient refused   Food Insecurity: Unknown (10/25/2022)    Hunger Vital Sign     Worried About Running Out of Food in the Last Year: Patient refused     Ran Out of Food in the Last Year: Patient refused   Transportation Needs: Unknown (10/25/2022)    PRAPARE - Transportation     Lack of Transportation (Medical): Patient refused     Lack of Transportation (Non-Medical): Patient refused   Physical Activity: Unknown (10/25/2022)    Exercise Vital Sign     Days of Exercise per Week: Patient refused   Stress: No Stress Concern Present (2020)    [employer] [place] [employer] - Occupational Stress Questionnaire     Feeling of Stress : Only a little   Social Connections: Unknown (10/25/2022)    Social Connection and Isolation Panel [NHANES]     Frequency of Communication with Friends and Family: Patient refused     Frequency of Social Gatherings with Friends and Family: Patient refused     Active Member of Clubs or Organizations: Patient refused     Attends Club or Organization Meetings: Patient refused     Marital Status: Patient refused   Housing Stability: Unknown (10/25/2022)    Housing Stability Vital Sign     Unable to Pay for Housing in the Last Year: Patient refused     Unstable Housing in the Last Year: Patient refused       Current Outpatient Medications   Medication Sig Dispense Refill    cholecalciferol, vitamin D3, 1,250 mcg (50,000 unit) capsule Take 1 capsule  (50,000 Units total) by mouth every 7 days. 12 capsule 1    ferrous sulfate 325 (65 FE) MG EC tablet Take 1 tablet (325 mg total) by mouth once daily. 90 tablet 1     Current Facility-Administered Medications   Medication Dose Route Frequency Provider Last Rate Last Admin    0.9%  NaCl infusion   Intravenous 1 time in Clinic/HOD Agatha Causey FNP        acetaminophen tablet 650 mg  650 mg Oral 1 time in Clinic/HOD Agatha Causey FNP           Review of patient's allergies indicates:   Allergen Reactions    Doxycycline Other (See Comments)     Triggers headaches    Bactrim ds [sulfamethoxazole-trimethoprim] Nausea And Vomiting    Nsaids (non-steroidal anti-inflammatory drug) Other (See Comments)     D/t h/o PUD and H pylori    Codeine Rash         Review of Systems   Constitutional: Negative for fever.   Cardiovascular:  Negative for chest pain.   Respiratory:  Negative for shortness of breath.    Musculoskeletal:  Positive for back pain.   Gastrointestinal:  Negative for bowel incontinence.   Genitourinary:  Negative for bladder incontinence.   Neurological:  Negative for numbness and paresthesias.        Objective:     General: Laila is well-developed, well-nourished, appears stated age, in no acute distress, alert and oriented to time, place and person.     General    Vitals reviewed.  Constitutional: She is oriented to person, place, and time. She appears well-developed and well-nourished.   HENT:   Head: Atraumatic.   Nose: Nose normal.   Eyes: Conjunctivae are normal.   Cardiovascular:  Normal rate.            Pulmonary/Chest: Effort normal.   Neurological: She is alert and oriented to person, place, and time.   Psychiatric: She has a normal mood and affect. Her behavior is normal. Judgment and thought content normal.     General Musculoskeletal Exam   Gait: normal     Back (L-Spine & T-Spine) / Neck (C-Spine) Exam     Tenderness Right paramedian tenderness of the Upper L-Spine and Lower  L-Spine.     Back (L-Spine & T-Spine) Range of Motion   Extension:  10   Flexion:  90     Spinal Sensation   Right Side Sensation  L-Spine Level: normal  S-Spine Level: normal  Left Side Sensation  L-Spine Level: normal  S-Spine Level: normal    Other   She has no scoliosis .  Spinal Kyphosis:  Absent      Muscle Strength   Right Upper Extremity   Biceps: 5/5   Deltoid:  5/5  Triceps:  5/5  Left Upper Extremity  Biceps: 5/5   Deltoid:  5/5  Triceps:  5/5  Right Lower Extremity   Hip Flexion: 5/5   Quadriceps:  5/5   Ankle Dorsiflexion:  5/5   Anterior tibial:  5/5   EHL:  5/5  Left Lower Extremity   Hip Flexion: 5/5   Quadriceps:  5/5   Ankle Dorsiflexion:  5/5   Anterior tibial:  5/5   EHL:  5/5    Reflexes     Left Side  Biceps:  2+  Brachioradialis:  2+  Achilles:  2+    Right Side   Biceps:  2+  Brachioradialis:  2+  Achilles:  2+    Vascular Exam     Right Pulses        Carotid:                  2+    Left Pulses        Carotid:                  2+          Assessment:     1. Dorsalgia, unspecified    2. Myofascial pain         Plan:        Prior records reviewed today  We discussed back pain and the nature of back pain.  We discussed that it is not one thing that causes the pain but an accumulation of multiple things that we do.    Failed conservative efforts and PT  Order lumbar MRI  Follow-up with Dr. Tanner as scheduled on 12/22/2023  Offered anti-inflammatory and muscle relaxer, however patient is exclusively breastfeeding and declined  We discussed posture sitting and the importance of trying to sit better.    We discussed the benefits of therapy and exercise and continuing to move.   No follow-up with me needed    More than 50% of the total time  of 45 minutes was spent face to face in counseling on diagnosis and treatment options. I also counseled patient  on common and most usual side effect of prescribed medications.  I reviewed Primary care , and other specialty's notes to better coordinate  patient's care. All questions were answered, and patient voiced understanding.      Follow-up: No follow-ups on file. If there are any questions prior to this, the patient was instructed to contact the office.

## 2023-11-29 ENCOUNTER — TELEPHONE (OUTPATIENT)
Dept: NEUROLOGY | Facility: CLINIC | Age: 33
End: 2023-11-29
Payer: COMMERCIAL

## 2023-11-29 NOTE — TELEPHONE ENCOUNTER
1st attempt to contact the pt. to confirm appt. with Dr. Ndiaye (11/30/23 at 1:00 pm), no answer, left vm

## 2023-11-30 ENCOUNTER — OFFICE VISIT (OUTPATIENT)
Dept: NEUROLOGY | Facility: CLINIC | Age: 33
End: 2023-11-30
Payer: COMMERCIAL

## 2023-11-30 ENCOUNTER — HOSPITAL ENCOUNTER (OUTPATIENT)
Dept: RADIOLOGY | Facility: OTHER | Age: 33
Discharge: HOME | End: 2023-11-30
Attending: ORTHOPAEDIC SURGERY
Payer: COMMERCIAL

## 2023-11-30 ENCOUNTER — TELEPHONE (OUTPATIENT)
Dept: HEMATOLOGY/ONCOLOGY | Facility: CLINIC | Age: 33
End: 2023-11-30
Payer: COMMERCIAL

## 2023-11-30 VITALS
WEIGHT: 152.13 LBS | DIASTOLIC BLOOD PRESSURE: 74 MMHG | BODY MASS INDEX: 26.95 KG/M2 | HEIGHT: 63 IN | SYSTOLIC BLOOD PRESSURE: 118 MMHG | HEART RATE: 102 BPM

## 2023-11-30 DIAGNOSIS — M51.36 DDD (DEGENERATIVE DISC DISEASE), LUMBAR: ICD-10-CM

## 2023-11-30 DIAGNOSIS — G93.2 IIH (IDIOPATHIC INTRACRANIAL HYPERTENSION): ICD-10-CM

## 2023-11-30 DIAGNOSIS — R42 DIZZINESS: Primary | ICD-10-CM

## 2023-11-30 PROCEDURE — 72110 XR LUMBAR SPINE AP AND LAT WITH FLEX/EXT: ICD-10-PCS | Mod: 26,,, | Performed by: RADIOLOGY

## 2023-11-30 PROCEDURE — 99999 PR PBB SHADOW E&M-EST. PATIENT-LVL III: ICD-10-PCS | Mod: PBBFAC,,, | Performed by: STUDENT IN AN ORGANIZED HEALTH CARE EDUCATION/TRAINING PROGRAM

## 2023-11-30 PROCEDURE — 99999 PR PBB SHADOW E&M-EST. PATIENT-LVL III: CPT | Mod: PBBFAC,,, | Performed by: STUDENT IN AN ORGANIZED HEALTH CARE EDUCATION/TRAINING PROGRAM

## 2023-11-30 PROCEDURE — 72110 X-RAY EXAM L-2 SPINE 4/>VWS: CPT | Mod: 26,,, | Performed by: RADIOLOGY

## 2023-11-30 PROCEDURE — 99215 OFFICE O/P EST HI 40 MIN: CPT | Mod: S$GLB,,, | Performed by: STUDENT IN AN ORGANIZED HEALTH CARE EDUCATION/TRAINING PROGRAM

## 2023-11-30 PROCEDURE — 99215 PR OFFICE/OUTPT VISIT, EST, LEVL V, 40-54 MIN: ICD-10-PCS | Mod: S$GLB,,, | Performed by: STUDENT IN AN ORGANIZED HEALTH CARE EDUCATION/TRAINING PROGRAM

## 2023-11-30 PROCEDURE — 72110 X-RAY EXAM L-2 SPINE 4/>VWS: CPT | Mod: TC,FY

## 2023-11-30 NOTE — PROGRESS NOTES
Chief Complaint and Duration     History of IIH    History of Present Illness     Laila Pappas is a 32 y.o. female with a history of paresthesias, history of IIH in which she had an opening pressure over 30, closing pressure 14.  Patient denies any headaches at present, was not on Diamox for a long time, did try for 2 months 2 years ago prior to getting pregnant.  Did not have issues with headaches after the tap.  Has been well-maintained.  Coming here today, still breast-feeding.  States that she has more episodes of dizziness, no recent illnesses.  No ear ringing.  Concern given that she does have the history of IIH.  Says she does drink enough water.  Otherwise no recent illnesses.  Does have low iron.    In 2021  Mean opening pressure: 33-mmHg  CSF volume removed: 20-cc  Mean closing pressure: 14-mmHg    2 months of diamox prior to pregnancy.  Also prior to the tap.  Stopped      Review of patient's allergies indicates:   Allergen Reactions    Doxycycline Other (See Comments)     Triggers headaches    Bactrim ds [sulfamethoxazole-trimethoprim] Nausea And Vomiting    Nsaids (non-steroidal anti-inflammatory drug) Other (See Comments)     D/t h/o PUD and H pylori    Codeine Rash     Current Outpatient Medications   Medication Sig Dispense Refill    cholecalciferol, vitamin D3, 1,250 mcg (50,000 unit) capsule Take 1 capsule (50,000 Units total) by mouth every 7 days. 12 capsule 1    ferrous sulfate 325 (65 FE) MG EC tablet Take 1 tablet (325 mg total) by mouth once daily. 90 tablet 1     Current Facility-Administered Medications   Medication Dose Route Frequency Provider Last Rate Last Admin    0.9%  NaCl infusion   Intravenous 1 time in Clinic/HOD Agatha Causey FNP        acetaminophen tablet 650 mg  650 mg Oral 1 time in Clinic/HOD Agatha Causey FNP           Medical History     Past Medical History:   Diagnosis Date    Bilateral knee pain 06/19/2018    Endometriosis     treated by GYN at  [facility]    Epigastric abdominal pain 12/05/2016    Gastroesophageal reflux disease 07/06/2016    Helicobacter pylori ab+     Helicobacter pylori ab+     High-tone pelvic floor dysfunction 05/02/2016    IIH (idiopathic intracranial hypertension)     Dr. Rogers - [facility]    Palpitations 07/06/2016    Pre-eclampsia in postpartum period 08/16/2022     Past Surgical History:   Procedure Laterality Date    ARTHROSCOPY OF KNEE Left 04/06/2021    Procedure: ARTHROSCOPY, KNEE;  Surgeon: Deidra Mejias MD;  Location: [facility] OR;  Service: Orthopedics;  Laterality: Left;    COLONOSCOPY N/A 12/13/2018    Procedure: COLONOSCOPY;  Surgeon: Micki Gross MD;  Location: [facility] (4TH FLR);  Service: Endoscopy;  Laterality: N/A;  preferrably in Dec 2018, CRS ok if needed.    COLONOSCOPY N/A 11/05/2022    Procedure: COLONOSCOPY;  Surgeon: Greg Leach MD;  Location: [facility] (4TH FLR);  Service: Endoscopy;  Laterality: N/A;  Endoscopy schedulers please schedule patient for colonoscopy with me for further evaluation of painless hematochezia thank you    ESOPHAGOGASTRODUODENOSCOPY N/A 10/30/2018    Procedure: EGD (ESOPHAGOGASTRODUODENOSCOPY);  Surgeon: Greg Leach MD;  Location: [facility] (2ND FLR);  Service: Endoscopy;  Laterality: N/A;  ok to schedule per Kimmy    ESOPHAGOGASTRODUODENOSCOPY N/A 11/05/2022    Procedure: EGD (ESOPHAGOGASTRODUODENOSCOPY);  Surgeon: Greg Leach MD;  Location: [facility] (4TH FLR);  Service: Endoscopy;  Laterality: N/A;  Endoscopy schedulers please schedule patient for EGD for evaluation of right upper quadrant pain thank you  prep instr portal--ml  11/2/22 room closed-pt okay with Dr. Leach-50 min case okay per Katlyn-ml    KNEE ARTHROSCOPY W/ DEBRIDEMENT  04/06/2021    Procedure: ARTHROSCOPY, KNEE, WITH DEBRIDEMENT;  Surgeon: Deidra Mejias MD;  Location: [facility] OR;  Service: Orthopedics;;    KNEE ARTHROSCOPY W/ MENISCECTOMY Right 04/06/2021    Procedure: ARTHROSCOPY, KNEE, WITH MENISCECTOMY;   Surgeon: Deidra Mejias MD;  Location: [facility] OR;  Service: Orthopedics;  Laterality: Right;    KNEE ARTHROSCOPY W/ PLICA EXCISION Left 2021    Procedure: EXCISION, PLICA, KNEE, ARTHROSCOPIC;  Surgeon: Deidra Mejias MD;  Location: [facility] OR;  Service: Orthopedics;  Laterality: Left;    LAPAROSCOPY  2017    dx with endo.  no removal    NO PAST SURGERIES      SYNOVECTOMY OF KNEE Left 2021    Procedure: SYNOVECTOMY, KNEE;  Surgeon: Deidra Mejias MD;  Location: [facility] OR;  Service: Orthopedics;  Laterality: Left;    UPPER GASTROINTESTINAL ENDOSCOPY       Family History   Problem Relation Age of Onset    Diabetes Mother     No Known Problems Father     No Known Problems Sister     Hypertension Brother     Hypertension Maternal Grandmother     Hypertension Maternal Grandfather     No Known Problems Paternal Grandmother     No Known Problems Paternal Grandfather     Colon cancer Neg Hx     Crohn's disease Neg Hx     Esophageal cancer Neg Hx     Inflammatory bowel disease Neg Hx     Irritable bowel syndrome Neg Hx     Rectal cancer Neg Hx     Stomach cancer Neg Hx     Ulcerative colitis Neg Hx     Celiac disease Neg Hx     Cirrhosis Neg Hx     Colon polyps Neg Hx     Liver cancer Neg Hx     Liver disease Neg Hx      Social History     Socioeconomic History    Marital status: Single   Occupational History     Employer: [employer]     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   Tobacco Use    Smoking status: Never     Passive exposure: Never    Smokeless tobacco: Never   Substance and Sexual Activity    Alcohol use: No    Drug use: No    Sexual activity: Yes     Partners: Male     Birth control/protection: None   Social History Narrative    She had her 1st baby in 2022 healthy vaginal delivery no      Social Determinants of Health     Financial Resource Strain: Unknown (10/25/2022)    Overall Financial Resource Strain (CARDIA)     Difficulty of Paying Living Expenses: Patient refused   Food Insecurity:  Unknown (10/25/2022)    Hunger Vital Sign     Worried About Running Out of Food in the Last Year: Patient refused     Ran Out of Food in the Last Year: Patient refused   Transportation Needs: Unknown (10/25/2022)    PRAPARE - Transportation     Lack of Transportation (Medical): Patient refused     Lack of Transportation (Non-Medical): Patient refused   Physical Activity: Unknown (10/25/2022)    Exercise Vital Sign     Days of Exercise per Week: Patient refused   Stress: No Stress Concern Present (9/29/2020)    [employer] [place] [employer] - Occupational Stress Questionnaire     Feeling of Stress : Only a little   Social Connections: Unknown (10/25/2022)    Social Connection and Isolation Panel [NHANES]     Frequency of Communication with Friends and Family: Patient refused     Frequency of Social Gatherings with Friends and Family: Patient refused     Active Member of Clubs or Organizations: Patient refused     Attends Club or Organization Meetings: Patient refused     Marital Status: Patient refused   Housing Stability: Unknown (10/25/2022)    Housing Stability Vital Sign     Unable to Pay for Housing in the Last Year: Patient refused     Unstable Housing in the Last Year: Patient refused       Exam     Vitals:    11/30/23 1251   BP: 118/74   Pulse: 102      Physical Exam:  General: Not in acute distress. Not ill-appearing.   HENT: Normocephalic and atraumatic. Moist mucous membranes.  Eyes: Conjunctivae normal.   Pulmonary: Pulmonary effort is normal.   Abdominal: Abdomen is soft and flat.   Skin: Skin is warm and dry. No rashes.   Psychiatric: Mood normal.        Neurologic Exam   Mental status: oriented to person, place, and time  Attention: Normal. Concentration: normal.  Speech: speech is normal.  Cranial Nerves: PERRL, EOMI intact, V1-V3 Facial sensation intact. Symmetric facies. Hearing grossly intact. Palate and uvula midline, symmetric. No tongue deviation. Trapezius strength intact.     Motor  exam: bulk and tone normal. Strength 5/5 in bilateral upper extremities: deltoids, biceps, triceps, wrist flexion/extension, finger abduction/adduction. Strength 5/5 in bilateral lower extremities: hip flexion/extension, thigh adduction/abduction, knee flexion/extension, dorsiflexion/plantarflexion, foot eversion/inversion.    Reflexes: 2+ in bilateral upper extremities: biceps and brachiaradialis, 2+ in bilateral lower extremities: patellar and achilles  Plantar reflex: normal  Nielson's/Clonus: negative    Sensory exam: light touch intact    Gait exam: normal  Romberg: negative  Coordination: normal    Tremor: none  Cogwheel rigidity: none    Labs and Imaging     Labs: reviewed  No results found for this or any previous visit (from the past 24 hour(s)).    A1c 4.9, TSH within normal limits    Imaging:   I have personally reviewed the images performed.   In 2022 with partially empty sella.    Assessment and Plan     Problem List Items Addressed This Visit          Neuro    IIH (idiopathic intracranial hypertension)    Overview     Pulsatile tinnitus with positional components definitely raises suspicion for idiopathic intracranial hypertension.  Patient's body habitus does not support this diagnosis.  She has not had any observable deficits or changes on the funduscopic examination documented by her ophthalmologist.  Tetracyclines have been shown in several patients to induce or provoke idiopathic intracranial hypertension.  LP showed OP - 33 cm H20. Off Diamox and since her symptoms have been relatively mild would not start therapy at this time as long as fundoscopic examination does not show significant papilledema.          Relevant Orders    MRI Brain Without Contrast    Ambulatory referral/consult to Ophthalmology       Other    Dizziness - Primary    Relevant Orders    MRI Brain Without Contrast    Ambulatory referral/consult to Ophthalmology     Patient needs repeat workup for IH, we will get an MRI of the  brain, C Ophthalmology.  However is denying headaches at time.  Patient is coming here today, to establish care, but also having intermittent dizziness that is nonspecific in nature.  Nonfocal neuro exam.  It workup and then decide if we need to proceed with LP At this time.  Currently still breast-feeding.    Follow-up:  After workup    Time spent on this encounter:  40 minutes. This includes face to face time and non-face to face time preparing to see the patient (eg, review of tests), obtaining and/or reviewing separately obtained history, documenting clinical information in the electronic or other health record, independently interpreting results and communicating results to the patient/family/caregiver, or care coordinator.     This note was created by combination of typed  and M-Modal dictation. Transcription and phonetic errors may be present.  If there are any questions, please contact me.

## 2023-11-30 NOTE — TELEPHONE ENCOUNTER
----- Message from Moi Estrada sent at 11/30/2023  4:37 PM CST -----  Type:  Patient Returning Call    Who Called:pt  Who Left Message for Patient:office  Does the patient know what this is regarding?:missed call  Would the patient rather a call back or a response via [facility]? Call  Best Call Back Number:[phone]  Additional Information: pt states she received a missed call from office and would like a call back asap

## 2023-12-01 ENCOUNTER — HOSPITAL ENCOUNTER (OUTPATIENT)
Dept: RADIOLOGY | Facility: OTHER | Age: 33
Discharge: HOME | End: 2023-12-01
Attending: NURSE PRACTITIONER
Payer: COMMERCIAL

## 2023-12-01 DIAGNOSIS — M79.18 MYOFASCIAL PAIN: ICD-10-CM

## 2023-12-01 DIAGNOSIS — M54.9 DORSALGIA, UNSPECIFIED: ICD-10-CM

## 2023-12-01 PROCEDURE — 72148 MRI LUMBAR SPINE W/O DYE: CPT | Mod: 26,,, | Performed by: RADIOLOGY

## 2023-12-01 PROCEDURE — 72148 MRI LUMBAR SPINE W/O DYE: CPT | Mod: TC

## 2023-12-01 PROCEDURE — 72148 MRI LUMBAR SPINE WITHOUT CONTRAST: ICD-10-PCS | Mod: 26,,, | Performed by: RADIOLOGY

## 2023-12-05 ENCOUNTER — HOSPITAL ENCOUNTER (OUTPATIENT)
Dept: RADIOLOGY | Facility: OTHER | Age: 33
Discharge: HOME | End: 2023-12-05
Attending: STUDENT IN AN ORGANIZED HEALTH CARE EDUCATION/TRAINING PROGRAM
Payer: COMMERCIAL

## 2023-12-05 DIAGNOSIS — G93.2 IIH (IDIOPATHIC INTRACRANIAL HYPERTENSION): ICD-10-CM

## 2023-12-05 DIAGNOSIS — R42 DIZZINESS: ICD-10-CM

## 2023-12-05 PROCEDURE — 70551 MRI BRAIN WITHOUT CONTRAST: ICD-10-PCS | Mod: 26,,, | Performed by: RADIOLOGY

## 2023-12-05 PROCEDURE — 70551 MRI BRAIN STEM W/O DYE: CPT | Mod: 26,,, | Performed by: RADIOLOGY

## 2023-12-05 PROCEDURE — 70551 MRI BRAIN STEM W/O DYE: CPT | Mod: TC

## 2023-12-07 ENCOUNTER — OFFICE VISIT (OUTPATIENT)
Dept: HEMATOLOGY/ONCOLOGY | Facility: CLINIC | Age: 33
End: 2023-12-07
Payer: COMMERCIAL

## 2023-12-07 VITALS
HEART RATE: 92 BPM | HEIGHT: 63 IN | DIASTOLIC BLOOD PRESSURE: 56 MMHG | WEIGHT: 155.56 LBS | OXYGEN SATURATION: 100 % | BODY MASS INDEX: 27.56 KG/M2 | SYSTOLIC BLOOD PRESSURE: 108 MMHG | TEMPERATURE: 99 F

## 2023-12-07 DIAGNOSIS — D50.0 IRON DEFICIENCY ANEMIA DUE TO CHRONIC BLOOD LOSS: Primary | ICD-10-CM

## 2023-12-07 DIAGNOSIS — E61.0 COPPER DEFICIENCY: ICD-10-CM

## 2023-12-07 PROCEDURE — 99213 PR OFFICE/OUTPT VISIT, EST, LEVL III, 20-29 MIN: ICD-10-PCS | Mod: S$GLB,,, | Performed by: INTERNAL MEDICINE

## 2023-12-07 PROCEDURE — 99999 PR PBB SHADOW E&M-EST. PATIENT-LVL III: ICD-10-PCS | Mod: PBBFAC,,, | Performed by: INTERNAL MEDICINE

## 2023-12-07 PROCEDURE — 99213 OFFICE O/P EST LOW 20 MIN: CPT | Mod: S$GLB,,, | Performed by: INTERNAL MEDICINE

## 2023-12-07 PROCEDURE — 99999 PR PBB SHADOW E&M-EST. PATIENT-LVL III: CPT | Mod: PBBFAC,,, | Performed by: INTERNAL MEDICINE

## 2023-12-07 NOTE — PROGRESS NOTES
Section of Hematology and Stem Cell Transplantation  New Patient Consult     PCP: Jhoana Trejo MD  Date of visit: 12/7/23    Reason for visit: Iron deficiency anemia due to chronic blood loss [D50.0]    History of Present Ilness:   Laila Pappas (Laila) is a pleasant 32 y.o.female who presents for follow up.  Recent labs notable for decreased saturated iron.  Hemoglobin, TIBC, ferritin unremarkable.  She states that overall she feels well.  She continues to have irregular cycles.  Denies abnormally heavy bleeding with cycles.  No other sources of blood loss.  She recently started oral iron daily.  She is tolerating these well.    PAST MEDICAL HISTORY:   Past Medical History:   Diagnosis Date    Bilateral knee pain 06/19/2018    Endometriosis     treated by GYN at     Epigastric abdominal pain 12/05/2016    Gastroesophageal reflux disease 07/06/2016    Helicobacter pylori ab+     Helicobacter pylori ab+     High-tone pelvic floor dysfunction 05/02/2016    IIH (idiopathic intracranial hypertension)     Dr. Rogers - [facility]    Palpitations 07/06/2016    Pre-eclampsia in postpartum period 08/16/2022       PAST SURGICAL HISTORY:   Past Surgical History:   Procedure Laterality Date    ARTHROSCOPY OF KNEE Left 04/06/2021    Procedure: ARTHROSCOPY, KNEE;  Surgeon: Deidra Mejias MD;  Location: [facility] OR;  Service: Orthopedics;  Laterality: Left;    COLONOSCOPY N/A 12/13/2018    Procedure: COLONOSCOPY;  Surgeon: Micki Gross MD;  Location: [address]);  Service: Endoscopy;  Laterality: N/A;  preferrably in Dec 2018, CRS ok if needed.    COLONOSCOPY N/A 11/05/2022    Procedure: COLONOSCOPY;  Surgeon: Greg Leach MD;  Location: [address]);  Service: Endoscopy;  Laterality: N/A;  Endoscopy schedulers please schedule patient for colonoscopy with me for further evaluation of painless hematochezia thank you    ESOPHAGOGASTRODUODENOSCOPY N/A 10/30/2018    Procedure: EGD  (ESOPHAGOGASTRODUODENOSCOPY);  Surgeon: Greg Leach MD;  Location: [facility] ENDO (2ND FLR);  Service: Endoscopy;  Laterality: N/A;  ok to schedule per Kimmy    ESOPHAGOGASTRODUODENOSCOPY N/A 11/05/2022    Procedure: EGD (ESOPHAGOGASTRODUODENOSCOPY);  Surgeon: Greg Leach MD;  Location: [facility] ENDO (4TH FLR);  Service: Endoscopy;  Laterality: N/A;  Endoscopy schedulers please schedule patient for EGD for evaluation of right upper quadrant pain thank you  prep instr portal--ml  11/2/22 room closed-pt okay with Dr. Leach-50 min case okay per Katlyn-ml    KNEE ARTHROSCOPY W/ DEBRIDEMENT  04/06/2021    Procedure: ARTHROSCOPY, KNEE, WITH DEBRIDEMENT;  Surgeon: Deidra Mejias MD;  Location: [facility] OR;  Service: Orthopedics;;    KNEE ARTHROSCOPY W/ MENISCECTOMY Right 04/06/2021    Procedure: ARTHROSCOPY, KNEE, WITH MENISCECTOMY;  Surgeon: Deidra Mejias MD;  Location: [facility] OR;  Service: Orthopedics;  Laterality: Right;    KNEE ARTHROSCOPY W/ PLICA EXCISION Left 04/06/2021    Procedure: EXCISION, PLICA, KNEE, ARTHROSCOPIC;  Surgeon: Deidra Mejias MD;  Location: [facility] OR;  Service: Orthopedics;  Laterality: Left;    LAPAROSCOPY  2017    dx with endo.  no removal    NO PAST SURGERIES      SYNOVECTOMY OF KNEE Left 04/06/2021    Procedure: SYNOVECTOMY, KNEE;  Surgeon: Deidra Mejias MD;  Location: [facility] OR;  Service: Orthopedics;  Laterality: Left;    UPPER GASTROINTESTINAL ENDOSCOPY         PAST SOCIAL HISTORY:  Social History     Tobacco Use    Smoking status: Never     Passive exposure: Never    Smokeless tobacco: Never   Substance Use Topics    Alcohol use: No    Drug use: No       FAMILY HISTORY:  Family History   Problem Relation Age of Onset    Diabetes Mother     No Known Problems Father     No Known Problems Sister     Hypertension Brother     Hypertension Maternal Grandmother     Hypertension Maternal Grandfather     No Known Problems Paternal Grandmother     No Known Problems Paternal Grandfather     Colon  cancer Neg Hx     Crohn's disease Neg Hx     Esophageal cancer Neg Hx     Inflammatory bowel disease Neg Hx     Irritable bowel syndrome Neg Hx     Rectal cancer Neg Hx     Stomach cancer Neg Hx     Ulcerative colitis Neg Hx     Celiac disease Neg Hx     Cirrhosis Neg Hx     Colon polyps Neg Hx     Liver cancer Neg Hx     Liver disease Neg Hx        CURRENT MEDICATIONS:   Current Outpatient Medications   Medication Sig    cholecalciferol, vitamin D3, 1,250 mcg (50,000 unit) capsule Take 1 capsule (50,000 Units total) by mouth every 7 days.    ferrous sulfate 325 (65 FE) MG EC tablet Take 1 tablet (325 mg total) by mouth once daily.     Current Facility-Administered Medications   Medication    0.9%  NaCl infusion    acetaminophen tablet 650 mg       ALLERGIES:   Review of patient's allergies indicates:   Allergen Reactions    Doxycycline Other (See Comments)     Triggers headaches    Bactrim ds [sulfamethoxazole-trimethoprim] Nausea And Vomiting    Nsaids (non-steroidal anti-inflammatory drug) Other (See Comments)     D/t h/o PUD and H pylori    Codeine Rash       Review of Systems:     Pertinent positives and negatives included in the HPI. Otherwise a complete review of systems is negative.    Physical Exam:     Vitals:    12/07/23 0853   BP: (!) 108/56   Pulse: 92   Temp: 98.6 °F (37 °C)     General: Appears well, NAD  Pulmonary: CTAB, no increased work of breathing, no W/R/C  Cardiovascular: S1S2 normal, RRR, no M/R/G  Abdominal: Soft, NT, ND, BS+, no HSM  Extremities: No C/C/E  Neurological: AAOx4, grossly normal, no focal deficits  Dermatologic: No appreciable rashes or lesions    Labs:   Lab Results   Component Value Date    WBC 5.85 10/26/2023    HGB 14.2 10/26/2023    HCT 44.6 10/26/2023    MCV 92 10/26/2023     10/26/2023       Lab Results   Component Value Date     10/26/2023    K 4.1 10/26/2023     10/26/2023    CO2 22 (L) 10/26/2023    BUN 10 10/26/2023    CREATININE  "0.7 10/26/2023    ALBUMIN 3.6 10/26/2023    BILITOT 0.2 10/26/2023    ALKPHOS 96 10/26/2023    AST 19 10/26/2023    ALT 22 10/26/2023       Lab Results   Component Value Date    IRON 43 10/26/2023    TIBC 351 10/26/2023    FERRITIN 66 10/26/2023       No components found for: "RETICULOCYTES"    No results found for: "HAPTOGLOBIN", "LDH"    Imaging:          Assessment and Plan:     Iron deficiency anemia:  Borderline low iron based on saturated iron on 12%.  Other iron studies and hemoglobin were normal.  She does not have any new symptoms.  Encouraged to continue oral iron supplementation for now as she is tolerating this well.  Would recommend taking iron every other day.  Repeat iron studies in 6 months.    Copper deficiency:  Will recheck as well in 6 months to ensure this is not contributing.    Orders/Follow Up:           Route Chart for Scheduling    BMT Chart Routing      Follow up with physician 6 months.   Follow up with ARIEL    Provider visit type Benign hem   Infusion scheduling note    Injection scheduling note    Labs CBC, CMP, copper, ferritin, iron and TIBC, reticulocytes, phosphorus and magnesium   Scheduling:  Preferred lab:  Lab interval:  Two weeks prior to visit   Imaging    Pharmacy appointment    Other referrals                     Lacho Reed MD  Hematology, Oncology, and Stem Cell Transplantation  [facility]    "

## 2023-12-12 ENCOUNTER — PATIENT MESSAGE (OUTPATIENT)
Dept: NEUROLOGY | Facility: CLINIC | Age: 33
End: 2023-12-12

## 2023-12-12 ENCOUNTER — OFFICE VISIT (OUTPATIENT)
Dept: NEUROLOGY | Facility: CLINIC | Age: 33
End: 2023-12-12
Payer: COMMERCIAL

## 2023-12-12 ENCOUNTER — TELEPHONE (OUTPATIENT)
Dept: NEUROLOGY | Facility: CLINIC | Age: 33
End: 2023-12-12

## 2023-12-12 DIAGNOSIS — H93.19 TINNITUS, UNSPECIFIED LATERALITY: ICD-10-CM

## 2023-12-12 DIAGNOSIS — R51.9 NONINTRACTABLE HEADACHE, UNSPECIFIED CHRONICITY PATTERN, UNSPECIFIED HEADACHE TYPE: ICD-10-CM

## 2023-12-12 DIAGNOSIS — G93.2 IIH (IDIOPATHIC INTRACRANIAL HYPERTENSION): Primary | ICD-10-CM

## 2023-12-12 PROCEDURE — 99214 OFFICE O/P EST MOD 30 MIN: CPT | Mod: 95,,, | Performed by: STUDENT IN AN ORGANIZED HEALTH CARE EDUCATION/TRAINING PROGRAM

## 2023-12-12 PROCEDURE — 99214 PR OFFICE/OUTPT VISIT, EST, LEVL IV, 30-39 MIN: ICD-10-PCS | Mod: 95,,, | Performed by: STUDENT IN AN ORGANIZED HEALTH CARE EDUCATION/TRAINING PROGRAM

## 2023-12-12 NOTE — TELEPHONE ENCOUNTER
1st attempt to contact the patient to schedule a 3-4 month f/u with Dr. Ndiaye, no answer, left message on vm.   ----- Message from Melany Ndiaye MD sent at 12/12/2023 12:49 PM CST -----  Reach out to patient, tell her to schedfule appt 3-4 months while pending nsgy for the lumbar puncutre so we can get an appt on the books for followup

## 2023-12-12 NOTE — PROGRESS NOTES
The patient location is: [place]  The chief complaint leading to consultation is: Headache    Visit type: audiovisual    Each patient to whom he or she provides medical services by telemedicine is:  (1) informed of the relationship between the physician and patient and the respective role of any other health care provider with respect to management of the patient; and (2) notified that he or she may decline to receive medical services by telemedicine and may withdraw from such care at any time.    Chief Complaint and Duration     History of IIH    History of Present Illness     Laila Pappas is a 32 y.o. female with a history of paresthesias, history of IIH in which she had an opening pressure over 30, closing pressure 14.  Patient denies any headaches at present, was not on Diamox for a long time, did try for 2 months 2 years ago prior to getting pregnant.  Did not have issues with headaches after the tap.  Has been well-maintained.  Coming here today, still breast-feeding.  States that she has more episodes of dizziness, no recent illnesses.  No ear ringing.  Concern given that she does have the history of IIH.  Says she does drink enough water.  Otherwise no recent illnesses.  Does have low iron.    In 2021  Mean opening pressure: 33-mmHg  CSF volume removed: 20-cc  Mean closing pressure: 14-mmHg    2 months of diamox prior to pregnancy.  Also prior to the tap.  Stopped.    12/12/23 - states headaches have been returning, no blurry vision. Also ear ringing.       Review of patient's allergies indicates:   Allergen Reactions    Doxycycline Other (See Comments)     Triggers headaches    Bactrim ds [sulfamethoxazole-trimethoprim] Nausea And Vomiting    Nsaids (non-steroidal anti-inflammatory drug) Other (See Comments)     D/t h/o PUD and H pylori    Codeine Rash     Current Outpatient Medications   Medication Sig Dispense Refill    cholecalciferol, vitamin D3, 1,250 mcg (50,000 unit) capsule Take 1 capsule (50,000  Units total) by mouth every 7 days. 12 capsule 1    ferrous sulfate 325 (65 FE) MG EC tablet Take 1 tablet (325 mg total) by mouth once daily. 90 tablet 1     Current Facility-Administered Medications   Medication Dose Route Frequency Provider Last Rate Last Admin    0.9%  NaCl infusion   Intravenous 1 time in Clinic/Agatha Ferrera FNP        acetaminophen tablet 650 mg  650 mg Oral 1 time in Clinic/HOD Agatha Causey FNP           Medical History     Past Medical History:   Diagnosis Date    Bilateral knee pain 06/19/2018    Endometriosis     treated by GYN at     Epigastric abdominal pain 12/05/2016    Gastroesophageal reflux disease 07/06/2016    Helicobacter pylori ab+     Helicobacter pylori ab+     High-tone pelvic floor dysfunction 05/02/2016    IIH (idiopathic intracranial hypertension)     Dr. Rogers - [facility]    Palpitations 07/06/2016    Pre-eclampsia in postpartum period 08/16/2022     Past Surgical History:   Procedure Laterality Date    ARTHROSCOPY OF KNEE Left 04/06/2021    Procedure: ARTHROSCOPY, KNEE;  Surgeon: Deidra Mejias MD;  Location: [facility] OR;  Service: Orthopedics;  Laterality: Left;    COLONOSCOPY N/A 12/13/2018    Procedure: COLONOSCOPY;  Surgeon: Micki Gross MD;  Location: [facility] (4TH FLR);  Service: Endoscopy;  Laterality: N/A;  preferrably in Dec 2018, CRS ok if needed.    COLONOSCOPY N/A 11/05/2022    Procedure: COLONOSCOPY;  Surgeon: Greg Leach MD;  Location: [facility] (4TH FLR);  Service: Endoscopy;  Laterality: N/A;  Endoscopy schedulers please schedule patient for colonoscopy with me for further evaluation of painless hematochezia thank you    ESOPHAGOGASTRODUODENOSCOPY N/A 10/30/2018    Procedure: EGD (ESOPHAGOGASTRODUODENOSCOPY);  Surgeon: Greg Leach MD;  Location: [facility] (2ND FLR);  Service: Endoscopy;  Laterality: N/A;  ok to schedule per Kimmy    ESOPHAGOGASTRODUODENOSCOPY N/A 11/05/2022    Procedure: EGD  (ESOPHAGOGASTRODUODENOSCOPY);  Surgeon: Greg Leach MD;  [facility]  Service: Endoscopy;  Laterality: N/A;  Endoscopy schedulers please schedule patient for EGD for evaluation of right upper quadrant pain thank you  prep instr portal--ml  11/2/22 room closed-pt okay with Dr. Leahc-50 min case okay per Katlyn-ml    KNEE ARTHROSCOPY W/ DEBRIDEMENT  04/06/2021    Procedure: ARTHROSCOPY, KNEE, WITH DEBRIDEMENT;  Surgeon: Deidra Mejias MD;  Location: [facility] OR;  Service: Orthopedics;;    KNEE ARTHROSCOPY W/ MENISCECTOMY Right 04/06/2021    Procedure: ARTHROSCOPY, KNEE, WITH MENISCECTOMY;  Surgeon: Deidra Mejias MD;  Location: [facility] OR;  Service: Orthopedics;  Laterality: Right;    KNEE ARTHROSCOPY W/ PLICA EXCISION Left 04/06/2021    Procedure: EXCISION, PLICA, KNEE, ARTHROSCOPIC;  Surgeon: Deidra Mejias MD;  Location: [facility] OR;  Service: Orthopedics;  Laterality: Left;    LAPAROSCOPY  2017    dx with endo.  no removal    NO PAST SURGERIES      SYNOVECTOMY OF KNEE Left 04/06/2021    Procedure: SYNOVECTOMY, KNEE;  Surgeon: Deidra Mejias MD;  Location: [facility] OR;  Service: Orthopedics;  Laterality: Left;    UPPER GASTROINTESTINAL ENDOSCOPY       Family History   Problem Relation Age of Onset    Diabetes Mother     No Known Problems Father     No Known Problems Sister     Hypertension Brother     Hypertension Maternal Grandmother     Hypertension Maternal Grandfather     No Known Problems Paternal Grandmother     No Known Problems Paternal Grandfather     Colon cancer Neg Hx     Crohn's disease Neg Hx     Esophageal cancer Neg Hx     Inflammatory bowel disease Neg Hx     Irritable bowel syndrome Neg Hx     Rectal cancer Neg Hx     Stomach cancer Neg Hx     Ulcerative colitis Neg Hx     Celiac disease Neg Hx     Cirrhosis Neg Hx     Colon polyps Neg Hx     Liver cancer Neg Hx     Liver disease Neg Hx      Social History     Socioeconomic History    Marital status: Single   Occupational History     Employer: [employer]     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   Tobacco Use    Smoking status: Never     Passive exposure: Never    Smokeless tobacco: Never   Substance and Sexual Activity    Alcohol use: No    Drug use: No    Sexual activity: Yes     Partners: Male     Birth control/protection: None   Social History Narrative    She had her 1st baby in 2022 healthy vaginal delivery no      Social Determinants of Health     Financial Resource Strain: Unknown (10/25/2022)    Overall Financial Resource Strain (CARDIA)     Difficulty of Paying Living Expenses: Patient refused   Food Insecurity: Unknown (10/25/2022)    Hunger Vital Sign     Worried About Running Out of Food in the Last Year: Patient refused     Ran Out of Food in the Last Year: Patient refused   Transportation Needs: Unknown (10/25/2022)    PRAPARE - Transportation     Lack of Transportation (Medical): Patient refused     Lack of Transportation (Non-Medical): Patient refused   Physical Activity: Unknown (2023)    Exercise Vital Sign     Days of Exercise per Week: 1 day   Stress: No Stress Concern Present (2020)    [employer] - Occupational Stress Questionnaire     Feeling of Stress : Only a little   Social Connections: Unknown (10/25/2022)    Social Connection and Isolation Panel [NHANES]     Frequency of Communication with Friends and Family: Patient refused     Frequency of Social Gatherings with Friends and Family: Patient refused     Active Member of Clubs or Organizations: Patient refused     Attends Club or Organization Meetings: Patient refused     Marital Status: Patient refused   Housing Stability: Unknown (10/25/2022)    Housing Stability Vital Sign     Unable to Pay for Housing in the Last Year: Patient refused     Unstable Housing in the Last Year: Patient refused       Exam     There were no vitals filed for this visit.    Physical Exam:  General: Not in acute distress. Not  ill-appearing.   Psychiatric: Mood normal.        Neurologic Exam   Mental status: oriented to person, place, and time  Attention: Normal. Concentration: normal.  Speech: speech is normal.  Cranial Nerves: Symmetric facies.     Motor exam: moving extremities symmetrically     Labs and Imaging     Labs: reviewed  No results found for this or any previous visit (from the past 24 hour(s)).    A1c 4.9, TSH within normal limits    Imaging:   I have personally reviewed the images performed.   In 2022 with partially empty sella.    Mri brain 2023 - partially empty sella    Assessment and Plan     Problem List Items Addressed This Visit          Neuro    IIH (idiopathic intracranial hypertension) - Primary    Overview     Pulsatile tinnitus with positional components definitely raises suspicion for idiopathic intracranial hypertension.  Patient's body habitus does not support this diagnosis.  She has not had any observable deficits or changes on the funduscopic examination documented by her ophthalmologist.  Tetracyclines have been shown in several patients to induce or provoke idiopathic intracranial hypertension.  LP showed OP - 33 cm H20 on prior tap.          Nonintractable headache       ENT    Tinnitus     Patient needs repeat workup for IH, needs to see Ophthalmology (states next appt availability is April).  Having recurrence of headaches and ear ringing. Consult nsgy for LP with opening pressure, hold off diamox as she is still breast feeding.     Follow-up:  After workup and tap    Time spent on this encounter:  30 minutes. This includes face to face time and non-face to face time preparing to see the patient (eg, review of tests), obtaining and/or reviewing separately obtained history, documenting clinical information in the electronic or other health record, independently interpreting results and communicating results to the patient/family/caregiver, or care coordinator.     This note was created by combination  of typed  and M-Modal dictation. Transcription and phonetic errors may be present.  If there are any questions, please contact me.

## 2023-12-12 NOTE — TELEPHONE ENCOUNTER
1st attempt to contact the patient to schedule a 3-4 month f/u with Dr. Ndiaye, no answer, left message on vm.

## 2023-12-12 NOTE — PROGRESS NOTES
Chief Complaint and Duration     History of IIH    History of Present Illness     Laila Pappas is a 32 y.o. female with a history of paresthesias, history of IIH in which she had an opening pressure over 30, closing pressure 14.  Patient denies any headaches at present, was not on Diamox for a long time, did try for 2 months 2 years ago prior to getting pregnant.  Did not have issues with headaches after the tap.  Has been well-maintained.  Coming here today, still breast-feeding.  States that she has more episodes of dizziness, no recent illnesses.  No ear ringing.  Concern given that she does have the history of IIH.  Says she does drink enough water.  Otherwise no recent illnesses.  Does have low iron.    In 2021  Mean opening pressure: 33-mmHg  CSF volume removed: 20-cc  Mean closing pressure: 14-mmHg    2 months of diamox prior to pregnancy.  Also prior to the tap.  Stopped      Review of patient's allergies indicates:   Allergen Reactions    Doxycycline Other (See Comments)     Triggers headaches    Bactrim ds [sulfamethoxazole-trimethoprim] Nausea And Vomiting    Nsaids (non-steroidal anti-inflammatory drug) Other (See Comments)     D/t h/o PUD and H pylori    Codeine Rash     Current Outpatient Medications   Medication Sig Dispense Refill    cholecalciferol, vitamin D3, 1,250 mcg (50,000 unit) capsule Take 1 capsule (50,000 Units total) by mouth every 7 days. 12 capsule 1    ferrous sulfate 325 (65 FE) MG EC tablet Take 1 tablet (325 mg total) by mouth once daily. 90 tablet 1     Current Facility-Administered Medications   Medication Dose Route Frequency Provider Last Rate Last Admin    0.9%  NaCl infusion   Intravenous 1 time in Clinic/HOD Agatha Causey FNP        acetaminophen tablet 650 mg  650 mg Oral 1 time in Clinic/HOD Agatha Causey FNP           Medical History     Past Medical History:   Diagnosis Date    Bilateral knee pain 06/19/2018    Endometriosis     treated by GYN at  [facility]    Epigastric abdominal pain 12/05/2016    Gastroesophageal reflux disease 07/06/2016    Helicobacter pylori ab+     Helicobacter pylori ab+     High-tone pelvic floor dysfunction 05/02/2016    IIH (idiopathic intracranial hypertension)     Dr. Rogers - [facility]    Palpitations 07/06/2016    Pre-eclampsia in postpartum period 08/16/2022     Past Surgical History:   Procedure Laterality Date    ARTHROSCOPY OF KNEE Left 04/06/2021    Procedure: ARTHROSCOPY, KNEE;  Surgeon: Deidra Mejias MD;  Location: [facility] OR;  Service: Orthopedics;  Laterality: Left;    COLONOSCOPY N/A 12/13/2018    Procedure: COLONOSCOPY;  Surgeon: Micki Gross MD;  Location: [facility] (4TH FLR);  Service: Endoscopy;  Laterality: N/A;  preferrably in Dec 2018, CRS ok if needed.    COLONOSCOPY N/A 11/05/2022    Procedure: COLONOSCOPY;  Surgeon: Greg Leach MD;  Location: [facility] (4TH FLR);  Service: Endoscopy;  Laterality: N/A;  Endoscopy schedulers please schedule patient for colonoscopy with me for further evaluation of painless hematochezia thank you    ESOPHAGOGASTRODUODENOSCOPY N/A 10/30/2018    Procedure: EGD (ESOPHAGOGASTRODUODENOSCOPY);  Surgeon: Greg Leach MD;  Location: [facility] (2ND FLR);  Service: Endoscopy;  Laterality: N/A;  ok to schedule per Kimmy    ESOPHAGOGASTRODUODENOSCOPY N/A 11/05/2022    Procedure: EGD (ESOPHAGOGASTRODUODENOSCOPY);  Surgeon: Greg Leach MD;  Location: [facility] (4TH FLR);  Service: Endoscopy;  Laterality: N/A;  Endoscopy schedulers please schedule patient for EGD for evaluation of right upper quadrant pain thank you  prep instr portal--ml  11/2/22 room closed-pt okay with Dr. Leach-50 min case okay per Katlyn-ml    KNEE ARTHROSCOPY W/ DEBRIDEMENT  04/06/2021    Procedure: ARTHROSCOPY, KNEE, WITH DEBRIDEMENT;  Surgeon: Deidra Mejias MD;  Location: [facility] OR;  Service: Orthopedics;;    KNEE ARTHROSCOPY W/ MENISCECTOMY Right 04/06/2021    Procedure: ARTHROSCOPY, KNEE, WITH MENISCECTOMY;   Surgeon: Deidra Mejias MD;  Location: [facility] OR;  Service: Orthopedics;  Laterality: Right;    KNEE ARTHROSCOPY W/ PLICA EXCISION Left 2021    Procedure: EXCISION, PLICA, KNEE, ARTHROSCOPIC;  Surgeon: Deidra Mejias MD;  Location: [facility] OR;  Service: Orthopedics;  Laterality: Left;    LAPAROSCOPY  2017    dx with endo.  no removal    NO PAST SURGERIES      SYNOVECTOMY OF KNEE Left 2021    Procedure: SYNOVECTOMY, KNEE;  Surgeon: Deidra Mejias MD;  Location: [facility] OR;  Service: Orthopedics;  Laterality: Left;    UPPER GASTROINTESTINAL ENDOSCOPY       Family History   Problem Relation Age of Onset    Diabetes Mother     No Known Problems Father     No Known Problems Sister     Hypertension Brother     Hypertension Maternal Grandmother     Hypertension Maternal Grandfather     No Known Problems Paternal Grandmother     No Known Problems Paternal Grandfather     Colon cancer Neg Hx     Crohn's disease Neg Hx     Esophageal cancer Neg Hx     Inflammatory bowel disease Neg Hx     Irritable bowel syndrome Neg Hx     Rectal cancer Neg Hx     Stomach cancer Neg Hx     Ulcerative colitis Neg Hx     Celiac disease Neg Hx     Cirrhosis Neg Hx     Colon polyps Neg Hx     Liver cancer Neg Hx     Liver disease Neg Hx      Social History     Socioeconomic History    Marital status: Single   Occupational History     Employer: [employer]     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   Tobacco Use    Smoking status: Never     Passive exposure: Never    Smokeless tobacco: Never   Substance and Sexual Activity    Alcohol use: No    Drug use: No    Sexual activity: Yes     Partners: Male     Birth control/protection: None   Social History Narrative    She had her 1st baby in 2022 healthy vaginal delivery no      Social Determinants of Health     Financial Resource Strain: Unknown (10/25/2022)    Overall Financial Resource Strain (CARDIA)     Difficulty of Paying Living Expenses: Patient refused   Food Insecurity:  Unknown (10/25/2022)    Hunger Vital Sign     Worried About Running Out of Food in the Last Year: Patient refused     Ran Out of Food in the Last Year: Patient refused   Transportation Needs: Unknown (10/25/2022)    PRAPARE - Transportation     Lack of Transportation (Medical): Patient refused     Lack of Transportation (Non-Medical): Patient refused   Physical Activity: Unknown (12/12/2023)    Exercise Vital Sign     Days of Exercise per Week: 1 day   Stress: No Stress Concern Present (9/29/2020)    [employer] [place] [employer] - Occupational Stress Questionnaire     Feeling of Stress : Only a little   Social Connections: Unknown (10/25/2022)    Social Connection and Isolation Panel [NHANES]     Frequency of Communication with Friends and Family: Patient refused     Frequency of Social Gatherings with Friends and Family: Patient refused     Active Member of Clubs or Organizations: Patient refused     Attends Club or Organization Meetings: Patient refused     Marital Status: Patient refused   Housing Stability: Unknown (10/25/2022)    Housing Stability Vital Sign     Unable to Pay for Housing in the Last Year: Patient refused     Unstable Housing in the Last Year: Patient refused       Exam     There were no vitals filed for this visit.     Physical Exam:  General: Not in acute distress. Not ill-appearing.   HENT: Normocephalic and atraumatic. Moist mucous membranes.  Eyes: Conjunctivae normal.   Pulmonary: Pulmonary effort is normal.   Abdominal: Abdomen is soft and flat.   Skin: Skin is warm and dry. No rashes.   Psychiatric: Mood normal.        Neurologic Exam   Mental status: oriented to person, place, and time  Attention: Normal. Concentration: normal.  Speech: speech is normal.  Cranial Nerves: PERRL, EOMI intact, V1-V3 Facial sensation intact. Symmetric facies. Hearing grossly intact. Palate and uvula midline, symmetric. No tongue deviation. Trapezius strength intact.     Motor exam: bulk and tone  normal. Strength 5/5 in bilateral upper extremities: deltoids, biceps, triceps, wrist flexion/extension, finger abduction/adduction. Strength 5/5 in bilateral lower extremities: hip flexion/extension, thigh adduction/abduction, knee flexion/extension, dorsiflexion/plantarflexion, foot eversion/inversion.    Reflexes: 2+ in bilateral upper extremities: biceps and brachiaradialis, 2+ in bilateral lower extremities: patellar and achilles  Plantar reflex: normal  Nielson's/Clonus: negative    Sensory exam: light touch intact    Gait exam: normal  Romberg: negative  Coordination: normal    Tremor: none  Cogwheel rigidity: none    Labs and Imaging     Labs: reviewed  No results found for this or any previous visit (from the past 24 hour(s)).    A1c 4.9, TSH within normal limits    Imaging:   I have personally reviewed the images performed.   In 2022 with partially empty sella.    Assessment and Plan     Problem List Items Addressed This Visit          Neuro    IIH (idiopathic intracranial hypertension) - Primary    Overview     Pulsatile tinnitus with positional components definitely raises suspicion for idiopathic intracranial hypertension.  Patient's body habitus does not support this diagnosis.  She has not had any observable deficits or changes on the funduscopic examination documented by her ophthalmologist.  Tetracyclines have been shown in several patients to induce or provoke idiopathic intracranial hypertension.  LP showed OP - 33 cm H20. Off Diamox and since her symptoms have been relatively mild would not start therapy at this time as long as fundoscopic examination does not show significant papilledema.          Relevant Orders    Ambulatory referral/consult to Neurosurgery     Other Visit Diagnoses       Nonintractable headache, unspecified chronicity pattern, unspecified headache type        Relevant Orders    Ambulatory referral/consult to Neurosurgery            Patient needs repeat workup for IH, we will  get an MRI of the brain, C Ophthalmology.  However is denying headaches at time.  Patient is coming here today, to establish care, but also having intermittent dizziness that is nonspecific in nature.  Nonfocal neuro exam.  It workup and then decide if we need to proceed with LP At this time.  Currently still breast-feeding.        Follow-up:  After workup    Time spent on this encounter:  40 minutes. This includes face to face time and non-face to face time preparing to see the patient (eg, review of tests), obtaining and/or reviewing separately obtained history, documenting clinical information in the electronic or other health record, independently interpreting results and communicating results to the patient/family/caregiver, or care coordinator.     This note was created by combination of typed  and M-Modal dictation. Transcription and phonetic errors may be present.  If there are any questions, please contact me.

## 2023-12-17 NOTE — PATIENT INSTRUCTIONS
"Ina Mcgowan is a 89 y.o. female on day 3 of admission presenting with Hematoma of left chest wall, initial encounter.    Subjective   Patient resting comfortably in bed. Appears in no acute distress. Denies chest pain, pressure or palpitations.        Objective     Physical Exam  Cardiovascular:      Rate and Rhythm: Normal rate and regular rhythm.      Heart sounds: Murmur heard.      No friction rub. No gallop.   Pulmonary:      Effort: Pulmonary effort is normal.      Breath sounds: Normal breath sounds.   Abdominal:      General: Bowel sounds are normal.   Musculoskeletal:      Comments: Large hematoma noted over left side   Neurological:      Mental Status: She is alert.      Comments: Oriented x2-3, poor recall of recent events         Last Recorded Vitals  Blood pressure 143/62, pulse 81, temperature 36.1 °C (97 °F), temperature source Temporal, resp. rate 19, height 1.575 m (5' 2\"), weight 66.6 kg (146 lb 13.2 oz), SpO2 97 %.  Intake/Output last 3 Shifts:  I/O last 3 completed shifts:  In: 340 (5.1 mL/kg) [P.O.:340]  Out: - (0 mL/kg)   Weight: 66.6 kg     Relevant Results  Results for orders placed or performed during the hospital encounter of 12/14/23 (from the past 24 hour(s))   Hemoglobin and hematocrit, blood   Result Value Ref Range    Hemoglobin 8.7 (L) 12.0 - 16.0 g/dL    Hematocrit 26.9 (L) 36.0 - 46.0 %   Protime-INR   Result Value Ref Range    Protime 12.2 9.3 - 12.7 seconds    INR 1.2 0.9 - 1.2   CBC   Result Value Ref Range    WBC 11.0 4.4 - 11.3 x10*3/uL    nRBC 0.8 (H) 0.0 - 0.0 /100 WBCs    RBC 2.45 (L) 4.00 - 5.20 x10*6/uL    Hemoglobin 7.6 (L) 12.0 - 16.0 g/dL    Hematocrit 23.3 (L) 36.0 - 46.0 %    MCV 95 80 - 100 fL    MCH 31.0 26.0 - 34.0 pg    MCHC 32.6 32.0 - 36.0 g/dL    RDW 15.5 (H) 11.5 - 14.5 %    Platelets 209 150 - 450 x10*3/uL   Basic Metabolic Panel   Result Value Ref Range    Glucose 135 (H) 65 - 99 mg/dL    Sodium 139 133 - 145 mmol/L    Potassium 3.8 3.4 - 5.1 mmol/L    " Home Program 4/28/22:    1)     360 Breath - Inhale long, slow and deep. You should feel as if your lower ribs are expanding in all directions like the way an umbrella opens. You should feel the belly, back and sides gently expand and you may notice a relaxation in the pelvic floor.     Continue to breath like this for 10 breaths. Repeat 3-5 times/day.     2) Pelvic floor muscle contractions  Quick Flicks    Contract pelvic floor muscles by closing around your openings and lifting up and in. Try not to use abdominal or buttocks muscles, and do not hold your breath. Relax completely after each contraction.     Repeat 10 times. Perform 3 sets/day.      3) Transverse Abdominus isolation    Gently tighten deep core muscles. Do not hold breath, may be easier to contract on an exhalation. Should feel a firming up and no outward or downward bulging.     Repeat 10 times. Perform 3 sets.     4) Stretching - cat-cow and leland<>hands and knees<>prone press up flow    10 times     >>>  >>>    Flow through these positions for a round of 10 and then hold in leland pose focusing on stretching upper back and shoulders          Chloride 108 (H) 97 - 107 mmol/L    Bicarbonate 23 (L) 24 - 31 mmol/L    Urea Nitrogen 35 (H) 8 - 25 mg/dL    Creatinine 0.90 0.40 - 1.60 mg/dL    eGFR 59 (L) >60 mL/min/1.73m*2    Calcium 7.5 (L) 8.5 - 10.4 mg/dL    Anion Gap 8 <=19 mmol/L             Assessment/Plan   Principal Problem:    Hematoma of left chest wall, initial encounter  Hematoma to left chest, side and back  Supra therapeutic INR  Atrial Fibrillation  Hypertension  Hyperlipidemia     Impression and Plan: 12/16 Patient presented to the emergency department from her nursing facility with a large left-sided hematoma.  Patient denies any recent chest pain, pressure or palpitations.  Patient denies any shortness of breath.  Patient does not recall whether she fell at her facility; however, her daughter who is at the bedside states that originally her mother told her she slipped off the end of her bed.  Arrival to the emergency department troponins were drawn they were elevated but trended overall flat at 80, 65 and 70.  Patient was found to be hypotensive in the emergency department and has remained with low blood pressures throughout her hospitalization with her last recorded at 101/47.  Would continue to hold her amlodipine and metoprolol.  Patient does have a history of atrial fibrillation though on arrival to the emergency department EKG was completed showing normal sinus rhythm with a right bundle branch block.  She has remained in a sinus rhythm on telemetry without significant ectopy.  Her Coumadin has been held in the setting of her low blood counts and hematoma.  This morning her hemoglobin is 8.7.  Her sodium is 141, potassium 4.2, her creatinine is improved at 1.20.  General surgery is following for management of this patient's hematoma.  Overall it is important to consider the risk versus benefit of keeping this patient on anticoagulation at this point specially given her age, labile INRs, and recent hematoma.  This patient has bled score  is quite elevated at 5 making her a 9.1% risk of having a major bleeding event in the next year.  Her [id number] is a 5 giving her 6.7% risk of a stroke event at 1 year follow-up.  I have discussed this with the patient and her daughter and they are both agreeable to discontinuing anticoagulation at this time, understanding the risks.     12/17: Patient doing well. Slightly more confused this morning. Denies chest pain, pressure or palpitations. Blood pressures have improved with her last recorded at 143/62. She is breathing comfortably on room air with a pulse oximetry of 97%.  Remains in a sinus rhythm on telemetry without significant ectopy.  Appears overall euvolemic on examination.  Labs this morning show sodium of 139, potassium of 3.8, creatinine of 0.90. Her hemoglobin is slightly lower at 7.6.  General surgery is following for hematoma.  An echocardiogram has been ordered to be completed; however, this should not impede her discharge and can certainly be completed in outpatient setting.  We will follow with you.        Marily Castillo, APRN-CNP

## 2023-12-22 ENCOUNTER — OFFICE VISIT (OUTPATIENT)
Dept: ORTHOPEDICS | Facility: CLINIC | Age: 33
End: 2023-12-22
Payer: COMMERCIAL

## 2023-12-22 VITALS — HEIGHT: 63 IN | BODY MASS INDEX: 28.12 KG/M2 | WEIGHT: 158.69 LBS

## 2023-12-22 DIAGNOSIS — M70.62 GREATER TROCHANTERIC BURSITIS OF LEFT HIP: Primary | ICD-10-CM

## 2023-12-22 PROCEDURE — 99999 PR PBB SHADOW E&M-EST. PATIENT-LVL III: ICD-10-PCS | Mod: PBBFAC,,, | Performed by: ORTHOPAEDIC SURGERY

## 2023-12-22 PROCEDURE — 99214 PR OFFICE/OUTPT VISIT, EST, LEVL IV, 30-39 MIN: ICD-10-PCS | Mod: S$GLB,,, | Performed by: ORTHOPAEDIC SURGERY

## 2023-12-22 PROCEDURE — 99214 OFFICE O/P EST MOD 30 MIN: CPT | Mod: S$GLB,,, | Performed by: ORTHOPAEDIC SURGERY

## 2023-12-22 PROCEDURE — 99999 PR PBB SHADOW E&M-EST. PATIENT-LVL III: CPT | Mod: PBBFAC,,, | Performed by: ORTHOPAEDIC SURGERY

## 2023-12-22 NOTE — PROGRESS NOTES
The patient returns for follow-up.  She is having left greater trochanteric pain, at her last visit, a year ago, I ordered a greater trochanteric bursa injection, but this was postponed by the patient because of concerns regarding breastfeeding.  She returns and reports ongoing left greater trochanteric pain with some radiation into the left groin.    She also describes left periscapular/lateral chest wall pain.    Today I was able to review a recent MRI of her lumbar spine that demonstrates no evidence of disc pathology or instability.      Impression:  Left greater trochanteric bursitis.      Plan:  Today we discussed options, I recommended a greater trochanteric bursa injection

## 2024-01-03 ENCOUNTER — PATIENT MESSAGE (OUTPATIENT)
Dept: INTERNAL MEDICINE | Facility: CLINIC | Age: 34
End: 2024-01-03
Payer: COMMERCIAL

## 2024-01-04 ENCOUNTER — OFFICE VISIT (OUTPATIENT)
Dept: FAMILY MEDICINE | Facility: CLINIC | Age: 34
End: 2024-01-04
Payer: COMMERCIAL

## 2024-01-04 DIAGNOSIS — Z20.828 EXPOSURE TO THE FLU: Primary | ICD-10-CM

## 2024-01-04 PROCEDURE — 99213 OFFICE O/P EST LOW 20 MIN: CPT | Mod: 95,,, | Performed by: NURSE PRACTITIONER

## 2024-01-04 RX ORDER — OSELTAMIVIR PHOSPHATE 75 MG/1
75 CAPSULE ORAL 2 TIMES DAILY
Qty: 10 CAPSULE | Refills: 0 | Status: SHIPPED | OUTPATIENT
Start: 2024-01-04 | End: 2024-01-09

## 2024-01-04 NOTE — PROGRESS NOTES
The patient location is: car  The chief complaint leading to consultation is: uri symptoms; exposure to flu    Visit type: audiovisual    Face to Face time with patient: 10 minutes of total time spent on the encounter, which includes face to face time and non-face to face time preparing to see the patient (eg, review of tests), Obtaining and/or reviewing separately obtained history, Documenting clinical information in the electronic or other health record, Independently interpreting results (not separately reported) and communicating results to the patient/family/caregiver, or Care coordination (not separately reported).     Each patient to whom he or she provides medical services by telemedicine is:  (1) informed of the relationship between the physician and patient and the respective role of any other health care provider with respect to management of the patient; and (2) notified that he or she may decline to receive medical services by telemedicine and may withdraw from such care at any time.    Notes:     Subjective:       Patient ID: Laila Pappas is a 33 y.o. female.    Chief Complaint: DAVID Pappas presents today for URI symptoms. Last seen in 10/26/2023 by GEE Causey NP. This is her first visit with me.     Cough  This is a new problem. The current episode started yesterday. The problem has been gradually worsening. The problem occurs every few minutes. The cough is Non-productive. Associated symptoms include chills, a fever, headaches and myalgias. Pertinent negatives include no chest pain, ear congestion, ear pain, heartburn, hemoptysis, nasal congestion, postnasal drip, rash, rhinorrhea, sore throat, shortness of breath, sweats, weight loss or wheezing. Nothing aggravates the symptoms. She has tried OTC cough suppressant for the symptoms. The treatment provided mild relief. There is no history of asthma, bronchiectasis, bronchitis, COPD, emphysema, environmental allergies or pneumonia.     diagnosed with flu. Symptoms started 1/3/2023.    Patient Active Problem List   Diagnosis    Endometriosis determined by laparoscopy    Multiple thyroid nodules    Poor posture    Mid back pain    Low back pain    Postural imbalance    Other chest pain    Dyslipidemia    IIH (idiopathic intracranial hypertension)    Arm pain, left    Plica syndrome    Decreased range of motion (ROM) of left knee    Impaired functional mobility, balance, gait, and endurance    Chronic pain of left knee    Gastroesophageal reflux disease    Endometriosis    Pregnancy resulting from in-vitro fertilization    Myalgia    Abdominal weakness    Pre-eclampsia in postpartum period    Hepatic steatosis    MIKE positive    Numbness    Muscle spasm    Dizziness    Nonintractable headache    Tinnitus       Current Outpatient Medications:     cholecalciferol, vitamin D3, 1,250 mcg (50,000 unit) capsule, Take 1 capsule (50,000 Units total) by mouth every 7 days., Disp: 12 capsule, Rfl: 1    ferrous sulfate 325 (65 FE) MG EC tablet, Take 1 tablet (325 mg total) by mouth once daily., Disp: 90 tablet, Rfl: 1    oseltamivir (TAMIFLU) 75 MG capsule, Take 1 capsule (75 mg total) by mouth 2 (two) times daily. for 5 days, Disp: 10 capsule, Rfl: 0    Current Facility-Administered Medications:     0.9%  NaCl infusion, , Intravenous, 1 time in Clinic/[facility], Agatha Causey FNP    acetaminophen tablet 650 mg, 650 mg, Oral, 1 time in Clinic/[facility], Agatha Causey FNP    The following portions of the patient's history were reviewed and updated as appropriate: allergies, past family history, past medical history, past social history and past surgical history.    Review of Systems   Constitutional:  Positive for chills and fever. Negative for weight loss.   HENT:  Negative for ear pain, postnasal drip, rhinorrhea and sore throat.    Respiratory:  Positive for cough. Negative for hemoptysis, shortness of breath and wheezing.     Cardiovascular:  Negative for chest pain.   Gastrointestinal:  Negative for heartburn.   Musculoskeletal:  Positive for myalgias.   Skin:  Negative for rash.   Allergic/Immunologic: Negative for environmental allergies.   Neurological:  Positive for headaches.       Objective:      LMP 11/07/2023     Physical Exam  Constitutional:       General: She is not in acute distress.     Appearance: Normal appearance.   Pulmonary:      Effort: Pulmonary effort is normal.   Musculoskeletal:         General: Normal range of motion.   Neurological:      Mental Status: She is alert and oriented to person, place, and time.   Psychiatric:         Mood and Affect: Mood normal.         Behavior: Behavior normal.         Assessment:       1. Exposure to the flu        Plan:   Laila was seen today for uri.    Diagnoses and all orders for this visit:    Exposure to the flu  -     oseltamivir (TAMIFLU) 75 MG capsule; Take 1 capsule (75 mg total) by mouth 2 (two) times daily. for 5 days

## 2024-01-04 NOTE — LETTER
January 4, 2024      [facility]  [address]  [facility] [address]  Phone: [phone]       Patient: Laila Pappas   YOB: 1990  Date of Visit: 01/04/2024    To Whom It May Concern:    Yamilet Pappas  was at [facility] on 01/04/2024. The patient may return to work on 1/9/2024 with no restrictions. Please excuse her from work 1/2-1/5. If you have any questions or concerns, or if I can be of further assistance, please do not hesitate to contact me.    Sincerely,    Amanda Moffett, NP

## 2024-01-09 ENCOUNTER — TELEPHONE (OUTPATIENT)
Dept: OPHTHALMOLOGY | Facility: CLINIC | Age: 34
End: 2024-01-09
Payer: COMMERCIAL

## 2024-01-09 NOTE — TELEPHONE ENCOUNTER
L/M to reschedule appointment () [facility]--[id number]

## 2024-01-18 ENCOUNTER — PATIENT MESSAGE (OUTPATIENT)
Dept: PAIN MEDICINE | Facility: OTHER | Age: 34
End: 2024-01-18
Payer: COMMERCIAL

## 2024-01-26 ENCOUNTER — TELEPHONE (OUTPATIENT)
Dept: OBSTETRICS AND GYNECOLOGY | Facility: CLINIC | Age: 34
End: 2024-01-26
Payer: COMMERCIAL

## 2024-01-26 NOTE — TELEPHONE ENCOUNTER
Lvm for pt to call back to reschedule appointment due to Dr. Guillaume being out of the office.    [facility]

## 2024-01-31 ENCOUNTER — PATIENT MESSAGE (OUTPATIENT)
Dept: OBSTETRICS AND GYNECOLOGY | Facility: CLINIC | Age: 34
End: 2024-01-31
Payer: COMMERCIAL

## 2024-02-20 ENCOUNTER — OFFICE VISIT (OUTPATIENT)
Dept: HEPATOLOGY | Facility: CLINIC | Age: 34
End: 2024-02-20
Payer: COMMERCIAL

## 2024-02-20 DIAGNOSIS — K76.0 HEPATIC STEATOSIS: Primary | ICD-10-CM

## 2024-02-20 PROCEDURE — 99214 OFFICE O/P EST MOD 30 MIN: CPT | Mod: 95,,, | Performed by: NURSE PRACTITIONER

## 2024-02-21 ENCOUNTER — TELEPHONE (OUTPATIENT)
Dept: HEPATOLOGY | Facility: CLINIC | Age: 34
End: 2024-02-21
Payer: COMMERCIAL

## 2024-02-21 NOTE — TELEPHONE ENCOUNTER
----- Message from Litzy Segovia sent at 2/21/2024  8:13 AM CST -----  Regarding: Reschedule Existing Appointment        Current appt date:    02/21    Type of appt :     Fibroscan    Physician:   Ms. Espinoza    Reason for rescheduling:   Pt accidentally ate 3 apple slices.    Caller:   Laila    Contact Preference:   [phone]

## 2024-02-22 ENCOUNTER — PATIENT MESSAGE (OUTPATIENT)
Dept: INTERNAL MEDICINE | Facility: CLINIC | Age: 34
End: 2024-02-22
Payer: COMMERCIAL

## 2024-02-23 ENCOUNTER — TELEPHONE (OUTPATIENT)
Dept: HEPATOLOGY | Facility: CLINIC | Age: 34
End: 2024-02-23
Payer: COMMERCIAL

## 2024-02-23 ENCOUNTER — PROCEDURE VISIT (OUTPATIENT)
Dept: HEPATOLOGY | Facility: CLINIC | Age: 34
End: 2024-02-23
Payer: COMMERCIAL

## 2024-02-23 DIAGNOSIS — K76.0 HEPATIC STEATOSIS: ICD-10-CM

## 2024-02-23 PROCEDURE — 91200 LIVER ELASTOGRAPHY: CPT | Mod: S$GLB,,, | Performed by: NURSE PRACTITIONER

## 2024-02-23 NOTE — TELEPHONE ENCOUNTER
Returned call and she will come in today to have fibroscan----- Message from Mehreen Bonifacio sent at 2/23/2024  9:13 AM CST -----  Regarding: Appt  Contact: Pt  [phone]            Appt Type:  Fibroscan     Date/Time Preference:  Today if possible or next brian     Treating Provider: Dilia Espinoza NP    Caller Name: Laila     Contact Prefer:   [phone]    Comments/Notes:  Called to reschedule missed appt from 02/21/24

## 2024-02-26 ENCOUNTER — PATIENT MESSAGE (OUTPATIENT)
Dept: HEPATOLOGY | Facility: CLINIC | Age: 34
End: 2024-02-26
Payer: COMMERCIAL

## 2024-02-27 ENCOUNTER — PATIENT MESSAGE (OUTPATIENT)
Dept: INTERNAL MEDICINE | Facility: CLINIC | Age: 34
End: 2024-02-27

## 2024-02-27 ENCOUNTER — OFFICE VISIT (OUTPATIENT)
Dept: INTERNAL MEDICINE | Facility: CLINIC | Age: 34
End: 2024-02-27
Payer: COMMERCIAL

## 2024-02-27 ENCOUNTER — TELEPHONE (OUTPATIENT)
Dept: INTERNAL MEDICINE | Facility: CLINIC | Age: 34
End: 2024-02-27

## 2024-02-27 ENCOUNTER — HOSPITAL ENCOUNTER (OUTPATIENT)
Dept: RADIOLOGY | Facility: HOSPITAL | Age: 34
Discharge: HOME | End: 2024-02-27
Attending: NURSE PRACTITIONER
Payer: COMMERCIAL

## 2024-02-27 VITALS
BODY MASS INDEX: 28.32 KG/M2 | SYSTOLIC BLOOD PRESSURE: 120 MMHG | DIASTOLIC BLOOD PRESSURE: 82 MMHG | WEIGHT: 159.81 LBS | HEART RATE: 98 BPM | OXYGEN SATURATION: 97 % | HEIGHT: 63 IN

## 2024-02-27 DIAGNOSIS — K76.0 HEPATIC STEATOSIS: ICD-10-CM

## 2024-02-27 DIAGNOSIS — D50.9 IRON DEFICIENCY ANEMIA, UNSPECIFIED IRON DEFICIENCY ANEMIA TYPE: ICD-10-CM

## 2024-02-27 DIAGNOSIS — R10.11 RIGHT UPPER QUADRANT ABDOMINAL PAIN: Primary | ICD-10-CM

## 2024-02-27 DIAGNOSIS — R10.11 RIGHT UPPER QUADRANT ABDOMINAL PAIN: ICD-10-CM

## 2024-02-27 PROCEDURE — 76700 US EXAM ABDOM COMPLETE: CPT | Mod: 26,,, | Performed by: STUDENT IN AN ORGANIZED HEALTH CARE EDUCATION/TRAINING PROGRAM

## 2024-02-27 PROCEDURE — 76700 US EXAM ABDOM COMPLETE: CPT | Mod: TC

## 2024-02-27 PROCEDURE — 99999 PR PBB SHADOW E&M-EST. PATIENT-LVL III: CPT | Mod: PBBFAC,,, | Performed by: NURSE PRACTITIONER

## 2024-02-27 PROCEDURE — 99214 OFFICE O/P EST MOD 30 MIN: CPT | Mod: S$GLB,,, | Performed by: NURSE PRACTITIONER

## 2024-02-27 RX ORDER — SUCRALFATE 1 G/1
1 TABLET ORAL 4 TIMES DAILY
Qty: 40 TABLET | Refills: 0 | Status: CANCELLED | OUTPATIENT
Start: 2024-02-27

## 2024-02-27 RX ORDER — DICYCLOMINE HYDROCHLORIDE 10 MG/1
10 CAPSULE ORAL
Qty: 120 CAPSULE | Refills: 0 | Status: CANCELLED | OUTPATIENT
Start: 2024-02-27 | End: 2024-03-28

## 2024-02-27 NOTE — PROGRESS NOTES
INTERNAL MEDICINE CLINIC - SAME DAY APPOINTMENT  Progress Note    PRESENTING HISTORY     PCP: Jhoana Trejo MD    Chief Complaint/Reason for Visit:   No chief complaint on file.    History of Present Illness & ROS : Ms. Laila Pappas is a 33 y.o. female.    Same day apt.   Pleasant lady.   Has been having some recurrent 'pain to right side', described as 'sticking sensation' unable to identify 'anything' makes it better or worse. Has reportedly been experiencing for 'more than a year, but feels has gotten worse'. Seen by Dr. Burks, but missed to her follow ups. Seen by Hematology, plans for repeat labs in 6 months. Seen by ASIA Scott, IVETTE with Hepatology, and has been released post 'scan'.   Laila does not endorse any changes in urinary frequency or pattern. 'Feels having some chronic issues with constipation'. Occassional nausea, but not 'anything different from her baseline'.     Review of Systems:  Eyes: denies visual changes at this time denies floaters   ENT: no nasal congestion or sore throat  Respiratory: no cough or shorness of breath  Cardiovascular: no chest pain or palpitations  Genitourinary: no hematuria or dysuria; denies frequency  Hematologic/Lymphatic: no easy bruising or lymphadenopathy  Musculoskeletal: no arthralgias or myalgias  Neurological: no seizures or tremors  Endocrine: no heat or cold intolerance  PAST HISTORY:     Past Medical History:   Diagnosis Date    Bilateral knee pain 06/19/2018    Endometriosis     treated by GYN at     Epigastric abdominal pain 12/05/2016    Gastroesophageal reflux disease 07/06/2016    Helicobacter pylori ab+     Helicobacter pylori ab+     High-tone pelvic floor dysfunction 05/02/2016    IIH (idiopathic intracranial hypertension)     Dr. Rogers - [facility]    Palpitations 07/06/2016    Pre-eclampsia in postpartum period 08/16/2022       Past Surgical History:   Procedure Laterality Date    ARTHROSCOPY OF KNEE Left 04/06/2021    Procedure:  ARTHROSCOPY, KNEE;  Surgeon: Deidra Mejias MD;  Location: [facility] OR;  Service: Orthopedics;  Laterality: Left;    COLONOSCOPY N/A 12/13/2018    Procedure: COLONOSCOPY;  Surgeon: Micki Gross MD;  Location: [facility] ENDO (4TH FLR);  Service: Endoscopy;  Laterality: N/A;  preferrably in Dec 2018, CRS ok if needed.    COLONOSCOPY N/A 11/05/2022    Procedure: COLONOSCOPY;  Surgeon: Greg Leach MD;  Location: [facility] ENDO (4TH FLR);  Service: Endoscopy;  Laterality: N/A;  Endoscopy schedulers please schedule patient for colonoscopy with me for further evaluation of painless hematochezia thank you    ESOPHAGOGASTRODUODENOSCOPY N/A 10/30/2018    Procedure: EGD (ESOPHAGOGASTRODUODENOSCOPY);  Surgeon: Greg Leach MD;  Location: [facility] (2ND FLR);  Service: Endoscopy;  Laterality: N/A;  ok to schedule per Kimmy    ESOPHAGOGASTRODUODENOSCOPY N/A 11/05/2022    Procedure: EGD (ESOPHAGOGASTRODUODENOSCOPY);  Surgeon: Greg Leach MD;  Location: [facility] (4TH FLR);  Service: Endoscopy;  Laterality: N/A;  Endoscopy schedulers please schedule patient for EGD for evaluation of right upper quadrant pain thank you  prep instr portal--ml  11/2/22 room closed-pt okay with Dr. Leach-50 min case okay per Katlyn-ml    KNEE ARTHROSCOPY W/ DEBRIDEMENT  04/06/2021    Procedure: ARTHROSCOPY, KNEE, WITH DEBRIDEMENT;  Surgeon: Deidra Mejias MD;  Location: [facility] OR;  Service: Orthopedics;;    KNEE ARTHROSCOPY W/ MENISCECTOMY Right 04/06/2021    Procedure: ARTHROSCOPY, KNEE, WITH MENISCECTOMY;  Surgeon: Deidra Mejias MD;  Location: [facility] OR;  Service: Orthopedics;  Laterality: Right;    KNEE ARTHROSCOPY W/ PLICA EXCISION Left 04/06/2021    Procedure: EXCISION, PLICA, KNEE, ARTHROSCOPIC;  Surgeon: Deidra Mejias MD;  Location: [facility] OR;  Service: Orthopedics;  Laterality: Left;    LAPAROSCOPY  2017    dx with endo.  no removal    NO PAST SURGERIES      SYNOVECTOMY OF KNEE Left 04/06/2021    Procedure: SYNOVECTOMY, KNEE;  Surgeon: Deidra Mejias MD;   Location: [facility] OR;  Service: Orthopedics;  Laterality: Left;    UPPER GASTROINTESTINAL ENDOSCOPY         Family History   Problem Relation Age of Onset    Diabetes Mother     No Known Problems Father     No Known Problems Sister     Hypertension Brother     Hypertension Maternal Grandmother     Hypertension Maternal Grandfather     No Known Problems Paternal Grandmother     No Known Problems Paternal Grandfather     Colon cancer Neg Hx     Crohn's disease Neg Hx     Esophageal cancer Neg Hx     Inflammatory bowel disease Neg Hx     Irritable bowel syndrome Neg Hx     Rectal cancer Neg Hx     Stomach cancer Neg Hx     Ulcerative colitis Neg Hx     Celiac disease Neg Hx     Cirrhosis Neg Hx     Colon polyps Neg Hx     Liver cancer Neg Hx     Liver disease Neg Hx        Social History     Socioeconomic History    Marital status: Single   Occupational History     Employer: [employer]     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   Tobacco Use    Smoking status: Never     Passive exposure: Never    Smokeless tobacco: Never   Substance and Sexual Activity    Alcohol use: No    Drug use: No    Sexual activity: Yes     Partners: Male     Birth control/protection: None   Social History Narrative    She had her 1st baby in 2022 healthy vaginal delivery no      Social Determinants of Health     Financial Resource Strain: Unknown (10/25/2022)    Overall Financial Resource Strain (CARDIA)     Difficulty of Paying Living Expenses: Patient declined   Food Insecurity: Unknown (10/25/2022)    Hunger Vital Sign     Worried About Running Out of Food in the Last Year: Patient declined     Ran Out of Food in the Last Year: Patient declined   Transportation Needs: Unknown (10/25/2022)    PRAPARE - Transportation     Lack of Transportation (Medical): Patient declined     Lack of Transportation (Non-Medical): Patient declined   Physical Activity: Unknown (2023)    Exercise Vital Sign     Days of Exercise per  Week: 1 day   Stress: No Stress Concern Present (9/29/2020)    [place] [employer] - Occupational Stress Questionnaire     Feeling of Stress : Only a little   Social Connections: Unknown (10/25/2022)    Social Connection and Isolation Panel [NHANES]     Frequency of Communication with Friends and Family: Patient declined     Frequency of Social Gatherings with Friends and Family: Patient declined     Active Member of Clubs or Organizations: Patient declined     Attends Club or Organization Meetings: Patient declined     Marital Status: Patient declined   Housing Stability: Unknown (10/25/2022)    Housing Stability Vital Sign     Unable to Pay for Housing in the Last Year: Patient refused     Unstable Housing in the Last Year: Patient refused       MEDICATIONS & ALLERGIES:     Current Outpatient Medications on File Prior to Visit   Medication Sig Dispense Refill    cholecalciferol, vitamin D3, 1,250 mcg (50,000 unit) capsule Take 1 capsule (50,000 Units total) by mouth every 7 days. 12 capsule 1    ferrous sulfate 325 (65 FE) MG EC tablet Take 1 tablet (325 mg total) by mouth once daily. 90 tablet 1    [DISCONTINUED] folic acid (FOLVITE) 1 MG tablet Take 1 mg by mouth once daily.       Current Facility-Administered Medications on File Prior to Visit   Medication Dose Route Frequency Provider Last Rate Last Admin    0.9%  NaCl infusion   Intravenous 1 time in Clinic/HOD Agatha Causey FNP        acetaminophen tablet 650 mg  650 mg Oral 1 time in Clinic/HOD Agatha Causey FNP            Review of patient's allergies indicates:   Allergen Reactions    Doxycycline Other (See Comments)     Triggers headaches    Bactrim ds [sulfamethoxazole-trimethoprim] Nausea And Vomiting    Nsaids (non-steroidal anti-inflammatory drug) Other (See Comments)     D/t h/o PUD and H pylori    Codeine Rash       Medications Reconciliation:   I have reconciled the patient's home medications with the  patient/family. I have updated all changes.  Refer to After-Visit Medication List.    OBJECTIVE:     Vital Signs:  There were no vitals filed for this visit.  Wt Readings from Last 3 Encounters:   12/22/23 0911 72 kg (158 lb 11.2 oz)   12/07/23 0853 70.5 kg (155 lb 8.6 oz)   11/30/23 1251 69 kg (152 lb 1.9 oz)     There is no height or weight on file to calculate BMI.   Wt Readings from Last 3 Encounters:   02/27/24 72.5 kg (159 lb 13.3 oz)   12/22/23 72 kg (158 lb 11.2 oz)   12/07/23 70.5 kg (155 lb 8.6 oz)     Temp Readings from Last 3 Encounters:   12/07/23 98.6 °F (37 °C) (Oral)   11/28/23 98.1 °F (36.7 °C) (Oral)   09/05/23 97.8 °F (36.6 °C) (Oral)     BP Readings from Last 3 Encounters:   02/27/24 120/82   12/07/23 (!) 108/56   11/30/23 118/74     Pulse Readings from Last 3 Encounters:   02/27/24 98   12/07/23 92   11/30/23 102     Physical Exam:  (Focused Exam)  General: Well developed, well nourished. No distress.  HEENT: Head is normocephalic, atraumatic  Eyes: Clear conjunctiva.  Neck: Supple, symmetrical neck; trachea midline.  Extremities: No LE edema. Pulses 2+ and symmetric.   Abdomen: Abdomen is soft, + deeply, palpably induced tenderness to RUQ, otherwise, non-tender non-distended with normal bowel sounds.  Skin: Skin color, texture, turgor normal. No rashes.  Musculoskeletal: Normal gait.   Neurologic: Normal strength and tone. No focal numbness or weakness.     Laboratory  Lab Results   Component Value Date    WBC 5.85 10/26/2023    HGB 14.2 10/26/2023    HCT 44.6 10/26/2023     10/26/2023    CHOL 195 10/26/2023    TRIG 84 10/26/2023    HDL 43 10/26/2023    ALT 22 10/26/2023    AST 19 10/26/2023     10/26/2023    K 4.1 10/26/2023     10/26/2023    CREATININE 0.7 10/26/2023    BUN 10 10/26/2023    CO2 22 (L) 10/26/2023    TSH 2.131 10/26/2023    INR 1.1 11/18/2022    HGBA1C 4.9 10/26/2023       ASSESSMENT & PLAN:     Right upper quadrant abdominal pain  Chronic Right upper  discomforts...  *Chronic PUD / H Pylori, dates back to 2016  *Still concerned that may be related to underlying biliary cause..  *Seen by GI (Dr. Burks): 2022: missed to follow ups: US, EGD, MRI, HIDDA;encouraged to keep follow up in May with Dr. Burks.   *Seen by Dr. Parker (Hepatic Steatosis): 2022  -     US Abdomen Complete; Future; Expected date: 02/27/2024  -     Urinalysis; Future; Expected date: 02/27/2024  -     CULTURE, URINE; Future; Expected date: 02/27/2024  -     Comprehensive Metabolic Panel; Future; Expected date: 02/27/2024  -     CBC Auto Differential; Future; Expected date: 02/27/2024  -     Amylase; Future; Expected date: 02/27/2024  -     Lipase; Future; Expected date: 02/27/2024    Iron deficiency anemia, unspecified iron deficiency anemia type  On supplements and being followed by Hematology with labs in 6 months   -     IRON AND TIBC; Future; Expected date: 02/27/2024  -     FERRITIN; Future; Expected date: 02/27/2024    Hepatic steatosis  *Completed Fibro Scan   -     US Abdomen Complete; Future; Expected date: 02/27/2024  -     Comprehensive Metabolic Panel; Future; Expected date: 02/27/2024      Future Appointments   Date Time Provider Department Center   4/2/2024  4:30 PM Gus [facility]udino MD PhD [facility] [place]   4/16/2024  7:45 AM Vishal Malave MD [facility]   5/27/2024 10:30 AM Lamine Burks MD [facility]STR[facility] [address]   5/31/2024 12:00 PM [facility] [place]   6/14/2024 11:30 AM Alden Reed MD [facility] B[facility] [place]   6/25/2024 10:45 AM Vishal Malave MD [facility]        Medication List            Accurate as of February 27, 2024  9:07 AM. If you have any questions, ask your nurse or doctor.                CONTINUE taking these medications      cholecalciferol (vitamin D3) 1,250 mcg (50,000 unit) capsule  Take 1 capsule (50,000 Units total) by mouth every 7 days.     ferrous sulfate 325 (65 FE) MG EC tablet  Take 1  tablet (325 mg total) by mouth once daily.              Signing Physician:  MORGAN Cage

## 2024-02-28 ENCOUNTER — OFFICE VISIT (OUTPATIENT)
Dept: SURGERY | Facility: CLINIC | Age: 34
End: 2024-02-28
Payer: COMMERCIAL

## 2024-02-28 VITALS
WEIGHT: 159.38 LBS | HEART RATE: 94 BPM | HEIGHT: 63 IN | BODY MASS INDEX: 28.24 KG/M2 | DIASTOLIC BLOOD PRESSURE: 76 MMHG | SYSTOLIC BLOOD PRESSURE: 128 MMHG

## 2024-02-28 DIAGNOSIS — R10.11 RIGHT UPPER QUADRANT ABDOMINAL PAIN: ICD-10-CM

## 2024-02-28 DIAGNOSIS — K80.20 SYMPTOMATIC CHOLELITHIASIS: Primary | ICD-10-CM

## 2024-02-28 PROCEDURE — 99204 OFFICE O/P NEW MOD 45 MIN: CPT | Mod: S$GLB,,, | Performed by: SURGERY

## 2024-02-28 PROCEDURE — 99999 PR PBB SHADOW E&M-EST. PATIENT-LVL IV: CPT | Mod: PBBFAC,,, | Performed by: SURGERY

## 2024-02-28 RX ORDER — CEFAZOLIN SODIUM 2 G/50ML
2 SOLUTION INTRAVENOUS
Status: CANCELLED | OUTPATIENT
Start: 2024-02-28

## 2024-02-28 NOTE — PROGRESS NOTES
History & Physical    SUBJECTIVE:     History of Present Illness:  Patient is a 33 y.o. female presents with ***.     No chief complaint on file.      Review of patient's allergies indicates:   Allergen Reactions    Doxycycline Other (See Comments)     Triggers headaches    Bactrim ds [sulfamethoxazole-trimethoprim] Nausea And Vomiting    Nsaids (non-steroidal anti-inflammatory drug) Other (See Comments)     D/t h/o PUD and H pylori    Codeine Rash       Current Outpatient Medications   Medication Sig Dispense Refill    cholecalciferol, vitamin D3, 1,250 mcg (50,000 unit) capsule Take 1 capsule (50,000 Units total) by mouth every 7 days. 12 capsule 1    ferrous sulfate 325 (65 FE) MG EC tablet Take 1 tablet (325 mg total) by mouth once daily. 90 tablet 1     Current Facility-Administered Medications   Medication Dose Route Frequency Provider Last Rate Last Admin    0.9%  NaCl infusion   Intravenous 1 time in Clinic/HOD Agatha Causey FNP        acetaminophen tablet 650 mg  650 mg Oral 1 time in Clinic/HOD Agatha Causey FNP           Past Medical History:   Diagnosis Date    Bilateral knee pain 06/19/2018    Endometriosis     treated by GYN at     Epigastric abdominal pain 12/05/2016    Gastroesophageal reflux disease 07/06/2016    Helicobacter pylori ab+     Helicobacter pylori ab+     High-tone pelvic floor dysfunction 05/02/2016    IIH (idiopathic intracranial hypertension)     Dr. Rogers - [facility]    Palpitations 07/06/2016    Pre-eclampsia in postpartum period 08/16/2022     Past Surgical History:   Procedure Laterality Date    ARTHROSCOPY OF KNEE Left 04/06/2021    Procedure: ARTHROSCOPY, KNEE;  Surgeon: Deidra Mejias MD;  Location: [facility] OR;  Service: Orthopedics;  Laterality: Left;    COLONOSCOPY N/A 12/13/2018    Procedure: COLONOSCOPY;  Surgeon: Micki Gross MD;  Location: [facility] ([address] [place] [address]);  Service: Endoscopy;  Laterality: N/A;  preferrably in Dec 2018, CRS ok if needed.     COLONOSCOPY N/A 11/05/2022    Procedure: COLONOSCOPY;  Surgeon: Greg Leach MD;  Location: [facility] ENDO (4TH FLR);  Service: Endoscopy;  Laterality: N/A;  Endoscopy schedulers please schedule patient for colonoscopy with me for further evaluation of painless hematochezia thank you    ESOPHAGOGASTRODUODENOSCOPY N/A 10/30/2018    Procedure: EGD (ESOPHAGOGASTRODUODENOSCOPY);  Surgeon: Greg Leach MD;  Location: [facility] ENDO (2ND FLR);  Service: Endoscopy;  Laterality: N/A;  ok to schedule per Kimmy    ESOPHAGOGASTRODUODENOSCOPY N/A 11/05/2022    Procedure: EGD (ESOPHAGOGASTRODUODENOSCOPY);  Surgeon: Greg Leach MD;  Location: [facility] ENDO (4TH FLR);  Service: Endoscopy;  Laterality: N/A;  Endoscopy schedulers please schedule patient for EGD for evaluation of right upper quadrant pain thank you  prep instr portal--ml  11/2/22 room closed-pt okay with Dr. Leach-50 min case okay per Katlyn-ml    KNEE ARTHROSCOPY W/ DEBRIDEMENT  04/06/2021    Procedure: ARTHROSCOPY, KNEE, WITH DEBRIDEMENT;  Surgeon: Deidra Mejias MD;  Location: [facility] OR;  Service: Orthopedics;;    KNEE ARTHROSCOPY W/ MENISCECTOMY Right 04/06/2021    Procedure: ARTHROSCOPY, KNEE, WITH MENISCECTOMY;  Surgeon: Deidra Mejias MD;  Location: [facility] OR;  Service: Orthopedics;  Laterality: Right;    KNEE ARTHROSCOPY W/ PLICA EXCISION Left 04/06/2021    Procedure: EXCISION, PLICA, KNEE, ARTHROSCOPIC;  Surgeon: Deidra Mejias MD;  Location: [facility] OR;  Service: Orthopedics;  Laterality: Left;    LAPAROSCOPY  2017    dx with endo.  no removal    NO PAST SURGERIES      SYNOVECTOMY OF KNEE Left 04/06/2021    Procedure: SYNOVECTOMY, KNEE;  Surgeon: Deidra Mejias MD;  Location: [facility] OR;  Service: Orthopedics;  Laterality: Left;    UPPER GASTROINTESTINAL ENDOSCOPY       Family History   Problem Relation Age of Onset    Diabetes Mother     No Known Problems Father     No Known Problems Sister     Hypertension Brother     Hypertension Maternal Grandmother     Hypertension Maternal  "Grandfather     No Known Problems Paternal Grandmother     No Known Problems Paternal Grandfather     Colon cancer Neg Hx     Crohn's disease Neg Hx     Esophageal cancer Neg Hx     Inflammatory bowel disease Neg Hx     Irritable bowel syndrome Neg Hx     Rectal cancer Neg Hx     Stomach cancer Neg Hx     Ulcerative colitis Neg Hx     Celiac disease Neg Hx     Cirrhosis Neg Hx     Colon polyps Neg Hx     Liver cancer Neg Hx     Liver disease Neg Hx      Social History     Tobacco Use    Smoking status: Never     Passive exposure: Never    Smokeless tobacco: Never   Substance Use Topics    Alcohol use: No    Drug use: No        Review of Systems:  Review of Systems    OBJECTIVE:     Vital Signs (Most Recent)              Physical Exam:  Physical Exam    Laboratory  {Labs Reviewed:87024::"***"}    Diagnostic Results:  {TestingReview:36415}    ASSESSMENT/PLAN:     ***    PLAN:                 "

## 2024-02-28 NOTE — PROGRESS NOTES
History & Physical    SUBJECTIVE:     History of Present Illness:  Patient is a 33 y.o. female presents with gallstones.  Patient with RUQ pain for several years.  Recently radiating to the back.  No nausea/vomiting.  No fevers/chills.  No diarrhea/bloating.  US with sludge and stone.  Interested in thierno.      Review of patient's allergies indicates:   Allergen Reactions    Doxycycline Other (See Comments)     Triggers headaches    Bactrim ds [sulfamethoxazole-trimethoprim] Nausea And Vomiting    Nsaids (non-steroidal anti-inflammatory drug) Other (See Comments)     D/t h/o PUD and H pylori    Codeine Rash       Current Outpatient Medications   Medication Sig Dispense Refill    cholecalciferol, vitamin D3, 1,250 mcg (50,000 unit) capsule Take 1 capsule (50,000 Units total) by mouth every 7 days. 12 capsule 1    ferrous sulfate 325 (65 FE) MG EC tablet Take 1 tablet (325 mg total) by mouth once daily. 90 tablet 1     Current Facility-Administered Medications   Medication Dose Route Frequency Provider Last Rate Last Admin    0.9%  NaCl infusion   Intravenous 1 time in Clinic/HOD Agatha Casuey FNP        acetaminophen tablet 650 mg  650 mg Oral 1 time in Clinic/HOD Agatha Casuey FNP           Past Medical History:   Diagnosis Date    Bilateral knee pain 06/19/2018    Endometriosis     treated by GYN at     Epigastric abdominal pain 12/05/2016    Gastroesophageal reflux disease 07/06/2016    Helicobacter pylori ab+     Helicobacter pylori ab+     High-tone pelvic floor dysfunction 05/02/2016    IIH (idiopathic intracranial hypertension)     Dr. Rogers - [facility]    Palpitations 07/06/2016    Pre-eclampsia in postpartum period 08/16/2022     Past Surgical History:   Procedure Laterality Date    ARTHROSCOPY OF KNEE Left 04/06/2021    Procedure: ARTHROSCOPY, KNEE;  Surgeon: Deidra Mejias MD;  Location: [facility];  Service: Orthopedics;  Laterality: Left;    COLONOSCOPY N/A 12/13/2018     Procedure: COLONOSCOPY;  Surgeon: Micki Gross MD;  Location: [facility] ENDO (4TH FLR);  Service: Endoscopy;  Laterality: N/A;  preferrably in Dec 2018, CRS ok if needed.    COLONOSCOPY N/A 11/05/2022    Procedure: COLONOSCOPY;  Surgeon: Greg Leach MD;  Location: [facility] ENDO (4TH FLR);  Service: Endoscopy;  Laterality: N/A;  Endoscopy schedulers please schedule patient for colonoscopy with me for further evaluation of painless hematochezia thank you    ESOPHAGOGASTRODUODENOSCOPY N/A 10/30/2018    Procedure: EGD (ESOPHAGOGASTRODUODENOSCOPY);  Surgeon: Greg Leach MD;  Location: [facility] ENDO (2ND FLR);  Service: Endoscopy;  Laterality: N/A;  ok to schedule per Kimmy    ESOPHAGOGASTRODUODENOSCOPY N/A 11/05/2022    Procedure: EGD (ESOPHAGOGASTRODUODENOSCOPY);  Surgeon: Greg Leach MD;  Location: [facility] (4TH FLR);  Service: Endoscopy;  Laterality: N/A;  Endoscopy schedulers please schedule patient for EGD for evaluation of right upper quadrant pain thank you  prep instr portal--ml  11/2/22 room closed-pt okay with Dr. Leach-50 min case okay per Katlyn-ml    KNEE ARTHROSCOPY W/ DEBRIDEMENT  04/06/2021    Procedure: ARTHROSCOPY, KNEE, WITH DEBRIDEMENT;  Surgeon: Deidra Mejias MD;  Location: [facility] OR;  Service: Orthopedics;;    KNEE ARTHROSCOPY W/ MENISCECTOMY Right 04/06/2021    Procedure: ARTHROSCOPY, KNEE, WITH MENISCECTOMY;  Surgeon: Deidra Mejias MD;  Location: [facility] OR;  Service: Orthopedics;  Laterality: Right;    KNEE ARTHROSCOPY W/ PLICA EXCISION Left 04/06/2021    Procedure: EXCISION, PLICA, KNEE, ARTHROSCOPIC;  Surgeon: Deidra Mejias MD;  Location: [facility] OR;  Service: Orthopedics;  Laterality: Left;    LAPAROSCOPY  2017    dx with endo.  no removal    NO PAST SURGERIES      SYNOVECTOMY OF KNEE Left 04/06/2021    Procedure: SYNOVECTOMY, KNEE;  Surgeon: Deidra Mejias MD;  Location: [facility] OR;  Service: Orthopedics;  Laterality: Left;    UPPER GASTROINTESTINAL ENDOSCOPY       Family History   Problem Relation Age of  "Onset    Diabetes Mother     No Known Problems Father     No Known Problems Sister     Hypertension Brother     Hypertension Maternal Grandmother     Hypertension Maternal Grandfather     No Known Problems Paternal Grandmother     No Known Problems Paternal Grandfather     Colon cancer Neg Hx     Crohn's disease Neg Hx     Esophageal cancer Neg Hx     Inflammatory bowel disease Neg Hx     Irritable bowel syndrome Neg Hx     Rectal cancer Neg Hx     Stomach cancer Neg Hx     Ulcerative colitis Neg Hx     Celiac disease Neg Hx     Cirrhosis Neg Hx     Colon polyps Neg Hx     Liver cancer Neg Hx     Liver disease Neg Hx      Social History     Tobacco Use    Smoking status: Never     Passive exposure: Never    Smokeless tobacco: Never   Substance Use Topics    Alcohol use: No    Drug use: No        Review of Systems:  Review of Systems   Constitutional:  Negative for activity change, appetite change, chills, diaphoresis, fatigue and fever.   HENT:  Negative for congestion, sinus pressure, sneezing and sore throat.    Eyes:  Negative for pain, discharge, redness, itching and visual disturbance.   Respiratory:  Negative for apnea, cough, choking, chest tightness and shortness of breath.    Cardiovascular:  Negative for chest pain, palpitations and leg swelling.   Gastrointestinal:  Positive for abdominal pain. Negative for abdominal distention, constipation, diarrhea, nausea and vomiting.   Musculoskeletal:  Negative for arthralgias, back pain and gait problem.   Skin:  Negative for color change, pallor and rash.   Neurological:  Negative for dizziness, facial asymmetry, light-headedness and headaches.   Psychiatric/Behavioral:  Negative for agitation, behavioral problems and confusion.        OBJECTIVE:     Vital Signs (Most Recent)  Pulse: 94 (02/28/24 0857)  BP: 128/76 (02/28/24 0857)  5' 3" (1.6 m)  72.3 kg (159 lb 6.3 oz)     Physical Exam:  Physical Exam  Constitutional:       Appearance: Normal appearance.   HENT: "      Head: Normocephalic and atraumatic.      Nose: Nose normal.      Mouth/Throat:      Mouth: Mucous membranes are moist.      Pharynx: Oropharynx is clear.   Eyes:      Extraocular Movements: Extraocular movements intact.      Conjunctiva/sclera: Conjunctivae normal.   Cardiovascular:      Rate and Rhythm: Normal rate and regular rhythm.   Pulmonary:      Effort: Pulmonary effort is normal.      Breath sounds: Normal breath sounds.   Abdominal:      General: Abdomen is flat.      Palpations: Abdomen is soft.      Tenderness: There is abdominal tenderness (mild RUQ).      Comments: Midline incision well approximated without induration or erythema   Skin:     General: Skin is warm and dry.      Capillary Refill: Capillary refill takes less than 2 seconds.   Neurological:      General: No focal deficit present.      Mental Status: She is alert.         ASSESSMENT/PLAN:     gallstones    PLAN:Plan     Lap thierno  Consent obtained         Tom Campos MD

## 2024-02-29 ENCOUNTER — PATIENT MESSAGE (OUTPATIENT)
Dept: ORTHOPEDICS | Facility: CLINIC | Age: 34
End: 2024-02-29
Payer: COMMERCIAL

## 2024-02-29 ENCOUNTER — TELEPHONE (OUTPATIENT)
Dept: INTERNAL MEDICINE | Facility: CLINIC | Age: 34
End: 2024-02-29
Payer: COMMERCIAL

## 2024-02-29 DIAGNOSIS — M79.642 LEFT HAND PAIN: Primary | ICD-10-CM

## 2024-02-29 RX ORDER — AMOXICILLIN AND CLAVULANATE POTASSIUM 500; 125 MG/1; MG/1
1 TABLET, FILM COATED ORAL 2 TIMES DAILY
Qty: 14 TABLET | Refills: 0 | Status: SHIPPED | OUTPATIENT
Start: 2024-02-29 | End: 2024-04-01

## 2024-03-01 ENCOUNTER — PATIENT MESSAGE (OUTPATIENT)
Dept: SURGERY | Facility: CLINIC | Age: 34
End: 2024-03-01
Payer: COMMERCIAL

## 2024-03-11 ENCOUNTER — OFFICE VISIT (OUTPATIENT)
Dept: OBSTETRICS AND GYNECOLOGY | Facility: CLINIC | Age: 34
End: 2024-03-11
Payer: COMMERCIAL

## 2024-03-11 VITALS
DIASTOLIC BLOOD PRESSURE: 76 MMHG | HEIGHT: 63 IN | SYSTOLIC BLOOD PRESSURE: 118 MMHG | HEART RATE: 101 BPM | BODY MASS INDEX: 28.32 KG/M2 | WEIGHT: 159.81 LBS

## 2024-03-11 DIAGNOSIS — R10.2 PELVIC PAIN: Primary | ICD-10-CM

## 2024-03-11 DIAGNOSIS — K21.9 GASTROESOPHAGEAL REFLUX DISEASE, UNSPECIFIED WHETHER ESOPHAGITIS PRESENT: ICD-10-CM

## 2024-03-11 DIAGNOSIS — N80.9 ENDOMETRIOSIS: ICD-10-CM

## 2024-03-11 LAB
B-HCG UR QL: NEGATIVE
CTP QC/QA: YES

## 2024-03-11 PROCEDURE — 99213 OFFICE O/P EST LOW 20 MIN: CPT | Mod: S$GLB,,, | Performed by: OBSTETRICS & GYNECOLOGY

## 2024-03-11 PROCEDURE — 99999 PR PBB SHADOW E&M-EST. PATIENT-LVL III: CPT | Mod: PBBFAC,,, | Performed by: OBSTETRICS & GYNECOLOGY

## 2024-03-11 PROCEDURE — 81025 URINE PREGNANCY TEST: CPT | Mod: S$GLB,,, | Performed by: OBSTETRICS & GYNECOLOGY

## 2024-03-18 NOTE — PROGRESS NOTES
HISTORY OF PRESENT ILLNESS:    Laila Pappas is a 33 y.o. female  Patient's last menstrual period was 2024. presents today pelvic pain.   Pelvic pain - midline for 4 months, intermittent, more constant lately.   Hx of endometriosis     Past Medical History:   Diagnosis Date    Bilateral knee pain 2018    Endometriosis     treated by GYN at     Epigastric abdominal pain 2016    Gastroesophageal reflux disease 2016    Helicobacter pylori ab+     Helicobacter pylori ab+     High-tone pelvic floor dysfunction 2016    IIH (idiopathic intracranial hypertension)     Dr. Rogers - [facility]    Palpitations 2016    Pre-eclampsia in postpartum period 2022       Past Surgical History:   Procedure Laterality Date    ARTHROSCOPY OF KNEE Left 2021    Procedure: ARTHROSCOPY, KNEE;  Surgeon: Deidra Mejias MD;  Location: [facility] OR;  Service: Orthopedics;  Laterality: Left;    COLONOSCOPY N/A 2018    Procedure: COLONOSCOPY;  Surgeon: Micki Gross MD;  Location: [facility] (4TH FLR);  Service: Endoscopy;  Laterality: N/A;  preferrably in Dec 2018, CRS ok if needed.    COLONOSCOPY N/A 2022    Procedure: COLONOSCOPY;  Surgeon: Greg Leach MD;  Location: [facility] (4TH FLR);  Service: Endoscopy;  Laterality: N/A;  Endoscopy schedulers please schedule patient for colonoscopy with me for further evaluation of painless hematochezia thank you    ESOPHAGOGASTRODUODENOSCOPY N/A 10/30/2018    Procedure: EGD (ESOPHAGOGASTRODUODENOSCOPY);  Surgeon: Greg Leach MD;  Location: [facility] (2ND FLR);  Service: Endoscopy;  Laterality: N/A;  ok to schedule per Kimmy    ESOPHAGOGASTRODUODENOSCOPY N/A 2022    Procedure: EGD (ESOPHAGOGASTRODUODENOSCOPY);  Surgeon: Greg Leach MD;  Location: [facility] (4TH FLR);  Service: Endoscopy;  Laterality: N/A;  Endoscopy schedulers please schedule patient for EGD for evaluation of right upper quadrant pain thank you  prep instr  portal--ml  11/2/22 room closed-pt okay with Dr. Leach-50 min case okay per Katlyn-ml    KNEE ARTHROSCOPY W/ DEBRIDEMENT  04/06/2021    Procedure: ARTHROSCOPY, KNEE, WITH DEBRIDEMENT;  Surgeon: Deidra Mejias MD;  Location: [facility] OR;  Service: Orthopedics;;    KNEE ARTHROSCOPY W/ MENISCECTOMY Right 04/06/2021    Procedure: ARTHROSCOPY, KNEE, WITH MENISCECTOMY;  Surgeon: Deidra Mejias MD;  Location: [facility] OR;  Service: Orthopedics;  Laterality: Right;    KNEE ARTHROSCOPY W/ PLICA EXCISION Left 04/06/2021    Procedure: EXCISION, PLICA, KNEE, ARTHROSCOPIC;  Surgeon: Deidra Mejias MD;  Location: [facility] OR;  Service: Orthopedics;  Laterality: Left;    LAPAROSCOPY  2017    dx with endo.  no removal    NO PAST SURGERIES      SYNOVECTOMY OF KNEE Left 04/06/2021    Procedure: SYNOVECTOMY, KNEE;  Surgeon: Deidra Mejias MD;  Location: [facility] OR;  Service: Orthopedics;  Laterality: Left;    UPPER GASTROINTESTINAL ENDOSCOPY         MEDICATIONS AND ALLERGIES:      Current Outpatient Medications:     amoxicillin-clavulanate 500-125mg (AUGMENTIN) 500-125 mg Tab, Take 1 tablet (500 mg total) by mouth 2 (two) times daily. (Patient not taking: Reported on 3/11/2024), Disp: 14 tablet, Rfl: 0    cholecalciferol, vitamin D3, 1,250 mcg (50,000 unit) capsule, Take 1 capsule (50,000 Units total) by mouth every 7 days. (Patient not taking: Reported on 3/11/2024), Disp: 12 capsule, Rfl: 1    ferrous sulfate 325 (65 FE) MG EC tablet, Take 1 tablet (325 mg total) by mouth once daily. (Patient not taking: Reported on 3/11/2024), Disp: 90 tablet, Rfl: 1    Current Facility-Administered Medications:     0.9%  NaCl infusion, , Intravenous, 1 time in Clinic/HOD, Agatha Causey FNP    acetaminophen tablet 650 mg, 650 mg, Oral, 1 time in Clinic/HOD, Agatha Causey FNP    Review of patient's allergies indicates:   Allergen Reactions    Doxycycline Other (See Comments)     Triggers headaches    Bactrim ds [sulfamethoxazole-trimethoprim] Nausea And  "Vomiting    Nsaids (non-steroidal anti-inflammatory drug) Other (See Comments)     D/t h/o PUD and H pylori    Codeine Rash       COMPREHENSIVE GYN HISTORY:  PAP History: Denies abnormal Paps.  Infection History: Denies STDs. Denies PID.  Benign History: Denies uterine fibroids. Denies ovarian cysts. Denies endometriosis. Denies other conditions.  Cancer History: Denies cervical cancer. Denies uterine cancer or hyperplasia. Denies ovarian cancer. Denies vulvar cancer or pre-cancer. Denies vaginal cancer or pre-cancer. Denies breast cancer. Denies colon cancer.  Sexual Activity History: Reports currently being sexually active  Menstrual History: Every 28 days, flows for 4 days. Light flow.  Dysmenorrhea History: Denies dysmenorrhea.    ROS:  GENERAL: No fever or chills.  BREASTS: No pain. No lumps. No discharge.  ABDOMEN: No pain. No nausea. No vomiting. No diarrhea. No constipation.  REPRODUCTIVE: No abnormal bleeding.   VULVA: No pain. No lesions. No itching.  VAGINA: No relaxation. No itching. No odor. No discharge. No lesions.  URINARY: No incontinence. No nocturia. No frequency. No dysuria.    /76 (BP Location: Right arm, Patient Position: Sitting, BP Method: Medium (Automatic))   Pulse 101   Ht 5' 3" (1.6 m)   Wt 72.5 kg (159 lb 13.3 oz)   LMP 02/29/2024   BMI 28.31 kg/m²     PE:  APPEARANCE: Well nourished, well developed, in no acute distress.  AFFECT: WNL, alert and oriented x 3.  ABDOMEN: Soft. No tenderness or masses. No hepatosplenomegaly. No hernias.  BREASTS: Symmetrical, no skin changes or visible lesions. No palpable masses, nipple discharge bilaterally.  PELVIC: Normal external female genitalia without lesions. Normal hair distribution. Adequate perineal body, normal urethral meatus. Vagina pink and well rugated without lesions or discharge. Cervix pink without lesions, discharge or tenderness. No significant cystocele or rectocele. Bimanual exam shows uterus to be 4-6 weeks size, regular, " mobile and nontender. Adnexa without masses or tenderness.    PROCEDURES:    1. Pelvic pain    2. Endometriosis    3. Gastroesophageal reflux disease, unspecified whether esophagitis present        PLAN:    Orders Placed This Encounter    POCT urine pregnancy       COUNSELING:  The patient was counseled today on:    I spent a total of 20 minutes on the day of the visit. addressing problems separate from annual exam.  This includes face to face time and non-face to face time preparing to see the patient (eg, review of tests), Obtaining and/or reviewing separately obtained history, Documenting clinical information in the electronic or other health record, Independently interpreting resultsand communicating results to the patient/family/caregiver, or Care coordination.     FOLLOW-UP with me in 3 months   Continuous OCPs discussed in detail

## 2024-03-20 ENCOUNTER — PATIENT MESSAGE (OUTPATIENT)
Dept: GASTROENTEROLOGY | Facility: CLINIC | Age: 34
End: 2024-03-20

## 2024-03-20 ENCOUNTER — OFFICE VISIT (OUTPATIENT)
Dept: GASTROENTEROLOGY | Facility: CLINIC | Age: 34
End: 2024-03-20
Payer: COMMERCIAL

## 2024-03-20 DIAGNOSIS — K59.00 CONSTIPATION, UNSPECIFIED CONSTIPATION TYPE: Primary | ICD-10-CM

## 2024-03-20 PROCEDURE — 99214 OFFICE O/P EST MOD 30 MIN: CPT | Mod: 95,,,

## 2024-03-20 NOTE — PROGRESS NOTES
The patient location is: Patient Home   The chief complaint leading to consultation is: constipation  Visit type: Virtual visit with synchronous audio and video  Total time spent with patient: 15 minutes     Each patient to whom he or she provides medical services by telemedicine is:  (1) informed of the relationship between the physician and patient and the respective role of any other health care provider with respect to management of the patient; and (2) notified that he or she may decline to receive medical services by telemedicine and may withdraw from such care at any time.                                                                               Gastroenterology Progress Note    Reason for Visit:  The primary encounter diagnosis was Constipation, unspecified constipation type. A diagnosis of Mother currently breast-feeding was also pertinent to this visit.    PCP:   Jhoana Trejo         Initial HPI   This is a 33 y.o. female presenting for constipation. Patient reports this has been an issue for her for 1 month. Denies diet changes, supplement changes. Has not tried anything over the counter. Does report some abdominal cramping when her constipation is severe. Having a BM about 1x/week. Had one yesterday that she reports was relatively normal and felt complete. Denies blood in her stool, unintentional weight loss. She is still currently breast feeding and has not been treated for her H. Pylori infection.     Of note, her recent US noted a small gallstone with gallbladder sludge. She will be proceeding with cholecystectomy in April.     GI Hx: LOV 12/6/2022 w/Dr. Burks for H. Pylori gastritis & intermittent RUQ pain. Patient was breastfeeding at the time, so treatment was deferred. HIDA scan was planned for her intermittent RUQ pain, but this was not completed.        ROS:  Review of Systems   Constitutional:  Negative for chills, fever and weight loss.   HENT:  Negative for sore throat.    Eyes:   Negative for redness.   Gastrointestinal:  Positive for abdominal pain and constipation. Negative for blood in stool, diarrhea, heartburn, melena, nausea and vomiting.   Skin:  Negative for itching and rash.   Neurological:  Negative for dizziness, loss of consciousness and weakness.        Medical History:  has a past medical history of Bilateral knee pain (06/19/2018), Endometriosis, Epigastric abdominal pain (12/05/2016), Gastroesophageal reflux disease (07/06/2016), Helicobacter pylori ab+, Helicobacter pylori ab+, High-tone pelvic floor dysfunction (05/02/2016), IIH (idiopathic intracranial hypertension), Palpitations (07/06/2016), and Pre-eclampsia in postpartum period (08/16/2022).    Surgical History:  has a past surgical history that includes No past surgeries; Upper gastrointestinal endoscopy; Esophagogastroduodenoscopy (N/A, 10/30/2018); Colonoscopy (N/A, 12/13/2018); Laparoscopy (2017); Synovectomy of knee (Left, 04/06/2021); Knee arthroscopy w/ plica excision (Left, 04/06/2021); Knee arthroscopy w/ debridement (04/06/2021); Arthroscopy of knee (Left, 04/06/2021); Knee arthroscopy w/ meniscectomy (Right, 04/06/2021); Esophagogastroduodenoscopy (N/A, 11/05/2022); and Colonoscopy (N/A, 11/05/2022).    Family History: family history includes Diabetes in her mother; Hypertension in her brother, maternal grandfather, and maternal grandmother; No Known Problems in her father, paternal grandfather, paternal grandmother, and sister..       Review of patient's allergies indicates:   Allergen Reactions    Doxycycline Other (See Comments)     Triggers headaches    Bactrim ds [sulfamethoxazole-trimethoprim] Nausea And Vomiting    Nsaids (non-steroidal anti-inflammatory drug) Other (See Comments)     D/t h/o PUD and H pylori    Codeine Rash       Current Outpatient Medications on File Prior to Visit   Medication Sig Dispense Refill    amoxicillin-clavulanate 500-125mg (AUGMENTIN) 500-125 mg Tab Take 1 tablet (500 mg  "total) by mouth 2 (two) times daily. (Patient not taking: Reported on 3/11/2024) 14 tablet 0    cholecalciferol, vitamin D3, 1,250 mcg (50,000 unit) capsule Take 1 capsule (50,000 Units total) by mouth every 7 days. (Patient not taking: Reported on 3/11/2024) 12 capsule 1    ferrous sulfate 325 (65 FE) MG EC tablet Take 1 tablet (325 mg total) by mouth once daily. (Patient not taking: Reported on 3/11/2024) 90 tablet 1    [DISCONTINUED] folic acid (FOLVITE) 1 MG tablet Take 1 mg by mouth once daily.       Current Facility-Administered Medications on File Prior to Visit   Medication Dose Route Frequency Provider Last Rate Last Admin    0.9%  NaCl infusion   Intravenous 1 time in Clinic/Agatha Ferrera FNP        acetaminophen tablet 650 mg  650 mg Oral 1 time in Clinic/Agatha Ferrera FNP             Objective Findings:    Vital Signs:  LMP 02/29/2024   There is no height or weight on file to calculate BMI.    Physical Exam: Limited due to virtual visit  Physical Exam  Neurological:      Mental Status: She is alert and oriented to person, place, and time.             Labs:  Lab Results   Component Value Date    WBC 7.17 02/27/2024    HGB 14.8 02/27/2024    HCT 46.8 02/27/2024     02/27/2024    CRP 1.0 11/21/2022    CHOL 195 10/26/2023    TRIG 84 10/26/2023    HDL 43 10/26/2023    ALKPHOS 73 02/27/2024    LIPASE 13 02/27/2024    ALT 12 02/27/2024    AST 17 02/27/2024     02/27/2024    K 4.3 02/27/2024     02/27/2024    CREATININE 0.7 02/27/2024    BUN 7 02/27/2024    CO2 23 02/27/2024    TSH 2.131 10/26/2023    INR 1.1 11/18/2022    HGBA1C 4.9 10/26/2023       No results found for: "CDIFFICILEAN"  No results found for: "CDIFFTOX"    Imaging reviewed: US Abdomen 2/27/2024  Impression:     Hepatic steatosis.  No focal hepatic lesions.     Gallbladder sludge and a 1 mm adherent stone versus gallbladder polyp without evidence for acute cholecystitis.        Electronically " signed by: Getachew Villela  Date:                                            02/27/2024  Time:                                           11:24    Endoscopy reviewed: Colonoscopy 11/5/2022  Impression:            - One 2 mm polyp in the rectum, removed with a                          jumbo cold forceps. Resected and retrieved.                          - The examination was otherwise normal on direct                          and retroflexion views.                          - The examined portion of the ileum was normal.   Recommendation:        - Discharge patient to home.                          - Patient has a contact number available for                          emergencies. The signs and symptoms of potential                          delayed complications were discussed with the                          patient. Return to normal activities tomorrow.                          Written discharge instructions were provided to                          the patient.                          - Resume previous diet.                          - Continue present medications.                          - Await pathology results.                          - Repeat colonoscopy at age 46 for screening                          purposes.                          - Return to referring physician.   Attending Participation:        I personally performed the entire procedure.   Greg Leach MD   11/5/2022 9:25:56 AM     EGD 11/5/2022  Impression:            - Normal esophagus.                          - Esophagogastric landmarks identified. No hiatal                          hernia seen.                          - Normal gastric body and antrum. Biopsied.                          - Normal examined duodenum.   Recommendation:        - Await pathology results.                          - Perform a colonoscopy now.                          - See colonoscopy report for further details.   Attending Participation:        I personally performed  the entire procedure.   Greg Leach MD   11/5/2022 9:03:00 AM     Assessment:  1. Constipation, unspecified constipation type    2. Mother currently breast-feeding             Recommendations:  Recommended trial of fiber supplementation and Miralax. Increased water consumption recommended as well. Recent exam conducted by her OBGYN, she was noted to have a large stool burden. Recommended Mag Citrate for bowel cleanse. If issues persist, will consider prescription medication (if safe to do so from her OBGYN standpoint). If abdominal pain persist, will consider imaging at patients request.   RTC in 2 months post thierno and after bowel regimen started to reassess her bowel movements.     Thank you for allowing me to participate in this patient's care.    Sincerely,     Amira Min NP  Gastroenterology Department  [facility]

## 2024-03-20 NOTE — PATIENT INSTRUCTIONS
--Magnesium Citrate (can get at pharmacy OTC)-1/2 of the bottle (150ml) and then if still no results after 2 hours can complete the other 1/2 of the bottle.   --Start miralax daily (17g per dose). Start at once per day and can titrate up to twice daily. Decrease and/or stop Miralax if stools become softer/liquid in consistency.    [facility]  High Fiber Diet    20-30 grams of fiber per day is recommended    Fiber cereal = 5 grams (Raisin Bran, Shredded Wheat, Grape Nuts)  Konsyl 1 teaspoon = 6 grams  Metamucil 1 tablespoon = 3 grams  Citrucel 1 tablespoon = 2 grams  Fiber Choice = 3-4 per day    Drink at least 4-5 glasses of liquids per day or the fiber can be constipating rather then stimulating to your gut.  Boil and bake potatoes in their skin. Eat the skin, too.  Include fresh fruits and raw vegetables in your daily diet. Raw fruits and vegetables have more useful fiber than those that have been peeled, cooked, pureed, or otherwise processed.  Eat a wide variety of fibrous foods in reasonable amounts. Increase fiber intake slowly especially if you have been on a low-fiber diet.  Eat more legumes-peas, beans, soybeans.  For snacks, try dried fruit, whole wheat and rye crackers.  Avoid instant-cook hot cereals. Use the longer cooking cereals.  Use bran whenever possible. Sprinkle it on top of cereal, mix it into mashed potatoes or hamburger meat, or use it in combination dishes such as meat loaf.   Substitute whole grain, whole wheat and bran products for white flour products.  Eat slowly and chew your food thoroughly.    Start daily fiber.  Take 1 tsp of fiber powder (psyllium or other sugar-free powder).  Mix in 8 oz of water.  Take x 3-5 days.  Then, increase fiber by 1 tsp every 3-5 days until stool is easy to pass.  Stop and continue at that dose.   Do not exceed 6 tsps/day. GOAL:  More well-formed stool (one continuous well-formed piece vs. Pellets) and minimize straining with  initiation.    Foods High in Fiber    This diet furnishes adequate amounts of all the essential nutrients needed by the body and a very liberal fiber or roughage content. Roughage is indigestible fiber found in fruits, vegetables and whole grain cereals. It provides bulk to the large intestine and, accompanied by an adequate fluid intake, is a stimulant to elimination. Regular eating and elimination habits are vital to good health.     Fruits:  Use all fruits and juices liberally; fresh, cooked, dried or canned. Eat fruit raw and with skins when possible. Have at least four servings of fruit daily including a citrus fruit and a stewed dried fruit. Hard seeds of fruits (berries, figs, Grapes, mangoes, tomatoes) etc. may be removed.    Vegetables: Use all vegetables liberally. Green leafy vegetables, such as cabbage, spinach, lettuce, broccoli, and other greens are particularly good.    Potato: As desired. Serve baked frequently and eat the skin. Other starchy foods such as rice, macaroni, etc., may be occasionally substituted. Chew popcorn well and do not eat hard kernels.    Meat, Fish, Poultry: One or two servings daily.    Eggs: One daily if you are not on a low cholesterol diet.    Milk: Include at least one-half pint daily. Whole milk or skimmed may be used.     Cereals: Use whole grain breads and cereals such as bran, bran flakes, grape nut flakes, puffed wheat, oatmeal, Wheaties, etc., as much as possible. Refined breaks and cereals are not restricted; however, they do not contain the bulk necessary for this diet.     Sugars, Sweets: Use very moderately. Depend on fruit as dessert.    Fats: Use butter or margarine as desired. Oil or dressing on salads as desired. Fried foods may be used in moderation. Nuts may be eaten if you chew them well and may be ground or finely chopped for cooking.   Sample Menu                                                                                 Breakfast                           Lunch  Orange juice, 4 ounces                                                Vegetable soup                    Stewed fruit                                                                 Fresh fruit plate with cottage cheese  Shredded wheat                                                           Whole wheat toast  Scrambled eggs                                                           Butter  Whole wheat toast                                                       Coffee or tea  Dinner                                                                         Bedtime  Large glass tomato juice                                             1 glass milk  Roast beef                                                                   stewed fruit  Baked potato with skin  Buttered spinach  Raw vegetable salad  Baked apple with skin   Coffee or tea

## 2024-03-21 ENCOUNTER — PATIENT MESSAGE (OUTPATIENT)
Dept: ORTHOPEDICS | Facility: CLINIC | Age: 34
End: 2024-03-21
Payer: COMMERCIAL

## 2024-03-21 ENCOUNTER — OFFICE VISIT (OUTPATIENT)
Dept: DERMATOLOGY | Facility: CLINIC | Age: 34
End: 2024-03-21
Payer: COMMERCIAL

## 2024-03-21 DIAGNOSIS — L21.9 SEBORRHEIC DERMATITIS: Primary | ICD-10-CM

## 2024-03-21 DIAGNOSIS — Z76.89 ENCOUNTER FOR SKIN CARE: ICD-10-CM

## 2024-03-21 PROCEDURE — 99203 OFFICE O/P NEW LOW 30 MIN: CPT | Mod: S$GLB,,, | Performed by: DERMATOLOGY

## 2024-03-21 PROCEDURE — 99999 PR PBB SHADOW E&M-EST. PATIENT-LVL III: CPT | Mod: PBBFAC,,, | Performed by: DERMATOLOGY

## 2024-03-21 RX ORDER — KETOCONAZOLE 20 MG/G
CREAM TOPICAL 2 TIMES DAILY
Qty: 60 G | Refills: 4 | Status: SHIPPED | OUTPATIENT
Start: 2024-03-21

## 2024-03-21 NOTE — PATIENT INSTRUCTIONS
No hot water bathing reviewed.    Seborrheic dermatitis is an ongoing (chronic) condition. It can go away and then come back. You will likely need to use shampoo, cream, or ointment with medicine once or twice a week. This can help to keep symptoms from coming back or getting worse.

## 2024-03-21 NOTE — PROGRESS NOTES
Subjective:      Patient ID:  Laila Pappas is a 33 y.o. female who presents for   Chief Complaint   Patient presents with    Rash     Left eye lid      Rash - Initial  Affected locations: L-eye lid.  Duration: 2 months  Signs / symptoms: itching, dryness and growing  Severity: mild  Timing: constant (Worsening)  Aggravated by: nothing  Treatments tried: Aquaphore.  Improvement on treatment: mild      Review of Systems   Constitutional:  Negative for fever, chills and fatigue.   HENT:  Negative for sore throat.    Respiratory:  Negative for cough.    Skin:  Positive for itching, rash, dry skin, activity-related sunscreen use and wears hat (Outside for extended periods). Negative for daily sunscreen use and recent sunburn.   Hematologic/Lymphatic: Bruises/bleeds easily (Bruises).       Objective:   Physical Exam   Constitutional: She appears well-developed and well-nourished. No distress.   Neurological: She is alert and oriented to person, place, and time. She is not disoriented.   Psychiatric: She has a normal mood and affect.   Skin:   Areas Examined (abnormalities noted in diagram):   Head / Face Inspection Performed            Diagram Legend     Erythematous scaling macule/papule c/w actinic keratosis       Vascular papule c/w angioma      Pigmented verrucoid papule/plaque c/w seborrheic keratosis      Yellow umbilicated papule c/w sebaceous hyperplasia      Irregularly shaped tan macule c/w lentigo     1-2 mm smooth white papules consistent with Milia      Movable subcutaneous cyst with punctum c/w epidermal inclusion cyst      Subcutaneous movable cyst c/w pilar cyst      Firm pink to brown papule c/w dermatofibroma      Pedunculated fleshy papule(s) c/w skin tag(s)      Evenly pigmented macule c/w junctional nevus     Mildly variegated pigmented, slightly irregular-bordered macule c/w mildly atypical nevus      Flesh colored to evenly pigmented papule c/w intradermal nevus       Pink pearly papule/plaque c/w  basal cell carcinoma      Erythematous hyperkeratotic cursted plaque c/w SCC      Surgical scar with no sign of skin cancer recurrence      Open and closed comedones      Inflammatory papules and pustules      Verrucoid papule consistent consistent with wart     Erythematous eczematous patches and plaques     Dystrophic onycholytic nail with subungual debris c/w onychomycosis     Umbilicated papule    Erythematous-base heme-crusted tan verrucoid plaque consistent with inflamed seborrheic keratosis     Erythematous Silvery Scaling Plaque c/w Psoriasis     See annotation  Rash on brow bone, not eyelid per se.      Assessment / Plan:        Seborrheic dermatitis  -     ketoconazole (NIZORAL) 2 % cream; Apply topically 2 (two) times daily. Prn rash of face  Dispense: 60 g; Refill: 4  Discussed with patient the etiology and pathogenesis of the disease or skin lesion(s) and possible treatments and aggravators.    Seborrheic dermatitis is an ongoing (chronic) condition. It can go away and then come back. You will likely need to use shampoo, cream, or ointment with medicine once or twice a week. This can help to keep symptoms from coming back or getting worse.  Reviewed with patient different treatment options and associated risks.  Proper application of medications and or care for affected area(s) and condition(s) reviewed.  Chronic nature of this condition discussed with patient.  Pt reports rash on sides of nose on and off.    Encounter for skin care  No hot water bathing reviewed.             Follow up if symptoms worsen or fail to improve.

## 2024-03-27 ENCOUNTER — PATIENT MESSAGE (OUTPATIENT)
Dept: SURGERY | Facility: CLINIC | Age: 34
End: 2024-03-27
Payer: COMMERCIAL

## 2024-04-01 ENCOUNTER — LAB VISIT (OUTPATIENT)
Dept: LAB | Facility: HOSPITAL | Age: 34
End: 2024-04-01
Attending: INTERNAL MEDICINE
Payer: COMMERCIAL

## 2024-04-01 ENCOUNTER — OFFICE VISIT (OUTPATIENT)
Dept: PRIMARY CARE CLINIC | Facility: CLINIC | Age: 34
End: 2024-04-01
Payer: COMMERCIAL

## 2024-04-01 VITALS
RESPIRATION RATE: 16 BRPM | OXYGEN SATURATION: 98 % | WEIGHT: 164.44 LBS | BODY MASS INDEX: 29.14 KG/M2 | DIASTOLIC BLOOD PRESSURE: 80 MMHG | TEMPERATURE: 98 F | HEIGHT: 63 IN | HEART RATE: 101 BPM | SYSTOLIC BLOOD PRESSURE: 112 MMHG

## 2024-04-01 DIAGNOSIS — R09.82 POSTNASAL DRIP: ICD-10-CM

## 2024-04-01 DIAGNOSIS — J02.9 ACUTE PHARYNGITIS, UNSPECIFIED ETIOLOGY: Primary | ICD-10-CM

## 2024-04-01 DIAGNOSIS — J02.9 ACUTE PHARYNGITIS, UNSPECIFIED ETIOLOGY: ICD-10-CM

## 2024-04-01 PROBLEM — K76.0 HEPATIC STEATOSIS: Status: RESOLVED | Noted: 2022-12-14 | Resolved: 2024-04-01

## 2024-04-01 LAB
CTP QC/QA: YES
HETEROPH AB SERPL QL IA: NEGATIVE
S PYO RRNA THROAT QL PROBE: NEGATIVE

## 2024-04-01 PROCEDURE — 87880 STREP A ASSAY W/OPTIC: CPT | Mod: QW,S$GLB,, | Performed by: INTERNAL MEDICINE

## 2024-04-01 PROCEDURE — 99999 PR PBB SHADOW E&M-EST. PATIENT-LVL IV: CPT | Mod: PBBFAC,,, | Performed by: INTERNAL MEDICINE

## 2024-04-01 PROCEDURE — 36415 COLL VENOUS BLD VENIPUNCTURE: CPT | Mod: PN | Performed by: INTERNAL MEDICINE

## 2024-04-01 PROCEDURE — 99213 OFFICE O/P EST LOW 20 MIN: CPT | Mod: S$GLB,,, | Performed by: INTERNAL MEDICINE

## 2024-04-01 PROCEDURE — 86308 HETEROPHILE ANTIBODY SCREEN: CPT | Performed by: INTERNAL MEDICINE

## 2024-04-01 NOTE — PROGRESS NOTES
[facility] Note    Chief Complaint      Chief Complaint   Patient presents with    Sore Throat    Otalgia       History of Present Illness      Laila Pappas is a 33 y.o. F with IIH, gallstones, Iron def anemia, endometriosis,   presents for same day appt for sore throa andl let ear pain.     Pt normally cared for by my colleague Dr. Trejo and patient is new to me. I have reviewed patient's past medical, surgical, and social history in addition to MAR and allergies.       Viral pharyngitis - Left ear pain/sore throat x 2 d- she was in her USOH until 2 days ago when she awakened with sore throat and progressive pain in her Left ear.  No drainage.  No F/C. She did have NS last night. She has only taken Betzy [place] OTC - no help. She has not taken any ibuprofen/APAP.  She is breast feeding her 19 m.o. She was recently on Augmentin in end of Feb. for a UTI    PUD - Avoid NSAIDS.     IIH - she reports recent Ophtho and plans for neuro fu. Denies any HA.       Patient Care Team:  Jhoana Trejo MD as PCP - General (Internal Medicine)  Sandi Chavarria LPN as Care Coordinator (Internal Medicine)  Louann Hook DO as Consulting Physician (Gynecology)     Health Maintenance:  Immunization History   Administered Date(s) Administered    COVID-19, MRNA, LN-S, PF (Pfizer) (Purple Cap) 08/05/2021, 08/26/2021    DTP 04/01/1992, 06/11/1992, 08/04/1992, 09/15/1992, 03/05/1996    HIB 02/21/1992, 06/11/1992, 08/04/1992, 09/15/1992    Hepatitis B, Pediatric/Adolescent 07/27/1999, 08/06/2003, 08/04/2011    MMR 10/31/1994, 03/05/1996    Meningococcal Conjugate (MCV4P) 03/13/2009    OPV 02/21/1992, 06/11/1992, 08/04/1992, 09/15/1992, 03/05/1996    PPD Test 03/13/2009, 03/13/2009    Rho (D) Immune Globulin 05/31/2022    Rho (D) Immune Globulin - IM 07/23/2020, 08/08/2022    Td (ADULT) 08/06/2003    Tdap 08/06/2003, 07/22/2019, 05/31/2022      Health Maintenance   Topic Date Due    TETANUS VACCINE  05/31/2032     Hepatitis C Screening  Completed    Lipid Panel  Completed        Past Medical History:  Past Medical History:   Diagnosis Date    Bilateral knee pain 06/19/2018    Endometriosis     treated by GYN at     Epigastric abdominal pain 12/05/2016    Gastroesophageal reflux disease 07/06/2016    Helicobacter pylori ab+     Helicobacter pylori ab+     High-tone pelvic floor dysfunction 05/02/2016    IIH (idiopathic intracranial hypertension)     Dr. Rogers - [facility]    Palpitations 07/06/2016    Pre-eclampsia in postpartum period 08/16/2022       Past Surgical History:   has a past surgical history that includes No past surgeries; Upper gastrointestinal endoscopy; Esophagogastroduodenoscopy (N/A, 10/30/2018); Colonoscopy (N/A, 12/13/2018); Laparoscopy (2017); Synovectomy of knee (Left, 04/06/2021); Knee arthroscopy w/ plica excision (Left, 04/06/2021); Knee arthroscopy w/ debridement (04/06/2021); Arthroscopy of knee (Left, 04/06/2021); Knee arthroscopy w/ meniscectomy (Right, 04/06/2021); Esophagogastroduodenoscopy (N/A, 11/05/2022); and Colonoscopy (N/A, 11/05/2022).    Family History:  family history includes Diabetes in her mother; Hypertension in her brother, maternal grandfather, and maternal grandmother; No Known Problems in her father, paternal grandfather, paternal grandmother, and sister.     Social History:  Social History     Tobacco Use    Smoking status: Never     Passive exposure: Never    Smokeless tobacco: Never   Substance Use Topics    Alcohol use: No    Drug use: No       Review of Systems   Constitutional:  Positive for diaphoresis. Negative for chills and fever.   HENT:  Positive for ear pain, postnasal drip and sore throat. Negative for nasal congestion and rhinorrhea.    Eyes:  Negative for discharge and itching.   Respiratory:  Positive for cough. Negative for chest tightness and shortness of breath.         Dry cough   Cardiovascular:  Negative for chest pain.   Gastrointestinal:  Negative  "for abdominal pain, diarrhea, nausea and vomiting.   Neurological:  Negative for dizziness and headaches.        Medications:    Current Outpatient Medications:     cholecalciferol, vitamin D3, 1,250 mcg (50,000 unit) capsule, Take 1 capsule (50,000 Units total) by mouth every 7 days., Disp: 12 capsule, Rfl: 1    ketoconazole (NIZORAL) 2 % cream, Apply topically 2 (two) times daily. Prn rash of face, Disp: 60 g, Rfl: 4    ferrous sulfate 325 (65 FE) MG EC tablet, Take 1 tablet (325 mg total) by mouth once daily. (Patient not taking: Reported on 4/1/2024), Disp: 90 tablet, Rfl: 1    Current Facility-Administered Medications:     0.9%  NaCl infusion, , Intravenous, 1 time in Clinic/HOD, Agatha Causey, DEYSIP    acetaminophen tablet 650 mg, 650 mg, Oral, 1 time in Clinic/HOD, Agatha Causey FNP     Allergies:  Review of patient's allergies indicates:   Allergen Reactions    Doxycycline Other (See Comments)     Triggers headaches    Bactrim ds [sulfamethoxazole-trimethoprim] Nausea And Vomiting    Nsaids (non-steroidal anti-inflammatory drug) Other (See Comments)     D/t h/o PUD and H pylori    Codeine Rash       Physical Exam      Vital Signs  Temp: 98.2 °F (36.8 °C)  Temp Source: Oral  Pulse: 101  Resp: 16  SpO2: 98 %  BP: 112/80  BP Location: Right arm  Patient Position: Sitting  Pain Score:   5 (THROAT,LEFT EAR)  Height and Weight  Height: 5' 3" (160 cm)  Weight: 74.6 kg (164 lb 7.4 oz)  BSA (Calculated - sq m): 1.82 sq meters  BMI (Calculated): 29.1  Weight in (lb) to have BMI = 25: 140.8      Patient Position: Sitting      Physical Exam  Vitals reviewed.   Constitutional:       General: She is not in acute distress.     Appearance: Normal appearance. She is not ill-appearing, toxic-appearing or diaphoretic.   HENT:      Head: Normocephalic and atraumatic.      Right Ear: Tympanic membrane normal.      Left Ear: Tympanic membrane normal.      Ears:      Comments: No pain on manipulation of ext " ear smooth. Smooth TM - wnl     Mouth/Throat:      Mouth: Mucous membranes are moist.      Pharynx: No posterior oropharyngeal erythema.      Comments: Erythema to post OP- + Cobblestoning. No exudate.   Eyes:      Extraocular Movements: Extraocular movements intact.      Conjunctiva/sclera: Conjunctivae normal.      Pupils: Pupils are equal, round, and reactive to light.   Neck:      Vascular: No carotid bruit.      Comments: Swollen tender submandibular glands  Cardiovascular:      Rate and Rhythm: Normal rate and regular rhythm.      Pulses: Normal pulses.      Heart sounds: Normal heart sounds. No murmur heard.  Pulmonary:      Effort: No respiratory distress.      Breath sounds: Normal breath sounds. No wheezing.   Abdominal:      General: Bowel sounds are normal. There is no distension.      Palpations: Abdomen is soft.      Tenderness: There is no abdominal tenderness.   Lymphadenopathy:      Cervical: No cervical adenopathy.   Neurological:      General: No focal deficit present.      Mental Status: She is alert and oriented to person, place, and time.   Psychiatric:         Mood and Affect: Mood normal.         Behavior: Behavior normal.          Laboratory:  CBC:  Recent Labs   Lab 09/05/23  1121 10/26/23  1417 02/27/24  0944   WBC 7.39 5.85 7.17   RBC 5.21 4.84 5.12   Hemoglobin 15.3 14.2 14.8   Hematocrit 47.1 44.6 46.8   Platelets 326 269 304   MCV 90 92 91   MCH 29.4 29.3 28.9   MCHC 32.5 31.8 L 31.6 L       CMP:  Recent Labs   Lab 09/05/23  1121 10/26/23  1417 02/27/24  0944   Glucose 84 83 95   Calcium 9.1 9.4 9.2   Albumin 4.1 3.6 3.8   Total Protein 7.7 7.1 7.4   Sodium 139 140 140   Potassium 4.2 4.1 4.3   CO2 23 22 L 23   Chloride 106 107 110   BUN 12 10 7   Creatinine 0.7 0.7 0.7   Alkaline Phosphatase 104 96 73   ALT 11 22 12   AST 16 19 17   Total Bilirubin 0.4 0.2 0.4           URINALYSIS:  Recent Labs   Lab 02/22/22  1817 10/24/22  2328 11/10/22  1601 02/27/24  1138   Color, UA Yellow Yellow   < >  Yellow   Specific Gravity, UA <=1.005 A 1.030   < > 1.025   pH, UA 6.0 6.0   < > 5.0   Protein, UA Negative 1+ A   < > Negative   Bacteria  --  Many A  --   --    Nitrite, UA Negative Negative   < > Negative   Leukocytes, UA Negative 1+ A   < > Negative   Urobilinogen, UA Negative  --   --   --    Hyaline Casts, UA  --  0  --   --     < > = values in this interval not displayed.        LIPIDS:  Recent Labs   Lab 10/29/21  1041 10/14/22  1140 10/26/23  1417   TSH 1.554 1.277 2.131   HDL 41  --  43   Cholesterol 204 H  --  195   Triglycerides 46  --  84   LDL Cholesterol 153.8  --  135.2   HDL/Cholesterol Ratio 20.1  --  22.1   Non-HDL Cholesterol 163  --  152   Total Cholesterol/HDL Ratio 5.0  --  4.5       TSH:  Recent Labs   Lab 10/29/21  1041 10/14/22  1140 10/26/23  1417   TSH 1.554 1.277 2.131       A1C:  Recent Labs   Lab 10/29/21  1041 10/27/22  1406 10/26/23  1417   Hemoglobin A1C 4.9 5.0 4.9            Other:   Recent Labs   Lab 10/14/22  1140 10/20/22  1138 10/26/23  1417 02/27/24  0944   Vit D, 25-Hydroxy 26 L  --  18 L  --    Vitamin B-12  --  >2000 H  --   --    Ferritin 158  --  66 56   Iron 88 106 43 90   Transferrin 252 217 237 269   TIBC 373 321 351 398   Saturated Iron 24 33 12 L 23     Recent Labs   Lab 11/21/22  1445   Hepatitis C Ab Non-reactive       Assessment/Plan     Laila Pappas is a 33 y.o.female with:    Acute pharyngitis, unspecified etiology  -     POCT rapid strep A  -     HETEROPHILE AB SCREEN; Future; Expected date: 04/01/2024  - Rapid Strep - neg.  Will check Monospot.  Suspect Viral in etiol. Rec supportive care.  Rec strict hand hygiene.  Gargle with salt water. Rec APAP  prn T > 100.3/pain. RTC or alert MD if Symptoms persist/worsen.    Postnasal drip  - Rec claritin prn.  Rec adeq hydration since still breast feeding. Autoinsufflation exercises prn.        Chronic conditions status updated as per HPI.  Other than changes above, cont current medications and maintain follow up with  specialists.        Yue Grijalva MD  [facility]

## 2024-04-02 NOTE — PROGRESS NOTES
I sent pt a my chart message -  I reviewed your Monospot test which was negative for Mono.  Please let me know if Symptoms persist/worsen.    Dr. SORENSEN

## 2024-04-10 ENCOUNTER — TELEPHONE (OUTPATIENT)
Dept: SURGERY | Facility: CLINIC | Age: 34
End: 2024-04-10
Payer: COMMERCIAL

## 2024-04-10 ENCOUNTER — ANESTHESIA EVENT (OUTPATIENT)
Dept: SURGERY | Facility: HOSPITAL | Age: 34
End: 2024-04-10
Payer: COMMERCIAL

## 2024-04-10 NOTE — ANESTHESIA PREPROCEDURE EVALUATION
[facility]  Anesthesia Pre-Operative Evaluation         Patient Name: Laila Pappas  YOB: 1990  MRN: [id number]    SUBJECTIVE:     Pre-operative evaluation for Procedure(s) (LRB):  CHOLECYSTECTOMY, LAPAROSCOPIC (N/A)     04/10/2024    Laila Pappas is a 33 y.o. female with a significant medical history of anemia, GERD, Back Pain, and endometriosis who presents for the above procedure.    Prev airway: None documented      Patient Active Problem List   Diagnosis    Endometriosis determined by laparoscopy    Multiple thyroid nodules    Poor posture    Mid back pain    Low back pain    Postural imbalance    Other chest pain    Dyslipidemia    IIH (idiopathic intracranial hypertension)    Arm pain, left    Plica syndrome    Decreased range of motion (ROM) of left knee    Impaired functional mobility, balance, gait, and endurance    Chronic pain of left knee    Gastroesophageal reflux disease    Endometriosis    Pregnancy resulting from in-vitro fertilization    Myalgia    Abdominal weakness    Pre-eclampsia in postpartum period    MIKE positive    Numbness    Muscle spasm    Dizziness    Nonintractable headache    Tinnitus    Acute pharyngitis    Postnasal drip       Review of patient's allergies indicates:   Allergen Reactions    Doxycycline Other (See Comments)     Triggers headaches    Bactrim ds [sulfamethoxazole-trimethoprim] Nausea And Vomiting    Nsaids (non-steroidal anti-inflammatory drug) Other (See Comments)     D/t h/o PUD and H pylori    Codeine Rash       Current Inpatient Medications:   sodium chloride 0.9%   Intravenous 1 time in Clinic/HOD    acetaminophen  650 mg Oral 1 time in Clinic/HOD       Current Facility-Administered Medications on File Prior to Encounter   Medication Dose Route Frequency Provider Last Rate Last Admin    0.9%  NaCl infusion   Intravenous 1 time in Clinic/HOD Agatha Causey FNP        acetaminophen tablet 650 mg  650 mg Oral 1 time in  Clinic/HOD Agatha Causey FNP         Current Outpatient Medications on File Prior to Encounter   Medication Sig Dispense Refill    cholecalciferol, vitamin D3, 1,250 mcg (50,000 unit) capsule Take 1 capsule (50,000 Units total) by mouth every 7 days. (Patient not taking: Reported on 4/10/2024) 12 capsule 1    ferrous sulfate 325 (65 FE) MG EC tablet Take 1 tablet (325 mg total) by mouth once daily. (Patient not taking: Reported on 4/1/2024) 90 tablet 1    ketoconazole (NIZORAL) 2 % cream Apply topically 2 (two) times daily. Prn rash of face 60 g 4    [DISCONTINUED] folic acid (FOLVITE) 1 MG tablet Take 1 mg by mouth once daily.         Past Surgical History:   Procedure Laterality Date    ARTHROSCOPY OF KNEE Left 04/06/2021    Procedure: ARTHROSCOPY, KNEE;  Surgeon: Deidra Mejias MD;  Location: [facility] OR;  Service: Orthopedics;  Laterality: Left;    COLONOSCOPY N/A 12/13/2018    Procedure: COLONOSCOPY;  Surgeon: Micki Gross MD;  Location: [facility] (4TH FLR);  Service: Endoscopy;  Laterality: N/A;  preferrably in Dec 2018, CRS ok if needed.    COLONOSCOPY N/A 11/05/2022    Procedure: COLONOSCOPY;  Surgeon: Greg Leach MD;  Location: [facility] (4TH FLR);  Service: Endoscopy;  Laterality: N/A;  Endoscopy schedulers please schedule patient for colonoscopy with me for further evaluation of painless hematochezia thank you    ESOPHAGOGASTRODUODENOSCOPY N/A 10/30/2018    Procedure: EGD (ESOPHAGOGASTRODUODENOSCOPY);  Surgeon: Greg Leach MD;  Location: [facility] (2ND FLR);  Service: Endoscopy;  Laterality: N/A;  ok to schedule per Kimmy    ESOPHAGOGASTRODUODENOSCOPY N/A 11/05/2022    Procedure: EGD (ESOPHAGOGASTRODUODENOSCOPY);  Surgeon: Greg Leach MD;  Location: [facility] (4TH FLR);  Service: Endoscopy;  Laterality: N/A;  Endoscopy schedulers please schedule patient for EGD for evaluation of right upper quadrant pain thank you  prep instr portal--ml  11/2/22 room closed-pt okay with   Rice-50 min case okay per Katlyn-ml    KNEE ARTHROSCOPY W/ DEBRIDEMENT  2021    Procedure: ARTHROSCOPY, KNEE, WITH DEBRIDEMENT;  Surgeon: Deidra Mejias MD;  Location: [facility] OR;  Service: Orthopedics;;    KNEE ARTHROSCOPY W/ MENISCECTOMY Right 2021    Procedure: ARTHROSCOPY, KNEE, WITH MENISCECTOMY;  Surgeon: Deidra Mejias MD;  Location: [facility] OR;  Service: Orthopedics;  Laterality: Right;    KNEE ARTHROSCOPY W/ PLICA EXCISION Left 2021    Procedure: EXCISION, PLICA, KNEE, ARTHROSCOPIC;  Surgeon: Deidra Mejias MD;  Location: [facility] OR;  Service: Orthopedics;  Laterality: Left;    LAPAROSCOPY  2017    dx with endo.  no removal    NO PAST SURGERIES      SYNOVECTOMY OF KNEE Left 2021    Procedure: SYNOVECTOMY, KNEE;  Surgeon: Deidra Mejias MD;  Location: [facility] OR;  Service: Orthopedics;  Laterality: Left;    UPPER GASTROINTESTINAL ENDOSCOPY         Social History     Socioeconomic History    Marital status: Single   Occupational History     Employer: [employer] [place]     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   Tobacco Use    Smoking status: Never     Passive exposure: Never    Smokeless tobacco: Never   Substance and Sexual Activity    Alcohol use: No    Drug use: No    Sexual activity: Yes     Partners: Male     Birth control/protection: None   Social History Narrative    She had her 1st baby in 2022 healthy vaginal delivery no      Social Determinants of Health     Financial Resource Strain: Unknown (10/25/2022)    Overall Financial Resource Strain (CARDIA)     Difficulty of Paying Living Expenses: Patient declined   Food Insecurity: Unknown (10/25/2022)    Hunger Vital Sign     Worried About Running Out of Food in the Last Year: Patient declined     Ran Out of Food in the Last Year: Patient declined   Transportation Needs: Unknown (10/25/2022)    PRAPARE - Transportation     Lack of Transportation (Medical): Patient declined     Lack of Transportation (Non-Medical): Patient declined  "  Physical Activity: Unknown (12/12/2023)    Exercise Vital Sign     Days of Exercise per Week: 1 day   Stress: No Stress Concern Present (9/29/2020)    [place] [employer] - Occupational Stress Questionnaire     Feeling of Stress : Only a little   Social Connections: Unknown (10/25/2022)    Social Connection and Isolation Panel [NHANES]     Frequency of Communication with Friends and Family: Patient declined     Frequency of Social Gatherings with Friends and Family: Patient declined     Active Member of Clubs or Organizations: Patient declined     Attends Club or Organization Meetings: Patient declined     Marital Status: Patient declined   Housing Stability: Unknown (10/25/2022)    Housing Stability Vital Sign     Unable to Pay for Housing in the Last Year: Patient refused     Unstable Housing in the Last Year: Patient refused       OBJECTIVE:     Vital Signs Range:      3/11/2024     9:57 AM 3/21/2024    11:00 AM 4/1/2024     8:15 AM   Vitals - 1 value per visit   SYSTOLIC 118  112   DIASTOLIC 76  80   Pulse 101  101   Temp   36.8 °C (98.2 °F)   Resp   16   SPO2   98 %   Weight (lb) 159.83  164.46   Weight (kg) 72.5  74.6   Height 5' 3" (1.6 m)  5' 3" (1.6 m)   BMI (Calculated) 28.3  29.1   Pain Score Four Zero Five         CBC:   Lab Results   Component Value Date    WBC 7.17 02/27/2024    HGB 14.8 02/27/2024    HCT 46.8 02/27/2024    MCV 91 02/27/2024     02/27/2024         CMP:   Sodium   Date Value Ref Range Status   02/27/2024 140 136 - 145 mmol/L Final     Potassium   Date Value Ref Range Status   02/27/2024 4.3 3.5 - 5.1 mmol/L Final     Chloride   Date Value Ref Range Status   02/27/2024 110 95 - 110 mmol/L Final     CO2   Date Value Ref Range Status   02/27/2024 23 23 - 29 mmol/L Final     Glucose   Date Value Ref Range Status   02/27/2024 95 70 - 110 mg/dL Final     BUN   Date Value Ref Range Status   02/27/2024 7 6 - 20 mg/dL Final     Creatinine   Date Value Ref Range " Status   02/27/2024 0.7 0.5 - 1.4 mg/dL Final     Calcium   Date Value Ref Range Status   02/27/2024 9.2 8.7 - 10.5 mg/dL Final     Total Protein   Date Value Ref Range Status   02/27/2024 7.4 6.0 - 8.4 g/dL Final     Albumin   Date Value Ref Range Status   02/27/2024 3.8 3.5 - 5.2 g/dL Final     Total Bilirubin   Date Value Ref Range Status   02/27/2024 0.4 0.1 - 1.0 mg/dL Final     Comment:     For infants and newborns, interpretation of results should be based  on gestational age, weight and in agreement with clinical  observations.    Premature Infant recommended reference ranges:  Up to 24 hours.............<8.0 mg/dL  Up to 48 hours............<12.0 mg/dL  3-5 days..................<15.0 mg/dL  6-29 days.................<15.0 mg/dL       Alkaline Phosphatase   Date Value Ref Range Status   02/27/2024 73 55 - 135 U/L Final     AST   Date Value Ref Range Status   02/27/2024 17 10 - 40 U/L Final     ALT   Date Value Ref Range Status   02/27/2024 12 10 - 44 U/L Final     Anion Gap   Date Value Ref Range Status   02/27/2024 7 (L) 8 - 16 mmol/L Final     eGFR if    Date Value Ref Range Status   07/26/2022 >60 >60 mL/min/1.73 m^2 Final     eGFR if non    Date Value Ref Range Status   07/26/2022 >60 >60 mL/min/1.73 m^2 Final     Comment:     Calculation used to obtain the estimated glomerular filtration  rate (eGFR) is the CKD-EPI equation.          INR:  Lab Results   Component Value Date    INR 1.1 11/18/2022    INR 1.0 11/10/2022    INR 1.0 09/22/2021       EKG:   Results for orders placed or performed during the hospital encounter of 09/05/23   EKG 12-lead    Collection Time: 09/05/23  9:59 AM    Narrative    Test Reason : R07.9,    Vent. Rate : 105 BPM     Atrial Rate : 105 BPM     P-R Int : 142 ms          QRS Dur : 078 ms      QT Int : 340 ms       P-R-T Axes : 082 079 080 degrees     QTc Int : 449 ms    Sinus tachycardia  Nonspecific T wave abnormality  Abnormal ECG  When  compared with ECG of 16-AUG-2023 12:10,  No significant change was found  Confirmed by Colton KEEN MD ([id number]) on 9/5/2023 11:08:02 AM    Referred By:             Confirmed By:Colton KEEN MD        2D ECHO:   Results for orders placed during the hospital encounter of 07/28/21    Echo    Interpretation Summary  · The left ventricle is normal in size with normal systolic function.  · The estimated ejection fraction is 60%.  · Normal left ventricular diastolic function.  · Normal right ventricular size with normal right ventricular systolic function.  · Normal central venous pressure (3 mmHg).         ASSESSMENT/PLAN:       Pre-op Assessment    I have reviewed the Patient Summary Reports.     I have reviewed the Nursing Notes. I have reviewed the NPO Status.   I have reviewed the Medications.     Review of Systems  Anesthesia Hx:  No problems with previous Anesthesia   History of prior surgery of interest to airway management or planning:            Denies Personal Hx of Anesthesia complications.                    Social:  Non-Smoker, No Alcohol Use       Hematology/Oncology:    Oncology Normal    -- Anemia:                                  EENT/Dental:  EENT/Dental Normal           Cardiovascular:     Hypertension       Denies Angina.       Denies CLARK.                            Pulmonary:       Denies Shortness of breath.                  Renal/:  Renal/ Normal                 Hepatic/GI:  Bowel Prep.   GERD   RUQ pain; hematochezia          Musculoskeletal:  Musculoskeletal Normal                Neurological:           IIH                            Endocrine:  Endocrine Normal            Dermatological:  Skin Normal    Psych:  Psychiatric Normal                    Physical Exam  General: Well nourished, Cooperative, Alert and Oriented    Airway:  Mallampati: II   Mouth Opening: Normal  TM Distance: Normal  Tongue: Normal  Neck ROM: Normal ROM    Dental:  Intact        Anesthesia Plan  Type of Anesthesia, risks &  benefits discussed:    Anesthesia Type: Gen ETT  Intra-op Monitoring Plan: Standard ASA Monitors  Post Op Pain Control Plan: multimodal analgesia  Induction:  IV  Airway Plan: Direct, Post-Induction  Informed Consent: Informed consent signed with the Patient and all parties understand the risks and agree with anesthesia plan.  All questions answered.   ASA Score: 2  Day of Surgery Review of History & Physical: H&P Update referred to the surgeon/provider.    Ready For Surgery From Anesthesia Perspective.     .

## 2024-04-11 ENCOUNTER — ANESTHESIA (OUTPATIENT)
Dept: SURGERY | Facility: HOSPITAL | Age: 34
End: 2024-04-11
Payer: COMMERCIAL

## 2024-04-11 ENCOUNTER — HOSPITAL ENCOUNTER (OUTPATIENT)
Facility: HOSPITAL | Age: 34
Discharge: HOME | End: 2024-04-11
Attending: SURGERY | Admitting: SURGERY
Payer: COMMERCIAL

## 2024-04-11 ENCOUNTER — PATIENT MESSAGE (OUTPATIENT)
Dept: SURGERY | Facility: HOSPITAL | Age: 34
End: 2024-04-11
Payer: COMMERCIAL

## 2024-04-11 VITALS
BODY MASS INDEX: 29.14 KG/M2 | DIASTOLIC BLOOD PRESSURE: 50 MMHG | WEIGHT: 164.44 LBS | OXYGEN SATURATION: 97 % | HEART RATE: 87 BPM | RESPIRATION RATE: 18 BRPM | SYSTOLIC BLOOD PRESSURE: 92 MMHG | TEMPERATURE: 98 F | HEIGHT: 63 IN

## 2024-04-11 DIAGNOSIS — K80.20 SYMPTOMATIC CHOLELITHIASIS: Primary | ICD-10-CM

## 2024-04-11 DIAGNOSIS — Z90.49 S/P LAPAROSCOPIC CHOLECYSTECTOMY: ICD-10-CM

## 2024-04-11 LAB
B-HCG UR QL: NEGATIVE
CTP QC/QA: YES

## 2024-04-11 PROCEDURE — 63600175 PHARM REV CODE 636 W HCPCS: Performed by: STUDENT IN AN ORGANIZED HEALTH CARE EDUCATION/TRAINING PROGRAM

## 2024-04-11 PROCEDURE — 27201423 OPTIME MED/SURG SUP & DEVICES STERILE SUPPLY: Performed by: SURGERY

## 2024-04-11 PROCEDURE — 63600175 PHARM REV CODE 636 W HCPCS: Performed by: ANESTHESIOLOGY

## 2024-04-11 PROCEDURE — 37000008 HC ANESTHESIA 1ST 15 MINUTES: Performed by: SURGERY

## 2024-04-11 PROCEDURE — 81025 URINE PREGNANCY TEST: CPT | Performed by: SURGERY

## 2024-04-11 PROCEDURE — 63600175 PHARM REV CODE 636 W HCPCS: Mod: JZ,JG | Performed by: SURGERY

## 2024-04-11 PROCEDURE — 71000015 HC POSTOP RECOV 1ST HR: Performed by: SURGERY

## 2024-04-11 PROCEDURE — 88304 TISSUE EXAM BY PATHOLOGIST: CPT | Mod: 26,,, | Performed by: PATHOLOGY

## 2024-04-11 PROCEDURE — 88304 TISSUE EXAM BY PATHOLOGIST: CPT | Performed by: PATHOLOGY

## 2024-04-11 PROCEDURE — 71000016 HC POSTOP RECOV ADDL HR: Performed by: SURGERY

## 2024-04-11 PROCEDURE — 25000003 PHARM REV CODE 250: Performed by: STUDENT IN AN ORGANIZED HEALTH CARE EDUCATION/TRAINING PROGRAM

## 2024-04-11 PROCEDURE — 37000009 HC ANESTHESIA EA ADD 15 MINS: Performed by: SURGERY

## 2024-04-11 PROCEDURE — 71000044 HC DOSC ROUTINE RECOVERY FIRST HOUR: Performed by: SURGERY

## 2024-04-11 PROCEDURE — 36000708 HC OR TIME LEV III 1ST 15 MIN: Performed by: SURGERY

## 2024-04-11 PROCEDURE — 47562 LAPAROSCOPIC CHOLECYSTECTOMY: CPT | Mod: ,,, | Performed by: SURGERY

## 2024-04-11 PROCEDURE — 25000003 PHARM REV CODE 250: Performed by: SURGERY

## 2024-04-11 PROCEDURE — 36000709 HC OR TIME LEV III EA ADD 15 MIN: Performed by: SURGERY

## 2024-04-11 PROCEDURE — D9220A PRA ANESTHESIA: Mod: ,,, | Performed by: ANESTHESIOLOGY

## 2024-04-11 RX ORDER — PROPOFOL 10 MG/ML
VIAL (ML) INTRAVENOUS
Status: DISCONTINUED | OUTPATIENT
Start: 2024-04-11 | End: 2024-04-11

## 2024-04-11 RX ORDER — FENTANYL CITRATE 50 UG/ML
INJECTION, SOLUTION INTRAMUSCULAR; INTRAVENOUS
Status: DISCONTINUED | OUTPATIENT
Start: 2024-04-11 | End: 2024-04-11

## 2024-04-11 RX ORDER — SODIUM CHLORIDE 0.9 % (FLUSH) 0.9 %
10 SYRINGE (ML) INJECTION
Status: DISCONTINUED | OUTPATIENT
Start: 2024-04-11 | End: 2024-04-11 | Stop reason: HOSPADM

## 2024-04-11 RX ORDER — CEFAZOLIN 2 G/1
INJECTION, POWDER, FOR SOLUTION INTRAMUSCULAR; INTRAVENOUS
Status: DISCONTINUED | OUTPATIENT
Start: 2024-04-11 | End: 2024-04-11

## 2024-04-11 RX ORDER — ONDANSETRON HYDROCHLORIDE 2 MG/ML
INJECTION, SOLUTION INTRAVENOUS
Status: DISCONTINUED | OUTPATIENT
Start: 2024-04-11 | End: 2024-04-11

## 2024-04-11 RX ORDER — HALOPERIDOL 5 MG/ML
INJECTION INTRAMUSCULAR
Status: DISCONTINUED | OUTPATIENT
Start: 2024-04-11 | End: 2024-04-11

## 2024-04-11 RX ORDER — LIDOCAINE HYDROCHLORIDE 20 MG/ML
INJECTION, SOLUTION EPIDURAL; INFILTRATION; INTRACAUDAL; PERINEURAL
Status: DISCONTINUED | OUTPATIENT
Start: 2024-04-11 | End: 2024-04-11

## 2024-04-11 RX ORDER — LIDOCAINE HYDROCHLORIDE AND EPINEPHRINE 10; 10 MG/ML; UG/ML
INJECTION, SOLUTION INFILTRATION; PERINEURAL
Status: DISCONTINUED | OUTPATIENT
Start: 2024-04-11 | End: 2024-04-11 | Stop reason: HOSPADM

## 2024-04-11 RX ORDER — DEXMEDETOMIDINE HYDROCHLORIDE 100 UG/ML
INJECTION, SOLUTION INTRAVENOUS
Status: DISCONTINUED | OUTPATIENT
Start: 2024-04-11 | End: 2024-04-11

## 2024-04-11 RX ORDER — HYDROCODONE BITARTRATE AND ACETAMINOPHEN 5; 325 MG/1; MG/1
1 TABLET ORAL EVERY 6 HOURS PRN
Qty: 12 TABLET | Refills: 0 | Status: SHIPPED | OUTPATIENT
Start: 2024-04-11 | End: 2024-04-18

## 2024-04-11 RX ORDER — BUPIVACAINE HYDROCHLORIDE 2.5 MG/ML
INJECTION, SOLUTION EPIDURAL; INFILTRATION; INTRACAUDAL
Status: DISCONTINUED | OUTPATIENT
Start: 2024-04-11 | End: 2024-04-11 | Stop reason: HOSPADM

## 2024-04-11 RX ORDER — ACETAMINOPHEN 10 MG/ML
1000 INJECTION, SOLUTION INTRAVENOUS ONCE
Status: COMPLETED | OUTPATIENT
Start: 2024-04-11 | End: 2024-04-11

## 2024-04-11 RX ORDER — ACETAMINOPHEN 500 MG
1000 TABLET ORAL
Status: DISCONTINUED | OUTPATIENT
Start: 2024-04-11 | End: 2024-04-11

## 2024-04-11 RX ORDER — OXYCODONE HYDROCHLORIDE 5 MG/1
5 TABLET ORAL EVERY 4 HOURS PRN
Qty: 15 TABLET | Refills: 0 | Status: SHIPPED | OUTPATIENT
Start: 2024-04-11

## 2024-04-11 RX ORDER — ROCURONIUM BROMIDE 10 MG/ML
INJECTION, SOLUTION INTRAVENOUS
Status: DISCONTINUED | OUTPATIENT
Start: 2024-04-11 | End: 2024-04-11

## 2024-04-11 RX ORDER — HALOPERIDOL 5 MG/ML
0.5 INJECTION INTRAMUSCULAR EVERY 10 MIN PRN
Status: DISCONTINUED | OUTPATIENT
Start: 2024-04-11 | End: 2024-04-11 | Stop reason: HOSPADM

## 2024-04-11 RX ORDER — MIDAZOLAM HYDROCHLORIDE 1 MG/ML
INJECTION INTRAMUSCULAR; INTRAVENOUS
Status: DISCONTINUED | OUTPATIENT
Start: 2024-04-11 | End: 2024-04-11

## 2024-04-11 RX ORDER — OXYCODONE HYDROCHLORIDE 5 MG/1
5 TABLET ORAL EVERY 4 HOURS PRN
Status: DISCONTINUED | OUTPATIENT
Start: 2024-04-11 | End: 2024-04-11 | Stop reason: HOSPADM

## 2024-04-11 RX ORDER — HYDROMORPHONE HYDROCHLORIDE 1 MG/ML
0.2 INJECTION, SOLUTION INTRAMUSCULAR; INTRAVENOUS; SUBCUTANEOUS EVERY 5 MIN PRN
Status: DISCONTINUED | OUTPATIENT
Start: 2024-04-11 | End: 2024-04-11 | Stop reason: HOSPADM

## 2024-04-11 RX ADMIN — ACETAMINOPHEN 1000 MG: 10 INJECTION, SOLUTION INTRAVENOUS at 09:04

## 2024-04-11 RX ADMIN — PROPOFOL 160 MG: 10 INJECTION, EMULSION INTRAVENOUS at 07:04

## 2024-04-11 RX ADMIN — PROPOFOL 10 MG: 10 INJECTION, EMULSION INTRAVENOUS at 08:04

## 2024-04-11 RX ADMIN — HYDROMORPHONE HYDROCHLORIDE 0.2 MG: 1 INJECTION, SOLUTION INTRAMUSCULAR; INTRAVENOUS; SUBCUTANEOUS at 09:04

## 2024-04-11 RX ADMIN — HALOPERIDOL LACTATE 0.5 MG: 5 INJECTION, SOLUTION INTRAMUSCULAR at 08:04

## 2024-04-11 RX ADMIN — DEXMEDETOMIDINE 16 MCG: 200 INJECTION, SOLUTION INTRAVENOUS at 07:04

## 2024-04-11 RX ADMIN — SODIUM CHLORIDE: 0.9 INJECTION, SOLUTION INTRAVENOUS at 07:04

## 2024-04-11 RX ADMIN — ONDANSETRON 4 MG: 2 INJECTION INTRAMUSCULAR; INTRAVENOUS at 08:04

## 2024-04-11 RX ADMIN — MIDAZOLAM 2 MG: 1 INJECTION INTRAMUSCULAR; INTRAVENOUS at 06:04

## 2024-04-11 RX ADMIN — SUGAMMADEX 200 MG: 100 INJECTION, SOLUTION INTRAVENOUS at 08:04

## 2024-04-11 RX ADMIN — CEFAZOLIN 2 G: 330 INJECTION, POWDER, FOR SOLUTION INTRAMUSCULAR; INTRAVENOUS at 07:04

## 2024-04-11 RX ADMIN — ROCURONIUM BROMIDE 50 MG: 10 INJECTION, SOLUTION INTRAVENOUS at 07:04

## 2024-04-11 RX ADMIN — FENTANYL CITRATE 100 MCG: 50 INJECTION INTRAMUSCULAR; INTRAVENOUS at 07:04

## 2024-04-11 RX ADMIN — LIDOCAINE HYDROCHLORIDE 100 MG: 20 INJECTION, SOLUTION EPIDURAL; INFILTRATION; INTRACAUDAL at 07:04

## 2024-04-11 RX ADMIN — HYDROMORPHONE HYDROCHLORIDE 0.2 MG: 1 INJECTION, SOLUTION INTRAMUSCULAR; INTRAVENOUS; SUBCUTANEOUS at 08:04

## 2024-04-11 RX ADMIN — PROPOFOL 20 MG: 10 INJECTION, EMULSION INTRAVENOUS at 07:04

## 2024-04-11 NOTE — DISCHARGE SUMMARY
[facility] - [facility] (2nd Fl)  Discharge Note  Short Stay    Procedure(s) (LRB):  CHOLECYSTECTOMY, LAPAROSCOPIC (N/A)      OUTCOME: Patient tolerated treatment/procedure well without complication and is now ready for discharge.    DISPOSITION: Home or Self Care    FINAL DIAGNOSIS:  Symptomatic cholelithiasis    FOLLOWUP: In clinic    DISCHARGE INSTRUCTIONS:    Discharge Procedure Orders   Diet general     Call MD for:  temperature >100.4     Call MD for:  persistent nausea and vomiting     Call MD for:  severe uncontrolled pain     Call MD for:  redness, tenderness, or signs of infection (pain, swelling, redness, odor or green/yellow discharge around incision site)     No dressing needed   Order Comments: No heavy lifting for 4-6 weeks, activity as tolerated. Patient can shower, allow water to run over incisions. No soaking in bath or pools for 2 weeks. Do not pick at steri-strips, it will come off on its own.     Activity as tolerated   Order Comments: No heavy lifting for 4-6 weeks, activity as tolerated. Patient can shower, allow water to run over incisions. No soaking in bath or pools for 2 weeks. Do not pick at surgical glue, it will come off on its own.        TIME SPENT ON DISCHARGE: 15 minutes

## 2024-04-11 NOTE — BRIEF OP NOTE
[facility] - [facility] ([facility])  Brief Operative Note    SUMMARY     Surgery Date: 4/11/2024     Surgeon(s) and Role:     * Koki Falk Jr., MD - Primary     * Hiral [place] MD - Assisting     * Kevin Rios MD - Resident - Assisting        Pre-op Diagnosis:  Symptomatic cholelithiasis [K80.20]    Post-op Diagnosis:  Post-Op Diagnosis Codes:     * Symptomatic cholelithiasis [K80.20]    Procedure(s) (LRB):  CHOLECYSTECTOMY, LAPAROSCOPIC (N/A)    Anesthesia: General    Operative Findings: Cystic duct and cystic artery separately identified and clipped     Estimated Blood Loss: < 10 ml     Estimated Blood Loss has been documented.         Specimens:   Specimen (24h ago, onward)       Start     Ordered    04/11/24 0811  Specimen to Pathology, Surgery General Surgery  Once        Comments: Pre-op Diagnosis: Symptomatic cholelithiasis [K80.20]Procedure(s):CHOLECYSTECTOMY, LAPAROSCOPIC Number of specimens: 1Name of specimens: 1. Gallbladder-permanent     References:    Click here for ordering Quick Tip   Question Answer Comment   Procedure Type: General Surgery    Specimen Class: Routine/Screening    Which provider would you like to cc? KOKI FALK JR    Release to patient Immediate        04/11/24 0810                    [id number]

## 2024-04-11 NOTE — H&P
General Surgery  History & Physical    SUBJECTIVE:     History of Present Illness:  Patient is a 33 y.o. female presented with gallstones.  Patient with RUQ pain for several years.  Recently had been radiating to the back.  No nausea/vomiting.  No fevers/chills.  No diarrhea/bloating.  US with sludge and stone.  Interested in thierno.    Interval Hx 4/11/2024: No changes in prior H and P, patient denies any surgical or medical changes.    Review of patient's allergies indicates:   Allergen Reactions    Doxycycline Other (See Comments)     Triggers headaches    Bactrim ds [sulfamethoxazole-trimethoprim] Nausea And Vomiting    Nsaids (non-steroidal anti-inflammatory drug) Other (See Comments)     D/t h/o PUD and H pylori    Codeine Rash       Current Facility-Administered Medications   Medication Dose Route Frequency Provider Last Rate Last Admin    acetaminophen tablet 1,000 mg  1,000 mg Oral Once Pre-Op Dieudonne Skinner MD        ceFAZolin 2 g in dextrose 5 % in water (D5W) 50 mL IVPB (MB+)  2 g Intravenous On Call Procedure Tom Campos Jr., MD           Past Medical History:   Diagnosis Date    Bilateral knee pain 06/19/2018    Endometriosis     treated by GYN at     Epigastric abdominal pain 12/05/2016    Gastroesophageal reflux disease 07/06/2016    Helicobacter pylori ab+     Helicobacter pylori ab+     High-tone pelvic floor dysfunction 05/02/2016    IIH (idiopathic intracranial hypertension)     Dr. Rogers - [facility]    Palpitations 07/06/2016    Pre-eclampsia in postpartum period 08/16/2022     Past Surgical History:   Procedure Laterality Date    ARTHROSCOPY OF KNEE Left 04/06/2021    Procedure: ARTHROSCOPY, KNEE;  Surgeon: Deidra Mejias MD;  Location: [facility] OR;  Service: Orthopedics;  Laterality: Left;    COLONOSCOPY N/A 12/13/2018    Procedure: COLONOSCOPY;  Surgeon: Micki Gross MD;  Location: [facility] ENDO ([address] [place] [address]);  Service: Endoscopy;  Laterality: N/A;  preferrably in Dec 2018, CRS ok if  needed.    COLONOSCOPY N/A 11/05/2022    Procedure: COLONOSCOPY;  Surgeon: Greg Leach MD;  Location: [facility] (4TH FLR);  Service: Endoscopy;  Laterality: N/A;  Endoscopy schedulers please schedule patient for colonoscopy with me for further evaluation of painless hematochezia thank you    ESOPHAGOGASTRODUODENOSCOPY N/A 10/30/2018    Procedure: EGD (ESOPHAGOGASTRODUODENOSCOPY);  Surgeon: Greg Leach MD;  Location: [facility] ENDO (2ND FLR);  Service: Endoscopy;  Laterality: N/A;  ok to schedule per Kimmy    ESOPHAGOGASTRODUODENOSCOPY N/A 11/05/2022    Procedure: EGD (ESOPHAGOGASTRODUODENOSCOPY);  Surgeon: Greg Leach MD;  Location: [facility] ENDO (4TH FLR);  Service: Endoscopy;  Laterality: N/A;  Endoscopy schedulers please schedule patient for EGD for evaluation of right upper quadrant pain thank you  prep instr portal--ml  11/2/22 room closed-pt okay with Dr. Leach-50 min case okay per Katlyn-ml    KNEE ARTHROSCOPY W/ DEBRIDEMENT  04/06/2021    Procedure: ARTHROSCOPY, KNEE, WITH DEBRIDEMENT;  Surgeon: Deidra Mejias MD;  Location: [facility] OR;  Service: Orthopedics;;    KNEE ARTHROSCOPY W/ MENISCECTOMY Right 04/06/2021    Procedure: ARTHROSCOPY, KNEE, WITH MENISCECTOMY;  Surgeon: Deidra Mejias MD;  Location: [facility] OR;  Service: Orthopedics;  Laterality: Right;    KNEE ARTHROSCOPY W/ PLICA EXCISION Left 04/06/2021    Procedure: EXCISION, PLICA, KNEE, ARTHROSCOPIC;  Surgeon: Deidra Mejias MD;  Location: [facility] OR;  Service: Orthopedics;  Laterality: Left;    LAPAROSCOPY  2017    dx with endo.  no removal    NO PAST SURGERIES      SYNOVECTOMY OF KNEE Left 04/06/2021    Procedure: SYNOVECTOMY, KNEE;  Surgeon: Deidra Mejias MD;  Location: [facility] OR;  Service: Orthopedics;  Laterality: Left;    UPPER GASTROINTESTINAL ENDOSCOPY       Family History   Problem Relation Age of Onset    Diabetes Mother     No Known Problems Father     No Known Problems Sister     Hypertension Brother     Hypertension Maternal Grandmother     Hypertension  "Maternal Grandfather     No Known Problems Paternal Grandmother     No Known Problems Paternal Grandfather     Colon cancer Neg Hx     Crohn's disease Neg Hx     Esophageal cancer Neg Hx     Inflammatory bowel disease Neg Hx     Irritable bowel syndrome Neg Hx     Rectal cancer Neg Hx     Stomach cancer Neg Hx     Ulcerative colitis Neg Hx     Celiac disease Neg Hx     Cirrhosis Neg Hx     Colon polyps Neg Hx     Liver cancer Neg Hx     Liver disease Neg Hx      Social History     Tobacco Use    Smoking status: Never     Passive exposure: Never    Smokeless tobacco: Never   Substance Use Topics    Alcohol use: No    Drug use: No        Review of Systems:  Review of Systems   Constitutional:  Negative for activity change, appetite change, chills, diaphoresis, fatigue and fever.   HENT:  Negative for congestion, sinus pressure, sneezing and sore throat.    Eyes:  Negative for pain, discharge, redness, itching and visual disturbance.   Respiratory:  Negative for apnea, cough, choking, chest tightness and shortness of breath.    Cardiovascular:  Negative for chest pain, palpitations and leg swelling.   Gastrointestinal:  Positive for abdominal pain. Negative for abdominal distention, constipation, diarrhea, nausea and vomiting.   Musculoskeletal:  Negative for arthralgias, back pain and gait problem.   Skin:  Negative for color change, pallor and rash.   Neurological:  Negative for dizziness, facial asymmetry, light-headedness and headaches.   Psychiatric/Behavioral:  Negative for agitation, behavioral problems and confusion.        OBJECTIVE:     Vital Signs (Most Recent)  Temp: 98.1 °F (36.7 °C) (04/11/24 0607)  Pulse: 101 (04/11/24 0607)  Resp: 20 (04/11/24 0607)  BP: 127/85 (04/11/24 0610)  SpO2: 100 % (04/11/24 0607)  5' 3" (1.6 m)  74.6 kg (164 lb 7.4 oz)     Physical Exam:  Physical Exam  Constitutional:       Appearance: Normal appearance.   HENT:      Head: Normocephalic and atraumatic.      Nose: Nose normal. "      Mouth/Throat:      Mouth: Mucous membranes are moist.      Pharynx: Oropharynx is clear.   Eyes:      Extraocular Movements: Extraocular movements intact.      Conjunctiva/sclera: Conjunctivae normal.   Cardiovascular:      Rate and Rhythm: Normal rate and regular rhythm.   Pulmonary:      Effort: Pulmonary effort is normal.      Breath sounds: Normal breath sounds.   Abdominal:      General: Abdomen is flat.      Palpations: Abdomen is soft.      Tenderness: There is abdominal tenderness (mild RUQ).   Skin:     General: Skin is warm and dry.      Capillary Refill: Capillary refill takes less than 2 seconds.   Neurological:      General: No focal deficit present.      Mental Status: She is alert.       ASSESSMENT/PLAN:     Symptomatic Gallstones    PLAN:    Lap thierno  Consent in chart     Kevin Rios MD  General Surgery PGY-1

## 2024-04-11 NOTE — TRANSFER OF CARE
"Anesthesia Transfer of Care Note    Patient: Laila Pappas    Procedure(s) Performed: Procedure(s) (LRB):  CHOLECYSTECTOMY, LAPAROSCOPIC (N/A)    Patient location: [facility]    Anesthesia Type: general    Transport from OR: Transported from OR on 6-10 L/min O2 by face mask with adequate spontaneous ventilation    Post pain: adequate analgesia    Post assessment: no apparent anesthetic complications    Post vital signs: stable    Level of consciousness: sedated    Nausea/Vomiting: no nausea/vomiting    Complications: none    Transfer of care protocol was followed      Last vitals: Visit Vitals  /61 (BP Location: Right arm, Patient Position: Lying)   Pulse 91   Temp 36.5 °C (97.7 °F) (Temporal)   Resp 20   Ht 5' 3" (1.6 m)   Wt 74.6 kg (164 lb 7.4 oz)   SpO2 99%   Breastfeeding Yes   BMI 29.13 kg/m²     "

## 2024-04-11 NOTE — ANESTHESIA POSTPROCEDURE EVALUATION
Anesthesia Post Evaluation    Patient: Laila Pappas    Procedure(s) Performed: Procedure(s) (LRB):  CHOLECYSTECTOMY, LAPAROSCOPIC (N/A)    Final Anesthesia Type: general      Patient location during evaluation: PACU  Patient participation: Yes- Able to Participate  Level of consciousness: awake and alert  Post-procedure vital signs: reviewed and stable  Pain management: adequate  Airway patency: patent    PONV status at discharge: No PONV  Anesthetic complications: no      Cardiovascular status: blood pressure returned to baseline  Respiratory status: spontaneous ventilation and room air  Hydration status: euvolemic  Follow-up not needed.              Vitals Value Taken Time   BP 92/50 04/11/24 1031   Temp 36.5 °C (97.7 °F) 04/11/24 1000   Pulse 90 04/11/24 1036   Resp 18 04/11/24 1015   SpO2 96 % 04/11/24 1036   Vitals shown include unvalidated device data.      No case tracking events are documented in the log.      Pain/Stefan Score: Pain Rating Prior to Med Admin: 8 (4/11/2024  9:22 AM)  Stefan Score: 9 (4/11/2024  9:00 AM)

## 2024-04-11 NOTE — ANESTHESIA PROCEDURE NOTES
Intubation    Date/Time: 4/11/2024 7:22 AM    Performed by: Dieudonne Skinner MD  Authorized by: Jamey Moreno Jr., MD    Intubation:     Induction:  Intravenous    Intubated:  Postinduction    Mask Ventilation:  Easy with oral airway    Attempts:  1    Attempted By:  Resident anesthesiologist    Method of Intubation:  Direct    Blade:  Fagan 3    Laryngeal View Grade: Grade I - full view of cords      Difficult Airway Encountered?: No      Complications:  None    Airway Device:  Oral endotracheal tube    Airway Device Size:  7.0    Style/Cuff Inflation:  Cuffed (inflated to minimal occlusive pressure)    Tube secured:  21    Secured at:  The lips    Placement Verified By:  Capnometry    Findings Post-Intubation:  BS equal bilateral and atraumatic/condition of teeth unchanged

## 2024-04-11 NOTE — OP NOTE
DATE OF PROCEDURE: 04/11/2024  SERVICE: General Surgery.   Surgeon(s) and Role:     * Tom Campos Jr., MD - Primary     * Hiral Molina MD - Assisting     * Kevin Rios MD - Resident - Assisting  PREOPERATIVE DIAGNOSIS: Symptomatic Cholelithiasis  POSTOPERATIVE DIAGNOSIS: Same.   PROCEDURE: Lap thierno.   ANESTHESIA: General endotracheal and local.   DESCRIPTION OF PROCEDURE: The patient was taken to the Operating Room, placed   under general anesthesia, prepped and draped in sterile fashion. At this time,   incision was made supraumbilically after infiltrating with local anesthetic.   This was carried down with electrocautery and blunt dissection to the linea   alba. Each side of the linea alba was grasped with Kocher clamp and elevated.   This was opened sharply in the midline and intraabdominal access was obtained.   At this time, an 0 Vicryl suture was placed in the fascia of this incision;   however, it was not tied down, and a 12-mm trocar was placed into the abdominal   cavity. At this time, pneumoperitoneum was obtained. Further ports were then   placed including an epigastric port, a midclavicular subcostal port on the right   and an anterior axillary subcostal port on the right as well. The patient was   then placed in reverse Trendelenburg and tilted to the left. The gallbladder   was grasped and elevated cephalad and laterally. Similarly, the base was   grasped and pulled inferiorly and laterally. At this time, using blunt   instrumentation, the peritoneum around the gallbladder was opened, exposing the   base of the gallbladder. We continued to take down attachments exposing the   cystic artery and cystic duct until the critical view was obtained showing the   artery entering the gallbladder, the duct entering the gallbladder, the base of   the gallbladder with liver behind it. Once these structures were skeletonized   and a critical view was obtained, each were clipped with two clips  proximally   and one distally and these structures were transected. We then took the   gallbladder off the liver bed using electrocautery.  It was then placed in an   EndoCatch bag and passed off the table. The gallbladder was then inspected.   There were no signs of any abnormalities. Any bleeding was stopped with   electrocautery. The area was irrigated copiously. The clips were inspected and   in good position on the cystic artery and duct. The fluid was suctioned.   There was no further bleeding from the liver bed. The ports were then removed   under direct visualization. The suture had been placed in the fascia. The   incision supraumbilically was tied down closing the fascia of this incision and   the skin of all four ports was closed with 4-0 Monocryl suture in subcuticular   fashion. Mastisol and Steri-Strips were placed. The patient was allowed to   awake from general anesthesia and transferred to bed for transfer to Recovery.   COMPLICATIONS: None.   SPONGE COUNT: Correct.   BLOOD LOSS: 15 mL   FLUIDS: Per Anesthesia.   BLOOD GIVEN: None.   DRAINS: None.   SPECIMENS: Gallbladder.   CONDITION OF PATIENT: Good.   I was present for the entire procedure.

## 2024-04-12 LAB
FINAL PATHOLOGIC DIAGNOSIS: NORMAL
GROSS: NORMAL
Lab: NORMAL

## 2024-04-19 ENCOUNTER — HOSPITAL ENCOUNTER (OUTPATIENT)
Dept: RADIOLOGY | Facility: HOSPITAL | Age: 34
Discharge: HOME | End: 2024-04-19
Attending: PHYSICIAN ASSISTANT
Payer: COMMERCIAL

## 2024-04-19 ENCOUNTER — OFFICE VISIT (OUTPATIENT)
Dept: SPORTS MEDICINE | Facility: CLINIC | Age: 34
End: 2024-04-19
Payer: COMMERCIAL

## 2024-04-19 VITALS
WEIGHT: 158 LBS | SYSTOLIC BLOOD PRESSURE: 105 MMHG | BODY MASS INDEX: 28 KG/M2 | DIASTOLIC BLOOD PRESSURE: 68 MMHG | HEIGHT: 63 IN | HEART RATE: 98 BPM

## 2024-04-19 DIAGNOSIS — M70.62 TROCHANTERIC BURSITIS OF LEFT HIP: ICD-10-CM

## 2024-04-19 DIAGNOSIS — M76.02 GLUTEAL TENDINITIS OF LEFT BUTTOCK: ICD-10-CM

## 2024-04-19 DIAGNOSIS — M25.552 LEFT HIP PAIN: ICD-10-CM

## 2024-04-19 DIAGNOSIS — M25.552 CHRONIC LEFT HIP PAIN: Primary | ICD-10-CM

## 2024-04-19 DIAGNOSIS — G89.29 CHRONIC LEFT HIP PAIN: Primary | ICD-10-CM

## 2024-04-19 PROCEDURE — 73503 X-RAY EXAM HIP UNI 4/> VIEWS: CPT | Mod: TC,LT

## 2024-04-19 PROCEDURE — 99214 OFFICE O/P EST MOD 30 MIN: CPT | Mod: 25,S$GLB,, | Performed by: PHYSICIAN ASSISTANT

## 2024-04-19 PROCEDURE — 73503 X-RAY EXAM HIP UNI 4/> VIEWS: CPT | Mod: 26,LT,, | Performed by: RADIOLOGY

## 2024-04-19 PROCEDURE — 20610 DRAIN/INJ JOINT/BURSA W/O US: CPT | Mod: LT,S$GLB,, | Performed by: PHYSICIAN ASSISTANT

## 2024-04-19 PROCEDURE — 99999 PR PBB SHADOW E&M-EST. PATIENT-LVL IV: CPT | Mod: PBBFAC,,, | Performed by: PHYSICIAN ASSISTANT

## 2024-04-19 RX ORDER — TRIAMCINOLONE ACETONIDE 40 MG/ML
80 INJECTION, SUSPENSION INTRA-ARTICULAR; INTRAMUSCULAR
Status: COMPLETED | OUTPATIENT
Start: 2024-04-19 | End: 2024-04-19

## 2024-04-19 RX ADMIN — TRIAMCINOLONE ACETONIDE 80 MG: 40 INJECTION, SUSPENSION INTRA-ARTICULAR; INTRAMUSCULAR at 12:04

## 2024-04-22 ENCOUNTER — PATIENT MESSAGE (OUTPATIENT)
Dept: SPORTS MEDICINE | Facility: CLINIC | Age: 34
End: 2024-04-22
Payer: COMMERCIAL

## 2024-04-24 NOTE — PROGRESS NOTES
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 No trauma, no mech sxs/instabilty.    She reports lateral hip pain that began 1+ years ago while pregnant. Pain is worse with prolonged activity. Pain worsened over the last 1-2 weeks ago. She tried dry needling and cupping with Josh Ndiaye at [facility] which made her pain worse. She has tried over the counter tylenol and ibuprofen with little pain relief.     Pain bothers her at night.     Today the patient rates pain at a 4/10 on visual analog scale.      Affecting ADLs and exercising      Review of Systems   Constitution: Negative. Negative for chills, fever and night sweats.   HENT: Negative for congestion and headaches.    Eyes: Negative for blurred vision, left vision loss and right vision loss.   Cardiovascular: Negative for chest pain and syncope.   Respiratory: Negative for cough and shortness of breath.    Endocrine: Negative for polydipsia, polyphagia and polyuria.   Hematologic/Lymphatic: Negative for bleeding problem. Does not bruise/bleed easily.   Skin: Negative for dry skin, itching and rash.   Musculoskeletal: Negative for falls and muscle weakness.   Gastrointestinal: Negative for abdominal pain and bowel incontinence.   Genitourinary: Negative for bladder incontinence and nocturia.   Neurological: Negative for disturbances in coordination, loss of balance and seizures.   Psychiatric/Behavioral: Negative for depression. The patient does not have insomnia.    Allergic/Immunologic: Negative for hives and persistent infections.   All other systems negative.    PAST MEDICAL HISTORY:   Past Medical History:   Diagnosis Date    Bilateral knee pain 06/19/2018    Endometriosis     treated by GYN at     Epigastric abdominal pain 12/05/2016    Gastroesophageal reflux disease 07/06/2016    Helicobacter pylori ab+     Helicobacter pylori ab+     High-tone pelvic floor  dysfunction 05/02/2016    IIH (idiopathic intracranial hypertension)     Dr. Rogers - [facility]    Palpitations 07/06/2016    Pre-eclampsia in postpartum period 08/16/2022     PAST SURGICAL HISTORY:   Past Surgical History:   Procedure Laterality Date    ARTHROSCOPY OF KNEE Left 04/06/2021    Procedure: ARTHROSCOPY, KNEE;  Surgeon: Deidra Mejias MD;  Location: [facility] OR;  Service: Orthopedics;  Laterality: Left;    COLONOSCOPY N/A 12/13/2018    Procedure: COLONOSCOPY;  Surgeon: Micki Gross MD;  Location: [facility] (4TH FLR);  Service: Endoscopy;  Laterality: N/A;  preferrably in Dec 2018, CRS ok if needed.    COLONOSCOPY N/A 11/05/2022    Procedure: COLONOSCOPY;  Surgeon: Greg Leach MD;  Location: [facility] (4TH FLR);  Service: Endoscopy;  Laterality: N/A;  Endoscopy schedulers please schedule patient for colonoscopy with me for further evaluation of painless hematochezia thank you    ESOPHAGOGASTRODUODENOSCOPY N/A 10/30/2018    Procedure: EGD (ESOPHAGOGASTRODUODENOSCOPY);  Surgeon: Greg Leach MD;  Location: [facility] (2ND FLR);  Service: Endoscopy;  Laterality: N/A;  ok to schedule per Kimmy    ESOPHAGOGASTRODUODENOSCOPY N/A 11/05/2022    Procedure: EGD (ESOPHAGOGASTRODUODENOSCOPY);  Surgeon: Greg Leach MD;  Location: [facility] (4TH FLR);  Service: Endoscopy;  Laterality: N/A;  Endoscopy schedulers please schedule patient for EGD for evaluation of right upper quadrant pain thank you  prep instr portal--ml  11/2/22 room closed-pt okay with Dr. Leach-50 min case okay per Katlyn-ml    KNEE ARTHROSCOPY W/ DEBRIDEMENT  04/06/2021    Procedure: ARTHROSCOPY, KNEE, WITH DEBRIDEMENT;  Surgeon: Deidra Mejias MD;  Location: [facility] OR;  Service: Orthopedics;;    KNEE ARTHROSCOPY W/ MENISCECTOMY Right 04/06/2021    Procedure: ARTHROSCOPY, KNEE, WITH MENISCECTOMY;  Surgeon: Deidra Mejias MD;  Location: [facility] OR;  Service: Orthopedics;  Laterality: Right;    KNEE ARTHROSCOPY W/ PLICA EXCISION Left 04/06/2021     Procedure: EXCISION, PLICA, KNEE, ARTHROSCOPIC;  Surgeon: Deidra Mejias MD;  Location: [facility] OR;  Service: Orthopedics;  Laterality: Left;    LAPAROSCOPIC CHOLECYSTECTOMY N/A 2024    Procedure: CHOLECYSTECTOMY, LAPAROSCOPIC;  Surgeon: Tom Campos Jr., MD;  Location: [address];  Service: General;  Laterality: N/A;    LAPAROSCOPY      dx with endo.  no removal    NO PAST SURGERIES      SYNOVECTOMY OF KNEE Left 2021    Procedure: SYNOVECTOMY, KNEE;  Surgeon: Deidra Mejias MD;  Location: [facility];  Service: Orthopedics;  Laterality: Left;    UPPER GASTROINTESTINAL ENDOSCOPY       FAMILY HISTORY:   Family History   Problem Relation Name Age of Onset    Diabetes Mother lizette chase     No Known Problems Father      No Known Problems Sister      Hypertension Brother bolivar chase jr.     Hypertension Maternal Grandmother      Hypertension Maternal Grandfather soraida and carol burrell     No Known Problems Paternal Grandmother      No Known Problems Paternal Grandfather      Colon cancer Neg Hx      Crohn's disease Neg Hx      Esophageal cancer Neg Hx      Inflammatory bowel disease Neg Hx      Irritable bowel syndrome Neg Hx      Rectal cancer Neg Hx      Stomach cancer Neg Hx      Ulcerative colitis Neg Hx      Celiac disease Neg Hx      Cirrhosis Neg Hx      Colon polyps Neg Hx      Liver cancer Neg Hx      Liver disease Neg Hx       SOCIAL HISTORY:   Social History     Socioeconomic History    Marital status: Single   Occupational History     Employer: [employer]     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   Tobacco Use    Smoking status: Never     Passive exposure: Never    Smokeless tobacco: Never   Substance and Sexual Activity    Alcohol use: No    Drug use: No    Sexual activity: Yes     Partners: Male     Birth control/protection: None   Social History Narrative    She had her 1st baby in 2022 healthy vaginal delivery no      Social Determinants of Health      Financial Resource Strain: Unknown (10/25/2022)    Overall Financial Resource Strain (CARDIA)     Difficulty of Paying Living Expenses: Patient declined   Food Insecurity: Unknown (10/25/2022)    Hunger Vital Sign     Worried About Running Out of Food in the Last Year: Patient declined     Ran Out of Food in the Last Year: Patient declined   Transportation Needs: Unknown (10/25/2022)    PRAPARE - Transportation     Lack of Transportation (Medical): Patient declined     Lack of Transportation (Non-Medical): Patient declined   Physical Activity: Unknown (12/12/2023)    Exercise Vital Sign     Days of Exercise per Week: 1 day   Stress: No Stress Concern Present (9/29/2020)    [place] [employer] - Occupational Stress Questionnaire     Feeling of Stress : Only a little   Social Connections: Unknown (10/25/2022)    Social Connection and Isolation Panel [NHANES]     Frequency of Communication with Friends and Family: Patient declined     Frequency of Social Gatherings with Friends and Family: Patient declined     Active Member of Clubs or Organizations: Patient declined     Attends Club or Organization Meetings: Patient declined     Marital Status: Patient declined   Housing Stability: Unknown (10/25/2022)    Housing Stability Vital Sign     Unable to Pay for Housing in the Last Year: Patient refused     Unstable Housing in the Last Year: Patient refused       MEDICATIONS:   Current Outpatient Medications:     cholecalciferol, vitamin D3, 1,250 mcg (50,000 unit) capsule, Take 1 capsule (50,000 Units total) by mouth every 7 days., Disp: 12 capsule, Rfl: 1    ferrous sulfate 325 (65 FE) MG EC tablet, Take 1 tablet (325 mg total) by mouth once daily., Disp: 90 tablet, Rfl: 1    ketoconazole (NIZORAL) 2 % cream, Apply topically 2 (two) times daily. Prn rash of face, Disp: 60 g, Rfl: 4    oxyCODONE (ROXICODONE) 5 MG immediate release tablet, Take 1 tablet (5 mg total) by mouth every 4 (four) hours as  "needed for Pain., Disp: 15 tablet, Rfl: 0    Current Facility-Administered Medications:     0.9%  NaCl infusion, , Intravenous, 1 time in Clinic/HOD, Agatha Causey FNP    acetaminophen tablet 650 mg, 650 mg, Oral, 1 time in Clinic/HOD, Agatha Causey FNP  ALLERGIES:   Review of patient's allergies indicates:   Allergen Reactions    Doxycycline Other (See Comments)     Triggers headaches    Bactrim ds [sulfamethoxazole-trimethoprim] Nausea And Vomiting    Nsaids (non-steroidal anti-inflammatory drug) Other (See Comments)     D/t h/o PUD and H pylori    Codeine Rash       VITAL SIGNS: /68   Pulse 98   Ht 5' 3" (1.6 m)   Wt 71.7 kg (158 lb)   LMP 03/30/2024   BMI 27.99 kg/m²        PHYSICAL EXAM /  HIP  PHYSICAL EXAMINATION  General:  The patient is alert and oriented x 3.  Mood is pleasant.  Observation of ears, eyes and nose reveal no gross abnormalities.  HEENT: NCAT, sclera nonicteric  Lungs: Respirations are equal and unlabored..    left HIP EXAMINATION     OBSERVATION / INSPECTION  Gait:   Nonantalgic   Alignment:  Neutral   Scars:   None   Muscle atrophy: None   Effusion:  None   Warmth:  None   Discoloration:   None   Leg lengths:   Equal   Pelvis:   Level     TENDERNESS / CREPITUS (T/C):      T / C  Trochanteric bursa   +/ -  Piriformis    - / -  SI joint    - / -  Psoas tendon   - / -  Rectus insertion  - / -  Adductor insertion  - / -  Pubic symphysis  - / -  IT band                                   - / -  Gluteus tendons                     + / -    ROM: (* = pain)    Flexion:    120 degrees  External rotation: 40 degrees  Internal rotation with axial load: 30 degrees  Internal rotation without axial load: 40 degrees  Abduction:  45 degrees  Adduction:   20 degrees    SPECIAL TESTS:  Pain w/ forced internal rotation (FADIR): -   Pain w/ forced external rotation (CAMPOS): Absent   Circumduction test:    -  Stinchfield test:    Negative   Log roll:      Negative   Snapping " hip (internal):   Negative   Sit-up pain:     Negative   Resisted sit-up pain:    Negative   Resisted sit-up with adductor contraction pain:  Negative   Step-down test:    +  Trendelenburg test:    Negative  Bridge test     +     EXTREMITY NEURO-VASCULAR EXAMINATION:   Sensation:  Grossly intact to light touch all dermatomal regions.   Motor Function:  Fully intact motor function at hip, knee, foot and ankle    DTRs;  quadriceps and  achilles 2+.  No clonus and downgoing Babinski.    Vascular status:  DP and PT pulses 2+, brisk capillary refill, symmetric.    Skin:  intact, compartments soft.    OTHER FINDINGS:      XRAYS:  3 Pelvis views and 2 hip views were independently reviewed and interpreted by myself.  No fracture, subluxation, or other joint abnormality is identified.      ASSESSMENT:    1. left hip pain, chronic  2. Left hip trochanteric bursitis   3. Left hip gluteus tendinitis   hip abd/core weakness    PLAN:  1. Discussed multiple treatment options today including injection. Explained that I would try PT and NSAIDs and see if things improve before injection. Patient would like injection today.   PT for left hip glute tendinitis and greater trochanteric bursitis. PT for hip, core, abductor strengthening with HEP   2. NSAIDS prn  3. Activity modifications.  4. PROCEDURE NOTE: left TROCHANTERIC BURSA INJECTION   After time out was performed, including verification of patient ID, procedure, site and side, availability of information and equipment, review of safety issues, and agreement with consent, the left procedure site was marked and the patient was prepped aseptically. A diagnostic and therapeutic injection given, the left hip bursa was prepped with Betadine and alcohol and was injected with 2cc 40 mg of Kenalog, 4cc 1% lidocaine and 4cc 0.25% bupivacaine from a lateral approach. The patient tolerated the procedure well. Significant relief.   The patient had no adverse reactions to the medication. Pain  decreased. The patient was instructed to apply ice to the joint for 20 minutes and avoid strenuous activities for 24-36 hours following the injection. Patient was warned of possible blood sugar and/or blood pressure changes during that time. Following that time, patient can resume regular activities.     RTC in 8 weeks if no improvement.     All questions were answered, pt will contact us for questions or concerns in the interim.

## 2024-04-25 ENCOUNTER — OFFICE VISIT (OUTPATIENT)
Dept: CARDIOLOGY | Facility: CLINIC | Age: 34
End: 2024-04-25
Payer: COMMERCIAL

## 2024-04-25 ENCOUNTER — HOSPITAL ENCOUNTER (OUTPATIENT)
Dept: RADIOLOGY | Facility: OTHER | Age: 34
Discharge: HOME | End: 2024-04-25
Attending: NURSE PRACTITIONER
Payer: COMMERCIAL

## 2024-04-25 VITALS
BODY MASS INDEX: 27.52 KG/M2 | HEIGHT: 64 IN | DIASTOLIC BLOOD PRESSURE: 76 MMHG | HEART RATE: 84 BPM | WEIGHT: 161.19 LBS | SYSTOLIC BLOOD PRESSURE: 122 MMHG

## 2024-04-25 DIAGNOSIS — R76.8 ANA POSITIVE: ICD-10-CM

## 2024-04-25 DIAGNOSIS — N64.4 BREAST PAIN: ICD-10-CM

## 2024-04-25 DIAGNOSIS — Z00.00 ANNUAL PHYSICAL EXAM: ICD-10-CM

## 2024-04-25 DIAGNOSIS — E78.5 DYSLIPIDEMIA: ICD-10-CM

## 2024-04-25 DIAGNOSIS — K76.0 HEPATIC STEATOSIS: ICD-10-CM

## 2024-04-25 DIAGNOSIS — G89.29 CHRONIC PELVIC PAIN IN FEMALE: ICD-10-CM

## 2024-04-25 DIAGNOSIS — R07.89 OTHER CHEST PAIN: Primary | ICD-10-CM

## 2024-04-25 DIAGNOSIS — G89.29 CHRONIC MIDLINE LOW BACK PAIN WITHOUT SCIATICA: ICD-10-CM

## 2024-04-25 DIAGNOSIS — K21.9 GASTROESOPHAGEAL REFLUX DISEASE, UNSPECIFIED WHETHER ESOPHAGITIS PRESENT: ICD-10-CM

## 2024-04-25 DIAGNOSIS — R10.2 CHRONIC PELVIC PAIN IN FEMALE: ICD-10-CM

## 2024-04-25 DIAGNOSIS — M54.50 CHRONIC MIDLINE LOW BACK PAIN WITHOUT SCIATICA: ICD-10-CM

## 2024-04-25 PROCEDURE — 71250 CT THORAX DX C-: CPT | Mod: TC

## 2024-04-25 PROCEDURE — 99213 OFFICE O/P EST LOW 20 MIN: CPT | Mod: S$GLB,,, | Performed by: PHYSICIAN ASSISTANT

## 2024-04-25 PROCEDURE — 71250 CT THORAX DX C-: CPT | Mod: 26,,, | Performed by: RADIOLOGY

## 2024-04-25 PROCEDURE — 93005 ELECTROCARDIOGRAM TRACING: CPT | Mod: S$GLB,,, | Performed by: PHYSICIAN ASSISTANT

## 2024-04-25 PROCEDURE — 93010 ELECTROCARDIOGRAM REPORT: CPT | Mod: S$GLB,,, | Performed by: INTERNAL MEDICINE

## 2024-04-25 PROCEDURE — 99999 PR PBB SHADOW E&M-EST. PATIENT-LVL III: CPT | Mod: PBBFAC,,, | Performed by: PHYSICIAN ASSISTANT

## 2024-04-25 RX ORDER — CHOLECALCIFEROL (VITAMIN D3) 25 MCG
1000 TABLET ORAL DAILY
COMMUNITY

## 2024-04-25 NOTE — PROGRESS NOTES
Cardiology Clinic Note  Reason for Visit: Chest pain     HPI:     Laila Pappas is a 33 y.o. F, who presents for chest pain. Mrs. Pappas was seen by Dr. Gudino with similar complaint in August 2023. At that time she was pregnant, and decided not to proceed with stress testing. She would like to do so now. She experiences tightness in her chest several days per week lasting 30-45 min at a time. She has no personal history of cardiac disease, but does have a family history of early CAD in her brother. She is a non smoker.     ROS:    Pertinent ROS included in HPI and below.  PMH:     Past Medical History:   Diagnosis Date    Bilateral knee pain 06/19/2018    Endometriosis     treated by GYN at     Epigastric abdominal pain 12/05/2016    Gastroesophageal reflux disease 07/06/2016    Helicobacter pylori ab+     Helicobacter pylori ab+     High-tone pelvic floor dysfunction 05/02/2016    IIH (idiopathic intracranial hypertension)     Dr. Rogers - [facility]    Palpitations 07/06/2016    Pre-eclampsia in postpartum period 08/16/2022     Past Surgical History:   Procedure Laterality Date    ARTHROSCOPY OF KNEE Left 04/06/2021    Procedure: ARTHROSCOPY, KNEE;  Surgeon: Deidra Mejias MD;  Location: [facility] OR;  Service: Orthopedics;  Laterality: Left;    COLONOSCOPY N/A 12/13/2018    Procedure: COLONOSCOPY;  Surgeon: Micki Gross MD;  Location: [facility] (4TH FLR);  Service: Endoscopy;  Laterality: N/A;  preferrably in Dec 2018, CRS ok if needed.    COLONOSCOPY N/A 11/05/2022    Procedure: COLONOSCOPY;  Surgeon: Greg Leach MD;  Location: [facility] (4TH FLR);  Service: Endoscopy;  Laterality: N/A;  Endoscopy schedulers please schedule patient for colonoscopy with me for further evaluation of painless hematochezia thank you    ESOPHAGOGASTRODUODENOSCOPY N/A 10/30/2018    Procedure: EGD (ESOPHAGOGASTRODUODENOSCOPY);  Surgeon: Greg Leach MD;  Location: [facility] (2ND FLR);  Service: Endoscopy;  Laterality: N/A;   ok to schedule per Kimmy    ESOPHAGOGASTRODUODENOSCOPY N/A 11/05/2022    Procedure: EGD (ESOPHAGOGASTRODUODENOSCOPY);  Surgeon: Greg Leach MD;  Location: [facility] (4TH FLR);  Service: Endoscopy;  Laterality: N/A;  Endoscopy schedulers please schedule patient for EGD for evaluation of right upper quadrant pain thank you  prep instr portal--ml  11/2/22 room closed-pt okay with Dr. Leach-50 min case okay per Katlyn-ml    KNEE ARTHROSCOPY W/ DEBRIDEMENT  04/06/2021    Procedure: ARTHROSCOPY, KNEE, WITH DEBRIDEMENT;  Surgeon: Deidra Mejias MD;  Location: [facility] OR;  Service: Orthopedics;;    KNEE ARTHROSCOPY W/ MENISCECTOMY Right 04/06/2021    Procedure: ARTHROSCOPY, KNEE, WITH MENISCECTOMY;  Surgeon: Deidra Mejias MD;  Location: [facility] OR;  Service: Orthopedics;  Laterality: Right;    KNEE ARTHROSCOPY W/ PLICA EXCISION Left 04/06/2021    Procedure: EXCISION, PLICA, KNEE, ARTHROSCOPIC;  Surgeon: Deidra Mejias MD;  Location: [facility] OR;  Service: Orthopedics;  Laterality: Left;    LAPAROSCOPIC CHOLECYSTECTOMY N/A 4/11/2024    Procedure: CHOLECYSTECTOMY, LAPAROSCOPIC;  Surgeon: Tom Campos Jr., MD;  Location: [facility] 2ND FLR;  Service: General;  Laterality: N/A;    LAPAROSCOPY  2017    dx with endo.  no removal    NO PAST SURGERIES      SYNOVECTOMY OF KNEE Left 04/06/2021    Procedure: SYNOVECTOMY, KNEE;  Surgeon: Deidra Mejias MD;  Location: [facility] OR;  Service: Orthopedics;  Laterality: Left;    UPPER GASTROINTESTINAL ENDOSCOPY       Allergies:     Review of patient's allergies indicates:   Allergen Reactions    Doxycycline Other (See Comments)     Triggers headaches    Bactrim ds [sulfamethoxazole-trimethoprim] Nausea And Vomiting    Nsaids (non-steroidal anti-inflammatory drug) Other (See Comments)     D/t h/o PUD and H pylori    Codeine Rash     Medications:     Current Outpatient Medications on File Prior to Visit   Medication Sig Dispense Refill    ferrous sulfate 325 (65 FE) MG EC tablet Take 1 tablet (325 mg total)  "by mouth once daily. 90 tablet 1    ketoconazole (NIZORAL) 2 % cream Apply topically 2 (two) times daily. Prn rash of face 60 g 4    oxyCODONE (ROXICODONE) 5 MG immediate release tablet Take 1 tablet (5 mg total) by mouth every 4 (four) hours as needed for Pain. 15 tablet 0    vitamin D (VITAMIN D3) 1000 units Tab Take 1,000 Units by mouth once daily.      [DISCONTINUED] folic acid (FOLVITE) 1 MG tablet Take 1 mg by mouth once daily.       Current Facility-Administered Medications on File Prior to Visit   Medication Dose Route Frequency Provider Last Rate Last Admin    0.9%  NaCl infusion   Intravenous 1 time in Clinic/HOD Agatha Causey FNP        acetaminophen tablet 650 mg  650 mg Oral 1 time in Clinic/HOD Agatha Causey FNP         Social History:     Social History     Tobacco Use    Smoking status: Never     Passive exposure: Never    Smokeless tobacco: Never   Substance Use Topics    Alcohol use: No     Family History:     Family History   Problem Relation Name Age of Onset    Diabetes Mother lizette chase     No Known Problems Father      No Known Problems Sister      Hypertension Brother bolivar chase .     Hypertension Maternal Grandmother      Hypertension Maternal Grandfather soraida and carol burrell     No Known Problems Paternal Grandmother      No Known Problems Paternal Grandfather      Colon cancer Neg Hx      Crohn's disease Neg Hx      Esophageal cancer Neg Hx      Inflammatory bowel disease Neg Hx      Irritable bowel syndrome Neg Hx      Rectal cancer Neg Hx      Stomach cancer Neg Hx      Ulcerative colitis Neg Hx      Celiac disease Neg Hx      Cirrhosis Neg Hx      Colon polyps Neg Hx      Liver cancer Neg Hx      Liver disease Neg Hx       Physical Exam:   /76   Pulse 84   Ht 5' 4" (1.626 m)   Wt 73.1 kg (161 lb 2.5 oz)   LMP 03/30/2024   BMI 27.66 kg/m²      Physical Exam  Vitals and nursing note reviewed.   Constitutional:       Appearance: Normal " appearance.   HENT:      Head: Normocephalic and atraumatic.   Neck:      Vascular: Normal carotid pulses. No carotid bruit or hepatojugular reflux.   Cardiovascular:      Rate and Rhythm: Normal rate and regular rhythm.      Chest Wall: PMI is not displaced.      Pulses:           Carotid pulses are 2+ on the right side and 2+ on the left side.       Radial pulses are 2+ on the right side and 2+ on the left side.        Dorsalis pedis pulses are 2+ on the right side and 2+ on the left side.        Posterior tibial pulses are 2+ on the right side and 2+ on the left side.   Pulmonary:      Effort: Pulmonary effort is normal.      Breath sounds: Normal breath sounds. No wheezing, rhonchi or rales.   Abdominal:      General: Bowel sounds are normal. There is no abdominal bruit.      Palpations: Abdomen is soft. There is no pulsatile mass.      Tenderness: There is no abdominal tenderness.   Feet:      Right foot:      Skin integrity: Skin integrity normal.      Left foot:      Skin integrity: Skin integrity normal.   Skin:     Capillary Refill: Capillary refill takes less than 2 seconds.   Neurological:      General: No focal deficit present.      Mental Status: She is alert.   Psychiatric:         Mood and Affect: Mood and affect normal.         Speech: Speech normal.         Behavior: Behavior is cooperative.         Thought Content: Thought content normal.          Labs:     Blood Tests:  Lab Results   Component Value Date    BNP 16 09/05/2023     02/27/2024    K 4.3 02/27/2024     02/27/2024    CO2 23 02/27/2024    BUN 7 02/27/2024    CREATININE 0.7 02/27/2024    GLU 95 02/27/2024    HGBA1C 4.9 10/26/2023    MG 2.1 12/20/2022    AST 17 02/27/2024    ALT 12 02/27/2024    ALBUMIN 3.8 02/27/2024    PROT 7.4 02/27/2024    BILITOT 0.4 02/27/2024    WBC 7.17 02/27/2024    HGB 14.8 02/27/2024    HCT 46.8 02/27/2024    HCT 37 10/24/2022    MCV 91 02/27/2024     02/27/2024    INR 1.1 11/18/2022    TSH  2.136 2024       Lab Results   Component Value Date    CHOL 195 10/26/2023    HDL 43 10/26/2023    TRIG 84 10/26/2023       Lab Results   Component Value Date    LDLCALC 135.2 10/26/2023         Imaging:     Echocardiogram  None    Stress testing  None    Cath Lab  None    Other  None    EK2024  Normal sinus rhythm   T wave abnormality, consider anterior ischemia   Abnormal ECG   When compared with ECG of 05-SEP-2023 09:59,   No significant change was found     Assessment:     1. Other chest pain    2. Dyslipidemia        Plan:     Other chest pain  -     IN OFFICE EKG 12-LEAD (to Muse)    Dyslipidemia    Schedule stress echo previously recommended by Dr. Gudino       Signed:  Sydni Espinoza PA-C  Cardiology     2024 1:15 PM    Follow-up:     Future Appointments   Date Time Provider Department Center   2024  2:00 PM Amira Min NP [facility] GASTRO [address]   2024 10:20 AM Amanda Benjamin NP [facility] [id number]   10/28/2024  3:00 PM Meghan Bunn MD [facility] OBGYN [facility]   2025 11:00 AM Meghan Bunn MD [facility] OBGYN [facility]

## 2024-04-26 LAB
OHS QRS DURATION: 72 MS
OHS QTC CALCULATION: 428 MS

## 2024-05-01 ENCOUNTER — OFFICE VISIT (OUTPATIENT)
Dept: SURGERY | Facility: CLINIC | Age: 34
End: 2024-05-01
Payer: COMMERCIAL

## 2024-05-01 VITALS
WEIGHT: 161.81 LBS | SYSTOLIC BLOOD PRESSURE: 118 MMHG | HEART RATE: 84 BPM | DIASTOLIC BLOOD PRESSURE: 67 MMHG | BODY MASS INDEX: 27.63 KG/M2 | HEIGHT: 64 IN

## 2024-05-01 DIAGNOSIS — Z90.49 S/P LAPAROSCOPIC CHOLECYSTECTOMY: Primary | ICD-10-CM

## 2024-05-01 PROCEDURE — 99024 POSTOP FOLLOW-UP VISIT: CPT | Mod: S$GLB,,, | Performed by: SURGERY

## 2024-05-01 PROCEDURE — 99999 PR PBB SHADOW E&M-EST. PATIENT-LVL III: CPT | Mod: PBBFAC,,, | Performed by: SURGERY

## 2024-05-01 NOTE — PROGRESS NOTES
[facility]  Post Op Visit    SUBJECTIVE:  The patient is a 33 y.o. y/o female s/p laparoscopic cholecystectomy on 4/11/24 for biliary colic. She is overall doing very well today, she denies pain, fevers, chills, nausea, vomiting, diarrhea, or constipation. She is eating well with normal appetite and bowel function. She denies redness around or drainage from incisions. She reports complete resolution of her pre-operative symptoms of post-prandial nausea. She has no questions or complaints at today's visit.     OBJECTIVE:  Vitals:    05/01/24 1514   BP: 118/67   Pulse: 84       GEN: female in NAD  ABD: soft, non-tender, non-distended  INCISIONS: healing well without signs of infection    PATHOLOGY:   Gallbladder, cholecystectomy:   Benign gallbladder without stones   No dysplasia or malignancy     ASSESSMENT/PLAN:  Doing well s/p laparoscopic cholecystectomy on 4/11/24 for biliary colic.   Patient is advised to avoid heavy lifting or strenuous activity for another 2 weeks.   Patient may bathe or shower per preference.    Follow-up PRN.   All questions answered; patient is comfortable with the above follow-up plan.    -Hiral Molina M.D  General Surgery PGY5

## 2024-05-09 ENCOUNTER — HOSPITAL ENCOUNTER (OUTPATIENT)
Dept: CARDIOLOGY | Facility: HOSPITAL | Age: 34
Discharge: HOME | End: 2024-05-09
Attending: INTERNAL MEDICINE
Payer: COMMERCIAL

## 2024-05-09 VITALS
HEART RATE: 93 BPM | DIASTOLIC BLOOD PRESSURE: 65 MMHG | SYSTOLIC BLOOD PRESSURE: 111 MMHG | HEIGHT: 64 IN | BODY MASS INDEX: 27.49 KG/M2 | WEIGHT: 161 LBS

## 2024-05-09 DIAGNOSIS — R07.89 OTHER CHEST PAIN: ICD-10-CM

## 2024-05-09 LAB
ASCENDING AORTA: 2.54 CM
AV INDEX (PROSTH): 0.85
AV MEAN GRADIENT: 3 MMHG
AV PEAK GRADIENT: 6 MMHG
AV VALVE AREA BY VELOCITY RATIO: 2.43 CM²
AV VALVE AREA: 2.49 CM²
AV VELOCITY RATIO: 0.83
BSA FOR ECHO PROCEDURE: 1.82 M2
CV ECHO LV RWT: 0.32 CM
CV STRESS BASE HR: 93 BPM
DIASTOLIC BLOOD PRESSURE: 65 MMHG
DOP CALC AO PEAK VEL: 1.24 M/S
DOP CALC AO VTI: 22.49 CM
DOP CALC LVOT AREA: 2.9 CM2
DOP CALC LVOT DIAMETER: 1.93 CM
DOP CALC LVOT PEAK VEL: 1.03 M/S
DOP CALC LVOT STROKE VOLUME: 55.97 CM3
DOP CALC RVOT PEAK VEL: 0.77 M/S
DOP CALC RVOT VTI: 14.81 CM
DOP CALCLVOT PEAK VEL VTI: 19.14 CM
E WAVE DECELERATION TIME: 146.87 MSEC
E/A RATIO: 1.31
E/E' RATIO: 4.92 M/S
ECHO LV POSTERIOR WALL: 0.66 CM (ref 0.6–1.1)
EJECTION FRACTION: 65 %
FRACTIONAL SHORTENING: 34 % (ref 28–44)
INTERVENTRICULAR SEPTUM: 0.6 CM (ref 0.6–1.1)
LA MAJOR: 5.57 CM
LA MINOR: 5.59 CM
LA WIDTH: 2.79 CM
LEFT ATRIUM SIZE: 3.33 CM
LEFT ATRIUM VOLUME INDEX MOD: 19.8 ML/M2
LEFT ATRIUM VOLUME INDEX: 24.8 ML/M2
LEFT ATRIUM VOLUME MOD: 35.19 CM3
LEFT ATRIUM VOLUME: 44.07 CM3
LEFT INTERNAL DIMENSION IN SYSTOLE: 2.74 CM (ref 2.1–4)
LEFT VENTRICLE DIASTOLIC VOLUME INDEX: 42.35 ML/M2
LEFT VENTRICLE DIASTOLIC VOLUME: 75.39 ML
LEFT VENTRICLE MASS INDEX: 41 G/M2
LEFT VENTRICLE SYSTOLIC VOLUME INDEX: 15.7 ML/M2
LEFT VENTRICLE SYSTOLIC VOLUME: 27.9 ML
LEFT VENTRICULAR INTERNAL DIMENSION IN DIASTOLE: 4.13 CM (ref 3.5–6)
LEFT VENTRICULAR MASS: 72.27 G
LV LATERAL E/E' RATIO: 4 M/S
LV SEPTAL E/E' RATIO: 6.4 M/S
MV A" WAVE DURATION": 9.71 MSEC
MV PEAK A VEL: 0.49 M/S
MV PEAK E VEL: 0.64 M/S
MV STENOSIS PRESSURE HALF TIME: 42.59 MS
MV VALVE AREA P 1/2 METHOD: 5.17 CM2
OHS CV CPX 1 MINUTE RECOVERY HEART RATE: 125 BPM
OHS CV CPX 85 PERCENT MAX PREDICTED HEART RATE MALE: 160
OHS CV CPX ESTIMATED METS: 8
OHS CV CPX MAX PREDICTED HEART RATE: 188
OHS CV CPX PATIENT IS FEMALE: 1
OHS CV CPX PATIENT IS MALE: 0
OHS CV CPX PEAK DIASTOLIC BLOOD PRESSURE: 97 MMHG
OHS CV CPX PEAK HEAR RATE: 162 BPM
OHS CV CPX PEAK RATE PRESSURE PRODUCT: ABNORMAL
OHS CV CPX PEAK SYSTOLIC BLOOD PRESSURE: 139 MMHG
OHS CV CPX PERCENT MAX PREDICTED HEART RATE ACHIEVED: 91
OHS CV CPX RATE PRESSURE PRODUCT PRESENTING: ABNORMAL
OHS CV RV/LV RATIO: 0.56 CM
PISA TR MAX VEL: 2.05 M/S
PULM VEIN S/D RATIO: 1.48
PV MEAN GRADIENT: 1 MMHG
PV PEAK D VEL: 0.29 M/S
PV PEAK GRADIENT: 3
PV PEAK S VEL: 0.43 M/S
PV PEAK VELOCITY: 0.84 M/S
RA MAJOR: 4.61 CM
RA PRESSURE ESTIMATED: 3 MMHG
RA WIDTH: 2.74 CM
RIGHT VENTRICULAR END-DIASTOLIC DIMENSION: 2.3 CM
RV TB RVSP: 5 MMHG
SINUS: 2.81 CM
STJ: 2.51 CM
STRESS ECHO POST EXERCISE DUR MIN: 5 MINUTES
STRESS ECHO POST EXERCISE DUR SEC: 18 SECONDS
STRESS ST DEPRESSION: 0.5 MM
SYSTOLIC BLOOD PRESSURE: 111 MMHG
TDI LATERAL: 0.16 M/S
TDI SEPTAL: 0.1 M/S
TDI: 0.13 M/S
TR MAX PG: 17 MMHG
TRICUSPID ANNULAR PLANE SYSTOLIC EXCURSION: 2.16 CM
TV REST PULMONARY ARTERY PRESSURE: 20 MMHG
Z-SCORE OF LEFT VENTRICULAR DIMENSION IN END DIASTOLE: -1.75
Z-SCORE OF LEFT VENTRICULAR DIMENSION IN END SYSTOLE: -0.83

## 2024-05-09 PROCEDURE — 93325 DOPPLER ECHO COLOR FLOW MAPG: CPT | Mod: 26,,, | Performed by: INTERNAL MEDICINE

## 2024-05-09 PROCEDURE — 93351 STRESS TTE COMPLETE: CPT | Mod: 26,,, | Performed by: INTERNAL MEDICINE

## 2024-05-09 PROCEDURE — 93320 DOPPLER ECHO COMPLETE: CPT | Mod: PO

## 2024-05-09 PROCEDURE — 93320 DOPPLER ECHO COMPLETE: CPT | Mod: 26,,, | Performed by: INTERNAL MEDICINE

## 2024-05-28 ENCOUNTER — OFFICE VISIT (OUTPATIENT)
Dept: OBSTETRICS AND GYNECOLOGY | Facility: CLINIC | Age: 34
End: 2024-05-28
Payer: COMMERCIAL

## 2024-05-28 ENCOUNTER — LAB VISIT (OUTPATIENT)
Dept: LAB | Facility: HOSPITAL | Age: 34
End: 2024-05-28
Attending: OBSTETRICS & GYNECOLOGY
Payer: COMMERCIAL

## 2024-05-28 VITALS
WEIGHT: 168.19 LBS | SYSTOLIC BLOOD PRESSURE: 118 MMHG | DIASTOLIC BLOOD PRESSURE: 70 MMHG | BODY MASS INDEX: 28.87 KG/M2

## 2024-05-28 DIAGNOSIS — R10.2 PELVIC PAIN: ICD-10-CM

## 2024-05-28 DIAGNOSIS — Z76.89 ENCOUNTER TO ESTABLISH CARE: Primary | ICD-10-CM

## 2024-05-28 DIAGNOSIS — N92.6 IRREGULAR PERIODS: ICD-10-CM

## 2024-05-28 LAB — TSH SERPL DL<=0.005 MIU/L-ACNC: 2.14 UIU/ML (ref 0.4–4)

## 2024-05-28 PROCEDURE — 84443 ASSAY THYROID STIM HORMONE: CPT | Performed by: OBSTETRICS & GYNECOLOGY

## 2024-05-28 PROCEDURE — 36415 COLL VENOUS BLD VENIPUNCTURE: CPT | Performed by: OBSTETRICS & GYNECOLOGY

## 2024-05-28 PROCEDURE — 99211 OFF/OP EST MAY X REQ PHY/QHP: CPT | Mod: S$GLB,,, | Performed by: OBSTETRICS & GYNECOLOGY

## 2024-05-28 PROCEDURE — 83001 ASSAY OF GONADOTROPIN (FSH): CPT | Performed by: OBSTETRICS & GYNECOLOGY

## 2024-05-28 PROCEDURE — 82670 ASSAY OF TOTAL ESTRADIOL: CPT | Performed by: OBSTETRICS & GYNECOLOGY

## 2024-05-28 PROCEDURE — 83002 ASSAY OF GONADOTROPIN (LH): CPT | Performed by: OBSTETRICS & GYNECOLOGY

## 2024-05-28 PROCEDURE — 99999 PR PBB SHADOW E&M-EST. PATIENT-LVL III: CPT | Mod: PBBFAC,,, | Performed by: OBSTETRICS & GYNECOLOGY

## 2024-05-28 NOTE — PROGRESS NOTES
History & Physical  Gynecology      SUBJECTIVE:     Chief Complaint: Advice Only       History of Present Illness:  Ms. Pappas is a 34 y/o female who presents to establish care. Patient has been seen multiple times by GYN for chronic pelvic and abdominal pain. She reports that pain is midline and intermittent. She s/p laparoscopic cholecystectomy on 04/11/2024 after presenting to ED for abdominal pain and postprandial nausea. She reports that she continues to have pelvic pain.She reports a history of endometriosis with one living child and 2 SABs in the past. TVUS was ordered and done with Dr. KATHERINE Dorado. OCPs were recommended by a previous OBGYN but patient declined as she would like to conceive soon.      Review of patient's allergies indicates:   Allergen Reactions    Doxycycline Other (See Comments)     Triggers headaches    Bactrim ds [sulfamethoxazole-trimethoprim] Nausea And Vomiting    Nsaids (non-steroidal anti-inflammatory drug) Other (See Comments)     D/t h/o PUD and H pylori    Codeine Rash       Past Medical History:   Diagnosis Date    Bilateral knee pain 06/19/2018    Endometriosis     treated by GYN at     Epigastric abdominal pain 12/05/2016    Gastroesophageal reflux disease 07/06/2016    Helicobacter pylori ab+     Helicobacter pylori ab+     High-tone pelvic floor dysfunction 05/02/2016    IIH (idiopathic intracranial hypertension)     Dr. Rogers - [facility]    Palpitations 07/06/2016    Pre-eclampsia in postpartum period 08/16/2022     Past Surgical History:   Procedure Laterality Date    ARTHROSCOPY OF KNEE Left 04/06/2021    Procedure: ARTHROSCOPY, KNEE;  Surgeon: Deidra Mejias MD;  Location: [facility];  Service: Orthopedics;  Laterality: Left;    COLONOSCOPY N/A 12/13/2018    Procedure: COLONOSCOPY;  Surgeon: Micki Gross MD;  Location: [address]);  Service: Endoscopy;  Laterality: N/A;  preferrably in Dec 2018, CRS ok if needed.    COLONOSCOPY N/A 11/05/2022    Procedure:  COLONOSCOPY;  Surgeon: Greg Leach MD;  Location: [facility] (4TH FLR);  Service: Endoscopy;  Laterality: N/A;  Endoscopy schedulers please schedule patient for colonoscopy with me for further evaluation of painless hematochezia thank you    ESOPHAGOGASTRODUODENOSCOPY N/A 10/30/2018    Procedure: EGD (ESOPHAGOGASTRODUODENOSCOPY);  Surgeon: Greg Leach MD;  Location: [facility] ENDO (2ND FLR);  Service: Endoscopy;  Laterality: N/A;  ok to schedule per Kimmy    ESOPHAGOGASTRODUODENOSCOPY N/A 2022    Procedure: EGD (ESOPHAGOGASTRODUODENOSCOPY);  Surgeon: Greg Leach MD;  Location: [facility] ENDO (4TH FLR);  Service: Endoscopy;  Laterality: N/A;  Endoscopy schedulers please schedule patient for EGD for evaluation of right upper quadrant pain thank you  prep instr portal--ml  22 room closed-pt okay with Dr. Leach-50 min case okay per Katlyn-ml    KNEE ARTHROSCOPY W/ DEBRIDEMENT  2021    Procedure: ARTHROSCOPY, KNEE, WITH DEBRIDEMENT;  Surgeon: Deidra Mejias MD;  Location: [facility] OR;  Service: Orthopedics;;    KNEE ARTHROSCOPY W/ MENISCECTOMY Right 2021    Procedure: ARTHROSCOPY, KNEE, WITH MENISCECTOMY;  Surgeon: Deidra Mejias MD;  Location: [facility] OR;  Service: Orthopedics;  Laterality: Right;    KNEE ARTHROSCOPY W/ PLICA EXCISION Left 2021    Procedure: EXCISION, PLICA, KNEE, ARTHROSCOPIC;  Surgeon: Deidra Mejias MD;  Location: [facility] OR;  Service: Orthopedics;  Laterality: Left;    LAPAROSCOPIC CHOLECYSTECTOMY N/A 2024    Procedure: CHOLECYSTECTOMY, LAPAROSCOPIC;  Surgeon: Tom Campos Jr., MD;  Location: [facility] 2ND FLR;  Service: General;  Laterality: N/A;    LAPAROSCOPY  2017    dx with endo.  no removal    NO PAST SURGERIES      SYNOVECTOMY OF KNEE Left 2021    Procedure: SYNOVECTOMY, KNEE;  Surgeon: Deidra Mejias MD;  Location: [facility] OR;  Service: Orthopedics;  Laterality: Left;    UPPER GASTROINTESTINAL ENDOSCOPY       OB History          2    Para   1    Term   1        0    AB   1    Living   1         SAB   1    IAB   0    Ectopic   0    Multiple   0    Live Births   1               Family History   Problem Relation Name Age of Onset    Diabetes Mother lizette chase     No Known Problems Father      No Known Problems Sister      Hypertension Brother bolivar chase jr.     Hypertension Maternal Grandmother      Hypertension Maternal Grandfather soraida and carol burrell     No Known Problems Paternal Grandmother      No Known Problems Paternal Grandfather      Colon cancer Neg Hx      Crohn's disease Neg Hx      Esophageal cancer Neg Hx      Inflammatory bowel disease Neg Hx      Irritable bowel syndrome Neg Hx      Rectal cancer Neg Hx      Stomach cancer Neg Hx      Ulcerative colitis Neg Hx      Celiac disease Neg Hx      Cirrhosis Neg Hx      Colon polyps Neg Hx      Liver cancer Neg Hx      Liver disease Neg Hx       Social History     Tobacco Use    Smoking status: Never     Passive exposure: Never    Smokeless tobacco: Never   Substance Use Topics    Alcohol use: No    Drug use: No       Current Outpatient Medications   Medication Sig    ferrous sulfate 325 (65 FE) MG EC tablet Take 1 tablet (325 mg total) by mouth once daily. (Patient not taking: Reported on 2024)    ketoconazole (NIZORAL) 2 % cream Apply topically 2 (two) times daily. Prn rash of face    oxyCODONE (ROXICODONE) 5 MG immediate release tablet Take 1 tablet (5 mg total) by mouth every 4 (four) hours as needed for Pain. (Patient not taking: Reported on 2024)    vitamin D (VITAMIN D3) 1000 units Tab Take 1,000 Units by mouth once daily. (Patient not taking: Reported on 2024)     Current Facility-Administered Medications   Medication    0.9%  NaCl infusion    acetaminophen tablet 650 mg         Review of Systems:  Review of Systems   Constitutional:  Negative for chills and fever.   Eyes:  Negative for visual disturbance.   Respiratory:  Negative for cough and wheezing.     Cardiovascular:  Negative for chest pain and palpitations.   Gastrointestinal:  Positive for abdominal pain. Negative for nausea and vomiting.   Genitourinary:  Positive for pelvic pain. Negative for dysuria, frequency, hematuria, vaginal bleeding, vaginal discharge and vaginal pain.   Neurological:  Negative for headaches.   Psychiatric/Behavioral:  Negative for depression.         OBJECTIVE:     Physical Exam:  Physical Exam  Vitals and nursing note reviewed.   Constitutional:       Appearance: Normal appearance. She is well-developed.   Cardiovascular:      Rate and Rhythm: Normal rate.   Pulmonary:      Effort: Pulmonary effort is normal. No respiratory distress.   Abdominal:      General: There is no distension.      Palpations: Abdomen is soft.      Tenderness: There is no abdominal tenderness.   Genitourinary:     Exam position: Supine.   Skin:     General: Skin is warm and dry.   Neurological:      Mental Status: She is oriented to person, place, and time.           ASSESSMENT:       ICD-10-CM ICD-9-CM    1. Encounter to establish care  Z76.89 V65.8       2. Irregular periods  N92.6 626.4       3. Pelvic pain  R10.2 [id number] TSH      Follicle Stimulating Hormone      LUTEINIZING HORMONE      Estradiol        Plan:      Laila was seen today for advice only.    Diagnoses and all orders for this visit:    Encounter to establish care    Irregular periods  - TVUS done with previous OB  - Offered continuous OCPs but patient declined as he would like to conceive soon    Pelvic pain  -     TSH; Future  -     Follicle Stimulating Hormone; Future  -     LUTEINIZING HORMONE; Future  -     Estradiol; Future  - History of endometriosis, dx laparoscopy considered      Orders Placed This Encounter   Procedures    TSH    Follicle Stimulating Hormone    LUTEINIZING HORMONE    Estradiol       Follow up in about 3 months (around 8/28/2024), or if symptoms worsen or fail to improve, for Follow up.    Counseling time: 30  kay Rothman

## 2024-05-29 LAB
ESTRADIOL SERPL-MCNC: 177 PG/ML
FSH SERPL-ACNC: 3.09 MIU/ML
LH SERPL-ACNC: 2.7 MIU/ML

## 2024-05-31 ENCOUNTER — OFFICE VISIT (OUTPATIENT)
Dept: ORTHOPEDICS | Facility: CLINIC | Age: 34
End: 2024-05-31
Payer: COMMERCIAL

## 2024-05-31 VITALS — WEIGHT: 165.31 LBS | BODY MASS INDEX: 27.54 KG/M2 | HEIGHT: 65 IN

## 2024-05-31 DIAGNOSIS — R52 PAIN: Primary | ICD-10-CM

## 2024-05-31 PROCEDURE — 99999 PR PBB SHADOW E&M-EST. PATIENT-LVL III: CPT | Mod: PBBFAC,,, | Performed by: ORTHOPAEDIC SURGERY

## 2024-05-31 PROCEDURE — 99213 OFFICE O/P EST LOW 20 MIN: CPT | Mod: S$GLB,,, | Performed by: ORTHOPAEDIC SURGERY

## 2024-05-31 NOTE — PROGRESS NOTES
The patient returns for follow-up.  She had a left hip greater trochanteric bursa injection by sports Medicine that resulted in minimal benefit.  She continues to complain of pain radiating into the anterior aspect of the left groin.    Today I reviewed all available imaging studies including x-rays and an MRI of her lumbar spine as well as an EMG that demonstrate no significant lumbar pathology.  Her EMG is negative for lumbar radiculopathy.      Today we discussed options, her pain does not seem to be related to her spine.  I have recommended evaluation by pain management.

## 2024-06-17 ENCOUNTER — OFFICE VISIT (OUTPATIENT)
Dept: SPORTS MEDICINE | Facility: CLINIC | Age: 34
End: 2024-06-17
Payer: COMMERCIAL

## 2024-06-17 VITALS
SYSTOLIC BLOOD PRESSURE: 110 MMHG | BODY MASS INDEX: 28.25 KG/M2 | WEIGHT: 169.75 LBS | HEART RATE: 85 BPM | DIASTOLIC BLOOD PRESSURE: 74 MMHG

## 2024-06-17 DIAGNOSIS — M76.02 GLUTEAL TENDINITIS OF LEFT BUTTOCK: ICD-10-CM

## 2024-06-17 DIAGNOSIS — M25.552 PAIN OF LEFT HIP: ICD-10-CM

## 2024-06-17 DIAGNOSIS — M70.62 TROCHANTERIC BURSITIS OF LEFT HIP: ICD-10-CM

## 2024-06-17 DIAGNOSIS — M25.552 CHRONIC HIP PAIN, LEFT: Primary | ICD-10-CM

## 2024-06-17 DIAGNOSIS — G89.29 CHRONIC HIP PAIN, LEFT: Primary | ICD-10-CM

## 2024-06-17 PROCEDURE — 99214 OFFICE O/P EST MOD 30 MIN: CPT | Mod: S$GLB,,, | Performed by: PHYSICIAN ASSISTANT

## 2024-06-17 PROCEDURE — 99999 PR PBB SHADOW E&M-EST. PATIENT-LVL IV: CPT | Mod: PBBFAC,,, | Performed by: PHYSICIAN ASSISTANT

## 2024-06-17 RX ORDER — MELOXICAM 15 MG/1
TABLET ORAL
Qty: 30 TABLET | Refills: 1 | Status: SHIPPED | OUTPATIENT
Start: 2024-06-17

## 2024-06-17 RX ORDER — OMEPRAZOLE 20 MG/1
20 CAPSULE, DELAYED RELEASE ORAL DAILY
Qty: 30 CAPSULE | Refills: 2 | Status: SHIPPED | OUTPATIENT
Start: 2024-06-17 | End: 2024-07-17

## 2024-06-18 ENCOUNTER — TELEPHONE (OUTPATIENT)
Dept: SPORTS MEDICINE | Facility: CLINIC | Age: 34
End: 2024-06-18
Payer: COMMERCIAL

## 2024-06-18 ENCOUNTER — HOSPITAL ENCOUNTER (OUTPATIENT)
Dept: RADIOLOGY | Facility: OTHER | Age: 34
Discharge: HOME | End: 2024-06-18
Attending: PHYSICIAN ASSISTANT
Payer: COMMERCIAL

## 2024-06-18 ENCOUNTER — OFFICE VISIT (OUTPATIENT)
Dept: INTERNAL MEDICINE | Facility: CLINIC | Age: 34
End: 2024-06-18
Payer: COMMERCIAL

## 2024-06-18 VITALS
OXYGEN SATURATION: 98 % | SYSTOLIC BLOOD PRESSURE: 114 MMHG | BODY MASS INDEX: 29.84 KG/M2 | DIASTOLIC BLOOD PRESSURE: 76 MMHG | HEIGHT: 63 IN | WEIGHT: 168.44 LBS | HEART RATE: 91 BPM

## 2024-06-18 DIAGNOSIS — J06.9 VIRAL URI WITH COUGH: Primary | ICD-10-CM

## 2024-06-18 DIAGNOSIS — M25.552 CHRONIC HIP PAIN, LEFT: ICD-10-CM

## 2024-06-18 DIAGNOSIS — M25.552 PAIN OF LEFT HIP: ICD-10-CM

## 2024-06-18 DIAGNOSIS — G89.29 CHRONIC HIP PAIN, LEFT: ICD-10-CM

## 2024-06-18 LAB
CTP QC/QA: YES
CTP QC/QA: YES
POC MOLECULAR INFLUENZA A AGN: NEGATIVE
POC MOLECULAR INFLUENZA B AGN: NEGATIVE
SARS-COV-2 RDRP RESP QL NAA+PROBE: NEGATIVE

## 2024-06-18 PROCEDURE — 87635 SARS-COV-2 COVID-19 AMP PRB: CPT | Mod: QW,S$GLB,, | Performed by: NURSE PRACTITIONER

## 2024-06-18 PROCEDURE — 73721 MRI JNT OF LWR EXTRE W/O DYE: CPT | Mod: TC,LT

## 2024-06-18 PROCEDURE — 99213 OFFICE O/P EST LOW 20 MIN: CPT | Mod: S$GLB,,, | Performed by: NURSE PRACTITIONER

## 2024-06-18 PROCEDURE — 99999 PR PBB SHADOW E&M-EST. PATIENT-LVL III: CPT | Mod: PBBFAC,,, | Performed by: NURSE PRACTITIONER

## 2024-06-18 PROCEDURE — 73721 MRI JNT OF LWR EXTRE W/O DYE: CPT | Mod: 26,LT,, | Performed by: RADIOLOGY

## 2024-06-18 PROCEDURE — 87502 INFLUENZA DNA AMP PROBE: CPT | Mod: QW,S$GLB,, | Performed by: NURSE PRACTITIONER

## 2024-06-18 RX ORDER — LEVOCETIRIZINE DIHYDROCHLORIDE 5 MG/1
5 TABLET, FILM COATED ORAL NIGHTLY
Qty: 30 TABLET | Refills: 11 | Status: SHIPPED | OUTPATIENT
Start: 2024-06-18 | End: 2025-06-18

## 2024-06-18 RX ORDER — FLUTICASONE PROPIONATE 50 MCG
1 SPRAY, SUSPENSION (ML) NASAL DAILY
Qty: 16 G | Refills: 5 | Status: SHIPPED | OUTPATIENT
Start: 2024-06-18

## 2024-06-18 RX ORDER — PROMETHAZINE HYDROCHLORIDE AND DEXTROMETHORPHAN HYDROBROMIDE 6.25; 15 MG/5ML; MG/5ML
5 SYRUP ORAL EVERY 8 HOURS PRN
Qty: 180 ML | Refills: 0 | Status: SHIPPED | OUTPATIENT
Start: 2024-06-18 | End: 2024-06-28

## 2024-06-18 NOTE — TELEPHONE ENCOUNTER
Spoke with patient regarding attached message. Patient refused rescheduling and will proceed with appointment today. Patient is aware procedure is not authorized.

## 2024-06-18 NOTE — PROGRESS NOTES
History of Present Illness   Laila Pappas is a 33 y.o. woman with medical history as listed below who presents today for evaluation of URI symptoms x1 day. She reports chills, body aches, feeling feverish, sore throat, congestion, post nasal drip, and cough. Has not tried medication for symptoms. No known sick contacts.       Past Medical History:   Diagnosis Date    Bilateral knee pain 06/19/2018    Endometriosis     treated by GYN at     Epigastric abdominal pain 12/05/2016    Gastroesophageal reflux disease 07/06/2016    Helicobacter pylori ab+     Helicobacter pylori ab+     High-tone pelvic floor dysfunction 05/02/2016    IIH (idiopathic intracranial hypertension)     Dr. Rogers - [facility]    Palpitations 07/06/2016    Pre-eclampsia in postpartum period 08/16/2022       Past Surgical History:   Procedure Laterality Date    ARTHROSCOPY OF KNEE Left 04/06/2021    Procedure: ARTHROSCOPY, KNEE;  Surgeon: Deidra Mejias MD;  Location: [facility];  Service: Orthopedics;  Laterality: Left;    COLONOSCOPY N/A 12/13/2018    Procedure: COLONOSCOPY;  Surgeon: Micki Gross MD;  Location: [facility] (4TH FLR);  Service: Endoscopy;  Laterality: N/A;  preferrably in Dec 2018, CRS ok if needed.    COLONOSCOPY N/A 11/05/2022    Procedure: COLONOSCOPY;  Surgeon: Greg Leach MD;  Location: [facility] (4TH FLR);  Service: Endoscopy;  Laterality: N/A;  Endoscopy schedulers please schedule patient for colonoscopy with me for further evaluation of painless hematochezia thank you    ESOPHAGOGASTRODUODENOSCOPY N/A 10/30/2018    Procedure: EGD (ESOPHAGOGASTRODUODENOSCOPY);  Surgeon: Greg Leach MD;  Location: [facility] (2ND FLR);  Service: Endoscopy;  Laterality: N/A;  ok to schedule per Kimmy    ESOPHAGOGASTRODUODENOSCOPY N/A 11/05/2022    Procedure: EGD (ESOPHAGOGASTRODUODENOSCOPY);  Surgeon: Greg Leach MD;  Location: [facility] (4TH FLR);  Service: Endoscopy;  Laterality: N/A;  Endoscopy schedulers please schedule  patient for EGD for evaluation of right upper quadrant pain thank you  prep instr portal--ml  22 room closed-pt okay with Dr. Leach-50 min case okay per Katlyn-ml    KNEE ARTHROSCOPY W/ DEBRIDEMENT  2021    Procedure: ARTHROSCOPY, KNEE, WITH DEBRIDEMENT;  Surgeon: Deidra Mejias MD;  Location: [facility] OR;  Service: Orthopedics;;    KNEE ARTHROSCOPY W/ MENISCECTOMY Right 2021    Procedure: ARTHROSCOPY, KNEE, WITH MENISCECTOMY;  Surgeon: Deidra Mejias MD;  Location: [facility] OR;  Service: Orthopedics;  Laterality: Right;    KNEE ARTHROSCOPY W/ PLICA EXCISION Left 2021    Procedure: EXCISION, PLICA, KNEE, ARTHROSCOPIC;  Surgeon: Deidra Mejias MD;  Location: [facility] OR;  Service: Orthopedics;  Laterality: Left;    LAPAROSCOPIC CHOLECYSTECTOMY N/A 2024    Procedure: CHOLECYSTECTOMY, LAPAROSCOPIC;  Surgeon: Tom Campos Jr., MD;  Location: [address];  Service: General;  Laterality: N/A;    LAPAROSCOPY  2017    dx with endo.  no removal    NO PAST SURGERIES      SYNOVECTOMY OF KNEE Left 2021    Procedure: SYNOVECTOMY, KNEE;  Surgeon: Deidra Mejias MD;  Location: [facility] OR;  Service: Orthopedics;  Laterality: Left;    UPPER GASTROINTESTINAL ENDOSCOPY         Social History     Socioeconomic History    Marital status: Single   Occupational History     Employer: [employer] [place]     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   Tobacco Use    Smoking status: Never     Passive exposure: Never    Smokeless tobacco: Never   Substance and Sexual Activity    Alcohol use: No    Drug use: No    Sexual activity: Yes     Partners: Male     Birth control/protection: None   Social History Narrative    She had her 1st baby in 2022 healthy vaginal delivery no      Social Determinants of Health     Financial Resource Strain: Unknown (10/25/2022)    Overall Financial Resource Strain (CARDIA)     Difficulty of Paying Living Expenses: Patient declined   Food Insecurity: Unknown (10/25/2022)    Hunger  Vital Sign     Worried About Running Out of Food in the Last Year: Patient declined     Ran Out of Food in the Last Year: Patient declined   Transportation Needs: Unknown (10/25/2022)    PRAPARE - Transportation     Lack of Transportation (Medical): Patient declined     Lack of Transportation (Non-Medical): Patient declined   Physical Activity: Unknown (12/12/2023)    Exercise Vital Sign     Days of Exercise per Week: 1 day   Stress: No Stress Concern Present (9/29/2020)    Citizen of [place] of Occupational Health - Occupational Stress Questionnaire     Feeling of Stress : Only a little   Housing Stability: Unknown (10/25/2022)    Housing Stability Vital Sign     Unable to Pay for Housing in the Last Year: Patient refused     Unstable Housing in the Last Year: Patient refused       Family History   Problem Relation Name Age of Onset    Diabetes Mother lizette chase     No Known Problems Father      No Known Problems Sister      Hypertension Brother bolivar chase      Hypertension Maternal Grandmother      Hypertension Maternal Grandfather soraida and carol burrell     No Known Problems Paternal Grandmother      No Known Problems Paternal Grandfather      Colon cancer Neg Hx      Crohn's disease Neg Hx      Esophageal cancer Neg Hx      Inflammatory bowel disease Neg Hx      Irritable bowel syndrome Neg Hx      Rectal cancer Neg Hx      Stomach cancer Neg Hx      Ulcerative colitis Neg Hx      Celiac disease Neg Hx      Cirrhosis Neg Hx      Colon polyps Neg Hx      Liver cancer Neg Hx      Liver disease Neg Hx         Review of Systems  Review of Systems   Constitutional:  Positive for malaise/fatigue. Negative for chills and fever.   HENT:  Positive for congestion and sore throat. Negative for ear discharge, ear pain and sinus pain.    Eyes:  Negative for discharge and redness.   Respiratory:  Positive for cough. Negative for sputum production, shortness of breath and wheezing.    Cardiovascular:  Negative for  "chest pain.   Gastrointestinal:  Negative for nausea and vomiting.     A complete review of systems was otherwise negative.    Physical Exam  /76 (BP Location: Left arm, Patient Position: Sitting, BP Method: Medium (Manual))   Pulse 91   Ht 5' 3" (1.6 m)   Wt 76.4 kg (168 lb 6.9 oz)   LMP 05/20/2024   SpO2 98%   BMI 29.84 kg/m²   General appearance: alert, appears stated age, cooperative, and no distress  Eyes: negative findings: lids and lashes normal and conjunctivae and sclerae normal  Ears: normal TM's and external ear canals both ears  Nose: clear discharge, mild congestion, turbinates red, swollen, no sinus tenderness  Throat: lips, mucosa, and tongue normal; teeth and gums normal and clear post nasal drainage  Neck: no adenopathy and supple, symmetrical, trachea midline  Lungs: clear to auscultation bilaterally  Heart: regular rate and rhythm, S1, S2 normal, no murmur, click, rub or gallop  Skin: Skin color, texture, turgor normal. No rashes or lesions  Neurologic: Grossly normal    Assessment/Plan  Viral URI with cough  COVID-19 and flu testing negative.  Viral, antibiotics not indicated.  Symptoms management with treatment as below.  RTC PRN.  -     POCT COVID-19 Rapid Screening  -     POCT Influenza A/B Molecular  -     levocetirizine (XYZAL) 5 MG tablet; Take 1 tablet (5 mg total) by mouth every evening.  Dispense: 30 tablet; Refill: 11  -     fluticasone propionate (FLONASE) 50 mcg/actuation nasal spray; 1 spray (50 mcg total) by Each Nostril route once daily.  Dispense: 16 g; Refill: 5  -     promethazine-dextromethorphan (PROMETHAZINE-DM) 6.25-15 mg/5 mL Syrp; Take 5 mLs by mouth every 8 (eight) hours as needed (cough).  Dispense: 180 mL; Refill: 0    Patient has verbalized understanding and is in agreement with plan of care.    Follow up if symptoms worsen or fail to improve.        "

## 2024-06-19 ENCOUNTER — CLINICAL SUPPORT (OUTPATIENT)
Dept: REHABILITATION | Facility: HOSPITAL | Age: 34
End: 2024-06-19
Payer: COMMERCIAL

## 2024-06-19 ENCOUNTER — TELEPHONE (OUTPATIENT)
Dept: SPORTS MEDICINE | Facility: CLINIC | Age: 34
End: 2024-06-19
Payer: COMMERCIAL

## 2024-06-19 DIAGNOSIS — G89.29 CHRONIC HIP PAIN, LEFT: ICD-10-CM

## 2024-06-19 DIAGNOSIS — M25.552 CHRONIC LEFT HIP PAIN: Primary | ICD-10-CM

## 2024-06-19 DIAGNOSIS — M76.02 GLUTEAL TENDINITIS OF LEFT BUTTOCK: ICD-10-CM

## 2024-06-19 DIAGNOSIS — M70.62 TROCHANTERIC BURSITIS OF LEFT HIP: ICD-10-CM

## 2024-06-19 DIAGNOSIS — G89.29 CHRONIC LEFT HIP PAIN: Primary | ICD-10-CM

## 2024-06-19 DIAGNOSIS — R29.898 WEAKNESS OF LEFT HIP: ICD-10-CM

## 2024-06-19 DIAGNOSIS — M25.552 CHRONIC HIP PAIN, LEFT: ICD-10-CM

## 2024-06-19 PROCEDURE — 97161 PT EVAL LOW COMPLEX 20 MIN: CPT

## 2024-06-19 NOTE — TELEPHONE ENCOUNTER
Called and left message for patient to call back to further discuss MRI results. Per ANGELICA Malhotra,  results of her hip MRI was completely normal. Likely origin of her pain is tendinitis like we discussed. Would recommend doing PT here like we discussed and mobic that I prescribed as needed. F/u in 8 weeks.      Asked patient to call back to schedule 8 week follow up.

## 2024-06-19 NOTE — PROGRESS NOTES
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 No trauma, no mech sxs/instabilty.    She reports lateral hip pain that began 1+ years ago while pregnant. Pain has continued. Pain is worse with prolonged activity. I last saw her 2 months ago and now she reports more groin pain then previous. Sometimes she feels that the hip is swollen at times. She occasionally feels some numbness radiating into her toes. She tried dry needling and cupping with Josh Ndiaye at [facility] which made her pain worse. She had a trochanteric bursa hip CSI from me with only 1 week of pain relief. She has tried over the counter tylenol and ibuprofen with little pain relief. She has not done formal PT and HEP for a consistent period of time.     Pain bothers her at night.   SANE 50  Today the patient rates pain at a 4/10 on visual analog scale.      Affecting ADLs and exercising      Review of Systems   Constitution: Negative. Negative for chills, fever and night sweats.   HENT: Negative for congestion and headaches.    Eyes: Negative for blurred vision, left vision loss and right vision loss.   Cardiovascular: Negative for chest pain and syncope.   Respiratory: Negative for cough and shortness of breath.    Endocrine: Negative for polydipsia, polyphagia and polyuria.   Hematologic/Lymphatic: Negative for bleeding problem. Does not bruise/bleed easily.   Skin: Negative for dry skin, itching and rash.   Musculoskeletal: Negative for falls and muscle weakness.   Gastrointestinal: Negative for abdominal pain and bowel incontinence.   Genitourinary: Negative for bladder incontinence and nocturia.   Neurological: Negative for disturbances in coordination, loss of balance and seizures.   Psychiatric/Behavioral: Negative for depression. The patient does not have insomnia.    Allergic/Immunologic: Negative for hives and persistent infections.   All other systems  negative.    PAST MEDICAL HISTORY:   Past Medical History:   Diagnosis Date    Bilateral knee pain 06/19/2018    Endometriosis     treated by GYN at     Epigastric abdominal pain 12/05/2016    Gastroesophageal reflux disease 07/06/2016    Helicobacter pylori ab+     Helicobacter pylori ab+     High-tone pelvic floor dysfunction 05/02/2016    IIH (idiopathic intracranial hypertension)     Dr. Rogers - [facility]    Palpitations 07/06/2016    Pre-eclampsia in postpartum period 08/16/2022     PAST SURGICAL HISTORY:   Past Surgical History:   Procedure Laterality Date    ARTHROSCOPY OF KNEE Left 04/06/2021    Procedure: ARTHROSCOPY, KNEE;  Surgeon: Deidra Mejias MD;  Location: [facility] OR;  Service: Orthopedics;  Laterality: Left;    COLONOSCOPY N/A 12/13/2018    Procedure: COLONOSCOPY;  Surgeon: Micki Gross MD;  Location: [facility] (4TH FLR);  Service: Endoscopy;  Laterality: N/A;  preferrably in Dec 2018, CRS ok if needed.    COLONOSCOPY N/A 11/05/2022    Procedure: COLONOSCOPY;  Surgeon: Greg Leach MD;  Location: [facility] (4TH FLR);  Service: Endoscopy;  Laterality: N/A;  Endoscopy schedulers please schedule patient for colonoscopy with me for further evaluation of painless hematochezia thank you    ESOPHAGOGASTRODUODENOSCOPY N/A 10/30/2018    Procedure: EGD (ESOPHAGOGASTRODUODENOSCOPY);  Surgeon: Greg Leach MD;  Location: [facility] (2ND FLR);  Service: Endoscopy;  Laterality: N/A;  ok to schedule per Kimmy    ESOPHAGOGASTRODUODENOSCOPY N/A 11/05/2022    Procedure: EGD (ESOPHAGOGASTRODUODENOSCOPY);  Surgeon: Greg Leach MD;  Location: [facility] (4TH FLR);  Service: Endoscopy;  Laterality: N/A;  Endoscopy schedulers please schedule patient for EGD for evaluation of right upper quadrant pain thank you  prep instr portal--ml  11/2/22 room closed-pt okay with Dr. Leach-50 min case okay per Katlyn-ml    KNEE ARTHROSCOPY W/ DEBRIDEMENT  04/06/2021    Procedure: ARTHROSCOPY, KNEE, WITH DEBRIDEMENT;   Surgeon: Deidra Mejias MD;  Location: [facility] OR;  Service: Orthopedics;;    KNEE ARTHROSCOPY W/ MENISCECTOMY Right 04/06/2021    Procedure: ARTHROSCOPY, KNEE, WITH MENISCECTOMY;  Surgeon: Deidra Mejias MD;  Location: [facility] OR;  Service: Orthopedics;  Laterality: Right;    KNEE ARTHROSCOPY W/ PLICA EXCISION Left 04/06/2021    Procedure: EXCISION, PLICA, KNEE, ARTHROSCOPIC;  Surgeon: Deidra Mejias MD;  Location: [facility] OR;  Service: Orthopedics;  Laterality: Left;    LAPAROSCOPIC CHOLECYSTECTOMY N/A 4/11/2024    Procedure: CHOLECYSTECTOMY, LAPAROSCOPIC;  Surgeon: Tom Campos Jr., MD;  Location: [facility] OR [address] [place] [address];  Service: General;  Laterality: N/A;    LAPAROSCOPY  2017    dx with endo.  no removal    NO PAST SURGERIES      SYNOVECTOMY OF KNEE Left 04/06/2021    Procedure: SYNOVECTOMY, KNEE;  Surgeon: Deidra Mejias MD;  Location: [facility] OR;  Service: Orthopedics;  Laterality: Left;    UPPER GASTROINTESTINAL ENDOSCOPY       FAMILY HISTORY:   Family History   Problem Relation Name Age of Onset    Diabetes Mother lizette chase     No Known Problems Father      No Known Problems Sister      Hypertension Brother bolivar chase .     Hypertension Maternal Grandmother      Hypertension Maternal Grandfather soraida and carol burrell     No Known Problems Paternal Grandmother      No Known Problems Paternal Grandfather      Colon cancer Neg Hx      Crohn's disease Neg Hx      Esophageal cancer Neg Hx      Inflammatory bowel disease Neg Hx      Irritable bowel syndrome Neg Hx      Rectal cancer Neg Hx      Stomach cancer Neg Hx      Ulcerative colitis Neg Hx      Celiac disease Neg Hx      Cirrhosis Neg Hx      Colon polyps Neg Hx      Liver cancer Neg Hx      Liver disease Neg Hx       SOCIAL HISTORY:   Social History     Socioeconomic History    Marital status: Single   Occupational History     Employer: [employer] [place]     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   Tobacco Use    Smoking status: Never     Passive exposure:  Never    Smokeless tobacco: Never   Substance and Sexual Activity    Alcohol use: No    Drug use: No    Sexual activity: Yes     Partners: Male     Birth control/protection: None   Social History Narrative    She had her 1st baby in 2022 healthy vaginal delivery no      Social Determinants of Health     Financial Resource Strain: Unknown (10/25/2022)    Overall Financial Resource Strain (CARDIA)     Difficulty of Paying Living Expenses: Patient declined   Food Insecurity: Unknown (10/25/2022)    Hunger Vital Sign     Worried About Running Out of Food in the Last Year: Patient declined     Ran Out of Food in the Last Year: Patient declined   Transportation Needs: Unknown (10/25/2022)    PRAPARE - Transportation     Lack of Transportation (Medical): Patient declined     Lack of Transportation (Non-Medical): Patient declined   Physical Activity: Unknown (2023)    Exercise Vital Sign     Days of Exercise per Week: 1 day   Stress: No Stress Concern Present (2020)    [place] of Occupational Health - Occupational Stress Questionnaire     Feeling of Stress : Only a little   Housing Stability: Unknown (10/25/2022)    Housing Stability Vital Sign     Unable to Pay for Housing in the Last Year: Patient refused     Unstable Housing in the Last Year: Patient refused       MEDICATIONS:   Current Outpatient Medications:     ferrous sulfate 325 (65 FE) MG EC tablet, Take 1 tablet (325 mg total) by mouth once daily. (Patient not taking: Reported on 2024), Disp: 90 tablet, Rfl: 1    fluticasone propionate (FLONASE) 50 mcg/actuation nasal spray, 1 spray (50 mcg total) by Each Nostril route once daily., Disp: 16 g, Rfl: 5    ketoconazole (NIZORAL) 2 % cream, Apply topically 2 (two) times daily. Prn rash of face (Patient not taking: Reported on 2024), Disp: 60 g, Rfl: 4    levocetirizine (XYZAL) 5 MG tablet, Take 1 tablet (5 mg total) by mouth every evening., Disp: 30 tablet, Rfl: 11     meloxicam (MOBIC) 15 MG tablet, Take 1 tablet by mouth once daily with food. May need 20mg prilosec once daily on days that you are taking mobic to protect the stomach., Disp: 30 tablet, Rfl: 1    omeprazole (PRILOSEC) 20 MG capsule, Take 1 capsule (20 mg total) by mouth once daily., Disp: 30 capsule, Rfl: 2    oxyCODONE (ROXICODONE) 5 MG immediate release tablet, Take 1 tablet (5 mg total) by mouth every 4 (four) hours as needed for Pain. (Patient not taking: Reported on 5/31/2024), Disp: 15 tablet, Rfl: 0    promethazine-dextromethorphan (PROMETHAZINE-DM) 6.25-15 mg/5 mL Syrp, Take 5 mLs by mouth every 8 (eight) hours as needed (cough)., Disp: 180 mL, Rfl: 0    vitamin D (VITAMIN D3) 1000 units Tab, Take 1,000 Units by mouth once daily. (Patient not taking: Reported on 5/31/2024), Disp: , Rfl:     Current Facility-Administered Medications:     0.9%  NaCl infusion, , Intravenous, 1 time in Clinic/HOD, Agatha Causey, DEYSIP    acetaminophen tablet 650 mg, 650 mg, Oral, 1 time in Clinic/HOD, Agatha Causey, MORGAN  ALLERGIES:   Review of patient's allergies indicates:   Allergen Reactions    Doxycycline Other (See Comments)     Triggers headaches    Bactrim ds [sulfamethoxazole-trimethoprim] Nausea And Vomiting    Nsaids (non-steroidal anti-inflammatory drug) Other (See Comments)     D/t h/o PUD and H pylori    Codeine Rash       VITAL SIGNS: /74   Pulse 85   Wt 77 kg (169 lb 12.1 oz)   LMP 05/20/2024   BMI 28.25 kg/m²        PHYSICAL EXAM /  HIP  PHYSICAL EXAMINATION  General:  The patient is alert and oriented x 3.  Mood is pleasant.  Observation of ears, eyes and nose reveal no gross abnormalities.  HEENT: NCAT, sclera nonicteric  Lungs: Respirations are equal and unlabored..    left HIP EXAMINATION     OBSERVATION / INSPECTION  Gait:   Nonantalgic   Alignment:  Neutral   Scars:   None   Muscle atrophy: None   Effusion:  None   Warmth:  None   Discoloration:   None   Leg lengths:   Equal    Pelvis:   Level     TENDERNESS / CREPITUS (T/C):      T / C  Trochanteric bursa   +/ -  Piriformis    - / -  SI joint    - / -  Psoas tendon   - / -  Rectus insertion  - / -  Adductor origin  + / -  Pubic symphysis  - / -  IT band                                   - / -  Gluteus tendons                     + / -    ROM: (* = pain)    Flexion:    120 degrees  External rotation: 40 degrees  Internal rotation with axial load: 30 degrees  Internal rotation without axial load: 40 degrees  Abduction:  45 degrees  Adduction:   20 degrees    SPECIAL TESTS:  Pain w/ forced internal rotation (FADIR): +  Pain w/ forced external rotation (CAMPOS): Absent   Circumduction test:    +  Stinchfield test:    Negative   Log roll:      Negative   Snapping hip (internal):   Negative   Sit-up pain:     Negative   Resisted sit-up pain:    Negative   Resisted sit-up with adductor contraction pain:  Negative   Step-down test:    +  Trendelenburg test:    Negative  Bridge test     +     EXTREMITY NEURO-VASCULAR EXAMINATION:   Sensation:  Grossly intact to light touch all dermatomal regions.   Motor Function:  Fully intact motor function at hip, knee, foot and ankle    DTRs;  quadriceps and  achilles 2+.  No clonus and downgoing Babinski.    Vascular status:  DP and PT pulses 2+, brisk capillary refill, symmetric.    Skin:  intact, compartments soft.    OTHER FINDINGS:      XRAYS:  3 Pelvis views and 2 hip views were independently reviewed and interpreted by myself.  No fracture, subluxation, or other joint abnormality is identified.      ASSESSMENT:    1. left hip pain, chronic  2. Left hip trochanteric bursitis   3. Left hip gluteus tendinitis   4. Left adductor tendinitis   hip abd/core weakness    PLAN:  1. Discussed multiple treatment options today including injection. Explained that I would try PT and NSAIDs. Strongly urged PT and weight loss to help.   PT for left hip glute tendinitis and greater trochanteric bursitis. PT for hip,  core, abductor strengthening with HEP.   She had gastritis a long time ago and has stayed away from NSAIDs. She was taking large qty at that time.   Will prescribe mobic daily to take with food. Take prilosec when using. Do not take other OTC NSAIDs  2. Will do PT here.   3. Activity modifications.  4. She is requesting MRI to further evaluate her hip pain.   RTC in 8 weeks for recheck.     All questions were answered, pt will contact us for questions or concerns in the interim.

## 2024-06-19 NOTE — PROGRESS NOTES
[facility]  Physical Therapy Initial Evaluation    Name: Laila Pappas  Clinic Number: [id number]    Therapy Diagnosis:   Encounter Diagnoses   Name Primary?    Chronic hip pain, left     Gluteal tendinitis of left buttock     Trochanteric bursitis of left hip      Physician: Wallace Villagomez III, *    Physician Orders: PT Eval and Treat  Medical Diagnosis from Referral:   M25.552,G89.29 (ICD-10-CM) - Chronic hip pain, left   M76.02 (ICD-10-CM) - Gluteal tendinitis of left buttock   M70.62 (ICD-10-CM) - Trochanteric bursitis of left hip   Evaluation Date: 6/19/2024  Authorization Period Expiration: 12/31/2024  Plan of Care Expiration: 09/19/2024  Visit # / Visits authorized: 1/1    FOTO: 53  FOTO 1st Follow-up: NA  FOTO 2nd Follow-up: NA    Time In: 1200  Time Out: 1305  Total Billable Time: 60 minutes    Precautions: Standard    Subjective     Date of onset: chronic  History of current condition - Laila reports: chronic history of rather diffuse L lateral hip pain that will occasionally present in her L groin.  Feels like the pain started from approximately 4 months into pregnancy and has persisted since that time.  Her daughter is now nearly 2 years old.  Unable to correlate any activity with pain provocation outside of stating that it is challenging for her to lie on her L side. States that she is relatively sedentary outside of work and caring for her daughter.  Works as a  at [employer] school, so she sits for prolonged periods.  Sitting really feels like pressure.  Used to enjoy running, but has not been able to run since before her pregnancy.   No formal exercise since before the pregnancy.   Denies any back pain.  Underwent an MRI and also a CSI; CSI no relief     Imaging: see chart    Prior Therapy: Yes at another facility; no improvement with dry needling and cupping  Social History: Lives with family  Occupation: see above  Prior Level of Function: NO issues  Current  Level of Function: Issues with prolonged sitting and prolonged wb'ing interventions    Pain:  Current 4/10, worst 9/10, best 2/10   Location: left lateral hip and groin  Description: Aching, Dull, Tight, and Deep  Aggravating Factors: Sitting, Standing, Laying, Touching, Morning, Extension, Flexing, Lifting, and Getting out of bed/chair  Easing Factors: rest    Pts Goals: Improve tolerance to lying on L side and prolonged standing and walking    Medical History:   Past Medical History:   Diagnosis Date    Bilateral knee pain 06/19/2018    Endometriosis     treated by GYN at     Epigastric abdominal pain 12/05/2016    Gastroesophageal reflux disease 07/06/2016    Helicobacter pylori ab+     Helicobacter pylori ab+     High-tone pelvic floor dysfunction 05/02/2016    IIH (idiopathic intracranial hypertension)     Dr. Rogers - [facility]    Palpitations 07/06/2016    Pre-eclampsia in postpartum period 08/16/2022       Surgical History:   Laila Pappas  has a past surgical history that includes No past surgeries; Upper gastrointestinal endoscopy; Esophagogastroduodenoscopy (N/A, 10/30/2018); Colonoscopy (N/A, 12/13/2018); Laparoscopy (2017); Synovectomy of knee (Left, 04/06/2021); Knee arthroscopy w/ plica excision (Left, 04/06/2021); Knee arthroscopy w/ debridement (04/06/2021); Arthroscopy of knee (Left, 04/06/2021); Knee arthroscopy w/ meniscectomy (Right, 04/06/2021); Esophagogastroduodenoscopy (N/A, 11/05/2022); Colonoscopy (N/A, 11/05/2022); and Laparoscopic cholecystectomy (N/A, 4/11/2024).    Medications:   Laila has a current medication list which includes the following prescription(s): ferrous sulfate, fluticasone propionate, ketoconazole, levocetirizine, meloxicam, omeprazole, oxycodone, promethazine-dextromethorphan, vitamin d, and [DISCONTINUED] folic acid, and the following Facility-Administered Medications: sodium chloride 0.9% and acetaminophen.    Allergies:   Review of patient's allergies indicates:  "  Allergen Reactions    Doxycycline Other (See Comments)     Triggers headaches    Bactrim ds [sulfamethoxazole-trimethoprim] Nausea And Vomiting    Nsaids (non-steroidal anti-inflammatory drug) Other (See Comments)     D/t h/o PUD and H pylori    Codeine Rash        Objective     Observation: increased L-sp lordosis in standing    Functional Tests:   SLS EO:   R: 20" L: 15" (subjective report of sway and weakness on L) Mild Trendelenburg  Double leg squat: Quad dominant; B genu valgus on end-range descent  Single leg squat: unable B  Plank: 14" (needs cues for neutral spine positioning)    Hip Range of Motion:   R PROM  L PROM   Flexion 125 125   Extension 5 8   Ext. Rotation 65 55*   Int. Rotation 25 20*     *= pain    Lower Extremity Strength   Right LE Left LE   Quadriceps: 4+/5 4/5   Hamstrings: 4+/5 4/5   Iliopsoas: 3+/5 3+/5   Hip extension:  4-/5 3+/5   PGM: 4/5 3+/5   Hip ER: 4-/5 4-/5   Hip IR: 4-/5 4-/5       Special Tests:   CAMPOS: -   SARAN: -  Hip Scour: -  Slump: -    Flexibility:    Hamstrings: R = normal ; L = normal    Markell test: R = normal ; L = normal      Joint Mobility: Hypermobile L Hip    Palpation: TTP L trochanteric borders     Intake Outcome Measure for FOTO Hip Survey    Therapist reviewed FOTO scores for Laila Pappas on 6/19/2024.   FOTO documents entered into [employer] - see Media section.    Intake Score: 53%     Treatment     Treatment Time In: 1200  Treatment Time Out: 1300  Total Treatment time separate from Evaluation: 30 minutes    Laila received the treatments listed below:      Therapeutic exercises to develop strength, endurance, ROM, flexibility, posture, and core stabilization for 5 minutes including:  Access Code: [email or web address]  - Supine Bridge with Resistance Band  - 1 x daily - 7 x weekly - 3 sets - 10 reps  - Sidelying Hip Adduction  - 1 x daily - 7 x weekly - 3 sets - 10 reps  - Standing Repeated Hip Adduction with Resistance  - 1 x daily - 7 x weekly - 3 sets - 10 reps  - Supine " Hip Adduction Isometric with Ball  - 1 x daily - 7 x weekly - 3 sets - 10 reps  - Quadruped Leg Extension with Resistance  - 1 x daily - 7 x weekly - 3 sets - 10 reps    Neuromuscular re-education activities to improve: Balance, Coordination, Kinesthetic, Sense, Proprioception, and Posture for 0 minutes. The following activities were included:     Therapeutic activities to improve functional performance for 0 minutes, including:    Home Exercises and Patient Education Provided     Education provided:   - Hip Stability  - Prognosis, activity modification, goals for therapy, role of therapy for care, exercises/HEP    Written Home Exercises Provided:  Exercises were reviewed and Laila was able to demonstrate them prior to the end of the session.   Pt received a written copy of exercises to perform at home. Laila demonstrated good understanding of the education provided.     See EMR under patient instructions for exercises given.     Assessment     Laila is a 33 y.o. female referred to outpatient Physical Therapy with presentation suggestive of L Hip Trochanteric Bursitis; however, pt had CSI to that region a few weeks ago and sx behavior remained unchanged, so will need to continue to monitor presentation.  Overall, presents with hypermobile L hip joint, stiffness upper L-sp and hypermobile lower L-sp to SIJ, and weakness ANT core and general hip musculature.  Will treat pt as stability candidate and work on hip strengthening and core strengthening with ideal pelvic control.      Pt will benefit from skilled outpatient Physical Therapy to address the deficits stated above and in the chart below, provide pt/family education, and to maximize pt's level of independence. Pt prognosis is Good.     Plan of care discussed with patient: Yes  Pt's spiritual, cultural and educational needs considered and patient is agreeable to the plan of care and goals as stated below:     Anticipated Barriers for therapy: Postpartum;  sedentary nature; chronic condition    Medical Necessity is demonstrated by the following:    History  Co-morbidities and personal factors that may impact the plan of care [x] LOW: no personal factors / co-morbidities  [] MODERATE: 1-2 personal factors / co-morbidities  [] HIGH: 3+ personal factors / co-morbidities    Moderate / High Support Documentation:   Co-morbidities affecting plan of care: None    Personal Factors:   No deficits     Examination  Body Structures and Functions, activity limitations and participation restrictions that may impact the plan of care [] LOW: addressing 1-2 elements  [x] MODERATE: 3+ elements  [] HIGH: 4+ elements (please support below)    Moderate / High Support Documentation:    Bilateral knee pain 06/19/2018    Endometriosis     treated by GYN at     Epigastric abdominal pain 12/05/2016    Gastroesophageal reflux disease 07/06/2016    Helicobacter pylori ab+     Helicobacter pylori ab+     High-tone pelvic floor dysfunction 05/02/2016    IIH (idiopathic intracranial hypertension)     Dr. Rogers - [facility]    Palpitations 07/06/2016    Pre-eclampsia in postpartum period 08/16/2022        Clinical Presentation [x] LOW: stable  [] MODERATE: Evolving  [] HIGH: Unstable     Decision Making/ Complexity Score: low       GOALS   Short Term Goals: 6 weeks  Pt will report decreased L hip pain  < / =  4/10  to increase tolerance for ADL performance and recreational routine.  Pt will improve pain-free L hip ROM to WFL in order to be able to perform ADLs without difficulty.  Pt will improve L hip strength by 1/3 MMT grade to increase tolerance for ADL and work activities  Pt will demonstrate tolerance to HEP to improve independence with ADL's    Long Term Goals: 12 weeks  Pt will report decreased L hip pain  < / =  1/10  to increase tolerance for ADL performance and recreational routine  Pt will improve pain-free L hip ROM to WNL in order to be able to perform ADLs without difficulty  Pt  will improve L hip strength by 1/3 MMT grade to increase tolerance for ADL and work activities  Pt will report at CJ level (20-40% impaired) on FOTO Hip to demonstrate increase in LE function with every day tasks.     Plan   Plan of care Certification: 6/19/2024 to 09/19/2024.    Outpatient Physical Therapy 2 times weekly for 12 weeks to include the following interventions: Gait Training, Manual Therapy, Moist Heat/ Ice, Neuromuscular Re-ed, Patient Education, Therapeutic Activites, Therapeutic Exercise, and Functional Dry Needling with/or without Electrical Stimulation as needed.     Richard Hoffman, PT , DPT, OCS  Board Certified in Orthopedic Physical Therapy

## 2024-06-20 PROBLEM — M25.552 CHRONIC LEFT HIP PAIN: Status: ACTIVE | Noted: 2024-06-20

## 2024-06-20 PROBLEM — R29.898 WEAKNESS OF LEFT HIP: Status: ACTIVE | Noted: 2024-06-20

## 2024-06-20 PROBLEM — G89.29 CHRONIC LEFT HIP PAIN: Status: ACTIVE | Noted: 2024-06-20

## 2024-06-20 NOTE — PLAN OF CARE
[facility]  Physical Therapy Initial Evaluation    Name: Laila Pappas  Clinic Number: [id number]    Therapy Diagnosis:   Encounter Diagnoses   Name Primary?    Chronic hip pain, left     Gluteal tendinitis of left buttock     Trochanteric bursitis of left hip      Physician: Wallace Villagomez III, *    Physician Orders: PT Eval and Treat  Medical Diagnosis from Referral:   M25.552,G89.29 (ICD-10-CM) - Chronic hip pain, left   M76.02 (ICD-10-CM) - Gluteal tendinitis of left buttock   M70.62 (ICD-10-CM) - Trochanteric bursitis of left hip   Evaluation Date: 6/19/2024  Authorization Period Expiration: 12/31/2024  Plan of Care Expiration: 09/19/2024  Visit # / Visits authorized: 1/1    FOTO: 53  FOTO 1st Follow-up: NA  FOTO 2nd Follow-up: NA    Time In: 1200  Time Out: 1305  Total Billable Time: 60 minutes    Precautions: Standard    Subjective     Date of onset: chronic  History of current condition - Laila reports: chronic history of rather diffuse L lateral hip pain that will occasionally present in her L groin.  Feels like the pain started from approximately 4 months into pregnancy and has persisted since that time.  Her daughter is now nearly 2 years old.  Unable to correlate any activity with pain provocation outside of stating that it is challenging for her to lie on her L side. States that she is relatively sedentary outside of work and caring for her daughter.  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  Sitting really feels like pressure.  Used to enjoy running, but has not been able to run since before her pregnancy.   No formal exercise since before the pregnancy.   Denies any back pain.  Underwent an MRI and also a CSI; CSI no relief     Imaging: see chart    Prior Therapy: Yes at another facility; no improvement with dry needling and cupping  Social History: Lives with family  Occupation: see above  Prior Level of Function: NO issues  Current  Level of Function: Issues with prolonged sitting and prolonged wb'ing interventions    Pain:  Current 4/10, worst 9/10, best 2/10   Location: left lateral hip and groin  Description: Aching, Dull, Tight, and Deep  Aggravating Factors: Sitting, Standing, Laying, Touching, Morning, Extension, Flexing, Lifting, and Getting out of bed/chair  Easing Factors: rest    Pts Goals: Improve tolerance to lying on L side and prolonged standing and walking    Medical History:   Past Medical History:   Diagnosis Date    Bilateral knee pain 06/19/2018    Endometriosis     treated by GYN at     Epigastric abdominal pain 12/05/2016    Gastroesophageal reflux disease 07/06/2016    Helicobacter pylori ab+     Helicobacter pylori ab+     High-tone pelvic floor dysfunction 05/02/2016    IIH (idiopathic intracranial hypertension)     Dr. Rogers - [facility]    Palpitations 07/06/2016    Pre-eclampsia in postpartum period 08/16/2022       Surgical History:   Laila Pappas  has a past surgical history that includes No past surgeries; Upper gastrointestinal endoscopy; Esophagogastroduodenoscopy (N/A, 10/30/2018); Colonoscopy (N/A, 12/13/2018); Laparoscopy (2017); Synovectomy of knee (Left, 04/06/2021); Knee arthroscopy w/ plica excision (Left, 04/06/2021); Knee arthroscopy w/ debridement (04/06/2021); Arthroscopy of knee (Left, 04/06/2021); Knee arthroscopy w/ meniscectomy (Right, 04/06/2021); Esophagogastroduodenoscopy (N/A, 11/05/2022); Colonoscopy (N/A, 11/05/2022); and Laparoscopic cholecystectomy (N/A, 4/11/2024).    Medications:   Laila has a current medication list which includes the following prescription(s): ferrous sulfate, fluticasone propionate, ketoconazole, levocetirizine, meloxicam, omeprazole, oxycodone, promethazine-dextromethorphan, vitamin d, and [DISCONTINUED] folic acid, and the following Facility-Administered Medications: sodium chloride 0.9% and acetaminophen.    Allergies:   Review of patient's allergies indicates:  "  Allergen Reactions    Doxycycline Other (See Comments)     Triggers headaches    Bactrim ds [sulfamethoxazole-trimethoprim] Nausea And Vomiting    Nsaids (non-steroidal anti-inflammatory drug) Other (See Comments)     D/t h/o PUD and H pylori    Codeine Rash        Objective     Observation: increased L-sp lordosis in standing    Functional Tests:   SLS EO:   R: 20" L: 15" (subjective report of sway and weakness on L) Mild Trendelenburg  Double leg squat: Quad dominant; B genu valgus on end-range descent  Single leg squat: unable B  Plank: 14" (needs cues for neutral spine positioning)    Hip Range of Motion:   R PROM  L PROM   Flexion 125 125   Extension 5 8   Ext. Rotation 65 55*   Int. Rotation 25 20*     *= pain    Lower Extremity Strength   Right LE Left LE   Quadriceps: 4+/5 4/5   Hamstrings: 4+/5 4/5   Iliopsoas: 3+/5 3+/5   Hip extension:  4-/5 3+/5   PGM: 4/5 3+/5   Hip ER: 4-/5 4-/5   Hip IR: 4-/5 4-/5       Special Tests:   CAMPOS: -   SARAN: -  Hip Scour: -  Slump: -    Flexibility:    Hamstrings: R = normal ; L = normal    Markell test: R = normal ; L = normal      Joint Mobility: Hypermobile L Hip    Palpation: TTP L trochanteric borders     Intake Outcome Measure for FOTO Hip Survey    Therapist reviewed FOTO scores for Laila Pappas on 6/19/2024.   FOTO documents entered into [employer] - see Media section.    Intake Score: 53%     Treatment     Treatment Time In: 1200  Treatment Time Out: 1300  Total Treatment time separate from Evaluation: 30 minutes    Laila received the treatments listed below:      Therapeutic exercises to develop strength, endurance, ROM, flexibility, posture, and core stabilization for 5 minutes including:  Access Code: [email or web address]  - Supine Bridge with Resistance Band  - 1 x daily - 7 x weekly - 3 sets - 10 reps  - Sidelying Hip Adduction  - 1 x daily - 7 x weekly - 3 sets - 10 reps  - Standing Repeated Hip Adduction with Resistance  - 1 x daily - 7 x weekly - 3 sets - 10 reps  - Supine " Hip Adduction Isometric with Ball  - 1 x daily - 7 x weekly - 3 sets - 10 reps  - Quadruped Leg Extension with Resistance  - 1 x daily - 7 x weekly - 3 sets - 10 reps    Neuromuscular re-education activities to improve: Balance, Coordination, Kinesthetic, Sense, Proprioception, and Posture for 0 minutes. The following activities were included:     Therapeutic activities to improve functional performance for 0 minutes, including:    Home Exercises and Patient Education Provided     Education provided:   - Hip Stability  - Prognosis, activity modification, goals for therapy, role of therapy for care, exercises/HEP    Written Home Exercises Provided:  Exercises were reviewed and Laila was able to demonstrate them prior to the end of the session.   Pt received a written copy of exercises to perform at home. Laila demonstrated good understanding of the education provided.     See EMR under patient instructions for exercises given.     Assessment     Laila is a 33 y.o. female referred to outpatient Physical Therapy with presentation suggestive of L Hip Trochanteric Bursitis; however, pt had CSI to that region a few weeks ago and sx behavior remained unchanged, so will need to continue to monitor presentation.  Overall, presents with hypermobile L hip joint, stiffness upper L-sp and hypermobile lower L-sp to SIJ, and weakness ANT core and general hip musculature.  Will treat pt as stability candidate and work on hip strengthening and core strengthening with ideal pelvic control.      Pt will benefit from skilled outpatient Physical Therapy to address the deficits stated above and in the chart below, provide pt/family education, and to maximize pt's level of independence. Pt prognosis is Good.     Plan of care discussed with patient: Yes  Pt's spiritual, cultural and educational needs considered and patient is agreeable to the plan of care and goals as stated below:     Anticipated Barriers for therapy: Postpartum;  sedentary nature; chronic condition    Medical Necessity is demonstrated by the following:    History  Co-morbidities and personal factors that may impact the plan of care [x] LOW: no personal factors / co-morbidities  [] MODERATE: 1-2 personal factors / co-morbidities  [] HIGH: 3+ personal factors / co-morbidities    Moderate / High Support Documentation:   Co-morbidities affecting plan of care: None    Personal Factors:   No deficits     Examination  Body Structures and Functions, activity limitations and participation restrictions that may impact the plan of care [] LOW: addressing 1-2 elements  [x] MODERATE: 3+ elements  [] HIGH: 4+ elements (please support below)    Moderate / High Support Documentation:    Bilateral knee pain 06/19/2018    Endometriosis     treated by GYN at     Epigastric abdominal pain 12/05/2016    Gastroesophageal reflux disease 07/06/2016    Helicobacter pylori ab+     Helicobacter pylori ab+     High-tone pelvic floor dysfunction 05/02/2016    IIH (idiopathic intracranial hypertension)     Dr. Rogers - [facility]    Palpitations 07/06/2016    Pre-eclampsia in postpartum period 08/16/2022        Clinical Presentation [x] LOW: stable  [] MODERATE: Evolving  [] HIGH: Unstable     Decision Making/ Complexity Score: low       GOALS   Short Term Goals: 6 weeks  Pt will report decreased L hip pain  < / =  4/10  to increase tolerance for ADL performance and recreational routine.  Pt will improve pain-free L hip ROM to WFL in order to be able to perform ADLs without difficulty.  Pt will improve L hip strength by 1/3 MMT grade to increase tolerance for ADL and work activities  Pt will demonstrate tolerance to HEP to improve independence with ADL's    Long Term Goals: 12 weeks  Pt will report decreased L hip pain  < / =  1/10  to increase tolerance for ADL performance and recreational routine  Pt will improve pain-free L hip ROM to WNL in order to be able to perform ADLs without difficulty  Pt  will improve L hip strength by 1/3 MMT grade to increase tolerance for ADL and work activities  Pt will report at CJ level (20-40% impaired) on FOTO Hip to demonstrate increase in LE function with every day tasks.     Plan   Plan of care Certification: 6/19/2024 to 09/19/2024.    Outpatient Physical Therapy 2 times weekly for 12 weeks to include the following interventions: Gait Training, Manual Therapy, Moist Heat/ Ice, Neuromuscular Re-ed, Patient Education, Therapeutic Activites, Therapeutic Exercise, and Functional Dry Needling with/or without Electrical Stimulation as needed.     Richard Hoffman, PT , DPT, OCS  Board Certified in Orthopedic Physical Therapy

## 2024-08-09 DIAGNOSIS — M54.2 NECK PAIN: Primary | ICD-10-CM

## 2024-08-12 ENCOUNTER — PATIENT MESSAGE (OUTPATIENT)
Dept: ORTHOPEDICS | Facility: CLINIC | Age: 34
End: 2024-08-12
Payer: COMMERCIAL

## 2024-08-20 ENCOUNTER — HOSPITAL ENCOUNTER (OUTPATIENT)
Dept: RADIOLOGY | Facility: HOSPITAL | Age: 34
Discharge: HOME | End: 2024-08-20
Attending: ORTHOPAEDIC SURGERY
Payer: COMMERCIAL

## 2024-08-20 DIAGNOSIS — M54.2 NECK PAIN: ICD-10-CM

## 2024-08-20 PROCEDURE — 72050 X-RAY EXAM NECK SPINE 4/5VWS: CPT | Mod: TC,PN

## 2024-08-20 PROCEDURE — 72050 X-RAY EXAM NECK SPINE 4/5VWS: CPT | Mod: 26,,, | Performed by: RADIOLOGY

## 2024-08-22 ENCOUNTER — PATIENT MESSAGE (OUTPATIENT)
Dept: INTERNAL MEDICINE | Facility: CLINIC | Age: 34
End: 2024-08-22

## 2024-08-22 ENCOUNTER — OFFICE VISIT (OUTPATIENT)
Dept: INTERNAL MEDICINE | Facility: CLINIC | Age: 34
End: 2024-08-22
Payer: COMMERCIAL

## 2024-08-22 VITALS
HEART RATE: 103 BPM | WEIGHT: 173.31 LBS | OXYGEN SATURATION: 98 % | SYSTOLIC BLOOD PRESSURE: 104 MMHG | DIASTOLIC BLOOD PRESSURE: 66 MMHG | HEIGHT: 63 IN | BODY MASS INDEX: 30.71 KG/M2

## 2024-08-22 DIAGNOSIS — R22.2 LOCALIZED SWELLING, MASS AND LUMP, TRUNK: Primary | ICD-10-CM

## 2024-08-22 PROCEDURE — 99999 PR PBB SHADOW E&M-EST. PATIENT-LVL IV: CPT | Mod: PBBFAC,,, | Performed by: NURSE PRACTITIONER

## 2024-08-22 RX ORDER — KETOROLAC TROMETHAMINE 30 MG/ML
30 INJECTION, SOLUTION INTRAMUSCULAR; INTRAVENOUS ONCE
Status: COMPLETED | OUTPATIENT
Start: 2024-08-22 | End: 2024-08-22

## 2024-08-22 RX ORDER — KETOROLAC TROMETHAMINE 30 MG/ML
30 INJECTION, SOLUTION INTRAMUSCULAR; INTRAVENOUS
Status: DISCONTINUED | OUTPATIENT
Start: 2024-08-22 | End: 2024-08-22

## 2024-08-22 RX ADMIN — KETOROLAC TROMETHAMINE 30 MG: 30 INJECTION, SOLUTION INTRAMUSCULAR; INTRAVENOUS at 11:08

## 2024-08-22 NOTE — PROGRESS NOTES
INTERNAL MEDICINE CLINIC - SAME DAY APPOINTMENT  Progress Note    PRESENTING HISTORY     PCP: Jhoana Trejo MD    Chief Complaint/Reason for Visit:   No chief complaint on file.    History of Present Illness & ROS : Ms. Laila Pappas is a 33 y.o. female.    Same day apt.   Laila is a very pleasant young lady.   Here with her spouse, MARIANNE and their little girl, Jessica, whom is currently not feeling well.   Laila has been having this chronic right sided pain; reports that if starts in the front and radiates to her back, hurts if 'pressed on', unable to identify anything that makes it better or worse. Also hurts at rest. No cough, dizziness, SOB, chest pain... she has had her gallbladder out, but still with some discomforts. No injury or trauma endorsed.     Review of Systems:  Eyes: denies visual changes at this time denies floaters   ENT: no nasal congestion or sore throat  Respiratory: no cough or shorness of breath  Cardiovascular: no chest pain or palpitations  Gastrointestinal: no nausea or vomiting, no abdominal pain or change in bowel habits  Genitourinary: no hematuria or dysuria; denies frequency  Hematologic/Lymphatic: no easy bruising or lymphadenopathy  Musculoskeletal: no arthralgias or myalgias  Neurological: no seizures or tremors  Endocrine: no heat or cold intolerance      PAST HISTORY:     Past Medical History:   Diagnosis Date    Bilateral knee pain 06/19/2018    Endometriosis     treated by GYN at     Epigastric abdominal pain 12/05/2016    Gastroesophageal reflux disease 07/06/2016    Helicobacter pylori ab+     Helicobacter pylori ab+     High-tone pelvic floor dysfunction 05/02/2016    IIH (idiopathic intracranial hypertension)     Dr. Rogers - [facility]    Palpitations 07/06/2016    Pre-eclampsia in postpartum period 08/16/2022       Past Surgical History:   Procedure Laterality Date    ARTHROSCOPY OF KNEE Left 04/06/2021    Procedure: ARTHROSCOPY, KNEE;  Surgeon: Deidra Mejias MD;  Location:  [facility] OR;  Service: Orthopedics;  Laterality: Left;    COLONOSCOPY N/A 12/13/2018    Procedure: COLONOSCOPY;  Surgeon: Micki Gross MD;  Location: [facility] (4TH FLR);  Service: Endoscopy;  Laterality: N/A;  preferrably in Dec 2018, CRS ok if needed.    COLONOSCOPY N/A 11/05/2022    Procedure: COLONOSCOPY;  Surgeon: Greg Leach MD;  Location: [facility] (4TH FLR);  Service: Endoscopy;  Laterality: N/A;  Endoscopy schedulers please schedule patient for colonoscopy with me for further evaluation of painless hematochezia thank you    ESOPHAGOGASTRODUODENOSCOPY N/A 10/30/2018    Procedure: EGD (ESOPHAGOGASTRODUODENOSCOPY);  Surgeon: Greg Leach MD;  Location: [facility] (2ND FLR);  Service: Endoscopy;  Laterality: N/A;  ok to schedule per Kimmy    ESOPHAGOGASTRODUODENOSCOPY N/A 11/05/2022    Procedure: EGD (ESOPHAGOGASTRODUODENOSCOPY);  Surgeon: Greg Leach MD;  Location: [facility] (4TH FLR);  Service: Endoscopy;  Laterality: N/A;  Endoscopy schedulers please schedule patient for EGD for evaluation of right upper quadrant pain thank you  prep instr portal--ml  11/2/22 room closed-pt okay with Dr. Leach-50 min case okay per Katlyn-ml    KNEE ARTHROSCOPY W/ DEBRIDEMENT  04/06/2021    Procedure: ARTHROSCOPY, KNEE, WITH DEBRIDEMENT;  Surgeon: Deidra Mejias MD;  Location: [facility] OR;  Service: Orthopedics;;    KNEE ARTHROSCOPY W/ MENISCECTOMY Right 04/06/2021    Procedure: ARTHROSCOPY, KNEE, WITH MENISCECTOMY;  Surgeon: Deidra Mejias MD;  Location: [facility] OR;  Service: Orthopedics;  Laterality: Right;    KNEE ARTHROSCOPY W/ PLICA EXCISION Left 04/06/2021    Procedure: EXCISION, PLICA, KNEE, ARTHROSCOPIC;  Surgeon: Deidra Mejias MD;  Location: [facility] OR;  Service: Orthopedics;  Laterality: Left;    LAPAROSCOPIC CHOLECYSTECTOMY N/A 4/11/2024    Procedure: CHOLECYSTECTOMY, LAPAROSCOPIC;  Surgeon: Tom Campos Jr., MD;  Location: [facility] 2ND FLR;  Service: General;  Laterality: N/A;    LAPAROSCOPY  2017    dx with endo.   no removal    NO PAST SURGERIES      SYNOVECTOMY OF KNEE Left 2021    Procedure: SYNOVECTOMY, KNEE;  Surgeon: Deidra Mejias MD;  Location: [facility];  Service: Orthopedics;  Laterality: Left;    UPPER GASTROINTESTINAL ENDOSCOPY         Family History   Problem Relation Name Age of Onset    Diabetes Mother lizette chase     No Known Problems Father      No Known Problems Sister      Hypertension Brother bolivar chase jr.     Hypertension Maternal Grandmother      Hypertension Maternal Grandfather soraida and carol burrell     No Known Problems Paternal Grandmother      No Known Problems Paternal Grandfather      Colon cancer Neg Hx      Crohn's disease Neg Hx      Esophageal cancer Neg Hx      Inflammatory bowel disease Neg Hx      Irritable bowel syndrome Neg Hx      Rectal cancer Neg Hx      Stomach cancer Neg Hx      Ulcerative colitis Neg Hx      Celiac disease Neg Hx      Cirrhosis Neg Hx      Colon polyps Neg Hx      Liver cancer Neg Hx      Liver disease Neg Hx         Social History     Socioeconomic History    Marital status: Single   Occupational History     Employer: [employer]     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   Tobacco Use    Smoking status: Never     Passive exposure: Never    Smokeless tobacco: Never   Substance and Sexual Activity    Alcohol use: No    Drug use: No    Sexual activity: Yes     Partners: Male     Birth control/protection: None   Social History Narrative    She had her 1st baby in 2022 healthy vaginal delivery no      Social Determinants of Health     Financial Resource Strain: Unknown (10/25/2022)    Overall Financial Resource Strain (CARDIA)     Difficulty of Paying Living Expenses: Patient declined   Food Insecurity: Unknown (10/25/2022)    Hunger Vital Sign     Worried About Running Out of Food in the Last Year: Patient declined     Ran Out of Food in the Last Year: Patient declined   Transportation Needs: Unknown (10/25/2022)    PRAPARE -  Transportation     Lack of Transportation (Medical): Patient declined     Lack of Transportation (Non-Medical): Patient declined   Physical Activity: Unknown (12/12/2023)    Exercise Vital Sign     Days of Exercise per Week: 1 day   Stress: No Stress Concern Present (9/29/2020)    [employer] [place] [employer] - Occupational Stress Questionnaire     Feeling of Stress : Only a little   Housing Stability: Unknown (10/25/2022)    Housing Stability Vital Sign     Unable to Pay for Housing in the Last Year: Patient refused     Unstable Housing in the Last Year: Patient refused       MEDICATIONS & ALLERGIES:     Current Outpatient Medications on File Prior to Visit   Medication Sig Dispense Refill    ferrous sulfate 325 (65 FE) MG EC tablet Take 1 tablet (325 mg total) by mouth once daily. (Patient not taking: Reported on 5/31/2024) 90 tablet 1    fluticasone propionate (FLONASE) 50 mcg/actuation nasal spray 1 spray (50 mcg total) by Each Nostril route once daily. 16 g 5    ketoconazole (NIZORAL) 2 % cream Apply topically 2 (two) times daily. Prn rash of face (Patient not taking: Reported on 6/17/2024) 60 g 4    levocetirizine (XYZAL) 5 MG tablet Take 1 tablet (5 mg total) by mouth every evening. 30 tablet 11    meloxicam (MOBIC) 15 MG tablet Take 1 tablet by mouth once daily with food. May need 20mg prilosec once daily on days that you are taking mobic to protect the stomach. 30 tablet 1    omeprazole (PRILOSEC) 20 MG capsule Take 1 capsule (20 mg total) by mouth once daily. 30 capsule 2    oxyCODONE (ROXICODONE) 5 MG immediate release tablet Take 1 tablet (5 mg total) by mouth every 4 (four) hours as needed for Pain. (Patient not taking: Reported on 5/31/2024) 15 tablet 0    vitamin D (VITAMIN D3) 1000 units Tab Take 1,000 Units by mouth once daily. (Patient not taking: Reported on 5/31/2024)      [DISCONTINUED] folic acid (FOLVITE) 1 MG tablet Take 1 mg by mouth once daily.       Current  Facility-Administered Medications on File Prior to Visit   Medication Dose Route Frequency Provider Last Rate Last Admin    0.9%  NaCl infusion   Intravenous 1 time in Clinic/HOD Agatha Causey FNP        acetaminophen tablet 650 mg  650 mg Oral 1 time in Clinic/HOD Agatha Causey FNP            Review of patient's allergies indicates:   Allergen Reactions    Doxycycline Other (See Comments)     Triggers headaches    Bactrim ds [sulfamethoxazole-trimethoprim] Nausea And Vomiting    Nsaids (non-steroidal anti-inflammatory drug) Other (See Comments)     D/t h/o PUD and H pylori    Codeine Rash       Medications Reconciliation:   I have reconciled the patient's home medications with the patient/family. I have updated all changes.  Refer to After-Visit Medication List.    OBJECTIVE:     Vital Signs:  There were no vitals filed for this visit.  Wt Readings from Last 3 Encounters:   06/18/24 0929 76.4 kg (168 lb 6.9 oz)   06/17/24 1558 77 kg (169 lb 12.1 oz)   05/31/24 1032 75 kg (165 lb 4.8 oz)     There is no height or weight on file to calculate BMI.   Wt Readings from Last 3 Encounters:   08/22/24 78.6 kg (173 lb 4.5 oz)   06/18/24 76.4 kg (168 lb 6.9 oz)   06/17/24 77 kg (169 lb 12.1 oz)     Temp Readings from Last 3 Encounters:   04/11/24 97.7 °F (36.5 °C) (Temporal)   04/01/24 98.2 °F (36.8 °C) (Oral)   12/07/23 98.6 °F (37 °C) (Oral)     BP Readings from Last 3 Encounters:   08/22/24 104/66   06/18/24 114/76   06/17/24 110/74     Pulse Readings from Last 3 Encounters:   08/22/24 103   06/18/24 91   06/17/24 85       Physical Exam:  General: Well developed, well nourished. No distress.  HEENT: Head is normocephalic, atraumatic  Eyes: Clear conjunctiva.  Neck: Supple, symmetrical neck; trachea midline.  Extremities: No LE edema. Pulses 2+ and symmetric.   Chest wall region: located to Right lower rib cage region with ? Of extending into upper abdominal region. Appreciable soft palpable area.  Fluctuance, not solid to palpation. ? Lipoma vs normal body habitus   Abdomen: Abdomen is soft, non-tender non-distended   Skin: Skin color, texture, turgor normal. No rashes.  Musculoskeletal: Normal gait.   Neurologic: Normal strength and tone. No focal numbness or weakness.     Laboratory  Lab Results   Component Value Date    WBC 7.17 02/27/2024    HGB 14.8 02/27/2024    HCT 46.8 02/27/2024     02/27/2024    CHOL 195 10/26/2023    TRIG 84 10/26/2023    HDL 43 10/26/2023    ALT 12 02/27/2024    AST 17 02/27/2024     02/27/2024    K 4.3 02/27/2024     02/27/2024    CREATININE 0.7 02/27/2024    BUN 7 02/27/2024    CO2 23 02/27/2024    TSH 2.136 05/28/2024    INR 1.1 11/18/2022    HGBA1C 4.9 10/26/2023         ASSESSMENT & PLAN:     Same day apt.       Localized swelling, mass and lump, trunk  ? Lipoma; will send for advanced imaging with hopes of being able to identify / delineate the finding of location of her pain.   -     MRI Chest W WO Contrast; Future; Expected date: 08/22/2024  -     ketorolac injection 30 mg  *Discussed the plans with Laila today and is in agreement with proceeding.     Future Appointments   Date Time Provider Department Center   8/23/2024  9:00 AM Quique Tanner MD [facility] SPINE [address]   8/23/2024  7:45 PM [facility] [id number] [facility]        Medication List            Accurate as of August 22, 2024 12:41 PM. If you have any questions, ask your nurse or doctor.                CONTINUE taking these medications      ferrous sulfate 325 (65 FE) MG EC tablet  Take 1 tablet (325 mg total) by mouth once daily.     fluticasone propionate 50 mcg/actuation nasal spray  Commonly known as: FLONASE  1 spray (50 mcg total) by Each Nostril route once daily.     ketoconazole 2 % cream  Commonly known as: NIZORAL  Apply topically 2 (two) times daily. Prn rash of face     levocetirizine 5 MG tablet  Commonly known as: XYZAL  Take 1 tablet (5 mg total) by mouth every  evening.     meloxicam 15 MG tablet  Commonly known as: MOBIC  Take 1 tablet by mouth once daily with food. May need 20mg prilosec once daily on days that you are taking mobic to protect the stomach.     omeprazole 20 MG capsule  Commonly known as: PRILOSEC  Take 1 capsule (20 mg total) by mouth once daily.     oxyCODONE 5 MG immediate release tablet  Commonly known as: ROXICODONE  Take 1 tablet (5 mg total) by mouth every 4 (four) hours as needed for Pain.     vitamin D 1000 units Tab  Commonly known as: VITAMIN D3            Signing Physician:  MORGAN Cage

## 2024-08-23 ENCOUNTER — HOSPITAL ENCOUNTER (OUTPATIENT)
Dept: RADIOLOGY | Facility: HOSPITAL | Age: 34
Discharge: HOME | End: 2024-08-23
Attending: NURSE PRACTITIONER
Payer: COMMERCIAL

## 2024-08-23 DIAGNOSIS — R22.2 LOCALIZED SWELLING, MASS AND LUMP, TRUNK: ICD-10-CM

## 2024-08-23 PROCEDURE — 25500020 PHARM REV CODE 255: Performed by: NURSE PRACTITIONER

## 2024-08-23 PROCEDURE — 71552 MRI CHEST W/O & W/DYE: CPT | Mod: TC

## 2024-08-23 PROCEDURE — A9585 GADOBUTROL INJECTION: HCPCS | Performed by: NURSE PRACTITIONER

## 2024-08-23 PROCEDURE — 71552 MRI CHEST W/O & W/DYE: CPT | Mod: 26,,, | Performed by: RADIOLOGY

## 2024-08-23 RX ORDER — GADOBUTROL 604.72 MG/ML
8 INJECTION INTRAVENOUS
Status: COMPLETED | OUTPATIENT
Start: 2024-08-23 | End: 2024-08-23

## 2024-08-23 RX ADMIN — GADOBUTROL 8 ML: 604.72 INJECTION INTRAVENOUS at 08:08

## 2024-08-25 ENCOUNTER — ON-DEMAND VIRTUAL (OUTPATIENT)
Dept: URGENT CARE | Facility: CLINIC | Age: 34
End: 2024-08-25
Payer: COMMERCIAL

## 2024-08-25 DIAGNOSIS — R39.9 UTI SYMPTOMS: Primary | ICD-10-CM

## 2024-08-25 PROCEDURE — 99213 OFFICE O/P EST LOW 20 MIN: CPT | Mod: 95,,, | Performed by: NURSE PRACTITIONER

## 2024-08-25 RX ORDER — NITROFURANTOIN 25; 75 MG/1; MG/1
100 CAPSULE ORAL 2 TIMES DAILY
Qty: 10 CAPSULE | Refills: 0 | Status: SHIPPED | OUTPATIENT
Start: 2024-08-25 | End: 2024-08-30

## 2024-08-25 NOTE — PROGRESS NOTES
Subjective:      Patient ID: Laila Pappas is a 33 y.o. female.    Vitals:  vitals were not taken for this visit.     Chief Complaint: Dysuria      Visit Type: TELE AUDIOVISUAL    Present with the patient at the time of consultation: TELEMED PRESENT WITH PATIENT: None        Past Medical History:   Diagnosis Date    Bilateral knee pain 06/19/2018    Endometriosis     treated by GYN at     Epigastric abdominal pain 12/05/2016    Gastroesophageal reflux disease 07/06/2016    Helicobacter pylori ab+     Helicobacter pylori ab+     High-tone pelvic floor dysfunction 05/02/2016    IIH (idiopathic intracranial hypertension)     Dr. Rogers - [facility]    Palpitations 07/06/2016    Pre-eclampsia in postpartum period 08/16/2022     Past Surgical History:   Procedure Laterality Date    ARTHROSCOPY OF KNEE Left 04/06/2021    Procedure: ARTHROSCOPY, KNEE;  Surgeon: Deidra Mejias MD;  Location: [facility] OR;  Service: Orthopedics;  Laterality: Left;    COLONOSCOPY N/A 12/13/2018    Procedure: COLONOSCOPY;  Surgeon: Micki Gross MD;  Location: [facility] (4TH FLR);  Service: Endoscopy;  Laterality: N/A;  preferrably in Dec 2018, CRS ok if needed.    COLONOSCOPY N/A 11/05/2022    Procedure: COLONOSCOPY;  Surgeon: Greg Leach MD;  Location: [facility] (4TH FLR);  Service: Endoscopy;  Laterality: N/A;  Endoscopy schedulers please schedule patient for colonoscopy with me for further evaluation of painless hematochezia thank you    ESOPHAGOGASTRODUODENOSCOPY N/A 10/30/2018    Procedure: EGD (ESOPHAGOGASTRODUODENOSCOPY);  Surgeon: Greg Leach MD;  Location: [facility] (2ND FLR);  Service: Endoscopy;  Laterality: N/A;  ok to schedule per Kimmy    ESOPHAGOGASTRODUODENOSCOPY N/A 11/05/2022    Procedure: EGD (ESOPHAGOGASTRODUODENOSCOPY);  Surgeon: Greg Leach MD;  Location: [facility] (4TH FLR);  Service: Endoscopy;  Laterality: N/A;  Endoscopy schedulers please schedule patient for EGD for evaluation of right upper quadrant  pain thank you  prep instr portal--ml  11/2/22 room closed-pt okjuan carlos with Dr. Leach-50 min case okay per Katlyn-ml    KNEE ARTHROSCOPY W/ DEBRIDEMENT  04/06/2021    Procedure: ARTHROSCOPY, KNEE, WITH DEBRIDEMENT;  Surgeon: Deidra Mejias MD;  Location: [facility] OR;  Service: Orthopedics;;    KNEE ARTHROSCOPY W/ MENISCECTOMY Right 04/06/2021    Procedure: ARTHROSCOPY, KNEE, WITH MENISCECTOMY;  Surgeon: Deidra Mejias MD;  Location: [facility] OR;  Service: Orthopedics;  Laterality: Right;    KNEE ARTHROSCOPY W/ PLICA EXCISION Left 04/06/2021    Procedure: EXCISION, PLICA, KNEE, ARTHROSCOPIC;  Surgeon: Deidra Mejias MD;  Location: [facility] OR;  Service: Orthopedics;  Laterality: Left;    LAPAROSCOPIC CHOLECYSTECTOMY N/A 4/11/2024    Procedure: CHOLECYSTECTOMY, LAPAROSCOPIC;  Surgeon: Tom Campos Jr., MD;  Location: [address];  Service: General;  Laterality: N/A;    LAPAROSCOPY  2017    dx with endo.  no removal    NO PAST SURGERIES      SYNOVECTOMY OF KNEE Left 04/06/2021    Procedure: SYNOVECTOMY, KNEE;  Surgeon: Deidra Mejias MD;  Location: [facility] OR;  Service: Orthopedics;  Laterality: Left;    UPPER GASTROINTESTINAL ENDOSCOPY       Review of patient's allergies indicates:   Allergen Reactions    Doxycycline Other (See Comments)     Triggers headaches    Bactrim ds [sulfamethoxazole-trimethoprim] Nausea And Vomiting    Nsaids (non-steroidal anti-inflammatory drug) Other (See Comments)     D/t h/o PUD and H pylori    Codeine Rash     Current Outpatient Medications on File Prior to Visit   Medication Sig Dispense Refill    ferrous sulfate 325 (65 FE) MG EC tablet Take 1 tablet (325 mg total) by mouth once daily. 90 tablet 1    fluticasone propionate (FLONASE) 50 mcg/actuation nasal spray 1 spray (50 mcg total) by Each Nostril route once daily. 16 g 5    ketoconazole (NIZORAL) 2 % cream Apply topically 2 (two) times daily. Prn rash of face 60 g 4    levocetirizine (XYZAL) 5 MG tablet Take 1 tablet (5 mg total) by mouth every evening.  30 tablet 11    meloxicam (MOBIC) 15 MG tablet Take 1 tablet by mouth once daily with food. May need 20mg prilosec once daily on days that you are taking mobic to protect the stomach. 30 tablet 1    omeprazole (PRILOSEC) 20 MG capsule Take 1 capsule (20 mg total) by mouth once daily. 30 capsule 2    oxyCODONE (ROXICODONE) 5 MG immediate release tablet Take 1 tablet (5 mg total) by mouth every 4 (four) hours as needed for Pain. 15 tablet 0    vitamin D (VITAMIN D3) 1000 units Tab Take 1,000 Units by mouth once daily.      [DISCONTINUED] folic acid (FOLVITE) 1 MG tablet Take 1 mg by mouth once daily.       Current Facility-Administered Medications on File Prior to Visit   Medication Dose Route Frequency Provider Last Rate Last Admin    0.9%  NaCl infusion   Intravenous 1 time in Clinic/Agatha Ferrera FNP        acetaminophen tablet 650 mg  650 mg Oral 1 time in Clinic/Agatha Ferrera FNP         Family History   Problem Relation Name Age of Onset    Diabetes Mother lizette chase     No Known Problems Father      No Known Problems Sister      Hypertension Brother bolivar chase .     Hypertension Maternal Grandmother      Hypertension Maternal Grandfather soraida and carol burrell     No Known Problems Paternal Grandmother      No Known Problems Paternal Grandfather      Colon cancer Neg Hx      Crohn's disease Neg Hx      Esophageal cancer Neg Hx      Inflammatory bowel disease Neg Hx      Irritable bowel syndrome Neg Hx      Rectal cancer Neg Hx      Stomach cancer Neg Hx      Ulcerative colitis Neg Hx      Celiac disease Neg Hx      Cirrhosis Neg Hx      Colon polyps Neg Hx      Liver cancer Neg Hx      Liver disease Neg Hx         Medications Ordered                [id number] - [place] [address] [place] [address] [address], [facility] [address]    Telephone: [phone]   Fax: [phone]   Hours: Open 24  hours                         E-Prescribed (1 of 1)              nitrofurantoin, macrocrystal-monohydrate, (MACROBID) 100 MG capsule    Sig: Take 1 capsule (100 mg total) by mouth 2 (two) times daily. for 5 days       Start: 8/25/24     Quantity: 10 capsule Refills: 0                           Ohs Peq Odvv Intake    8/25/2024  3:27 PM CDT - Filed by Patient   What is your current physical address in the event of a medical emergency? [id number]   Are you able to take your vital signs? No   Please attach any relevant images or files          34 yo female with c/o uti symptoms that started today. She has burning on urination, odor and hematuria. She denies abdominal pain and back pain. Denies fever. Denies discharge. Denies concern for std.           Constitution: Negative. Negative for fever.   HENT: Negative.     Neck: neck negative.   Cardiovascular: Negative.    Respiratory: Negative.     Gastrointestinal: Negative.  Negative for abdominal pain, nausea and vomiting.   Endocrine: negative.   Genitourinary:  Positive for dysuria, frequency and urgency. Negative for vaginal discharge, vaginal odor and genital sore.   Musculoskeletal: Negative.  Negative for back pain.   Skin: Negative.    Neurological: Negative.         Objective:   The physical exam was conducted virtually.  LOCATION OF PATIENT home  Physical Exam   Constitutional: She is oriented to person, place, and time. She appears well-developed.   HENT:   Head: Normocephalic and atraumatic.   Ears:   Right Ear: Hearing, tympanic membrane and external ear normal.   Left Ear: Hearing, tympanic membrane and external ear normal.   Nose: Nose normal.   Mouth/Throat: Uvula is midline, oropharynx is clear and moist and mucous membranes are normal.   Eyes: Conjunctivae and EOM are normal. Pupils are equal, round, and reactive to light.   Neck: Neck supple.   Cardiovascular: Normal rate.   Pulmonary/Chest: Effort normal and breath sounds normal.    Musculoskeletal: Normal range of motion.         General: Normal range of motion.   Neurological: She is alert and oriented to person, place, and time.   Skin: Skin is warm.   Psychiatric: Her behavior is normal. Thought content normal.   Nursing note and vitals reviewed.      Assessment:     1. UTI symptoms        Plan:   PLEASE READ YOUR DISCHARGE INSTRUCTIONS ENTIRELY AS IT CONTAINS IMPORTANT INFORMATION.      Take the antibiotics to completion.     Drink plenty of fluids, wipe front to back, take showers not baths, no scented soaps, wear breathable cotton underwear, urinate after sexual intercourse.     Please go to the ER for worsening symptoms including fever, worsening flank pain, vomiting, etc.       Please return or see your primary care doctor if you develop new or worsening symptoms.     Please arrange follow up with your primary medical clinic as soon as possible. You must understand that you've received an Urgent Care treatment only and that you may be released before all of your medical problems are known or treated. You, the patient, will arrange for follow up as instructed. If your symptoms worsen or fail to improve you should go to the Emergency Room.  WE CANNOT RULE OUT ALL POSSIBLE CAUSES OF YOUR SYMPTOMS IN THE URGENT CARE SETTING PLEASE GO TO THE ER IF YOU FEELS YOUR CONDITION IS WORSENING OR YOU WOULD LIKE EMERGENT EVALUATION.      UTI symptoms  -     nitrofurantoin, macrocrystal-monohydrate, (MACROBID) 100 MG capsule; Take 1 capsule (100 mg total) by mouth 2 (two) times daily. for 5 days  Dispense: 10 capsule; Refill: 0

## 2024-09-13 ENCOUNTER — TELEPHONE (OUTPATIENT)
Dept: OBSTETRICS AND GYNECOLOGY | Facility: CLINIC | Age: 34
End: 2024-09-13
Payer: COMMERCIAL

## 2024-09-19 ENCOUNTER — OFFICE VISIT (OUTPATIENT)
Dept: OBSTETRICS AND GYNECOLOGY | Facility: CLINIC | Age: 34
End: 2024-09-19
Payer: COMMERCIAL

## 2024-09-19 VITALS — SYSTOLIC BLOOD PRESSURE: 127 MMHG | WEIGHT: 173.31 LBS | BODY MASS INDEX: 30.7 KG/M2 | DIASTOLIC BLOOD PRESSURE: 87 MMHG

## 2024-09-19 DIAGNOSIS — G89.29 CHRONIC PELVIC PAIN IN FEMALE: Primary | ICD-10-CM

## 2024-09-19 DIAGNOSIS — R10.2 CHRONIC PELVIC PAIN IN FEMALE: Primary | ICD-10-CM

## 2024-09-19 DIAGNOSIS — Z87.42 HISTORY OF ENDOMETRIOSIS: ICD-10-CM

## 2024-09-19 LAB
B-HCG UR QL: NEGATIVE
CTP QC/QA: YES

## 2024-09-19 PROCEDURE — 99999 PR PBB SHADOW E&M-EST. PATIENT-LVL III: CPT | Mod: PBBFAC,,, | Performed by: NURSE PRACTITIONER

## 2024-09-19 PROCEDURE — 99213 OFFICE O/P EST LOW 20 MIN: CPT | Mod: S$GLB,,, | Performed by: NURSE PRACTITIONER

## 2024-09-19 PROCEDURE — 81025 URINE PREGNANCY TEST: CPT | Mod: S$GLB,,, | Performed by: NURSE PRACTITIONER

## 2024-09-19 NOTE — PROGRESS NOTES
Laila Pappas is a 33 y.o. female  presents with complaint of chronic pelvic pain that has started to become daily and worsened in severity these past few months. History of endometriosis diagnosed via surgery around . She is followed by Dr. Knapp @ [facility] and has one frozen embryo left she wants to transfer in the next  year or so. Pain is daily, used to only be cyclical. During ovulation pain is severe. Now having pain with bowel movements. Pain is concentrated on left side of pelvis and radiates to her back.    Past Medical History:   Diagnosis Date    Bilateral knee pain 2018    Endometriosis     treated by GYN at     Epigastric abdominal pain 2016    Gastroesophageal reflux disease 2016    Helicobacter pylori ab+     Helicobacter pylori ab+     High-tone pelvic floor dysfunction 2016    IIH (idiopathic intracranial hypertension)     Dr. Rogers - [facility]    Palpitations 2016    Pre-eclampsia in postpartum period 2022     Past Surgical History:   Procedure Laterality Date    ARTHROSCOPY OF KNEE Left 2021    Procedure: ARTHROSCOPY, KNEE;  Surgeon: Deidra Mejias MD;  Location: [facility] OR;  Service: Orthopedics;  Laterality: Left;    COLONOSCOPY N/A 2018    Procedure: COLONOSCOPY;  Surgeon: Micki Gross MD;  Location: [facility] (4TH FLR);  Service: Endoscopy;  Laterality: N/A;  preferrably in Dec 2018, CRS ok if needed.    COLONOSCOPY N/A 2022    Procedure: COLONOSCOPY;  Surgeon: Greg Leach MD;  Location: [facility] (4TH FLR);  Service: Endoscopy;  Laterality: N/A;  Endoscopy schedulers please schedule patient for colonoscopy with me for further evaluation of painless hematochezia thank you    ESOPHAGOGASTRODUODENOSCOPY N/A 10/30/2018    Procedure: EGD (ESOPHAGOGASTRODUODENOSCOPY);  Surgeon: Greg Leahc MD;  Location: [facility] (2ND FLR);  Service: Endoscopy;  Laterality: N/A;  ok to schedule per Kimmy    ESOPHAGOGASTRODUODENOSCOPY N/A  11/05/2022    Procedure: EGD (ESOPHAGOGASTRODUODENOSCOPY);  Surgeon: Greg Leach MD;  Location: [facility] ENDO (4TH FLR);  Service: Endoscopy;  Laterality: N/A;  Endoscopy schedulers please schedule patient for EGD for evaluation of right upper quadrant pain thank you  prep instr portal--ml  11/2/22 room closed-pt okay with Dr. Leach-50 min case okay per Katlyn-ml    KNEE ARTHROSCOPY W/ DEBRIDEMENT  04/06/2021    Procedure: ARTHROSCOPY, KNEE, WITH DEBRIDEMENT;  Surgeon: Deidra Mejias MD;  Location: [facility] OR;  Service: Orthopedics;;    KNEE ARTHROSCOPY W/ MENISCECTOMY Right 04/06/2021    Procedure: ARTHROSCOPY, KNEE, WITH MENISCECTOMY;  Surgeon: Deidra Mejias MD;  Location: [facility] OR;  Service: Orthopedics;  Laterality: Right;    KNEE ARTHROSCOPY W/ PLICA EXCISION Left 04/06/2021    Procedure: EXCISION, PLICA, KNEE, ARTHROSCOPIC;  Surgeon: Deidra Mejias MD;  Location: [facility] OR;  Service: Orthopedics;  Laterality: Left;    LAPAROSCOPIC CHOLECYSTECTOMY N/A 4/11/2024    Procedure: CHOLECYSTECTOMY, LAPAROSCOPIC;  Surgeon: Tom Campos Jr., MD;  Location: [facility] OR 2ND FLR;  Service: General;  Laterality: N/A;    LAPAROSCOPY  2017    dx with endo.  no removal    NO PAST SURGERIES      SYNOVECTOMY OF KNEE Left 04/06/2021    Procedure: SYNOVECTOMY, KNEE;  Surgeon: Deidra Mejias MD;  Location: [facility] OR;  Service: Orthopedics;  Laterality: Left;    UPPER GASTROINTESTINAL ENDOSCOPY       Social History     Tobacco Use    Smoking status: Never     Passive exposure: Never    Smokeless tobacco: Never   Substance Use Topics    Alcohol use: No    Drug use: No     Family History   Problem Relation Name Age of Onset    Diabetes Mother lizette chase     No Known Problems Father      No Known Problems Sister      Hypertension Brother bolivar salvatore patel.     Hypertension Maternal Grandmother      Hypertension Maternal Grandfather soraida and carol burrell     No Known Problems Paternal Grandmother      No Known Problems Paternal Grandfather       Colon cancer Neg Hx      Crohn's disease Neg Hx      Esophageal cancer Neg Hx      Inflammatory bowel disease Neg Hx      Irritable bowel syndrome Neg Hx      Rectal cancer Neg Hx      Stomach cancer Neg Hx      Ulcerative colitis Neg Hx      Celiac disease Neg Hx      Cirrhosis Neg Hx      Colon polyps Neg Hx      Liver cancer Neg Hx      Liver disease Neg Hx       OB History    Para Term  AB Living   2 1 1 0 1 1   SAB IAB Ectopic Multiple Live Births   1 0 0 0 1      # Outcome Date GA Lbr Sam/2nd Weight Sex Type Anes PTL Lv   2 Term 23 39w6d / 00:50 3.62 kg (7 lb 15.7 oz) F Vag-Spont EPI N SADA      Complications: Shoulder Dystocia   1 SAB                ROS:  GENERAL: No fever, chills, fatigability or weight loss.  VULVAR: No pain, no lesions and no itching.  VAGINAL: No relaxation, no itching, no discharge, no abnormal bleeding and no lesions.  ABDOMEN: daily pelvic pain. Denies nausea. Denies vomiting. No diarrhea. No constipation  BREAST: Denies pain. No lumps. No discharge.  URINARY: No incontinence, no nocturia, no frequency and no dysuria.  CARDIOVASCULAR: No chest pain. No shortness of breath. No leg cramps.  NEUROLOGICAL: No headaches. No vision changes.    PHYSICAL EXAM:  Talk only    ASSESSMENT and PLAN:    ICD-10-CM ICD-9-CM    1. Chronic pelvic pain in female  R10.2 625.9 Urine culture    G89.29 338.29 POCT urine pregnancy      US Pelvis Comp with Transvag NON-OB (xpd      2. History of endometriosis  Z87.42 V13.29 US Pelvis Comp with Transvag NON-OB (xpd            Repeat ultrasound  Fu with Dr. De La Vega to discuss possible dx lap surgery      FOLLOW UP: PRN lack of improvement.    As of 2021, the Cures Act has been passed nationally. This new law requires that all doctors progress notes, lab results, pathology reports and radiology reports be released IMMEDIATELY to the patient in the patient portal. That means that the results are released to you at the EXACT same time they  are released to me. Therefore, with all of the patients that I have I am not able to reply to each patient exactly when the results come in. So there will be a delay from when you see the results to when I see them and have time to come up with a response to send you. Also I only see these results when I am on the computer at work. So if the results come in over the weekend or after 5 pm of a work day, I will not see them until the next business day. As you can tell, this is a challenge as a provider to give every patient the quick response they hope for and deserve. So please be patient!   Thanks for your understanding and patience.

## 2024-09-20 ENCOUNTER — HOSPITAL ENCOUNTER (OUTPATIENT)
Dept: RADIOLOGY | Facility: OTHER | Age: 34
Discharge: HOME | End: 2024-09-20
Attending: NURSE PRACTITIONER
Payer: COMMERCIAL

## 2024-09-20 DIAGNOSIS — G89.29 CHRONIC PELVIC PAIN IN FEMALE: ICD-10-CM

## 2024-09-20 DIAGNOSIS — Z87.42 HISTORY OF ENDOMETRIOSIS: ICD-10-CM

## 2024-09-20 DIAGNOSIS — R10.2 CHRONIC PELVIC PAIN IN FEMALE: ICD-10-CM

## 2024-09-20 PROCEDURE — 76856 US EXAM PELVIC COMPLETE: CPT | Mod: TC

## 2024-09-20 PROCEDURE — 76856 US EXAM PELVIC COMPLETE: CPT | Mod: 26,,, | Performed by: RADIOLOGY

## 2024-09-20 PROCEDURE — 76830 TRANSVAGINAL US NON-OB: CPT | Mod: 26,,, | Performed by: RADIOLOGY

## 2024-09-20 PROCEDURE — 76830 TRANSVAGINAL US NON-OB: CPT | Mod: TC

## 2024-09-26 ENCOUNTER — OFFICE VISIT (OUTPATIENT)
Dept: GASTROENTEROLOGY | Facility: CLINIC | Age: 34
End: 2024-09-26
Payer: COMMERCIAL

## 2024-09-26 VITALS
BODY MASS INDEX: 30.74 KG/M2 | HEART RATE: 97 BPM | WEIGHT: 173.5 LBS | DIASTOLIC BLOOD PRESSURE: 80 MMHG | HEIGHT: 63 IN | SYSTOLIC BLOOD PRESSURE: 128 MMHG

## 2024-09-26 DIAGNOSIS — R10.12 LUQ ABDOMINAL PAIN: ICD-10-CM

## 2024-09-26 DIAGNOSIS — R19.4 CHANGE IN BOWEL HABITS: Primary | ICD-10-CM

## 2024-09-26 PROCEDURE — 99999 PR PBB SHADOW E&M-EST. PATIENT-LVL IV: CPT | Mod: PBBFAC,,,

## 2024-09-26 PROCEDURE — 99214 OFFICE O/P EST MOD 30 MIN: CPT | Mod: S$GLB,,,

## 2024-09-26 RX ORDER — MONTELUKAST SODIUM 4 MG/1
1 TABLET, CHEWABLE ORAL 2 TIMES DAILY
Qty: 180 TABLET | Refills: 3 | Status: SHIPPED | OUTPATIENT
Start: 2024-09-26 | End: 2025-09-26

## 2024-09-26 NOTE — PROGRESS NOTES
"GENERAL GI PATIENT INTAKE:    COVID symptoms in the last 7 days (runny nose, sore throat, congestion, cough, fever): No  PCP: Agatha Schmidt NP  If not PCP-  number given to establish [phone]: N/a    ALLERGIES REVIEWED:  Yes    CHIEF COMPLAINT:    Chief Complaint   Patient presents with    Abdominal Pain    Constipation       VITAL SIGNS:  /80   Pulse 97   Ht 5' 3" (1.6 m)   Wt 78.7 kg (173 lb 8 oz)   LMP 09/05/2024   BMI 30.73 kg/m²      Change in medical, surgical, family or social history: No      REVIEWED MEDICATION LIST RECONCILED INCLUDING ABOVE MEDS:  Yes     "

## 2024-09-26 NOTE — PROGRESS NOTES
Gastroenterology Clinic Consultation Note    Reason for Visit:  The primary encounter diagnosis was Change in bowel habits. A diagnosis of LUQ abdominal pain was also pertinent to this visit.    PCP:   Jhoana Trejo   No address on file      Initial HPI   This is a 33 y.o. female presenting for irregular bowel movements and change in stool consistency. Still with intermittent hardened stools, but now her stools are soft or loose in consistency (BSC #5-6). Even though they are this consistency, she reports that she only moves her bowels twice a week. She has noticed this change since having her gallbladder removed. She is also experiencing Left sided abdominal pain that started a couple of months ago. She finds that this is worst when laying down. Feels like an ache. Located more left lateral than left sided abdomen. She has had an MRI of her chest that did not note any upper abdominal abnormalities. Denies RUQ pain since gallbladder removal.  Denies blood in her stools, unintentional weight loss.     Our last office visit in March, she was having worsening constipation. Tried fiber supplementation once daily, but this did not help her.       ROS:  Review of Systems   Constitutional:  Negative for chills, fever and weight loss.   Eyes:  Negative for redness.   Respiratory:  Negative for cough and wheezing.    Cardiovascular:  Negative for chest pain.   Gastrointestinal:  Positive for abdominal pain and diarrhea. Negative for blood in stool, constipation, heartburn, melena, nausea and vomiting.   Skin:  Negative for rash.   Neurological:  Negative for seizures, loss of consciousness and weakness.        Medical History:  has a past medical history of Bilateral knee pain (06/19/2018), Endometriosis, Epigastric abdominal pain (12/05/2016), Gastroesophageal reflux disease (07/06/2016), Helicobacter pylori ab+, Helicobacter pylori ab+, High-tone  pelvic floor dysfunction (05/02/2016), IIH (idiopathic intracranial hypertension), Palpitations (07/06/2016), and Pre-eclampsia in postpartum period (08/16/2022).    Surgical History:  has a past surgical history that includes No past surgeries; Upper gastrointestinal endoscopy; Esophagogastroduodenoscopy (N/A, 10/30/2018); Colonoscopy (N/A, 12/13/2018); Laparoscopy (2017); Synovectomy of knee (Left, 04/06/2021); Knee arthroscopy w/ plica excision (Left, 04/06/2021); Knee arthroscopy w/ debridement (04/06/2021); Arthroscopy of knee (Left, 04/06/2021); Knee arthroscopy w/ meniscectomy (Right, 04/06/2021); Esophagogastroduodenoscopy (N/A, 11/05/2022); Colonoscopy (N/A, 11/05/2022); and Laparoscopic cholecystectomy (N/A, 4/11/2024).    Family History: family history includes Diabetes in her mother; Hypertension in her brother, maternal grandfather, and maternal grandmother; No Known Problems in her father, paternal grandfather, paternal grandmother, and sister..       Review of patient's allergies indicates:   Allergen Reactions    Doxycycline Other (See Comments)     Triggers headaches    Bactrim ds [sulfamethoxazole-trimethoprim] Nausea And Vomiting    Nsaids (non-steroidal anti-inflammatory drug) Other (See Comments)     D/t h/o PUD and H pylori    Codeine Rash       Current Outpatient Medications on File Prior to Visit   Medication Sig Dispense Refill    ferrous sulfate 325 (65 FE) MG EC tablet Take 1 tablet (325 mg total) by mouth once daily. 90 tablet 1    fluticasone propionate (FLONASE) 50 mcg/actuation nasal spray 1 spray (50 mcg total) by Each Nostril route once daily. 16 g 5    ketoconazole (NIZORAL) 2 % cream Apply topically 2 (two) times daily. Prn rash of face 60 g 4    levocetirizine (XYZAL) 5 MG tablet Take 1 tablet (5 mg total) by mouth every evening. 30 tablet 11    meloxicam (MOBIC) 15 MG tablet Take 1 tablet by mouth once daily with food. May need 20mg prilosec once daily on days that you are taking  "mobic to protect the stomach. 30 tablet 1    oxyCODONE (ROXICODONE) 5 MG immediate release tablet Take 1 tablet (5 mg total) by mouth every 4 (four) hours as needed for Pain. 15 tablet 0    vitamin D (VITAMIN D3) 1000 units Tab Take 1,000 Units by mouth once daily.      omeprazole (PRILOSEC) 20 MG capsule Take 1 capsule (20 mg total) by mouth once daily. 30 capsule 2    [DISCONTINUED] folic acid (FOLVITE) 1 MG tablet Take 1 mg by mouth once daily.       Current Facility-Administered Medications on File Prior to Visit   Medication Dose Route Frequency Provider Last Rate Last Admin    acetaminophen tablet 650 mg  650 mg Oral 1 time in Clinic/HOD Agatha Causey FNP             Objective Findings:    Vital Signs:  /80   Pulse 97   Ht 5' 3" (1.6 m)   Wt 78.7 kg (173 lb 8 oz)   LMP 09/05/2024   BMI 30.73 kg/m²   Body mass index is 30.73 kg/m².    Physical Exam:  Physical Exam  Vitals and nursing note reviewed.   Constitutional:       Appearance: She is normal weight. She is not ill-appearing.   HENT:      Mouth/Throat:      Mouth: Mucous membranes are moist.      Pharynx: Oropharynx is clear.   Eyes:      General: No scleral icterus.  Abdominal:      General: Abdomen is flat. Bowel sounds are normal. There is no distension.      Palpations: Abdomen is soft. There is no mass.      Tenderness: There is no abdominal tenderness.      Hernia: No hernia is present.       Skin:     General: Skin is warm and dry.      Capillary Refill: Capillary refill takes less than 2 seconds.      Coloration: Skin is not jaundiced or pale.   Neurological:      Mental Status: She is alert and oriented to person, place, and time. Mental status is at baseline.             Labs:  Lab Results   Component Value Date    WBC 7.17 02/27/2024    HGB 14.8 02/27/2024    HCT 46.8 02/27/2024     02/27/2024    CRP 1.0 11/21/2022    CHOL 195 10/26/2023    TRIG 84 10/26/2023    HDL 43 10/26/2023    ALKPHOS 73 02/27/2024    LIPASE " 13 02/27/2024    ALT 12 02/27/2024    AST 17 02/27/2024     02/27/2024    K 4.3 02/27/2024     02/27/2024    CREATININE 0.7 02/27/2024    BUN 7 02/27/2024    CO2 23 02/27/2024    TSH 2.136 05/28/2024    INR 1.1 11/18/2022    HGBA1C 4.9 10/26/2023       Imaging reviewed: MRI Chest 8/23/2024  FINDINGS:  No chest wall mass is detected.  No focal fluid collection is identified.     Motion artifact.     No adenopathy is detected.     No edema is identified.  No pathologic enhancement on post-contrast imaging.     No acute abnormality in the abdomen.     Osseous structures demonstrate normal marrow signal.     Possible skin marker anteriorly in the right upper quadrant on axial 25 of series 7.  No underlying abnormality is detected.     Impression:     No acute abnormality.        Electronically signed by:Patrick Dixon  Date:                                            08/23/2024  Time:                                           21:15      Endoscopy reviewed: Colonoscopy 11/05/2022  Impression:            - One 2 mm polyp in the rectum, removed with a                          jumbo cold forceps. Resected and retrieved.                          - The examination was otherwise normal on direct                          and retroflexion views.                          - The examined portion of the ileum was normal.   Recommendation:        - Discharge patient to home.                          - Patient has a contact number available for                          emergencies. The signs and symptoms of potential                          delayed complications were discussed with the                          patient. Return to normal activities tomorrow.                          Written discharge instructions were provided to                          the patient.                          - Resume previous diet.                          - Continue present medications.                          - Await pathology results.                           - Repeat colonoscopy at age 46 for screening                          purposes.                          - Return to referring physician.   Attending Participation:        I personally performed the entire procedure.   Greg Leach MD   11/5/2022 9:25:56 AM     EGD 11/5/2022  Impression:            - Normal esophagus.                          - Esophagogastric landmarks identified. No hiatal                          hernia seen.                          - Normal gastric body and antrum. Biopsied.                          - Normal examined duodenum.   Recommendation:        - Await pathology results.                          - Perform a colonoscopy now.                          - See colonoscopy report for further details.   Attending Participation:        I personally performed the entire procedure.   Greg Leach MD   11/5/2022 9:03:00 AM     Assessment:  1. Change in bowel habits    2. LUQ abdominal pain      Orders Placed This Encounter    colestipoL (COLESTID) 1 gram Tab         Plan:  Patient now report softer stool consistency, but still infrequent. Recommended fiber supplementation and Colestipol BID.  Will try above regimen. If her pain persist, we will proceed with CT Enterography vs. CT A/P for further evaluation. Can consider EGD/Colonoscopy.       Thank you for allowing me to participate in this patient's care.    Sincerely,     Amira Min NP  Gastroenterology Department  [facility]

## 2024-09-26 NOTE — PATIENT INSTRUCTIONS
--Let me know in 2-3 weeks how the Colestipol and fiber works for you.   --If LUQ pain continues, we will get imaging and consider EGD/Colonoscopy     [facility]  High Fiber Diet    20-30 grams of fiber per day is recommended    Fiber cereal = 5 grams (Raisin Bran, Shredded Wheat, Grape Nuts)  Konsyl 1 teaspoon = 6 grams  Metamucil 1 tablespoon = 3 grams  Citrucel 1 tablespoon = 2 grams  Fiber Choice = 3-4 per day    Drink at least 4-5 glasses of liquids per day or the fiber can be constipating rather then stimulating to your gut.  Boil and bake potatoes in their skin. Eat the skin, too.  Include fresh fruits and raw vegetables in your daily diet. Raw fruits and vegetables have more useful fiber than those that have been peeled, cooked, pureed, or otherwise processed.  Eat a wide variety of fibrous foods in reasonable amounts. Increase fiber intake slowly especially if you have been on a low-fiber diet.  Eat more legumes-peas, beans, soybeans.  For snacks, try dried fruit, whole wheat and rye crackers.  Avoid instant-cook hot cereals. Use the longer cooking cereals.  Use bran whenever possible. Sprinkle it on top of cereal, mix it into mashed potatoes or hamburger meat, or use it in combination dishes such as meat loaf.   Substitute whole grain, whole wheat and bran products for white flour products.  Eat slowly and chew your food thoroughly.    Start daily fiber.  Take 1 tsp of fiber powder (psyllium or other sugar-free powder).  Mix in 8 oz of water.  Take x 3-5 days.  Then, increase fiber by 1 tsp every 3-5 days until stool is easy to pass.  Stop and continue at that dose.   Do not exceed 6 tsps/day. GOAL:  More well-formed stool (one continuous well-formed piece vs. Pellets) and minimize straining with initiation.    Foods High in Fiber    This diet furnishes adequate amounts of all the essential nutrients needed by the body and a very liberal fiber or roughage content. Roughage is indigestible  fiber found in fruits, vegetables and whole grain cereals. It provides bulk to the large intestine and, accompanied by an adequate fluid intake, is a stimulant to elimination. Regular eating and elimination habits are vital to good health.     Fruits:  Use all fruits and juices liberally; fresh, cooked, dried or canned. Eat fruit raw and with skins when possible. Have at least four servings of fruit daily including a citrus fruit and a stewed dried fruit. Hard seeds of fruits (berries, figs, Grapes, mangoes, tomatoes) etc. may be removed.    Vegetables: Use all vegetables liberally. Green leafy vegetables, such as cabbage, spinach, lettuce, broccoli, and other greens are particularly good.    Potato: As desired. Serve baked frequently and eat the skin. Other starchy foods such as rice, macaroni, etc., may be occasionally substituted. Chew popcorn well and do not eat hard kernels.    Meat, Fish, Poultry: One or two servings daily.    Eggs: One daily if you are not on a low cholesterol diet.    Milk: Include at least one-half pint daily. Whole milk or skimmed may be used.     Cereals: Use whole grain breads and cereals such as bran, bran flakes, grape nut flakes, puffed wheat, oatmeal, Wheaties, etc., as much as possible. Refined breaks and cereals are not restricted; however, they do not contain the bulk necessary for this diet.     Sugars, Sweets: Use very moderately. Depend on fruit as dessert.    Fats: Use butter or margarine as desired. Oil or dressing on salads as desired. Fried foods may be used in moderation. Nuts may be eaten if you chew them well and may be ground or finely chopped for cooking.   Sample Menu                                                                                 Breakfast                          Lunch  Orange juice, 4 ounces                                                Vegetable soup                    Stewed fruit                                                                  Fresh fruit plate with cottage cheese  Shredded wheat                                                           Whole wheat toast  Scrambled eggs                                                           Butter  Whole wheat toast                                                       Coffee or tea  Dinner                                                                         Bedtime  Large glass tomato juice                                             1 glass milk  Roast beef                                                                   stewed fruit  Baked potato with skin  Buttered spinach  Raw vegetable salad  Baked apple with skin   Coffee or tea

## 2024-10-09 ENCOUNTER — PATIENT MESSAGE (OUTPATIENT)
Dept: GASTROENTEROLOGY | Facility: CLINIC | Age: 34
End: 2024-10-09
Payer: COMMERCIAL

## 2024-10-10 DIAGNOSIS — R10.12 LUQ ABDOMINAL PAIN: ICD-10-CM

## 2024-10-10 DIAGNOSIS — R10.12 LUQ ABDOMINAL PAIN: Primary | ICD-10-CM

## 2024-10-10 RX ORDER — DICYCLOMINE HYDROCHLORIDE 10 MG/1
CAPSULE ORAL
Qty: 270 CAPSULE | Refills: 0 | Status: SHIPPED | OUTPATIENT
Start: 2024-10-10

## 2024-10-10 RX ORDER — DICYCLOMINE HYDROCHLORIDE 10 MG/1
10 CAPSULE ORAL 3 TIMES DAILY PRN
Qty: 120 CAPSULE | Refills: 0 | Status: SHIPPED | OUTPATIENT
Start: 2024-10-10 | End: 2024-10-10

## 2024-10-11 ENCOUNTER — TELEPHONE (OUTPATIENT)
Dept: ENDOSCOPY | Facility: HOSPITAL | Age: 34
End: 2024-10-11
Payer: COMMERCIAL

## 2024-10-11 ENCOUNTER — HOSPITAL ENCOUNTER (OUTPATIENT)
Dept: RADIOLOGY | Facility: OTHER | Age: 34
Discharge: HOME | End: 2024-10-11
Payer: COMMERCIAL

## 2024-10-11 VITALS — BODY MASS INDEX: 30.65 KG/M2 | HEIGHT: 63 IN | WEIGHT: 173 LBS

## 2024-10-11 DIAGNOSIS — R10.12 LUQ ABDOMINAL PAIN: ICD-10-CM

## 2024-10-11 DIAGNOSIS — R10.9 ABDOMINAL PAIN, UNSPECIFIED ABDOMINAL LOCATION: ICD-10-CM

## 2024-10-11 DIAGNOSIS — K59.00 CONSTIPATION, UNSPECIFIED CONSTIPATION TYPE: ICD-10-CM

## 2024-10-11 DIAGNOSIS — R19.7 DIARRHEA, UNSPECIFIED TYPE: Primary | ICD-10-CM

## 2024-10-11 PROCEDURE — 74177 CT ABD & PELVIS W/CONTRAST: CPT | Mod: TC

## 2024-10-11 PROCEDURE — A9698 NON-RAD CONTRAST MATERIALNOC: HCPCS

## 2024-10-11 PROCEDURE — 25500020 PHARM REV CODE 255

## 2024-10-11 PROCEDURE — 74177 CT ABD & PELVIS W/CONTRAST: CPT | Mod: 26,,, | Performed by: RADIOLOGY

## 2024-10-11 RX ORDER — POLYETHYLENE GLYCOL 3350, SODIUM SULFATE, POTASSIUM CHLORIDE, MAGNESIUM SULFATE, AND SODIUM CHLORIDE FOR ORAL SOLUTION 178.7-7.3G
1 KIT ORAL ONCE
Qty: 1 EACH | Refills: 0 | Status: SHIPPED | OUTPATIENT
Start: 2024-10-11 | End: 2024-10-11

## 2024-10-11 RX ADMIN — IOHEXOL 75 ML: 350 INJECTION, SOLUTION INTRAVENOUS at 10:10

## 2024-10-11 RX ADMIN — IOHEXOL 1000 ML: 9 SOLUTION ORAL at 10:10

## 2024-10-11 NOTE — TELEPHONE ENCOUNTER
"Amira Min NP  P [facility] Patients  Caller: Unspecified (Yesterday,  8:01 AM)  Procedure: EGD/Colonoscopy    Diagnosis: Diarrhea, Constipation, and Abdominal pain    Procedure Timin-12 weeks    *If within 4 weeks selected, please mague as high priority*    *If greater than 12 weeks, please select "5-12 weeks" and delay sending until 3 months prior to requested date*    Location: Any Site    Additional Scheduling Information: No scheduling concerns    Prep Specifications:Standard prep    Is the patient taking a GLP-1 Agonist:no    Have you attached a patient to this message: yes  "

## 2024-10-11 NOTE — PLAN OF CARE
Spoke to pt to schedule procedure(s) Colonoscopy/EGD       Physician to perform procedure(s) Dr. CHANDAN Collazo  Date of Procedure (s) 10/17/24  Arrival Time 2:00 PM  Time of Procedure(s) 3:00 PM   Location of Procedure(s) [place] 2nd Floor  Type of Rx Prep sent to patient: [facility]  Instructions provided to patient via [facility]    Patient was informed on the following information and verbalized understanding. Screening questionnaire reviewed with patient and complete. If procedure requires anesthesia, a responsible adult needs to be present to accompany the patient home, patient cannot drive after receiving anesthesia. Appointment details are tentative, especially check-in time. Patient will receive a prep-op call 7 days prior to confirm check-in time for procedure. If applicable the patient should contact their pharmacy to verify Rx for procedure prep is ready for pick-up. Patient was advised to call the scheduling department at [phone] if pharmacy states no Rx is available. Patient was advised to call the endoscopy scheduling department if any questions or concerns arise.      [facility] Scheduling Department

## 2024-10-11 NOTE — TELEPHONE ENCOUNTER
Spoke to pt to schedule procedure(s) Colonoscopy/EGD       Physician to perform procedure(s) Dr. CHANDAN Collazo  Date of Procedure (s) 10/17/24  Arrival Time 2:00 PM  Time of Procedure(s) 3:00 PM   Location of Procedure(s) [facility] [address]  Type of Rx Prep sent to patient: [facility]  Instructions provided to patient via [facility]    Patient was informed on the following information and verbalized understanding. Screening questionnaire reviewed with patient and complete. If procedure requires anesthesia, a responsible adult needs to be present to accompany the patient home, patient cannot drive after receiving anesthesia. Appointment details are tentative, especially check-in time. Patient will receive a prep-op call 7 days prior to confirm check-in time for procedure. If applicable the patient should contact their pharmacy to verify Rx for procedure prep is ready for pick-up. Patient was advised to call the scheduling department at [phone] if pharmacy states no Rx is available. Patient was advised to call the endoscopy scheduling department if any questions or concerns arise.      [facility] Scheduling Department

## 2024-10-17 ENCOUNTER — PATIENT MESSAGE (OUTPATIENT)
Dept: GASTROENTEROLOGY | Facility: CLINIC | Age: 34
End: 2024-10-17
Payer: COMMERCIAL

## 2024-10-17 ENCOUNTER — ANESTHESIA (OUTPATIENT)
Dept: ENDOSCOPY | Facility: HOSPITAL | Age: 34
End: 2024-10-17
Payer: COMMERCIAL

## 2024-10-17 ENCOUNTER — HOSPITAL ENCOUNTER (OUTPATIENT)
Facility: HOSPITAL | Age: 34
Discharge: HOME | End: 2024-10-17
Attending: INTERNAL MEDICINE | Admitting: INTERNAL MEDICINE
Payer: COMMERCIAL

## 2024-10-17 ENCOUNTER — PATIENT MESSAGE (OUTPATIENT)
Dept: GASTROENTEROLOGY | Facility: CLINIC | Age: 34
End: 2024-10-17

## 2024-10-17 ENCOUNTER — ANESTHESIA EVENT (OUTPATIENT)
Dept: ENDOSCOPY | Facility: HOSPITAL | Age: 34
End: 2024-10-17
Payer: COMMERCIAL

## 2024-10-17 VITALS
HEIGHT: 63 IN | BODY MASS INDEX: 30.65 KG/M2 | RESPIRATION RATE: 20 BRPM | WEIGHT: 173 LBS | HEART RATE: 80 BPM | SYSTOLIC BLOOD PRESSURE: 111 MMHG | DIASTOLIC BLOOD PRESSURE: 65 MMHG | OXYGEN SATURATION: 100 % | TEMPERATURE: 98 F

## 2024-10-17 DIAGNOSIS — M25.552 CHRONIC HIP PAIN, LEFT: ICD-10-CM

## 2024-10-17 DIAGNOSIS — R10.12 LUQ ABDOMINAL PAIN: Primary | ICD-10-CM

## 2024-10-17 DIAGNOSIS — R10.9 ABDOMINAL PAIN: ICD-10-CM

## 2024-10-17 DIAGNOSIS — G89.29 CHRONIC HIP PAIN, LEFT: ICD-10-CM

## 2024-10-17 LAB
B-HCG UR QL: NEGATIVE
CTP QC/QA: YES

## 2024-10-17 PROCEDURE — 88305 TISSUE EXAM BY PATHOLOGIST: CPT | Mod: 26,,, | Performed by: PATHOLOGY

## 2024-10-17 PROCEDURE — 43239 EGD BIOPSY SINGLE/MULTIPLE: CPT | Performed by: INTERNAL MEDICINE

## 2024-10-17 PROCEDURE — 88305 TISSUE EXAM BY PATHOLOGIST: CPT | Performed by: PATHOLOGY

## 2024-10-17 PROCEDURE — 45378 DIAGNOSTIC COLONOSCOPY: CPT | Mod: ,,, | Performed by: INTERNAL MEDICINE

## 2024-10-17 PROCEDURE — 37000008 HC ANESTHESIA 1ST 15 MINUTES: Performed by: INTERNAL MEDICINE

## 2024-10-17 PROCEDURE — 63600175 PHARM REV CODE 636 W HCPCS: Performed by: NURSE ANESTHETIST, CERTIFIED REGISTERED

## 2024-10-17 PROCEDURE — 43239 EGD BIOPSY SINGLE/MULTIPLE: CPT | Mod: 51,,, | Performed by: INTERNAL MEDICINE

## 2024-10-17 PROCEDURE — 45378 DIAGNOSTIC COLONOSCOPY: CPT | Performed by: INTERNAL MEDICINE

## 2024-10-17 PROCEDURE — 27201012 HC FORCEPS, HOT/COLD, DISP: Performed by: INTERNAL MEDICINE

## 2024-10-17 PROCEDURE — 37000009 HC ANESTHESIA EA ADD 15 MINS: Performed by: INTERNAL MEDICINE

## 2024-10-17 RX ORDER — SODIUM CHLORIDE 9 MG/ML
INJECTION, SOLUTION INTRAVENOUS CONTINUOUS
Status: DISCONTINUED | OUTPATIENT
Start: 2024-10-17 | End: 2024-10-17 | Stop reason: HOSPADM

## 2024-10-17 RX ORDER — PROPOFOL 10 MG/ML
VIAL (ML) INTRAVENOUS
Status: DISCONTINUED | OUTPATIENT
Start: 2024-10-17 | End: 2024-10-17

## 2024-10-17 RX ORDER — OMEPRAZOLE 40 MG/1
40 CAPSULE, DELAYED RELEASE ORAL DAILY
Qty: 90 CAPSULE | Refills: 3 | Status: SHIPPED | OUTPATIENT
Start: 2024-10-17 | End: 2025-10-17

## 2024-10-17 RX ORDER — GLUCAGON 1 MG
1 KIT INJECTION
OUTPATIENT
Start: 2024-10-17

## 2024-10-17 RX ORDER — ONDANSETRON HYDROCHLORIDE 2 MG/ML
4 INJECTION, SOLUTION INTRAVENOUS ONCE AS NEEDED
OUTPATIENT
Start: 2024-10-17 | End: 2036-03-15

## 2024-10-17 RX ORDER — LIDOCAINE HYDROCHLORIDE 20 MG/ML
INJECTION INTRAVENOUS
Status: DISCONTINUED | OUTPATIENT
Start: 2024-10-17 | End: 2024-10-17

## 2024-10-17 RX ADMIN — PROPOFOL 20 MG: 10 INJECTION, EMULSION INTRAVENOUS at 02:10

## 2024-10-17 RX ADMIN — PROPOFOL 200 MCG/KG/MIN: 10 INJECTION, EMULSION INTRAVENOUS at 02:10

## 2024-10-17 RX ADMIN — PROPOFOL 80 MG: 10 INJECTION, EMULSION INTRAVENOUS at 02:10

## 2024-10-17 RX ADMIN — LIDOCAINE HYDROCHLORIDE 80 MG: 20 INJECTION INTRAVENOUS at 02:10

## 2024-10-17 NOTE — PROVATION PATIENT INSTRUCTIONS
Discharge Summary/Instructions after an Endoscopic Procedure  Patient Name: Laila Pappas  Patient MRN: [id number]  Patient YOB: 1990 Thursday, October 17, 2024  Tom Collazo MD  Dear patient,  As a result of recent federal legislation (The Federal Cures Act), you may   receive lab or pathology results from your procedure in your [facility]   account before your physician is able to contact you. Your physician or   their representative will relay the results to you with their   recommendations at their soonest availability.  Thank you,  RESTRICTIONS:  During your procedure today, you received medications for sedation.  These   medications may affect your judgment, balance and coordination.  Therefore,   for 24 hours, you have the following restrictions:   - DO NOT drive a car, operate machinery, make legal/financial decisions,   sign important papers or drink alcohol.    ACTIVITY:  Today: no heavy lifting, straining or running due to procedural   sedation/anesthesia.  The following day: return to full activity including work.  DIET:  Eat and drink normally unless instructed otherwise.     TREATMENT FOR COMMON SIDE EFFECTS:  - Mild abdominal pain, nausea, belching, bloating or excessive gas:  rest,   eat lightly and use a heating pad.  - Sore Throat: treat with throat lozenges and/or gargle with warm salt   water.  - Because air was used during the procedure, expelling large amounts of air   from your rectum or belching is normal.  - If a bowel prep was taken, you may not have a bowel movement for 1-3 days.    This is normal.  SYMPTOMS TO WATCH FOR AND REPORT TO YOUR PHYSICIAN:  1. Abdominal pain or bloating, other than gas cramps.  2. Chest pain.  3. Back pain.  4. Signs of infection such as: chills or fever occurring within 24 hours   after the procedure.  5. Rectal bleeding, which would show as bright red, maroon, or black stools.   (A tablespoon of blood from the rectum is not serious, especially if    hemorrhoids are present.)  6. Vomiting.  7. Weakness or dizziness.  GO DIRECTLY TO THE NEAREST EMERGENCY ROOM IF YOU HAVE ANY OF THE FOLLOWING:      Difficulty breathing              Chills and/or fever over 101 F   Persistent vomiting and/or vomiting blood   Severe abdominal pain   Severe chest pain   Black, tarry stools   Bleeding- more than one tablespoon   Any other symptom or condition that you feel may need urgent attention  Your doctor recommends these additional instructions:  If any biopsies were taken, your doctors clinic will contact you in 1 to 2   weeks with any results.  - Patient has a contact number available for emergencies.  The signs and   symptoms of potential delayed complications were discussed with the   patient.  Return to normal activities tomorrow.  Written discharge   instructions were provided to the patient.   - Discharge patient to home (ambulatory).   - Resume previous diet.   - Continue present medications.   - Await pathology results.   - Return to referring physician after studies are complete.  For questions, problems or results please call your physician - Tom Collazo MD at Work:  [phone].  [facility], EMERGENCY ROOM PHONE NUMBER: [phone]  IF A COMPLICATION OR EMERGENCY SITUATION ARISES AND YOU ARE UNABLE TO REACH   YOUR PHYSICIAN - GO DIRECTLY TO THE EMERGENCY ROOM.  Tom Collazo MD  10/17/2024 2:37:17 PM  This report has been verified and signed electronically.  Dear patient,  As a result of recent federal legislation (The Federal Cures Act), you may   receive lab or pathology results from your procedure in your [facility]   account before your physician is able to contact you. Your physician or   their representative will relay the results to you with their   recommendations at their soonest availability.  Thank you,  PROVATION

## 2024-10-17 NOTE — ANESTHESIA PREPROCEDURE EVALUATION
[facility]  Anesthesia Pre-Operative Evaluation     Patient Name: Laila Pappas  YOB: 1990  MRN: [id number]  [facility]: [id number]       Admit Date: 10/17/2024   Admit Team: Networked reference to record PCT   Hospital Day: 1  Date of Procedure: 10/17/2024  Anesthesia: Choice Procedure: Procedure(s) (LRB):  EGD (ESOPHAGOGASTRODUODENOSCOPY) (N/A)  COLONOSCOPY (N/A)  Pre-Operative Diagnosis: Diarrhea, unspecified type [R19.7]  Constipation, unspecified constipation type [K59.00]  Abdominal pain, unspecified abdominal location [R10.9]  Proceduralist:Surgeons and Role:     * Tom Collazo MD - Primary  Code Status: Prior   Advanced Directive: <no information>  Isolation Precautions: No active isolations  Capacity: Full capacity     SUBJECTIVE:   Laila Pappas is a 33 y.o. female who  has a past medical history of Bilateral knee pain (06/19/2018), Endometriosis, Epigastric abdominal pain (12/05/2016), Gastroesophageal reflux disease (07/06/2016), Helicobacter pylori ab+, Helicobacter pylori ab+, High-tone pelvic floor dysfunction (05/02/2016), IIH (idiopathic intracranial hypertension), Palpitations (07/06/2016), and Pre-eclampsia in postpartum period (08/16/2022).  No notes on file   0.9% NaCl   Intravenous Continuous         Hospital LOS: 0 days  ICU LOS: Patient does not have an ICU stay during this admission.    she has a current medication list which includes the following long-term medication(s): colestipol, ketoconazole, levocetirizine, omeprazole, and [DISCONTINUED] folic acid.   Current Outpatient Medications   Medication Instructions    colestipoL (COLESTID) 1 g, Oral, 2 times daily    dicyclomine (BENTYL) 10 MG capsule TAKE 1 CAPSULE(10 MG) BY MOUTH THREE TIMES DAILY AS NEEDED FOR ABDOMINAL PAIN    ferrous sulfate 325 mg, Oral, Daily    fluticasone propionate (FLONASE) 50 mcg, Each Nostril, Daily    ketoconazole (NIZORAL) 2 % cream Topical (Top), 2 times daily, Prn rash of face     levocetirizine (XYZAL) 5 mg, Oral, Nightly    meloxicam (MOBIC) 15 MG tablet Take 1 tablet by mouth once daily with food. May need 20mg prilosec once daily on days that you are taking mobic to protect the stomach.    omeprazole (PRILOSEC) 20 mg, Oral, Daily    oxyCODONE (ROXICODONE) 5 mg, Oral, Every 4 hours PRN    vitamin D (VITAMIN D3) 1,000 Units, Oral, Daily     ALLERGIES:     Review of patient's allergies indicates:   Allergen Reactions    Doxycycline Other (See Comments)     Triggers headaches    Bactrim ds [sulfamethoxazole-trimethoprim] Nausea And Vomiting    Nsaids (non-steroidal anti-inflammatory drug) Other (See Comments)     D/t h/o PUD and H pylori    Codeine Rash     LDA:   AIRWAY:         * No LDAs found *      Lines/Drains/Airways       Peripheral Intravenous Line  Duration                  Peripheral IV - Single Lumen 10/17/24 1356 20 G Right Antecubital <1 day                   Anesthesia Evaluation      Airway   Mallampati: III  TM distance: Normal  Neck ROM: Normal ROM  Dental    (+) Intact    Pulmonary    Cardiovascular   (+) hypertension    Neuro/Psych    (+) neuromuscular disease, headaches    GI/Hepatic/Renal    (+) GERD    Endo/Other    Abdominal                    MEDICATIONS:     Current Outpatient Medications on File Prior to Encounter   Medication Sig Dispense Refill Last Dose/Taking    colestipoL (COLESTID) 1 gram Tab Take 1 tablet (1 g total) by mouth 2 (two) times daily. 180 tablet 3     dicyclomine (BENTYL) 10 MG capsule TAKE 1 CAPSULE(10 MG) BY MOUTH THREE TIMES DAILY AS NEEDED FOR ABDOMINAL PAIN 270 capsule 0     ferrous sulfate 325 (65 FE) MG EC tablet Take 1 tablet (325 mg total) by mouth once daily. 90 tablet 1     fluticasone propionate (FLONASE) 50 mcg/actuation nasal spray 1 spray (50 mcg total) by Each Nostril route once daily. 16 g 5     ketoconazole (NIZORAL) 2 % cream Apply topically 2 (two) times daily. Prn rash of face 60 g 4     levocetirizine (XYZAL) 5 MG tablet Take  1 tablet (5 mg total) by mouth every evening. 30 tablet 11     meloxicam (MOBIC) 15 MG tablet Take 1 tablet by mouth once daily with food. May need 20mg prilosec once daily on days that you are taking mobic to protect the stomach. 30 tablet 1     omeprazole (PRILOSEC) 20 MG capsule Take 1 capsule (20 mg total) by mouth once daily. 30 capsule 2     oxyCODONE (ROXICODONE) 5 MG immediate release tablet Take 1 tablet (5 mg total) by mouth every 4 (four) hours as needed for Pain. 15 tablet 0     vitamin D (VITAMIN D3) 1000 units Tab Take 1,000 Units by mouth once daily.       [DISCONTINUED] folic acid (FOLVITE) 1 MG tablet Take 1 mg by mouth once daily.         Inpatient Medications:  Antibiotics (From admission, onward)      None          VTE Risk Mitigation (From admission, onward)      None              Current Facility-Administered Medications   Medication Dose Route Frequency Provider Last Rate Last Admin    0.9%  NaCl infusion   Intravenous Continuous Tom Collazo MD              History:   There are no hospital problems to display for this patient.    Surgical History:    has a past surgical history that includes No past surgeries; Upper gastrointestinal endoscopy; Esophagogastroduodenoscopy (N/A, 10/30/2018); Colonoscopy (N/A, 12/13/2018); Laparoscopy (2017); Synovectomy of knee (Left, 04/06/2021); Knee arthroscopy w/ plica excision (Left, 04/06/2021); Knee arthroscopy w/ debridement (04/06/2021); Arthroscopy of knee (Left, 04/06/2021); Knee arthroscopy w/ meniscectomy (Right, 04/06/2021); Esophagogastroduodenoscopy (N/A, 11/05/2022); Colonoscopy (N/A, 11/05/2022); and Laparoscopic cholecystectomy (N/A, 4/11/2024).   Social History:    reports being sexually active and has had partner(s) who are male. She reports using the following method of birth control/protection: None.  reports that she has never smoked. She has never been exposed to tobacco smoke. She has never used smokeless tobacco. She reports that  "she does not drink alcohol and does not use drugs.    Vitals:    10/17/24 1353   BP: 133/68   Patient Position: Lying   Pulse: 96   Resp: 16   Temp: 36.7 °C (98 °F)   TempSrc: Temporal   SpO2: 100%   Weight: 78.5 kg (173 lb)   Height: 5' 3" (1.6 m)     Vital Signs Range (Last 24H):  Temp:  [36.7 °C (98 °F)]   Pulse:  [96]   Resp:  [16]   BP: (133)/(68)   SpO2:  [100 %]     Body mass index is 30.65 kg/m².  Wt Readings from Last 4 Encounters:   10/17/24 78.5 kg (173 lb)   10/11/24 78.5 kg (173 lb)   09/26/24 78.7 kg (173 lb 8 oz)   09/19/24 78.6 kg (173 lb 4.5 oz)        Intake/Output - Last 3 Shifts       None          Lab Results   Component Value Date    WBC 7.17 02/27/2024    HGB 14.8 02/27/2024    HCT 46.8 02/27/2024     02/27/2024     02/27/2024    K 4.3 02/27/2024     02/27/2024    CREATININE 0.7 02/27/2024    BUN 7 02/27/2024    CO2 23 02/27/2024    GLU 95 02/27/2024    CALCIUM 9.2 02/27/2024    MG 2.1 12/20/2022    PHOS 3.9 12/20/2022    ALKPHOS 73 02/27/2024    ALT 12 02/27/2024    AST 17 02/27/2024    ALBUMIN 3.8 02/27/2024    INR 1.1 11/18/2022    APTT 25.6 11/18/2022    HGBA1C 4.9 10/26/2023    CPK 75 12/20/2022    TROPONINI <0.006 09/05/2023    BNP 16 09/05/2023    HCGQUANT <1.2 10/03/2023     Recent Results (from the past 12 hours)   POCT urine pregnancy    Collection Time: 10/17/24  1:40 PM   Result Value Ref Range    POC Preg Test, Ur Negative Negative     Acceptable Yes      No results for input(s): "WBC", "HGB", "HCT", "PLT", "NA", "K", "CREATININE", "GLU", "INR", "LACTATE", "5HIAAPLASMA", "5HIAAURINT", "5HIAA", "1ZRVC18BN" in the last 168 hours.  Patient's last menstrual period was 09/05/2024.    EKG:   Results for orders placed or performed in visit on 04/25/24   IN OFFICE EKG 12-LEAD (to [place])    Collection Time: 04/25/24  4:00 PM   Result Value Ref Range    QRS Duration 72 ms    [facility] QTC Calculation 428 ms    Narrative    Test Reason : R07.89,    Vent. Rate : 089 " BPM     Atrial Rate : 089 BPM     P-R Int : 142 ms          QRS Dur : 072 ms      QT Int : 352 ms       P-R-T Axes : 075 068 052 degrees     QTc Int : 428 ms    Normal sinus rhythm  T wave abnormality, consider anterior ischemia  Abnormal ECG  When compared with ECG of 05-SEP-2023 09:59,  No significant change was found  Confirmed by Colton KEEN MD ([id number]) on 4/26/2024 6:21:09 AM    Referred By: [email or web address]   SELF           Confirmed By:Colton KEEN MD     TTE:  Results for orders placed during the hospital encounter of 07/28/21    Echo    Interpretation Summary  · The left ventricle is normal in size with normal systolic function.  · The estimated ejection fraction is 60%.  · Normal left ventricular diastolic function.  · Normal right ventricular size with normal right ventricular systolic function.  · Normal central venous pressure (3 mmHg).    TEODORO:  No results found for this or any previous visit.    Stress Test:  No results found for this or any previous visit.    Results for orders placed during the hospital encounter of 05/09/24    Stress Echo Which stress agent will be used? Treadmill Exercise; Color Flow Doppler? Yes    Interpretation Summary    Post-stress    Impression  The study is negative with no echocardiographic evidence of stress induced ischemia.    ECG Conclusion: The ECG portion of the study is negative for ischemia.    Stress Protocol: The patient exercised for 5 minutes 18 seconds on a high ramp protocol, corresponding to a functional capacity of 8METS, achieving a peak heart rate of 162 bpm, which is 91% of the age predicted maximum heart rate. Their exercise capacity was below average. The patient reported chest discomfort during the stress test. The test was stopped because the patient experienced chest pain and fatigue.    Left Ventricle: The left ventricle is normal in size. Ventricular mass is normal. Normal wall thickness. Normal wall motion. There is normal systolic function. Ejection fraction  by visual approximation is 65%. There is normal diastolic function.    Right Ventricle: Normal right ventricular cavity size. Wall thickness is normal. Systolic function is normal.    Pulmonary Artery: The estimated pulmonary artery systolic pressure is 20 mmHg.    IVC/SVC: Normal venous pressure at 3 mmHg.        Pre-op Assessment          Review of Systems  Cardiovascular:     Hypertension                                    Hypertension         Hepatic/GI:     GERD         Gerd          Neurological:    Neuromuscular Disease,  Headaches      Dx of Headaches                         Neuromuscular Disease       Physical Exam  General: Well nourished    Airway:  Mallampati: III / II  Mouth Opening: Normal  TM Distance: Normal  Tongue: Normal  Neck ROM: Normal ROM    Dental:  Intact        Anesthesia Plan  Type of Anesthesia, risks & benefits discussed:    Anesthesia Type: Gen ETT, Gen Natural Airway, MAC  Intra-op Monitoring Plan: Standard ASA Monitors  Post Op Pain Control Plan: multimodal analgesia and IV/PO Opioids PRN  Induction:  IV  Airway Plan: Direct and Video, Post-Induction  Informed Consent: Informed consent signed with the Patient and all parties understand the risks and agree with anesthesia plan.  All questions answered.   ASA Score: 3  Day of Surgery Review of History & Physical: H&P completed by Anesthesiologist.  Anesthesia Plan Notes: Chart reviewed, patient interviewed and examined.  The anesthetic plan was explained.  Risks, benefits, and alternatives were discussed. Questions were answered and the consent was signed.        JACEK Blood M.D.         Ready For Surgery From Anesthesia Perspective.     .

## 2024-10-17 NOTE — PROVATION PATIENT INSTRUCTIONS
Discharge Summary/Instructions after an Endoscopic Procedure  Patient Name: Laila Pappas  Patient MRN: [id number]  Patient YOB: 1990 Thursday, October 17, 2024  Tom Collazo MD  Dear patient,  As a result of recent federal legislation (The Federal Cures Act), you may   receive lab or pathology results from your procedure in your [facility]   account before your physician is able to contact you. Your physician or   their representative will relay the results to you with their   recommendations at their soonest availability.  Thank you,  RESTRICTIONS:  During your procedure today, you received medications for sedation.  These   medications may affect your judgment, balance and coordination.  Therefore,   for 24 hours, you have the following restrictions:   - DO NOT drive a car, operate machinery, make legal/financial decisions,   sign important papers or drink alcohol.    ACTIVITY:  Today: no heavy lifting, straining or running due to procedural   sedation/anesthesia.  The following day: return to full activity including work.  DIET:  Eat and drink normally unless instructed otherwise.     TREATMENT FOR COMMON SIDE EFFECTS:  - Mild abdominal pain, nausea, belching, bloating or excessive gas:  rest,   eat lightly and use a heating pad.  - Sore Throat: treat with throat lozenges and/or gargle with warm salt   water.  - Because air was used during the procedure, expelling large amounts of air   from your rectum or belching is normal.  - If a bowel prep was taken, you may not have a bowel movement for 1-3 days.    This is normal.  SYMPTOMS TO WATCH FOR AND REPORT TO YOUR PHYSICIAN:  1. Abdominal pain or bloating, other than gas cramps.  2. Chest pain.  3. Back pain.  4. Signs of infection such as: chills or fever occurring within 24 hours   after the procedure.  5. Rectal bleeding, which would show as bright red, maroon, or black stools.   (A tablespoon of blood from the rectum is not serious, especially if    hemorrhoids are present.)  6. Vomiting.  7. Weakness or dizziness.  GO DIRECTLY TO THE NEAREST EMERGENCY ROOM IF YOU HAVE ANY OF THE FOLLOWING:      Difficulty breathing              Chills and/or fever over 101 F   Persistent vomiting and/or vomiting blood   Severe abdominal pain   Severe chest pain   Black, tarry stools   Bleeding- more than one tablespoon   Any other symptom or condition that you feel may need urgent attention  Your doctor recommends these additional instructions:  If any biopsies were taken, your doctors clinic will contact you in 1 to 2   weeks with any results.  - Patient has a contact number available for emergencies.  The signs and   symptoms of potential delayed complications were discussed with the   patient.  Return to normal activities tomorrow.  Written discharge   instructions were provided to the patient.   - Discharge patient to home (ambulatory).   - Resume previous diet.   - Return to referring physician after studies are complete.   - Continue present medications.   - Repeat colonoscopy for screening purposes. age 46  For questions, problems or results please call your physician - Tom Collazo MD at Work:  [phone].  [facility], EMERGENCY ROOM PHONE NUMBER: [phone]  IF A COMPLICATION OR EMERGENCY SITUATION ARISES AND YOU ARE UNABLE TO REACH   YOUR PHYSICIAN - GO DIRECTLY TO THE EMERGENCY ROOM.  Tom Collazo MD  10/17/2024 2:58:07 PM  This report has been verified and signed electronically.  Dear patient,  As a result of recent federal legislation (The Federal Cures Act), you may   receive lab or pathology results from your procedure in your [facility]   account before your physician is able to contact you. Your physician or   their representative will relay the results to you with their   recommendations at their soonest availability.  Thank you,  PROVATION

## 2024-10-17 NOTE — TRANSFER OF CARE
"Anesthesia Transfer of Care Note    Patient: Laila Pappas    Procedure(s) Performed: Procedure(s) (LRB):  EGD (ESOPHAGOGASTRODUODENOSCOPY) (N/A)  COLONOSCOPY (N/A)    Patient location: [facility]    Anesthesia Type: general    Transport from OR: Transported from OR on room air with adequate spontaneous ventilation    Post pain: adequate analgesia    Post assessment: no apparent anesthetic complications and tolerated procedure well    Post vital signs: stable    Level of consciousness: lethargic and responds to stimulation    Nausea/Vomiting: no nausea/vomiting    Complications: none    Transfer of care protocol was followed      Last vitals: Visit Vitals  /68 (Patient Position: Lying)   Pulse 96   Temp 36.7 °C (98 °F) (Temporal)   Resp 16   Ht 5' 3" (1.6 m)   Wt 78.5 kg (173 lb)   LMP 09/05/2024   SpO2 100%   Breastfeeding No   BMI 30.65 kg/m²     "

## 2024-10-17 NOTE — H&P
[facility] - Endoscopy  Gastroenterology  H&P    Patient Name: Laila Pappas  MRN: [id number]  Admission Date: 10/17/2024  Code Status: Prior    Attending Provider: clary chase  Primary Care Physician: Jhoana Trejo MD  Principal Problem:<principal problem not specified>    Subjective:     History of Present Illness: left sided abdominal pain, change in bowel habits    Past Medical History:   Diagnosis Date    Bilateral knee pain 06/19/2018    Endometriosis     treated by GYN at     Epigastric abdominal pain 12/05/2016    Gastroesophageal reflux disease 07/06/2016    Helicobacter pylori ab+     Helicobacter pylori ab+     High-tone pelvic floor dysfunction 05/02/2016    IIH (idiopathic intracranial hypertension)     Dr. Rogers - [facility]    Palpitations 07/06/2016    Pre-eclampsia in postpartum period 08/16/2022       Past Surgical History:   Procedure Laterality Date    ARTHROSCOPY OF KNEE Left 04/06/2021    Procedure: ARTHROSCOPY, KNEE;  Surgeon: Deidra Mejias MD;  Location: [facility];  Service: Orthopedics;  Laterality: Left;    COLONOSCOPY N/A 12/13/2018    Procedure: COLONOSCOPY;  Surgeon: Micki Gross MD;  Location: [facility] (4TH FLR);  Service: Endoscopy;  Laterality: N/A;  preferrably in Dec 2018, CRS ok if needed.    COLONOSCOPY N/A 11/05/2022    Procedure: COLONOSCOPY;  Surgeon: Greg Leach MD;  Location: [facility] (4TH FLR);  Service: Endoscopy;  Laterality: N/A;  Endoscopy schedulers please schedule patient for colonoscopy with me for further evaluation of painless hematochezia thank you    ESOPHAGOGASTRODUODENOSCOPY N/A 10/30/2018    Procedure: EGD (ESOPHAGOGASTRODUODENOSCOPY);  Surgeon: Greg Leach MD;  Location: [facility] (2ND FLR);  Service: Endoscopy;  Laterality: N/A;  ok to schedule per Kimmy    ESOPHAGOGASTRODUODENOSCOPY N/A 11/05/2022    Procedure: EGD (ESOPHAGOGASTRODUODENOSCOPY);  Surgeon: Greg Leach MD;  Location: [facility] (4TH FLR);  Service: Endoscopy;  Laterality: N/A;   Endoscopy schedulers please schedule patient for EGD for evaluation of right upper quadrant pain thank you  prep instr portal--ml  11/2/22 room closed-pt okay with Dr. Leach-50 min case okay per Katlyn-ml    KNEE ARTHROSCOPY W/ DEBRIDEMENT  04/06/2021    Procedure: ARTHROSCOPY, KNEE, WITH DEBRIDEMENT;  Surgeon: Deidra Mejias MD;  Location: [facility] OR;  Service: Orthopedics;;    KNEE ARTHROSCOPY W/ MENISCECTOMY Right 04/06/2021    Procedure: ARTHROSCOPY, KNEE, WITH MENISCECTOMY;  Surgeon: Deidra Mejias MD;  Location: [facility] OR;  Service: Orthopedics;  Laterality: Right;    KNEE ARTHROSCOPY W/ PLICA EXCISION Left 04/06/2021    Procedure: EXCISION, PLICA, KNEE, ARTHROSCOPIC;  Surgeon: Deidra Mejias MD;  Location: [facility] OR;  Service: Orthopedics;  Laterality: Left;    LAPAROSCOPIC CHOLECYSTECTOMY N/A 4/11/2024    Procedure: CHOLECYSTECTOMY, LAPAROSCOPIC;  Surgeon: Tom Campos Jr., MD;  Location: [address];  Service: General;  Laterality: N/A;    LAPAROSCOPY  2017    dx with endo.  no removal    NO PAST SURGERIES      SYNOVECTOMY OF KNEE Left 04/06/2021    Procedure: SYNOVECTOMY, KNEE;  Surgeon: Deidra Mejias MD;  Location: [facility] OR;  Service: Orthopedics;  Laterality: Left;    UPPER GASTROINTESTINAL ENDOSCOPY         Review of patient's allergies indicates:   Allergen Reactions    Doxycycline Other (See Comments)     Triggers headaches    Bactrim ds [sulfamethoxazole-trimethoprim] Nausea And Vomiting    Nsaids (non-steroidal anti-inflammatory drug) Other (See Comments)     D/t h/o PUD and H pylori    Codeine Rash     Family History       Problem Relation (Age of Onset)    Diabetes Mother    Hypertension Brother, Maternal Grandmother, Maternal Grandfather    No Known Problems Father, Sister, Paternal Grandmother, Paternal Grandfather          Tobacco Use    Smoking status: Never     Passive exposure: Never    Smokeless tobacco: Never   Substance and Sexual Activity    Alcohol use: No    Drug use: No    Sexual activity: Yes      Partners: Male     Birth control/protection: None     Review of Systems   Respiratory: Negative.     Cardiovascular: Negative.      Objective:     Vital Signs (Most Recent):  Temp: 98 °F (36.7 °C) (10/17/24 1353)  Pulse: 96 (10/17/24 1353)  Resp: 16 (10/17/24 1353)  BP: 133/68 (10/17/24 1353)  SpO2: 100 % (10/17/24 1353) Vital Signs (24h Range):  Temp:  [98 °F (36.7 °C)] 98 °F (36.7 °C)  Pulse:  [96] 96  Resp:  [16] 16  SpO2:  [100 %] 100 %  BP: (133)/(68) 133/68     Weight: 78.5 kg (173 lb) (10/17/24 1353)  Body mass index is 30.65 kg/m².    No intake or output data in the 24 hours ending 10/17/24 1405    Lines/Drains/Airways       Peripheral Intravenous Line  Duration                  Peripheral IV - Single Lumen 10/17/24 1356 20 G Right Antecubital <1 day                    Physical Exam  Cardiovascular:      Rate and Rhythm: Normal rate and regular rhythm.   Pulmonary:      Effort: Pulmonary effort is normal.      Breath sounds: Normal breath sounds.         Significant Labs:      Significant Imaging:      Assessment/Plan:     There are no hospital problems to display for this patient.      Indication for procedure:    ASA:I  Airway normal  Malampati class:    Personal and family history negative for anesthesia problems    Plan:egd/colon  Anesthesia plan: general      Tom Collazo MD  Gastroenterology  [facility] - Endoscopy

## 2024-10-21 ENCOUNTER — PATIENT MESSAGE (OUTPATIENT)
Dept: INTERNAL MEDICINE | Facility: CLINIC | Age: 34
End: 2024-10-21
Payer: COMMERCIAL

## 2024-10-21 LAB
FINAL PATHOLOGIC DIAGNOSIS: NORMAL
GROSS: NORMAL
Lab: NORMAL

## 2024-10-22 ENCOUNTER — PATIENT MESSAGE (OUTPATIENT)
Dept: GASTROENTEROLOGY | Facility: HOSPITAL | Age: 34
End: 2024-10-22
Payer: COMMERCIAL

## 2024-10-22 ENCOUNTER — PATIENT MESSAGE (OUTPATIENT)
Dept: GASTROENTEROLOGY | Facility: CLINIC | Age: 34
End: 2024-10-22
Payer: COMMERCIAL

## 2024-10-22 DIAGNOSIS — A04.8 H. PYLORI INFECTION: Primary | ICD-10-CM

## 2024-10-22 DIAGNOSIS — R10.12 LUQ ABDOMINAL PAIN: ICD-10-CM

## 2024-10-22 RX ORDER — BISMUTH SUBCITRATE POTASSIUM, METRONIDAZOLE AND TETRACYCLINE HYDROCHLORIDE 140; 125; 125 MG/1; MG/1; MG/1
3 CAPSULE ORAL
Qty: 120 CAPSULE | Refills: 0 | Status: SHIPPED | OUTPATIENT
Start: 2024-10-22 | End: 2024-10-25

## 2024-10-22 RX ORDER — ONDANSETRON 4 MG/1
4 TABLET, ORALLY DISINTEGRATING ORAL EVERY 8 HOURS PRN
Qty: 30 TABLET | Refills: 3 | Status: SHIPPED | OUTPATIENT
Start: 2024-10-22

## 2024-10-22 RX ORDER — OMEPRAZOLE 40 MG/1
CAPSULE, DELAYED RELEASE ORAL
Qty: 90 CAPSULE | Refills: 3 | Status: SHIPPED | OUTPATIENT
Start: 2024-10-22 | End: 2025-10-21

## 2024-10-23 NOTE — ANESTHESIA POSTPROCEDURE EVALUATION
Anesthesia Post Evaluation    Patient: Laila Pappas    Procedure(s) Performed: Procedure(s) (LRB):  EGD (ESOPHAGOGASTRODUODENOSCOPY) (N/A)  COLONOSCOPY (N/A)    Final Anesthesia Type: general      Patient location during evaluation: PACU  Patient participation: Yes- Able to Participate  Level of consciousness: awake and alert  Post-procedure vital signs: reviewed and stable  Pain management: adequate  Airway patency: patent    PONV status at discharge: No PONV  Anesthetic complications: no      Cardiovascular status: blood pressure returned to baseline  Respiratory status: unassisted  Hydration status: euvolemic  Follow-up not needed.              Vitals Value Taken Time   /65 10/17/24 1531   Temp 36.7 °C (98.1 °F) 10/17/24 1500   Pulse 85 10/17/24 1534   Resp 41 10/17/24 1534   SpO2 100 % 10/17/24 1534   Vitals shown include unfiled device data.      No case tracking events are documented in the log.      Pain/Stefan Score: No data recorded

## 2024-10-25 ENCOUNTER — PATIENT MESSAGE (OUTPATIENT)
Dept: GASTROENTEROLOGY | Facility: CLINIC | Age: 34
End: 2024-10-25
Payer: COMMERCIAL

## 2024-10-25 DIAGNOSIS — A04.8 H. PYLORI INFECTION: Primary | ICD-10-CM

## 2024-10-25 RX ORDER — CLARITHROMYCIN 500 MG/1
500 TABLET, FILM COATED ORAL EVERY 12 HOURS
Qty: 20 TABLET | Refills: 0 | Status: SHIPPED | OUTPATIENT
Start: 2024-10-25 | End: 2024-11-04

## 2024-10-25 RX ORDER — AMOXICILLIN 500 MG/1
1000 CAPSULE ORAL 2 TIMES DAILY
Qty: 40 CAPSULE | Refills: 0 | Status: SHIPPED | OUTPATIENT
Start: 2024-10-25 | End: 2024-11-04

## 2024-11-26 ENCOUNTER — OFFICE VISIT (OUTPATIENT)
Dept: INTERNAL MEDICINE | Facility: CLINIC | Age: 34
End: 2024-11-26
Payer: COMMERCIAL

## 2024-11-26 ENCOUNTER — ON-DEMAND VIRTUAL (OUTPATIENT)
Dept: URGENT CARE | Facility: CLINIC | Age: 34
End: 2024-11-26
Payer: COMMERCIAL

## 2024-11-26 DIAGNOSIS — Z91.199 NO-SHOW FOR APPOINTMENT: Primary | ICD-10-CM

## 2024-11-26 DIAGNOSIS — R39.9 UTI SYMPTOMS: Primary | ICD-10-CM

## 2024-11-26 PROCEDURE — 99499 UNLISTED E&M SERVICE: CPT | Mod: 95,,, | Performed by: INTERNAL MEDICINE

## 2024-11-26 RX ORDER — CEPHALEXIN 500 MG/1
500 CAPSULE ORAL EVERY 8 HOURS
Qty: 21 CAPSULE | Refills: 0 | Status: SHIPPED | OUTPATIENT
Start: 2024-11-26 | End: 2024-12-03

## 2024-11-26 RX ORDER — FLUCONAZOLE 150 MG/1
150 TABLET ORAL DAILY
Qty: 2 TABLET | Refills: 0 | Status: SHIPPED | OUTPATIENT
Start: 2024-11-26 | End: 2024-11-28

## 2024-11-26 NOTE — PROGRESS NOTES
Subjective:      Patient ID: Laila Pappas is a 33 y.o. female.    Vitals:  vitals were not taken for this visit.     Chief Complaint: Urinary Tract Infection      Visit Type: TELE AUDIOVISUAL    Present with the patient at the time of consultation: TELEMED PRESENT WITH PATIENT: None    Past Medical History:   Diagnosis Date    Bilateral knee pain 06/19/2018    Endometriosis     treated by GYN at     Epigastric abdominal pain 12/05/2016    Gastroesophageal reflux disease 07/06/2016    Helicobacter pylori ab+     Helicobacter pylori ab+     High-tone pelvic floor dysfunction 05/02/2016    IIH (idiopathic intracranial hypertension)     Dr. Rogers - [facility]    Palpitations 07/06/2016    Pre-eclampsia in postpartum period 08/16/2022     Past Surgical History:   Procedure Laterality Date    ARTHROSCOPY OF KNEE Left 04/06/2021    Procedure: ARTHROSCOPY, KNEE;  Surgeon: Deidra Mejias MD;  Location: [facility] OR;  Service: Orthopedics;  Laterality: Left;    COLONOSCOPY N/A 12/13/2018    Procedure: COLONOSCOPY;  Surgeon: Micki Gross MD;  [facility]  Service: Endoscopy;  Laterality: N/A;  preferrably in Dec 2018, CRS ok if needed.    COLONOSCOPY N/A 11/05/2022    Procedure: COLONOSCOPY;  Surgeon: Greg Leach MD;  Location: [facility] (4TH FLR);  Service: Endoscopy;  Laterality: N/A;  Endoscopy schedulers please schedule patient for colonoscopy with me for further evaluation of painless hematochezia thank you    COLONOSCOPY N/A 10/17/2024    Procedure: COLONOSCOPY;  Surgeon: Tom Collazo MD;  [facility]  Service: Endoscopy;  Laterality: N/A;    ESOPHAGOGASTRODUODENOSCOPY N/A 10/30/2018    Procedure: EGD (ESOPHAGOGASTRODUODENOSCOPY);  Surgeon: Greg Leach MD;  Location: [facility] (2ND FLR);  Service: Endoscopy;  Laterality: N/A;  ok to schedule per Kimmy    ESOPHAGOGASTRODUODENOSCOPY N/A 11/05/2022    Procedure: EGD (ESOPHAGOGASTRODUODENOSCOPY);  Surgeon: Greg Leach  MD;  Location: [facility] (4TH FLR);  Service: Endoscopy;  Laterality: N/A;  Endoscopy schedulers please schedule patient for EGD for evaluation of right upper quadrant pain thank you  prep instr portal--ml  11/2/22 room closed-pt okay with Dr. Leach-50 min case okay per Katlyn-ml    ESOPHAGOGASTRODUODENOSCOPY N/A 10/17/2024    Procedure: EGD (ESOPHAGOGASTRODUODENOSCOPY);  Surgeon: Tom Collazo MD;  Location: [facility] (2ND FLR);  Service: Endoscopy;  Laterality: N/A;  Labat/Suflave/portal/ 2nd floor due to availability SW    KNEE ARTHROSCOPY W/ DEBRIDEMENT  04/06/2021    Procedure: ARTHROSCOPY, KNEE, WITH DEBRIDEMENT;  Surgeon: Deidra Mejias MD;  Location: [facility] OR;  Service: Orthopedics;;    KNEE ARTHROSCOPY W/ MENISCECTOMY Right 04/06/2021    Procedure: ARTHROSCOPY, KNEE, WITH MENISCECTOMY;  Surgeon: Deidra Mejias MD;  Location: [facility] OR;  Service: Orthopedics;  Laterality: Right;    KNEE ARTHROSCOPY W/ PLICA EXCISION Left 04/06/2021    Procedure: EXCISION, PLICA, KNEE, ARTHROSCOPIC;  Surgeon: Deidra Mejias MD;  Location: [facility] OR;  Service: Orthopedics;  Laterality: Left;    LAPAROSCOPIC CHOLECYSTECTOMY N/A 4/11/2024    Procedure: CHOLECYSTECTOMY, LAPAROSCOPIC;  Surgeon: Tom Campos Jr., MD;  Location: [facility] 2ND FLR;  Service: General;  Laterality: N/A;    LAPAROSCOPY  2017    dx with endo.  no removal    NO PAST SURGERIES      SYNOVECTOMY OF KNEE Left 04/06/2021    Procedure: SYNOVECTOMY, KNEE;  Surgeon: Deidra Mejias MD;  Location: [facility] OR;  Service: Orthopedics;  Laterality: Left;    UPPER GASTROINTESTINAL ENDOSCOPY       Review of patient's allergies indicates:   Allergen Reactions    Doxycycline Other (See Comments)     Triggers headaches    Bactrim ds [sulfamethoxazole-trimethoprim] Nausea And Vomiting    Nsaids (non-steroidal anti-inflammatory drug) Other (See Comments)     D/t h/o PUD and H pylori    Codeine Rash     Current Outpatient Medications on File Prior to Visit   Medication Sig Dispense Refill     colestipoL (COLESTID) 1 gram Tab Take 1 tablet (1 g total) by mouth 2 (two) times daily. 180 tablet 3    dicyclomine (BENTYL) 10 MG capsule TAKE 1 CAPSULE(10 MG) BY MOUTH THREE TIMES DAILY AS NEEDED FOR ABDOMINAL PAIN 270 capsule 0    ferrous sulfate 325 (65 FE) MG EC tablet Take 1 tablet (325 mg total) by mouth once daily. 90 tablet 1    fluticasone propionate (FLONASE) 50 mcg/actuation nasal spray 1 spray (50 mcg total) by Each Nostril route once daily. 16 g 5    ketoconazole (NIZORAL) 2 % cream Apply topically 2 (two) times daily. Prn rash of face 60 g 4    levocetirizine (XYZAL) 5 MG tablet Take 1 tablet (5 mg total) by mouth every evening. 30 tablet 11    meloxicam (MOBIC) 15 MG tablet Take 1 tablet by mouth once daily with food. May need 20mg prilosec once daily on days that you are taking mobic to protect the stomach. 30 tablet 1    omeprazole (PRILOSEC) 40 MG capsule Take 1 capsule (40 mg total) by mouth 2 (two) times daily before meals for 14 days, THEN 1 capsule (40 mg total) every morning. 90 capsule 3    ondansetron (ZOFRAN-ODT) 4 MG TbDL Take 1 tablet (4 mg total) by mouth every 8 (eight) hours as needed (nausea). 30 tablet 3    oxyCODONE (ROXICODONE) 5 MG immediate release tablet Take 1 tablet (5 mg total) by mouth every 4 (four) hours as needed for Pain. 15 tablet 0    vitamin D (VITAMIN D3) 1000 units Tab Take 1,000 Units by mouth once daily.      [DISCONTINUED] folic acid (FOLVITE) 1 MG tablet Take 1 mg by mouth once daily.       Current Facility-Administered Medications on File Prior to Visit   Medication Dose Route Frequency Provider Last Rate Last Admin    acetaminophen tablet 650 mg  650 mg Oral 1 time in Clinic/HOD Agatha Causey FNP         Family History   Problem Relation Name Age of Onset    Diabetes Mother lizette chase     No Known Problems Father      No Known Problems Sister      Hypertension Brother bolivar salvatore patel.     Hypertension Maternal Grandmother      Hypertension  Maternal Grandfather alon burrell     No Known Problems Paternal Grandmother      No Known Problems Paternal Grandfather      Colon cancer Neg Hx      Crohn's disease Neg Hx      Esophageal cancer Neg Hx      Inflammatory bowel disease Neg Hx      Irritable bowel syndrome Neg Hx      Rectal cancer Neg Hx      Stomach cancer Neg Hx      Ulcerative colitis Neg Hx      Celiac disease Neg Hx      Cirrhosis Neg Hx      Colon polyps Neg Hx      Liver cancer Neg Hx      Liver disease Neg Hx         Medications Ordered                [id number] - [place] [address] [place] [address] [address], [facility] [address]    Telephone: [phone]   Fax: [phone]   Hours: Open 24 hours                         E-Prescribed (2 of 2)              cephALEXin (KEFLEX) 500 MG capsule    Sig: Take 1 capsule (500 mg total) by mouth every 8 (eight) hours. for 7 days       Start: 11/26/24     Quantity: 21 capsule Refills: 0                         fluconazole (DIFLUCAN) 150 MG Tab    Sig: Take 1 tablet (150 mg total) by mouth once daily. for 2 days       Start: 11/26/24     Quantity: 2 tablet Refills: 0                           Ohs Peq Odvv Intake    11/26/2024  1:17 PM CST - Filed by Patient   What is your current physical address in the event of a medical emergency? UTI   Are you able to take your vital signs? No   Please attach any relevant images or files    Is your employer contracted with [facility] [employer]? No         Presents or virtual visit with complaints of dysuria and urinary frequency which began yesterday.  Consistent with prior symptoms of UTI.        Genitourinary:  Positive for dysuria, frequency and urgency.        Objective:   The physical exam was conducted virtually.  Physical Exam   Constitutional: She is oriented to person, place, and time. She does not appear ill. No distress.   HENT:   Head: Normocephalic and atraumatic.    Pulmonary/Chest: Effort normal. No respiratory distress.   Abdominal: Normal appearance.   Musculoskeletal: Normal range of motion.         General: Normal range of motion.   Neurological: no focal deficit. She is alert and oriented to person, place, and time.   Psychiatric: Her behavior is normal. Mood, judgment and thought content normal.       Assessment:     1. UTI symptoms        Plan:       UTI symptoms    Other orders  -     cephALEXin (KEFLEX) 500 MG capsule; Take 1 capsule (500 mg total) by mouth every 8 (eight) hours. for 7 days  Dispense: 21 capsule; Refill: 0  -     fluconazole (DIFLUCAN) 150 MG Tab; Take 1 tablet (150 mg total) by mouth once daily. for 2 days  Dispense: 2 tablet; Refill: 0    Increased intake of fluids.  Avoid caffeine.  Proceed to urgent care, PCP, or emergency department if symptoms become worse or fever develops.

## 2024-12-02 ENCOUNTER — HOSPITAL ENCOUNTER (EMERGENCY)
Facility: HOSPITAL | Age: 34
Discharge: HOME | End: 2024-12-02
Attending: EMERGENCY MEDICINE
Payer: COMMERCIAL

## 2024-12-02 VITALS
RESPIRATION RATE: 18 BRPM | DIASTOLIC BLOOD PRESSURE: 70 MMHG | SYSTOLIC BLOOD PRESSURE: 118 MMHG | TEMPERATURE: 98 F | OXYGEN SATURATION: 100 % | BODY MASS INDEX: 29.88 KG/M2 | HEIGHT: 64 IN | WEIGHT: 175 LBS | HEART RATE: 80 BPM

## 2024-12-02 DIAGNOSIS — R07.89 CHEST WALL PAIN: Primary | ICD-10-CM

## 2024-12-02 LAB
ALBUMIN SERPL BCP-MCNC: 3.7 G/DL (ref 3.5–5.2)
ALP SERPL-CCNC: 65 U/L (ref 40–150)
ALT SERPL W/O P-5'-P-CCNC: 12 U/L (ref 10–44)
ANION GAP SERPL CALC-SCNC: 10 MMOL/L (ref 8–16)
AST SERPL-CCNC: 17 U/L (ref 10–40)
B-HCG UR QL: NEGATIVE
BASOPHILS # BLD AUTO: 0.07 K/UL (ref 0–0.2)
BASOPHILS NFR BLD: 0.8 % (ref 0–1.9)
BILIRUB SERPL-MCNC: 0.4 MG/DL (ref 0.1–1)
BNP SERPL-MCNC: 19 PG/ML (ref 0–99)
BUN SERPL-MCNC: 8 MG/DL (ref 6–20)
CALCIUM SERPL-MCNC: 9.1 MG/DL (ref 8.7–10.5)
CHLORIDE SERPL-SCNC: 108 MMOL/L (ref 95–110)
CO2 SERPL-SCNC: 23 MMOL/L (ref 23–29)
CREAT SERPL-MCNC: 0.8 MG/DL (ref 0.5–1.4)
CTP QC/QA: YES
DIFFERENTIAL METHOD BLD: ABNORMAL
EOSINOPHIL # BLD AUTO: 0.4 K/UL (ref 0–0.5)
EOSINOPHIL NFR BLD: 4.2 % (ref 0–8)
ERYTHROCYTE [DISTWIDTH] IN BLOOD BY AUTOMATED COUNT: 12.8 % (ref 11.5–14.5)
EST. GFR  (NO RACE VARIABLE): >60 ML/MIN/1.73 M^2
GLUCOSE SERPL-MCNC: 78 MG/DL (ref 70–110)
HCT VFR BLD AUTO: 44.8 % (ref 37–48.5)
HGB BLD-MCNC: 14.3 G/DL (ref 12–16)
IMM GRANULOCYTES # BLD AUTO: 0.02 K/UL (ref 0–0.04)
IMM GRANULOCYTES NFR BLD AUTO: 0.2 % (ref 0–0.5)
LYMPHOCYTES # BLD AUTO: 2.8 K/UL (ref 1–4.8)
LYMPHOCYTES NFR BLD: 33.5 % (ref 18–48)
MCH RBC QN AUTO: 28.9 PG (ref 27–31)
MCHC RBC AUTO-ENTMCNC: 31.9 G/DL (ref 32–36)
MCV RBC AUTO: 91 FL (ref 82–98)
MONOCYTES # BLD AUTO: 0.6 K/UL (ref 0.3–1)
MONOCYTES NFR BLD: 6.6 % (ref 4–15)
NEUTROPHILS # BLD AUTO: 4.6 K/UL (ref 1.8–7.7)
NEUTROPHILS NFR BLD: 54.7 % (ref 38–73)
NRBC BLD-RTO: 0 /100 WBC
PLATELET # BLD AUTO: 294 K/UL (ref 150–450)
PMV BLD AUTO: 10.5 FL (ref 9.2–12.9)
POTASSIUM SERPL-SCNC: 3.3 MMOL/L (ref 3.5–5.1)
PROT SERPL-MCNC: 7.5 G/DL (ref 6–8.4)
RBC # BLD AUTO: 4.94 M/UL (ref 4–5.4)
SODIUM SERPL-SCNC: 141 MMOL/L (ref 136–145)
TROPONIN I SERPL DL<=0.01 NG/ML-MCNC: 0.01 NG/ML (ref 0–0.03)
WBC # BLD AUTO: 8.33 K/UL (ref 3.9–12.7)

## 2024-12-02 PROCEDURE — 80053 COMPREHEN METABOLIC PANEL: CPT

## 2024-12-02 PROCEDURE — 85025 COMPLETE CBC W/AUTO DIFF WBC: CPT

## 2024-12-02 PROCEDURE — 99285 EMERGENCY DEPT VISIT HI MDM: CPT | Mod: 25

## 2024-12-02 PROCEDURE — 93005 ELECTROCARDIOGRAM TRACING: CPT

## 2024-12-02 PROCEDURE — 84484 ASSAY OF TROPONIN QUANT: CPT

## 2024-12-02 PROCEDURE — 93010 ELECTROCARDIOGRAM REPORT: CPT | Mod: ,,, | Performed by: INTERNAL MEDICINE

## 2024-12-02 PROCEDURE — 25000003 PHARM REV CODE 250: Performed by: EMERGENCY MEDICINE

## 2024-12-02 PROCEDURE — 83880 ASSAY OF NATRIURETIC PEPTIDE: CPT

## 2024-12-02 PROCEDURE — 81025 URINE PREGNANCY TEST: CPT

## 2024-12-02 RX ORDER — ASPIRIN 325 MG
325 TABLET ORAL
Status: COMPLETED | OUTPATIENT
Start: 2024-12-02 | End: 2024-12-02

## 2024-12-02 RX ORDER — CYCLOBENZAPRINE HCL 10 MG
10 TABLET ORAL 3 TIMES DAILY PRN
Qty: 15 TABLET | Refills: 0 | Status: SHIPPED | OUTPATIENT
Start: 2024-12-02 | End: 2024-12-07

## 2024-12-02 RX ORDER — ACETAMINOPHEN 500 MG
500 TABLET ORAL EVERY 6 HOURS PRN
Qty: 13 TABLET | Refills: 0 | Status: SHIPPED | OUTPATIENT
Start: 2024-12-02

## 2024-12-02 RX ADMIN — ASPIRIN 325 MG ORAL TABLET 325 MG: 325 PILL ORAL at 06:12

## 2024-12-02 NOTE — DISCHARGE INSTRUCTIONS

## 2024-12-02 NOTE — Clinical Note
"Laila"James Pappas was seen and treated in our emergency department on 12/2/2024.  She may return to work on 12/04/2024.       If you have any questions or concerns, please don't hesitate to call.      Leobardo Sawyer MD"

## 2024-12-04 LAB
OHS QRS DURATION: 78 MS
OHS QTC CALCULATION: 421 MS

## 2024-12-28 ENCOUNTER — HOSPITAL ENCOUNTER (EMERGENCY)
Facility: HOSPITAL | Age: 34
Discharge: HOME | End: 2024-12-28
Attending: EMERGENCY MEDICINE
Payer: COMMERCIAL

## 2024-12-28 VITALS
TEMPERATURE: 98 F | HEART RATE: 78 BPM | WEIGHT: 175 LBS | RESPIRATION RATE: 16 BRPM | HEIGHT: 64 IN | SYSTOLIC BLOOD PRESSURE: 116 MMHG | DIASTOLIC BLOOD PRESSURE: 68 MMHG | OXYGEN SATURATION: 97 % | BODY MASS INDEX: 29.88 KG/M2

## 2024-12-28 DIAGNOSIS — R51.9 ACUTE NONINTRACTABLE HEADACHE, UNSPECIFIED HEADACHE TYPE: Primary | ICD-10-CM

## 2024-12-28 LAB
B-HCG UR QL: NEGATIVE
CTP QC/QA: YES

## 2024-12-28 PROCEDURE — 85025 COMPLETE CBC W/AUTO DIFF WBC: CPT | Performed by: PHYSICIAN ASSISTANT

## 2024-12-28 PROCEDURE — 96361 HYDRATE IV INFUSION ADD-ON: CPT

## 2024-12-28 PROCEDURE — 63600175 PHARM REV CODE 636 W HCPCS: Performed by: PHYSICIAN ASSISTANT

## 2024-12-28 PROCEDURE — 99284 EMERGENCY DEPT VISIT MOD MDM: CPT | Mod: 25

## 2024-12-28 PROCEDURE — 81025 URINE PREGNANCY TEST: CPT | Performed by: PHYSICIAN ASSISTANT

## 2024-12-28 PROCEDURE — 96374 THER/PROPH/DIAG INJ IV PUSH: CPT

## 2024-12-28 PROCEDURE — 80053 COMPREHEN METABOLIC PANEL: CPT | Performed by: PHYSICIAN ASSISTANT

## 2024-12-28 PROCEDURE — 87389 HIV-1 AG W/HIV-1&-2 AB AG IA: CPT | Performed by: PHYSICIAN ASSISTANT

## 2024-12-28 PROCEDURE — 86803 HEPATITIS C AB TEST: CPT | Performed by: PHYSICIAN ASSISTANT

## 2024-12-28 PROCEDURE — 25000003 PHARM REV CODE 250: Performed by: PHYSICIAN ASSISTANT

## 2024-12-28 RX ORDER — BUTALBITAL, ACETAMINOPHEN AND CAFFEINE 50; 325; 40 MG/1; MG/1; MG/1
1 TABLET ORAL
Status: COMPLETED | OUTPATIENT
Start: 2024-12-28 | End: 2024-12-28

## 2024-12-28 RX ORDER — METOCLOPRAMIDE HYDROCHLORIDE 5 MG/ML
10 INJECTION INTRAMUSCULAR; INTRAVENOUS
Status: COMPLETED | OUTPATIENT
Start: 2024-12-28 | End: 2024-12-28

## 2024-12-28 RX ORDER — BUTALBITAL, ACETAMINOPHEN AND CAFFEINE 50; 325; 40 MG/1; MG/1; MG/1
1 TABLET ORAL EVERY 4 HOURS PRN
Qty: 6 TABLET | Refills: 0 | Status: SHIPPED | OUTPATIENT
Start: 2024-12-28 | End: 2024-12-29

## 2024-12-28 RX ADMIN — METOCLOPRAMIDE 10 MG: 5 INJECTION, SOLUTION INTRAMUSCULAR; INTRAVENOUS at 10:12

## 2024-12-28 RX ADMIN — BUTALBITAL, ACETAMINOPHEN, AND CAFFEINE 1 TABLET: 325; 50; 40 TABLET ORAL at 10:12

## 2024-12-28 RX ADMIN — SODIUM CHLORIDE 500 ML: 9 INJECTION, SOLUTION INTRAVENOUS at 10:12

## 2024-12-29 LAB
ALBUMIN SERPL BCP-MCNC: 3.5 G/DL (ref 3.5–5.2)
ALP SERPL-CCNC: 63 U/L (ref 40–150)
ALT SERPL W/O P-5'-P-CCNC: 10 U/L (ref 10–44)
ANION GAP SERPL CALC-SCNC: 9 MMOL/L (ref 8–16)
AST SERPL-CCNC: 14 U/L (ref 10–40)
BASOPHILS # BLD AUTO: 0.03 K/UL (ref 0–0.2)
BASOPHILS NFR BLD: 0.3 % (ref 0–1.9)
BILIRUB SERPL-MCNC: 0.2 MG/DL (ref 0.1–1)
BUN SERPL-MCNC: 10 MG/DL (ref 6–20)
CALCIUM SERPL-MCNC: 9.3 MG/DL (ref 8.7–10.5)
CHLORIDE SERPL-SCNC: 108 MMOL/L (ref 95–110)
CO2 SERPL-SCNC: 23 MMOL/L (ref 23–29)
CREAT SERPL-MCNC: 0.7 MG/DL (ref 0.5–1.4)
DIFFERENTIAL METHOD BLD: ABNORMAL
EOSINOPHIL # BLD AUTO: 0.4 K/UL (ref 0–0.5)
EOSINOPHIL NFR BLD: 4.3 % (ref 0–8)
ERYTHROCYTE [DISTWIDTH] IN BLOOD BY AUTOMATED COUNT: 13.1 % (ref 11.5–14.5)
EST. GFR  (NO RACE VARIABLE): >60 ML/MIN/1.73 M^2
GLUCOSE SERPL-MCNC: 90 MG/DL (ref 70–110)
HCT VFR BLD AUTO: 44 % (ref 37–48.5)
HCV AB SERPL QL IA: NORMAL
HGB BLD-MCNC: 14.3 G/DL (ref 12–16)
HIV 1+2 AB+HIV1 P24 AG SERPL QL IA: NORMAL
IMM GRANULOCYTES # BLD AUTO: 0.06 K/UL (ref 0–0.04)
IMM GRANULOCYTES NFR BLD AUTO: 0.6 % (ref 0–0.5)
LYMPHOCYTES # BLD AUTO: 2.5 K/UL (ref 1–4.8)
LYMPHOCYTES NFR BLD: 24.1 % (ref 18–48)
MCH RBC QN AUTO: 30.2 PG (ref 27–31)
MCHC RBC AUTO-ENTMCNC: 32.5 G/DL (ref 32–36)
MCV RBC AUTO: 93 FL (ref 82–98)
MONOCYTES # BLD AUTO: 0.6 K/UL (ref 0.3–1)
MONOCYTES NFR BLD: 5.5 % (ref 4–15)
NEUTROPHILS # BLD AUTO: 6.6 K/UL (ref 1.8–7.7)
NEUTROPHILS NFR BLD: 65.2 % (ref 38–73)
NRBC BLD-RTO: 0 /100 WBC
PLATELET # BLD AUTO: 300 K/UL (ref 150–450)
PMV BLD AUTO: 11 FL (ref 9.2–12.9)
POTASSIUM SERPL-SCNC: 4 MMOL/L (ref 3.5–5.1)
PROT SERPL-MCNC: 6.9 G/DL (ref 6–8.4)
RBC # BLD AUTO: 4.74 M/UL (ref 4–5.4)
SODIUM SERPL-SCNC: 140 MMOL/L (ref 136–145)
WBC # BLD AUTO: 10.18 K/UL (ref 3.9–12.7)

## 2024-12-29 NOTE — ED PROVIDER NOTES
Encounter Date: 12/28/2024       History     Chief Complaint   Patient presents with    Headache     Pt c/o headache that started around noon today, pt reports it's not getting better despite taking tylenol. Hx of IIH     34-year-old female with past medical history of IH, endometriosis, GERD presents to the ED for headache.  Around 12:00 p.m. today had gradual onset headache which has progressively worsened.  States it is global and not relieved with Tylenol which is what she usually takes for headaches.  States she has had 2 L PEs in the past for IIH but has not needed 1 since 2021 prior to her pregnancy.  Denies any fevers/chills, neck stiffness, blurred vision.        Review of patient's allergies indicates:   Allergen Reactions    Doxycycline Other (See Comments)     Triggers headaches    Bactrim ds [sulfamethoxazole-trimethoprim] Nausea And Vomiting    Nsaids (non-steroidal anti-inflammatory drug) Other (See Comments)     D/t h/o PUD and H pylori    Codeine Rash     Past Medical History:   Diagnosis Date    Bilateral knee pain 06/19/2018    Endometriosis     treated by GYN at     Epigastric abdominal pain 12/05/2016    Gastroesophageal reflux disease 07/06/2016    Helicobacter pylori ab+     Helicobacter pylori ab+     High-tone pelvic floor dysfunction 05/02/2016    IIH (idiopathic intracranial hypertension)     Dr. Rogers - [facility]    Palpitations 07/06/2016    Pre-eclampsia in postpartum period 08/16/2022     Past Surgical History:   Procedure Laterality Date    ARTHROSCOPY OF KNEE Left 04/06/2021    Procedure: ARTHROSCOPY, KNEE;  Surgeon: Deidra Mejias MD;  Location: [facility];  Service: Orthopedics;  Laterality: Left;    COLONOSCOPY N/A 12/13/2018    Procedure: COLONOSCOPY;  Surgeon: Micki Gross MD;  Location: [address]);  Service: Endoscopy;  Laterality: N/A;  preferrably in Dec 2018, CRS ok if needed.    COLONOSCOPY N/A 11/05/2022    Procedure: COLONOSCOPY;  Surgeon: Greg Leach MD;   Location: [facility] (4TH FLR);  Service: Endoscopy;  Laterality: N/A;  Endoscopy schedulers please schedule patient for colonoscopy with me for further evaluation of painless hematochezia thank you    COLONOSCOPY N/A 10/17/2024    Procedure: COLONOSCOPY;  Surgeon: Tom Collazo MD;  Location: [facility] (2ND FLR);  Service: Endoscopy;  Laterality: N/A;    ESOPHAGOGASTRODUODENOSCOPY N/A 10/30/2018    Procedure: EGD (ESOPHAGOGASTRODUODENOSCOPY);  Surgeon: Greg Leach MD;  Location: [facility] (2ND FLR);  Service: Endoscopy;  Laterality: N/A;  ok to schedule per Kimmy    ESOPHAGOGASTRODUODENOSCOPY N/A 11/05/2022    Procedure: EGD (ESOPHAGOGASTRODUODENOSCOPY);  Surgeon: Greg Leach MD;  Location: [facility] (4TH FLR);  Service: Endoscopy;  Laterality: N/A;  Endoscopy schedulers please schedule patient for EGD for evaluation of right upper quadrant pain thank you  prep instr portal--ml  11/2/22 room closed-pt okay with Dr. Leach-50 min case okay per Katlyn-ml    ESOPHAGOGASTRODUODENOSCOPY N/A 10/17/2024    Procedure: EGD (ESOPHAGOGASTRODUODENOSCOPY);  Surgeon: Tom Collazo MD;  Location: [facility] (2ND FLR);  Service: Endoscopy;  Laterality: N/A;  Labat/Suflave/portal/ 2nd floor due to availability SW    KNEE ARTHROSCOPY W/ DEBRIDEMENT  04/06/2021    Procedure: ARTHROSCOPY, KNEE, WITH DEBRIDEMENT;  Surgeon: Deidra Mejias MD;  Location: [facility] OR;  Service: Orthopedics;;    KNEE ARTHROSCOPY W/ MENISCECTOMY Right 04/06/2021    Procedure: ARTHROSCOPY, KNEE, WITH MENISCECTOMY;  Surgeon: Deidra Mejias MD;  Location: [facility] OR;  Service: Orthopedics;  Laterality: Right;    KNEE ARTHROSCOPY W/ PLICA EXCISION Left 04/06/2021    Procedure: EXCISION, PLICA, KNEE, ARTHROSCOPIC;  Surgeon: Deidra Mejias MD;  Location: [facility] OR;  Service: Orthopedics;  Laterality: Left;    LAPAROSCOPIC CHOLECYSTECTOMY N/A 4/11/2024    Procedure: CHOLECYSTECTOMY, LAPAROSCOPIC;  Surgeon: Tom Campos Jr., MD;  Location: [facility] OR [address] [place] [address];  Service:  General;  Laterality: N/A;    LAPAROSCOPY  2017    dx with endo.  no removal    NO PAST SURGERIES      SYNOVECTOMY OF KNEE Left 04/06/2021    Procedure: SYNOVECTOMY, KNEE;  Surgeon: Deidra Mejias MD;  Location: [facility] OR;  Service: Orthopedics;  Laterality: Left;    UPPER GASTROINTESTINAL ENDOSCOPY       Family History   Problem Relation Name Age of Onset    Diabetes Mother lizette chase     No Known Problems Father      No Known Problems Sister      Hypertension Brother bolivar chase jr.     Hypertension Maternal Grandmother      Hypertension Maternal Grandfather soraida and carol burrell     No Known Problems Paternal Grandmother      No Known Problems Paternal Grandfather      Colon cancer Neg Hx      Crohn's disease Neg Hx      Esophageal cancer Neg Hx      Inflammatory bowel disease Neg Hx      Irritable bowel syndrome Neg Hx      Rectal cancer Neg Hx      Stomach cancer Neg Hx      Ulcerative colitis Neg Hx      Celiac disease Neg Hx      Cirrhosis Neg Hx      Colon polyps Neg Hx      Liver cancer Neg Hx      Liver disease Neg Hx       Social History     Tobacco Use    Smoking status: Never     Passive exposure: Never    Smokeless tobacco: Never   Substance Use Topics    Alcohol use: No    Drug use: No     Review of Systems    Physical Exam     Initial Vitals [12/28/24 2004]   BP Pulse Resp Temp SpO2   130/79 93 16 98.6 °F (37 °C) 98 %      MAP       --         Physical Exam    Nursing note and vitals reviewed.  Constitutional: She appears well-developed and well-nourished.   HENT:   Head: Normocephalic and atraumatic.   Eyes: Conjunctivae are normal. Pupils are equal, round, and reactive to light.   Neck: Neck supple.   No midline cervical tenderness or step-offs.  No meningismus    Normal range of motion.  Cardiovascular:  Normal rate.           Pulmonary/Chest: Breath sounds normal.   Abdominal: Abdomen is soft. There is no abdominal tenderness.   Musculoskeletal:         General: Normal range of motion.       Cervical back: Normal range of motion and neck supple.     Neurological: She is alert and oriented to person, place, and time. She has normal strength. No cranial nerve deficit. GCS score is 15. GCS eye subscore is 4. GCS verbal subscore is 5. GCS motor subscore is 6.   Skin: Skin is warm and dry. Capillary refill takes less than 2 seconds.         ED Course   Procedures  Labs Reviewed   HEPATITIS C ANTIBODY   HIV 1 / 2 ANTIBODY   CBC W/ AUTO DIFFERENTIAL   COMPREHENSIVE METABOLIC PANEL   POCT URINE PREGNANCY       Result Value    POC Preg Test, Ur Negative       Acceptable Yes            Imaging Results    None          Medications   sodium chloride 0.9% bolus 500 mL 500 mL (500 mLs Intravenous New Bag 12/28/24 2242)   metoclopramide injection 10 mg (10 mg Intravenous Given 12/28/24 2242)   butalbital-acetaminophen-caffeine -40 mg per tablet 1 tablet (1 tablet Oral Given 12/28/24 2220)     Medical Decision Making  34-year-old female presents ED with headache.  History of IIH.    Differential includes but not limited to pseudotumor cerebri , tension headache, migraine    History not concerning for subarachnoid hemorrhage.  No focal neurological deficits.  No meningismus.  She was given Fioricet as well as fluids and Reglan in the ED with resolution of her headache.  She is comfortable with discharge home.  Recommend close follow-up with her neurologist.  Counseled on return ED precautions.    Amount and/or Complexity of Data Reviewed  Labs: ordered.    Risk  Prescription drug management.                                      Clinical Impression:  Final diagnoses:  [R51.9] Acute nonintractable headache, unspecified headache type (Primary)          ED Disposition Condition    Discharge Stable          ED Prescriptions       Medication Sig Dispense Start Date End Date Auth. Provider    butalbital-acetaminophen-caffeine -40 mg (FIORICET, ESGIC) -40 mg per tablet Take 1 tablet by mouth  every 4 (four) hours as needed for Pain. 6 tablet 12/28/2024 12/29/2024 Hal Jorge PA-C          Follow-up Information       Follow up With Specialties Details Why Contact Info    Jhoana Trejo MD Internal Medicine Schedule an appointment as soon as possible for a visit   [address]  [place] [address]  [phone]               Hal Jorge PA-C  12/29/24 0150

## 2024-12-29 NOTE — ED TRIAGE NOTES
Pt c/o headache that began around 1200 today that has progressively gotten worse despite OTC med use. Pt has hx of Intracranial HTN and her last spinal tap was in 2022. Pt states this headache is worse than any pain she has had before. Pt endorses nausea, dizziness, and blurred vision if she turns her head quickly. Pt denies vomiting, diarrhea, weakness, CP, SOB, and any recent trauma.

## 2025-01-08 ENCOUNTER — OFFICE VISIT (OUTPATIENT)
Dept: OBSTETRICS AND GYNECOLOGY | Facility: CLINIC | Age: 35
End: 2025-01-08
Payer: COMMERCIAL

## 2025-01-08 VITALS
SYSTOLIC BLOOD PRESSURE: 115 MMHG | DIASTOLIC BLOOD PRESSURE: 90 MMHG | HEIGHT: 63 IN | WEIGHT: 178.56 LBS | BODY MASS INDEX: 31.64 KG/M2

## 2025-01-08 DIAGNOSIS — Z01.419 ENCOUNTER FOR GYNECOLOGICAL EXAMINATION WITHOUT ABNORMAL FINDING: Primary | ICD-10-CM

## 2025-01-08 DIAGNOSIS — N94.6 DYSMENORRHEA: ICD-10-CM

## 2025-01-08 DIAGNOSIS — N80.30 ENDOMETRIOSIS OF PELVIC PERITONEUM: ICD-10-CM

## 2025-01-08 DIAGNOSIS — G93.2 IIH (IDIOPATHIC INTRACRANIAL HYPERTENSION): ICD-10-CM

## 2025-01-08 DIAGNOSIS — K21.9 GASTROESOPHAGEAL REFLUX DISEASE, UNSPECIFIED WHETHER ESOPHAGITIS PRESENT: ICD-10-CM

## 2025-01-08 DIAGNOSIS — E78.5 DYSLIPIDEMIA: ICD-10-CM

## 2025-01-08 PROCEDURE — 99999 PR PBB SHADOW E&M-EST. PATIENT-LVL III: CPT | Mod: PBBFAC,,, | Performed by: OBSTETRICS & GYNECOLOGY

## 2025-01-08 PROCEDURE — 99395 PREV VISIT EST AGE 18-39: CPT | Mod: S$GLB,,, | Performed by: OBSTETRICS & GYNECOLOGY

## 2025-01-08 NOTE — PROGRESS NOTES
HISTORY OF PRESENT ILLNESS:    Laila Pappas is a 34 y.o. female, , Patient's last menstrual period was 2024 (exact date).,  presents for a routine exam and has no complaints.  Patient reports cycles occur every 4 weeks lasting 4-5 days using 3-4 pads per day.   She denies any BTB.     Last visit:  complaint of chronic pelvic pain that has started to become daily and worsened in severity these past few months. History of endometriosis diagnosed via surgery around . She is followed by Dr. Ra DOBBS and has one frozen embryo left she wants to transfer in the next year or so. Pain is daily, used to only be cyclical. During ovulation pain is severe. Now having pain with bowel movements. Pain is concentrated on left side of pelvis and radiates to her back.     History of abnormal pap: No  Last Pap:   Last MMG: N/A  Last Colonoscopy: N/A     She uses no birth control, history on infertility.    She does not desire STD screening.    The patient participates in regular exercise: no   The patient does not smoke.    The patient wears seatbelts.     Pt denies any domestic violence.    Past Medical History:   Diagnosis Date    Bilateral knee pain 2018    Endometriosis     treated by GYN at     Epigastric abdominal pain 2016    Gastroesophageal reflux disease 2016    Helicobacter pylori ab+     Helicobacter pylori ab+     High-tone pelvic floor dysfunction 2016    IIH (idiopathic intracranial hypertension)     Dr. Rogers - [facility]    Palpitations 2016    Pre-eclampsia in postpartum period 2022       Past Surgical History:   Procedure Laterality Date    ARTHROSCOPY OF KNEE Left 2021    Procedure: ARTHROSCOPY, KNEE;  Surgeon: Deidra Mejias MD;  Location: [facility] OR;  Service: Orthopedics;  Laterality: Left;    COLONOSCOPY N/A 2018    Procedure: COLONOSCOPY;  Surgeon: Micki Gross MD;  Location: [address]);  Service: Endoscopy;  Laterality: N/A;   Presents with c/o vomiting and ear pain starting 10/30/23-10/31/23 during the night. Seen by PMD on 10/31/23 and given zofran and amoxil for possible ear infection. Mom at bedside, st she has not given her the amoxil and pt only c/o ear pain prior to vomiting. St vomiting started again this early this morning. Pt denies any pain at this time.      Marsha Navarrete RN  11/05/23 6640 preferrably in Dec 2018, CRS ok if needed.    COLONOSCOPY N/A 11/05/2022    Procedure: COLONOSCOPY;  Surgeon: Greg Leach MD;  Location: [facility] (4TH FLR);  Service: Endoscopy;  Laterality: N/A;  Endoscopy schedulers please schedule patient for colonoscopy with me for further evaluation of painless hematochezia thank you    COLONOSCOPY N/A 10/17/2024    Procedure: COLONOSCOPY;  Surgeon: Tom Collazo MD;  Location: [facility] (2ND FLR);  Service: Endoscopy;  Laterality: N/A;    ESOPHAGOGASTRODUODENOSCOPY N/A 10/30/2018    Procedure: EGD (ESOPHAGOGASTRODUODENOSCOPY);  Surgeon: Greg Leach MD;  Location: [facility] (2ND FLR);  Service: Endoscopy;  Laterality: N/A;  ok to schedule per Kimmy    ESOPHAGOGASTRODUODENOSCOPY N/A 11/05/2022    Procedure: EGD (ESOPHAGOGASTRODUODENOSCOPY);  Surgeon: Greg Leach MD;  Location: [facility] (4TH FLR);  Service: Endoscopy;  Laterality: N/A;  Endoscopy schedulers please schedule patient for EGD for evaluation of right upper quadrant pain thank you  prep instr portal--ml  11/2/22 room closed-pt okay with Dr. Leach-50 min case okay per Katlyn-ml    ESOPHAGOGASTRODUODENOSCOPY N/A 10/17/2024    Procedure: EGD (ESOPHAGOGASTRODUODENOSCOPY);  Surgeon: Tom Collazo MD;  Location: [facility] (2ND FLR);  Service: Endoscopy;  Laterality: N/A;  Labat/Suflave/portal/ 2nd floor due to availability SW    KNEE ARTHROSCOPY W/ DEBRIDEMENT  04/06/2021    Procedure: ARTHROSCOPY, KNEE, WITH DEBRIDEMENT;  Surgeon: Deidra Mejias MD;  Location: [facility] OR;  Service: Orthopedics;;    KNEE ARTHROSCOPY W/ MENISCECTOMY Right 04/06/2021    Procedure: ARTHROSCOPY, KNEE, WITH MENISCECTOMY;  Surgeon: Deidra Mejias MD;  Location: [facility] OR;  Service: Orthopedics;  Laterality: Right;    KNEE ARTHROSCOPY W/ PLICA EXCISION Left 04/06/2021    Procedure: EXCISION, PLICA, KNEE, ARTHROSCOPIC;  Surgeon: Deidra Mejias MD;  Location: [facility];  Service: Orthopedics;  Laterality: Left;    LAPAROSCOPIC CHOLECYSTECTOMY N/A  4/11/2024    Procedure: CHOLECYSTECTOMY, LAPAROSCOPIC;  Surgeon: Tom Campos Jr., MD;  Location: [address];  Service: General;  Laterality: N/A;    LAPAROSCOPY  2017    dx with endo.  no removal    NO PAST SURGERIES      SYNOVECTOMY OF KNEE Left 04/06/2021    Procedure: SYNOVECTOMY, KNEE;  Surgeon: Deidra Mejias MD;  Location: [facility];  Service: Orthopedics;  Laterality: Left;    UPPER GASTROINTESTINAL ENDOSCOPY         MEDICATIONS AND ALLERGIES:      Current Outpatient Medications:     acetaminophen (TYLENOL) 500 MG tablet, Take 1 tablet (500 mg total) by mouth every 6 (six) hours as needed., Disp: 13 tablet, Rfl: 0    colestipoL (COLESTID) 1 gram Tab, Take 1 tablet (1 g total) by mouth 2 (two) times daily., Disp: 180 tablet, Rfl: 3    ketoconazole (NIZORAL) 2 % cream, Apply topically 2 (two) times daily. Prn rash of face, Disp: 60 g, Rfl: 4    Review of patient's allergies indicates:   Allergen Reactions    Doxycycline Other (See Comments)     Triggers headaches    Bactrim ds [sulfamethoxazole-trimethoprim] Nausea And Vomiting    Nsaids (non-steroidal anti-inflammatory drug) Other (See Comments)     D/t h/o PUD and H pylori    Codeine Rash       Family History   Problem Relation Name Age of Onset    Diabetes Mother lizette chase     No Known Problems Father      No Known Problems Sister      Hypertension Brother bolivar chase      Hypertension Maternal Grandmother      Hypertension Maternal Grandfather soraida and carol valenciaanita     No Known Problems Paternal Grandmother      No Known Problems Paternal Grandfather      Colon cancer Neg Hx      Crohn's disease Neg Hx      Esophageal cancer Neg Hx      Inflammatory bowel disease Neg Hx      Irritable bowel syndrome Neg Hx      Rectal cancer Neg Hx      Stomach cancer Neg Hx      Ulcerative colitis Neg Hx      Celiac disease Neg Hx      Cirrhosis Neg Hx      Colon polyps Neg Hx      Liver cancer Neg Hx      Liver disease Neg Hx         Social History      Socioeconomic History    Marital status: Single   Occupational History     Employer: [employer]     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   Tobacco Use    Smoking status: Never     Passive exposure: Never    Smokeless tobacco: Never   Substance and Sexual Activity    Alcohol use: No    Drug use: No    Sexual activity: Yes     Partners: Male     Birth control/protection: None   Social History Narrative    She had her 1st baby in 2022 healthy vaginal delivery no      Social Drivers of Health     Financial Resource Strain: Unknown (10/25/2022)    Overall Financial Resource Strain (CARDIA)     Difficulty of Paying Living Expenses: Patient declined   Food Insecurity: Unknown (10/25/2022)    Hunger Vital Sign     Worried About Running Out of Food in the Last Year: Patient declined     Ran Out of Food in the Last Year: Patient declined   Transportation Needs: Unknown (10/25/2022)    PRAPARE - Transportation     Lack of Transportation (Medical): Patient declined     Lack of Transportation (Non-Medical): Patient declined   Physical Activity: Unknown (2023)    Exercise Vital Sign     Days of Exercise per Week: 1 day   Stress: No Stress Concern Present (2020)    [employer] - Occupational Stress Questionnaire     Feeling of Stress : Only a little   Housing Stability: Unknown (10/25/2022)    Housing Stability Vital Sign     Unable to Pay for Housing in the Last Year: Patient refused     Unstable Housing in the Last Year: Patient refused       COMPREHENSIVE GYN HISTORY:  PAP History: Denies abnormal Paps.  Infection History: Denies STDs. Denies PID.  Benign History: Denies uterine fibroids. Denies ovarian cysts. Denies endometriosis. Denies other conditions.  Cancer History: Denies cervical cancer. Denies uterine cancer or hyperplasia. Denies ovarian cancer. Denies vulvar cancer or pre-cancer. Denies vaginal cancer or pre-cancer. Denies breast cancer. Denies  "colon cancer.  Sexual Activity History: Reports currently being sexually active  Menstrual History: Monthly. Mod then light flow.   Dysmenorrhea History: Reports mild dysmenorrhea.       ROS:  GENERAL: No weight changes. No swelling. No fatigue. No fever.  CARDIOVASCULAR: No chest pain. No shortness of breath. No leg cramps.   NEUROLOGICAL: No headaches. No vision changes.  BREASTS: No pain. No lumps. No discharge.  ABDOMEN: No pain. No nausea. No vomiting. No diarrhea. No constipation.  REPRODUCTIVE: No abnormal bleeding.   VULVA: No pain. No lesions. No itching.  VAGINA: No relaxation. No itching. No odor. No discharge. No lesions.  URINARY: No incontinence. No nocturia. No frequency. No dysuria.    BP (!) 115/90 (BP Location: Right arm, Patient Position: Sitting)   Ht 5' 3" (1.6 m)   Wt 81 kg (178 lb 9.2 oz)   LMP 12/21/2024 (Exact Date)   BMI 31.63 kg/m²     PE:  APPEARANCE: Well nourished, well developed, in no acute distress.  AFFECT: WNL, alert and oriented x 3.  SKIN: No acne or hirsutism.  NECK: Neck symmetric, without masses or thyromegaly.  NODES: No inguinal, cervical, axillary or femoral lymph node enlargement.  CHEST: Good respiratory effort.   ABDOMEN: Soft. No tenderness or masses. No hepatosplenomegaly. No hernias.  BREASTS: Symmetrical, no skin changes, visible lesions, palpable masses or nipple discharge bilaterally.  PELVIC: External female genitalia without lesions.  Female hair distribution. Adequate perineal body, Normal urethral meatus. Vagina moist and well rugated without lesions or discharge.  No significant cystocele or rectocele present. Cervix pink without lesions, discharge or tenderness. Uterus is 4-6 week size, regular, mobile and nontender. Adnexa without masses or tenderness.  EXTREMITIES: No edema    DIAGNOSIS:  1. Encounter for gynecological examination without abnormal finding    2. Endometriosis of pelvic peritoneum    3. Dysmenorrhea    4. IIH (idiopathic intracranial " hypertension)    5. Dyslipidemia    6. Gastroesophageal reflux disease, unspecified whether esophagitis present      PLAN:    Orders Placed This Encounter    HPV High Risk Genotypes, PCR    Liquid-Based Pap Smear, Screening       COUNSELING:  The patient was counseled today on:  -A.C.S. Pap and pelvic exam guidelines (pap every 3 years), recomendations for yearly mammogram;  -to follow up with her PCP for other health maintenance.    FOLLOW-UP with me annually.   Patient considering [facility] or [facility] for endometriosis and pelvic pain

## 2025-01-21 ENCOUNTER — OFFICE VISIT (OUTPATIENT)
Dept: NEUROLOGY | Facility: CLINIC | Age: 35
End: 2025-01-21
Payer: COMMERCIAL

## 2025-01-21 DIAGNOSIS — R51.9 NONINTRACTABLE HEADACHE, UNSPECIFIED CHRONICITY PATTERN, UNSPECIFIED HEADACHE TYPE: ICD-10-CM

## 2025-01-21 DIAGNOSIS — G93.2 IIH (IDIOPATHIC INTRACRANIAL HYPERTENSION): Primary | ICD-10-CM

## 2025-01-28 NOTE — PROGRESS NOTES
The patient location is: [place]  The chief complaint leading to consultation is: Headache    Visit type: audiovisual    Each patient to whom he or she provides medical services by telemedicine is:  (1) informed of the relationship between the physician and patient and the respective role of any other health care provider with respect to management of the patient; and (2) notified that he or she may decline to receive medical services by telemedicine and may withdraw from such care at any time.    Chief Complaint and Duration     History of IIH    History of Present Illness     Laila Pappas is a 34 y.o. female with a history of paresthesias, history of IIH in which she had an opening pressure over 30, closing pressure 14.  Patient denies any headaches at present, was not on Diamox for a long time, did try for 2 months 2 years ago prior to getting pregnant.  Did not have issues with headaches after the tap.  Has been well-maintained.  Coming here today, still breast-feeding.  States that she has more episodes of dizziness, no recent illnesses.  No ear ringing.  Concern given that she does have the history of IIH.  Says she does drink enough water.  Otherwise no recent illnesses.  Does have low iron.    In 2021  Mean opening pressure: 33-mmHg  CSF volume removed: 20-cc  Mean closing pressure: 14-mmHg    2 months of diamox prior to pregnancy.  Also prior to the tap.  Stopped.    12/12/23 - states headaches have been returning, no blurry vision. Also ear ringing.     1/21/25 - feels headaches have come back. Pressure.  Blurry vision.    Review of patient's allergies indicates:   Allergen Reactions    Doxycycline Other (See Comments)     Triggers headaches    Bactrim ds [sulfamethoxazole-trimethoprim] Nausea And Vomiting    Nsaids (non-steroidal anti-inflammatory drug) Other (See Comments)     D/t h/o PUD and H pylori    Codeine Rash     Current Outpatient Medications   Medication Sig Dispense Refill    acetaminophen  (TYLENOL) 500 MG tablet Take 1 tablet (500 mg total) by mouth every 6 (six) hours as needed. 13 tablet 0    colestipoL (COLESTID) 1 gram Tab Take 1 tablet (1 g total) by mouth 2 (two) times daily. 180 tablet 3    ketoconazole (NIZORAL) 2 % cream Apply topically 2 (two) times daily. Prn rash of face 60 g 4     No current facility-administered medications for this visit.       Medical History     Past Medical History:   Diagnosis Date    Bilateral knee pain 06/19/2018    Endometriosis     treated by GYN at     Epigastric abdominal pain 12/05/2016    Gastroesophageal reflux disease 07/06/2016    Helicobacter pylori ab+     Helicobacter pylori ab+     High-tone pelvic floor dysfunction 05/02/2016    IIH (idiopathic intracranial hypertension)     Dr. Rogers - [facility]    Palpitations 07/06/2016    Pre-eclampsia in postpartum period 08/16/2022     Past Surgical History:   Procedure Laterality Date    ARTHROSCOPY OF KNEE Left 04/06/2021    Procedure: ARTHROSCOPY, KNEE;  Surgeon: Deidra Mejias MD;  Location: [facility];  Service: Orthopedics;  Laterality: Left;    COLONOSCOPY N/A 12/13/2018    Procedure: COLONOSCOPY;  Surgeon: Micki Gross MD;  Location: [facility] (4TH FLR);  Service: Endoscopy;  Laterality: N/A;  preferrably in Dec 2018, CRS ok if needed.    COLONOSCOPY N/A 11/05/2022    Procedure: COLONOSCOPY;  Surgeon: Greg Leach MD;  Location: [facility] (4TH FLR);  Service: Endoscopy;  Laterality: N/A;  Endoscopy schedulers please schedule patient for colonoscopy with me for further evaluation of painless hematochezia thank you    COLONOSCOPY N/A 10/17/2024    Procedure: COLONOSCOPY;  Surgeon: Tom Collazo MD;  Location: [facility] (2ND FLR);  Service: Endoscopy;  Laterality: N/A;    ESOPHAGOGASTRODUODENOSCOPY N/A 10/30/2018    Procedure: EGD (ESOPHAGOGASTRODUODENOSCOPY);  Surgeon: Greg Leach MD;  Location: [facility] (2ND FLR);  Service: Endoscopy;  Laterality: N/A;  ok to schedule per Kimmy     ESOPHAGOGASTRODUODENOSCOPY N/A 11/05/2022    Procedure: EGD (ESOPHAGOGASTRODUODENOSCOPY);  Surgeon: Greg Leach MD;  Location: [facility] ENDO (4TH FLR);  Service: Endoscopy;  Laterality: N/A;  Endoscopy schedulers please schedule patient for EGD for evaluation of right upper quadrant pain thank you  prep instr portal--ml  11/2/22 room closed-pt okay with Dr. Leach-50 min case okay per Katlyn-ml    ESOPHAGOGASTRODUODENOSCOPY N/A 10/17/2024    Procedure: EGD (ESOPHAGOGASTRODUODENOSCOPY);  Surgeon: Tom Chase MD;  Location: [facility] ENDO (2ND FLR);  Service: Endoscopy;  Laterality: N/A;  Labat/Suflave/portal/ 2nd floor due to availability SW    KNEE ARTHROSCOPY W/ DEBRIDEMENT  04/06/2021    Procedure: ARTHROSCOPY, KNEE, WITH DEBRIDEMENT;  Surgeon: Deidra Mejias MD;  Location: [facility] OR;  Service: Orthopedics;;    KNEE ARTHROSCOPY W/ MENISCECTOMY Right 04/06/2021    Procedure: ARTHROSCOPY, KNEE, WITH MENISCECTOMY;  Surgeon: Deidra Mejias MD;  Location: [facility] OR;  Service: Orthopedics;  Laterality: Right;    KNEE ARTHROSCOPY W/ PLICA EXCISION Left 04/06/2021    Procedure: EXCISION, PLICA, KNEE, ARTHROSCOPIC;  Surgeon: Deidra Mejias MD;  Location: [facility] OR;  Service: Orthopedics;  Laterality: Left;    LAPAROSCOPIC CHOLECYSTECTOMY N/A 4/11/2024    Procedure: CHOLECYSTECTOMY, LAPAROSCOPIC;  Surgeon: Tom Campos Jr., MD;  Location: [facility] 2ND FLR;  Service: General;  Laterality: N/A;    LAPAROSCOPY  2017    dx with endo.  no removal    NO PAST SURGERIES      SYNOVECTOMY OF KNEE Left 04/06/2021    Procedure: SYNOVECTOMY, KNEE;  Surgeon: Deidra Mejias MD;  Location: [facility] OR;  Service: Orthopedics;  Laterality: Left;    UPPER GASTROINTESTINAL ENDOSCOPY       Family History   Problem Relation Name Age of Onset    Diabetes Mother lizette chase     No Known Problems Father      No Known Problems Sister      Hypertension Brother bolivar chase .     Hypertension Maternal Grandmother      Hypertension Maternal Grandfather soraida and  shraddha burrell     No Known Problems Paternal Grandmother      No Known Problems Paternal Grandfather      Colon cancer Neg Hx      Crohn's disease Neg Hx      Esophageal cancer Neg Hx      Inflammatory bowel disease Neg Hx      Irritable bowel syndrome Neg Hx      Rectal cancer Neg Hx      Stomach cancer Neg Hx      Ulcerative colitis Neg Hx      Celiac disease Neg Hx      Cirrhosis Neg Hx      Colon polyps Neg Hx      Liver cancer Neg Hx      Liver disease Neg Hx       Social History     Socioeconomic History    Marital status: Single   Occupational History     Employer: [employer]     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   Tobacco Use    Smoking status: Never     Passive exposure: Never    Smokeless tobacco: Never   Substance and Sexual Activity    Alcohol use: No    Drug use: No    Sexual activity: Yes     Partners: Male     Birth control/protection: None   Social History Narrative    She had her 1st baby in 2022 healthy vaginal delivery no      Social Drivers of Health     Financial Resource Strain: Low Risk  (2025)    Overall Financial Resource Strain (CARDIA)     Difficulty of Paying Living Expenses: Not hard at all   Food Insecurity: No Food Insecurity (2025)    Hunger Vital Sign     Worried About Running Out of Food in the Last Year: Never true     Ran Out of Food in the Last Year: Never true   Transportation Needs: Unknown (10/25/2022)    PRAPARE - Transportation     Lack of Transportation (Medical): Patient declined     Lack of Transportation (Non-Medical): Patient declined   Physical Activity: Inactive (2025)    Exercise Vital Sign     Days of Exercise per Week: 0 days     Minutes of Exercise per Session: 0 min   Stress: No Stress Concern Present (2025)    [employer] - Occupational Stress Questionnaire     Feeling of Stress : Not at all   Housing Stability: Unknown (2025)    Housing Stability Vital Sign     Unable to Pay  for Housing in the Last Year: No       Exam     There were no vitals filed for this visit.    Physical Exam:  General: Not in acute distress. Not ill-appearing.   Psychiatric: Mood normal.        Neurologic Exam   Mental status: oriented to person, place, and time  Attention: Normal. Concentration: normal.  Speech: speech is normal.  Cranial Nerves: Symmetric facies.     Motor exam: moving extremities symmetrically     Labs and Imaging     Labs: reviewed  No results found for this or any previous visit (from the past 24 hours).    A1c 4.9, TSH within normal limits    Imaging:   I have personally reviewed the images performed.   In 2022 with partially empty sella.    Mri brain 2023 - partially empty sella    Assessment and Plan     Problem List Items Addressed This Visit          Neuro    IIH (idiopathic intracranial hypertension) - Primary    Overview     Pulsatile tinnitus with positional components definitely raises suspicion for idiopathic intracranial hypertension.  Patient's body habitus does not support this diagnosis.  She has not had any observable deficits or changes on the funduscopic examination documented by her ophthalmologist.  Tetracyclines have been shown in several patients to induce or provoke idiopathic intracranial hypertension.  LP showed OP - 33 cm H20 on prior tap.          Relevant Orders    MRI Brain Without Contrast    Nonintractable headache    Relevant Orders    MRI Brain Without Contrast     Patient needs repeat workup for IIH, needs to see Ophthalmology for papilledema Having recurrence of headaches and ear ringing. Will get new brain mri for further characterization prior to possible LP and treatment for IIH.    Appreciate opportunity to care for this patient.    Follow-up:  after imaging    Time spent on this encounter:  40 minutes. This includes face to face time and non-face to face time preparing to see the patient (eg, review of tests), obtaining and/or reviewing separately  obtained history, documenting clinical information in the electronic or other health record, independently interpreting results and communicating results to the patient/family/caregiver, or care coordinator.     This note was created by combination of typed  and M-Modal dictation. Transcription and phonetic errors may be present.  If there are any questions, please contact me.

## 2025-01-28 NOTE — PROGRESS NOTES
The patient location is: [place]  The chief complaint leading to consultation is: H. Pylori    Visit type: audiovisual    Face to Face time with patient: 8 minutes  10 minutes of total time spent on the encounter, which includes face to face time and non-face to face time preparing to see the patient (eg, review of tests), Obtaining and/or reviewing separately obtained history, Documenting clinical information in the electronic or other health record, Independently interpreting results (not separately reported) and communicating results to the patient/family/caregiver, or Care coordination (not separately reported).     Each patient to whom he or she provides medical services by telemedicine is:  (1) informed of the relationship between the physician and patient and the respective role of any other health care provider with respect to management of the patient; and (2) notified that he or she may decline to receive medical services by telemedicine and may withdraw from such care at any time.                                                                                 Gastroenterology Progress Note    Reason for Visit:  The encounter diagnosis was H. pylori infection.    PCP:   Jhoana Trejo         Initial HPI   This is a 34 y.o. female presenting for GI followup for H. Pylori. Biopsies from her EGD noted H. Pylori infection. Was going to take Pylera, but sensitive to Doxy so script was change to Amoxil, Clarithromycin and PPI. [facility]     ROS:  Review of Systems   Constitutional:  Negative for chills, fever and weight loss.   Eyes:  Negative for redness.   Respiratory:  Negative for cough, sputum production and wheezing.    Cardiovascular:  Negative for chest pain.   Gastrointestinal:  Positive for heartburn. Negative for abdominal pain, blood in stool, constipation, diarrhea, melena, nausea and vomiting.   Skin:  Negative for rash.    Neurological:  Negative for seizures, loss of consciousness and weakness.        Medical History:  has a past medical history of Bilateral knee pain (06/19/2018), Endometriosis, Epigastric abdominal pain (12/05/2016), Gastroesophageal reflux disease (07/06/2016), Helicobacter pylori ab+, Helicobacter pylori ab+, High-tone pelvic floor dysfunction (05/02/2016), IIH (idiopathic intracranial hypertension), Palpitations (07/06/2016), and Pre-eclampsia in postpartum period (08/16/2022).    Surgical History:  has a past surgical history that includes No past surgeries; Upper gastrointestinal endoscopy; Esophagogastroduodenoscopy (N/A, 10/30/2018); Colonoscopy (N/A, 12/13/2018); Laparoscopy (2017); Synovectomy of knee (Left, 04/06/2021); Knee arthroscopy w/ plica excision (Left, 04/06/2021); Knee arthroscopy w/ debridement (04/06/2021); Arthroscopy of knee (Left, 04/06/2021); Knee arthroscopy w/ meniscectomy (Right, 04/06/2021); Esophagogastroduodenoscopy (N/A, 11/05/2022); Colonoscopy (N/A, 11/05/2022); Laparoscopic cholecystectomy (N/A, 4/11/2024); Esophagogastroduodenoscopy (N/A, 10/17/2024); and Colonoscopy (N/A, 10/17/2024).    Family History: family history includes Diabetes in her mother; Hypertension in her brother, maternal grandfather, and maternal grandmother; No Known Problems in her father, paternal grandfather, paternal grandmother, and sister..       Review of patient's allergies indicates:   Allergen Reactions    Doxycycline Other (See Comments)     Triggers headaches    Bactrim ds [sulfamethoxazole-trimethoprim] Nausea And Vomiting    Nsaids (non-steroidal anti-inflammatory drug) Other (See Comments)     D/t h/o PUD and H pylori    Codeine Rash       Current Outpatient Medications on File Prior to Visit   Medication Sig Dispense Refill    acetaminophen (TYLENOL) 500 MG tablet Take 1 tablet (500 mg total) by mouth every 6 (six) hours as needed. 13 tablet 0    colestipoL (COLESTID) 1 gram Tab Take 1 tablet  "(1 g total) by mouth 2 (two) times daily. 180 tablet 3    ketoconazole (NIZORAL) 2 % cream Apply topically 2 (two) times daily. Prn rash of face 60 g 4    [DISCONTINUED] folic acid (FOLVITE) 1 MG tablet Take 1 mg by mouth once daily.       No current facility-administered medications on file prior to visit.         Objective Findings:    Vital Signs:  Ht 5' 3" (1.6 m)   Wt 80.7 kg (178 lb)   LMP 12/21/2024 (Exact Date)   BMI 31.53 kg/m²   Body mass index is 31.53 kg/m².    Physical Exam:  Physical Exam        Labs:  Lab Results   Component Value Date    WBC 10.18 12/28/2024    HGB 14.3 12/28/2024    HCT 44.0 12/28/2024     12/28/2024    CRP 1.0 11/21/2022    CHOL 195 10/26/2023    TRIG 84 10/26/2023    HDL 43 10/26/2023    ALKPHOS 63 12/28/2024    LIPASE 13 02/27/2024    ALT 10 12/28/2024    AST 14 12/28/2024     12/28/2024    K 4.0 12/28/2024     12/28/2024    CREATININE 0.7 12/28/2024    BUN 10 12/28/2024    CO2 23 12/28/2024    TSH 2.136 05/28/2024    INR 1.1 11/18/2022    HGBA1C 4.9 10/26/2023         Imaging reviewed: No pertinent imaging reviewed       Endoscopy reviewed: EGD 10/17/2024  Impression:            - Normal esophagus.                          - Gastric erosions with no bleeding and no                          stigmata of recent bleeding. Biopsied.                          - Normal examined duodenum.   Recommendation:        - Patient has a contact number available for                          emergencies. The signs and symptoms of potential                          delayed complications were discussed with the                          patient. Return to normal activities tomorrow.                          Written discharge instructions were provided to                          the patient.                          - Discharge patient to home (ambulatory).                          - Resume previous diet.                          - Continue present medications.                   "        - Await pathology results.                          - Return to referring physician after studies are                          complete.   Attending Participation:        I personally performed the entire procedure.   Tom Collazo MD   10/17/2024 2:37:17 PM     Pathology 10/17/2024  3 mo ago    Final Pathologic Diagnosis STOMACH, BIOPSY:  Helicobacter pylori associated chronic active gastritis  No evidence of intestinal metaplasia, dysplasia or malignancy  Numerous Helicobacter pylori organisms identified on H&E stain   Comment: Interp By Cammie Joy M.D., Signed on 10/21/2024 at 15:49       Colonoscopy 10/17/2024  Impression:            - The examined portion of the ileum was normal.                          - The entire examined colon is normal.                          - No specimens collected.   Recommendation:        - Patient has a contact number available for                          emergencies. The signs and symptoms of potential                          delayed complications were discussed with the                          patient. Return to normal activities tomorrow.                          Written discharge instructions were provided to                          the patient.                          - Discharge patient to home (ambulatory).                          - Resume previous diet.                          - Return to referring physician after studies are                          complete.                          - Continue present medications.                          - Repeat colonoscopy for screening purposes. age 46   Attending Participation:        I personally performed the entire procedure.   Tom Collazo MD   10/17/2024 2:58:07 PM     Assessment:  1. H. pylori infection             Recommendations:  Will treat with Triple therapy. Instructions on submitting stool to check for eradication discussed.   RTC pending above.       Thank you for allowing me to participate in this  patient's care.    Sincerely,     Amira Min NP  Gastroenterology Department  [facility]

## 2025-01-29 ENCOUNTER — OFFICE VISIT (OUTPATIENT)
Dept: GASTROENTEROLOGY | Facility: CLINIC | Age: 35
End: 2025-01-29
Payer: COMMERCIAL

## 2025-01-29 ENCOUNTER — OFFICE VISIT (OUTPATIENT)
Dept: INTERNAL MEDICINE | Facility: CLINIC | Age: 35
End: 2025-01-29
Payer: COMMERCIAL

## 2025-01-29 ENCOUNTER — LAB VISIT (OUTPATIENT)
Dept: LAB | Facility: HOSPITAL | Age: 35
End: 2025-01-29
Attending: NURSE PRACTITIONER
Payer: COMMERCIAL

## 2025-01-29 VITALS — BODY MASS INDEX: 31.54 KG/M2 | HEIGHT: 63 IN | WEIGHT: 178 LBS

## 2025-01-29 VITALS
DIASTOLIC BLOOD PRESSURE: 80 MMHG | OXYGEN SATURATION: 97 % | BODY MASS INDEX: 31.59 KG/M2 | HEART RATE: 89 BPM | WEIGHT: 178.38 LBS | SYSTOLIC BLOOD PRESSURE: 102 MMHG

## 2025-01-29 DIAGNOSIS — R10.12 LEFT UPPER QUADRANT PAIN: ICD-10-CM

## 2025-01-29 DIAGNOSIS — N30.90 RECURRENT CYSTITIS: ICD-10-CM

## 2025-01-29 DIAGNOSIS — N30.90 RECURRENT CYSTITIS: Primary | ICD-10-CM

## 2025-01-29 DIAGNOSIS — A04.8 H. PYLORI INFECTION: Primary | ICD-10-CM

## 2025-01-29 DIAGNOSIS — A04.8 H. PYLORI INFECTION: ICD-10-CM

## 2025-01-29 LAB
BILIRUB UR QL STRIP: NEGATIVE
CLARITY UR REFRACT.AUTO: CLEAR
COLOR UR AUTO: YELLOW
GLUCOSE UR QL STRIP: NEGATIVE
HGB UR QL STRIP: NEGATIVE
KETONES UR QL STRIP: NEGATIVE
LEUKOCYTE ESTERASE UR QL STRIP: NEGATIVE
NITRITE UR QL STRIP: NEGATIVE
PH UR STRIP: 7 [PH] (ref 5–8)
PROT UR QL STRIP: NEGATIVE
SP GR UR STRIP: 1.02 (ref 1–1.03)
URN SPEC COLLECT METH UR: NORMAL

## 2025-01-29 PROCEDURE — 81003 URINALYSIS AUTO W/O SCOPE: CPT | Performed by: NURSE PRACTITIONER

## 2025-01-29 PROCEDURE — 99999 PR PBB SHADOW E&M-EST. PATIENT-LVL IV: CPT | Mod: PBBFAC,,, | Performed by: NURSE PRACTITIONER

## 2025-01-29 PROCEDURE — 99214 OFFICE O/P EST MOD 30 MIN: CPT | Mod: S$GLB,,, | Performed by: NURSE PRACTITIONER

## 2025-01-29 RX ORDER — AMOXICILLIN 500 MG/1
1000 TABLET, FILM COATED ORAL EVERY 12 HOURS
Qty: 56 TABLET | Refills: 0 | Status: SHIPPED | OUTPATIENT
Start: 2025-01-29 | End: 2025-02-12

## 2025-01-29 RX ORDER — ONDANSETRON 4 MG/1
4 TABLET, ORALLY DISINTEGRATING ORAL EVERY 8 HOURS PRN
Qty: 30 TABLET | Refills: 0 | Status: SHIPPED | OUTPATIENT
Start: 2025-01-29

## 2025-01-29 RX ORDER — OMEPRAZOLE 40 MG/1
40 CAPSULE, DELAYED RELEASE ORAL DAILY
Qty: 90 CAPSULE | Refills: 3 | Status: SHIPPED | OUTPATIENT
Start: 2025-01-29 | End: 2026-01-29

## 2025-01-29 RX ORDER — CLARITHROMYCIN 500 MG/1
500 TABLET, FILM COATED ORAL EVERY 12 HOURS
Qty: 28 TABLET | Refills: 0 | Status: SHIPPED | OUTPATIENT
Start: 2025-01-29 | End: 2025-02-12

## 2025-01-29 NOTE — PROGRESS NOTES
INTERNAL MEDICINE CLINIC - SAME DAY APPOINTMENT  Progress Note    PRESENTING HISTORY     PCP: Jhoana Trejo MD    Chief Complaint/Reason for Visit:   No chief complaint on file.    History of Present Illness & ROS : Ms. Laila Pappas is a 34 y.o. female.    Same day apt.   Here with her spouse today.   Pleasant young lady.   She is having some recurrent left sided discomforts. Reports that has not been able to get the 'medication prescribed by GI for the H pylori from her pharmacy and will following up with Amira today'. The discomforts she is describing seems to have unchanged and as we chatted about today, it is imperative that this infection is treated to ascertain eradicated and will need to have follow up surveillance study. She was ok with this plan and gaining next best step from the GI team of experts at this time.   In addition, she is concerned about 'chronic' symptoms at it relates to possible UTIs. Not currently with symptoms, but has been having recurrent symptoms of 'burning' with urination'. No smell or pain with urination. Discussed with her OB / GYN also.   No fever, chills or flank pain. No visible blood to urine endorsed.   She has been seen by Urology but this was several 'years ago'.    Review of Systems:  Eyes: denies visual changes at this time denies floaters   ENT: no nasal congestion or sore throat  Respiratory: no cough or shortness of breath  Cardiovascular: no chest pain or palpitations  Gastrointestinal: no nausea or vomiting, no abdominal pain or change in bowel habits  Genitourinary: no hematuria or dysuria; denies frequency  Hematologic/Lymphatic: no easy bruising or lymphadenopathy  Musculoskeletal: no arthralgias or myalgias  Neurological: no seizures or tremors  Endocrine: no heat or cold intolerance      PAST HISTORY:     Past Medical History:   Diagnosis Date    Bilateral knee pain 06/19/2018    Endometriosis     treated by GYN at     Epigastric abdominal pain 12/05/2016     Gastroesophageal reflux disease 07/06/2016    Helicobacter pylori ab+     Helicobacter pylori ab+     High-tone pelvic floor dysfunction 05/02/2016    IIH (idiopathic intracranial hypertension)     Dr. Rogers - [facility]    Palpitations 07/06/2016    Pre-eclampsia in postpartum period 08/16/2022       Past Surgical History:   Procedure Laterality Date    ARTHROSCOPY OF KNEE Left 04/06/2021    Procedure: ARTHROSCOPY, KNEE;  Surgeon: Deidra Mejias MD;  Location: [facility] OR;  Service: Orthopedics;  Laterality: Left;    COLONOSCOPY N/A 12/13/2018    Procedure: COLONOSCOPY;  Surgeon: Micki Gross MD;  Location: [facility] ENDO (4TH FLR);  Service: Endoscopy;  Laterality: N/A;  preferrably in Dec 2018, CRS ok if needed.    COLONOSCOPY N/A 11/05/2022    Procedure: COLONOSCOPY;  Surgeon: Greg Leach MD;  Location: [facility] (4TH FLR);  Service: Endoscopy;  Laterality: N/A;  Endoscopy schedulers please schedule patient for colonoscopy with me for further evaluation of painless hematochezia thank you    COLONOSCOPY N/A 10/17/2024    Procedure: COLONOSCOPY;  Surgeon: Tom Collazo MD;  Location: [facility] (2ND FLR);  Service: Endoscopy;  Laterality: N/A;    ESOPHAGOGASTRODUODENOSCOPY N/A 10/30/2018    Procedure: EGD (ESOPHAGOGASTRODUODENOSCOPY);  Surgeon: Greg Leach MD;  Location: [facility] (2ND FLR);  Service: Endoscopy;  Laterality: N/A;  ok to schedule per Kimmy    ESOPHAGOGASTRODUODENOSCOPY N/A 11/05/2022    Procedure: EGD (ESOPHAGOGASTRODUODENOSCOPY);  Surgeon: Greg Leach MD;  Location: [facility] (4TH FLR);  Service: Endoscopy;  Laterality: N/A;  Endoscopy schedulers please schedule patient for EGD for evaluation of right upper quadrant pain thank you  prep instr portal--ml  11/2/22 room closed-pt okay with Dr. Leach-50 min case okay per Katlyn-ml    ESOPHAGOGASTRODUODENOSCOPY N/A 10/17/2024    Procedure: EGD (ESOPHAGOGASTRODUODENOSCOPY);  Surgeon: Tom Collazo MD;  Location: [facility] (2ND FLR);   Service: Endoscopy;  Laterality: N/A;  Labat/Suflave/portal/ 2nd floor due to availability SW    KNEE ARTHROSCOPY W/ DEBRIDEMENT  04/06/2021    Procedure: ARTHROSCOPY, KNEE, WITH DEBRIDEMENT;  Surgeon: Deidra Mejias MD;  Location: [facility] OR;  Service: Orthopedics;;    KNEE ARTHROSCOPY W/ MENISCECTOMY Right 04/06/2021    Procedure: ARTHROSCOPY, KNEE, WITH MENISCECTOMY;  Surgeon: Deidra Mejias MD;  Location: [facility] OR;  Service: Orthopedics;  Laterality: Right;    KNEE ARTHROSCOPY W/ PLICA EXCISION Left 04/06/2021    Procedure: EXCISION, PLICA, KNEE, ARTHROSCOPIC;  Surgeon: Deidra Mejias MD;  Location: [facility] OR;  Service: Orthopedics;  Laterality: Left;    LAPAROSCOPIC CHOLECYSTECTOMY N/A 4/11/2024    Procedure: CHOLECYSTECTOMY, LAPAROSCOPIC;  Surgeon: Tom Campos Jr., MD;  Location: [address];  Service: General;  Laterality: N/A;    LAPAROSCOPY  2017    dx with endo.  no removal    NO PAST SURGERIES      SYNOVECTOMY OF KNEE Left 04/06/2021    Procedure: SYNOVECTOMY, KNEE;  Surgeon: Deidra Mejias MD;  Location: [facility] OR;  Service: Orthopedics;  Laterality: Left;    UPPER GASTROINTESTINAL ENDOSCOPY         Family History   Problem Relation Name Age of Onset    Diabetes Mother lizette chase     No Known Problems Father      No Known Problems Sister      Hypertension Brother bolivar chase .     Hypertension Maternal Grandmother      Hypertension Maternal Grandfather soraida and carol valenciaanita     No Known Problems Paternal Grandmother      No Known Problems Paternal Grandfather      Colon cancer Neg Hx      Crohn's disease Neg Hx      Esophageal cancer Neg Hx      Inflammatory bowel disease Neg Hx      Irritable bowel syndrome Neg Hx      Rectal cancer Neg Hx      Stomach cancer Neg Hx      Ulcerative colitis Neg Hx      Celiac disease Neg Hx      Cirrhosis Neg Hx      Colon polyps Neg Hx      Liver cancer Neg Hx      Liver disease Neg Hx         Social History     Socioeconomic History    Marital status: Single    Occupational History     Employer: [employer]     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   Tobacco Use    Smoking status: Never     Passive exposure: Never    Smokeless tobacco: Never   Substance and Sexual Activity    Alcohol use: No    Drug use: No    Sexual activity: Yes     Partners: Male     Birth control/protection: None   Social History Narrative    She had her 1st baby in 2022 healthy vaginal delivery no      Social Drivers of Health     Financial Resource Strain: Low Risk  (2025)    Overall Financial Resource Strain (CARDIA)     Difficulty of Paying Living Expenses: Not hard at all   Food Insecurity: No Food Insecurity (2025)    Hunger Vital Sign     Worried About Running Out of Food in the Last Year: Never true     Ran Out of Food in the Last Year: Never true   Transportation Needs: Unknown (10/25/2022)    PRAPARE - Transportation     Lack of Transportation (Medical): Patient declined     Lack of Transportation (Non-Medical): Patient declined   Physical Activity: Inactive (2025)    Exercise Vital Sign     Days of Exercise per Week: 0 days     Minutes of Exercise per Session: 0 min   Stress: No Stress Concern Present (2025)    [place] [employer] - Occupational Stress Questionnaire     Feeling of Stress : Not at all   Housing Stability: Unknown (2025)    Housing Stability Vital Sign     Unable to Pay for Housing in the Last Year: No       MEDICATIONS & ALLERGIES:     Current Outpatient Medications on File Prior to Visit   Medication Sig Dispense Refill    acetaminophen (TYLENOL) 500 MG tablet Take 1 tablet (500 mg total) by mouth every 6 (six) hours as needed. 13 tablet 0    colestipoL (COLESTID) 1 gram Tab Take 1 tablet (1 g total) by mouth 2 (two) times daily. 180 tablet 3    ketoconazole (NIZORAL) 2 % cream Apply topically 2 (two) times daily. Prn rash of face 60 g 4    [DISCONTINUED] folic acid (FOLVITE) 1 MG tablet Take 1 mg by  mouth once daily.       No current facility-administered medications on file prior to visit.        Review of patient's allergies indicates:   Allergen Reactions    Doxycycline Other (See Comments)     Triggers headaches    Bactrim ds [sulfamethoxazole-trimethoprim] Nausea And Vomiting    Nsaids (non-steroidal anti-inflammatory drug) Other (See Comments)     D/t h/o PUD and H pylori    Codeine Rash       Medications Reconciliation:   I have reconciled the patient's home medications with the patient/family. I have updated all changes.  Refer to After-Visit Medication List.    OBJECTIVE:     Vital Signs:  There were no vitals filed for this visit.  Wt Readings from Last 3 Encounters:   01/08/25 1134 81 kg (178 lb 9.2 oz)   12/28/24 2004 79.4 kg (175 lb)   12/02/24 1541 79.4 kg (175 lb)     There is no height or weight on file to calculate BMI.   Wt Readings from Last 3 Encounters:   01/29/25 80.9 kg (178 lb 5.6 oz)   01/08/25 81 kg (178 lb 9.2 oz)   12/28/24 79.4 kg (175 lb)     Temp Readings from Last 3 Encounters:   12/28/24 97.9 °F (36.6 °C) (Oral)   12/02/24 98.4 °F (36.9 °C) (Oral)   10/17/24 98.1 °F (36.7 °C) (Skin)     BP Readings from Last 3 Encounters:   01/29/25 102/80   01/08/25 (!) 115/90   12/28/24 116/68     Pulse Readings from Last 3 Encounters:   01/29/25 89   12/28/24 78   12/02/24 80       Physical Exam:  General: Well developed, well nourished. No distress.  HEENT: Head is normocephalic, atraumatic  Eyes: Clear conjunctiva.  Neck: Supple, symmetrical neck; trachea midline.  Extremities: No LE edema. Pulses 2+ and symmetric.   Skin: Skin color, texture, turgor normal. No rashes.  Musculoskeletal: Normal gait.   Neurologic: No focal numbness or weakness.   Psychiatric: Not depressed.    Laboratory  Lab Results   Component Value Date    WBC 10.18 12/28/2024    HGB 14.3 12/28/2024    HCT 44.0 12/28/2024     12/28/2024    CHOL 195 10/26/2023    TRIG 84 10/26/2023    HDL 43 10/26/2023    ALT 10  12/28/2024    AST 14 12/28/2024     12/28/2024    K 4.0 12/28/2024     12/28/2024    CREATININE 0.7 12/28/2024    BUN 10 12/28/2024    CO2 23 12/28/2024    TSH 2.136 05/28/2024    INR 1.1 11/18/2022    HGBA1C 4.9 10/26/2023       ASSESSMENT & PLAN:     Same day apt.     LUQ pain..   Suspect from the following...   *10/17/2024: Post EGD: + Gastritis and H Pylori on Path  *Seen by LUCI Min in 9/2024 for left abd pain... Rx'd Colestid with plans for possible CT Enterography vs CT A/P, +/- EGD with C Scope. Biopsy, per Dr. Collazo, GI expert in 10/2024, + H Pylori, Rx'd Pylera on 10/22/2024 with repeat stool study...unable to obtain the Pylera Rx and will be seeing her GI experts today.   *Recommend repeating this stool study to ensure eradication of this bacterial infection upoln completion of therapy; will need to hold PPI; in addition, will check urine for possible infection.    Recurrent cystitis  -     Urinalysis, Reflex to Urine Culture Urine, Clean Catch; Future; Expected date: 01/29/2025  -     Ambulatory referral/consult to Urology; Future; Expected date: 02/05/2025  -     US Retroperitoneal Complete; Future; Expected date: 01/29/2025    Future Appointments   Date Time Provider Department Center   1/30/2025  5:00 PM [facility] OIC-US1 MASTER [facility] ULTR [facility]   1/31/2025  4:30 PM [facility] MRI1 [address] LB LIMIT [facility] MRI [facility]   2/7/2025 11:30 AM Melany Ndiaye MD [facility] NEURO [facility]   3/28/2025  8:40 AM Becky Martin MD [facility] UROLOGY [facility]        Medication List            Accurate as of January 29, 2025  5:28 PM. If you have any questions, ask your nurse or doctor.                START taking these medications      amoxicillin 500 MG Tab  Commonly known as: AMOXIL  Take 2 tablets (1,000 mg total) by mouth every 12 (twelve) hours. for 14 days  Started by: Amira Min NP     clarithromycin 500 MG tablet  Commonly known as: BIAXIN  Take 1 tablet (500 mg total) by mouth every 12 (twelve)  hours. for 14 days  Started by: Amira Min NP     omeprazole 40 MG capsule  Commonly known as: PRILOSEC  Take 1 capsule (40 mg total) by mouth once daily.  Started by: Amira Min NP     ondansetron 4 MG Tbdl  Commonly known as: ZOFRAN-ODT  Take 1 tablet (4 mg total) by mouth every 8 (eight) hours as needed (nausea).  Started by: Amira Min NP            CONTINUE taking these medications      acetaminophen 500 MG tablet  Commonly known as: TYLENOL  Take 1 tablet (500 mg total) by mouth every 6 (six) hours as needed.     colestipoL 1 gram Tab  Commonly known as: COLESTID  Take 1 tablet (1 g total) by mouth 2 (two) times daily.     ketoconazole 2 % cream  Commonly known as: NIZORAL  Apply topically 2 (two) times daily. Prn rash of face               Where to Get Your Medications        These medications were sent to [id number] - [place] [address] [facility] [place] [address] [address], [facility] [address]      Hours: 24-hours Phone: [phone]   amoxicillin 500 MG Tab  clarithromycin 500 MG tablet  omeprazole 40 MG capsule  ondansetron 4 MG Tbdl       Signing Physician:  MORGAN Cage

## 2025-01-30 ENCOUNTER — HOSPITAL ENCOUNTER (OUTPATIENT)
Dept: RADIOLOGY | Facility: HOSPITAL | Age: 35
Discharge: HOME | End: 2025-01-30
Attending: NURSE PRACTITIONER
Payer: COMMERCIAL

## 2025-01-30 DIAGNOSIS — N30.90 RECURRENT CYSTITIS: ICD-10-CM

## 2025-01-30 PROCEDURE — 76770 US EXAM ABDO BACK WALL COMP: CPT | Mod: 26,,, | Performed by: RADIOLOGY

## 2025-01-30 PROCEDURE — 76770 US EXAM ABDO BACK WALL COMP: CPT | Mod: TC

## 2025-01-31 ENCOUNTER — OFFICE VISIT (OUTPATIENT)
Dept: INTERNAL MEDICINE | Facility: CLINIC | Age: 35
End: 2025-01-31
Payer: COMMERCIAL

## 2025-01-31 ENCOUNTER — PATIENT MESSAGE (OUTPATIENT)
Dept: INTERNAL MEDICINE | Facility: CLINIC | Age: 35
End: 2025-01-31

## 2025-01-31 VITALS
HEART RATE: 134 BPM | WEIGHT: 176.56 LBS | SYSTOLIC BLOOD PRESSURE: 104 MMHG | TEMPERATURE: 102 F | DIASTOLIC BLOOD PRESSURE: 62 MMHG | HEIGHT: 63 IN | BODY MASS INDEX: 31.29 KG/M2 | OXYGEN SATURATION: 99 %

## 2025-01-31 DIAGNOSIS — J10.1 INFLUENZA A: Primary | ICD-10-CM

## 2025-01-31 DIAGNOSIS — R50.9 FEVER, UNSPECIFIED FEVER CAUSE: ICD-10-CM

## 2025-01-31 PROCEDURE — 99999 PR PBB SHADOW E&M-EST. PATIENT-LVL IV: CPT | Mod: PBBFAC,,, | Performed by: NURSE PRACTITIONER

## 2025-01-31 PROCEDURE — 99214 OFFICE O/P EST MOD 30 MIN: CPT | Mod: S$GLB,,, | Performed by: NURSE PRACTITIONER

## 2025-01-31 RX ORDER — IBUPROFEN 600 MG/1
600 TABLET ORAL
Status: COMPLETED | OUTPATIENT
Start: 2025-01-31 | End: 2025-01-31

## 2025-01-31 RX ORDER — OSELTAMIVIR PHOSPHATE 75 MG/1
75 CAPSULE ORAL 2 TIMES DAILY
Qty: 10 CAPSULE | Refills: 0 | Status: SHIPPED | OUTPATIENT
Start: 2025-01-31 | End: 2025-02-05

## 2025-01-31 RX ADMIN — IBUPROFEN 600 MG: 600 TABLET ORAL at 02:01

## 2025-01-31 NOTE — LETTER
January 31, 2025      [facility]  [address]  [facility] [address]  Phone: [phone]  Fax: [phone]       Patient: Laila Pappas   YOB: 1990  Date of Visit: 01/31/2025    To Whom It May Concern:    Yamilet Pappas  was at [facility] on 01/31/2025. Patient tested positive for the flu. The patient may return to work/school on 2/4/25 as long as you have been fever free for at least 24 hours without fever reducing medications and overall have had symptom improvement.  If you have any questions or concerns, or if I can be of further assistance, please do not hesitate to contact me.    Sincerely,     Amira Lee NP

## 2025-01-31 NOTE — PROGRESS NOTES
INTERNAL MEDICINE PROGRESS/URGENT CARE NOTE    CHIEF COMPLAINT     Chief Complaint   Patient presents with    Sore Throat    Emesis    Generalized Body Aches       HPI     Laila Pappas is a 34 y.o. female who presents for an urgent visit today.    Pt c/o sore throat, body aches, chills, congestion since yesterday. Took dayquil. No recorded temps but feels feverish. Had a few episodes of vomiting. No diarrhea.   No ill contacts. Took some dayquil today.   Body aches is worst.       Patient Active Problem List   Diagnosis    Endometriosis determined by laparoscopy    Multiple thyroid nodules    Poor posture    Mid back pain    Low back pain    Postural imbalance    Other chest pain    Dyslipidemia    IIH (idiopathic intracranial hypertension)    Arm pain, left    Plica syndrome    Decreased range of motion (ROM) of left knee    Impaired functional mobility, balance, gait, and endurance    Chronic pain of left knee    Gastroesophageal reflux disease    Endometriosis    Pregnancy resulting from in-vitro fertilization    Myalgia    Abdominal weakness    Pre-eclampsia in postpartum period    MIKE positive    Numbness    Muscle spasm    Dizziness    Nonintractable headache    Tinnitus    Acute pharyngitis    Postnasal drip    Symptomatic cholelithiasis    Chronic left hip pain    Weakness of left hip        Past Medical History:  Past Medical History:   Diagnosis Date    Bilateral knee pain 06/19/2018    Endometriosis     treated by GYN at     Epigastric abdominal pain 12/05/2016    Gastroesophageal reflux disease 07/06/2016    Helicobacter pylori ab+     Helicobacter pylori ab+     High-tone pelvic floor dysfunction 05/02/2016    IIH (idiopathic intracranial hypertension)     Dr. Rogers - [facility]    Palpitations 07/06/2016    Pre-eclampsia in postpartum period 08/16/2022        Past Surgical History:  Past Surgical History:   Procedure Laterality Date    ARTHROSCOPY OF KNEE Left 04/06/2021    Procedure: ARTHROSCOPY,  KNEE;  Surgeon: Deidra Mejias MD;  Location: [facility] OR;  Service: Orthopedics;  Laterality: Left;    COLONOSCOPY N/A 12/13/2018    Procedure: COLONOSCOPY;  Surgeon: Micki Gross MD;  Location: [facility] (4TH FLR);  Service: Endoscopy;  Laterality: N/A;  preferrably in Dec 2018, CRS ok if needed.    COLONOSCOPY N/A 11/05/2022    Procedure: COLONOSCOPY;  Surgeon: Greg Leach MD;  Location: [facility] (4TH FLR);  Service: Endoscopy;  Laterality: N/A;  Endoscopy schedulers please schedule patient for colonoscopy with me for further evaluation of painless hematochezia thank you    COLONOSCOPY N/A 10/17/2024    Procedure: COLONOSCOPY;  Surgeon: Tom Collazo MD;  Location: [facility] (2ND FLR);  Service: Endoscopy;  Laterality: N/A;    ESOPHAGOGASTRODUODENOSCOPY N/A 10/30/2018    Procedure: EGD (ESOPHAGOGASTRODUODENOSCOPY);  Surgeon: Greg Leach MD;  Location: [facility] (2ND FLR);  Service: Endoscopy;  Laterality: N/A;  ok to schedule per Kimmy    ESOPHAGOGASTRODUODENOSCOPY N/A 11/05/2022    Procedure: EGD (ESOPHAGOGASTRODUODENOSCOPY);  Surgeon: Greg Leach MD;  Location: [facility] (4TH FLR);  Service: Endoscopy;  Laterality: N/A;  Endoscopy schedulers please schedule patient for EGD for evaluation of right upper quadrant pain thank you  prep instr portal--ml  11/2/22 room closed-pt okay with Dr. Leach-50 min case okay per Katlyn-ml    ESOPHAGOGASTRODUODENOSCOPY N/A 10/17/2024    Procedure: EGD (ESOPHAGOGASTRODUODENOSCOPY);  Surgeon: Tom Collazo MD;  Location: [facility] (2ND FLR);  Service: Endoscopy;  Laterality: N/A;  Labat/Suflave/portal/ 2nd floor due to availability SW    KNEE ARTHROSCOPY W/ DEBRIDEMENT  04/06/2021    Procedure: ARTHROSCOPY, KNEE, WITH DEBRIDEMENT;  Surgeon: Deidra Mejias MD;  Location: [facility] OR;  Service: Orthopedics;;    KNEE ARTHROSCOPY W/ MENISCECTOMY Right 04/06/2021    Procedure: ARTHROSCOPY, KNEE, WITH MENISCECTOMY;  Surgeon: Deidra Mejias MD;  Location: [facility];  Service:  Orthopedics;  Laterality: Right;    KNEE ARTHROSCOPY W/ PLICA EXCISION Left 04/06/2021    Procedure: EXCISION, PLICA, KNEE, ARTHROSCOPIC;  Surgeon: Deidra Mejias MD;  Location: [facility] OR;  Service: Orthopedics;  Laterality: Left;    LAPAROSCOPIC CHOLECYSTECTOMY N/A 4/11/2024    Procedure: CHOLECYSTECTOMY, LAPAROSCOPIC;  Surgeon: Tom Campos Jr., MD;  Location: [address];  Service: General;  Laterality: N/A;    LAPAROSCOPY  2017    dx with endo.  no removal    NO PAST SURGERIES      SYNOVECTOMY OF KNEE Left 04/06/2021    Procedure: SYNOVECTOMY, KNEE;  Surgeon: Deidra Mejias MD;  Location: [facility] OR;  Service: Orthopedics;  Laterality: Left;    UPPER GASTROINTESTINAL ENDOSCOPY          Allergies:  Review of patient's allergies indicates:   Allergen Reactions    Doxycycline Other (See Comments)     Triggers headaches    Bactrim ds [sulfamethoxazole-trimethoprim] Nausea And Vomiting    Nsaids (non-steroidal anti-inflammatory drug) Other (See Comments)     D/t h/o PUD and H pylori    Codeine Rash       Home Medications:    Current Outpatient Medications:     acetaminophen (TYLENOL) 500 MG tablet, Take 1 tablet (500 mg total) by mouth every 6 (six) hours as needed., Disp: 13 tablet, Rfl: 0    amoxicillin (AMOXIL) 500 MG Tab, Take 2 tablets (1,000 mg total) by mouth every 12 (twelve) hours. for 14 days, Disp: 56 tablet, Rfl: 0    clarithromycin (BIAXIN) 500 MG tablet, Take 1 tablet (500 mg total) by mouth every 12 (twelve) hours. for 14 days, Disp: 28 tablet, Rfl: 0    colestipoL (COLESTID) 1 gram Tab, Take 1 tablet (1 g total) by mouth 2 (two) times daily., Disp: 180 tablet, Rfl: 3    ketoconazole (NIZORAL) 2 % cream, Apply topically 2 (two) times daily. Prn rash of face, Disp: 60 g, Rfl: 4    omeprazole (PRILOSEC) 40 MG capsule, Take 1 capsule (40 mg total) by mouth once daily., Disp: 90 capsule, Rfl: 3    ondansetron (ZOFRAN-ODT) 4 MG TbDL, Take 1 tablet (4 mg total) by mouth every 8 (eight) hours as needed  "(nausea)., Disp: 30 tablet, Rfl: 0    oseltamivir (TAMIFLU) 75 MG capsule, Take 1 capsule (75 mg total) by mouth 2 (two) times daily. for 5 days, Disp: 10 capsule, Rfl: 0  No current facility-administered medications for this visit.     Review of Systems:  Review of Systems   Constitutional:  Positive for chills and fatigue.   HENT:  Positive for congestion and sore throat. Negative for ear pain, mouth sores and trouble swallowing.    Eyes:  Negative for discharge, redness and itching.   Respiratory:  Positive for cough. Negative for shortness of breath, wheezing and stridor.    Cardiovascular:  Negative for chest pain.   Gastrointestinal:  Negative for abdominal pain, diarrhea, nausea and vomiting.   Musculoskeletal:  Positive for myalgias.   Neurological:  Positive for headaches. Negative for dizziness and weakness.         PHYSICAL EXAM     Vitals:    01/31/25 1329   BP: 104/62   BP Location: Left arm   Patient Position: Sitting   Pulse: (!) 134   Temp: (!) 102 °F (38.9 °C)   SpO2: 99%   Weight: 80.1 kg (176 lb 9.4 oz)   Height: 5' 3" (1.6 m)      Body mass index is 31.28 kg/m².     Physical Exam  Vitals reviewed.   Constitutional:       Appearance: Normal appearance. She is ill-appearing.   HENT:      Head: Normocephalic and atraumatic.      Right Ear: Tympanic membrane and ear canal normal.      Left Ear: Tympanic membrane and ear canal normal.      Nose: Congestion present.      Mouth/Throat:      Mouth: Mucous membranes are moist.      Pharynx: Oropharynx is clear. Uvula midline. Posterior oropharyngeal erythema present. No oropharyngeal exudate or uvula swelling.   Eyes:      Conjunctiva/sclera: Conjunctivae normal.      Pupils: Pupils are equal, round, and reactive to light.   Cardiovascular:      Rate and Rhythm: Regular rhythm. Tachycardia present.   Pulmonary:      Effort: Pulmonary effort is normal.      Breath sounds: Normal breath sounds.   Musculoskeletal:      Cervical back: Normal range of motion " and neck supple.   Lymphadenopathy:      Cervical: No cervical adenopathy.   Skin:     General: Skin is warm and dry.      Findings: No rash.   Neurological:      General: No focal deficit present.      Mental Status: She is alert.   Psychiatric:         Mood and Affect: Mood normal.         Behavior: Behavior normal.         LABS     Lab Results   Component Value Date    HGBA1C 4.9 10/26/2023     CMP  Sodium   Date Value Ref Range Status   12/28/2024 140 136 - 145 mmol/L Final     Potassium   Date Value Ref Range Status   12/28/2024 4.0 3.5 - 5.1 mmol/L Final     Chloride   Date Value Ref Range Status   12/28/2024 108 95 - 110 mmol/L Final     CO2   Date Value Ref Range Status   12/28/2024 23 23 - 29 mmol/L Final     Glucose   Date Value Ref Range Status   12/28/2024 90 70 - 110 mg/dL Final     BUN   Date Value Ref Range Status   12/28/2024 10 6 - 20 mg/dL Final     Creatinine   Date Value Ref Range Status   12/28/2024 0.7 0.5 - 1.4 mg/dL Final     Calcium   Date Value Ref Range Status   12/28/2024 9.3 8.7 - 10.5 mg/dL Final     Total Protein   Date Value Ref Range Status   12/28/2024 6.9 6.0 - 8.4 g/dL Final     Albumin   Date Value Ref Range Status   12/28/2024 3.5 3.5 - 5.2 g/dL Final     Total Bilirubin   Date Value Ref Range Status   12/28/2024 0.2 0.1 - 1.0 mg/dL Final     Comment:     For infants and newborns, interpretation of results should be based  on gestational age, weight and in agreement with clinical  observations.    Premature Infant recommended reference ranges:  Up to 24 hours.............<8.0 mg/dL  Up to 48 hours............<12.0 mg/dL  3-5 days..................<15.0 mg/dL  6-29 days.................<15.0 mg/dL       Alkaline Phosphatase   Date Value Ref Range Status   12/28/2024 63 40 - 150 U/L Final     AST   Date Value Ref Range Status   12/28/2024 14 10 - 40 U/L Final     ALT   Date Value Ref Range Status   12/28/2024 10 10 - 44 U/L Final     Anion Gap   Date Value Ref Range Status    12/28/2024 9 8 - 16 mmol/L Final     eGFR if    Date Value Ref Range Status   07/26/2022 >60 >60 mL/min/1.73 m^2 Final     eGFR if non    Date Value Ref Range Status   07/26/2022 >60 >60 mL/min/1.73 m^2 Final     Comment:     Calculation used to obtain the estimated glomerular filtration  rate (eGFR) is the CKD-EPI equation.        Lab Results   Component Value Date    WBC 10.18 12/28/2024    HGB 14.3 12/28/2024    HCT 44.0 12/28/2024    MCV 93 12/28/2024     12/28/2024     Lab Results   Component Value Date    CHOL 195 10/26/2023    CHOL 204 (H) 10/29/2021    CHOL 192 07/22/2020     Lab Results   Component Value Date    HDL 43 10/26/2023    HDL 41 10/29/2021    HDL 39 (L) 07/22/2020     Lab Results   Component Value Date    LDLCALC 135.2 10/26/2023    LDLCALC 153.8 10/29/2021    LDLCALC 143.4 07/22/2020     Lab Results   Component Value Date    TRIG 84 10/26/2023    TRIG 46 10/29/2021    TRIG 48 07/22/2020     Lab Results   Component Value Date    CHOLHDL 22.1 10/26/2023    CHOLHDL 20.1 10/29/2021    CHOLHDL 20.3 07/22/2020     Lab Results   Component Value Date    TSH 2.136 05/28/2024    [id number] 8.2 07/22/2020       ASSESSMENT     1. Influenza A    2. Fever, unspecified fever cause           PLAN  Positive for flu a. Negative strep and covid. Start tamiflu asap. She has zofran prn nausea which she hadn't taken yet.   Gave her 600 mg of IBU for temp and body aches since tylenol in dayquil. No allergy. No GIBs. Being treated already for h.pylori. discussed no more nsaids for now. Hydrate well and rest. Okay for dayquil or nyquil.  Isolate, can resume normal activities once you are fever free for at least 24 hours without fever reducing medications and overall have had symptom improvement.       1. Influenza A  - oseltamivir (TAMIFLU) 75 MG capsule; Take 1 capsule (75 mg total) by mouth 2 (two) times daily. for 5 days  Dispense: 10 capsule; Refill: 0    2. Fever, unspecified  fever cause  - ibuprofen tablet 600 mg       Follow up with PCP      Patient's plan/treatment was discussed including medications and possible side effects. Verbalized understanding of all instructions.     This note was partly generated with [employer] voice recognition software. I apologize for any possible typographical errors.          JAYE Bejarano  Department of Internal Medicine - [facility]  01/31/2025

## 2025-03-19 ENCOUNTER — RESULTS FOLLOW-UP (OUTPATIENT)
Dept: INTERNAL MEDICINE | Facility: CLINIC | Age: 35
End: 2025-03-19

## 2025-03-19 ENCOUNTER — TELEPHONE (OUTPATIENT)
Dept: INTERNAL MEDICINE | Facility: CLINIC | Age: 35
End: 2025-03-19

## 2025-03-19 ENCOUNTER — HOSPITAL ENCOUNTER (OUTPATIENT)
Dept: RADIOLOGY | Facility: HOSPITAL | Age: 35
Discharge: HOME | End: 2025-03-19
Attending: NURSE PRACTITIONER
Payer: COMMERCIAL

## 2025-03-19 ENCOUNTER — OFFICE VISIT (OUTPATIENT)
Dept: INTERNAL MEDICINE | Facility: CLINIC | Age: 35
End: 2025-03-19
Payer: COMMERCIAL

## 2025-03-19 VITALS
DIASTOLIC BLOOD PRESSURE: 84 MMHG | OXYGEN SATURATION: 98 % | SYSTOLIC BLOOD PRESSURE: 110 MMHG | HEART RATE: 74 BPM | WEIGHT: 179.69 LBS | HEIGHT: 63 IN | BODY MASS INDEX: 31.84 KG/M2

## 2025-03-19 DIAGNOSIS — M79.605 LEG PAIN, LEFT: Primary | ICD-10-CM

## 2025-03-19 DIAGNOSIS — M25.469 SUPRAPATELLAR EFFUSION OF KNEE: Primary | ICD-10-CM

## 2025-03-19 DIAGNOSIS — M79.605 LEG PAIN, LEFT: ICD-10-CM

## 2025-03-19 PROCEDURE — 99999 PR PBB SHADOW E&M-EST. PATIENT-LVL III: CPT | Mod: PBBFAC,,, | Performed by: NURSE PRACTITIONER

## 2025-03-19 PROCEDURE — 99214 OFFICE O/P EST MOD 30 MIN: CPT | Mod: S$GLB,,, | Performed by: NURSE PRACTITIONER

## 2025-03-19 PROCEDURE — 93971 EXTREMITY STUDY: CPT | Mod: TC,LT

## 2025-03-19 PROCEDURE — 93971 EXTREMITY STUDY: CPT | Mod: 26,LT,, | Performed by: RADIOLOGY

## 2025-03-19 NOTE — PROGRESS NOTES
INTERNAL MEDICINE CLINIC - SAME DAY APPOINTMENT  Progress Note    PRESENTING HISTORY     PCP: Jhoana Trejo MD    Chief Complaint/Reason for Visit:   No chief complaint on file.     History of Present Illness & ROS : Ms. Laila Pappas is a 34 y.o. female.    Same day apt.   Very pleasant lady.   Here with her , PJ.   Very sweet couple.   Reports that she has been having pain to the left leg; does not endorse fall, injury or trauma. States, 'the pain starts on the inner aspect and tracks to the back of lower leg, below the knee'.   She is not on birth control.     *No recent travels or long car rides endorsed.     Review of Systems:  Eyes: denies visual changes at this time denies floaters   ENT: no nasal congestion or sore throat  Respiratory: no cough or shorness of breath  Cardiovascular: no chest pain or palpitations  Gastrointestinal: no nausea or vomiting, no abdominal pain or change in bowel habits  Genitourinary: no hematuria or dysuria; denies frequency  Hematologic/Lymphatic: no easy bruising or lymphadenopathy  Musculoskeletal: no arthralgias or myalgias  Neurological: no seizures or tremors  Endocrine: no heat or cold intolerance      PAST HISTORY:     Past Medical History:   Diagnosis Date    Bilateral knee pain 06/19/2018    Endometriosis     treated by GYN at     Epigastric abdominal pain 12/05/2016    Gastroesophageal reflux disease 07/06/2016    Helicobacter pylori ab+     Helicobacter pylori ab+     High-tone pelvic floor dysfunction 05/02/2016    IIH (idiopathic intracranial hypertension)     Dr. Rogers - [facility]    Palpitations 07/06/2016    Pre-eclampsia in postpartum period 08/16/2022       Past Surgical History:   Procedure Laterality Date    ARTHROSCOPY OF KNEE Left 04/06/2021    Procedure: ARTHROSCOPY, KNEE;  Surgeon: Deidra Mejias MD;  Location: [facility];  Service: Orthopedics;  Laterality: Left;    COLONOSCOPY N/A 12/13/2018    Procedure: COLONOSCOPY;  Surgeon: Micki Gross  MD;  Location: [facility] (4TH FLR);  Service: Endoscopy;  Laterality: N/A;  preferrably in Dec 2018, CRS ok if needed.    COLONOSCOPY N/A 11/05/2022    Procedure: COLONOSCOPY;  Surgeon: Greg Leach MD;  Location: [facility] (4TH FLR);  Service: Endoscopy;  Laterality: N/A;  Endoscopy schedulers please schedule patient for colonoscopy with me for further evaluation of painless hematochezia thank you    COLONOSCOPY N/A 10/17/2024    Procedure: COLONOSCOPY;  Surgeon: Tom Collazo MD;  Location: [facility] (2ND FLR);  Service: Endoscopy;  Laterality: N/A;    ESOPHAGOGASTRODUODENOSCOPY N/A 10/30/2018    Procedure: EGD (ESOPHAGOGASTRODUODENOSCOPY);  Surgeon: Greg Leach MD;  Location: [facility] (2ND FLR);  Service: Endoscopy;  Laterality: N/A;  ok to schedule per Kimmy    ESOPHAGOGASTRODUODENOSCOPY N/A 11/05/2022    Procedure: EGD (ESOPHAGOGASTRODUODENOSCOPY);  Surgeon: Greg Leach MD;  Location: [facility] (4TH FLR);  Service: Endoscopy;  Laterality: N/A;  Endoscopy schedulers please schedule patient for EGD for evaluation of right upper quadrant pain thank you  prep instr portal--ml  11/2/22 room closed-pt okay with Dr. Leach-50 min case okay per Katlyn-ml    ESOPHAGOGASTRODUODENOSCOPY N/A 10/17/2024    Procedure: EGD (ESOPHAGOGASTRODUODENOSCOPY);  Surgeon: Tom Collazo MD;  Location: [facility] (2ND FLR);  Service: Endoscopy;  Laterality: N/A;  Labat/Suflave/portal/ 2nd floor due to availability SW    KNEE ARTHROSCOPY W/ DEBRIDEMENT  04/06/2021    Procedure: ARTHROSCOPY, KNEE, WITH DEBRIDEMENT;  Surgeon: Deidra Mejias MD;  Location: [facility] OR;  Service: Orthopedics;;    KNEE ARTHROSCOPY W/ MENISCECTOMY Right 04/06/2021    Procedure: ARTHROSCOPY, KNEE, WITH MENISCECTOMY;  Surgeon: Deidra Mejias MD;  Location: [facility] OR;  Service: Orthopedics;  Laterality: Right;    KNEE ARTHROSCOPY W/ PLICA EXCISION Left 04/06/2021    Procedure: EXCISION, PLICA, KNEE, ARTHROSCOPIC;  Surgeon: Deidra Mejias MD;  Location: [facility] OR;   Service: Orthopedics;  Laterality: Left;    LAPAROSCOPIC CHOLECYSTECTOMY N/A 2024    Procedure: CHOLECYSTECTOMY, LAPAROSCOPIC;  Surgeon: Tom Campos Jr., MD;  Location: [address];  Service: General;  Laterality: N/A;    LAPAROSCOPY  2017    dx with endo.  no removal    NO PAST SURGERIES      SYNOVECTOMY OF KNEE Left 2021    Procedure: SYNOVECTOMY, KNEE;  Surgeon: Deidra Mejias MD;  Location: [facility];  Service: Orthopedics;  Laterality: Left;    UPPER GASTROINTESTINAL ENDOSCOPY         Family History   Problem Relation Name Age of Onset    Diabetes Mother lizette chase     No Known Problems Father      No Known Problems Sister      Hypertension Brother bolivar chase jr.     Hypertension Maternal Grandmother      Hypertension Maternal Grandfather soraida and carol burrell     No Known Problems Paternal Grandmother      No Known Problems Paternal Grandfather      Colon cancer Neg Hx      Crohn's disease Neg Hx      Esophageal cancer Neg Hx      Inflammatory bowel disease Neg Hx      Irritable bowel syndrome Neg Hx      Rectal cancer Neg Hx      Stomach cancer Neg Hx      Ulcerative colitis Neg Hx      Celiac disease Neg Hx      Cirrhosis Neg Hx      Colon polyps Neg Hx      Liver cancer Neg Hx      Liver disease Neg Hx         Social History     Socioeconomic History    Marital status: Single   Occupational History     Employer: [email or web address] [place]     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   Tobacco Use    Smoking status: Never     Passive exposure: Never    Smokeless tobacco: Never   Substance and Sexual Activity    Alcohol use: No    Drug use: No    Sexual activity: Yes     Partners: Male     Birth control/protection: None   Social History Narrative    She had her 1st baby in 2022 healthy vaginal delivery no      Social Drivers of Health     Financial Resource Strain: Low Risk  (2025)    Overall Financial Resource Strain (CARDIA)     Difficulty of Paying Living  Expenses: Not hard at all   Food Insecurity: No Food Insecurity (1/21/2025)    Hunger Vital Sign     Worried About Running Out of Food in the Last Year: Never true     Ran Out of Food in the Last Year: Never true   Transportation Needs: Unknown (10/25/2022)    PRAPARE - Transportation     Lack of Transportation (Medical): Patient declined     Lack of Transportation (Non-Medical): Patient declined   Physical Activity: Inactive (1/21/2025)    Exercise Vital Sign     Days of Exercise per Week: 0 days     Minutes of Exercise per Session: 0 min   Stress: No Stress Concern Present (1/21/2025)    [place] [employer] - Occupational Stress Questionnaire     Feeling of Stress : Not at all   Housing Stability: Unknown (1/21/2025)    Housing Stability Vital Sign     Unable to Pay for Housing in the Last Year: No       MEDICATIONS & ALLERGIES:     Medications Ordered Prior to Encounter[1]     Review of patient's allergies indicates:   Allergen Reactions    Doxycycline Other (See Comments)     Triggers headaches    Bactrim ds [sulfamethoxazole-trimethoprim] Nausea And Vomiting    Nsaids (non-steroidal anti-inflammatory drug) Other (See Comments)     D/t h/o PUD and H pylori    Codeine Rash       Medications Reconciliation:   I have reconciled the patient's home medications with the patient/family. I have updated all changes.  Refer to After-Visit Medication List.    OBJECTIVE:     Vital Signs:  There were no vitals filed for this visit.  Wt Readings from Last 3 Encounters:   01/31/25 1329 80.1 kg (176 lb 9.4 oz)   01/29/25 1134 80.7 kg (178 lb)   01/29/25 0854 80.9 kg (178 lb 5.6 oz)     Wt Readings from Last 3 Encounters:   03/19/25 81.5 kg (179 lb 10.8 oz)   01/31/25 80.1 kg (176 lb 9.4 oz)   01/29/25 80.7 kg (178 lb)     Temp Readings from Last 3 Encounters:   01/31/25 (!) 102 °F (38.9 °C)   12/28/24 97.9 °F (36.6 °C) (Oral)   12/02/24 98.4 °F (36.9 °C) (Oral)     BP Readings from Last 3 Encounters:    03/19/25 110/84   01/31/25 104/62   01/29/25 102/80     Pulse Readings from Last 3 Encounters:   03/19/25 74   01/31/25 (!) 134   01/29/25 89     Physical Exam:  General: Well developed, well nourished. No distress.  HEENT: Head is normocephalic, atraumatic  Eyes: Clear conjunctiva.  Neck: Supple, symmetrical neck; trachea midline.s  Extremities: No LE edema. Pulses 2+ and symmetric.   LLE: + edema to medial aspect; no discoloration, erythema or ecchymosis appreciated on exam today   RLE: unremarkable.   Skin: Skin color, texture, turgor normal. No rashes.  Musculoskeletal: Normal gait.   Neurologic: Normal strength and tone. No focal numbness or weakness.   Psychiatric: Not depressed.    Laboratory  Lab Results   Component Value Date    WBC 10.18 12/28/2024    HGB 14.3 12/28/2024    HCT 44.0 12/28/2024     12/28/2024    CHOL 195 10/26/2023    TRIG 84 10/26/2023    HDL 43 10/26/2023    ALT 10 12/28/2024    AST 14 12/28/2024     12/28/2024    K 4.0 12/28/2024     12/28/2024    CREATININE 0.7 12/28/2024    BUN 10 12/28/2024    CO2 23 12/28/2024    TSH 2.136 05/28/2024    INR 1.1 11/18/2022    HGBA1C 4.9 10/26/2023       ASSESSMENT & PLAN:     Same day apt.     Leg pain, left  *Evaluate for possible underlying thromboembolic event   -     US Lower Extremity Veins Left; Future; Expected date: 03/19/2025    Future Appointments   Date Time Provider Department Center   3/19/2025  5:00 PM [facility] OIC-US1 MASTER [facility] ULTR [facility]   3/28/2025  8:40 AM Becky Martin MD [facility] UROLOGY [facility]        Medication List            Accurate as of March 19, 2025 10:48 AM. If you have any questions, ask your nurse or doctor.                CONTINUE taking these medications      acetaminophen 500 MG tablet  Commonly known as: TYLENOL  Take 1 tablet (500 mg total) by mouth every 6 (six) hours as needed.     colestipoL 1 gram Tab  Commonly known as: COLESTID  Take 1 tablet (1 g total) by mouth 2 (two) times  daily.     ketoconazole 2 % cream  Commonly known as: NIZORAL  Apply topically 2 (two) times daily. Prn rash of face     omeprazole 40 MG capsule  Commonly known as: PRILOSEC  Take 1 capsule (40 mg total) by mouth once daily.     ondansetron 4 MG Tbdl  Commonly known as: ZOFRAN-ODT  Take 1 tablet (4 mg total) by mouth every 8 (eight) hours as needed (nausea).            Signing Physician:  MORGAN Cage         [1]   Current Outpatient Medications on File Prior to Visit   Medication Sig Dispense Refill    acetaminophen (TYLENOL) 500 MG tablet Take 1 tablet (500 mg total) by mouth every 6 (six) hours as needed. 13 tablet 0    colestipoL (COLESTID) 1 gram Tab Take 1 tablet (1 g total) by mouth 2 (two) times daily. 180 tablet 3    ketoconazole (NIZORAL) 2 % cream Apply topically 2 (two) times daily. Prn rash of face 60 g 4    omeprazole (PRILOSEC) 40 MG capsule Take 1 capsule (40 mg total) by mouth once daily. 90 capsule 3    ondansetron (ZOFRAN-ODT) 4 MG TbDL Take 1 tablet (4 mg total) by mouth every 8 (eight) hours as needed (nausea). 30 tablet 0    [DISCONTINUED] folic acid (FOLVITE) 1 MG tablet Take 1 mg by mouth once daily.       No current facility-administered medications on file prior to visit.

## 2025-04-09 ENCOUNTER — OFFICE VISIT (OUTPATIENT)
Dept: CARDIOLOGY | Facility: CLINIC | Age: 35
End: 2025-04-09
Payer: COMMERCIAL

## 2025-04-09 VITALS
SYSTOLIC BLOOD PRESSURE: 113 MMHG | WEIGHT: 184.06 LBS | DIASTOLIC BLOOD PRESSURE: 71 MMHG | HEART RATE: 85 BPM | BODY MASS INDEX: 32.61 KG/M2

## 2025-04-09 DIAGNOSIS — R07.2 PRECORDIAL PAIN: Primary | ICD-10-CM

## 2025-04-09 DIAGNOSIS — Z82.49 FAMILY HISTORY OF PREMATURE CORONARY ARTERY DISEASE: ICD-10-CM

## 2025-04-09 PROCEDURE — 99214 OFFICE O/P EST MOD 30 MIN: CPT | Mod: S$GLB,,, | Performed by: INTERNAL MEDICINE

## 2025-04-09 PROCEDURE — 99999 PR PBB SHADOW E&M-EST. PATIENT-LVL III: CPT | Mod: PBBFAC,,, | Performed by: INTERNAL MEDICINE

## 2025-04-09 RX ORDER — METOPROLOL TARTRATE 50 MG/1
TABLET ORAL
Qty: 2 TABLET | Refills: 0 | Status: SHIPPED | OUTPATIENT
Start: 2025-04-09 | End: 2025-04-11 | Stop reason: SDUPTHER

## 2025-04-09 NOTE — PROGRESS NOTES
Subjective:   04/09/2025     Patient ID:  Laila Pappas is a 34 y.o. female who presents for evaulation of Chest Pain      She comes in today for evaluation of chest pain, she underwent a stress echocardiogram, notes that the study was negative for ischemia, but the exercise portion was stopped because of the onset of chest pain.  She has persisted in having episodes of chest pain, this can occur with or without activity.  It can last minutes to an hour.  There is associated shortness of breath.      She is concerned because her brother age 35 did have a heart attack.      The patient does not smoke.  She does not have hypertension or diabetes.              Past Medical History:   Diagnosis Date    Bilateral knee pain 06/19/2018    Endometriosis     treated by GYN at     Epigastric abdominal pain 12/05/2016    Gastroesophageal reflux disease 07/06/2016    Helicobacter pylori ab+     Helicobacter pylori ab+     High-tone pelvic floor dysfunction 05/02/2016    IIH (idiopathic intracranial hypertension)     Dr. Rogers - [facility]    Palpitations 07/06/2016    Pre-eclampsia in postpartum period 08/16/2022       Review of patient's allergies indicates:   Allergen Reactions    Doxycycline Other (See Comments)     Triggers headaches    Bactrim ds [sulfamethoxazole-trimethoprim] Nausea And Vomiting    Nsaids (non-steroidal anti-inflammatory drug) Other (See Comments)     D/t h/o PUD and H pylori    Codeine Rash       Current Medications[1]     Objective:   Review of Systems   Cardiovascular:  Positive for chest pain. Negative for claudication, cyanosis, dyspnea on exertion, irregular heartbeat, leg swelling, near-syncope, orthopnea, palpitations, paroxysmal nocturnal dyspnea and syncope.   Respiratory:  Positive for shortness of breath.          Vitals:    04/09/25 1442   BP: 113/71   Pulse: 85     Wt Readings from Last 3 Encounters:   04/09/25 83.5 kg (184 lb 1.4 oz)   03/19/25 81.5 kg (179 lb 10.8 oz)   01/31/25 80.1 kg  (176 lb 9.4 oz)     Temp Readings from Last 3 Encounters:   01/31/25 (!) 102 °F (38.9 °C)   12/28/24 97.9 °F (36.6 °C) (Oral)   12/02/24 98.4 °F (36.9 °C) (Oral)     BP Readings from Last 3 Encounters:   04/09/25 113/71   03/19/25 110/84   01/31/25 104/62     Pulse Readings from Last 3 Encounters:   04/09/25 85   03/19/25 74   01/31/25 (!) 134             Physical Exam  Vitals reviewed.   Constitutional:       General: She is not in acute distress.     Appearance: She is well-developed.   HENT:      Head: Normocephalic and atraumatic.      Nose: Nose normal.   Eyes:      Conjunctiva/sclera: Conjunctivae normal.      Pupils: Pupils are equal, round, and reactive to light.   Neck:      Vascular: No carotid bruit or JVD.   Cardiovascular:      Rate and Rhythm: Normal rate and regular rhythm.      Pulses: Normal pulses and intact distal pulses.      Heart sounds: Normal heart sounds. No murmur heard.     No friction rub. No gallop.   Pulmonary:      Effort: Pulmonary effort is normal. No respiratory distress.      Breath sounds: Normal breath sounds. No wheezing or rales.   Chest:      Chest wall: No tenderness.   Abdominal:      General: Bowel sounds are normal. There is no distension.      Palpations: Abdomen is soft.      Tenderness: There is no abdominal tenderness.   Musculoskeletal:         General: No tenderness or deformity. Normal range of motion.      Cervical back: Normal range of motion and neck supple.      Right lower leg: No edema.      Left lower leg: No edema.   Skin:     General: Skin is warm and dry.      Findings: No erythema or rash.   Neurological:      Mental Status: She is alert and oriented to person, place, and time.      Cranial Nerves: No cranial nerve deficit.      Motor: No abnormal muscle tone.      Coordination: Coordination normal.   Psychiatric:         Behavior: Behavior normal.         Thought Content: Thought content normal.         Judgment: Judgment normal.           Lab Results    Component Value Date    CHOL 195 10/26/2023    CHOL 204 (H) 10/29/2021    CHOL 192 07/22/2020     Lab Results   Component Value Date    HDL 43 10/26/2023    HDL 41 10/29/2021    HDL 39 (L) 07/22/2020     Lab Results   Component Value Date    LDLCALC 135.2 10/26/2023    LDLCALC 153.8 10/29/2021    LDLCALC 143.4 07/22/2020     Lab Results   Component Value Date    ALT 10 12/28/2024    AST 14 12/28/2024    AST 17 12/02/2024    AST 17 02/27/2024     Lab Results   Component Value Date    CREATININE 0.7 12/28/2024    BUN 10 12/28/2024     12/28/2024    K 4.0 12/28/2024    CO2 23 12/28/2024    CO2 23 12/02/2024    CO2 23 02/27/2024     Lab Results   Component Value Date    HGB 14.3 12/28/2024    HCT 44.0 12/28/2024    HCT 44.8 12/02/2024    HCT 46.8 02/27/2024             EKG today shows normal sinus rhythm, normal EKG.            Assessment and Plan:     Precordial pain  -     IN OFFICE EKG 12-LEAD (to Muse)  -     metoprolol tartrate (LOPRESSOR) 50 MG tablet; Take 2 tablets 1 hour prior to CT coronary angiogram  Dispense: 2 tablet; Refill: 0  -     CTA Cardiac; Future; Expected date: 04/09/2025  -     Basic Metabolic Panel; Future; Expected date: 04/16/2025    Family history of premature coronary artery disease       Patient with an abnormal stress test with exertional chest pain.  Give it a strongly positive family history, it is prudent to further cardiac evaluation with a CT coronary angiogram.  Will order with metoprolol preparation.      No follow-ups on file.          Future Appointments   Date Time Provider Department Center   4/11/2025  9:15 AM Wallace Villagomez III, PA-C [facility] SPORTS [place]   5/7/2025 12:30 PM [facility]  CT1 [facility]SCAN [place]                    [1]   Current Outpatient Medications:     acetaminophen (TYLENOL) 500 MG tablet, Take 1 tablet (500 mg total) by mouth every 6 (six) hours as needed., Disp: 13 tablet, Rfl: 0    omeprazole (PRILOSEC) 40 MG capsule, Take 1 capsule (40 mg total) by  mouth once daily., Disp: 90 capsule, Rfl: 3    ondansetron (ZOFRAN-ODT) 4 MG TbDL, Take 1 tablet (4 mg total) by mouth every 8 (eight) hours as needed (nausea)., Disp: 30 tablet, Rfl: 0    colestipoL (COLESTID) 1 gram Tab, Take 1 tablet (1 g total) by mouth 2 (two) times daily. (Patient not taking: Reported on 4/9/2025), Disp: 180 tablet, Rfl: 3    ketoconazole (NIZORAL) 2 % cream, Apply topically 2 (two) times daily. Prn rash of face (Patient not taking: Reported on 4/9/2025), Disp: 60 g, Rfl: 4    metoprolol tartrate (LOPRESSOR) 50 MG tablet, Take 2 tablets 1 hour prior to CT coronary angiogram, Disp: 2 tablet, Rfl: 0

## 2025-04-11 DIAGNOSIS — R07.2 PRECORDIAL PAIN: ICD-10-CM

## 2025-04-11 RX ORDER — METOPROLOL TARTRATE 50 MG/1
TABLET ORAL
Qty: 2 TABLET | Refills: 0 | Status: SHIPPED | OUTPATIENT
Start: 2025-04-11

## 2025-04-14 DIAGNOSIS — R07.2 PRECORDIAL PAIN: ICD-10-CM

## 2025-04-14 RX ORDER — METOPROLOL TARTRATE 50 MG/1
TABLET ORAL
Qty: 2 TABLET | Refills: 0 | Status: SHIPPED | OUTPATIENT
Start: 2025-04-14 | End: 2025-04-16 | Stop reason: SDUPTHER

## 2025-04-16 DIAGNOSIS — R07.2 PRECORDIAL PAIN: ICD-10-CM

## 2025-04-16 RX ORDER — METOPROLOL TARTRATE 50 MG/1
TABLET ORAL
Qty: 2 TABLET | Refills: 0 | Status: SHIPPED | OUTPATIENT
Start: 2025-04-16

## 2025-05-05 ENCOUNTER — PATIENT MESSAGE (OUTPATIENT)
Dept: CARDIOLOGY | Facility: HOSPITAL | Age: 35
End: 2025-05-05
Payer: COMMERCIAL

## 2025-05-05 ENCOUNTER — TELEPHONE (OUTPATIENT)
Dept: CARDIOLOGY | Facility: HOSPITAL | Age: 35
End: 2025-05-05
Payer: COMMERCIAL

## 2025-05-05 NOTE — TELEPHONE ENCOUNTER
I had the pleasure of discussing your upcoming Cardiac CTA scheduled for 05/07/2025 at 12:30. To review, we discussed showing up 15-20 minutes before your appointment time. Your test is located at [facility] on the corner of [address] and [address], address [address], [place] [address], next door to [employer].     I have reviewed your current medications that you take and I've discussed the Cardiac CTA prep for heart rate management with Dr. Kramer, the interpreting MD. He agrees with the 100mg of Metoprolol tartrate (Lopressor) 2-hours prior to the CTA. Again, the goal for this is to maintain a desired heart rate of ~60 beats/minute for the duration of the test--about 40 minutes. This helps for the overall study quality and clarity.    Reminder for a 4-hour fasting time, no caffeine the AM of, and you can have water, anywhere from 16-32 ounces before and after completion of the test. It is advisable to have someone transport you to and from the test as a safety precaution. Thank you again for your time today. For any questions or concerns: I am available M-F from 7:30-4, please call [phone].

## 2025-05-05 NOTE — TELEPHONE ENCOUNTER
Attempted to call patient in regards to their upcoming Cardiac CTA scheduled for 05/07/2025 at 12:30.     Reminder for the patient to fast for 4-hours prior to the test, no caffeine the AM of, and can have water.     For questions or concerns: I am available M-F 7:30-4, please call [phone]. Thank you!

## 2025-06-20 ENCOUNTER — OFFICE VISIT (OUTPATIENT)
Dept: OBSTETRICS AND GYNECOLOGY | Facility: CLINIC | Age: 35
End: 2025-06-20
Payer: COMMERCIAL

## 2025-06-20 VITALS
BODY MASS INDEX: 32.93 KG/M2 | HEIGHT: 63 IN | WEIGHT: 185.88 LBS | DIASTOLIC BLOOD PRESSURE: 70 MMHG | SYSTOLIC BLOOD PRESSURE: 116 MMHG

## 2025-06-20 DIAGNOSIS — N64.4 BREAST PAIN IN FEMALE: Primary | ICD-10-CM

## 2025-06-20 PROCEDURE — 99999 PR PBB SHADOW E&M-EST. PATIENT-LVL III: CPT | Mod: PBBFAC,,, | Performed by: NURSE PRACTITIONER

## 2025-06-20 NOTE — PROGRESS NOTES
Laila Pappas is a 34 y.o. female  presents with complaint of bilateral breast pain (R>L) for months. Pretty constant, not cyclical. Denies new asymmetry, nipple discharge, palpable masses, or skin changes. Pain is mild- has not tried any alleviating factors. Wears under wire bras mostly and does drink large amounts of caffeine. No hormonal contraception, planning to do another IVF transfer soon! No family history of breast cancer.    Past Medical History:   Diagnosis Date    Bilateral knee pain 2018    Endometriosis     treated by GYN at     Epigastric abdominal pain 2016    Gastroesophageal reflux disease 2016    Helicobacter pylori ab+     Helicobacter pylori ab+     High-tone pelvic floor dysfunction 2016    IIH (idiopathic intracranial hypertension)     Dr. Rogers - [facility]    Palpitations 2016    Pre-eclampsia in postpartum period 2022     Past Surgical History:   Procedure Laterality Date    ARTHROSCOPY OF KNEE Left 2021    Procedure: ARTHROSCOPY, KNEE;  Surgeon: Deidra Mejias MD;  Location: [facility] OR;  Service: Orthopedics;  Laterality: Left;    COLONOSCOPY N/A 2018    Procedure: COLONOSCOPY;  Surgeon: Micki Gross MD;  Location: [facility] (4TH FLR);  Service: Endoscopy;  Laterality: N/A;  preferrably in Dec 2018, CRS ok if needed.    COLONOSCOPY N/A 2022    Procedure: COLONOSCOPY;  Surgeon: Greg Leach MD;  Location: [facility] (4TH FLR);  Service: Endoscopy;  Laterality: N/A;  Endoscopy schedulers please schedule patient for colonoscopy with me for further evaluation of painless hematochezia thank you    COLONOSCOPY N/A 10/17/2024    Procedure: COLONOSCOPY;  Surgeon: Tom Collazo MD;  Location: [facility] (2ND FLR);  Service: Endoscopy;  Laterality: N/A;    ESOPHAGOGASTRODUODENOSCOPY N/A 10/30/2018    Procedure: EGD (ESOPHAGOGASTRODUODENOSCOPY);  Surgeon: Greg Leach MD;  Location: [facility] (2ND FLR);  Service: Endoscopy;   Laterality: N/A;  ok to schedule per Kimmy    ESOPHAGOGASTRODUODENOSCOPY N/A 11/05/2022    Procedure: EGD (ESOPHAGOGASTRODUODENOSCOPY);  Surgeon: Greg Leach MD;  Location: [facility] (4TH FLR);  Service: Endoscopy;  Laterality: N/A;  Endoscopy schedulers please schedule patient for EGD for evaluation of right upper quadrant pain thank you  prep instr portal--ml  11/2/22 room closed-pt okay with Dr. Leach-50 min case okay per Katlyn-ml    ESOPHAGOGASTRODUODENOSCOPY N/A 10/17/2024    Procedure: EGD (ESOPHAGOGASTRODUODENOSCOPY);  Surgeon: Tom Chase MD;  Location: [facility] (2ND FLR);  Service: Endoscopy;  Laterality: N/A;  Labat/Suflave/portal/ 2nd floor due to availability SW    KNEE ARTHROSCOPY W/ DEBRIDEMENT  04/06/2021    Procedure: ARTHROSCOPY, KNEE, WITH DEBRIDEMENT;  Surgeon: Deidra Mejias MD;  Location: [facility] OR;  Service: Orthopedics;;    KNEE ARTHROSCOPY W/ MENISCECTOMY Right 04/06/2021    Procedure: ARTHROSCOPY, KNEE, WITH MENISCECTOMY;  Surgeon: Deidra Mejias MD;  Location: [facility] OR;  Service: Orthopedics;  Laterality: Right;    KNEE ARTHROSCOPY W/ PLICA EXCISION Left 04/06/2021    Procedure: EXCISION, PLICA, KNEE, ARTHROSCOPIC;  Surgeon: Deidra Mejias MD;  Location: [facility] OR;  Service: Orthopedics;  Laterality: Left;    LAPAROSCOPIC CHOLECYSTECTOMY N/A 4/11/2024    Procedure: CHOLECYSTECTOMY, LAPAROSCOPIC;  Surgeon: Tom Campos Jr., MD;  Location: [facility] 2ND FLR;  Service: General;  Laterality: N/A;    LAPAROSCOPY  2017    dx with endo.  no removal    NO PAST SURGERIES      SYNOVECTOMY OF KNEE Left 04/06/2021    Procedure: SYNOVECTOMY, KNEE;  Surgeon: Deidra Mejias MD;  Location: [facility] OR;  Service: Orthopedics;  Laterality: Left;    UPPER GASTROINTESTINAL ENDOSCOPY       Social History[1]  Family History   Problem Relation Name Age of Onset    Diabetes Mother lizette hcase     No Known Problems Father      No Known Problems Sister      Hypertension Brother bolivar salvatore wayne     Hypertension Maternal  Grandmother      Hypertension Maternal Grandfather soraida and carol burrell     No Known Problems Paternal Grandmother      No Known Problems Paternal Grandfather      Colon cancer Neg Hx      Crohn's disease Neg Hx      Esophageal cancer Neg Hx      Inflammatory bowel disease Neg Hx      Irritable bowel syndrome Neg Hx      Rectal cancer Neg Hx      Stomach cancer Neg Hx      Ulcerative colitis Neg Hx      Celiac disease Neg Hx      Cirrhosis Neg Hx      Colon polyps Neg Hx      Liver cancer Neg Hx      Liver disease Neg Hx       OB History    Para Term  AB Living   2 1 1 0 1 1   SAB IAB Ectopic Multiple Live Births   1 0 0 0 1      # Outcome Date GA Lbr Sam/2nd Weight Sex Type Anes PTL Lv   2 Term 23 39w6d / 00:50 3.62 kg (7 lb 15.7 oz) F Vag-Spont EPI N SADA      Complications: Shoulder Dystocia   1 SAB                ROS:  GENERAL: No fever, chills, fatigability or weight loss.  VULVAR: No pain, no lesions and no itching.  VAGINAL: No relaxation, no itching, no discharge, no abnormal bleeding and no lesions.  ABDOMEN: No abdominal pain. Denies nausea. Denies vomiting. No diarrhea. No constipation  BREAST: bilateral breast pain, R>L  No lumps. No discharge.  URINARY: No incontinence, no nocturia, no frequency and no dysuria.  CARDIOVASCULAR: No chest pain. No shortness of breath. No leg cramps.  NEUROLOGICAL: No headaches. No vision changes.    PHYSICAL EXAM:  No pelvic exam done  BREAST: symmetrical, no skin changes or palpable lumps    ASSESSMENT and PLAN:    ICD-10-CM ICD-9-CM    1. Breast pain in female  N64.4 611.71 US Breast Bilateral Limited      Mammo Digital Diagnostic Bilat with Angel (XPD)        Imaging ordered    Try these tips to help reduce breast pain:    1. Try reducing caffeine intake (coffee, tea, soft drinks, and chocolate)  2. Find fitted, supportive bra- wear daily and with exercise  3. Start OTC Vitamin E 1200 IU once daily for its anti-inflammatory effects  4. Start  Evening Primrose Oil- 1500 mg twice daily for 6 months (avoid if lactating or taking anti-seizure meds)  5. Eat a low fat diet  6. Try using topical NSAIDs such as Voltaren, Aspercreme, and Capzacin-HP to painful areas as needed.       FOLLOW UP: PRN lack of improvement.    As of April 1, 2021, the Cures Act has been passed nationally. This new law requires that all doctors progress notes, lab results, pathology reports and radiology reports be released IMMEDIATELY to the patient in the patient portal. That means that the results are released to you at the EXACT same time they are released to me. Therefore, with all of the patients that I have I am not able to reply to each patient exactly when the results come in. So there will be a delay from when you see the results to when I see them and have time to come up with a response to send you. Also I only see these results when I am on the computer at work. So if the results come in over the weekend or after 5 pm of a work day, I will not see them until the next business day. As you can tell, this is a challenge as a provider to give every patient the quick response they hope for and deserve. So please be patient!   Thanks for your understanding and patience.            [1]   Social History  Tobacco Use    Smoking status: Never     Passive exposure: Never    Smokeless tobacco: Never   Substance Use Topics    Alcohol use: No    Drug use: No

## 2025-06-25 ENCOUNTER — PATIENT MESSAGE (OUTPATIENT)
Dept: CARDIOLOGY | Facility: CLINIC | Age: 35
End: 2025-06-25
Payer: COMMERCIAL

## 2025-06-26 ENCOUNTER — TELEPHONE (OUTPATIENT)
Dept: RADIOLOGY | Facility: HOSPITAL | Age: 35
End: 2025-06-26
Payer: COMMERCIAL

## 2025-06-26 ENCOUNTER — HOSPITAL ENCOUNTER (OUTPATIENT)
Dept: RADIOLOGY | Facility: HOSPITAL | Age: 35
Discharge: HOME | End: 2025-06-26
Attending: NURSE PRACTITIONER
Payer: COMMERCIAL

## 2025-06-26 DIAGNOSIS — N64.4 BREAST PAIN IN FEMALE: ICD-10-CM

## 2025-06-26 PROCEDURE — 77066 DX MAMMO INCL CAD BI: CPT | Mod: 26,,, | Performed by: RADIOLOGY

## 2025-06-26 PROCEDURE — 77066 DX MAMMO INCL CAD BI: CPT | Mod: TC

## 2025-06-26 PROCEDURE — 77062 BREAST TOMOSYNTHESIS BI: CPT | Mod: 26,,, | Performed by: RADIOLOGY

## 2025-06-26 PROCEDURE — 76642 ULTRASOUND BREAST LIMITED: CPT | Mod: TC,50

## 2025-06-26 NOTE — TELEPHONE ENCOUNTER
Patient [facility].    Complex Repair And Single Advancement Flap Text: The defect edges were debeveled with a #15 scalpel blade.  The primary defect was closed partially with a complex linear closure.  Given the location of the remaining defect, shape of the defect and the proximity to free margins a single advancement flap was deemed most appropriate for complete closure of the defect.  Using a sterile surgical marker, an appropriate advancement flap was drawn incorporating the defect and placing the expected incisions within the relaxed skin tension lines where possible.    The area thus outlined was incised deep to adipose tissue with a #15 scalpel blade.  The skin margins were undermined to an appropriate distance in all directions utilizing iris scissors.

## 2025-06-27 ENCOUNTER — TELEPHONE (OUTPATIENT)
Dept: RADIOLOGY | Facility: HOSPITAL | Age: 35
End: 2025-06-27
Payer: COMMERCIAL

## 2025-06-27 ENCOUNTER — PATIENT MESSAGE (OUTPATIENT)
Dept: RESEARCH | Facility: HOSPITAL | Age: 35
End: 2025-06-27
Payer: COMMERCIAL

## 2025-06-27 NOTE — TELEPHONE ENCOUNTER
Spoke with patient. Reviewed breast biopsy procedure and reviewed instructions for breast biopsy. Patient expressed understanding and all questions were answered. Provided patient with my phone number to call for any further concerns or questions.   Patient scheduled breast biopsy at the [facility] on 7/1/2025.

## 2025-06-30 ENCOUNTER — TELEPHONE (OUTPATIENT)
Dept: RESEARCH | Facility: HOSPITAL | Age: 35
End: 2025-06-30
Payer: COMMERCIAL

## 2025-07-01 ENCOUNTER — HOSPITAL ENCOUNTER (OUTPATIENT)
Dept: RADIOLOGY | Facility: HOSPITAL | Age: 35
Discharge: HOME | End: 2025-07-01
Attending: NURSE PRACTITIONER
Payer: COMMERCIAL

## 2025-07-01 DIAGNOSIS — N64.4 BREAST PAIN IN FEMALE: ICD-10-CM

## 2025-07-01 DIAGNOSIS — R92.8 ABNORMAL FINDING ON BREAST IMAGING: Primary | ICD-10-CM

## 2025-07-01 PROCEDURE — 19081 BX BREAST 1ST LESION STRTCTC: CPT | Mod: RT

## 2025-07-01 PROCEDURE — 19081 BX BREAST 1ST LESION STRTCTC: CPT | Mod: RT,,, | Performed by: RADIOLOGY

## 2025-07-01 PROCEDURE — 63600175 PHARM REV CODE 636 W HCPCS: Performed by: NURSE PRACTITIONER

## 2025-07-01 PROCEDURE — 88305 TISSUE EXAM BY PATHOLOGIST: CPT | Mod: TC | Performed by: RADIOLOGY

## 2025-07-01 PROCEDURE — 77065 DX MAMMO INCL CAD UNI: CPT | Mod: 26,RT,, | Performed by: RADIOLOGY

## 2025-07-01 PROCEDURE — 77065 DX MAMMO INCL CAD UNI: CPT | Mod: TC,RT

## 2025-07-01 RX ORDER — LIDOCAINE HYDROCHLORIDE AND EPINEPHRINE 10; 20 UG/ML; MG/ML
20 INJECTION, SOLUTION INFILTRATION; PERINEURAL ONCE
Status: COMPLETED | OUTPATIENT
Start: 2025-07-01 | End: 2025-07-01

## 2025-07-01 RX ORDER — LIDOCAINE HYDROCHLORIDE 10 MG/ML
3 INJECTION, SOLUTION EPIDURAL; INFILTRATION; INTRACAUDAL; PERINEURAL ONCE
Status: COMPLETED | OUTPATIENT
Start: 2025-07-01 | End: 2025-07-01

## 2025-07-01 RX ADMIN — LIDOCAINE HYDROCHLORIDE,EPINEPHRINE BITARTRATE 20 ML: 20; .01 INJECTION, SOLUTION INFILTRATION; PERINEURAL at 02:07

## 2025-07-01 RX ADMIN — LIDOCAINE HYDROCHLORIDE 30 MG: 10 INJECTION, SOLUTION EPIDURAL; INFILTRATION; INTRACAUDAL; PERINEURAL at 02:07

## 2025-07-02 ENCOUNTER — RESULTS FOLLOW-UP (OUTPATIENT)
Dept: OBSTETRICS AND GYNECOLOGY | Facility: CLINIC | Age: 35
End: 2025-07-02

## 2025-07-02 LAB
DHEA SERPL-MCNC: NORMAL
ESTROGEN SERPL-MCNC: NORMAL PG/ML
INSULIN SERPL-ACNC: NORMAL U[IU]/ML
LAB AP DIAGNOSIS CATEGORY: NORMAL
LAB AP GROSS DESCRIPTION: NORMAL
LAB AP PERFORMING LOCATION(S): NORMAL
LAB AP REPORT FOOTNOTES: NORMAL

## 2025-07-03 ENCOUNTER — TELEPHONE (OUTPATIENT)
Dept: SURGERY | Facility: CLINIC | Age: 35
End: 2025-07-03
Payer: COMMERCIAL

## 2025-07-03 NOTE — TELEPHONE ENCOUNTER
Patient called with results of breast biopsy from 7/1/2025,   no atypia/benign, all questions answered, pt advised to follow up in 6 months with repeat imaging per core biopsy protocol.  reviewed biopsy results and results are benign and concordant. Patient verbalized understanding.       ----- Message from Moises Rice MD sent at 7/2/2025  5:38 PM CDT -----  Benign and concordant.    Thank you,    Moises Rice M.D.  ----- Message -----  From: Lab, Background User  Sent: 7/2/2025   4:06 PM CDT  To: Moises Rice MD

## 2025-07-08 NOTE — TELEPHONE ENCOUNTER
Called Ms. Pappas today to follow up on a portal message sent to her recently regarding an [facility] research study she qualifies to participate in. Discussed study. She expressed that she would think about it and call me back if she decides to participate.

## 2025-07-10 ENCOUNTER — TELEPHONE (OUTPATIENT)
Dept: CARDIOLOGY | Facility: HOSPITAL | Age: 35
End: 2025-07-10
Payer: COMMERCIAL

## 2025-07-10 ENCOUNTER — PATIENT MESSAGE (OUTPATIENT)
Dept: CARDIOLOGY | Facility: HOSPITAL | Age: 35
End: 2025-07-10
Payer: COMMERCIAL

## 2025-07-10 NOTE — TELEPHONE ENCOUNTER
Attempted to call patient in regards to their upcoming Cardiac CTA scheduled for 07/16/2025 at 1:30.     Reminder for the patient to fast for 4-hours prior to the test, no caffeine the AM of, and can have water.     For questions or concerns: I am available M-F 7:30-4, please call [phone]. Thank you!

## 2025-07-14 ENCOUNTER — TELEPHONE (OUTPATIENT)
Dept: CARDIOLOGY | Facility: HOSPITAL | Age: 35
End: 2025-07-14
Payer: COMMERCIAL

## 2025-07-14 ENCOUNTER — PATIENT MESSAGE (OUTPATIENT)
Dept: CARDIOLOGY | Facility: HOSPITAL | Age: 35
End: 2025-07-14
Payer: COMMERCIAL

## 2025-07-14 NOTE — TELEPHONE ENCOUNTER
Attempted to call patient in regards to their upcoming Cardiac CTA scheduled for 07/16/2025 at 1:30.     Reminder for the patient to fast for 4-hours prior to the test, no caffeine the AM of, and can have water.     For questions or concerns: I am available M-F 7:30-4, please call [phone]. Thank you!

## 2025-07-15 ENCOUNTER — TELEPHONE (OUTPATIENT)
Dept: CARDIOLOGY | Facility: HOSPITAL | Age: 35
End: 2025-07-15
Payer: COMMERCIAL

## 2025-07-15 ENCOUNTER — PATIENT MESSAGE (OUTPATIENT)
Dept: CARDIOLOGY | Facility: HOSPITAL | Age: 35
End: 2025-07-15
Payer: COMMERCIAL

## 2025-07-15 RX ORDER — IVABRADINE 7.5 MG/1
15 TABLET, FILM COATED ORAL
Qty: 2 TABLET | Refills: 0 | Status: SHIPPED | OUTPATIENT
Start: 2025-07-15

## 2025-07-15 RX ORDER — IVABRADINE 7.5 MG/1
15 TABLET, FILM COATED ORAL
COMMUNITY
End: 2025-07-15 | Stop reason: SDUPTHER

## 2025-07-15 NOTE — TELEPHONE ENCOUNTER
I had the pleasure of discussing your upcoming Cardiac CTA scheduled for 07/16/2025 at 1:30. To review, we discussed showing up 15-20 minutes before your appointment time. Your test is located at [facility] on the corner of [place] [address], address [address], [place] [address], next door to [employer].     I have reviewed your current medications that you take and I've discussed the Cardiac CTA prep for heart rate management with Dr. Kramer, the interpreting MD. He agrees with the 100mg of Metoprolol given to you by Dr. Aguayo. He is also ordering for you to take 15mg of Ivabradine (Corlanor). Please take both the Metoprolol and Ivabradine 2-hours prior to the CTA. Again, the goal for this is to maintain a desired heart rate of ~60 beats/minute for the duration of the test--about 40 minutes. This helps for the overall study quality and clarity. The Ivabradine will be sent to the [facility] pharmacy.    Please ensure to complete the lab work ordered by Dr. Aguayo to ensure your renal function is stable enough to receive the IV contrast needed for the imaging. This can be completed at the [facility] or [place] facility as a walk-in with no appointment necessary.     Reminder for a 4-hour fasting time, no caffeine the AM of, and you can have water, anywhere from 16-32 ounces before and after completion of the test. It is advisable to have someone transport you to and from the test as a safety precaution. Thank you again for your time today. For any questions or concerns: I am available M-F from 7:30-4, please call [phone].

## 2025-07-16 ENCOUNTER — HOSPITAL ENCOUNTER (OUTPATIENT)
Dept: RADIOLOGY | Facility: HOSPITAL | Age: 35
Discharge: HOME | End: 2025-07-16
Attending: INTERNAL MEDICINE
Payer: COMMERCIAL

## 2025-07-16 VITALS
DIASTOLIC BLOOD PRESSURE: 68 MMHG | HEART RATE: 71 BPM | SYSTOLIC BLOOD PRESSURE: 101 MMHG | OXYGEN SATURATION: 97 % | RESPIRATION RATE: 16 BRPM

## 2025-07-16 DIAGNOSIS — R07.2 PRECORDIAL PAIN: ICD-10-CM

## 2025-07-16 PROCEDURE — 25000242 PHARM REV CODE 250 ALT 637 W/ HCPCS: Performed by: INTERNAL MEDICINE

## 2025-07-16 PROCEDURE — 75574 CT ANGIO HRT W/3D IMAGE: CPT | Mod: TC

## 2025-07-16 PROCEDURE — 25500020 PHARM REV CODE 255: Performed by: INTERNAL MEDICINE

## 2025-07-16 PROCEDURE — 63600175 PHARM REV CODE 636 W HCPCS: Performed by: INTERNAL MEDICINE

## 2025-07-16 PROCEDURE — 75574 CT ANGIO HRT W/3D IMAGE: CPT | Mod: 26,,, | Performed by: STUDENT IN AN ORGANIZED HEALTH CARE EDUCATION/TRAINING PROGRAM

## 2025-07-16 RX ORDER — NITROGLYCERIN 0.4 MG/1
0.4 TABLET SUBLINGUAL ONCE
Status: COMPLETED | OUTPATIENT
Start: 2025-07-16 | End: 2025-07-16

## 2025-07-16 RX ORDER — METOPROLOL TARTRATE 1 MG/ML
5 INJECTION, SOLUTION INTRAVENOUS EVERY 5 MIN PRN
Status: DISCONTINUED | OUTPATIENT
Start: 2025-07-16 | End: 2025-07-17 | Stop reason: HOSPADM

## 2025-07-16 RX ADMIN — METOPROLOL TARTRATE 5 MG: 5 INJECTION, SOLUTION INTRAVENOUS at 12:07

## 2025-07-16 RX ADMIN — NITROGLYCERIN 0.4 MG: 0.4 TABLET SUBLINGUAL at 12:07

## 2025-07-16 RX ADMIN — IOHEXOL 100 ML: 350 INJECTION, SOLUTION INTRAVENOUS at 01:07

## 2025-07-16 NOTE — NURSING
Pt arrived to [facility] for cardiac CTA.  Pt AAOx4, ambulatory, no distress noted. Pt informed of procedure, need for PIV and side effects of contrast and nitroglycerin.  Pt connected for continuous vital sign monitoring. VS assessed and put in flowsheets.  IV in place.  Pt verbalizes understanding.

## 2025-07-16 NOTE — NURSING
Pt VSS post CTA study. Pt exhibiting no adverse effects from medication. Pt able to adequately ambulate with no dizziness or SOB. IV removed, site dressed with guaze and coban. [facility]. Pt educated on post study instructions. Verbalized understanding.

## 2025-07-21 ENCOUNTER — TELEPHONE (OUTPATIENT)
Dept: NEUROLOGY | Facility: CLINIC | Age: 35
End: 2025-07-21
Payer: COMMERCIAL

## 2025-07-21 NOTE — TELEPHONE ENCOUNTER
----- Message from Melany Ndiaye MD sent at 7/21/2025  1:00 PM CDT -----  Regarding: MRI brain scheduled  Can you help w getting this patient's MRI scheduled, I put one in January.  Concern for IIH, may need LP.  Put imaging in, then set up a visit for us virtual is okay to review image and consider LP after that.

## 2025-07-22 ENCOUNTER — HOSPITAL ENCOUNTER (OUTPATIENT)
Dept: RADIOLOGY | Facility: OTHER | Age: 35
Discharge: HOME | End: 2025-07-22
Attending: STUDENT IN AN ORGANIZED HEALTH CARE EDUCATION/TRAINING PROGRAM
Payer: COMMERCIAL

## 2025-07-22 DIAGNOSIS — R51.9 NONINTRACTABLE HEADACHE, UNSPECIFIED CHRONICITY PATTERN, UNSPECIFIED HEADACHE TYPE: ICD-10-CM

## 2025-07-22 DIAGNOSIS — G93.2 IIH (IDIOPATHIC INTRACRANIAL HYPERTENSION): ICD-10-CM

## 2025-07-25 ENCOUNTER — HOSPITAL ENCOUNTER (OUTPATIENT)
Dept: RADIOLOGY | Facility: OTHER | Age: 35
Discharge: HOME | End: 2025-07-25
Attending: STUDENT IN AN ORGANIZED HEALTH CARE EDUCATION/TRAINING PROGRAM
Payer: COMMERCIAL

## 2025-07-25 PROCEDURE — 70551 MRI BRAIN STEM W/O DYE: CPT | Mod: 26,,, | Performed by: RADIOLOGY

## 2025-07-25 PROCEDURE — 70551 MRI BRAIN STEM W/O DYE: CPT | Mod: TC

## 2025-08-04 ENCOUNTER — PATIENT MESSAGE (OUTPATIENT)
Dept: NEUROLOGY | Facility: CLINIC | Age: 35
End: 2025-08-04
Payer: COMMERCIAL

## 2025-08-05 ENCOUNTER — TELEPHONE (OUTPATIENT)
Dept: NEUROLOGY | Facility: CLINIC | Age: 35
End: 2025-08-05
Payer: COMMERCIAL

## 2025-08-07 ENCOUNTER — OFFICE VISIT (OUTPATIENT)
Dept: NEUROLOGY | Facility: CLINIC | Age: 35
End: 2025-08-07
Payer: COMMERCIAL

## 2025-08-07 DIAGNOSIS — R42 DIZZINESS: ICD-10-CM

## 2025-08-07 DIAGNOSIS — G93.2 IIH (IDIOPATHIC INTRACRANIAL HYPERTENSION): Primary | ICD-10-CM

## 2025-08-07 DIAGNOSIS — H93.19 TINNITUS, UNSPECIFIED LATERALITY: ICD-10-CM

## 2025-08-07 NOTE — PROGRESS NOTES
The patient location is: [place]  The chief complaint leading to consultation is: Headache    Visit type: audiovisual    Each patient to whom he or she provides medical services by telemedicine is:  (1) informed of the relationship between the physician and patient and the respective role of any other health care provider with respect to management of the patient; and (2) notified that he or she may decline to receive medical services by telemedicine and may withdraw from such care at any time.    Chief Complaint and Duration     History of IIH    History of Present Illness     Laila Pappas is a 34 y.o. female with a history of paresthesias, history of IIH in which she had an opening pressure over 30, closing pressure 14.  Patient denies any headaches at present, was not on Diamox for a long time, did try for 2 months 2 years ago prior to getting pregnant.  Did not have issues with headaches after the tap.  Has been well-maintained.  Coming here today, still breast-feeding.  States that she has more episodes of dizziness, no recent illnesses.  No ear ringing.  Concern given that she does have the history of IIH.  Says she does drink enough water.  Otherwise no recent illnesses.  Does have low iron.    In 2021  Mean opening pressure: 33-mmHg  CSF volume removed: 20-cc  Mean closing pressure: 14-mmHg    2 months of diamox prior to pregnancy.  Also prior to the tap.  Stopped.    12/12/23 - states headaches have been returning, no blurry vision. Also ear ringing.     1/21/25 - feels headaches have come back. Pressure.  Blurry vision.    8/7/25- here for followup. Feels worsening of headaches. Able to work, works at dental school. Looking to get pregnant at some point in near future.     Review of patient's allergies indicates:   Allergen Reactions    Doxycycline Other (See Comments)     Triggers headaches    Bactrim ds [sulfamethoxazole-trimethoprim] Nausea And Vomiting    Nsaids (non-steroidal anti-inflammatory drug)  Other (See Comments)     D/t h/o PUD and H pylori    Codeine Rash     Current Outpatient Medications   Medication Sig Dispense Refill    acetaminophen (TYLENOL) 500 MG tablet Take 1 tablet (500 mg total) by mouth every 6 (six) hours as needed. 13 tablet 0    colestipoL (COLESTID) 1 gram Tab Take 1 tablet (1 g total) by mouth 2 (two) times daily. (Patient not taking: Reported on 6/20/2025) 180 tablet 3    ivabradine (CORLANOR) 7.5 mg Tab Take 2 tablets (15 mg total) by mouth On call Procedure (take 2-hours prior to your cardiac CTA). 2 tablet 0    ketoconazole (NIZORAL) 2 % cream Apply topically 2 (two) times daily. Prn rash of face (Patient not taking: Reported on 6/20/2025) 60 g 4    metoprolol tartrate (LOPRESSOR) 50 MG tablet Take 2 tablets 1 hour prior to CT coronary angiogram 2 tablet 0    omeprazole (PRILOSEC) 40 MG capsule Take 1 capsule (40 mg total) by mouth once daily. 90 capsule 3    ondansetron (ZOFRAN-ODT) 4 MG TbDL Take 1 tablet (4 mg total) by mouth every 8 (eight) hours as needed (nausea). 30 tablet 0     No current facility-administered medications for this visit.       Medical History     Past Medical History:   Diagnosis Date    Bilateral knee pain 06/19/2018    Endometriosis     treated by GYN at     Epigastric abdominal pain 12/05/2016    Gastroesophageal reflux disease 07/06/2016    Helicobacter pylori ab+     Helicobacter pylori ab+     High-tone pelvic floor dysfunction 05/02/2016    IIH (idiopathic intracranial hypertension)     Dr. Rogers - [facility]    Palpitations 07/06/2016    Pre-eclampsia in postpartum period 08/16/2022     Past Surgical History:   Procedure Laterality Date    ARTHROSCOPY OF KNEE Left 04/06/2021    Procedure: ARTHROSCOPY, KNEE;  Surgeon: Deidra Mejias MD;  Location: [facility] OR;  Service: Orthopedics;  Laterality: Left;    COLONOSCOPY N/A 12/13/2018    Procedure: COLONOSCOPY;  Surgeon: Micki Gross MD;  Location: [address]);  Service: Endoscopy;  Laterality:  N/A;  preferrably in Dec 2018, CRS ok if needed.    COLONOSCOPY N/A 11/05/2022    Procedure: COLONOSCOPY;  Surgeon: Greg Leach MD;  Location: [facility] (4TH FLR);  Service: Endoscopy;  Laterality: N/A;  Endoscopy schedulers please schedule patient for colonoscopy with me for further evaluation of painless hematochezia thank you    COLONOSCOPY N/A 10/17/2024    Procedure: COLONOSCOPY;  Surgeon: Tom Collazo MD;  Location: [facility] (2ND FLR);  Service: Endoscopy;  Laterality: N/A;    ESOPHAGOGASTRODUODENOSCOPY N/A 10/30/2018    Procedure: EGD (ESOPHAGOGASTRODUODENOSCOPY);  Surgeon: Greg Leach MD;  Location: [facility] (2ND FLR);  Service: Endoscopy;  Laterality: N/A;  ok to schedule per Kimmy    ESOPHAGOGASTRODUODENOSCOPY N/A 11/05/2022    Procedure: EGD (ESOPHAGOGASTRODUODENOSCOPY);  Surgeon: Greg Leach MD;  Location: [facility] (4TH FLR);  Service: Endoscopy;  Laterality: N/A;  Endoscopy schedulers please schedule patient for EGD for evaluation of right upper quadrant pain thank you  prep instr portal--ml  11/2/22 room closed-pt okay with Dr. Leach-50 min case okay per Katlyn-ml    ESOPHAGOGASTRODUODENOSCOPY N/A 10/17/2024    Procedure: EGD (ESOPHAGOGASTRODUODENOSCOPY);  Surgeon: Tom Collazo MD;  Location: [facility] (2ND FLR);  Service: Endoscopy;  Laterality: N/A;  Labat/Suflave/portal/ 2nd floor due to availability SW    KNEE ARTHROSCOPY W/ DEBRIDEMENT  04/06/2021    Procedure: ARTHROSCOPY, KNEE, WITH DEBRIDEMENT;  Surgeon: Deidra Mejias MD;  Location: [facility] OR;  Service: Orthopedics;;    KNEE ARTHROSCOPY W/ MENISCECTOMY Right 04/06/2021    Procedure: ARTHROSCOPY, KNEE, WITH MENISCECTOMY;  Surgeon: Deidra Mejias MD;  Location: [facility] OR;  Service: Orthopedics;  Laterality: Right;    KNEE ARTHROSCOPY W/ PLICA EXCISION Left 04/06/2021    Procedure: EXCISION, PLICA, KNEE, ARTHROSCOPIC;  Surgeon: Deidra Mejias MD;  Location: [facility];  Service: Orthopedics;  Laterality: Left;    LAPAROSCOPIC  CHOLECYSTECTOMY N/A 2024    Procedure: CHOLECYSTECTOMY, LAPAROSCOPIC;  Surgeon: Tom Campos Jr., MD;  Location: [address];  Service: General;  Laterality: N/A;    LAPAROSCOPY  2017    dx with endo.  no removal    NO PAST SURGERIES      SYNOVECTOMY OF KNEE Left 2021    Procedure: SYNOVECTOMY, KNEE;  Surgeon: Deidra Mejias MD;  Location: [facility] OR;  Service: Orthopedics;  Laterality: Left;    UPPER GASTROINTESTINAL ENDOSCOPY       Family History   Problem Relation Name Age of Onset    Diabetes Mother lizette chase     No Known Problems Father      No Known Problems Sister      Hypertension Brother bolivar chase jr.     Hypertension Maternal Grandmother      Hypertension Maternal Grandfather soraida and carol burrell     No Known Problems Paternal Grandmother      No Known Problems Paternal Grandfather      Colon cancer Neg Hx      Crohn's disease Neg Hx      Esophageal cancer Neg Hx      Inflammatory bowel disease Neg Hx      Irritable bowel syndrome Neg Hx      Rectal cancer Neg Hx      Stomach cancer Neg Hx      Ulcerative colitis Neg Hx      Celiac disease Neg Hx      Cirrhosis Neg Hx      Colon polyps Neg Hx      Liver cancer Neg Hx      Liver disease Neg Hx       Social History     Socioeconomic History    Marital status: Single   Occupational History     Employer: [employer] [place]     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   Tobacco Use    Smoking status: Never     Passive exposure: Never    Smokeless tobacco: Never   Substance and Sexual Activity    Alcohol use: No    Drug use: No    Sexual activity: Yes     Partners: Male     Birth control/protection: None   Social History Narrative    She had her 1st baby in 2022 healthy vaginal delivery no      Social Drivers of Health     Financial Resource Strain: Low Risk  (2025)    Overall Financial Resource Strain (CARDIA)     Difficulty of Paying Living Expenses: Not hard at all   Food Insecurity: No Food Insecurity  (1/21/2025)    Hunger Vital Sign     Worried About Running Out of Food in the Last Year: Never true     Ran Out of Food in the Last Year: Never true   Transportation Needs: Unknown (10/25/2022)    PRAPARE - Transportation     Lack of Transportation (Medical): Patient declined     Lack of Transportation (Non-Medical): Patient declined   Physical Activity: Inactive (1/21/2025)    Exercise Vital Sign     Days of Exercise per Week: 0 days     Minutes of Exercise per Session: 0 min   Stress: No Stress Concern Present (1/21/2025)    Citizen of [place] of Occupational Health - Occupational Stress Questionnaire     Feeling of Stress : Not at all   Housing Stability: Unknown (1/21/2025)    Housing Stability Vital Sign     Unable to Pay for Housing in the Last Year: No       Exam     There were no vitals filed for this visit.    Physical Exam:  General: Not in acute distress. Not ill-appearing.   Psychiatric: Mood normal.        Neurologic Exam   Mental status: oriented to person, place, and time  Attention: Normal. Concentration: normal.  Speech: speech is normal.  Cranial Nerves: Symmetric facies.     Motor exam: moving extremities symmetrically     Labs and Imaging     Labs: reviewed  No results found for this or any previous visit (from the past 24 hours).    A1c 4.9, TSH within normal limits    Imaging:   I have personally reviewed the images performed.   In 2022 with partially empty sella.    Mri brain 2023 - partially empty sella    Mri brain 2025 - partially empty sella    Assessment and Plan     Problem List Items Addressed This Visit          Neuro    IIH (idiopathic intracranial hypertension) - Primary    Overview   Pulsatile tinnitus with positional components definitely raises suspicion for idiopathic intracranial hypertension.  Patient's body habitus does not support this diagnosis.  She has not had any observable deficits or changes on the funduscopic examination documented by her ophthalmologist.  Tetracyclines  have been shown in several patients to induce or provoke idiopathic intracranial hypertension.  LP showed OP - 33 cm H20 on prior tap.          Relevant Orders    FL LUMBAR PUNCTURE DIAGNOSTIC WITH IMAGING       ENT    Tinnitus    Relevant Orders    FL LUMBAR PUNCTURE DIAGNOSTIC WITH IMAGING       Other    Dizziness    Relevant Orders    FL LUMBAR PUNCTURE DIAGNOSTIC WITH IMAGING     Patient needs repeat workup for IIH, Having recurrence of headaches and ear ringing. Mri brain shows partially empty sella. Looking to get pregnant in near future.   Will proceed with LP for OP. Diamox after if warranted.     Appreciate opportunity to care for this patient.    Follow-up: after LP. Patient to message in after LP and we can proceed. Likely need visit, okay for virtual. And then diamox if warranted.    This note was created by combination of typed  and M-Modal dictation. Transcription and phonetic errors may be present.  If there are any questions, please contact me.

## 2025-08-20 ENCOUNTER — OFFICE VISIT (OUTPATIENT)
Dept: INTERNAL MEDICINE | Facility: CLINIC | Age: 35
End: 2025-08-20
Payer: COMMERCIAL

## 2025-08-20 ENCOUNTER — HOSPITAL ENCOUNTER (OUTPATIENT)
Dept: RADIOLOGY | Facility: HOSPITAL | Age: 35
Discharge: HOME | End: 2025-08-20
Attending: STUDENT IN AN ORGANIZED HEALTH CARE EDUCATION/TRAINING PROGRAM
Payer: COMMERCIAL

## 2025-08-20 VITALS
HEIGHT: 62 IN | SYSTOLIC BLOOD PRESSURE: 118 MMHG | BODY MASS INDEX: 34.89 KG/M2 | HEART RATE: 78 BPM | DIASTOLIC BLOOD PRESSURE: 68 MMHG | OXYGEN SATURATION: 98 % | WEIGHT: 189.63 LBS

## 2025-08-20 DIAGNOSIS — D50.9 IRON DEFICIENCY ANEMIA, UNSPECIFIED IRON DEFICIENCY ANEMIA TYPE: ICD-10-CM

## 2025-08-20 DIAGNOSIS — R76.8 ANA POSITIVE: ICD-10-CM

## 2025-08-20 DIAGNOSIS — E78.5 DYSLIPIDEMIA: ICD-10-CM

## 2025-08-20 DIAGNOSIS — A04.8 H. PYLORI INFECTION: ICD-10-CM

## 2025-08-20 DIAGNOSIS — K76.0 HEPATIC STEATOSIS: ICD-10-CM

## 2025-08-20 DIAGNOSIS — R42 DIZZINESS: ICD-10-CM

## 2025-08-20 DIAGNOSIS — G89.29 CHRONIC MIDLINE LOW BACK PAIN WITHOUT SCIATICA: ICD-10-CM

## 2025-08-20 DIAGNOSIS — M54.50 CHRONIC MIDLINE LOW BACK PAIN WITHOUT SCIATICA: ICD-10-CM

## 2025-08-20 DIAGNOSIS — M25.552 CHRONIC LEFT HIP PAIN: ICD-10-CM

## 2025-08-20 DIAGNOSIS — Z09 FOLLOW-UP EXAM: Primary | ICD-10-CM

## 2025-08-20 DIAGNOSIS — H93.19 TINNITUS, UNSPECIFIED LATERALITY: ICD-10-CM

## 2025-08-20 DIAGNOSIS — Z83.49 FAMILY HISTORY OF THYROID DISORDER: ICD-10-CM

## 2025-08-20 DIAGNOSIS — G93.2 IIH (IDIOPATHIC INTRACRANIAL HYPERTENSION): ICD-10-CM

## 2025-08-20 DIAGNOSIS — G89.29 CHRONIC LEFT HIP PAIN: ICD-10-CM

## 2025-08-20 DIAGNOSIS — E04.2 MULTIPLE THYROID NODULES: ICD-10-CM

## 2025-08-20 PROCEDURE — 99214 OFFICE O/P EST MOD 30 MIN: CPT | Mod: S$GLB,,, | Performed by: NURSE PRACTITIONER

## 2025-08-20 PROCEDURE — 99999 PR PBB SHADOW E&M-EST. PATIENT-LVL IV: CPT | Mod: PBBFAC,,, | Performed by: NURSE PRACTITIONER

## 2025-08-20 PROCEDURE — 63600175 PHARM REV CODE 636 W HCPCS: Performed by: STUDENT IN AN ORGANIZED HEALTH CARE EDUCATION/TRAINING PROGRAM

## 2025-08-20 RX ORDER — LIDOCAINE HYDROCHLORIDE 10 MG/ML
5 INJECTION, SOLUTION INFILTRATION; PERINEURAL ONCE
Status: COMPLETED | OUTPATIENT
Start: 2025-08-20 | End: 2025-08-20

## 2025-08-20 RX ADMIN — LIDOCAINE HYDROCHLORIDE 5 ML: 10 INJECTION, SOLUTION INFILTRATION; PERINEURAL at 01:08

## 2025-08-25 ENCOUNTER — TELEPHONE (OUTPATIENT)
Dept: NEUROLOGY | Facility: CLINIC | Age: 35
End: 2025-08-25
Payer: COMMERCIAL

## 2025-08-26 ENCOUNTER — TELEPHONE (OUTPATIENT)
Dept: NEUROLOGY | Facility: CLINIC | Age: 35
End: 2025-08-26
Payer: COMMERCIAL

## 2025-08-28 ENCOUNTER — HOSPITAL ENCOUNTER (OUTPATIENT)
Dept: RADIOLOGY | Facility: HOSPITAL | Age: 35
Discharge: HOME | End: 2025-08-28
Attending: PHYSICIAN ASSISTANT
Payer: COMMERCIAL

## 2025-08-28 ENCOUNTER — OFFICE VISIT (OUTPATIENT)
Dept: INTERNAL MEDICINE | Facility: CLINIC | Age: 35
End: 2025-08-28
Payer: COMMERCIAL

## 2025-08-28 VITALS
WEIGHT: 189.13 LBS | SYSTOLIC BLOOD PRESSURE: 104 MMHG | DIASTOLIC BLOOD PRESSURE: 70 MMHG | HEART RATE: 93 BPM | HEIGHT: 62 IN | BODY MASS INDEX: 34.8 KG/M2 | OXYGEN SATURATION: 100 %

## 2025-08-28 DIAGNOSIS — M79.604 RIGHT LEG PAIN: ICD-10-CM

## 2025-08-28 DIAGNOSIS — M79.604 RIGHT LEG PAIN: Primary | ICD-10-CM

## 2025-08-28 PROBLEM — J02.9 ACUTE PHARYNGITIS: Status: RESOLVED | Noted: 2024-04-01 | Resolved: 2025-08-28

## 2025-08-28 PROCEDURE — 99999 PR PBB SHADOW E&M-EST. PATIENT-LVL IV: CPT | Mod: PBBFAC,,, | Performed by: PHYSICIAN ASSISTANT

## 2025-08-28 PROCEDURE — 99213 OFFICE O/P EST LOW 20 MIN: CPT | Mod: S$GLB,,, | Performed by: PHYSICIAN ASSISTANT

## 2025-08-28 PROCEDURE — 73552 X-RAY EXAM OF FEMUR 2/>: CPT | Mod: TC,RT

## 2025-08-28 PROCEDURE — 73552 X-RAY EXAM OF FEMUR 2/>: CPT | Mod: 26,RT,, | Performed by: RADIOLOGY

## 2025-08-29 ENCOUNTER — HOSPITAL ENCOUNTER (OUTPATIENT)
Dept: RADIOLOGY | Facility: HOSPITAL | Age: 35
Discharge: HOME | End: 2025-08-29
Attending: PHYSICIAN ASSISTANT
Payer: COMMERCIAL

## 2025-08-29 ENCOUNTER — OFFICE VISIT (OUTPATIENT)
Dept: SPORTS MEDICINE | Facility: CLINIC | Age: 35
End: 2025-08-29
Payer: COMMERCIAL

## 2025-08-29 VITALS
HEIGHT: 62 IN | SYSTOLIC BLOOD PRESSURE: 102 MMHG | BODY MASS INDEX: 34.48 KG/M2 | WEIGHT: 187.38 LBS | DIASTOLIC BLOOD PRESSURE: 71 MMHG | HEART RATE: 92 BPM

## 2025-08-29 DIAGNOSIS — M76.02 GLUTEAL TENDINITIS OF LEFT BUTTOCK: ICD-10-CM

## 2025-08-29 DIAGNOSIS — R22.41 MASS OF RIGHT THIGH: ICD-10-CM

## 2025-08-29 DIAGNOSIS — M25.552 CHRONIC HIP PAIN, LEFT: Primary | ICD-10-CM

## 2025-08-29 DIAGNOSIS — G89.29 CHRONIC HIP PAIN, LEFT: Primary | ICD-10-CM

## 2025-08-29 DIAGNOSIS — M70.62 TROCHANTERIC BURSITIS OF LEFT HIP: ICD-10-CM

## 2025-08-29 PROCEDURE — 99999 PR PBB SHADOW E&M-EST. PATIENT-LVL IV: CPT | Mod: PBBFAC,,, | Performed by: PHYSICIAN ASSISTANT

## 2025-09-02 ENCOUNTER — HOSPITAL ENCOUNTER (OUTPATIENT)
Dept: RADIOLOGY | Facility: HOSPITAL | Age: 35
Discharge: HOME | End: 2025-09-02
Attending: PHYSICIAN ASSISTANT
Payer: COMMERCIAL

## 2025-09-02 ENCOUNTER — HOSPITAL ENCOUNTER (OUTPATIENT)
Dept: RADIOLOGY | Facility: HOSPITAL | Age: 35
Discharge: HOME | End: 2025-09-02
Attending: INTERNAL MEDICINE
Payer: COMMERCIAL

## 2025-09-02 DIAGNOSIS — E04.1 THYROID NODULE: ICD-10-CM

## 2025-09-02 DIAGNOSIS — R22.41 MASS OF RIGHT THIGH: ICD-10-CM

## 2025-09-02 PROCEDURE — 76536 US EXAM OF HEAD AND NECK: CPT | Mod: TC

## 2025-09-02 PROCEDURE — 73720 MRI LWR EXTREMITY W/O&W/DYE: CPT | Mod: 26,RT,, | Performed by: RADIOLOGY

## 2025-09-02 PROCEDURE — 76536 US EXAM OF HEAD AND NECK: CPT | Mod: 26,,, | Performed by: INTERNAL MEDICINE

## 2025-09-02 PROCEDURE — 25500020 PHARM REV CODE 255: Performed by: PHYSICIAN ASSISTANT

## 2025-09-02 PROCEDURE — 73720 MRI LWR EXTREMITY W/O&W/DYE: CPT | Mod: TC,RT

## 2025-09-02 PROCEDURE — A9585 GADOBUTROL INJECTION: HCPCS | Performed by: PHYSICIAN ASSISTANT

## 2025-09-02 RX ORDER — GADOBUTROL 604.72 MG/ML
9 INJECTION INTRAVENOUS
Status: COMPLETED | OUTPATIENT
Start: 2025-09-02 | End: 2025-09-02

## 2025-09-02 RX ADMIN — GADOBUTROL 9 ML: 604.72 INJECTION INTRAVENOUS at 04:09

## 2025-09-03 ENCOUNTER — TELEPHONE (OUTPATIENT)
Dept: SPORTS MEDICINE | Facility: CLINIC | Age: 35
End: 2025-09-03
Payer: COMMERCIAL

## (undated) DEVICE — TROCAR ENDOPATH XCEL 12MM 10CM

## (undated) DEVICE — PAD ABD 8X10 STERILE

## (undated) DEVICE — GLOVE ORTHO PF SZ 8.5

## (undated) DEVICE — PAD ELECTRODE STER 1.5X3

## (undated) DEVICE — BLADE 4.2MM PREBENT ULTRACUT

## (undated) DEVICE — SUT 0 VICRYL / UR6 (J603)

## (undated) DEVICE — SYR 10CC LUER LOCK

## (undated) DEVICE — GLOVE SURGEON SYN PF SZ 9

## (undated) DEVICE — ADHESIVE MASTISOL VIAL 48/BX

## (undated) DEVICE — GLOVE BIOGEL SKINSENSE PI 7.0

## (undated) DEVICE — TRAY MINOR GEN SURG OMC

## (undated) DEVICE — COVER MAYO STAND REINFRCD 30

## (undated) DEVICE — SCISSOR 5MMX35CM DIRECT DRIVE

## (undated) DEVICE — ELECTRODE REM PLYHSV RETURN 9

## (undated) DEVICE — GOWN SURGICAL X-LARGE

## (undated) DEVICE — DRAPE ABDOMINAL TIBURON 14X11

## (undated) DEVICE — NDL 18GA X1 1/2 REG BEVEL

## (undated) DEVICE — PAD COLD THERAPY KNEE WRAP ON

## (undated) DEVICE — SUT MCRYL PLUS 4-0 PS2 27IN

## (undated) DEVICE — SEE MEDLINE ITEM 157131

## (undated) DEVICE — SEE MEDLINE ITEM 157117

## (undated) DEVICE — SOL IRR NACL .9% 3000ML

## (undated) DEVICE — TOURNIQUET SB QC DP 34X4IN

## (undated) DEVICE — DRESSING XEROFORM FOIL PK 1X8

## (undated) DEVICE — TUBING HF INSUFFLATION W/ FLTR

## (undated) DEVICE — APPLICATOR CHLORAPREP ORN 26ML

## (undated) DEVICE — UNDERGLOVES BIOGEL PI SIZE 7.5

## (undated) DEVICE — SEE MEDLINE ITEM 157169

## (undated) DEVICE — SYS SMOKE EVACUATION LAP

## (undated) DEVICE — CLIP HEMO-LOK ML

## (undated) DEVICE — NDL HYPO REG 25G X 1 1/2

## (undated) DEVICE — SOL NS 1000CC

## (undated) DEVICE — TUBE SET INFLOW/OUTFLOW

## (undated) DEVICE — GOWN SMART IMP BREATHABLE XXLG

## (undated) DEVICE — TROCAR ENDOPATH XCEL 5X100MM

## (undated) DEVICE — BAG TISS RETRV MONARCH 10MM

## (undated) DEVICE — SOL 9P NACL IRR PIC IL

## (undated) DEVICE — GAUZE SPONGE 4X4 12PLY

## (undated) DEVICE — Device

## (undated) DEVICE — GOWN POLY REINF BRTH SLV XL

## (undated) DEVICE — CANNULA ENDOPATH XCEL 5X100MM

## (undated) DEVICE — IRRIGATOR ENDOSCOPY DISP.

## (undated) DEVICE — CLOSURE SKIN STERI STRIP 1/2X4